# Patient Record
Sex: MALE | Race: WHITE | Employment: OTHER | URBAN - METROPOLITAN AREA
[De-identification: names, ages, dates, MRNs, and addresses within clinical notes are randomized per-mention and may not be internally consistent; named-entity substitution may affect disease eponyms.]

---

## 2017-01-06 ENCOUNTER — TELEPHONE (OUTPATIENT)
Dept: FAMILY MEDICINE CLINIC | Age: 72
End: 2017-01-06

## 2017-01-10 ENCOUNTER — OFFICE VISIT (OUTPATIENT)
Dept: FAMILY MEDICINE CLINIC | Age: 72
End: 2017-01-10

## 2017-01-10 VITALS
HEART RATE: 68 BPM | BODY MASS INDEX: 29.83 KG/M2 | DIASTOLIC BLOOD PRESSURE: 74 MMHG | WEIGHT: 202 LBS | SYSTOLIC BLOOD PRESSURE: 130 MMHG

## 2017-01-10 DIAGNOSIS — E78.2 MIXED HYPERLIPIDEMIA: ICD-10-CM

## 2017-01-10 DIAGNOSIS — K21.9 GASTROESOPHAGEAL REFLUX DISEASE WITHOUT ESOPHAGITIS: ICD-10-CM

## 2017-01-10 DIAGNOSIS — N18.30 CKD (CHRONIC KIDNEY DISEASE) STAGE 3, GFR 30-59 ML/MIN (HCC): ICD-10-CM

## 2017-01-10 DIAGNOSIS — R73.01 ELEVATED FASTING GLUCOSE: ICD-10-CM

## 2017-01-10 DIAGNOSIS — I10 ESSENTIAL HYPERTENSION, BENIGN: Primary | ICD-10-CM

## 2017-01-10 DIAGNOSIS — Z86.718 HISTORY OF RECURRENT DEEP VEIN THROMBOSIS (DVT): ICD-10-CM

## 2017-01-10 PROCEDURE — 36415 COLL VENOUS BLD VENIPUNCTURE: CPT | Performed by: FAMILY MEDICINE

## 2017-01-10 PROCEDURE — 99214 OFFICE O/P EST MOD 30 MIN: CPT | Performed by: FAMILY MEDICINE

## 2017-01-10 ASSESSMENT — ENCOUNTER SYMPTOMS
CONSTIPATION: 0
ABDOMINAL PAIN: 0
EYE PAIN: 0
DIARRHEA: 0
NAUSEA: 0
COUGH: 0
SHORTNESS OF BREATH: 0

## 2017-01-11 LAB
A/G RATIO: 1.9 (ref 1.1–2.2)
ALBUMIN SERPL-MCNC: 4.5 G/DL (ref 3.4–5)
ALP BLD-CCNC: 55 U/L (ref 40–129)
ALT SERPL-CCNC: 10 U/L (ref 10–40)
ANION GAP SERPL CALCULATED.3IONS-SCNC: 15 MMOL/L (ref 3–16)
AST SERPL-CCNC: 12 U/L (ref 15–37)
BILIRUB SERPL-MCNC: 0.4 MG/DL (ref 0–1)
BUN BLDV-MCNC: 22 MG/DL (ref 7–20)
CALCIUM SERPL-MCNC: 9.7 MG/DL (ref 8.3–10.6)
CHLORIDE BLD-SCNC: 100 MMOL/L (ref 99–110)
CHOLESTEROL, TOTAL: 172 MG/DL (ref 0–199)
CO2: 26 MMOL/L (ref 21–32)
CREAT SERPL-MCNC: 1.4 MG/DL (ref 0.8–1.3)
ESTIMATED AVERAGE GLUCOSE: 116.9 MG/DL
GFR AFRICAN AMERICAN: >60
GFR NON-AFRICAN AMERICAN: 50
GLOBULIN: 2.4 G/DL
GLUCOSE BLD-MCNC: 79 MG/DL (ref 70–99)
HBA1C MFR BLD: 5.7 %
HDLC SERPL-MCNC: 67 MG/DL (ref 40–60)
LDL CHOLESTEROL CALCULATED: 91 MG/DL
POTASSIUM SERPL-SCNC: 4.9 MMOL/L (ref 3.5–5.1)
SODIUM BLD-SCNC: 141 MMOL/L (ref 136–145)
TOTAL PROTEIN: 6.9 G/DL (ref 6.4–8.2)
TRIGL SERPL-MCNC: 71 MG/DL (ref 0–150)
VLDLC SERPL CALC-MCNC: 14 MG/DL

## 2017-01-17 ENCOUNTER — HOSPITAL ENCOUNTER (OUTPATIENT)
Dept: OTHER | Age: 72
Discharge: HOME | End: 2017-01-17
Attending: INTERNAL MEDICINE | Admitting: INTERNAL MEDICINE

## 2017-01-17 LAB
A/G RATIO: 1.7 (ref 1.1–2.2)
ALBUMIN SERPL-MCNC: 4.2 G/DL (ref 3.4–5)
ALP BLD-CCNC: 55 U/L (ref 40–129)
ALT SERPL-CCNC: 9 U/L (ref 10–40)
ANION GAP SERPL CALCULATED.3IONS-SCNC: 16 MMOL/L (ref 3–16)
AST SERPL-CCNC: 11 U/L (ref 15–37)
BILIRUB SERPL-MCNC: 0.3 MG/DL (ref 0–1)
BUN BLDV-MCNC: 19 MG/DL (ref 7–20)
CALCIUM SERPL-MCNC: 9.1 MG/DL (ref 8.3–10.6)
CHLORIDE BLD-SCNC: 103 MMOL/L (ref 99–110)
CO2: 25 MMOL/L (ref 21–32)
CREAT SERPL-MCNC: 1.4 MG/DL (ref 0.8–1.3)
GFR AFRICAN AMERICAN: >60
GFR NON-AFRICAN AMERICAN: 50
GLOBULIN: 2.5 G/DL
GLUCOSE BLD-MCNC: 111 MG/DL (ref 70–99)
HCT VFR BLD CALC: 44.1 % (ref 40.5–52.5)
HEMOGLOBIN: 14.7 G/DL (ref 13.5–17.5)
MCH RBC QN AUTO: 29.8 PG (ref 26–34)
MCHC RBC AUTO-ENTMCNC: 33.3 G/DL (ref 31–36)
MCV RBC AUTO: 89.5 FL (ref 80–100)
PDW BLD-RTO: 14.1 % (ref 12.4–15.4)
PLATELET # BLD: 206 K/UL (ref 135–450)
PMV BLD AUTO: 9.2 FL (ref 5–10.5)
POTASSIUM SERPL-SCNC: 4.4 MMOL/L (ref 3.5–5.1)
RBC # BLD: 4.93 M/UL (ref 4.2–5.9)
SODIUM BLD-SCNC: 144 MMOL/L (ref 136–145)
TOTAL PROTEIN: 6.7 G/DL (ref 6.4–8.2)
WBC # BLD: 7.9 K/UL (ref 4–11)

## 2017-02-14 DIAGNOSIS — F41.9 ANXIETY: ICD-10-CM

## 2017-02-15 RX ORDER — ROSUVASTATIN CALCIUM 20 MG/1
20 TABLET, COATED ORAL NIGHTLY
Qty: 90 TABLET | Refills: 1 | Status: SHIPPED | OUTPATIENT
Start: 2017-02-15 | End: 2017-07-11 | Stop reason: SDUPTHER

## 2017-02-15 RX ORDER — LORAZEPAM 1 MG/1
TABLET ORAL
Qty: 180 TABLET | Refills: 0 | OUTPATIENT
Start: 2017-02-15 | End: 2017-05-01 | Stop reason: SDUPTHER

## 2017-02-15 RX ORDER — FENOFIBRATE 48 MG/1
48 TABLET, COATED ORAL DAILY
Qty: 90 TABLET | Refills: 1 | Status: SHIPPED | OUTPATIENT
Start: 2017-02-15 | End: 2017-07-11 | Stop reason: DRUGHIGH

## 2017-03-09 ENCOUNTER — TELEPHONE (OUTPATIENT)
Dept: FAMILY MEDICINE CLINIC | Age: 72
End: 2017-03-09

## 2017-03-15 ENCOUNTER — TELEPHONE (OUTPATIENT)
Dept: FAMILY MEDICINE CLINIC | Age: 72
End: 2017-03-15

## 2017-03-21 DIAGNOSIS — E78.2 MIXED HYPERLIPIDEMIA: Primary | ICD-10-CM

## 2017-03-21 RX ORDER — FENOFIBRATE 160 MG/1
160 TABLET ORAL DAILY
Qty: 90 TABLET | Refills: 1 | Status: CANCELLED | OUTPATIENT
Start: 2017-03-21

## 2017-03-23 RX ORDER — FENOFIBRATE 54 MG/1
54 TABLET ORAL DAILY
Qty: 90 TABLET | Refills: 3 | Status: SHIPPED | OUTPATIENT
Start: 2017-03-23 | End: 2018-03-08 | Stop reason: SDUPTHER

## 2017-04-26 RX ORDER — TRAZODONE HYDROCHLORIDE 100 MG/1
TABLET ORAL
Qty: 90 TABLET | Refills: 1 | Status: SHIPPED | OUTPATIENT
Start: 2017-04-26 | End: 2017-10-06 | Stop reason: SDUPTHER

## 2017-04-26 RX ORDER — LISINOPRIL 20 MG/1
TABLET ORAL
Qty: 90 TABLET | Refills: 1 | Status: SHIPPED | OUTPATIENT
Start: 2017-04-26 | End: 2017-10-06 | Stop reason: SDUPTHER

## 2017-04-26 RX ORDER — DILTIAZEM HYDROCHLORIDE 120 MG/1
CAPSULE, COATED, EXTENDED RELEASE ORAL
Qty: 90 CAPSULE | Refills: 1 | Status: SHIPPED | OUTPATIENT
Start: 2017-04-26 | End: 2017-10-06 | Stop reason: SDUPTHER

## 2017-04-26 RX ORDER — AMITRIPTYLINE HYDROCHLORIDE 100 MG/1
TABLET, FILM COATED ORAL
Qty: 90 TABLET | Refills: 1 | Status: SHIPPED | OUTPATIENT
Start: 2017-04-26 | End: 2017-10-06 | Stop reason: SDUPTHER

## 2017-05-01 DIAGNOSIS — F41.9 ANXIETY: ICD-10-CM

## 2017-05-02 RX ORDER — LORAZEPAM 1 MG/1
TABLET ORAL
Qty: 180 TABLET | Refills: 0 | OUTPATIENT
Start: 2017-05-02 | End: 2017-07-11 | Stop reason: SDUPTHER

## 2017-05-23 RX ORDER — RANITIDINE 150 MG/1
150 TABLET ORAL 2 TIMES DAILY
Qty: 180 TABLET | Refills: 3 | Status: SHIPPED | OUTPATIENT
Start: 2017-05-23 | End: 2018-03-24

## 2017-06-19 DIAGNOSIS — I82.401 DVT, RECURRENT, LOWER EXTREMITY, ACUTE, RIGHT (HCC): ICD-10-CM

## 2017-07-05 ENCOUNTER — HOSPITAL ENCOUNTER (OUTPATIENT)
Dept: GENERAL RADIOLOGY | Age: 72
Discharge: HOME | End: 2017-07-05
Attending: INTERNAL MEDICINE | Admitting: INTERNAL MEDICINE

## 2017-07-05 DIAGNOSIS — R13.10 DYSPHAGIA, UNSPECIFIED TYPE: ICD-10-CM

## 2017-07-05 DIAGNOSIS — R13.10 DYSPHAGIA: ICD-10-CM

## 2017-07-11 ENCOUNTER — OFFICE VISIT (OUTPATIENT)
Dept: FAMILY MEDICINE CLINIC | Age: 72
End: 2017-07-11

## 2017-07-11 VITALS
SYSTOLIC BLOOD PRESSURE: 128 MMHG | WEIGHT: 196 LBS | HEART RATE: 64 BPM | BODY MASS INDEX: 28.94 KG/M2 | DIASTOLIC BLOOD PRESSURE: 70 MMHG

## 2017-07-11 DIAGNOSIS — Z85.46 PERSONAL HISTORY OF PROSTATE CANCER: ICD-10-CM

## 2017-07-11 DIAGNOSIS — I10 ESSENTIAL HYPERTENSION, BENIGN: Primary | ICD-10-CM

## 2017-07-11 DIAGNOSIS — F41.9 ANXIETY: ICD-10-CM

## 2017-07-11 DIAGNOSIS — E78.2 MIXED HYPERLIPIDEMIA: ICD-10-CM

## 2017-07-11 DIAGNOSIS — K21.9 GASTROESOPHAGEAL REFLUX DISEASE WITHOUT ESOPHAGITIS: ICD-10-CM

## 2017-07-11 DIAGNOSIS — N18.30 CKD (CHRONIC KIDNEY DISEASE) STAGE 3, GFR 30-59 ML/MIN (HCC): ICD-10-CM

## 2017-07-11 DIAGNOSIS — R73.01 ELEVATED FASTING GLUCOSE: ICD-10-CM

## 2017-07-11 LAB
A/G RATIO: 2 (ref 1.1–2.2)
ALBUMIN SERPL-MCNC: 4.8 G/DL (ref 3.4–5)
ALP BLD-CCNC: 63 U/L (ref 40–129)
ALT SERPL-CCNC: 13 U/L (ref 10–40)
ANION GAP SERPL CALCULATED.3IONS-SCNC: 14 MMOL/L (ref 3–16)
AST SERPL-CCNC: 13 U/L (ref 15–37)
BASOPHILS ABSOLUTE: 0.1 K/UL (ref 0–0.2)
BASOPHILS RELATIVE PERCENT: 0.7 %
BILIRUB SERPL-MCNC: <0.2 MG/DL (ref 0–1)
BUN BLDV-MCNC: 26 MG/DL (ref 7–20)
CALCIUM SERPL-MCNC: 9.9 MG/DL (ref 8.3–10.6)
CHLORIDE BLD-SCNC: 98 MMOL/L (ref 99–110)
CHOLESTEROL, TOTAL: 224 MG/DL (ref 0–199)
CO2: 26 MMOL/L (ref 21–32)
CREAT SERPL-MCNC: 1.4 MG/DL (ref 0.8–1.3)
EOSINOPHILS ABSOLUTE: 0.2 K/UL (ref 0–0.6)
EOSINOPHILS RELATIVE PERCENT: 2.5 %
GFR AFRICAN AMERICAN: >60
GFR NON-AFRICAN AMERICAN: 50
GLOBULIN: 2.4 G/DL
GLUCOSE BLD-MCNC: 85 MG/DL (ref 70–99)
HCT VFR BLD CALC: 47.2 % (ref 40.5–52.5)
HDLC SERPL-MCNC: 67 MG/DL (ref 40–60)
HEMOGLOBIN: 15.3 G/DL (ref 13.5–17.5)
LDL CHOLESTEROL CALCULATED: 136 MG/DL
LYMPHOCYTES ABSOLUTE: 1.6 K/UL (ref 1–5.1)
LYMPHOCYTES RELATIVE PERCENT: 18.5 %
MCH RBC QN AUTO: 29.7 PG (ref 26–34)
MCHC RBC AUTO-ENTMCNC: 32.5 G/DL (ref 31–36)
MCV RBC AUTO: 91.6 FL (ref 80–100)
MONOCYTES ABSOLUTE: 0.7 K/UL (ref 0–1.3)
MONOCYTES RELATIVE PERCENT: 7.7 %
NEUTROPHILS ABSOLUTE: 6.3 K/UL (ref 1.7–7.7)
NEUTROPHILS RELATIVE PERCENT: 70.6 %
PARATHYROID HORMONE INTACT: 44.9 PG/ML (ref 14–72)
PDW BLD-RTO: 14.2 % (ref 12.4–15.4)
PLATELET # BLD: 240 K/UL (ref 135–450)
PMV BLD AUTO: 9.2 FL (ref 5–10.5)
POTASSIUM SERPL-SCNC: 5 MMOL/L (ref 3.5–5.1)
PROSTATE SPECIFIC ANTIGEN: <0.01 NG/ML (ref 0–4)
RBC # BLD: 5.16 M/UL (ref 4.2–5.9)
SODIUM BLD-SCNC: 138 MMOL/L (ref 136–145)
TOTAL PROTEIN: 7.2 G/DL (ref 6.4–8.2)
TRIGL SERPL-MCNC: 106 MG/DL (ref 0–150)
VITAMIN D 25-HYDROXY: 32.3 NG/ML
VLDLC SERPL CALC-MCNC: 21 MG/DL
WBC # BLD: 8.9 K/UL (ref 4–11)

## 2017-07-11 PROCEDURE — 1036F TOBACCO NON-USER: CPT | Performed by: FAMILY MEDICINE

## 2017-07-11 PROCEDURE — 3017F COLORECTAL CA SCREEN DOC REV: CPT | Performed by: FAMILY MEDICINE

## 2017-07-11 PROCEDURE — 99214 OFFICE O/P EST MOD 30 MIN: CPT | Performed by: FAMILY MEDICINE

## 2017-07-11 PROCEDURE — 1123F ACP DISCUSS/DSCN MKR DOCD: CPT | Performed by: FAMILY MEDICINE

## 2017-07-11 PROCEDURE — 4040F PNEUMOC VAC/ADMIN/RCVD: CPT | Performed by: FAMILY MEDICINE

## 2017-07-11 PROCEDURE — G8427 DOCREV CUR MEDS BY ELIG CLIN: HCPCS | Performed by: FAMILY MEDICINE

## 2017-07-11 PROCEDURE — 36415 COLL VENOUS BLD VENIPUNCTURE: CPT | Performed by: FAMILY MEDICINE

## 2017-07-11 PROCEDURE — G8419 CALC BMI OUT NRM PARAM NOF/U: HCPCS | Performed by: FAMILY MEDICINE

## 2017-07-11 RX ORDER — LORAZEPAM 1 MG/1
TABLET ORAL
Qty: 180 TABLET | Refills: 0 | OUTPATIENT
Start: 2017-07-11 | End: 2017-07-24 | Stop reason: SDUPTHER

## 2017-07-11 RX ORDER — ROSUVASTATIN CALCIUM 20 MG/1
20 TABLET, COATED ORAL NIGHTLY
Qty: 90 TABLET | Refills: 1 | Status: SHIPPED | OUTPATIENT
Start: 2017-07-11 | End: 2018-02-20 | Stop reason: SDUPTHER

## 2017-07-12 ENCOUNTER — HOSPITAL ENCOUNTER (OUTPATIENT)
Dept: OTHER | Age: 72
Discharge: HOME | End: 2017-07-12
Attending: INTERNAL MEDICINE | Admitting: INTERNAL MEDICINE

## 2017-07-12 VITALS
HEIGHT: 69 IN | WEIGHT: 195 LBS | HEART RATE: 62 BPM | OXYGEN SATURATION: 92 % | SYSTOLIC BLOOD PRESSURE: 116 MMHG | BODY MASS INDEX: 28.88 KG/M2 | TEMPERATURE: 98.1 F | DIASTOLIC BLOOD PRESSURE: 78 MMHG | RESPIRATION RATE: 16 BRPM

## 2017-07-12 LAB
ESTIMATED AVERAGE GLUCOSE: 116.9 MG/DL
HBA1C MFR BLD: 5.7 %

## 2017-07-12 RX ORDER — SODIUM CHLORIDE, SODIUM LACTATE, POTASSIUM CHLORIDE, CALCIUM CHLORIDE 600; 310; 30; 20 MG/100ML; MG/100ML; MG/100ML; MG/100ML
INJECTION, SOLUTION INTRAVENOUS CONTINUOUS
Status: DISCONTINUED | OUTPATIENT
Start: 2017-07-12 | End: 2017-07-13 | Stop reason: HOSPADM

## 2017-07-12 RX ADMIN — SODIUM CHLORIDE, SODIUM LACTATE, POTASSIUM CHLORIDE, CALCIUM CHLORIDE: 600; 310; 30; 20 INJECTION, SOLUTION INTRAVENOUS at 09:29

## 2017-07-12 ASSESSMENT — PAIN - FUNCTIONAL ASSESSMENT: PAIN_FUNCTIONAL_ASSESSMENT: 0-10

## 2017-07-12 ASSESSMENT — ENCOUNTER SYMPTOMS
CHEST TIGHTNESS: 0
DIARRHEA: 0
SHORTNESS OF BREATH: 0
ABDOMINAL PAIN: 0
EYE PAIN: 0
NAUSEA: 0
COUGH: 0

## 2017-07-21 ENCOUNTER — TELEPHONE (OUTPATIENT)
Dept: FAMILY MEDICINE CLINIC | Age: 72
End: 2017-07-21

## 2017-07-24 ENCOUNTER — TELEPHONE (OUTPATIENT)
Dept: FAMILY MEDICINE CLINIC | Age: 72
End: 2017-07-24

## 2017-07-24 DIAGNOSIS — F41.9 ANXIETY: ICD-10-CM

## 2017-07-31 RX ORDER — LORAZEPAM 1 MG/1
TABLET ORAL
Qty: 180 TABLET | Refills: 0 | OUTPATIENT
Start: 2017-07-31 | End: 2017-10-23 | Stop reason: SDUPTHER

## 2017-08-21 DIAGNOSIS — E78.2 MIXED HYPERLIPIDEMIA: ICD-10-CM

## 2017-08-21 RX ORDER — ROSUVASTATIN CALCIUM 20 MG/1
20 TABLET, COATED ORAL NIGHTLY
Qty: 90 TABLET | Refills: 1 | Status: CANCELLED | OUTPATIENT
Start: 2017-08-21

## 2017-10-02 ENCOUNTER — TELEPHONE (OUTPATIENT)
Dept: FAMILY MEDICINE CLINIC | Age: 72
End: 2017-10-02

## 2017-10-02 NOTE — TELEPHONE ENCOUNTER
Patient was advised that once a response is received from his insurance he would be notified of the outcome. He voiced understanding.

## 2017-10-06 NOTE — TELEPHONE ENCOUNTER
Alaina Cornelius is requesting refill(s)   Last OV 7/11/17 (pertaining to medication)  LR 4/26/17 (per medication requested)  Next office visit scheduled or attempted Yes   If no, reason:  1/15/18

## 2017-10-06 NOTE — TELEPHONE ENCOUNTER
From: Roland Alegria  To:  Al Reyes MD  Sent: 10/5/2017 8:00 PM EDT  Subject: Medication Renewal Request    Original authorizing provider: MD Roland Jc would like a refill of the following medications:  amitriptyline (ELAVIL) 100 MG tablet [Jarrod Tidwell MD]  diltiazem (CARDIZEM CD) 120 MG extended release capsule [Jarrod Tidwell MD]  lisinopril (PRINIVIL;ZESTRIL) 20 MG tablet [Jarrod Tidwell MD]  traZODone (DESYREL) 100 MG tablet [Jarrod Tidwell MD]    Preferred pharmacy: [address] [place] [address] [phone] - F [phone]    Comment:

## 2017-10-09 RX ORDER — AMITRIPTYLINE HYDROCHLORIDE 100 MG/1
TABLET, FILM COATED ORAL
Qty: 90 TABLET | Refills: 1 | Status: SHIPPED | OUTPATIENT
Start: 2017-10-09 | End: 2018-04-17 | Stop reason: SDUPTHER

## 2017-10-09 RX ORDER — TRAZODONE HYDROCHLORIDE 100 MG/1
TABLET ORAL
Qty: 90 TABLET | Refills: 1 | Status: SHIPPED | OUTPATIENT
Start: 2017-10-09 | End: 2018-03-08 | Stop reason: SDUPTHER

## 2017-10-09 RX ORDER — DILTIAZEM HYDROCHLORIDE 120 MG/1
CAPSULE, COATED, EXTENDED RELEASE ORAL
Qty: 90 CAPSULE | Refills: 1 | Status: SHIPPED | OUTPATIENT
Start: 2017-10-09 | End: 2018-04-17 | Stop reason: SDUPTHER

## 2017-10-09 RX ORDER — LISINOPRIL 20 MG/1
TABLET ORAL
Qty: 90 TABLET | Refills: 1 | Status: SHIPPED | OUTPATIENT
Start: 2017-10-09 | End: 2018-04-17 | Stop reason: SDUPTHER

## 2017-10-23 DIAGNOSIS — F41.9 ANXIETY: ICD-10-CM

## 2017-10-23 RX ORDER — LORAZEPAM 1 MG/1
TABLET ORAL
Qty: 180 TABLET | Refills: 0 | Status: CANCELLED | OUTPATIENT
Start: 2017-10-23

## 2017-10-24 RX ORDER — LORAZEPAM 1 MG/1
TABLET ORAL
Qty: 180 TABLET | Refills: 0 | OUTPATIENT
Start: 2017-10-24 | End: 2018-01-11 | Stop reason: SDUPTHER

## 2017-11-04 ENCOUNTER — HOSPITAL ENCOUNTER (OUTPATIENT)
Dept: ULTRASOUND IMAGING | Age: 72
Discharge: HOME | End: 2017-11-04
Attending: INTERNAL MEDICINE | Admitting: INTERNAL MEDICINE

## 2017-11-04 DIAGNOSIS — R10.13 ABDOMINAL PAIN, EPIGASTRIC: ICD-10-CM

## 2017-11-04 DIAGNOSIS — K80.10 CALCULUS OF GALLBLADDER WITH CHOLECYSTITIS WITHOUT BILIARY OBSTRUCTION, UNSPECIFIED CHOLECYSTITIS ACUITY: ICD-10-CM

## 2017-11-04 DIAGNOSIS — R10.13 EPIGASTRIC PAIN: ICD-10-CM

## 2017-11-04 DIAGNOSIS — K30 MILD DIETARY INDIGESTION: ICD-10-CM

## 2017-11-04 LAB
A/G RATIO: 1.6 (ref 1.1–2.2)
ALBUMIN SERPL-MCNC: 4.4 G/DL (ref 3.4–5)
ALP BLD-CCNC: 53 U/L (ref 40–129)
ALT SERPL-CCNC: 9 U/L (ref 10–40)
ANION GAP SERPL CALCULATED.3IONS-SCNC: 11 MMOL/L (ref 3–16)
AST SERPL-CCNC: 17 U/L (ref 15–37)
BASOPHILS ABSOLUTE: 0 K/UL (ref 0–0.2)
BASOPHILS RELATIVE PERCENT: 0.6 %
BILIRUB SERPL-MCNC: 0.4 MG/DL (ref 0–1)
BUN BLDV-MCNC: 30 MG/DL (ref 7–20)
CALCIUM SERPL-MCNC: 9.4 MG/DL (ref 8.3–10.6)
CHLORIDE BLD-SCNC: 97 MMOL/L (ref 99–110)
CO2: 28 MMOL/L (ref 21–32)
CREAT SERPL-MCNC: 1.5 MG/DL (ref 0.8–1.3)
EOSINOPHILS ABSOLUTE: 0.2 K/UL (ref 0–0.6)
EOSINOPHILS RELATIVE PERCENT: 2.1 %
GFR AFRICAN AMERICAN: 56
GFR NON-AFRICAN AMERICAN: 46
GLOBULIN: 2.8 G/DL
GLUCOSE BLD-MCNC: 88 MG/DL (ref 70–99)
HCT VFR BLD CALC: 44.9 % (ref 40.5–52.5)
HEMOGLOBIN: 15 G/DL (ref 13.5–17.5)
LIPASE: 24 U/L (ref 13–60)
LYMPHOCYTES ABSOLUTE: 1.4 K/UL (ref 1–5.1)
LYMPHOCYTES RELATIVE PERCENT: 17.6 %
MCH RBC QN AUTO: 30.3 PG (ref 26–34)
MCHC RBC AUTO-ENTMCNC: 33.3 G/DL (ref 31–36)
MCV RBC AUTO: 91 FL (ref 80–100)
MONOCYTES ABSOLUTE: 0.7 K/UL (ref 0–1.3)
MONOCYTES RELATIVE PERCENT: 8.5 %
NEUTROPHILS ABSOLUTE: 5.6 K/UL (ref 1.7–7.7)
NEUTROPHILS RELATIVE PERCENT: 71.2 %
PDW BLD-RTO: 14.1 % (ref 12.4–15.4)
PLATELET # BLD: 222 K/UL (ref 135–450)
PMV BLD AUTO: 9.1 FL (ref 5–10.5)
POTASSIUM SERPL-SCNC: 5 MMOL/L (ref 3.5–5.1)
RBC # BLD: 4.94 M/UL (ref 4.2–5.9)
SODIUM BLD-SCNC: 136 MMOL/L (ref 136–145)
TOTAL PROTEIN: 7.2 G/DL (ref 6.4–8.2)
TSH SERPL DL<=0.05 MIU/L-ACNC: 2.48 UIU/ML (ref 0.27–4.2)
WBC # BLD: 7.9 K/UL (ref 4–11)

## 2017-11-06 LAB — H PYLORI BREATH TEST: NEGATIVE

## 2018-01-11 DIAGNOSIS — F41.9 ANXIETY: ICD-10-CM

## 2018-01-12 RX ORDER — LORAZEPAM 1 MG/1
TABLET ORAL
Qty: 180 TABLET | Refills: 0 | OUTPATIENT
Start: 2018-01-12 | End: 2018-04-06 | Stop reason: SDUPTHER

## 2018-01-15 ENCOUNTER — TELEPHONE (OUTPATIENT)
Dept: FAMILY MEDICINE CLINIC | Age: 73
End: 2018-01-15

## 2018-01-15 NOTE — TELEPHONE ENCOUNTER
Left message for patient to reschedule today's visit (1/15/18) while our office was delayed 2 hours this morning.  Patient is to schedule at earliest convenience

## 2018-01-29 ENCOUNTER — OFFICE VISIT (OUTPATIENT)
Dept: FAMILY MEDICINE CLINIC | Age: 73
End: 2018-01-29

## 2018-01-29 VITALS
HEIGHT: 69 IN | BODY MASS INDEX: 28.58 KG/M2 | HEART RATE: 64 BPM | WEIGHT: 193 LBS | OXYGEN SATURATION: 96 % | DIASTOLIC BLOOD PRESSURE: 62 MMHG | SYSTOLIC BLOOD PRESSURE: 110 MMHG

## 2018-01-29 DIAGNOSIS — Z23 NEED FOR IMMUNIZATION AGAINST INFLUENZA: ICD-10-CM

## 2018-01-29 DIAGNOSIS — E78.2 MIXED HYPERLIPIDEMIA: ICD-10-CM

## 2018-01-29 DIAGNOSIS — I82.401 DEEP VEIN THROMBOSIS (DVT) OF RIGHT LOWER EXTREMITY, UNSPECIFIED CHRONICITY, UNSPECIFIED VEIN (HCC): ICD-10-CM

## 2018-01-29 DIAGNOSIS — R73.01 ELEVATED FASTING GLUCOSE: ICD-10-CM

## 2018-01-29 DIAGNOSIS — I10 ESSENTIAL HYPERTENSION, BENIGN: Primary | ICD-10-CM

## 2018-01-29 LAB
ANION GAP SERPL CALCULATED.3IONS-SCNC: 13 MMOL/L (ref 3–16)
BUN BLDV-MCNC: 25 MG/DL (ref 7–20)
CALCIUM SERPL-MCNC: 9.7 MG/DL (ref 8.3–10.6)
CHLORIDE BLD-SCNC: 104 MMOL/L (ref 99–110)
CHOLESTEROL, TOTAL: 177 MG/DL (ref 0–199)
CO2: 28 MMOL/L (ref 21–32)
CREAT SERPL-MCNC: 1.3 MG/DL (ref 0.8–1.3)
GFR AFRICAN AMERICAN: >60
GFR NON-AFRICAN AMERICAN: 54
GLUCOSE BLD-MCNC: 89 MG/DL (ref 70–99)
HDLC SERPL-MCNC: 75 MG/DL (ref 40–60)
LDL CHOLESTEROL CALCULATED: 86 MG/DL
POTASSIUM SERPL-SCNC: 5.4 MMOL/L (ref 3.5–5.1)
SODIUM BLD-SCNC: 145 MMOL/L (ref 136–145)
TRIGL SERPL-MCNC: 80 MG/DL (ref 0–150)
VLDLC SERPL CALC-MCNC: 16 MG/DL

## 2018-01-29 PROCEDURE — 1123F ACP DISCUSS/DSCN MKR DOCD: CPT | Performed by: FAMILY MEDICINE

## 2018-01-29 PROCEDURE — 3017F COLORECTAL CA SCREEN DOC REV: CPT | Performed by: FAMILY MEDICINE

## 2018-01-29 PROCEDURE — 1036F TOBACCO NON-USER: CPT | Performed by: FAMILY MEDICINE

## 2018-01-29 PROCEDURE — 99213 OFFICE O/P EST LOW 20 MIN: CPT | Performed by: FAMILY MEDICINE

## 2018-01-29 PROCEDURE — G8484 FLU IMMUNIZE NO ADMIN: HCPCS | Performed by: FAMILY MEDICINE

## 2018-01-29 PROCEDURE — 90662 IIV NO PRSV INCREASED AG IM: CPT | Performed by: FAMILY MEDICINE

## 2018-01-29 PROCEDURE — 4040F PNEUMOC VAC/ADMIN/RCVD: CPT | Performed by: FAMILY MEDICINE

## 2018-01-29 PROCEDURE — G8427 DOCREV CUR MEDS BY ELIG CLIN: HCPCS | Performed by: FAMILY MEDICINE

## 2018-01-29 PROCEDURE — G8419 CALC BMI OUT NRM PARAM NOF/U: HCPCS | Performed by: FAMILY MEDICINE

## 2018-01-29 PROCEDURE — G0008 ADMIN INFLUENZA VIRUS VAC: HCPCS | Performed by: FAMILY MEDICINE

## 2018-01-29 PROCEDURE — 36415 COLL VENOUS BLD VENIPUNCTURE: CPT | Performed by: FAMILY MEDICINE

## 2018-01-29 RX ORDER — PANTOPRAZOLE SODIUM 40 MG/1
40 TABLET, DELAYED RELEASE ORAL DAILY
COMMUNITY
Start: 2018-01-24 | End: 2022-04-07 | Stop reason: SDUPTHER

## 2018-01-30 LAB
ESTIMATED AVERAGE GLUCOSE: 114 MG/DL
HBA1C MFR BLD: 5.6 %

## 2018-01-30 ASSESSMENT — ENCOUNTER SYMPTOMS
ABDOMINAL PAIN: 0
SHORTNESS OF BREATH: 0

## 2018-02-20 DIAGNOSIS — E78.2 MIXED HYPERLIPIDEMIA: ICD-10-CM

## 2018-02-20 RX ORDER — ROSUVASTATIN CALCIUM 20 MG/1
20 TABLET, COATED ORAL NIGHTLY
Qty: 90 TABLET | Refills: 1 | Status: SHIPPED | OUTPATIENT
Start: 2018-02-20 | End: 2018-05-07 | Stop reason: SDUPTHER

## 2018-03-08 DIAGNOSIS — E78.2 MIXED HYPERLIPIDEMIA: ICD-10-CM

## 2018-03-09 DIAGNOSIS — E78.2 MIXED HYPERLIPIDEMIA: ICD-10-CM

## 2018-03-09 NOTE — TELEPHONE ENCOUNTER
Collette Gey is requesting refill(s)   Last OV 1/29/18 (pertaining to medication)  LR 3/3/17 (per medication requested)  Next office visit scheduled or attempted No   If no, reason:  Has one on 7/30/18

## 2018-03-10 RX ORDER — FENOFIBRATE 54 MG/1
54 TABLET ORAL DAILY
Qty: 90 TABLET | Refills: 3 | Status: SHIPPED | OUTPATIENT
Start: 2018-03-10 | End: 2019-04-01 | Stop reason: SDUPTHER

## 2018-03-10 RX ORDER — TRAZODONE HYDROCHLORIDE 100 MG/1
TABLET ORAL
Qty: 90 TABLET | Refills: 3 | Status: SHIPPED | OUTPATIENT
Start: 2018-03-10 | End: 2018-03-24

## 2018-03-10 RX ORDER — TRAZODONE HYDROCHLORIDE 100 MG/1
TABLET ORAL
Qty: 90 TABLET | Refills: 3 | Status: SHIPPED | OUTPATIENT
Start: 2018-03-10 | End: 2019-05-27 | Stop reason: SDUPTHER

## 2018-03-10 RX ORDER — FENOFIBRATE 54 MG/1
TABLET ORAL
Qty: 90 TABLET | Refills: 3 | Status: SHIPPED | OUTPATIENT
Start: 2018-03-10 | End: 2018-03-24

## 2018-03-26 ENCOUNTER — OFFICE VISIT (OUTPATIENT)
Dept: FAMILY MEDICINE CLINIC | Age: 73
End: 2018-03-26

## 2018-03-26 VITALS
HEART RATE: 84 BPM | DIASTOLIC BLOOD PRESSURE: 70 MMHG | RESPIRATION RATE: 18 BRPM | HEIGHT: 71 IN | WEIGHT: 189.4 LBS | SYSTOLIC BLOOD PRESSURE: 116 MMHG | BODY MASS INDEX: 26.52 KG/M2

## 2018-03-26 DIAGNOSIS — E87.5 HYPERKALEMIA: Primary | ICD-10-CM

## 2018-03-26 DIAGNOSIS — I49.9 IRREGULAR HEART BEAT: ICD-10-CM

## 2018-03-26 DIAGNOSIS — R42 EPISODIC LIGHTHEADEDNESS: ICD-10-CM

## 2018-03-26 LAB — POTASSIUM SERPL-SCNC: 5 MMOL/L (ref 3.5–5.1)

## 2018-03-26 PROCEDURE — G8428 CUR MEDS NOT DOCUMENT: HCPCS | Performed by: PHYSICIAN ASSISTANT

## 2018-03-26 PROCEDURE — 99213 OFFICE O/P EST LOW 20 MIN: CPT | Performed by: PHYSICIAN ASSISTANT

## 2018-03-26 PROCEDURE — G8482 FLU IMMUNIZE ORDER/ADMIN: HCPCS | Performed by: PHYSICIAN ASSISTANT

## 2018-03-26 PROCEDURE — 4040F PNEUMOC VAC/ADMIN/RCVD: CPT | Performed by: PHYSICIAN ASSISTANT

## 2018-03-26 PROCEDURE — 1036F TOBACCO NON-USER: CPT | Performed by: PHYSICIAN ASSISTANT

## 2018-03-26 PROCEDURE — 3017F COLORECTAL CA SCREEN DOC REV: CPT | Performed by: PHYSICIAN ASSISTANT

## 2018-03-26 PROCEDURE — 1123F ACP DISCUSS/DSCN MKR DOCD: CPT | Performed by: PHYSICIAN ASSISTANT

## 2018-03-26 PROCEDURE — 36415 COLL VENOUS BLD VENIPUNCTURE: CPT | Performed by: PHYSICIAN ASSISTANT

## 2018-03-26 PROCEDURE — G8419 CALC BMI OUT NRM PARAM NOF/U: HCPCS | Performed by: PHYSICIAN ASSISTANT

## 2018-03-26 ASSESSMENT — ENCOUNTER SYMPTOMS
GASTROINTESTINAL NEGATIVE: 1
RESPIRATORY NEGATIVE: 1

## 2018-04-06 ENCOUNTER — TELEPHONE (OUTPATIENT)
Dept: FAMILY MEDICINE CLINIC | Age: 73
End: 2018-04-06

## 2018-04-06 DIAGNOSIS — F41.9 ANXIETY: ICD-10-CM

## 2018-04-10 RX ORDER — LORAZEPAM 1 MG/1
TABLET ORAL
Qty: 180 TABLET | Refills: 0 | OUTPATIENT
Start: 2018-04-10 | End: 2018-07-05

## 2018-04-18 RX ORDER — LISINOPRIL 20 MG/1
TABLET ORAL
Qty: 90 TABLET | Refills: 1 | Status: SHIPPED | OUTPATIENT
Start: 2018-04-18 | End: 2018-09-24 | Stop reason: SDUPTHER

## 2018-04-18 RX ORDER — AMITRIPTYLINE HYDROCHLORIDE 100 MG/1
TABLET, FILM COATED ORAL
Qty: 90 TABLET | Refills: 1 | Status: SHIPPED | OUTPATIENT
Start: 2018-04-18 | End: 2018-09-24 | Stop reason: SDUPTHER

## 2018-04-18 RX ORDER — DILTIAZEM HYDROCHLORIDE 120 MG/1
CAPSULE, COATED, EXTENDED RELEASE ORAL
Qty: 90 CAPSULE | Refills: 1 | Status: SHIPPED | OUTPATIENT
Start: 2018-04-18 | End: 2018-09-24 | Stop reason: SDUPTHER

## 2018-05-07 DIAGNOSIS — E78.2 MIXED HYPERLIPIDEMIA: ICD-10-CM

## 2018-05-09 RX ORDER — ROSUVASTATIN CALCIUM 20 MG/1
20 TABLET, COATED ORAL NIGHTLY
Qty: 90 TABLET | Refills: 3 | Status: SHIPPED | OUTPATIENT
Start: 2018-05-09 | End: 2019-04-01 | Stop reason: SDUPTHER

## 2018-06-18 DIAGNOSIS — I82.401 DVT, RECURRENT, LOWER EXTREMITY, ACUTE, RIGHT (HCC): ICD-10-CM

## 2018-06-20 DIAGNOSIS — I82.401 DVT, RECURRENT, LOWER EXTREMITY, ACUTE, RIGHT (HCC): ICD-10-CM

## 2018-07-02 ENCOUNTER — TELEPHONE (OUTPATIENT)
Dept: FAMILY MEDICINE CLINIC | Age: 73
End: 2018-07-02

## 2018-07-16 DIAGNOSIS — F41.9 ANXIETY: Primary | ICD-10-CM

## 2018-07-16 RX ORDER — LORAZEPAM 1 MG/1
1 TABLET ORAL 2 TIMES DAILY PRN
Qty: 180 TABLET | Refills: 0 | OUTPATIENT
Start: 2018-07-16 | End: 2018-10-01 | Stop reason: SDUPTHER

## 2018-07-16 NOTE — TELEPHONE ENCOUNTER
Mr Mary Mckeon stopping in office to ask about his lorazepam refill/ last fill 4/10/18 for #180 wants called into local pharmacy , jose [facility] instead of mail-away this time

## 2018-07-30 ENCOUNTER — OFFICE VISIT (OUTPATIENT)
Dept: FAMILY MEDICINE CLINIC | Age: 73
End: 2018-07-30

## 2018-07-30 VITALS
DIASTOLIC BLOOD PRESSURE: 64 MMHG | SYSTOLIC BLOOD PRESSURE: 110 MMHG | BODY MASS INDEX: 26.88 KG/M2 | WEIGHT: 190 LBS | HEART RATE: 60 BPM

## 2018-07-30 DIAGNOSIS — R73.09 ELEVATED GLUCOSE: ICD-10-CM

## 2018-07-30 DIAGNOSIS — I10 ESSENTIAL HYPERTENSION, BENIGN: Primary | ICD-10-CM

## 2018-07-30 DIAGNOSIS — I49.49 PREMATURE CONTRACTIONS OR SYSTOLES: ICD-10-CM

## 2018-07-30 DIAGNOSIS — C61 PROSTATE CA (HCC): ICD-10-CM

## 2018-07-30 DIAGNOSIS — F41.9 ANXIETY: ICD-10-CM

## 2018-07-30 DIAGNOSIS — I83.92 VARICOSE VEINS OF LEFT LOWER EXTREMITY: ICD-10-CM

## 2018-07-30 DIAGNOSIS — I80.02 THROMBOPHLEBITIS OF SUPERFICIAL VEINS OF LEFT LOWER EXTREMITY: ICD-10-CM

## 2018-07-30 DIAGNOSIS — I83.91 VARICOSE VEINS OF RIGHT LOWER EXTREMITY: ICD-10-CM

## 2018-07-30 DIAGNOSIS — K22.70 BARRETT'S ESOPHAGUS WITHOUT DYSPLASIA: ICD-10-CM

## 2018-07-30 DIAGNOSIS — E78.2 MIXED HYPERLIPIDEMIA: ICD-10-CM

## 2018-07-30 DIAGNOSIS — K21.9 GASTROESOPHAGEAL REFLUX DISEASE WITHOUT ESOPHAGITIS: ICD-10-CM

## 2018-07-30 LAB
ANION GAP SERPL CALCULATED.3IONS-SCNC: 14 MMOL/L (ref 3–16)
BUN BLDV-MCNC: 32 MG/DL (ref 7–20)
CALCIUM SERPL-MCNC: 9.9 MG/DL (ref 8.3–10.6)
CHLORIDE BLD-SCNC: 101 MMOL/L (ref 99–110)
CHOLESTEROL, TOTAL: 183 MG/DL (ref 0–199)
CO2: 25 MMOL/L (ref 21–32)
CREAT SERPL-MCNC: 1.6 MG/DL (ref 0.8–1.3)
GFR AFRICAN AMERICAN: 51
GFR NON-AFRICAN AMERICAN: 43
GLUCOSE BLD-MCNC: 102 MG/DL (ref 70–99)
HDLC SERPL-MCNC: 67 MG/DL (ref 40–60)
LDL CHOLESTEROL CALCULATED: 97 MG/DL
POTASSIUM SERPL-SCNC: 5.3 MMOL/L (ref 3.5–5.1)
PROSTATE SPECIFIC ANTIGEN: <0.01 NG/ML (ref 0–4)
SODIUM BLD-SCNC: 140 MMOL/L (ref 136–145)
TRIGL SERPL-MCNC: 97 MG/DL (ref 0–150)
VLDLC SERPL CALC-MCNC: 19 MG/DL

## 2018-07-30 PROCEDURE — 3017F COLORECTAL CA SCREEN DOC REV: CPT | Performed by: FAMILY MEDICINE

## 2018-07-30 PROCEDURE — 4040F PNEUMOC VAC/ADMIN/RCVD: CPT | Performed by: FAMILY MEDICINE

## 2018-07-30 PROCEDURE — 36415 COLL VENOUS BLD VENIPUNCTURE: CPT | Performed by: FAMILY MEDICINE

## 2018-07-30 PROCEDURE — G8419 CALC BMI OUT NRM PARAM NOF/U: HCPCS | Performed by: FAMILY MEDICINE

## 2018-07-30 PROCEDURE — 1036F TOBACCO NON-USER: CPT | Performed by: FAMILY MEDICINE

## 2018-07-30 PROCEDURE — G8427 DOCREV CUR MEDS BY ELIG CLIN: HCPCS | Performed by: FAMILY MEDICINE

## 2018-07-30 PROCEDURE — 1123F ACP DISCUSS/DSCN MKR DOCD: CPT | Performed by: FAMILY MEDICINE

## 2018-07-30 PROCEDURE — 99214 OFFICE O/P EST MOD 30 MIN: CPT | Performed by: FAMILY MEDICINE

## 2018-07-30 PROCEDURE — 1101F PT FALLS ASSESS-DOCD LE1/YR: CPT | Performed by: FAMILY MEDICINE

## 2018-07-30 ASSESSMENT — ENCOUNTER SYMPTOMS
SHORTNESS OF BREATH: 0
NAUSEA: 0
ABDOMINAL PAIN: 0
COUGH: 0
EYE PAIN: 0
DIARRHEA: 0

## 2018-07-30 ASSESSMENT — PATIENT HEALTH QUESTIONNAIRE - PHQ9
SUM OF ALL RESPONSES TO PHQ QUESTIONS 1-9: 0
2. FEELING DOWN, DEPRESSED OR HOPELESS: 0
SUM OF ALL RESPONSES TO PHQ9 QUESTIONS 1 & 2: 0
1. LITTLE INTEREST OR PLEASURE IN DOING THINGS: 0

## 2018-07-30 NOTE — PROGRESS NOTES
Subjective:      Patient ID: Thanh Marie is a 67 y.o. male. HPI  FU for HTN/HLD/elevated glucose. In [place] recently. Noticed something above left medial ankle, concerned about ? DVT - has US, but told seems to be large lump, and it hurts. Was a little black and blue at the time, and states is \"weird\" - states. Still hurts and is a little swollen - was told he needs to do something about his varicose veins. States was told it was a superficial thrombophlebitis. About one month ago, noticed irregular heartbeats - had stress test, all ok, and told his irregular heartbeat is NOT concerning - told can come back in 2 years, no need to FU for the irregular heartbeat (per Lorene Severino). Walking 40  Minutes 4 times/week, and diet good (lowfat) - 185 few months ago, now up to 190. Pantoprazole for GERD, daily, and works well. (Also told has Ni's esophagus.)    Review of Systems   Constitutional: Negative for chills and fever. HENT: Negative for ear pain and hearing loss. Eyes: Negative for pain. Respiratory: Negative for cough and shortness of breath. Cardiovascular: Negative for chest pain, palpitations and leg swelling. Gastrointestinal: Negative for abdominal pain, diarrhea and nausea. Genitourinary: Negative for difficulty urinating and dysuria. Musculoskeletal: Negative for gait problem. Psychiatric/Behavioral: Negative for dysphoric mood. The patient is not nervous/anxious. Objective:   Physical Exam   Constitutional: He is oriented to person, place, and time. He appears well-developed and well-nourished. No distress. HENT:   Head: Normocephalic and atraumatic. Eyes: Conjunctivae and EOM are normal. No scleral icterus. Neck: Neck supple. Cardiovascular: Normal rate, regular rhythm, normal heart sounds and intact distal pulses. Pulmonary/Chest: Effort normal and breath sounds normal. No respiratory distress. Abdominal: Soft. There is no tenderness.

## 2018-07-31 DIAGNOSIS — R79.89 ELEVATED SERUM CREATININE: Primary | ICD-10-CM

## 2018-07-31 DIAGNOSIS — I10 ESSENTIAL HYPERTENSION, BENIGN: ICD-10-CM

## 2018-07-31 LAB
ESTIMATED AVERAGE GLUCOSE: 114 MG/DL
HBA1C MFR BLD: 5.6 %

## 2018-07-31 NOTE — PATIENT INSTRUCTIONS
Hypertension, anxiety, GERD, and premature contractions all stable, continue present medications. Consider follow-up with vascular surgeon as discussed, regarding your bilateral varicose veins. Follow-up with cardiologist if palpitations increase, or if any other symptoms with the palpitations. Advise low-fat and low sweets diet, also mild/gradual weight loss as able. If labs stable, and overall feeling well, then repeat fasting office visit in 6 months, sooner as needed.

## 2018-09-24 NOTE — TELEPHONE ENCOUNTER
From: Bruce Pal  Sent: 9/24/2018 7:21 AM EDT  Subject: Medication Renewal Request    Bruce Pal would like a refill of the following medications:     amitriptyline (ELAVIL) 100 MG tablet [Jarrod Tidwell MD]     diltiazem (CARDIZEM CD) 120 MG extended release capsule [Jarrod Tidwell MD]     lisinopril (PRINIVIL;ZESTRIL) 20 MG tablet [Jarrod Tidwell MD]    Preferred pharmacy: [address] [place] [address] [phone] - F [phone]

## 2018-09-25 RX ORDER — LISINOPRIL 20 MG/1
TABLET ORAL
Qty: 90 TABLET | Refills: 1 | Status: CANCELLED | OUTPATIENT
Start: 2018-09-25

## 2018-09-25 RX ORDER — AMITRIPTYLINE HYDROCHLORIDE 100 MG/1
TABLET, FILM COATED ORAL
Qty: 90 TABLET | Refills: 1 | Status: CANCELLED | OUTPATIENT
Start: 2018-09-25

## 2018-09-25 RX ORDER — DILTIAZEM HYDROCHLORIDE 120 MG/1
CAPSULE, COATED, EXTENDED RELEASE ORAL
Qty: 90 CAPSULE | Refills: 1 | Status: CANCELLED | OUTPATIENT
Start: 2018-09-25

## 2018-09-25 NOTE — TELEPHONE ENCOUNTER
Norma Fernández is requesting refill(s)   Last OV 7/30/18 (pertaining to medication)  LR 12/4/17 (per medication requested)  Next office visit scheduled or attempted No   If no, reason:  Has one on 12/4/18

## 2018-09-25 NOTE — TELEPHONE ENCOUNTER
From: Sonia Phillips  Sent: 9/25/2018 7:22 AM EDT  Subject: Medication Renewal Request    Sonia Phillips would like a refill of the following medications:     amitriptyline (ELAVIL) 100 MG tablet [Jarrod Tidwell MD]     diltiazem (CARDIZEM CD) 120 MG extended release capsule [Jarrod Tidwell MD]     lisinopril (PRINIVIL;ZESTRIL) 20 MG tablet [Jarrod Tidwell MD]    Preferred pharmacy: [address] [place] [address] [phone] - F [phone]

## 2018-09-26 RX ORDER — DILTIAZEM HYDROCHLORIDE 120 MG/1
CAPSULE, COATED, EXTENDED RELEASE ORAL
Qty: 90 CAPSULE | Refills: 1 | Status: SHIPPED | OUTPATIENT
Start: 2018-09-26 | End: 2019-04-01 | Stop reason: SDUPTHER

## 2018-09-26 RX ORDER — LISINOPRIL 20 MG/1
TABLET ORAL
Qty: 90 TABLET | Refills: 1 | Status: SHIPPED | OUTPATIENT
Start: 2018-09-26 | End: 2019-04-01 | Stop reason: SDUPTHER

## 2018-09-26 RX ORDER — AMITRIPTYLINE HYDROCHLORIDE 100 MG/1
TABLET, FILM COATED ORAL
Qty: 90 TABLET | Refills: 1 | Status: SHIPPED | OUTPATIENT
Start: 2018-09-26 | End: 2019-04-01 | Stop reason: SDUPTHER

## 2018-10-01 DIAGNOSIS — F41.9 ANXIETY: ICD-10-CM

## 2018-10-03 RX ORDER — LORAZEPAM 1 MG/1
1 TABLET ORAL 2 TIMES DAILY PRN
Qty: 180 TABLET | Refills: 0 | OUTPATIENT
Start: 2018-10-03 | End: 2018-12-19 | Stop reason: SDUPTHER

## 2018-10-22 ENCOUNTER — HOSPITAL ENCOUNTER (OUTPATIENT)
Dept: GENERAL RADIOLOGY | Age: 73
Discharge: HOME | End: 2018-10-22
Payer: MEDICARE

## 2018-10-22 DIAGNOSIS — R13.10 DYSPHAGIA, UNSPECIFIED TYPE: ICD-10-CM

## 2018-10-22 PROCEDURE — 74220 X-RAY XM ESOPHAGUS 1CNTRST: CPT

## 2018-12-19 DIAGNOSIS — F41.9 ANXIETY: ICD-10-CM

## 2018-12-19 NOTE — TELEPHONE ENCOUNTER
Emma Wiley is requesting refill(s) ativan  Last OV 7/30/18 (pertaining to medication)  LR 10/3/18 (per medication requested)  Next office visit scheduled or attempted Yes   If no, reason:  2/4/19

## 2018-12-21 ENCOUNTER — TELEPHONE (OUTPATIENT)
Dept: FAMILY MEDICINE CLINIC | Age: 73
End: 2018-12-21

## 2018-12-21 ENCOUNTER — APPOINTMENT (OUTPATIENT)
Dept: CT IMAGING | Age: 73
End: 2018-12-21
Payer: COMMERCIAL

## 2018-12-21 ENCOUNTER — HOSPITAL ENCOUNTER (EMERGENCY)
Age: 73
Discharge: HOME | End: 2018-12-21
Attending: EMERGENCY MEDICINE
Payer: COMMERCIAL

## 2018-12-21 VITALS
HEIGHT: 69 IN | HEART RATE: 60 BPM | RESPIRATION RATE: 14 BRPM | WEIGHT: 187 LBS | SYSTOLIC BLOOD PRESSURE: 131 MMHG | DIASTOLIC BLOOD PRESSURE: 75 MMHG | BODY MASS INDEX: 27.7 KG/M2 | TEMPERATURE: 98.1 F | OXYGEN SATURATION: 98 %
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
## 2018-12-21 DIAGNOSIS — S09.90XA CLOSED HEAD INJURY, INITIAL ENCOUNTER: ICD-10-CM

## 2018-12-21 PROCEDURE — 70450 CT HEAD/BRAIN W/O DYE: CPT

## 2018-12-21 PROCEDURE — 99284 EMERGENCY DEPT VISIT MOD MDM: CPT

## 2018-12-21 RX ORDER — LORAZEPAM 1 MG/1
TABLET ORAL
Qty: 180 TABLET | Refills: 0 | Status: SHIPPED | OUTPATIENT
Start: 2018-12-21 | End: 2019-03-19 | Stop reason: SDUPTHER

## 2018-12-21 ASSESSMENT — PAIN DESCRIPTION - PAIN TYPE: TYPE: ACUTE PAIN

## 2018-12-21 ASSESSMENT — PAIN SCALES - GENERAL
PAINLEVEL_OUTOF10: 3
PAINLEVEL_OUTOF10: 0

## 2018-12-21 ASSESSMENT — PAIN DESCRIPTION - ORIENTATION: ORIENTATION: POSTERIOR

## 2018-12-21 ASSESSMENT — PAIN DESCRIPTION - LOCATION: LOCATION: HEAD

## 2018-12-22 NOTE — ED PROVIDER NOTES
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 denies loc. on eloquis) and Head Injury      HISTORY OF PRESENT ILLNESS  Sahil Mcclure is a 75 y.o. male who presents to the ED complaining of headache after tripped and fell. Works with disabled people. He is on Eliquis due to DVTs in the past. He denies any loc or neck pain or any numbness or tingling. .   No other complaints, modifying factors or associated symptoms. Nursing notes reviewed. Past Medical History:   Diagnosis Date    Arthritis     [facility]     DVT (deep venous thrombosis) ([facility] 7/1/2014    Left leg    Elevated fasting glucose     Hyperlipidemia     Hypertension     MVP (mitral valve prolapse)     Prostate CA ([facility] 2005     Past Surgical History:   Procedure Laterality Date    BLADDER SURGERY      tumor removed    COLONOSCOPY  6/29/12    Polyps    COLONOSCOPY  07/12/2017    Polyp    EYE SURGERY  June 2009    cataracts both    HERNIA REPAIR  6/1999    Poncho Colon - Dr. [facility]  2005 approx    prostatectomy - Dr. Randal Rothman      In [facility] - had one tonsil burned out     Family History   Problem Relation Age of Onset    Cancer Mother         lung    Cancer Father         prostate    Diabetes Father     Diabetes Brother     Cancer Daughter      Social History     Social History    Marital status:      Spouse name: N/A    Number of children: N/A    Years of education: N/A     Occupational History    Not on file. Social History Main Topics    Smoking status: Never Smoker    Smokeless tobacco: Never Used    Alcohol use No    Drug use: No    Sexual activity: Not on file     Other Topics Concern    Not on file     Social History Narrative    , retired 1/2015     No current facility-administered medications for this encounter.       Current Outpatient Prescriptions   Medication Sig Dispense Refill    LORazepam (ATIVAN) 1 MG tablet TAKE 1 TABLET BY MOUTH  TWICE A DAY AS NEEDED FOR  ANXIETY MUST LAST 90 DAYS 180 tablet 0    amitriptyline (ELAVIL) 100 MG tablet Take 1 tablet by mouth  every night as directed 90 tablet 1    diltiazem (CARDIZEM CD) 120 MG extended release capsule Take 1 capsule by mouth  daily 90 capsule 1    lisinopril (PRINIVIL;ZESTRIL) 20 MG tablet Take 1 tablet by mouth  daily 90 tablet 1    apixaban (ELIQUIS) 5 MG TABS tablet Take 1 tablet by mouth 2 times daily 180 tablet 3    rosuvastatin (CRESTOR) 20 MG tablet Take 1 tablet by mouth nightly 90 tablet 3    traZODone (DESYREL) 100 MG tablet Take 1 tablet by mouth  nightly 90 tablet 3    fenofibrate (TRICOR) 54 MG tablet Take 1 tablet by mouth daily instead of the 48mg dose. (Please dispense GENERIC!) 90 tablet 3    pantoprazole (PROTONIX) 40 MG tablet Take 40 mg by mouth daily       Flaxseed, Linseed, (FLAX SEED OIL) 1000 MG CAPS Take 4,000 Units by mouth daily. Allergies   Allergen Reactions    Lipitor Rash       REVIEW OF SYSTEMS  6 systems reviewed, pertinent positives per HPI otherwise noted to be negative    PHYSICAL EXAM  /75   Pulse 60   Temp 98.1 °F (36.7 °C)   Resp 14   Ht 5' 9\" (1.753 m)   Wt 187 lb (84.8 kg)   SpO2 98%   BMI 27.62 kg/m²   GENERAL APPEARANCE: Awake and alert. Cooperative. No acute distress. HEAD: Normocephalic. Atraumatic. EYES: PERRL. EOM's grossly intact. ENT: Mucous membranes are moist.   NECK: Supple. Normal ROM. CHEST: Equal symmetric chest rise. LUNGS: Breathing is unlabored. Speaking comfortably in full sentences. EXTREMITIES: . MAEE. No acute deformities. All extremities neurovascularly intact. SKIN: Warm and dry. NEUROLOGICAL: Alert and oriented. Normal coordination. Gait normal.     RADIOLOGY  X-RAYS:  I have reviewed radiologic plain film image(s).   ALL OTHER NON-PLAIN FILM IMAGES SUCH AS CT, ULTRASOUND AND MRI HAVE BEEN READ BY THE

## 2019-01-28 ENCOUNTER — HOSPITAL ENCOUNTER (EMERGENCY)
Age: 74
Discharge: HOME | End: 2019-01-28
Attending: EMERGENCY MEDICINE
Payer: COMMERCIAL

## 2019-01-28 ENCOUNTER — APPOINTMENT (OUTPATIENT)
Dept: CT IMAGING | Age: 74
End: 2019-01-28
Payer: COMMERCIAL

## 2019-01-28 VITALS
DIASTOLIC BLOOD PRESSURE: 90 MMHG | TEMPERATURE: 97 F | WEIGHT: 189 LBS | BODY MASS INDEX: 27.99 KG/M2 | OXYGEN SATURATION: 100 % | HEIGHT: 69 IN | SYSTOLIC BLOOD PRESSURE: 148 MMHG | HEART RATE: 88 BPM | RESPIRATION RATE: 14 BRPM

## 2019-01-28 DIAGNOSIS — R42 DIZZINESS: Primary | ICD-10-CM

## 2019-01-28 DIAGNOSIS — R91.1 PULMONARY NODULE: ICD-10-CM

## 2019-01-28 LAB
A/G RATIO: 1.7 (ref 1.1–2.2)
ALBUMIN SERPL-MCNC: 4.6 G/DL (ref 3.4–5)
ALP BLD-CCNC: 64 U/L (ref 40–129)
ALT SERPL-CCNC: 9 U/L (ref 10–40)
ANION GAP SERPL CALCULATED.3IONS-SCNC: 10 MMOL/L (ref 3–16)
AST SERPL-CCNC: 13 U/L (ref 15–37)
BASOPHILS ABSOLUTE: 0 K/UL (ref 0–0.2)
BASOPHILS RELATIVE PERCENT: 0.4 %
BILIRUB SERPL-MCNC: 0.4 MG/DL (ref 0–1)
BUN BLDV-MCNC: 27 MG/DL (ref 7–20)
CALCIUM SERPL-MCNC: 9.3 MG/DL (ref 8.3–10.6)
CHLORIDE BLD-SCNC: 103 MMOL/L (ref 99–110)
CO2: 28 MMOL/L (ref 21–32)
CREAT SERPL-MCNC: 1.3 MG/DL (ref 0.8–1.3)
EOSINOPHILS ABSOLUTE: 0.2 K/UL (ref 0–0.6)
EOSINOPHILS RELATIVE PERCENT: 1.9 %
GFR AFRICAN AMERICAN: >60
GFR NON-AFRICAN AMERICAN: 54
GLOBULIN: 2.7 G/DL
GLUCOSE BLD-MCNC: 103 MG/DL (ref 70–99)
HCT VFR BLD CALC: 47.9 % (ref 40.5–52.5)
HEMOGLOBIN: 16 G/DL (ref 13.5–17.5)
INR BLD: 1.3 (ref 0.86–1.14)
LYMPHOCYTES ABSOLUTE: 1.4 K/UL (ref 1–5.1)
LYMPHOCYTES RELATIVE PERCENT: 15.5 %
MCH RBC QN AUTO: 29.8 PG (ref 26–34)
MCHC RBC AUTO-ENTMCNC: 33.3 G/DL (ref 31–36)
MCV RBC AUTO: 89.5 FL (ref 80–100)
MONOCYTES ABSOLUTE: 0.5 K/UL (ref 0–1.3)
MONOCYTES RELATIVE PERCENT: 5.9 %
NEUTROPHILS ABSOLUTE: 6.9 K/UL (ref 1.7–7.7)
NEUTROPHILS RELATIVE PERCENT: 76.3 %
PDW BLD-RTO: 14.8 % (ref 12.4–15.4)
PLATELET # BLD: 262 K/UL (ref 135–450)
PMV BLD AUTO: 8.3 FL (ref 5–10.5)
POTASSIUM SERPL-SCNC: 5.3 MMOL/L (ref 3.5–5.1)
PROTHROMBIN TIME: 14.8 SEC (ref 9.8–13)
RBC # BLD: 5.35 M/UL (ref 4.2–5.9)
SODIUM BLD-SCNC: 141 MMOL/L (ref 136–145)
TOTAL PROTEIN: 7.3 G/DL (ref 6.4–8.2)
WBC # BLD: 9.1 K/UL (ref 4–11)

## 2019-01-28 PROCEDURE — 6360000004 HC RX CONTRAST MEDICATION: Performed by: EMERGENCY MEDICINE

## 2019-01-28 PROCEDURE — 70496 CT ANGIOGRAPHY HEAD: CPT

## 2019-01-28 PROCEDURE — 2580000003 HC RX 258: Performed by: NURSE PRACTITIONER

## 2019-01-28 PROCEDURE — 85610 PROTHROMBIN TIME: CPT

## 2019-01-28 PROCEDURE — 70498 CT ANGIOGRAPHY NECK: CPT

## 2019-01-28 PROCEDURE — 96360 HYDRATION IV INFUSION INIT: CPT

## 2019-01-28 PROCEDURE — 6370000000 HC RX 637 (ALT 250 FOR IP): Performed by: EMERGENCY MEDICINE

## 2019-01-28 PROCEDURE — 85025 COMPLETE CBC W/AUTO DIFF WBC: CPT

## 2019-01-28 PROCEDURE — 99284 EMERGENCY DEPT VISIT MOD MDM: CPT

## 2019-01-28 PROCEDURE — 70450 CT HEAD/BRAIN W/O DYE: CPT

## 2019-01-28 PROCEDURE — 80053 COMPREHEN METABOLIC PANEL: CPT

## 2019-01-28 RX ORDER — 0.9 % SODIUM CHLORIDE 0.9 %
250 INTRAVENOUS SOLUTION INTRAVENOUS ONCE
Status: COMPLETED | OUTPATIENT
Start: 2019-01-28 | End: 2019-01-28

## 2019-01-28 RX ORDER — MECLIZINE HCL 12.5 MG/1
25 TABLET ORAL ONCE
Status: COMPLETED | OUTPATIENT
Start: 2019-01-28 | End: 2019-01-28

## 2019-01-28 RX ORDER — MECLIZINE HYDROCHLORIDE 25 MG/1
25 TABLET ORAL 3 TIMES DAILY PRN
Qty: 30 TABLET | Refills: 0 | Status: SHIPPED | OUTPATIENT
Start: 2019-01-28 | End: 2019-02-07

## 2019-01-28 RX ADMIN — MECLIZINE 25 MG: 12.5 TABLET ORAL at 10:43

## 2019-01-28 RX ADMIN — SODIUM CHLORIDE 250 ML: 9 INJECTION, SOLUTION INTRAVENOUS at 10:43

## 2019-01-28 RX ADMIN — IOPAMIDOL 75 ML: 755 INJECTION, SOLUTION INTRAVENOUS at 10:21

## 2019-01-28 ASSESSMENT — ENCOUNTER SYMPTOMS
EYES NEGATIVE: 1
DIARRHEA: 0
SHORTNESS OF BREATH: 0
ABDOMINAL PAIN: 0
VOMITING: 0
BACK PAIN: 0
ALLERGIC/IMMUNOLOGIC NEGATIVE: 1
ABDOMINAL DISTENTION: 0
NAUSEA: 0
COUGH: 0
WHEEZING: 0

## 2019-02-04 ENCOUNTER — OFFICE VISIT (OUTPATIENT)
Dept: FAMILY MEDICINE CLINIC | Age: 74
End: 2019-02-04
Payer: MEDICARE

## 2019-02-04 VITALS
WEIGHT: 193.4 LBS | BODY MASS INDEX: 28.64 KG/M2 | HEART RATE: 79 BPM | SYSTOLIC BLOOD PRESSURE: 120 MMHG | HEIGHT: 69 IN | OXYGEN SATURATION: 98 % | DIASTOLIC BLOOD PRESSURE: 62 MMHG

## 2019-02-04 DIAGNOSIS — C67.9 MALIGNANT NEOPLASM OF URINARY BLADDER, UNSPECIFIED SITE (HCC): ICD-10-CM

## 2019-02-04 DIAGNOSIS — K22.70 BARRETT'S ESOPHAGUS WITHOUT DYSPLASIA: ICD-10-CM

## 2019-02-04 DIAGNOSIS — E78.2 MIXED HYPERLIPIDEMIA: ICD-10-CM

## 2019-02-04 DIAGNOSIS — I10 ESSENTIAL HYPERTENSION, BENIGN: Primary | ICD-10-CM

## 2019-02-04 DIAGNOSIS — F07.81 POST CONCUSSIVE SYNDROME: ICD-10-CM

## 2019-02-04 DIAGNOSIS — N18.30 CKD (CHRONIC KIDNEY DISEASE) STAGE 3, GFR 30-59 ML/MIN (HCC): ICD-10-CM

## 2019-02-04 DIAGNOSIS — R73.01 ELEVATED FASTING GLUCOSE: ICD-10-CM

## 2019-02-04 LAB
CHOLESTEROL, TOTAL: 196 MG/DL (ref 0–199)
GLUCOSE BLD-MCNC: 92 MG/DL (ref 70–99)
HDLC SERPL-MCNC: 62 MG/DL (ref 40–60)
LDL CHOLESTEROL CALCULATED: 109 MG/DL
TRIGL SERPL-MCNC: 124 MG/DL (ref 0–150)
VLDLC SERPL CALC-MCNC: 25 MG/DL

## 2019-02-04 PROCEDURE — G8427 DOCREV CUR MEDS BY ELIG CLIN: HCPCS | Performed by: FAMILY MEDICINE

## 2019-02-04 PROCEDURE — 1036F TOBACCO NON-USER: CPT | Performed by: FAMILY MEDICINE

## 2019-02-04 PROCEDURE — 36415 COLL VENOUS BLD VENIPUNCTURE: CPT | Performed by: FAMILY MEDICINE

## 2019-02-04 PROCEDURE — 1101F PT FALLS ASSESS-DOCD LE1/YR: CPT | Performed by: FAMILY MEDICINE

## 2019-02-04 PROCEDURE — G8484 FLU IMMUNIZE NO ADMIN: HCPCS | Performed by: FAMILY MEDICINE

## 2019-02-04 PROCEDURE — 4040F PNEUMOC VAC/ADMIN/RCVD: CPT | Performed by: FAMILY MEDICINE

## 2019-02-04 PROCEDURE — 1123F ACP DISCUSS/DSCN MKR DOCD: CPT | Performed by: FAMILY MEDICINE

## 2019-02-04 PROCEDURE — 99214 OFFICE O/P EST MOD 30 MIN: CPT | Performed by: FAMILY MEDICINE

## 2019-02-04 PROCEDURE — G8419 CALC BMI OUT NRM PARAM NOF/U: HCPCS | Performed by: FAMILY MEDICINE

## 2019-02-04 PROCEDURE — 3017F COLORECTAL CA SCREEN DOC REV: CPT | Performed by: FAMILY MEDICINE

## 2019-02-04 ASSESSMENT — ENCOUNTER SYMPTOMS
EYE PAIN: 0
COUGH: 0
ABDOMINAL PAIN: 0
SHORTNESS OF BREATH: 0
DIARRHEA: 0

## 2019-02-05 LAB
ESTIMATED AVERAGE GLUCOSE: 119.8 MG/DL
HBA1C MFR BLD: 5.8 %

## 2019-03-08 ENCOUNTER — OFFICE VISIT (OUTPATIENT)
Dept: FAMILY MEDICINE CLINIC | Age: 74
End: 2019-03-08
Payer: MEDICARE

## 2019-03-08 VITALS
OXYGEN SATURATION: 88 % | WEIGHT: 190 LBS | SYSTOLIC BLOOD PRESSURE: 138 MMHG | DIASTOLIC BLOOD PRESSURE: 70 MMHG | HEIGHT: 69 IN | BODY MASS INDEX: 28.14 KG/M2 | HEART RATE: 77 BPM

## 2019-03-08 DIAGNOSIS — J02.9 SORE THROAT: Primary | ICD-10-CM

## 2019-03-08 PROCEDURE — G8484 FLU IMMUNIZE NO ADMIN: HCPCS | Performed by: PHYSICIAN ASSISTANT

## 2019-03-08 PROCEDURE — 3017F COLORECTAL CA SCREEN DOC REV: CPT | Performed by: PHYSICIAN ASSISTANT

## 2019-03-08 PROCEDURE — 1036F TOBACCO NON-USER: CPT | Performed by: PHYSICIAN ASSISTANT

## 2019-03-08 PROCEDURE — 1101F PT FALLS ASSESS-DOCD LE1/YR: CPT | Performed by: PHYSICIAN ASSISTANT

## 2019-03-08 PROCEDURE — 4040F PNEUMOC VAC/ADMIN/RCVD: CPT | Performed by: PHYSICIAN ASSISTANT

## 2019-03-08 PROCEDURE — 99213 OFFICE O/P EST LOW 20 MIN: CPT | Performed by: PHYSICIAN ASSISTANT

## 2019-03-08 PROCEDURE — G8427 DOCREV CUR MEDS BY ELIG CLIN: HCPCS | Performed by: PHYSICIAN ASSISTANT

## 2019-03-08 PROCEDURE — G8419 CALC BMI OUT NRM PARAM NOF/U: HCPCS | Performed by: PHYSICIAN ASSISTANT

## 2019-03-08 PROCEDURE — 1123F ACP DISCUSS/DSCN MKR DOCD: CPT | Performed by: PHYSICIAN ASSISTANT

## 2019-03-08 ASSESSMENT — PATIENT HEALTH QUESTIONNAIRE - PHQ9
SUM OF ALL RESPONSES TO PHQ QUESTIONS 1-9: 0
2. FEELING DOWN, DEPRESSED OR HOPELESS: 0
1. LITTLE INTEREST OR PLEASURE IN DOING THINGS: 0
SUM OF ALL RESPONSES TO PHQ QUESTIONS 1-9: 0
SUM OF ALL RESPONSES TO PHQ9 QUESTIONS 1 & 2: 0

## 2019-03-08 ASSESSMENT — ENCOUNTER SYMPTOMS
SORE THROAT: 1
RESPIRATORY NEGATIVE: 1
EYES NEGATIVE: 1

## 2019-03-15 DIAGNOSIS — F41.9 ANXIETY: ICD-10-CM

## 2019-03-15 RX ORDER — LORAZEPAM 1 MG/1
TABLET ORAL
Qty: 180 TABLET | Refills: 0 | Status: CANCELLED | OUTPATIENT
Start: 2019-03-15 | End: 2019-06-13

## 2019-03-19 RX ORDER — LORAZEPAM 1 MG/1
TABLET ORAL
Qty: 180 TABLET | OUTPATIENT
Start: 2019-03-19 | End: 2019-06-17

## 2019-03-19 RX ORDER — LORAZEPAM 1 MG/1
TABLET ORAL
Qty: 180 TABLET | Refills: 0 | Status: SHIPPED | OUTPATIENT
Start: 2019-03-19 | End: 2019-08-20 | Stop reason: SDUPTHER

## 2019-04-01 ENCOUNTER — TELEPHONE (OUTPATIENT)
Dept: FAMILY MEDICINE CLINIC | Age: 74
End: 2019-04-01

## 2019-04-01 DIAGNOSIS — E78.2 MIXED HYPERLIPIDEMIA: ICD-10-CM

## 2019-04-01 RX ORDER — FENOFIBRATE 54 MG/1
54 TABLET ORAL DAILY
Qty: 90 TABLET | Refills: 3 | Status: SHIPPED | OUTPATIENT
Start: 2019-04-01 | End: 2020-03-02

## 2019-04-01 NOTE — TELEPHONE ENCOUNTER
Dale King [phone] (home)    is requesting refill(s) of medication  fenofibrate (TRICOR) 54 MG tablet to preferred pharmacy [address] [place] [address]. 3/8/19 (pertaining to medication)   Last refill 3/10/18 (per medication requested)  Next office visit scheduled or attempted Yes  Date 8/5/19  If No, reason

## 2019-04-02 RX ORDER — LISINOPRIL 20 MG/1
TABLET ORAL
Qty: 90 TABLET | Refills: 1 | Status: SHIPPED | OUTPATIENT
Start: 2019-04-02 | End: 2019-09-23 | Stop reason: SDUPTHER

## 2019-04-02 RX ORDER — ROSUVASTATIN CALCIUM 20 MG/1
20 TABLET, COATED ORAL NIGHTLY
Qty: 90 TABLET | Refills: 1 | Status: SHIPPED | OUTPATIENT
Start: 2019-04-02 | End: 2019-09-23 | Stop reason: SDUPTHER

## 2019-04-02 RX ORDER — DILTIAZEM HYDROCHLORIDE 120 MG/1
CAPSULE, COATED, EXTENDED RELEASE ORAL
Qty: 90 CAPSULE | Refills: 1 | Status: SHIPPED | OUTPATIENT
Start: 2019-04-02 | End: 2019-08-20 | Stop reason: SDUPTHER

## 2019-04-02 RX ORDER — AMITRIPTYLINE HYDROCHLORIDE 100 MG/1
TABLET, FILM COATED ORAL
Qty: 90 TABLET | Refills: 1 | Status: SHIPPED | OUTPATIENT
Start: 2019-04-02 | End: 2019-08-30 | Stop reason: SDUPTHER

## 2019-05-07 DIAGNOSIS — I10 ESSENTIAL HYPERTENSION, BENIGN: Primary | ICD-10-CM

## 2019-05-07 NOTE — TELEPHONE ENCOUNTER
Servando Lindsay [phone] (home)    is requesting refill(s) of medication Lisinopril to preferred pharmacy [facility]     Last OV 2/4/19 (pertaining to medication)   Last refill 4/2/19 (per medication requested)  Next office visit scheduled or attempted Yes  Date 8/5/19  If No, reason         This was refilled on 4/2/19 90 x1 sent to [facility]

## 2019-05-08 NOTE — TELEPHONE ENCOUNTER
Nurse - Please call his pharmacy (or patient) to see what happened to the lisinopril refills sent on 4/2/19 - if they never received them, then please call in exact same Rx, then send to me to sign for \"phone in\" refills.   (Thanks)

## 2019-05-09 NOTE — TELEPHONE ENCOUNTER
Nurse - Please call [facility] and see what's going on - see my note below.   (HE apparently will be running out soon, so IF he needs new Rx, please call in #90 and 1RF, same instructions.)

## 2019-05-13 RX ORDER — LISINOPRIL 20 MG/1
TABLET ORAL
Qty: 90 TABLET | Refills: 3 | OUTPATIENT
Start: 2019-05-13

## 2019-05-13 RX ORDER — LISINOPRIL 20 MG/1
TABLET ORAL
Qty: 90 TABLET | Refills: 1 | OUTPATIENT
Start: 2019-05-13

## 2019-05-13 NOTE — TELEPHONE ENCOUNTER
Patient needs the Lisinopril prescription called back into [facility] they state that did not receive it.

## 2019-05-14 RX ORDER — LISINOPRIL 20 MG/1
TABLET ORAL
Qty: 90 TABLET | Refills: 3 | Status: SHIPPED | OUTPATIENT
Start: 2019-05-14 | End: 2019-08-20 | Stop reason: SDUPTHER

## 2019-05-28 RX ORDER — TRAZODONE HYDROCHLORIDE 100 MG/1
TABLET ORAL
Qty: 90 TABLET | Refills: 3 | Status: SHIPPED | OUTPATIENT
Start: 2019-05-28 | End: 2020-03-02

## 2019-06-05 ENCOUNTER — HOSPITAL ENCOUNTER (OUTPATIENT)
Dept: PHYSICAL THERAPY | Age: 74
Setting detail: THERAPIES SERIES
Discharge: HOME | End: 2019-06-05
Payer: MEDICARE

## 2019-06-05 PROCEDURE — 97140 MANUAL THERAPY 1/> REGIONS: CPT

## 2019-06-05 PROCEDURE — 97161 PT EVAL LOW COMPLEX 20 MIN: CPT

## 2019-06-05 ASSESSMENT — PAIN SCALES - GENERAL: PAINLEVEL_OUTOF10: 0

## 2019-06-05 NOTE — PROGRESS NOTES
Physical Therapy  Initial Assessment  Date: 2019  Patient Name: Isela Dominguez  MRN: [id number]  : 1945    Subjective   General  Chart Reviewed: Yes  Patient assessed for rehabilitation services?: Yes  Family / Caregiver Present: No  Referring Practitioner: Maged Uribe  Referral Date : 19  Diagnosis: Cervical radiculopathy  Follows Commands: Within Functional Limits  General Comment  Comments: PLOF: full functional activity without limits to pain. Pt goal to have no numbness in L hand and full use of L UE. PT Visit Information  PT Insurance Information: Medicare  Subjective  Subjective: Pt reports numb L pinky in the middle of the night about a year ago. Pt fell in december last year and fell on his back on concrete. Pt hit his neck on bumper and head on door of the [employer]. Since that time pain in the L lateral upper arm into elbo and numbness in 4-5 digits. Symptoms have improved since december but numbness is constant and annoying. MRI (+) arthritis in cervical spine. Bulging disks in the cervical spine. no HA. Dizziness at times 3 weeks after the fall. Diagnosed as post-cuncussive. Ful functional activity but significant pain in the lateral elbow with neck motions. Difficulty gripping and holding objects in L hand. Pt is R hand dominant. Pt is retired but working as a . No Hx of neck or back pain. Pain only in middle of night. No pain during the day. Pain Screening  Patient Currently in Pain: Yes  Pain Assessment  Pain Assessment: 0-10  Pain Level: 0(At worst 3-4/10 at night when he wakes in the middle of the night. )  Vital Signs  Patient Currently in Pain: Yes    Objective     Observation/Palpation  Posture: Fair  Palpation: Mild tenderness B suboccipitals  Observation: Sitting: Forward head and upper cervical extension. Good HH positioning in the glenoid. L scapular IR, AT and depression. Body Mechanics: B cervical ERS.      PROM RUE (degrees)  RUE PROM: WNL  AROM RUE (degrees)  RUE AROM : WNL  PROM LUE (degrees)  LUE PROM: WNL  AROM LUE (degrees)  LUE AROM : WNL  Spine  Cervical: Decreased 50% L rotation with pain on L E'.  SB 75% decreased without pain. Flexion WFL without pain. Extension 50% with pain in the Elbow. Joint Mobility  Spine: Hypomobility L rib 1-2. Hypomobility C0-C1 all motions, C1-C2 L rotation. C2-C3     Strength RUE  Strength RUE: WNL  Comment: 52, 38, 34  strength   Strength LUE  Strength LUE: WNL  Comment: W' flexion 4/5; finger abd/add 4/5; thumb ext 4-/5. 28, 27, 25  strength   Strength Other  Other: Cervical 5/5 grossly except SF 4/5     Additional Measures  Special Tests: NDI 8% disability. (-) clonus, (-) hoffmans. Normal DTR B UEs. Denies VA symptoms. (-) cranial nerve symptoms. Other: Normal sensation except L C8 'different'. (+) ULTT R with symptoms in L elbow median and ulnar.  (+) ULTT L median and ulnar with increased tingling in fingers. (+) ULTT L radial with pain in hand and elbow. Assessment   Conditions Requiring Skilled Therapeutic Intervention  Assessment: Pt presents wtih movement impairment ERS cervical spine B, increased L scapular IR,AT and depression.  (+) ULTT B Median and ulnar and L radial nerves.    Prognosis: Good  Decision Making: Low Complexity  REQUIRES PT FOLLOW UP: Yes         Plan   Plan  Times per week: 2x/wk for 4 weeks   Current Treatment Recommendations: Strengthening, Manual Therapy - Joint Manipulation, ROM, Neuromuscular Re-education, Endurance Training, Manual Therapy - Soft Tissue Mobilization, Home Exercise Program    G-Code  NDI  8% disability     Goals  Long term goals  Time Frame for Long term goals : 4 weeks   Long term goal 1: Pt return to PLOF without pain  Long term goal 2: Centralization of symptoms in L hand  Long term goal 3: Pt to sleep through night without waking to elbow pain  Long term goal 4: Pt strength to improve by 1/3 muscle grade L UE.         Therapy Time   Individual Concurrent Group Co-treatment   Time In  7:30         Time Out  8:30         Minutes  15                 Julio Boyer [place]

## 2019-06-05 NOTE — PROGRESS NOTES
Outpatient Physical Therapy  Phone: [phone] Fax: [phone]     To: Erna Benjamin    From: Elnor Carrel, PT     Patient: Julianna Parham      : 1945  Diagnosis: Cervical Spine    Date: 2019  Treatment Diagnosis:      Physical Therapy Certification/Re-Certification Form  Dear Dr. Sheng Guzman,  The following patient has been evaluated for physical therapy services and for therapy to continue, Medicare requires monthly physician review of the treatment plan. Please review the attached evaluation and/or summary of the patient's plan of care, and verify that you agree therapy should continue by signing the attached document and sending it back to our office. Plan of Care/Treatment to date:  [x] Therapeutic Exercise   [] Modalities:  [x] Therapeutic Activity     [] Ultrasound  [] Electrical Stimulation   [] Gait Training      [] Cervical Traction [] Lumbar Traction  [x] Neuromuscular Re-education  [] Hot/Coldpack [] Iontophoresis    [x] Instruction in HEP      Other:  [x] Manual Therapy       []                        [] Aquatic Therapy       []                      ? Frequency/Duration:  # Days per week: [] 1 day # Weeks: [] 1 week [] 5 weeks      [x] 2 days? [] 2 weeks [] 6 weeks     [] 3 days   [] 3 weeks [] 7 weeks     [] 4 days   [x] 4 weeks [] 8 weeks    Rehab Potential: [] Excellent [x] Good [] Fair  [] Poor       Electronically signed by:  Elnor Carrel, PT      If you have any questions or concerns, please don't hesitate to call.   Thank you for your referral.    Physician Signature:________________________________Date:__________________  By signing above, therapists plan is approved by physician

## 2019-06-05 NOTE — FLOWSHEET NOTE
Physical Therapy Daily Treatment Note    Date:  2019    Patient Name:  Charu Nichole    :  1945  MRN: [id number]  Restrictions/Precautions:    Medical/Treatment Diagnosis Information:  · Diagnosis: Cervical radiculopathy  Insurance/Certification information:  PT Insurance Information: Medicare  Physician Information:  Referring Practitioner: Renita Whitman  Plan of care signed (Y/N):  N  Visit# / total visits:      G-Code (if applicable):          NDI 8% disability     Medicare Cap (if applicable):  70 = total amount used, updated 2019    Time in:   8:30     Timed Treatment: 0  Total Treatment Time:  60  ________________________________________________________________________________________    Pain Level:    0/10  SUBJECTIVE:  See evaluation. OBJECTIVE:     Exercise/Equipment Resistance/Repetitions Other comments                                                                                                                                             Other Therapeutic Activities:  Posture re-ed NV     Manual Treatments:   NV Neural flossing and tensioners B uEs. Gr 4 1st rib mobs L. HH repositioning L. Serratus setting, scapular setting. Modalities:      Test/Measurements:    Sitting: Forward head and upper cervical extension. Good HH positioning in the glenoid. L scapular IR, AT and depression. Body Mechanics: B cervical ERS. Cervical: Decreased 50% L rotation with pain on L E'.  SB 75% decreased without pain. Flexion WFL without pain. Extension 50% with pain in the Elbow. Joint Mobility  Spine: Hypomobility L rib 1-2. Hypomobility C0-C1 all motions, C1-C2 L rotation.  C2-C3      Strength RUE  Strength RUE: WNL  Comment: 52, 38, 34  strength   Strength LUE  Strength LUE: WNL  Comment: W' flexion 4/5; finger abd/add 4/5; thumb ext 4-/5. 28, 27, 25  strength   Strength Other  Other: Cervical 5/5 grossly except SF 4/5  Additional Measures  Special Tests: NDI 8% disability. (-) clonus, (-) hoffmans. Normal DTR B UEs. Denies VA symptoms. (-) cranial nerve symptoms. Other: Normal sensation except L C8 'different'. (+) ULTT R with symptoms in L elbow median and ulnar.  (+) ULTT L median and ulnar with increased tingling in fingers. (+) ULTT L radial with pain in hand and elbow. ASSESSMENT:         Treatment/Activity Tolerance:   ?Patient tolerated treatment well ? Patient limited by fatique  ? Patient limited by pain ? Patient limited by other medical complications  ? Other:     Goals:        Long term goals  Time Frame for Long term goals : 4 weeks   Long term goal 1: Pt return to PLOF without pain  Long term goal 2: Centralization of symptoms in L hand  Long term goal 3: Pt to sleep through night without waking to elbow pain  Long term goal 4: Pt strength to improve by 1/3 muscle grade L UE. Plan: ? Continue per plan of care ? Alter current plan (see comments)   ? Plan of care initiated ? Hold pending MD visit ?  Discharge      Plan for Next Session:      Re-Certification Due Date:         Signature:  Deepak Paez

## 2019-06-10 ENCOUNTER — HOSPITAL ENCOUNTER (OUTPATIENT)
Dept: PHYSICAL THERAPY | Age: 74
Setting detail: THERAPIES SERIES
Discharge: HOME | End: 2019-06-10
Payer: MEDICARE

## 2019-06-10 PROCEDURE — 97110 THERAPEUTIC EXERCISES: CPT

## 2019-06-10 PROCEDURE — 97140 MANUAL THERAPY 1/> REGIONS: CPT

## 2019-06-10 NOTE — FLOWSHEET NOTE
Physical Therapy Daily Treatment Note    Date:  6/10/2019    Patient Name:  Freddy Gonzalez    :  1945  MRN: [id number]  Restrictions/Precautions:    Medical/Treatment Diagnosis Information:  · Diagnosis: Cervical radiculopathy  Insurance/Certification information:  PT Insurance Information: Medicare  Physician Information:  Referring Practitioner: Anna Hugo  Plan of care signed (Y/N):  N  Visit# / total visits:      G-Code (if applicable):          NDI 8% disability     Medicare Cap (if applicable):  876 = total amount used, updated 6/10/2019    Time in:   8:30     Timed Treatment: 30  Total Treatment Time:  30  ________________________________________________________________________________________    Pain Level:    0-2/10  SUBJECTIVE:   Only has 1/10 in L elbow with turning his head to the R. No longer having pain at night. OBJECTIVE:     Exercise/Equipment Resistance/Repetitions Other comments          Cervical rotation   x10  HEP video    SNF training    x10 with 5 sec hold  HEP video                               S' IR/ER neuro re-ed    1# x3 mins                                                                                       Other Therapeutic Activities:  Posture re-ed NV     Manual Treatments:   NV Neural flossing and tensioners B UEs. Gr 4 1st and 2nd rib mobs L through 3 breaths. Suboccipital release x5 mins. Gr 3-4 C0-1 mob to promote C0 flexion on C1. Thoracic distraction gr 4 attempted but pt unable to relax. Gr 2-3 upper thoracic and CTJ PA mobs on TPs. HH repositioning L followed by resisted IR/ER neuro re-ed training. Serratus setting, scapular setting. Modalities:      Test/Measurements:    Sitting: Forward head and upper cervical extension. Good HH positioning in the glenoid. L scapular IR, AT and depression. Body Mechanics: B cervical ERS. Cervical: Decreased 50% L rotation with pain on L E'.  SB 75% decreased without pain.   Flexion WFL without pain.  Extension 50% with pain in the Elbow. Joint Mobility  Spine: Hypomobility L rib 1-2. Hypomobility C0-C1 all motions, C1-C2 L rotation. C2-C3      Strength RUE  Strength RUE: WNL  Comment: 52, 38, 34  strength   Strength LUE  Strength LUE: WNL  Comment: W' flexion 4/5; finger abd/add 4/5; thumb ext 4-/5. 28, 27, 25  strength   Strength Other  Other: Cervical 5/5 grossly except SF 4/5  Additional Measures  Special Tests: NDI 8% disability. (-) clonus, (-) hoffmans. Normal DTR B UEs. Denies VA symptoms. (-) cranial nerve symptoms. Other: Normal sensation except L C8 'different'. (+) ULTT R with symptoms in L elbow median and ulnar.  (+) ULTT L median and ulnar with increased tingling in fingers. (+) ULTT L radial with pain in hand and elbow. ASSESSMENT:   Pt tolerated therex well but still benefits from verbal cueing during performance of exercises. Pt demonstrated improved stability of L shoulder and improved mobility of cervical spine. Progress POC as patient tolerates. Treatment/Activity Tolerance:   XPatient tolerated treatment well ? Patient limited by fatique  ? Patient limited by pain ? Patient limited by other medical complications  ? Other:     Goals:        Long term goals  Time Frame for Long term goals : 4 weeks   Long term goal 1: Pt return to PLOF without pain  Long term goal 2: Centralization of symptoms in L hand  Long term goal 3: Pt to sleep through night without waking to elbow pain  Long term goal 4: Pt strength to improve by 1/3 muscle grade L UE. Plan: X Continue per plan of care ? Alter current plan (see comments)   ? Plan of care initiated ? Hold pending MD visit ?  Discharge      Plan for Next Session:      Re-Certification Due Date:         Signature:  Austin Richardson

## 2019-06-12 ENCOUNTER — HOSPITAL ENCOUNTER (OUTPATIENT)
Dept: PHYSICAL THERAPY | Age: 74
Setting detail: THERAPIES SERIES
Discharge: HOME | End: 2019-06-12
Payer: MEDICARE

## 2019-06-12 PROCEDURE — 97110 THERAPEUTIC EXERCISES: CPT

## 2019-06-12 PROCEDURE — 97140 MANUAL THERAPY 1/> REGIONS: CPT

## 2019-06-12 NOTE — FLOWSHEET NOTE
Physical Therapy Daily Treatment Note    Date:  2019    Patient Name:  Sherry Chapman    :  1945  MRN: [id number]  Restrictions/Precautions:    Medical/Treatment Diagnosis Information:  · Diagnosis: Cervical radiculopathy  Insurance/Certification information:  PT Insurance Information: Medicare  Physician Information:  Referring Practitioner: Emily Bourne  Plan of care signed (Y/N):  N  Visit# / total visits:  3/8    G-Code (if applicable):          NDI 8% disability     Medicare Cap (if applicable):  638 = total amount used, updated 2019    Time in:   8:30     Timed Treatment: 30  Total Treatment Time:  30  ________________________________________________________________________________________    Pain Level:    0-2/10  SUBJECTIVE: Pt reports treatment and exercises \"sort of\" working\". Pt reports that since last session the pain and tingling is improving. 50% improvement with L cervical rotation pain. OBJECTIVE:     Exercise/Equipment Resistance/Repetitions Other comments          Cervical rotation   x10  HEP video    SNF training    x10 with 5 sec hold  HEP video                               S' IR/ER neuro re-ed    1# x3 mins                                                                                       Other Therapeutic Activities:  Posture re-ed x5 mins      Manual Treatments:   Neural flossing and tensioners B UEs. Gr 4 1st and 2nd rib mobs R through 3 breaths. Suboccipital release x5 mins. Cervical traction manual gr 3 x5 mins. Gr 3-4 C0-1 mob to promote C0 flexion on C1. Thoracic distraction gr 4 with cavitation at set up. Gr 2-3 upper thoracic and CTJ PA mobs on TPs. HH repositioning L followed by resisted IR/ER neuro re-ed training. Serratus setting, scapular setting. Modalities:      Test/Measurements:    Sitting: Forward head and upper cervical extension. Good HH positioning in the glenoid. L scapular IR, AT and depression.     Body Mechanics: B cervical ERS. Cervical: Decreased 50% L rotation with pain on L E'.  SB 75% decreased without pain. Flexion WFL without pain. Extension 50% with pain in the Elbow. Joint Mobility  Spine: Hypomobility L rib 1-2. Hypomobility C0-C1 all motions, C1-C2 L rotation. C2-C3      Strength RUE  Strength RUE: WNL  Comment: 52, 38, 34  strength   Strength LUE  Strength LUE: WNL  Comment: W' flexion 4/5; finger abd/add 4/5; thumb ext 4-/5. 28, 27, 25  strength   Strength Other  Other: Cervical 5/5 grossly except SF 4/5  Additional Measures  Special Tests: NDI 8% disability. (-) clonus, (-) hoffmans. Normal DTR B UEs. Denies VA symptoms. (-) cranial nerve symptoms. Other: Normal sensation except L C8 'different'. (+) ULTT R with symptoms in L elbow median and ulnar.  (+) ULTT L median and ulnar with increased tingling in fingers. (+) ULTT L radial with pain in hand and elbow. ASSESSMENT:   Pt tolerated therex and manual therapy well this session. Pt demonstrated increase in cervical ROM without pain. Pt also demonstrated improvement in cervical strength. Progress POC as pt tolerates. Treatment/Activity Tolerance:   XPatient tolerated treatment well ? Patient limited by fatique  ? Patient limited by pain ? Patient limited by other medical complications  ? Other:     Goals:        Long term goals  Time Frame for Long term goals : 4 weeks   Long term goal 1: Pt return to PLOF without pain  Long term goal 2: Centralization of symptoms in L hand  Long term goal 3: Pt to sleep through night without waking to elbow pain  Long term goal 4: Pt strength to improve by 1/3 muscle grade L UE. Plan: X Continue per plan of care ? Alter current plan (see comments)   ? Plan of care initiated ? Hold pending MD visit ?  Discharge      Plan for Next Session:      Re-Certification Due Date:         Signature:  Olivier Mera

## 2019-06-17 ENCOUNTER — HOSPITAL ENCOUNTER (OUTPATIENT)
Dept: PHYSICAL THERAPY | Age: 74
Setting detail: THERAPIES SERIES
Discharge: HOME | End: 2019-06-17
Payer: MEDICARE

## 2019-06-17 PROCEDURE — 97140 MANUAL THERAPY 1/> REGIONS: CPT

## 2019-06-17 PROCEDURE — 97110 THERAPEUTIC EXERCISES: CPT

## 2019-06-17 NOTE — FLOWSHEET NOTE
Physical Therapy Daily Treatment Note    Date:  2019    Patient Name:  Keena Morales    :  1945  MRN: [id number]  Restrictions/Precautions:    Medical/Treatment Diagnosis Information:  · Diagnosis: Cervical radiculopathy  Insurance/Certification information:  PT Insurance Information: Medicare  Physician Information:  Referring Practitioner: Magalis Gutierres  Plan of care signed (Y/N):  N  Visit# / total visits:      G-Code (if applicable):          NDI 8% disability     Medicare Cap (if applicable):  946 = total amount used, updated 2019    Time in:   8:30     Timed Treatment: 30  Total Treatment Time:  30  ________________________________________________________________________________________    Pain Level:    0-2/10  SUBJECTIVE: Pt reports pain is \"pretty much gone\" and neck is feeling \"really good\". OBJECTIVE:     Exercise/Equipment Resistance/Repetitions Other comments          Cervical rotation   x10  HEP video    SNF training    x10 with 5 sec hold  HEP video          Prone Ys and Ts   x10 B each  HEP video                  S' IR/ER neuro re-ed    2# x3 mins                                                                                       Other Therapeutic Activities:  Posture re-ed x5 mins      Manual Treatments:   Neural flossing and tensioners L UEs ulnar and median nerve. Gr 4 1st and 2nd rib mobs B through 3 breaths. Suboccipital release x3 mins. Cervical traction manual gr 3 x4 mins. Gr 3-4 C0-1 mob to promote C0 flexion on C1. Thoracic distraction gr 4 with cavitation at set up. Gr 2-3 upper thoracic and CTJ PA mobs on TPs. HH repositioning L followed by resisted IR/ER neuro re-ed training. Serratus setting, scapular setting. Modalities:      Test/Measurements:    Sitting: Forward head and upper cervical extension. Good HH positioning in the glenoid. L scapular IR, AT and depression. Body Mechanics: B cervical ERS.   Cervical: Decreased 50% L rotation with pain on L E'.  SB 75% decreased without pain. Flexion WFL without pain. Extension 50% with pain in the Elbow. Joint Mobility  Spine: Hypomobility L rib 1-2. Hypomobility C0-C1 all motions, C1-C2 L rotation. C2-C3      Strength RUE  Strength RUE: WNL  Comment: 52, 38, 34  strength   Strength LUE  Strength LUE: WNL  Comment: W' flexion 4/5; finger abd/add 4/5; thumb ext 4-/5. 28, 27, 25  strength   Strength Other  Other: Cervical 5/5 grossly except SF 4/5  Additional Measures  Special Tests: NDI 8% disability. (-) clonus, (-) hoffmans. Normal DTR B UEs. Denies VA symptoms. (-) cranial nerve symptoms. Other: Normal sensation except L C8 'different'. (+) ULTT R with symptoms in L elbow median and ulnar.  (+) ULTT L median and ulnar with increased tingling in fingers. (+) ULTT L radial with pain in hand and elbow. ASSESSMENT:   Pt tolerated therex and manual therapy well this session. Pt demonstrated increase in pain-free cervical ROM. Pt progressed to scapular strengthening in prone position. Progress POC as pt tolerates. Treatment/Activity Tolerance:   XPatient tolerated treatment well ? Patient limited by fatique  ? Patient limited by pain ? Patient limited by other medical complications  ? Other:     Goals:        Long term goals  Time Frame for Long term goals : 4 weeks   Long term goal 1: Pt return to PLOF without pain  Long term goal 2: Centralization of symptoms in L hand  Long term goal 3: Pt to sleep through night without waking to elbow pain  Long term goal 4: Pt strength to improve by 1/3 muscle grade L UE. Plan: X Continue per plan of care ? Alter current plan (see comments)   ? Plan of care initiated ? Hold pending MD visit ?  Discharge      Plan for Next Session:      Re-Certification Due Date:         Signature:  Sarah Pizarro

## 2019-06-19 ENCOUNTER — HOSPITAL ENCOUNTER (OUTPATIENT)
Dept: PHYSICAL THERAPY | Age: 74
Setting detail: THERAPIES SERIES
Discharge: HOME | End: 2019-06-19
Payer: MEDICARE

## 2019-06-19 PROCEDURE — 97140 MANUAL THERAPY 1/> REGIONS: CPT

## 2019-06-19 PROCEDURE — 97110 THERAPEUTIC EXERCISES: CPT

## 2019-06-19 NOTE — FLOWSHEET NOTE
Physical Therapy Daily Treatment Note    Date:  2019    Patient Name:  Lul Chen    :  1945  MRN: [id number]  Restrictions/Precautions:    Medical/Treatment Diagnosis Information:  · Diagnosis: Cervical radiculopathy  Insurance/Certification information:  PT Insurance Information: Medicare  Physician Information:  Referring Practitioner: Christel Jiménez  Plan of care signed (Y/N):  N  Visit# / total visits:      G-Code (if applicable):          NDI 8% disability     Medicare Cap (if applicable):  907 = total amount used, updated 2019    Time in:   7:30     Timed Treatment: 30  Total Treatment Time:  30  ________________________________________________________________________________________    Pain Level:    0-2/10  SUBJECTIVE: Pt reports feeling great without symptoms. OBJECTIVE:     Exercise/Equipment Resistance/Repetitions Other comments          Cervical rotation    HEP video    SNF training     HEP video          Prone Ys and Ts   x10 B each  HEP video    UT on Wall    x10            S' IR/ER neuro re-ed    2# x3 mins            Pivot prone      x3 mins  Attempted with increased pain upon stretch    Pec Stretch in Doorway     NV                                                              Other Therapeutic Activities:  Posture re-ed x5 mins      Manual Treatments:   Neural flossing and tensioners L UEs ulnar and median nerve. Gr 4 1st and 2nd rib mobs B through 3 breaths. Suboccipital release x3 mins. Cervical traction manual gr 3 x4 mins. Gr 3-4 C0-1 mob to promote C0 flexion on C1. Thoracic distraction gr 4 with cavitation at set up. Gr 2-3 upper thoracic and CTJ PA mobs on TPs. Seated MWM upper thoracic and CTJ PAs. HH repositioning L followed by resisted IR/ER neuro re-ed training. Serratus setting, scapular setting. Modalities:      Test/Measurements:    Sitting: Forward head and upper cervical extension. Good HH positioning in the glenoid.   L

## 2019-06-24 ENCOUNTER — HOSPITAL ENCOUNTER (OUTPATIENT)
Dept: PHYSICAL THERAPY | Age: 74
Setting detail: THERAPIES SERIES
Discharge: HOME | End: 2019-06-24
Payer: MEDICARE

## 2019-06-24 DIAGNOSIS — I82.401 DVT, RECURRENT, LOWER EXTREMITY, ACUTE, RIGHT (HCC): ICD-10-CM

## 2019-06-24 PROCEDURE — 97110 THERAPEUTIC EXERCISES: CPT

## 2019-06-24 PROCEDURE — 97140 MANUAL THERAPY 1/> REGIONS: CPT

## 2019-06-24 RX ORDER — APIXABAN 5 MG/1
TABLET, FILM COATED ORAL
Qty: 180 TABLET | Refills: 3 | Status: SHIPPED | OUTPATIENT
Start: 2019-06-24 | End: 2020-04-03

## 2019-06-24 NOTE — FLOWSHEET NOTE
Physical Therapy Daily Treatment Note    Date:  2019    Patient Name:  Mahesh Chavarria    :  1945  MRN: [id number]  Restrictions/Precautions:    Medical/Treatment Diagnosis Information:  · Diagnosis: Cervical radiculopathy  Insurance/Certification information:  PT Insurance Information: Medicare  Physician Information:  Referring Practitioner: Stevo Asencio  Plan of care signed (Y/N):  N  Visit# / total visits:      G-Code (if applicable):          NDI 8% disability     Medicare Cap (if applicable):  595 = total amount used, updated 2019    Time in:   7:25     Timed Treatment: 30  Total Treatment Time:  30  ________________________________________________________________________________________    Pain Level:    denies/10  SUBJECTIVE: Pt reports that the neck is good and that all of the symptoms are gone. OBJECTIVE:     Exercise/Equipment Resistance/Repetitions Other comments          Cervical rotation    HEP video    SNF training     HEP video          Prone Ys and Ts   x10 B each  HEP video    UT on Wall    x10            S' IR/ER neuro re-ed    2# x3 mins            Pivot prone      x3 mins  Attempted with increased pain upon stretch    Pec Stretch in Doorway     NV           Foam Roll Protocol (6 in half roll)    -Stable wit arms out, palms up     -Hugs    -Punches    -S' Flexion 3 mins, 1 min stable   x10 hugs  x10 punches  x10 S' Flexion                                                Other Therapeutic Activities:  Movement and reaching training x6 mins       Manual Treatments:     HH repositioning L followed by resisted IR/ER neuro re-ed training. Serratus setting, scapular setting. Modalities:      Test/Measurements:    Sitting: Forward head and upper cervical extension. Good HH positioning in the glenoid. L scapular IR, AT and depression. Body Mechanics: B cervical ERS. Cervical: Decreased 50% L rotation with pain on L E'.  SB 75% decreased without pain. and was responsible for the assessment and treatment of the patient.

## 2019-06-26 ENCOUNTER — HOSPITAL ENCOUNTER (OUTPATIENT)
Dept: PHYSICAL THERAPY | Age: 74
Setting detail: THERAPIES SERIES
Discharge: HOME | End: 2019-06-26
Payer: MEDICARE

## 2019-06-26 PROCEDURE — 97110 THERAPEUTIC EXERCISES: CPT

## 2019-06-26 NOTE — FLOWSHEET NOTE
Physical Therapy Daily Treatment Note    Date:  2019    Patient Name:  Saige Little    :  1945  MRN: [id number]  Restrictions/Precautions:    Medical/Treatment Diagnosis Information:  · Diagnosis: Cervical radiculopathy  Insurance/Certification information:  PT Insurance Information: Medicare  Physician Information:  Referring Practitioner: Rafal Smith  Plan of care signed (Y/N):  N  Visit# / total visits:      G-Code (if applicable):          NDI 4% disability (19)    8% disability (initial)      Medicare Cap (if applicable):  500 = total amount used, updated 2019    Time in:   7:30     Timed Treatment: 30  Total Treatment Time:  30  ________________________________________________________________________________________    Pain Level:    denies/10  SUBJECTIVE: Pt reports that the neck is good and that all of the symptoms are gone. \"Feeling like a new man\". Pt states he is going to cancel his cortisone shot because he says there is no need due to pain being gone. OBJECTIVE:   Test/Measurements:    Sitting: Forward head and upper cervical extension. Good HH positioning in the glenoid. L scapular IR, AT and depression improved from initial visit. Body Mechanics: B cervical ERS.   Cervical: 25% decrease in L cervical rotation, 50% decrease in R cervical rotation       Strength RUE  Strength RUE: WNL  Comment: 19  strength: 66, 50, 68          At initial  strength: 52, 38, 34     Strength LUE  Strength LUE: WNL  Comment:      19       L W' Flexion: 4+/5       L Finger ABd/ADd: 4/5       L Thumb Ext 4-/5    At initial      L W' Flexion: 4/5      L Finger ABd/ADd: 4/5      L Thumb: 4-/5      19  strength: 41, 40, 42    At initial  strength: 28, 27, 25      Strength Other  Other:  19 SNF 5/5   At initial SNF: 4/5   Cervical 5/5 grossly except SF 4/5   Additional Measures  Special Tests: NDI 4% disability         ASSESSMENT: After 7 PT visits pt is demonstrating significant improvements in neck and L LE strength. Pt has also demonstrated significant improvements in postural control and awareness of HH position throughout functional activity. Recommend transition to HEP and hold last approved PT visit unless needed over the next 2 weeks. Discharge at that time if flare up does not occur. Treatment/Activity Tolerance:   XPatient tolerated treatment well ? Patient limited by fatique  ? Patient limited by pain ? Patient limited by other medical complications  ? Other:     Goals:        Long term goals  Time Frame for Long term goals : 4 weeks   Long term goal 1: Pt return to PLOF without pain (met)   Long term goal 2: Centralization of symptoms in L hand (met)   Long term goal 3: Pt to sleep through night without waking to elbow pain (met)    Long term goal 4: Pt strength to improve by 1/3 muscle grade L UE. (not met, all improved except L finger abd/add and L thumb strength)       Plan: X Hold PT 2 weeks unless flare up occurs then DC to HEP   ? Alter current plan (see comments)   ? Plan of care initiated ? Hold pending MD visit  Discharge      Plan for Next Session:      Re-Certification Due Date:         Signature:  Devi Macario, PT  Yannick Forrest, SPT     Physical therapist was present, directed the patient's care, made skilled judgement, and was responsible for the assessment and treatment of the patient.

## 2019-06-26 NOTE — FLOWSHEET NOTE
Physical Therapy Daily Treatment Note    Date:  2019    Patient Name:  Sherry Chapman    :  1945  MRN: [id number]  Restrictions/Precautions:    Medical/Treatment Diagnosis Information:  · Diagnosis: Cervical radiculopathy  Insurance/Certification information:  PT Insurance Information: Medicare  Physician Information:  Referring Practitioner: Emily Mealing  Plan of care signed (Y/N):  N  Visit# / total visits:      G-Code (if applicable):          NDI 8% disability (initial)    4% disability (19)     Medicare Cap (if applicable):  077 = total amount used, updated 2019    Time in:   7:30     Timed Treatment: 30  Total Treatment Time:  30  ________________________________________________________________________________________    Pain Level:    denies/10  SUBJECTIVE: Pt reports that the neck is good and that all of the symptoms are gone. \"Feeling like a new man\". Pt states he is going to cancel his cortisone shot because he says there is no need due to pain being gone. OBJECTIVE:     Exercise/Equipment Resistance/Repetitions Other comments          Cervical rotation    HEP video    SNF training     HEP video          Prone Ys and Ts    HEP video    UT on Wall    x10            S' IR/ER neuro re-ed               Pivot prone      x3 mins  Attempted with increased pain upon stretch    Pec Stretch in Doorway     NV           Foam Roll Protocol (6 in half roll)    -Stable wit arms out, palms up     -Hugs    -Punches    -S' Flexion                                                 Other Therapeutic Activities:    Manual Treatments:     HH repositioning L followed by resisted IR/ER neuro re-ed training. Serratus setting, scapular setting. Modalities:      Test/Measurements:    Sitting: Forward head and upper cervical extension. Good HH positioning in the glenoid. L scapular IR, AT and depression. Body Mechanics: B cervical ERS.   Cervical: Decreased 50% L rotation with pain on L E'.  SB 75% decreased without pain. Flexion WFL without pain. Extension 50% with pain in the Elbow. Joint Mobility  Spine: Hypomobility L rib 1-2. Hypomobility C0-C1 all motions, C1-C2 L rotation. C2-C3      Strength RUE  Strength RUE: WNL  Comment: 52, 38, 34  strength (66, 50, 68 6/26/19)   Strength LUE  Strength LUE: WNL  Comment: W'     flexion 4/5 (4+/5, 6/26/19)    finger abd/add 4/5 (4/5, 6/26/19)    thumb ext 4-/5 (4-/5, 6/26/19)    28, 27, 25  strength (41,40,42 6/26/19)   Strength Other  Other: Cervical 5/5 grossly except SF 4/5 (5/5, 6/26/19)  Additional Measures  Special Tests: NDI 8% disability. (-) clonus, (-) hoffmans. Normal DTR B UEs. Denies VA symptoms. (-) cranial nerve symptoms. Other: Normal sensation except L C8 'different'. (+) ULTT R with symptoms in L elbow median and ulnar.  (+) ULTT L median and ulnar with increased tingling in fingers. (+) ULTT L radial with pain in hand and elbow. ASSESSMENT: After 7 PT visits pt is demonstrating significant improvements in neck and L LE strength. Pt has also demonstrated significant improvements in postural control and awareness of HH position throughout functional activity. Recommend transition to HEP and hold last approved PT visit unless needed over the next 2 weeks. Discharge at that time if flare up does not occur. Treatment/Activity Tolerance:   XPatient tolerated treatment well ? Patient limited by fatique  ? Patient limited by pain ? Patient limited by other medical complications  ?  Other:     Goals:        Long term goals  Time Frame for Long term goals : 4 weeks   Long term goal 1: Pt return to PLOF without pain (met)   Long term goal 2: Centralization of symptoms in L hand (met)   Long term goal 3: Pt to sleep through night without waking to elbow pain (met)    Long term goal 4: Pt strength to improve by 1/3 muscle grade L UE. (not met, all improved except L finger abd/add and L thumb strength) Plan: X Hold PT 2 weeks unless flare up occurs then DC to HEP   ? Alter current plan (see comments)   ? Plan of care initiated ? Hold pending MD visit  Discharge      Plan for Next Session:      Re-Certification Due Date:         Signature:  Galo Barrientos PT  Mary Sánchez, [facility]     Physical therapist was present, directed the patient's care, made skilled judgement, and was responsible for the assessment and treatment of the patient.

## 2019-08-20 ENCOUNTER — OFFICE VISIT (OUTPATIENT)
Dept: FAMILY MEDICINE CLINIC | Age: 74
End: 2019-08-20
Payer: MEDICARE

## 2019-08-20 VITALS
DIASTOLIC BLOOD PRESSURE: 60 MMHG | HEIGHT: 69 IN | SYSTOLIC BLOOD PRESSURE: 120 MMHG | WEIGHT: 197 LBS | HEART RATE: 65 BPM | BODY MASS INDEX: 29.18 KG/M2 | OXYGEN SATURATION: 98 %

## 2019-08-20 DIAGNOSIS — F41.9 ANXIETY: ICD-10-CM

## 2019-08-20 DIAGNOSIS — I10 ESSENTIAL HYPERTENSION, BENIGN: Primary | ICD-10-CM

## 2019-08-20 DIAGNOSIS — R73.09 ELEVATED GLUCOSE: ICD-10-CM

## 2019-08-20 DIAGNOSIS — C61 PROSTATE CA (HCC): ICD-10-CM

## 2019-08-20 DIAGNOSIS — K21.9 GASTROESOPHAGEAL REFLUX DISEASE WITHOUT ESOPHAGITIS: ICD-10-CM

## 2019-08-20 DIAGNOSIS — E78.2 MIXED HYPERLIPIDEMIA: ICD-10-CM

## 2019-08-20 DIAGNOSIS — E66.3 OVERWEIGHT (BMI 25.0-29.9): ICD-10-CM

## 2019-08-20 DIAGNOSIS — N18.30 CKD (CHRONIC KIDNEY DISEASE) STAGE 3, GFR 30-59 ML/MIN (HCC): ICD-10-CM

## 2019-08-20 LAB
A/G RATIO: 2.5 (ref 1.1–2.2)
ALBUMIN SERPL-MCNC: 5 G/DL (ref 3.4–5)
ALP BLD-CCNC: 60 U/L (ref 40–129)
ALT SERPL-CCNC: 12 U/L (ref 10–40)
ANION GAP SERPL CALCULATED.3IONS-SCNC: 16 MMOL/L (ref 3–16)
AST SERPL-CCNC: 14 U/L (ref 15–37)
BILIRUB SERPL-MCNC: 0.3 MG/DL (ref 0–1)
BUN BLDV-MCNC: 31 MG/DL (ref 7–20)
CALCIUM SERPL-MCNC: 10 MG/DL (ref 8.3–10.6)
CHLORIDE BLD-SCNC: 99 MMOL/L (ref 99–110)
CHOLESTEROL, TOTAL: 174 MG/DL (ref 0–199)
CO2: 24 MMOL/L (ref 21–32)
CREAT SERPL-MCNC: 1.7 MG/DL (ref 0.8–1.3)
GFR AFRICAN AMERICAN: 48
GFR NON-AFRICAN AMERICAN: 40
GLOBULIN: 2 G/DL
GLUCOSE BLD-MCNC: 94 MG/DL (ref 70–99)
HDLC SERPL-MCNC: 65 MG/DL (ref 40–60)
LDL CHOLESTEROL CALCULATED: 91 MG/DL
POTASSIUM SERPL-SCNC: 5.4 MMOL/L (ref 3.5–5.1)
PROSTATE SPECIFIC ANTIGEN: 0.01 NG/ML (ref 0–4)
SODIUM BLD-SCNC: 139 MMOL/L (ref 136–145)
TOTAL PROTEIN: 7 G/DL (ref 6.4–8.2)
TRIGL SERPL-MCNC: 90 MG/DL (ref 0–150)
VLDLC SERPL CALC-MCNC: 18 MG/DL

## 2019-08-20 PROCEDURE — 3017F COLORECTAL CA SCREEN DOC REV: CPT | Performed by: FAMILY MEDICINE

## 2019-08-20 PROCEDURE — G8419 CALC BMI OUT NRM PARAM NOF/U: HCPCS | Performed by: FAMILY MEDICINE

## 2019-08-20 PROCEDURE — 36415 COLL VENOUS BLD VENIPUNCTURE: CPT | Performed by: FAMILY MEDICINE

## 2019-08-20 PROCEDURE — 1123F ACP DISCUSS/DSCN MKR DOCD: CPT | Performed by: FAMILY MEDICINE

## 2019-08-20 PROCEDURE — 4040F PNEUMOC VAC/ADMIN/RCVD: CPT | Performed by: FAMILY MEDICINE

## 2019-08-20 PROCEDURE — 99214 OFFICE O/P EST MOD 30 MIN: CPT | Performed by: FAMILY MEDICINE

## 2019-08-20 PROCEDURE — 1036F TOBACCO NON-USER: CPT | Performed by: FAMILY MEDICINE

## 2019-08-20 PROCEDURE — G8427 DOCREV CUR MEDS BY ELIG CLIN: HCPCS | Performed by: FAMILY MEDICINE

## 2019-08-20 RX ORDER — LORAZEPAM 1 MG/1
TABLET ORAL
Qty: 180 TABLET | Refills: 1 | Status: SHIPPED | OUTPATIENT
Start: 2019-08-20 | End: 2019-11-20

## 2019-08-20 RX ORDER — DILTIAZEM HYDROCHLORIDE 120 MG/1
120 CAPSULE, COATED, EXTENDED RELEASE ORAL DAILY
Qty: 90 CAPSULE | Refills: 1 | Status: SHIPPED | OUTPATIENT
Start: 2019-08-20 | End: 2019-12-11 | Stop reason: SDUPTHER

## 2019-08-20 RX ORDER — LORAZEPAM 1 MG/1
TABLET ORAL
Qty: 180 TABLET | Refills: 1 | OUTPATIENT
Start: 2019-08-20 | End: 2019-08-20 | Stop reason: SDUPTHER

## 2019-08-20 NOTE — PROGRESS NOTES
note and vitals reviewed. Assessment:      Encounter Diagnoses   Name Primary?  Essential hypertension, benign Yes    CKD (chronic kidney disease) stage 3, GFR 30-59 ml/min (HCC)     Mixed hyperlipidemia     Elevated glucose     Anxiety     Gastroesophageal reflux disease without esophagitis     Prostate Ca     Overweight (BMI 25.0-29. 9)          Plan:      Per orders - await results. Hypertension, anxiety, and GERD all stable, continue present medications. Discussed basics of weight loss diet/lifestyle -increase non-starchy vegetables, decrease carbs, minimize fried foods and high fat foods, also increase regular exercise as able. If labs stable, and overall doing well, then repeat fasting office visit in 6 months, sooner as needed.           Raven Snider MD

## 2019-08-21 LAB
ESTIMATED AVERAGE GLUCOSE: 119.8 MG/DL
HBA1C MFR BLD: 5.8 %

## 2019-08-21 ASSESSMENT — ENCOUNTER SYMPTOMS
DIARRHEA: 0
ABDOMINAL PAIN: 0
EYE PAIN: 0
COUGH: 0
SHORTNESS OF BREATH: 0
NAUSEA: 0

## 2019-08-21 NOTE — PATIENT INSTRUCTIONS
Hypertension, anxiety, and GERD all stable, continue present medications. Discussed basics of weight loss diet/lifestyle -increase non-starchy vegetables, decrease carbs, minimize fried foods and high fat foods, also increase regular exercise as able. If labs stable, and overall doing well, then repeat fasting office visit in 6 months, sooner as needed.

## 2019-08-30 RX ORDER — AMITRIPTYLINE HYDROCHLORIDE 100 MG/1
TABLET, FILM COATED ORAL
Qty: 90 TABLET | Refills: 1 | Status: SHIPPED | OUTPATIENT
Start: 2019-08-30 | End: 2020-04-07 | Stop reason: SDUPTHER

## 2019-08-30 NOTE — TELEPHONE ENCOUNTER
Virielizabeth Quintanilla [phone] (home)    is requesting refill(s) of medication Amitriptyline to preferred pharmacy [facility]    Last OV 8/20/19 (pertaining to medication)   Last refill 4/2/19 (per medication requested)  Next office visit scheduled or attempted Yes  Date 2/21/20  If No, reason

## 2019-09-23 DIAGNOSIS — E78.2 MIXED HYPERLIPIDEMIA: ICD-10-CM

## 2019-09-23 RX ORDER — ROSUVASTATIN CALCIUM 20 MG/1
20 TABLET, COATED ORAL NIGHTLY
Qty: 90 TABLET | Refills: 1 | Status: SHIPPED | OUTPATIENT
Start: 2019-09-23 | End: 2020-03-02

## 2019-09-23 RX ORDER — LISINOPRIL 20 MG/1
TABLET ORAL
Qty: 90 TABLET | Refills: 1 | Status: SHIPPED | OUTPATIENT
Start: 2019-09-23 | End: 2019-12-31 | Stop reason: SDUPTHER

## 2019-11-06 ENCOUNTER — TELEPHONE (OUTPATIENT)
Dept: FAMILY MEDICINE CLINIC | Age: 74
End: 2019-11-06

## 2019-12-11 DIAGNOSIS — I10 ESSENTIAL HYPERTENSION, BENIGN: ICD-10-CM

## 2019-12-11 RX ORDER — DILTIAZEM HYDROCHLORIDE 120 MG/1
120 CAPSULE, COATED, EXTENDED RELEASE ORAL DAILY
Qty: 90 CAPSULE | Refills: 1 | Status: SHIPPED | OUTPATIENT
Start: 2019-12-11 | End: 2020-03-18

## 2019-12-31 DIAGNOSIS — I10 ESSENTIAL HYPERTENSION, BENIGN: Primary | ICD-10-CM

## 2019-12-31 RX ORDER — LISINOPRIL 20 MG/1
TABLET ORAL
Qty: 90 TABLET | Refills: 1 | Status: SHIPPED | OUTPATIENT
Start: 2019-12-31 | End: 2020-04-07 | Stop reason: SDUPTHER

## 2020-02-03 ENCOUNTER — HOSPITAL ENCOUNTER (OUTPATIENT)
Age: 75
Discharge: HOME | End: 2020-02-03
Payer: MEDICARE

## 2020-02-03 LAB
ALBUMIN SERPL-MCNC: 4.4 G/DL (ref 3.4–5)
ANION GAP SERPL CALCULATED.3IONS-SCNC: 17 MMOL/L (ref 3–16)
BILIRUBIN URINE: NEGATIVE
BLOOD, URINE: NEGATIVE
BUN BLDV-MCNC: 24 MG/DL (ref 7–20)
CALCIUM SERPL-MCNC: 10 MG/DL (ref 8.3–10.6)
CHLORIDE BLD-SCNC: 106 MMOL/L (ref 99–110)
CLARITY: CLEAR
CO2: 22 MMOL/L (ref 21–32)
COLOR: YELLOW
CREAT SERPL-MCNC: 1.5 MG/DL (ref 0.8–1.3)
CREATININE URINE: 13.4 MG/DL (ref 39–259)
GFR AFRICAN AMERICAN: 55
GFR NON-AFRICAN AMERICAN: 46
GLUCOSE BLD-MCNC: 72 MG/DL (ref 70–99)
GLUCOSE URINE: NEGATIVE MG/DL
KETONES, URINE: NEGATIVE MG/DL
LEUKOCYTE ESTERASE, URINE: NEGATIVE
MAGNESIUM: 2.5 MG/DL (ref 1.8–2.4)
MICROALBUMIN UR-MCNC: <1.2 MG/DL
MICROALBUMIN/CREAT UR-RTO: ABNORMAL MG/G (ref 0–30)
MICROSCOPIC EXAMINATION: NORMAL
NITRITE, URINE: NEGATIVE
PARATHYROID HORMONE INTACT: 26.8 PG/ML (ref 14–72)
PH UA: 6 (ref 5–8)
PHOSPHORUS: 2.8 MG/DL (ref 2.5–4.9)
POTASSIUM SERPL-SCNC: 5.3 MMOL/L (ref 3.5–5.1)
PROTEIN UA: NEGATIVE MG/DL
SODIUM BLD-SCNC: 145 MMOL/L (ref 136–145)
SPECIFIC GRAVITY UA: <=1.005 (ref 1–1.03)
URIC ACID, SERUM: 5.5 MG/DL (ref 3.5–7.2)
URINE TYPE: NORMAL
UROBILINOGEN, URINE: 0.2 E.U./DL
VITAMIN D 25-HYDROXY: 26.9 NG/ML

## 2020-02-03 PROCEDURE — 80069 RENAL FUNCTION PANEL: CPT

## 2020-02-03 PROCEDURE — 36415 COLL VENOUS BLD VENIPUNCTURE: CPT

## 2020-02-03 PROCEDURE — 82043 UR ALBUMIN QUANTITATIVE: CPT

## 2020-02-03 PROCEDURE — 83735 ASSAY OF MAGNESIUM: CPT

## 2020-02-03 PROCEDURE — 81003 URINALYSIS AUTO W/O SCOPE: CPT

## 2020-02-03 PROCEDURE — 82570 ASSAY OF URINE CREATININE: CPT

## 2020-02-03 PROCEDURE — 84550 ASSAY OF BLOOD/URIC ACID: CPT

## 2020-02-03 PROCEDURE — 82306 VITAMIN D 25 HYDROXY: CPT

## 2020-02-03 PROCEDURE — 83970 ASSAY OF PARATHORMONE: CPT

## 2020-02-04 RX ORDER — LORAZEPAM 1 MG/1
TABLET ORAL
Qty: 180 TABLET | Refills: 0 | Status: SHIPPED | OUTPATIENT
Start: 2020-02-04 | End: 2020-05-08 | Stop reason: SDUPTHER

## 2020-02-04 NOTE — TELEPHONE ENCOUNTER
Rx sent via two step verification through Epic. Nurse - If you are willing, please let patient know that he will need to find a new primary care physician who is willing to prescribe lorazepam/Ativan, as I will be leaving [facility] by the beginning of March - he should be receiving a letter in the mail within the next week or two.  (I can speak to him if any problems - just get me/interupt me, doesn't matter.)      ([facility] was run and reviewed personally by me, Merlene Tidwell, on 02/04/20, and no signs or suspicion of diversion or other abuse.)

## 2020-03-02 RX ORDER — TRAZODONE HYDROCHLORIDE 100 MG/1
TABLET ORAL
Qty: 30 TABLET | Refills: 0 | Status: SHIPPED | OUTPATIENT
Start: 2020-03-02 | End: 2020-04-10 | Stop reason: SDUPTHER

## 2020-03-02 RX ORDER — ROSUVASTATIN CALCIUM 20 MG/1
20 TABLET, COATED ORAL NIGHTLY
Qty: 30 TABLET | Refills: 0 | Status: SHIPPED | OUTPATIENT
Start: 2020-03-02 | End: 2020-06-19 | Stop reason: SDUPTHER

## 2020-03-18 ENCOUNTER — OFFICE VISIT (OUTPATIENT)
Dept: INTERNAL MEDICINE CLINIC | Age: 75
End: 2020-03-18
Payer: MEDICARE

## 2020-03-18 VITALS
WEIGHT: 184 LBS | SYSTOLIC BLOOD PRESSURE: 124 MMHG | OXYGEN SATURATION: 95 % | HEART RATE: 64 BPM | DIASTOLIC BLOOD PRESSURE: 82 MMHG | BODY MASS INDEX: 27.17 KG/M2

## 2020-03-18 DIAGNOSIS — E78.2 MIXED HYPERLIPIDEMIA: ICD-10-CM

## 2020-03-18 DIAGNOSIS — I10 ESSENTIAL HYPERTENSION, BENIGN: ICD-10-CM

## 2020-03-18 DIAGNOSIS — R73.02 IGT (IMPAIRED GLUCOSE TOLERANCE): ICD-10-CM

## 2020-03-18 LAB
A/G RATIO: 2.1 (ref 1.1–2.2)
ALBUMIN SERPL-MCNC: 4.9 G/DL (ref 3.4–5)
ALP BLD-CCNC: 57 U/L (ref 40–129)
ALT SERPL-CCNC: 10 U/L (ref 10–40)
ANION GAP SERPL CALCULATED.3IONS-SCNC: 13 MMOL/L (ref 3–16)
AST SERPL-CCNC: 13 U/L (ref 15–37)
BASOPHILS ABSOLUTE: 0 K/UL (ref 0–0.2)
BASOPHILS RELATIVE PERCENT: 0.4 %
BILIRUB SERPL-MCNC: 0.4 MG/DL (ref 0–1)
BILIRUBIN URINE: NEGATIVE
BLOOD, URINE: NEGATIVE
BUN BLDV-MCNC: 28 MG/DL (ref 7–20)
CALCIUM SERPL-MCNC: 10 MG/DL (ref 8.3–10.6)
CHLORIDE BLD-SCNC: 99 MMOL/L (ref 99–110)
CHOLESTEROL, TOTAL: 178 MG/DL (ref 0–199)
CLARITY: CLEAR
CO2: 27 MMOL/L (ref 21–32)
COLOR: YELLOW
CREAT SERPL-MCNC: 1.4 MG/DL (ref 0.8–1.3)
CREATININE URINE: 88.1 MG/DL (ref 39–259)
EOSINOPHILS ABSOLUTE: 0.1 K/UL (ref 0–0.6)
EOSINOPHILS RELATIVE PERCENT: 0.9 %
EPITHELIAL CELLS, UA: 0 /HPF (ref 0–5)
GFR AFRICAN AMERICAN: 60
GFR NON-AFRICAN AMERICAN: 49
GLOBULIN: 2.3 G/DL
GLUCOSE BLD-MCNC: 94 MG/DL (ref 70–99)
GLUCOSE URINE: NEGATIVE MG/DL
HCT VFR BLD CALC: 48.9 % (ref 40.5–52.5)
HDLC SERPL-MCNC: 57 MG/DL (ref 40–60)
HEMOGLOBIN: 16 G/DL (ref 13.5–17.5)
HYALINE CASTS: 0 /LPF (ref 0–8)
KETONES, URINE: NEGATIVE MG/DL
LDL CHOLESTEROL CALCULATED: 103 MG/DL
LEUKOCYTE ESTERASE, URINE: NEGATIVE
LYMPHOCYTES ABSOLUTE: 1.8 K/UL (ref 1–5.1)
LYMPHOCYTES RELATIVE PERCENT: 17.7 %
MCH RBC QN AUTO: 29.6 PG (ref 26–34)
MCHC RBC AUTO-ENTMCNC: 32.7 G/DL (ref 31–36)
MCV RBC AUTO: 90.7 FL (ref 80–100)
MICROALBUMIN UR-MCNC: 1.5 MG/DL
MICROALBUMIN/CREAT UR-RTO: 17 MG/G (ref 0–30)
MICROSCOPIC EXAMINATION: NORMAL
MONOCYTES ABSOLUTE: 0.6 K/UL (ref 0–1.3)
MONOCYTES RELATIVE PERCENT: 6.1 %
NEUTROPHILS ABSOLUTE: 7.4 K/UL (ref 1.7–7.7)
NEUTROPHILS RELATIVE PERCENT: 74.9 %
NITRITE, URINE: NEGATIVE
PDW BLD-RTO: 14.4 % (ref 12.4–15.4)
PH UA: 6 (ref 5–8)
PLATELET # BLD: 274 K/UL (ref 135–450)
PMV BLD AUTO: 9.3 FL (ref 5–10.5)
POTASSIUM SERPL-SCNC: 5 MMOL/L (ref 3.5–5.1)
PROTEIN UA: NEGATIVE MG/DL
RBC # BLD: 5.39 M/UL (ref 4.2–5.9)
RBC UA: 0 /HPF (ref 0–4)
SODIUM BLD-SCNC: 139 MMOL/L (ref 136–145)
SPECIFIC GRAVITY UA: 1.02 (ref 1–1.03)
TOTAL PROTEIN: 7.2 G/DL (ref 6.4–8.2)
TRIGL SERPL-MCNC: 89 MG/DL (ref 0–150)
TSH SERPL DL<=0.05 MIU/L-ACNC: 1.94 UIU/ML (ref 0.27–4.2)
URINE TYPE: NORMAL
UROBILINOGEN, URINE: 0.2 E.U./DL
VLDLC SERPL CALC-MCNC: 18 MG/DL
WBC # BLD: 9.9 K/UL (ref 4–11)
WBC UA: 1 /HPF (ref 0–5)

## 2020-03-18 PROCEDURE — G8427 DOCREV CUR MEDS BY ELIG CLIN: HCPCS | Performed by: INTERNAL MEDICINE

## 2020-03-18 PROCEDURE — 1123F ACP DISCUSS/DSCN MKR DOCD: CPT | Performed by: INTERNAL MEDICINE

## 2020-03-18 PROCEDURE — G8417 CALC BMI ABV UP PARAM F/U: HCPCS | Performed by: INTERNAL MEDICINE

## 2020-03-18 PROCEDURE — 4040F PNEUMOC VAC/ADMIN/RCVD: CPT | Performed by: INTERNAL MEDICINE

## 2020-03-18 PROCEDURE — G8484 FLU IMMUNIZE NO ADMIN: HCPCS | Performed by: INTERNAL MEDICINE

## 2020-03-18 PROCEDURE — G8510 SCR DEP NEG, NO PLAN REQD: HCPCS | Performed by: INTERNAL MEDICINE

## 2020-03-18 PROCEDURE — 1036F TOBACCO NON-USER: CPT | Performed by: INTERNAL MEDICINE

## 2020-03-18 PROCEDURE — 3017F COLORECTAL CA SCREEN DOC REV: CPT | Performed by: INTERNAL MEDICINE

## 2020-03-18 PROCEDURE — 99203 OFFICE O/P NEW LOW 30 MIN: CPT | Performed by: INTERNAL MEDICINE

## 2020-03-18 ASSESSMENT — PATIENT HEALTH QUESTIONNAIRE - PHQ9
2. FEELING DOWN, DEPRESSED OR HOPELESS: 0
1. LITTLE INTEREST OR PLEASURE IN DOING THINGS: 0
SUM OF ALL RESPONSES TO PHQ9 QUESTIONS 1 & 2: 0
SUM OF ALL RESPONSES TO PHQ QUESTIONS 1-9: 0
SUM OF ALL RESPONSES TO PHQ QUESTIONS 1-9: 0

## 2020-03-18 ASSESSMENT — ENCOUNTER SYMPTOMS
WHEEZING: 0
CHEST TIGHTNESS: 0
RESPIRATORY NEGATIVE: 1
GASTROINTESTINAL NEGATIVE: 1
SHORTNESS OF BREATH: 0

## 2020-03-18 NOTE — PROGRESS NOTES
Fear of current or ex partner: Not on file     Emotionally abused: Not on file     Physically abused: Not on file     Forced sexual activity: Not on file   Other Topics Concern    Not on file   Social History Narrative    , retired 1/2015       Family History   Problem Relation Age of Onset    Lung Cancer Mother     Heart Disease Mother     Diabetes Father     Prostate Cancer Father     Diabetes Brother        Vitals:    03/18/20 1300   BP: 124/82   Pulse: 64   SpO2: 95%       Wt Readings from Last 3 Encounters:   03/18/20 184 lb (83.5 kg)   08/20/19 197 lb (89.4 kg)   03/08/19 190 lb (86.2 kg)     He is feeling well. He is working on diet and exercise  He has no problems with Eliquis. He is taking this for recurrent DVT. Patient is taking blood pressure medication without problem  He follows with urology for prostate cancer. He is scheduled for cystoscopy in May for cystoscopy. Patient is taking their cholesterol medication and watching diet without problem. Review of Systems   Constitutional: Negative. HENT: Negative. Respiratory: Negative. Negative for chest tightness, shortness of breath and wheezing. Cardiovascular: Negative. Negative for chest pain, palpitations and leg swelling. Gastrointestinal: Negative. Genitourinary: Negative. Musculoskeletal: Negative for arthralgias and myalgias. Neurological: Negative. Objective:   Physical Exam  Constitutional:       Appearance: He is well-developed. Neck:      Vascular: No carotid bruit or JVD. Cardiovascular:      Rate and Rhythm: Normal rate. Pulses: Normal pulses. Heart sounds: Normal heart sounds. No murmur. Pulmonary:      Effort: Pulmonary effort is normal.      Breath sounds: Normal breath sounds. No wheezing or rales. Abdominal:      General: Abdomen is flat. Bowel sounds are normal.      Palpations: Abdomen is soft. Tenderness: There is no abdominal tenderness. Skin:     General: Skin is dry. Assessment:      See Problem List assessment and plan       Plan:      Prostate Ca  He is following with Dr Chi Euceda at the [facility]  He is doing well    GERD (gastroesophageal reflux disease)  Stable  No dyspepsia  Doing well    DVT (deep venous thrombosis) ([facility] [address]  Stable  Continue Eliquis  Doing well with treatment    Malignant neoplasm of urinary bladder [facility])  He is following with Dr Chi Euceda at the Urology group  He is doing well    Anxiety  Mood good  He has been on Ativan for > 20 years  Discussed risks  Discussed contract  Discussed need to be seen every 3 months  He will see Dr Nancie Fu for evaluation of anxiety and discuss treatment and have baseline  Discussed Trazodone and Elavil and duplication of medication    Mixed hyperlipidemia  Stable with present treatment  Check fasting lipids      Essential hypertension, benign  BP stable  Continue present treatment              Patient engaged in shared decision making. Information given to evaluate options of treatment, understand what is needed and discussimportance of following plan.

## 2020-03-19 LAB
ESTIMATED AVERAGE GLUCOSE: 116.9 MG/DL
HBA1C MFR BLD: 5.7 %

## 2020-04-03 RX ORDER — APIXABAN 5 MG/1
TABLET, FILM COATED ORAL
Qty: 180 TABLET | Refills: 3 | Status: SHIPPED | OUTPATIENT
Start: 2020-04-03 | End: 2021-04-14 | Stop reason: SDUPTHER

## 2020-04-07 ENCOUNTER — TELEPHONE (OUTPATIENT)
Dept: INTERNAL MEDICINE CLINIC | Age: 75
End: 2020-04-07

## 2020-04-07 RX ORDER — LISINOPRIL 20 MG/1
TABLET ORAL
Qty: 90 TABLET | Refills: 1 | Status: SHIPPED | OUTPATIENT
Start: 2020-04-07 | End: 2020-08-03

## 2020-04-07 RX ORDER — AMITRIPTYLINE HYDROCHLORIDE 100 MG/1
TABLET, FILM COATED ORAL
Qty: 90 TABLET | Refills: 1 | Status: SHIPPED | OUTPATIENT
Start: 2020-04-07 | End: 2020-07-20

## 2020-04-13 RX ORDER — TRAZODONE HYDROCHLORIDE 100 MG/1
TABLET ORAL
Qty: 30 TABLET | Refills: 0 | Status: SHIPPED | OUTPATIENT
Start: 2020-04-13 | End: 2020-05-25 | Stop reason: SDUPTHER

## 2020-04-13 NOTE — TELEPHONE ENCOUNTER
Sent to [facility]    Enoc Chen [phone] (home)    is requesting refill(s) of medication Trazodone  to preferred pharmacy [facility]    Last OV 3/18/20 (pertaining to medication)   Last refill 3/2/20 (per medication requested)  Next office visit scheduled or attempted Yes  Date 4/21/20  If No, reason

## 2020-04-14 ENCOUNTER — VIRTUAL VISIT (OUTPATIENT)
Dept: PSYCHOLOGY | Age: 75
End: 2020-04-14
Payer: MEDICARE

## 2020-04-14 PROCEDURE — 98968 PH1 ASSMT&MGMT NQHP 21-30: CPT | Performed by: PSYCHOLOGIST

## 2020-04-14 NOTE — PATIENT INSTRUCTIONS
Restlessness   Agitation   Insecurity            Worthlessness   Anxiety   Stress    Depression             Hopelessness   Guilt    Defensiveness  Anger            Racing thoughts   Nightmares   Intense thinking  Sensitivity           Expecting the worst   Numbness   Lack of motivation  Mood swings             Forgetfulness   ? Concentration  Rigidity              Preoccupation  Intolerance     Behavioral Responses   Avoidance   Withdrawal   Neglect   ? Alcohol use   Smoking   ? Eating   Arguing        Poor appearance   ? Spending   Poor hygiene   ? Eating   Seeking reassurance   Nail biting   Skin picking   ? Talking         ? Body checking   Sexual problems  Foot tapping          Seeking information ? Exercise   Teeth clenching          Multitasking      Aggressive speaking  ? Fun activities    ? Sleeping        ? Relaxing activities    Fidgeting Rapid walking    The parasympathetic nervous system in your body is designed to turn on your bodys relaxation response, which is why it is sometimes called your \"rest and relax\" system. Your behaviors and thinking can keep your bodys natural relaxation response from operating at its best, especially when we're under stress. Getting your body to relax on a daily basis for at least brief periods can help decrease unpleasant stress responses. Learning to relax your body, through specific breathing and relaxation exercises and by minimizing stressful thinking, can help your bodys natural relaxation system become more effective. MINDFULNESS    Mindfulness means paying attention in a particular way: on purpose, in the present moment, and non-judgmentally. Haroon Murillo    \"Mindfulness is the basic human ability to be fully present, aware of where we are and what were doing, and not overly reactive or overwhelmed by whats going on around us. \"   -Mindful [place]    Paying attention on purpose  Mindfulness involves paying attention on purpose.  Mindfulness are  sometimes associated with overbreathing. Helpful Hints  Make sure that you arent hyperventitating; it is important to pause for a second or two after each breath. Try to breathe from your diaphragm or abdomen. Your shoulders and chest area  should be fairly relaxed and still. If this is challenging at first, it can be helpful to  first try this exercise by lying down on the floor with one hand on your heart, the  other hand on your abdomen. Watch the hand on your abdomen rise as you fill  your lungs with air, expanding your chest.     Rules of Practice   Try diaphragmatic breathing for one to two minutes throughout the day. You do not need to be feeling anxious to practice - in fact, at first you  should practice while feeling relatively calm. You need to be comfortable  breathing this way when feeling calm before you can feel comfortable doing it  when anxious. Winferd Agent gradually master this skill and feel the benefits! Once you are comfortable with this technique, you will feel more comfortable using it in situations that cause anxiety.

## 2020-04-14 NOTE — PROGRESS NOTES
Behavioral Health Consultation  Blossom Mcdaniel Psy.D. Psychologist  4/14/2020  10:30-11:05 AM      Time spent with Patient: 30 minutes  This is patient's first [facility] appointment. Reason for Consult: Anxiety  Referring Provider: MD Nikunj López Milton [address] [address]    Pt provided informed consent for the behavioral health program. Discussed with patient the model of service, including the limits of confidentiality (e.g., abuse reporting, suicide intervention) and the nature of the [facility] approach (e.g., focused, targeted interventions; open communication with PCP). Pt indicated understanding. Feedback given to PCP. TELEHEALTH VISIT -- Audio Only (During WPOQU-78 public health emergency)  }  Pursuant to the emergency declaration under the [facility], [facility] waiver authority and the [employer] Act, this phone call was conducted, with patient's consent, to reduce the patient's risk of exposure to COVID-19 and provide continuity of care for an established patient. Services were provided through a phone call discussion to substitute for in-person clinic visit. Pt gave verbal informed consent to participate in telehealth services. Consent:  He and/or health care decision maker is aware that that he may receive a bill for this telephone service, depending on his insurance coverage, and has provided verbal consent to proceed: Yes    Conducted a risk-benefit analysis and determined that the patient's presenting problems are consistent with the use of telepsychology. Determined that the patient has sufficient knowledge and skills in the use of technology enabling them to adequately benefit from telepsychology. It was determined that this patient was able to be properly treated without an in-person session.  Patient verified that they were currently located at the [facility] address that was provided during registration. Verified the following information:  Patient's identification: Yes  Patient location: [address] [place] [id number]  Patient's call back number: [phone]  Patient's emergency contact's name and number, as well as permission to contact them if needed:   Extended Emergency Contact Information  Primary Emergency Contact: Radha Monroe  Address: [address]  [address]           [address], [address] Phone: [phone]  Mobile Phone: [phone]  Relation: Spouse    Provider location: [place], [address] affirm this is an episode with a Patient who has not had a related appointment within my department in the past 7 days or scheduled within the next 24 hours. S:  Pt reported that he has dealt with \"free-floating anxiety\" throughout his life. Mother was high-strung, nervous individual. Pt's panic attacks began when pt was in college. Stopped having PAs for awhile when working (partially retired ). Moved to [place] in 12, started having PAs again. Actually went to the ER a couple times d/t PAs. A former PCP prescribed Ativan about 30 years ago. Now taking 1mg BID. Pt stopped having PAs 20 years ago, but he continues to wake up daily with anxiety that focuses on \"everything. \" Feels anxious in the afternoon before he takes second Ativan around 3pm. PCP left his practice recently, so pt just switched to Dr. Simin Hamilton. Avid reader of history and English literature. Has played Go board game since high school. 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     Prostate cancer 15 years ago. Tumor removed from his bladder more recently. Cancer-free now. Sleep: Terrible. Wakes several times during the night, able to get back to sleep. Can't recall sleeping through the night. Plays music and pets his dogs to help him fall asleep. Nutrition: Really good. Lot of veggies and fish.  No sweets to keep his A1c from inching up.  Exercise: Walks 4 miles 3 times per week  Drugs/Etoh: Hardly ever drinks alcohol. No marijuana. Tobacco: No  Caffeine: \"Lots of caffeine,\" nearly a full pot of coffee every morning  SI/HI: Has passive thoughts of death when he thinks about surviving if his wife were not here any longer. No SI, plan or intent at any time. Mental health history: No      Social History     Tobacco Use    Smoking status: Never Smoker    Smokeless tobacco: Never Used   Substance Use Topics    Alcohol use: Yes     Comment: rare      Illicit drugs:   Social History     Substance and Sexual Activity   Drug Use No        O:  Patient reported a history of generalized anxiety and panic attacks, the latter of which have been well controlled on Ativan 1 mg twice daily for the past 30 years. Self-care routine includes regular walks, engagement in valued activities, and connection with his support system. Patient drinks a significant amount of caffeine every morning. Although he describes sleep as being \"terrible\" due to nighttime awakenings, he is able to fall asleep without much difficulty. No past psychotherapy. He endorsed occasional passive thoughts of death when he thinks about losing his wife, but he denied SI/HI, plan or intent. Safe for outpatient level of care at this time. Normalized and validated pt's distress. Provided psychoeducation about anxiety, the body's stress reaction, mindfulness, and risks of long-term benzo use. Practiced diaphragmatic breathing during visit.        A:  MSE:  Appearance: Not assessed  Attitude: cooperative, friendly and mild distress  Consciousness: alert  Orientation: oriented to person, place, time, general circumstance  Memory: recent and remote memory intact  Attention/Concentration: intact during session  Psychomotor Activity: normal and Not assessed  Eye Contact: normal and Not assessed  Speech: normal rate and volume, well-articulated  Mood: anxious  Affect: congruent  Perception:

## 2020-04-16 ENCOUNTER — TELEPHONE (OUTPATIENT)
Dept: INTERNAL MEDICINE CLINIC | Age: 75
End: 2020-04-16

## 2020-04-28 ENCOUNTER — VIRTUAL VISIT (OUTPATIENT)
Dept: PSYCHOLOGY | Age: 75
End: 2020-04-28
Payer: MEDICARE

## 2020-04-28 PROCEDURE — 98968 PH1 ASSMT&MGMT NQHP 21-30: CPT | Performed by: PSYCHOLOGIST

## 2020-04-28 NOTE — PATIENT INSTRUCTIONS
Goals: 1. Practice being mindful - paying attention to the present moment on purpose and in a nonjudgmental way  2. Practice diaphragmatic breathing throughout the day  3. Limit time spent watching the news  4. Try guided meditation using an phuc like Head Space, Calm, or Ten Percent Happier: Meditation for the [employer]  It can be very helpful to use tools like relaxed breathing, muscle relaxation, and guided imagery/visualization to cope with stress, pain, anxiety and depression. Try different techniques to find the ones that work best for you. Below are 2 websites that have several breathing, relaxation, and visualization exercises that you can listen to and download for free.    [email or web address] html  · Deep Breathing & Guided Relaxation Exercises (3)  · Guided Imagery/Visualization Exercises (5)  · Mindfulness & Meditation Exercises (3)  · Progressive Muscle Relaxation   · Soothing Instrumental Music (11)    [email or web address]  · Diaphragmatic Breathing   · Deep Breathing I   · Deep Breathing II   · Progressive Muscle Relaxation   · Guided Imagery: The mPay Gateway   · Guided Imagery: [facility]   · Relaxing Phrases   · Just This Breath   · Increasing Awareness   · Sending Thoughts Away on Clouds  · Sending Thoughts Away on Leaves  · Sorting Into Boxes   -----------------------------------------------------  Below are several apps that you can download to your smartphone to help with relaxation and mood coping. [email or web address]  Platform: iPhone & Android  Cost: Free  Your breathing has a profound effect on your body. Alice Mclaughlin know this fact to be true if youve ever taken deep breaths to calm yourself down when you were upset. That exercise can often make you feel more centered, and its proof that breathing is powerful. The Nwgioun3Sybsc phuc uses guided breathing exercises to help reduce symptoms of an anxiety attack.  If an control of your anxiety and emotions. Tell the phuc how youre feeling, how anxious you are, or how worried you are. Then let the phucs self-help features walk you through some calming or relaxation practices. If you want, you can connect with a social network of other [facility] users. Dont worry, the network isnt connected to larger networks like Twitter or [employer]. Stop Panic & Anxiety Help  Platform: Android  Cost: Free  If panic and anxiety attacks have a  on your life, this phuc might help you let them go. The Stop Panic & Anxiety Help Android phuc uses emotion and relaxation training audio tracks to help you fight your fears and find a state of calm. When youve overcome the attack, use the phucs journal to record what caused the attack and how you were able to get through it. Then use this journal to learn from your experiences and prepare for the future. I Can Be Fearless by [employer]  Platform: iPhone  Cost: Free  When you were younger, your parents might have told you that you could do anything you put your mind to. This phuc might not help you be an astronaut or a world famous actress, but it can help you break through your anxiety, fears, and worries to a place of calm and confidence. Open your Apple device and select what you want to be right now -- calm, motivated, and confident are among the options -- then let the audio hypnosis guide you through a session. Anti-Anxiety   Platform: Android  Cost: Free  You can tell the phuc your problems by taking a diagnostic quiz about your level of stress and anxiety. Using your answers, the phuc will design a custom treatment plan for you. Follow instructional self-help videos like How to Tolerate and Lessen Anxiety.  Keep a daily journal of your anxiety and worries, and track your progress as you learn to regain a sense of calm.   Worry Box - Anxiety Self-Help  Platform: Android  Cost: Free  Have you ever wished you could put all your worries in a box, leave you feel sleepy, even better. Use the phuc to doze off into a relaxing slumber  Calming Music to Simplicity  Platform: Android  Cost: Free  [employer] arent the only relaxing tunes in smartphone apps. Music, especially some traditional [place] music, can be relaxing and soothing. The Calming Music to Simplicity phuc contains nine traditional [place] music selections. Press play and let your worries melt away. Relax Ocean Waves Sleep by [employer]  Platform: Android  Cost: Free  Living far from a beach doesnt mean you have to be far from total relaxation. Slip on a set of headphones and drift into a distant sand-and-suds oasis. Whether youre trying to head off an anxiety attack or just need to get some good sleep after a few anxious days, the Relax Ocean Waves Sleep phuc helps you find a place of serenity.  --------------------------------------------------------------  Harvdarrel Countess Meditation Relaxation  Platform: Android  Cost: Free  Rent[employer] Communicationsdarrel CevallosOpTier is a traditional [facility] health system that brings together posture, breathing, and the mind to reduce anxiety. This Android phuc connects users with a library of relaxation videos that contain instructions for relaxing and clearing the mind. The videos are created by Dr. Abimael Starkey, a psychologist with more than 20 years of experience. In addition to viewing Dr. Saint Client videos, you can read a variety of articles related to anxiety, meditation, and stress management. Anxiety Free  Platform: Miradia   Cost: Free  Meditation requires mindfulness and a sense of presence in your thoughts. Hypnosis is one step beyond meditation. It works by sending signals to your brain and transforming it almost unknowingly. The creators of this phuc say that its audio recordings contain subliminal signals that speak to the subconscious with powerful effect.  Hypnosis, like meditation, requires practice, and the goal is to get each user to a place where self-hypnosis is possible in order to reduce Stress  Platform: iPhone and Android  Cost: Free  This phuc can help you learn about and manage symptoms that often occur after trauma. Provides information and coping skills for common kinds of posttraumatic stress symptoms and problems, including systematic relaxation and self-help techniques. Things Keep Getting Scarier. He Can Help You [place]. By Chucho Lovell     In this turbulent moment, a lot of us -- myself included -- are feeling fear, anxiety and grief. And a lot of us, I suspect, could use some help managing those difficult emotions and thoughts. I had been wanting to talk to someone who could answer that question with practicality and steadying wisdom, so I got in touch with Brittany Tovar, whose work has offered that to me and a great many others over the years. A clinical psychologist and Amari Chan whose books have sold over a million copies, Italo Whyte is one of [place]'s true mindfulness pioneers, a man who helped popularize the once-exotic practices he learned more than 50 years ago when he began training as a . Epidemics are a part of the cycle of life on this planet, Rancho said. The choice is how we respond. With greed and hatred and fear and ignorance? Or with generosity, clarity, steadiness and love?     People reading this might be scared of socrates Covid-19 themselves, or fear that someone they love might contract it. Is there something, even small, that you can share that can help us all feel a little steadier? What's needed in a time like this, Gautam Sanchez, are ways to steady the heart, which is the essence of your question. The first step is acknowledgment and the willingness to be present. You could almost whisper to yourself, Sadness, fear, anxiety, grief, longing, as if to bow to that feeling and hold it with respect. That allows the feeling to open -- maybe even intensify for a bit -- but eventually to soften.  The next step is to bring in a sense of compassion just this body. We are consciousness. Does it matter if I don't believe that? I believe that when we die, we're gone. Is there still comfort I can take in what you're saying? Doesn't matter in the slightest. I was pushing the envelope so that those who were reading and were interested could explore it. You know, when I was a kid, if it was a clear night, I used to go outside and lie in the grass. I would imagine that I wasn't looking up at the stars but that I was looking down into a vast sea of stars. It gave me this combination of awe and fear and wonder. What is our place in the universe? It is so vast. So when we talk about questions of death, we can bring all of our ideas to them. I've had my own very powerful experiences, but these aren't things that I would ask someone to believe. You're a human being on this earth for this time, and you have not just a body but a miraculous consciousness. There's no good science about consciousness, really. It's still a mystery. I want to add something entirely different, if I may. Of course. In the Uatsdin tradition, there are beings called bodhisattvas. A bodhisattva vows to alleviate suffering and bring blessings in every circumstance. They choose to live with dignity and courage and radiate compassion for all. The beautiful thing is that we can see bodhisattvas all around. We see them in the bravery of the health care workers or the unheralded ones who drive the trucks and stock the shelves of our grocery stores so we can all continue to eat. And now it's time to add our part to this great dance. This is what we are here for. It's time to make a vow, to sit quietly, rest your heart and ask, Versa An is my best intention, my most griffiths aspiration in this difficult time?  If you quiet yourself, your heart will answer. The answer could be simple: I vow to be kind no matter what.  And when you find the answer in yourself, write it down and place it somewhere you'll a bunch of questions that relate only to me and how I'm feeling. You can do that. That could make it more interesting for people. I want this to be useful, Lexi Romero. Well, if you're willing to indulge me: I feel that I'm iwona enough not to have had to deal with any serious health issues, and I'm doing a good job of being a  and a dad in this weird time. So I'm fine. I'm iwona, and I'm fine. But just underneath that feeling of fine is a real strain of fear and uncertainty. I don't know what my question is. I guess I just want someone to tell me that having those conflicting feelings -- and feeling guilt about being scared while I'm in such a relatively fortunate position -- is all OK. How does it feel to say all that out loud to me? My guess is that it's helpful, because you're acknowledging: I'm doing OK. Even though the virus is rampaging through [place], I have a job, and I have my family sequestered for now.  So you can feel all those things. You can feel guilt. All of those are natural, and it's not helpful to  the feelings, because you don't ask for them. They arise. But what you can do, as you just did, is acknowledge that these are all part of being human and that the field of mindfulness can hold them. Then you can say, Ronny Plunkett do I tend this moment? Shayy Foss You're tending it by doing your work, which is a source of understanding. You're tending your family. And your acknowledgment of this is helpful. It can make other people feel like, Oh, it's OK to be a human being.     How do we strike any sort of balance between accepting how little control we have as individuals in this situation and not letting that acceptance turn into resignation? That's a beautiful question. It's posed in a way that sets up a straw man: Either we accept things the way they are and don't try to change anything, or we realize that it's our job to change the whole world, which would be a heavy burden.  The reality is the anything about how to adapt to disconnection and solitude? I wouldn't want to set any ideal, because temperaments are very different. Instead I would say, in the solitude or in the sequestering, let yourself find ways that nurture you. It may be listening to music or watching old movies or reading. And people will find that they might feel stir-crazy, but if they look closely, there will be moments that get more still. Moments of presence or contentment that come unbidden because we have been quiet. And if you can, pause before you distract yourself with a video and acknowledge, I'm getting stir-crazy.  Take a breath, and hold that restlessness. Allow it to be held with some kindness, and it will start to settle down. Doing that will open you to something more mysterious. Which is that you've realized: I can tolerate this. Maybe I can live a little easier.  This kind of attention is what neuroscientists call widening the window of tolerance. Are you finding it more difficult to practice mindfulness these days? Although I guess you're probably long past having that problem. Give me a break, Link Blizzard. I worry about dying. I'm almost 75years old. I've had many blessings, and in a lot of ways I feel I'm ready to die, but I know I don't want to leave my daughter, my grandchildren, my wife. But when the time comes, I will let go. So nobody's past anything. We all are exactly where we are.     © 2020 [employer] [facility]

## 2020-04-28 NOTE — PROGRESS NOTES
Behavioral Health Consultation  James Godinez Psy.D. Psychologist  4/28/2020  11-11:30 AM      Time spent with Patient: 30 minutes  This is patient's second [facility] appointment. Reason for Consult: Anxiety  Referring Provider: Delphine Downey MD  [address] [facility] [address]    TELEHEALTH VISIT -- Audio Only (During JIEZE-09 public health emergency)  }  Pursuant to the emergency declaration under the [facility] waiver authority and the Coronavirus Preparedness and [employer] Act, this phone call was conducted, with patient's consent, to reduce the patient's risk of exposure to COVID-19 and provide continuity of care for an established patient. Services were provided through a phone call discussion to substitute for in-person clinic visit. Pt gave verbal informed consent to participate in telehealth services. Consent:  He and/or health care decision maker is aware that that he may receive a bill for this telephone service, depending on his insurance coverage, and has provided verbal consent to proceed: Yes    Conducted a risk-benefit analysis and determined that the patient's presenting problems are consistent with the use of telepsychology. Determined that the patient has sufficient knowledge and skills in the use of technology enabling them to adequately benefit from telepsychology. It was determined that this patient was able to be properly treated without an in-person session. Patient verified that they were currently located at the [facility] address that was provided during registration.     Verified the following information:  Patient's identification: Yes  Patient location: [address] [place] [address]  Patient's call back number: [phone]  Patient's emergency contact's name and number, as well as permission to contact them if needed:   Extended Emergency Contact Information  Primary Emergency Contact: Karmen Hartman  Address: [address] [address]           [place], [address] [place] of [address] [place] [address] Phone: [phone]  Mobile Phone: [phone]  Relation: Spouse    Provider location: [facility] Mejia affirm this is an episode with a Patient who has not had a related appointment within my department in the past 7 days or scheduled within the next 24 hours. S:  Pt reported that he's doing alright. Practicing 5-10 minutes of breathing when he wakes in the morning and before bed. Hasn't helped much, wonders if he needs to practice it for longer. Cut back on caffeine (drinking 1/2 caffeine, 1/2 decaf now). Taking less time to fall asleep at night. Aware the news is a trigger for his anxiety but struggles to shut it off. His wife watches the news all day. Trying to spend more time playing Go, reading. Open to trying anything to better manage his anxiety. O:  Pt's anxiety remains elevated due to concerns about COVID-19 and is worsened by news consumption. Normalized and validated pt's concerns. Explored ways to limit news intake, which is a significant trigger for him. Provided psychoeducation about the benefits of guided meditation to help pt shift his relationship with his thoughts. Provided a handout with a mindfulness article for pt to review. Discussed with pt the risks of long-term benzo use and clarified our plan to continue with skill-building to help pt better manage his anxiety in preparation to reduce benzo dependence. He was in agreement. No safety concerns at this time.       A:  MSE:  Appearance: Not assessed  Attitude: cooperative, friendly and mild distress  Consciousness: alert  Orientation: oriented to person, place, time, general circumstance  Memory: recent and remote memory intact  Attention/Concentration: intact during session  Psychomotor Activity: normal and Not assessed  Eye Contact: normal and Not assessed  Speech: normal rate and volume, well-articulated  Mood:

## 2020-05-07 ENCOUNTER — TELEPHONE (OUTPATIENT)
Dept: INTERNAL MEDICINE CLINIC | Age: 75
End: 2020-05-07

## 2020-05-08 RX ORDER — LORAZEPAM 1 MG/1
1 TABLET ORAL 2 TIMES DAILY PRN
Qty: 56 TABLET | Refills: 0 | Status: SHIPPED | OUTPATIENT
Start: 2020-05-08 | End: 2020-06-05 | Stop reason: SDUPTHER

## 2020-05-14 ENCOUNTER — VIRTUAL VISIT (OUTPATIENT)
Dept: PSYCHOLOGY | Age: 75
End: 2020-05-14
Payer: MEDICARE

## 2020-05-14 PROCEDURE — 98968 PH1 ASSMT&MGMT NQHP 21-30: CPT | Performed by: PSYCHOLOGIST

## 2020-05-14 NOTE — PROGRESS NOTES
Behavioral Health Consultation  Suzanna Pritchard Psy.D. Psychologist  5/14/2020  11:30 AM - 12 PM      Time spent with Patient: 30 minutes  This is patient's third [facility] appointment. Reason for Consult: Anxiety  Referring Provider: MD Butch Hunter [address] [address]    TELEHEALTH VISIT -- Audio Only (During UWUIS-08 public health emergency)  }  Pursuant to the emergency declaration under the [facility] waiver authority and the Coronavirus Preparedness and [employer] Act, this phone call was conducted, with patient's consent, to reduce the patient's risk of exposure to COVID-19 and provide continuity of care for an established patient. Services were provided through a phone call discussion to substitute for in-person clinic visit. Pt gave verbal informed consent to participate in telehealth services. Consent:  He and/or health care decision maker is aware that that he may receive a bill for this telephone service, depending on his insurance coverage, and has provided verbal consent to proceed: Yes    Conducted a risk-benefit analysis and determined that the patient's presenting problems are consistent with the use of telepsychology. Determined that the patient has sufficient knowledge and skills in the use of technology enabling them to adequately benefit from telepsychology. It was determined that this patient was able to be properly treated without an in-person session. Patient verified that they were currently located at the [facility] address that was provided during registration.     Verified the following information:  Patient's identification: Yes  Patient location: [address] [place] [address]  Patient's call back number: [phone]  Patient's emergency contact's name and number, as well as permission to contact them if needed:   Extended Emergency Contact Information  Primary Emergency Contact: Karmen Hartman  Address: [address] [address]           [facility] [place] [address] Phone: [phone]  Mobile Phone: [phone]  Relation: Spouse    Provider location: [facility] Mejia affirm this is an episode with a Patient who has not had a related appointment within my department in the past 7 days or scheduled within the next 24 hours. S:  Pt reported that things have been not too bad. Learning about meditation through the resources he was provided at last visit and finding some of them to be beneficial. Realized that he was hyperventilating, so he's been practicing slowing down his breathing in general (e.g., while he walks, before bed). Still waking up with a feeling of doom. Trying to eat less, losing weight. Walking a lot. Cut back on news intake but still quite worried about the COVID-19 outbreak. Pt is interested in trying a new medication, as he has been learning more about the risks associated with long-term Ativan use. O:  Pt's anxiety remains high. He has been benefiting from breath retraining, guided meditation, and visualization exercises, as well as cutting back on news intake. Reinforced his progress. Provided psychoeducation about cognitive defusion. Explored additional strategies to better manage his worries, including worry mgmt techniques (e.g., worry delaying, making a worry log) and grounding techniques (handouts provided). Recommended pt manage morning anxiety through breathing, grounding, visualization and movement. Encouraged pt to discuss alternative medication options with his PCP at their upcoming visit. No safety concerns at this time.       A:  MSE:  Appearance: Not assessed  Attitude: cooperative, friendly and mild distress  Consciousness: alert  Orientation: oriented to person, place, time, general circumstance  Memory: recent and remote memory intact  Attention/Concentration: intact during session  Psychomotor Activity: normal and Not assessed  Eye Contact: normal and Not assessed  Speech: normal rate and volume, well-articulated  Mood: anxious  Affect: congruent  Perception: within normal limits  Thought Content: within normal limits  Thought Process: logical, coherent and goal-directed  Insight: good  Judgment: intact  Ability to understand instructions: Yes  Ability to respond meaningfully: Yes  Morbid Ideation: Occasional passive thoughts of death  Suicide Assessment: no suicidal ideation, plan, or intent  Homicidal Ideation: no      PHQ Scores 3/18/2020 3/8/2019 7/30/2018 2/18/2014   PHQ2 Score 0 0 0 0   PHQ9 Score 0 0 0 0     Interpretation of Total Score Depression Severity: 1-4 = Minimal depression, 5-9 = Mild depression, 10-14 = Moderate depression, 15-19 = Moderately severe depression, 20-27 = Severe depression    Diagnosis:    1. Generalized anxiety disorder        Patient Active Problem List   Diagnosis    Elevated fasting glucose    GERD (gastroesophageal reflux disease)    Anxiety    Essential hypertension, benign    [facility]    CKD (chronic kidney disease) stage 3, GFR 30-59 ml/min ([facility])    DVT, recurrent, lower extremity, acute ([facility]    Mixed hyperlipidemia    Ni's esophagus without dysplasia    Malignant neoplasm of urinary bladder ([facility]         Plan:  Pt interventions:  Supportive techniques, Provided Psychoeducation re: worry mgmt techniques, grounding, Emphasized self-care as important for managing overall health, Provided handout on worry mgmt techniques, grounding and CBT to target balanced thinking. Pt Behavioral Change Plan:  Pt set the following goals:  1. Practice being mindful - paying attention to the present moment on purpose and in a nonjudgmental way  2. Practice diaphragmatic breathing throughout the day  3. Limit time spent watching the news  4. Practice guided meditation daily, in addition to practicing visualization (e.g., the beach in [place])  5.  Try delaying your worries, planning a set worry time for later in the day, and/or worrying for a set amount of time and then shifting to something else when that time period ends  6. Consider writing down your worries  7. Practice grounding exercises - noticing what your senses are experiencing in the moment  8. Start your day with breathing, grounding, visualization, and movement    Pt scheduled F/U phone visit in 2 weeks.

## 2020-05-26 RX ORDER — TRAZODONE HYDROCHLORIDE 100 MG/1
TABLET ORAL
Qty: 30 TABLET | Refills: 0 | Status: SHIPPED | OUTPATIENT
Start: 2020-05-26 | End: 2020-06-19 | Stop reason: SDUPTHER

## 2020-05-26 RX ORDER — FENOFIBRATE 54 MG/1
54 TABLET ORAL DAILY
Qty: 30 TABLET | Refills: 0 | Status: SHIPPED | OUTPATIENT
Start: 2020-05-26 | End: 2020-06-19 | Stop reason: SDUPTHER

## 2020-05-29 ENCOUNTER — VIRTUAL VISIT (OUTPATIENT)
Dept: PSYCHOLOGY | Age: 75
End: 2020-05-29
Payer: MEDICARE

## 2020-05-29 PROCEDURE — 98968 PH1 ASSMT&MGMT NQHP 21-30: CPT | Performed by: PSYCHOLOGIST

## 2020-05-29 NOTE — PROGRESS NOTES
Karmen Hartman  Address: [address] [facility] [address]           [place], [address] Reesa Luther of [address] [place] [address] Phone: [phone]  Mobile Phone: [phone]  Relation: Spouse    Provider location: Jose Armando Mejia affirm this is an episode with a Patient who has not had a related appointment within my department in the past 7 days or scheduled within the next 24 hours. S:  Pt reported that he's been using mindfulness tools to manage anxiety. Starts his day with deep breathing and visualization, as well as a game of Go. He enjoys grounding when he goes for a 3-mile walk every morning. Learning not to hyperventilate while walking. Trying to delay his worries until a 30-minute period every evening, but his anxiety also peaks during this time. He finds it helpful to write down his worries. Prays after he goes to bed, finds this soothing. Worries about his son, who will be 48 soon and has delayed getting a colonoscopy. Daughter has colon cancer 2 years ago at age 52, but pt knows he needs to let that worry go because he has no control over it. Whereas his worries about his brother prompted him to check on his brother, which made him feel better. Feeling sad about the recent loss of his aunt in [place] to COVID-19. Also upset about the [place] plane crash and the killing of Patricia Rumps by police in [place]. Wellness visit scheduled for yesterday was pushed back, but pt still wants to discuss non-benzo medication options with his PCP. O:  Pt continues to experience daily anxiety, but he has been making progress with managing his anxiety utilizing the tools noted above. Normalized and validated his experience. Reinforced his efforts towards self-care and anxiety/worry management, including his use of prayer to calm himself. Encouraged pt to utilize mindfulness techniques during his daily worry time to manage the corresponding increase in anxiety.  Encouraged pt's plan to discuss non-benzo medication options with his PCP. No safety concerns at this time. A:  MSE:  Appearance: Not assessed  Attitude: cooperative, friendly and mild distress  Consciousness: alert  Orientation: oriented to person, place, time, general circumstance  Memory: recent and remote memory intact  Attention/Concentration: intact during session  Psychomotor Activity: normal and Not assessed  Eye Contact: normal and Not assessed  Speech: normal rate and volume, well-articulated  Mood: anxious  Affect: congruent  Perception: within normal limits  Thought Content: within normal limits  Thought Process: logical, coherent and goal-directed  Insight: good  Judgment: intact  Ability to understand instructions: Yes  Ability to respond meaningfully: Yes  Morbid Ideation: Occasional passive thoughts of death  Suicide Assessment: no suicidal ideation, plan, or intent  Homicidal Ideation: no      PHQ Scores 3/18/2020 3/8/2019 7/30/2018 2/18/2014   PHQ2 Score 0 0 0 0   PHQ9 Score 0 0 0 0     Interpretation of Total Score Depression Severity: 1-4 = Minimal depression, 5-9 = Mild depression, 10-14 = Moderate depression, 15-19 = Moderately severe depression, 20-27 = Severe depression    Diagnosis:    1. Generalized anxiety disorder        Patient Active Problem List   Diagnosis    Elevated fasting glucose    GERD (gastroesophageal reflux disease)    Anxiety    Essential hypertension, benign    [facility]    CKD (chronic kidney disease) stage 3, GFR 30-59 ml/min ([facility])    DVT, recurrent, lower extremity, acute ([address]    Mixed hyperlipidemia    Ni's esophagus without dysplasia    Malignant neoplasm of urinary bladder ([address]         Plan:  Pt interventions:  Supportive techniques, Emphasized self-care as important for managing overall health and CBT to target balanced thinking. Pt Behavioral Change Plan:  Pt set the following goals:  1.  Practice being mindful - paying attention to the present moment on purpose and

## 2020-06-05 RX ORDER — LORAZEPAM 1 MG/1
1 TABLET ORAL 2 TIMES DAILY PRN
Qty: 56 TABLET | Refills: 0 | Status: SHIPPED | OUTPATIENT
Start: 2020-06-05 | End: 2020-07-02 | Stop reason: SDUPTHER

## 2020-06-12 ENCOUNTER — TELEMEDICINE (OUTPATIENT)
Dept: PSYCHOLOGY | Age: 75
End: 2020-06-12
Payer: MEDICARE

## 2020-06-12 PROCEDURE — 90832 PSYTX W PT 30 MINUTES: CPT | Performed by: PSYCHOLOGIST

## 2020-06-12 NOTE — PROGRESS NOTES
Behavioral Health Consultation  Felicity Marie Psy.D. Psychologist  6/12/2020  8:30-9 AM    Time spent with Patient: 30 minutes  This is patient's fifth [facility] appointment. Reason for Consult: Anxiety  Referring Provider: MD Anabel Coyne'ALFREDO'' [facility] [address] [facility] [address]    TELEHEALTH VISIT -- Audio/Video (During QELEG-54 public health emergency)  }  Pursuant to the emergency declaration under the [facility] waiver authority and the Coronavirus Preparedness and [employer] Act, this phone call was conducted, with patient's consent, to reduce the patient's risk of exposure to COVID-19 and provide continuity of care for an established patient. Services were provided through a phone call discussion to substitute for in-person clinic visit. Pt gave verbal informed consent to participate in telehealth services. Consent:  He and/or health care decision maker is aware that that he may receive a bill for this telephone service, depending on his insurance coverage, and has provided verbal consent to proceed: Yes    Conducted a risk-benefit analysis and determined that the patient's presenting problems are consistent with the use of telepsychology. Determined that the patient has sufficient knowledge and skills in the use of technology enabling them to adequately benefit from telepsychology. It was determined that this patient was able to be properly treated without an in-person session. Patient verified that they were currently located at the [facility] address that was provided during registration.     Verified the following information:  Patient's identification: Yes  Patient location: [address] [place] [address]  Patient's call back number: [phone]  Patient's emergency contact's name and number, as well as permission to contact them if needed:   Extended Emergency Contact Information  Primary Emergency Contact:

## 2020-06-12 NOTE — PATIENT INSTRUCTIONS
Goals: 1. Practice being mindful - paying attention to the present moment on purpose and in a nonjudgmental way  2. Practice diaphragmatic breathing throughout the day  3. Limit time spent watching the news  4. Practice guided meditation daily, in addition to practicing visualization (e.g., the beach in [place])  5. Try delaying your worries, planning a set worry time for later in the day, and/or worrying for a set amount of time and then shifting to something else when that time period ends. Utilize mindfulness techniques to manage anxiety during the worry time. 6. Consider writing down your worries  7. Practice grounding exercises - noticing what your senses are experiencing in the moment  8. Start your day with breathing, grounding, visualization, and movement  9. Look for volunteer opportunities that you can start doing at home  10.  Talk to Dr. Yvonne Barboza about trying an SSRI (antidepressant) to manage anxiety        Dr. Mendez  of [address]  [facility].  [phone]

## 2020-06-17 ENCOUNTER — VIRTUAL VISIT (OUTPATIENT)
Dept: INTERNAL MEDICINE CLINIC | Age: 75
End: 2020-06-17

## 2020-06-19 RX ORDER — ROSUVASTATIN CALCIUM 20 MG/1
20 TABLET, COATED ORAL NIGHTLY
Qty: 30 TABLET | Refills: 0 | Status: SHIPPED | OUTPATIENT
Start: 2020-06-19 | End: 2020-07-17 | Stop reason: SDUPTHER

## 2020-06-19 RX ORDER — FENOFIBRATE 54 MG/1
54 TABLET ORAL DAILY
Qty: 30 TABLET | Refills: 0 | Status: SHIPPED | OUTPATIENT
Start: 2020-06-19 | End: 2020-07-17 | Stop reason: SDUPTHER

## 2020-06-19 RX ORDER — TRAZODONE HYDROCHLORIDE 100 MG/1
TABLET ORAL
Qty: 30 TABLET | Refills: 0 | Status: SHIPPED | OUTPATIENT
Start: 2020-06-19 | End: 2020-07-17 | Stop reason: SDUPTHER

## 2020-06-26 ENCOUNTER — TELEMEDICINE (OUTPATIENT)
Dept: PSYCHOLOGY | Age: 75
End: 2020-06-26
Payer: MEDICARE

## 2020-06-26 PROCEDURE — 90832 PSYTX W PT 30 MINUTES: CPT | Performed by: PSYCHOLOGIST

## 2020-06-26 NOTE — PROGRESS NOTES
Karmen Hartman  Address: [address] [address]           [place], [address] Phone: [phone]  Mobile Phone: [phone]  Relation: Spouse    Provider location: [facility] Armando Mejia affirm this is an episode with a Patient who has not had a related appointment within my department in the past 7 days or scheduled within the next 24 hours. S:  Pt reported that he was unable to have his visit with Dr. Belem Og. Pt called the office to ask why he hadn't gotten a phone call, he was told by the registrar that there was a problem with outbound phone calls. He rescheduled his visit for in-person on 7/17. Pt is eager to discuss SSRIs with his PCP because his anxiety is never gone, always with him to some degree. After learning about daily regimens online from [facility], pt is trying to develop a daily regimen that he can stick to to better manage pt's anxiety and stress. Adding hot baths and house cleaning projects into his routine. Pt speed walks 4 times per week while grounding himself. Practicing visualizing positive scenes from his childhood to manage anxiety and help him fall asleep at night. Staying in contact with family and friends. Worried about having another panic attack, but breathing deeply has helped him avoid them. O:  Pt's distress has been better managed using the abovementioned strategies, but his anxiety remains ever present. Normalized and validated his experience. Strongly reinforced his efforts towards self-care and progress with further developing his anxiety management strategies. Continued discussing the potential benefits of an SSRI as another tool to better manage his anxiety. Directed pt to learn more about benzo tapering using the [facility] Manual, in addition to discussing this process with his PCP before he begins. No safety concerns at this time.       A:  MSE:  Appearance: good hygiene  and appropriate attire  Attitude: cooperative, friendly and mild distress  Consciousness: alert  Orientation: oriented to person, place, time, general circumstance  Memory: recent and remote memory intact  Attention/Concentration: intact during session  Psychomotor Activity: normal  Eye Contact: normal  Speech: normal rate and volume, well-articulated  Mood: anxious  Affect: congruent  Perception: within normal limits  Thought Content: within normal limits  Thought Process: logical, coherent and goal-directed  Insight: good  Judgment: intact  Ability to understand instructions: Yes  Ability to respond meaningfully: Yes  Morbid Ideation: no   Suicide Assessment: no suicidal ideation, plan, or intent  Homicidal Ideation: no      PHQ Scores 3/18/2020 3/8/2019 7/30/2018 2/18/2014   PHQ2 Score 0 0 0 0   PHQ9 Score 0 0 0 0     Interpretation of Total Score Depression Severity: 1-4 = Minimal depression, 5-9 = Mild depression, 10-14 = Moderate depression, 15-19 = Moderately severe depression, 20-27 = Severe depression    Diagnosis:    1. Generalized anxiety disorder        Patient Active Problem List   Diagnosis    Elevated fasting glucose    GERD (gastroesophageal reflux disease)    Anxiety    Essential hypertension, benign    [facility]    CKD (chronic kidney disease) stage 3, GFR 30-59 ml/min ([facility])    DVT, recurrent, lower extremity, acute ([address]    Mixed hyperlipidemia    Ni's esophagus without dysplasia    Malignant neoplasm of urinary bladder ([address]         Plan:  Pt interventions:  Supportive techniques, Emphasized self-care as important for managing overall health, CBT to target balanced thinking and Provided education on the use of medication to treat patient's mental health symptoms. Pt Behavioral Change Plan:  Pt set the following goals:  1. Practice being mindful - paying attention to the present moment on purpose and in a nonjudgmental way  2. Practice diaphragmatic breathing throughout the day  3.  Limit time spent watching the news  4. Practice guided meditation daily, in addition to practicing visualization (e.g., the beach in [place])  5. Try delaying your worries, planning a set worry time for later in the day, and/or worrying for a set amount of time and then shifting to something else when that time period ends. Utilize mindfulness techniques to manage anxiety during the worry time. 6. Consider writing down your worries  7. Practice grounding exercises - noticing what your senses are experiencing in the moment  8. Start your day with breathing, grounding, visualization, and movement  9. Look for volunteer opportunities that you can start doing at home  10. Talk to Dr. Genoveva Tobias about trying an SSRI (antidepressant) to manage anxiety  11. Read through the following to learn about tapering off benzos:  [email or web address]    Pt scheduled F/U virtual visit in 2 weeks.

## 2020-06-26 NOTE — PATIENT INSTRUCTIONS
Goals: 1. Practice being mindful - paying attention to the present moment on purpose and in a nonjudgmental way  2. Practice diaphragmatic breathing throughout the day  3. Limit time spent watching the news  4. Practice guided meditation daily, in addition to practicing visualization (e.g., the beach in [place])  5. Try delaying your worries, planning a set worry time for later in the day, and/or worrying for a set amount of time and then shifting to something else when that time period ends. Utilize mindfulness techniques to manage anxiety during the worry time. 6. Consider writing down your worries  7. Practice grounding exercises - noticing what your senses are experiencing in the moment  8. Start your day with breathing, grounding, visualization, and movement  9. Look for volunteer opportunities that you can start doing at home  10. Talk to Dr. Beto Eid about trying an SSRI (antidepressant) to manage anxiety  11.  Read through the following to learn about tapering off benzos:  [email or web address]

## 2020-07-02 RX ORDER — LORAZEPAM 1 MG/1
1 TABLET ORAL 2 TIMES DAILY PRN
Qty: 56 TABLET | Refills: 0 | Status: SHIPPED | OUTPATIENT
Start: 2020-07-02 | End: 2020-07-30 | Stop reason: SDUPTHER

## 2020-07-10 ENCOUNTER — TELEMEDICINE (OUTPATIENT)
Dept: PSYCHOLOGY | Age: 75
End: 2020-07-10
Payer: MEDICARE

## 2020-07-10 PROCEDURE — 98968 PH1 ASSMT&MGMT NQHP 21-30: CPT | Performed by: PSYCHOLOGIST

## 2020-07-10 NOTE — PATIENT INSTRUCTIONS
Goals: 1. Practice being mindful - paying attention to the present moment on purpose and in a nonjudgmental way  2. Practice diaphragmatic breathing throughout the day  3. Limit time spent watching the news  4. Practice guided meditation daily, in addition to practicing visualization (e.g., the beach in [place])  5. Try delaying your worries, planning a set worry time for later in the day, and/or worrying for a set amount of time and then shifting to something else when that time period ends. Utilize mindfulness techniques to manage anxiety during the worry time. 6. Consider writing down your worries  7. Practice grounding exercises - noticing what your senses are experiencing in the moment  8. Start your day with breathing, grounding, visualization, and movement  9. Look for volunteer opportunities that you can start doing at home  10. Talk to Dr. Brittaney Wright about trying an SSRI (antidepressant) to manage anxiety  11.  Read through the following to learn about tapering off benzos:  [email or web address]

## 2020-07-10 NOTE — PROGRESS NOTES
Behavioral Health Consultation  Belinda Blair Psy.D. Psychologist  7/10/2020  8:30-9 AM    Time spent with Patient: 30 minutes  This is patient's seventh [facility] appointment. Reason for Consult: Anxiety  Referring Provider: Tegan Teresa MD  [address] [facility] [address]    TELEHEALTH VISIT -- Audio Only (During [facility] public health emergency)  }  Pursuant to the emergency declaration under the [facility] waiver authority and the Coronavirus Preparedness and [employer] Act, this phone call was conducted, with patient's consent, to reduce the patient's risk of exposure to COVID-19 and provide continuity of care for an established patient. Services were provided through a phone call discussion to substitute for in-person clinic visit. Pt gave verbal informed consent to participate in telehealth services. Consent:  He and/or health care decision maker is aware that that he may receive a bill for this service, depending on his insurance coverage, and has provided verbal consent to proceed: Yes    Conducted a risk-benefit analysis and determined that the patient's presenting problems are consistent with the use of telepsychology. Determined that the patient has sufficient knowledge and skills in the use of technology enabling them to adequately benefit from telepsychology. It was determined that this patient was able to be properly treated without an in-person session. Patient verified that they were currently located at the [facility] address that was provided during registration.     Verified the following information:  Patient's identification: Yes  Patient location: [address] [place] [address]  Patient's call back number: [phone]  Patient's emergency contact's name and number, as well as permission to contact them if needed:   Extended Emergency Contact Information  Primary Emergency Contact: Karmen Hartman  Address: [address] [facility] [address]           [address], [address] [facility] [address] Phone: [phone]  Mobile Phone: [phone]  Relation: Spouse    Provider location: [place], [address] affirm this is an episode with a Patient who has not had a related appointment within my department in the past 7 days or scheduled within the next 24 hours. S:  Pt reported that he's been doing his best to manage stress and anxiety about the COVID pandemic. Discussed his concerns about aging. Pt and his wife continue to isolate at home. He is doing well with maintaining his self-care and relaxation routines. Finds breathing and other tools helpful but also only temporary. 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 Pt read about benzo use and tapering, which raised questions and concerns for pt about what he will experience should he try to taper off. O:  Pt's distress has been present but generally stable and better managed using tools discussed thus far. Pt spent much of today's discussion asking questions and expressing concerns about eventually tapering off benzo medication. Answered his questions and processed his concerns. Reinforced his ongoing efforts toward self-care and anxiety management. Encouraged his plan to discuss SSRIs or other antidepressants that might help to better control his anxiety. No safety concerns at this time.       A:  MSE:  Appearance: good hygiene  and appropriate attire  Attitude: cooperative, friendly and mild distress  Consciousness: alert  Orientation: oriented to person, place, time, general circumstance  Memory: recent and remote memory intact  Attention/Concentration: intact during session  Psychomotor Activity: normal  Eye Contact: normal  Speech: normal rate and volume, well-articulated  Mood: anxious  Affect: congruent  Perception: within normal limits  Thought Content: within normal limits  Thought Process: logical, coherent and goal-directed  Insight: good  Judgment: intact  Ability to understand instructions: Yes  Ability to respond meaningfully: Yes  Morbid Ideation: no   Suicide Assessment: no suicidal ideation, plan, or intent  Homicidal Ideation: no      PHQ Scores 3/18/2020 3/8/2019 7/30/2018 2/18/2014   PHQ2 Score 0 0 0 0   PHQ9 Score 0 0 0 0     Interpretation of Total Score Depression Severity: 1-4 = Minimal depression, 5-9 = Mild depression, 10-14 = Moderate depression, 15-19 = Moderately severe depression, 20-27 = Severe depression    Diagnosis:    1. Generalized anxiety disorder        Patient Active Problem List   Diagnosis    Elevated fasting glucose    GERD (gastroesophageal reflux disease)    Anxiety    Essential hypertension, benign    [facility]    CKD (chronic kidney disease) stage 3, GFR 30-59 ml/min ([facility]    D[facility], recurrent, [facility] extremity, acute [facility]    Mixed hyperlipidemia    Ni's esophagus without dysplasia    Malignant neoplasm of urinary bladder ([facility]         Plan:  Pt interventions:  Supportive techniques, Emphasized self-care as important for managing overall health, CBT to target balanced thinking and Provided education on the use of medication to treat patient's mental health symptoms. Pt Behavioral Change Plan:  Pt set the following goals:  1. Practice being mindful - paying attention to the present moment on purpose and in a nonjudgmental way  2. Practice diaphragmatic breathing throughout the day  3. Limit time spent watching the news  4. Practice guided meditation daily, in addition to practicing visualization (e.g., the beach in [place])  5. Try delaying your worries, planning a set worry time for later in the day, and/or worrying for a set amount of time and then shifting to something else when that time period ends. Utilize mindfulness techniques to manage anxiety during the worry time.   6. Consider writing down your worries  7. Practice grounding exercises - noticing what your senses are experiencing in the moment  8. Start your day with breathing, grounding, visualization, and movement  9. Look for volunteer opportunities that you can start doing at home  10. Talk to Dr. Violeta Bernstein about trying an SSRI (antidepressant) to manage anxiety  11. Read through the following to learn about tapering off benzos:  [email or web address]    Pt scheduled F/U virtual visit in 2 weeks.

## 2020-07-17 ENCOUNTER — HOSPITAL ENCOUNTER (OUTPATIENT)
Age: 75
Discharge: HOME | End: 2020-07-17
Payer: MEDICARE

## 2020-07-17 LAB
ALBUMIN SERPL-MCNC: 4.7 G/DL (ref 3.4–5)
ANION GAP SERPL CALCULATED.3IONS-SCNC: 14 MMOL/L (ref 3–16)
BILIRUBIN URINE: NEGATIVE
BLOOD, URINE: NEGATIVE
BUN BLDV-MCNC: 30 MG/DL (ref 7–20)
CALCIUM SERPL-MCNC: 9.6 MG/DL (ref 8.3–10.6)
CHLORIDE BLD-SCNC: 101 MMOL/L (ref 99–110)
CLARITY: CLEAR
CO2: 26 MMOL/L (ref 21–32)
COLOR: YELLOW
CREAT SERPL-MCNC: 1.4 MG/DL (ref 0.8–1.3)
GFR AFRICAN AMERICAN: 60
GFR NON-AFRICAN AMERICAN: 49
GLUCOSE BLD-MCNC: 64 MG/DL (ref 70–99)
GLUCOSE URINE: NEGATIVE MG/DL
KETONES, URINE: NEGATIVE MG/DL
LEUKOCYTE ESTERASE, URINE: NEGATIVE
MICROSCOPIC EXAMINATION: NORMAL
NITRITE, URINE: NEGATIVE
PH UA: 5 (ref 5–8)
PHOSPHORUS: 3.7 MG/DL (ref 2.5–4.9)
POTASSIUM SERPL-SCNC: 4.7 MMOL/L (ref 3.5–5.1)
PROTEIN UA: NEGATIVE MG/DL
SODIUM BLD-SCNC: 141 MMOL/L (ref 136–145)
SPECIFIC GRAVITY UA: 1.02 (ref 1–1.03)
URINE TYPE: NORMAL
UROBILINOGEN, URINE: 0.2 E.U./DL

## 2020-07-17 PROCEDURE — 36415 COLL VENOUS BLD VENIPUNCTURE: CPT

## 2020-07-17 PROCEDURE — 80069 RENAL FUNCTION PANEL: CPT

## 2020-07-17 PROCEDURE — 81003 URINALYSIS AUTO W/O SCOPE: CPT

## 2020-07-17 RX ORDER — FENOFIBRATE 54 MG/1
54 TABLET ORAL DAILY
Qty: 30 TABLET | Refills: 0 | Status: SHIPPED | OUTPATIENT
Start: 2020-07-17 | End: 2020-07-20 | Stop reason: SDUPTHER

## 2020-07-17 RX ORDER — ROSUVASTATIN CALCIUM 20 MG/1
20 TABLET, COATED ORAL NIGHTLY
Qty: 30 TABLET | Refills: 0 | Status: SHIPPED | OUTPATIENT
Start: 2020-07-17 | End: 2020-07-20 | Stop reason: SDUPTHER

## 2020-07-17 RX ORDER — TRAZODONE HYDROCHLORIDE 100 MG/1
TABLET ORAL
Qty: 30 TABLET | Refills: 0 | Status: SHIPPED | OUTPATIENT
Start: 2020-07-17 | End: 2020-07-20 | Stop reason: SDUPTHER

## 2020-07-20 ENCOUNTER — OFFICE VISIT (OUTPATIENT)
Dept: INTERNAL MEDICINE CLINIC | Age: 75
End: 2020-07-20
Payer: MEDICARE

## 2020-07-20 VITALS
BODY MASS INDEX: 28.14 KG/M2 | TEMPERATURE: 97.7 F | HEIGHT: 69 IN | WEIGHT: 190 LBS | SYSTOLIC BLOOD PRESSURE: 114 MMHG | OXYGEN SATURATION: 97 % | DIASTOLIC BLOOD PRESSURE: 64 MMHG

## 2020-07-20 PROBLEM — Z00.00 ROUTINE GENERAL MEDICAL EXAMINATION AT A HEALTH CARE FACILITY: Status: ACTIVE | Noted: 2020-07-20

## 2020-07-20 LAB
CONTROL: NORMAL
HEMOCCULT STL QL: NORMAL

## 2020-07-20 PROCEDURE — G8417 CALC BMI ABV UP PARAM F/U: HCPCS | Performed by: INTERNAL MEDICINE

## 2020-07-20 PROCEDURE — 93000 ELECTROCARDIOGRAM COMPLETE: CPT | Performed by: INTERNAL MEDICINE

## 2020-07-20 PROCEDURE — 1123F ACP DISCUSS/DSCN MKR DOCD: CPT | Performed by: INTERNAL MEDICINE

## 2020-07-20 PROCEDURE — 3017F COLORECTAL CA SCREEN DOC REV: CPT | Performed by: INTERNAL MEDICINE

## 2020-07-20 PROCEDURE — 1036F TOBACCO NON-USER: CPT | Performed by: INTERNAL MEDICINE

## 2020-07-20 PROCEDURE — 4040F PNEUMOC VAC/ADMIN/RCVD: CPT | Performed by: INTERNAL MEDICINE

## 2020-07-20 PROCEDURE — 99213 OFFICE O/P EST LOW 20 MIN: CPT | Performed by: INTERNAL MEDICINE

## 2020-07-20 PROCEDURE — G8427 DOCREV CUR MEDS BY ELIG CLIN: HCPCS | Performed by: INTERNAL MEDICINE

## 2020-07-20 PROCEDURE — 82274 ASSAY TEST FOR BLOOD FECAL: CPT | Performed by: INTERNAL MEDICINE

## 2020-07-20 PROCEDURE — G0439 PPPS, SUBSEQ VISIT: HCPCS | Performed by: INTERNAL MEDICINE

## 2020-07-20 RX ORDER — ESCITALOPRAM OXALATE 10 MG/1
10 TABLET ORAL DAILY
Qty: 30 TABLET | Refills: 1 | Status: SHIPPED | OUTPATIENT
Start: 2020-07-20 | End: 2020-09-09 | Stop reason: SDUPTHER

## 2020-07-20 RX ORDER — FENOFIBRATE 54 MG/1
54 TABLET ORAL DAILY
Qty: 90 TABLET | Refills: 1 | Status: SHIPPED | OUTPATIENT
Start: 2020-07-20 | End: 2021-03-25 | Stop reason: SDUPTHER

## 2020-07-20 RX ORDER — TRAZODONE HYDROCHLORIDE 150 MG/1
TABLET ORAL
Qty: 90 TABLET | Refills: 0
Start: 2020-07-20 | End: 2020-08-31 | Stop reason: SDUPTHER

## 2020-07-20 RX ORDER — ROSUVASTATIN CALCIUM 20 MG/1
20 TABLET, COATED ORAL NIGHTLY
Qty: 90 TABLET | Refills: 1 | Status: SHIPPED | OUTPATIENT
Start: 2020-07-20 | End: 2021-01-19

## 2020-07-20 ASSESSMENT — ENCOUNTER SYMPTOMS
EYE REDNESS: 0
COLOR CHANGE: 0
TROUBLE SWALLOWING: 0
SHORTNESS OF BREATH: 0
RHINORRHEA: 0
BLOOD IN STOOL: 0
APNEA: 0
PHOTOPHOBIA: 0
SORE THROAT: 0
CHEST TIGHTNESS: 0
ANAL BLEEDING: 0
VOICE CHANGE: 0
WHEEZING: 0
NAUSEA: 0
DIARRHEA: 0
COUGH: 0
RECTAL PAIN: 0
EYE PAIN: 0
EYE DISCHARGE: 0
CHOKING: 0
FACIAL SWELLING: 0
VOMITING: 0
ABDOMINAL PAIN: 0
EYE ITCHING: 0
SINUS PRESSURE: 0
CONSTIPATION: 0
ABDOMINAL DISTENTION: 0
BACK PAIN: 0
STRIDOR: 0

## 2020-07-20 ASSESSMENT — LIFESTYLE VARIABLES: HOW OFTEN DO YOU HAVE A DRINK CONTAINING ALCOHOL: 0

## 2020-07-20 ASSESSMENT — PATIENT HEALTH QUESTIONNAIRE - PHQ9
SUM OF ALL RESPONSES TO PHQ QUESTIONS 1-9: 0
SUM OF ALL RESPONSES TO PHQ QUESTIONS 1-9: 0

## 2020-07-20 NOTE — PATIENT INSTRUCTIONS
Personalized Preventive Plan for Chris Schmitt - 7/20/2020  Medicare offers a range of preventive health benefits. Some of the tests and screenings are paid in full while other may be subject to a deductible, co-insurance, and/or copay. Some of these benefits include a comprehensive review of your medical history including lifestyle, illnesses that may run in your family, and various assessments and screenings as appropriate. After reviewing your medical record and screening and assessments performed today your provider may have ordered immunizations, labs, imaging, and/or referrals for you. A list of these orders (if applicable) as well as your Preventive Care list are included within your After Visit Summary for your review. Other Preventive Recommendations:    · A preventive eye exam performed by an eye specialist is recommended every 1-2 years to screen for glaucoma; cataracts, macular degeneration, and other eye disorders. · A preventive dental visit is recommended every 6 months. · Try to get at least 150 minutes of exercise per week or 10,000 steps per day on a pedometer . · Order or download the FREE \"Exercise & Physical Activity: Your Everyday Guide\" from [employer]. Call 4-422.351.9017 or search [employer] online. · You need 1815-9808 mg of calcium and 2068-4875 IU of vitamin D per day. It is possible to meet your calcium requirement with diet alone, but a vitamin D supplement is usually necessary to meet this goal.  · When exposed to the sun, use a sunscreen that protects against both UVA and UVB radiation with an SPF of 30 or greater. Reapply every 2 to 3 hours or after sweating, drying off with a towel, or swimming. · Always wear a seat belt when traveling in a car. Always wear a helmet when riding a bicycle or motorcycle.     Keeping Home a [facility]       As we get older, changes in balance, gait, strength, vision, hearing, and cognition make even the most youthful senior more prone to accidents. Falls are one of the leading health risks for older people. This increased risk of falling is related to:   Aging process (eg, decreased muscle strength, slowed reflexes)   Higher incidence of chronic health problems (eg, arthritis, diabetes) that may limit mobility, agility or sensory awareness   Side effects of medicine (eg, dizziness, blurred vision)especially medicines like prescription pain medicines and drugs used to treat mental health conditions   Depending on the brittleness of your bones, the consequences of a fall can be serious and long lasting. Home Life   Research by the Association of Aging [facility]) shows that some home accidents among older adults can be prevented by making simple lifestyle changes and basic modifications and repairs to the home environment. Here are some lifestyle changes that experts recommend:   Have your hearing and vision checked regularly. Be sure to wear prescription glasses that are right for you. Speak to your doctor or pharmacist about the possible side effects of your medicines. A number of medicines can cause dizziness. If you have problems with sleep, talk to your doctor. Limit your intake of alcohol. If necessary, use a cane or walker to help maintain your balance. Wear supportive, rubber-soled shoes, even at home. If you live in a region that gets wintry weather, you may want to put special cleats on your shoes to prevent you from slipping on the snow and ice. Exercise regularly to help maintain muscle tone, agility, and balance. Always hold the banister when going up or down stairs. Also, use  bars when getting in or out of the bath or shower, or using the toilet. To avoid dizziness, get up slowly from a lying down position. Sit up first, dangling your legs for a minute or two before rising to a standing position.    Overall Home Safety Check   According to the Consumer Product Safety Commision's when in use. Make sure kitchen curtains are tied back. Move cords and appliances away from the sink and hot surfaces. If extension cords are needed, install wiring guides so they do not hang over the sink, range, or working areas. Look for coffee pots, kettles and toaster ovens with automatic shut-offs. Keep a mop handy in the kitchen so you can wipe up spills instantly. You should also have a small fire extinguisher. Arrange your kitchen with frequently used items on lower shelves to avoid the need to stand on a stepstool to reach them. Make sure countertops are well-lit to avoid injuries while cutting and preparing food. In the Bathroom    Use a non-slip mat or decals in the tub and shower, since wet, soapy tile or porcelain surfaces are extremely slippery. Make sure bathroom rugs are non-skid or tape them firmly to the floor. Bathtubs should have at least one, preferably two, grab bars, firmly attached to structural supports in the wall. (Do not use built-in soap holders or glass shower doors as grab bars.)    Tub seats fitted with non-slip material on the legs allow you to wash sitting down. For people with limited mobility, bathtub transfer benches allow you to slide safely into the tub. Raised toilet seats and toilet safety rails are helpful for those with knee or hip problems. In the [facility]    Make sure you use a nightlight and that the area around your bed is clear of potential obstacles. Be careful with electric blankets and never go to sleep with a heating pad, which can cause serious burns even if on a low setting. Use fire-resistant mattress covers and pillows, and NEVER smoke in bed. Keep a phone next to the bed that is programmed to dial 911 at the push of a button. If you have a chronic condition, you may want to sign on with an automatic call-in service.  Typically the system includes a small pendant that connects directly to an emergency medical worse, you may want to make decisions about end-of-life care. Planning for the end of your life does not mean that you are giving up. It is a way to make sure that your wishes are met. Clearly stating your wishes can make it easier for your loved ones. Making plans while you are still able may also ease your mind and make your final days less stressful and more meaningful. Follow-up care is a key part of your treatment and safety. Be sure to make and go to all appointments, and call your doctor if you are having problems. It's also a good idea to know your test results and keep a list of the medicines you take. What can you do to plan for the end of life? You can bring these issues up with your doctor. You do not need to wait until your doctor starts the conversation. You might start with \"I would not be willing to live with . Sam Tong \" When you complete this sentence it helps your doctor understand your wishes. Talk openly and honestly with your doctor. This is the best way to understand the decisions you will need to make as your health changes. Know that you can always change your mind. Ask your doctor about commonly used life-support measures. These include tube feedings, breathing machines, and fluids given through a vein (IV). Understanding these treatments will help you decide whether you want them. You may choose to have these life-supporting treatments for a limited time. This allows a trial period to see whether they will help you. You may also decide that you want your doctor to take only certain measures to keep you alive. It is important to spell out these conditions so that your doctor and family understand them. Talk to your doctor about how long you are likely to live. He or she may be able to give you an idea of what usually happens with your specific illness. Think about preparing papers that state your wishes. This way there will not be any confusion about what you want.  You can change your instructions at any time. Which papers should you prepare? Advance directives are legal papers that tell doctors how you want to be cared for at the end of your life. You do not need a  to write these papers. Ask your doctor or your state [facility] department for information on how to write your advance directives. They may have the forms for each of these types of papers. Make sure your doctor has a copy of these on file, and give a copy to a family member or close friend. Consider a do-not-resuscitate order (DNR). This order asks that no extra treatments be done if your heart stops or you stop breathing. Extra treatments may include cardiopulmonary resuscitation (CPR), electrical shock to restart your heart, or a machine to breathe for you. If you decide to have a DNR order, ask your doctor to explain and write it. Place the order in your home where everyone can easily see it. Consider a living will. A living will explains your wishes about life support and other treatments at the end of your life if you become unable to speak for yourself. Living curtis tell doctors to use or not use treatments that would keep you alive. You must have one or two witnesses or a notary present when you sign this form. A living will may be called something else in your state. Consider a medical power of . This form allows you to name a person to make decisions about your care if you are not able to. Most people ask a close friend or family member. Talk to this person about the kinds of treatments you want and those that you do not want. Make sure this person understands your wishes. A medical power of  may be called something else in your state. These legal papers are simple to change. Tell your doctor what you want to change, and ask him or her to make a note in your medical file. Give your family updated copies of the papers. Where can you learn more? Go to https://praneeth.[employer]. org and sign in to your [employer]et[employer] account. Enter P184 in the [employer] box to learn more about \"Advance Care Planning: Care Instructions. \"     If you do not have an account, please click on the \"Sign Up Now\" link. Current as of: December 9, 2019               Content Version: 12.5  © 2060-6986 [employer]. Care instructions adapted under license by [facility] ([facility]). If you have questions about a medical condition or this instruction, always ask your healthcare professional. [address] any warranty or liability for your use of this information. ·        Learning About Jevon Wade  What is a living will? A living will, also called a declaration, is a legal form. It tells your family and your doctor your wishes when you can't speak for yourself. It's used by the health professionals who will treat you as you near the end of your life or if you get seriously hurt or ill. If you put your wishes in writing, your loved ones and others will know what kind of care you want. They won't need to guess. This can ease your mind and be helpful to others. And you can change or cancel your living will at any time. A living will is not the same as an estate or property will. An estate will explains what you want to happen with your money and property after you die. How do you use it? A living will is used to describe the kinds of treatment or life support you want as you near the end of your life or if you get seriously hurt or ill. Keep these facts in mind about living curtis. Your living will is used only if you can't speak or make decisions for yourself. Most often, one or more doctors must certify that you can't speak or decide for yourself before your living will takes effect. If you get better and can speak for yourself again, you can accept or refuse any treatment. It doesn't matter what you said in your living will.   Some states may limit your right to refuse treatment in certain cases. For example, you may need to clearly state in your living will that you don't want artificial hydration and nutrition, such as being fed through a tube. Is a living will a legal document? A living will is a legal document. Each state has its own laws about living curtis. And a living will may be called something else in your state. Here are some things to know about living curtis. You don't need an  to complete a living will. But legal advice can be helpful if your state's laws are unclear. It can also help if your health history is complicated or your family can't agree on what should be in your living will. You can change your living will at any time. Some people find that their wishes about end-of-life care change as their health changes. If you make big changes to your living will, complete a new form. If you move to another state, make sure that your living will is legal in the state where you now live. In most cases, doctors will respect your wishes even if you have a form from a different state. You might use a universal form that has been approved by many states. This kind of form can sometimes be filled out and stored online. Your digital copy will then be available wherever you have a connection to the internet. The doctors and nurses who need to treat you can find it right away. Your state may offer an online registry. This is another place where you can store your living will online. It's a good idea to get your living will notarized. This means using a person called a  to watch two people sign, or witness, your living will. What should you know when you create a living will? Here are some questions to ask yourself as you make your living will:  Do you know enough about life support methods that might be used? If not, talk to your doctor so you know what might be done if you can't breathe on your own, your heart stops, or you can't swallow.   What things would you still want to be able to do after you receive life-support methods? Would you want to be able to walk? To speak? To eat on your own? To live without the help of machines? Do you want certain Zoroastrian practices performed if you become very ill? If you have a choice, where do you want to be cared for? In your home? At a hospital or nursing home? If you have a choice at the end of your life, where would you prefer to die? At home? In a hospital or nursing home? Somewhere else? Would you prefer to be buried or cremated? Do you want your organs to be donated after you die? What should you do with your living will? Make sure that your family members and your health care agent have copies of your living will (also called a declaration). Give your doctor a copy of your living will. Ask him or her to keep it as part of your medical record. If you have more than one doctor, make sure that each one has a copy. Put a copy of your living will where it can be easily found. For example, some people may put a copy on their refrigerator door. If you are using a digital copy, be sure your doctor, family members, and health care agent know how to find and access it. Where can you learn more? Go to https://HIGH MOBILITYpepiceweb.[employer]. org and sign in to your [employer] account. Enter I499 in the Outsmart box to learn more about \"Learning About Living Perroy. \"     If you do not have an account, please click on the \"Sign Up Now\" link. Current as of: December 9, 2019               Content Version: 12.5  © 3919-8289 Healthwise, Incorporated. Care instructions adapted under license by [facility] [place] ([facility]). If you have questions about a medical condition or this instruction, always ask your healthcare professional. [place] [address] any warranty or liability for your use of this information. ·        Learning About Medical Power of   What is a medical power of ?      A medical power of , also called a durable power of  for health care, is one type of the legal forms called advance directives. It lets you name the person you want to make treatment decisions for you if you can't speak or decide for yourself. The person you choose is called your health care agent. This person is also called a health care proxy or health care surrogate. A medical power of  may be called something else in your state. How do you choose a health care agent? Choose your health care agent carefully. This person may or may not be a family member. Talk to the person before you make your final decision. Make sure he or she is comfortable with this responsibility. It's a good idea to choose someone who:  Is at least 25years old. Knows you well and understands what makes life meaningful for you. Understands your Buddhism and moral values. Will do what you want, not what he or she wants. Will be able to make difficult choices at a stressful time. Will be able to refuse or stop treatment, if that is what you would want, even if you could die. Will be firm and confident with health professionals if needed. Will ask questions to get needed information. Lives near you or agrees to travel to you if needed. Your family may help you make medical decisions while you can still be part of that process. But it's important to choose one person to be your health care agent in case you aren't able to make decisions for yourself. If you don't fill out the legal form and name a health care agent, the decisions your family can make may be limited. A health care agent may be called something else in your state. Who will make decisions for you if you don't have a health care agent? If you don't have a health care agent or a living will, you may not get the care you want. Decisions may be made by family members who disagree about your medical care.  Or decisions may be made by a medical professional who doesn't know you well. In some cases, a  makes the decisions. When you name a health care agent, it is very clear who has the power to make health decisions for you. How do you name a health care agent? You name your health care agent on a legal form. This form is usually called a medical power of . Ask your hospital, state bar association, or office on aging where to find these forms. You must sign the form to make it legal. Some states require you to get the form notarized. This means that a person called a  watches you sign the form and then he or she signs the form. Some states also require that two or more witnesses sign the form. Be sure to tell your family members and doctors who your health care agent is. Where can you learn more? Go to https://Transpondpecaioeweb.[employer]. org and sign in to your [employer] account. Enter [id number] in the [employer] box to learn more about \"Learning About [address]. \"     If you do not have an account, please click on the \"Sign Up Now\" link. Current as of: December 9, 2019               Content Version: 12.5  © 8302-8477 Healthwise, Incorporated. Care instructions adapted under license by [facility] ([facility]). If you have questions about a medical condition or this instruction, always ask your healthcare professional. [place] [address] any warranty or liability for your use of this information.     ·

## 2020-07-20 NOTE — PROGRESS NOTES
Nose: Nose normal.      Mouth/Throat:      Pharynx: Uvula midline. Eyes:      General: Lids are normal.      Pupils: Pupils are equal, round, and reactive to light. Comments: Fundi exam is normal   Neck:      Musculoskeletal: No edema. Thyroid: No thyroid mass or thyromegaly. Vascular: No carotid bruit or JVD. Trachea: Trachea normal.   Cardiovascular:      Rate and Rhythm: Normal rate and regular rhythm. Heart sounds: S1 normal and S2 normal. No murmur. Pulmonary:      Effort: Pulmonary effort is normal.      Breath sounds: Normal breath sounds. Abdominal:      Palpations: Abdomen is soft. Tenderness: There is no abdominal tenderness. Hernia: No hernia is present. There is no hernia in the left inguinal area. Genitourinary:     Penis: Normal.       Scrotum/Testes: Normal.      Rectum: Guaiac result negative. Comments: Instructed on monthly self testicle exam  Musculoskeletal: Normal range of motion. Skin:     General: Skin is warm and dry. Comments: Patient advised to do a monthly mole check     Neurological:      Mental Status: He is alert and oriented to person, place, and time. Cranial Nerves: No cranial nerve deficit. Sensory: No sensory deficit. Deep Tendon Reflexes: Reflexes are normal and symmetric. Assessment:      See Problem List assessment and plan       Plan:       Routine general medical examination at a health care facility  Reviewed diet and exercise  Check labs  Reviewed immunizations  Reviewed colonoscopy  Living Will      DVT, recurrent, lower extremity, acute ([facility] [address]  On Eliquis  Continue treatment    Mixed hyperlipidemia  Stable with present treatment  Check fasting lipids      Essential hypertension, benign  BP stable  Continue present treatment      Anxiety  Discussed issues  Seeing Dr Daniel Phipps  Call in two weeks how dong  If well, plan weaning Ativan  He is on Trazodone and Elavil.   He has been on these for a long while to sleep  Discussed changing to one  Will increase Trazodone to 150 gm at bedtime and stop Elavil  monitor              Patient engaged in shared decision making. Information given to evaluate options of treatment, understand what is needed and discuss importance of following plan. Medicare Annual Wellness Visit  Name: Sue Regan Date: 2020   MRN: <[id number]> Sex: Male   Age: 75 y.o. Ethnicity: Non-/Non    : 1945 Race: Jackson Davies is here for Medicare AWV    Screenings for behavioral, psychosocial and functional/safety risks, and cognitive dysfunction are all negative except as indicated below. These results, as well as other patient data from the [address] form, are documented in Flowsheets linked to this Encounter. Allergies   Allergen Reactions    Lipitor Rash         Prior to Visit Medications    Medication Sig Taking? Authorizing Provider   escitalopram (LEXAPRO) 10 MG tablet Take 1 tablet by mouth daily Yes Janett Benoit MD   rosuvastatin (CRESTOR) 20 MG tablet Take 1 tablet by mouth nightly Yes Janett Benoit MD   fenofibrate (TRICOR) 54 MG tablet Take 1 tablet by mouth daily s [facility] pharmacy: Please dispense generic fenofibrate unless prescriber denote Yes Janett Benoit MD   traZODone (DESYREL) 150 MG tablet TAKE 1 TABLET BY MOUTH  NIGHTLY Yes Janett Benoit MD   LORazepam (ATIVAN) 1 MG tablet Take 1 tablet by mouth 2 times daily as needed for Anxiety for up to 28 days. Yes Janett Benoit MD   lisinopril (PRINIVIL;ZESTRIL) 20 MG tablet TAKE 1 TABLET BY MOUTH  DAILY Yes Janett Benoit MD   ELIQUIS 5 MG TABS tablet TAKE 1 TABLET BY MOUTH TWO  TIMES DAILY Yes Janett Benoit MD   pantoprazole (PROTONIX) 40 MG tablet Take 40 mg by mouth daily  Yes Historical Provider, MD   Flaxseed, Linseed, (FLAX SEED OIL) 1000 MG CAPS Take 4,000 Units by mouth daily.  Yes Historical Provider, MD         Past Medical History:   Diagnosis Date    Arthritis     Bladder [facility] ([facility] [address]     DVT (deep venous thrombosis) ([facility] [address] 07/01/2014    two spontaneous DVTs left leg    Elevated fasting glucose     Hyperlipidemia     Hypertension     MVP (mitral valve prolapse)     Prostate CA ([facility] [address] 2005       Past Surgical History:   Procedure Laterality Date    BLADDER SURGERY      tumor removed    CATARACT REMOVAL Bilateral     COLONOSCOPY  6/29/12    Polyps    COLONOSCOPY  07/12/2017    Polyp    INGUINAL HERNIA REPAIR Right 06/1999    PENILE PROSTHESIS PLACEMENT      PROSTATECTOMY  2005 approx    Dr. Brennon Robbins      In [facility] - had one tonsil burned out         Family History   Problem Relation Age of Onset    Lung Cancer Mother     Heart Disease Mother     Diabetes Father     Prostate Cancer Father     Diabetes Brother        CareTeam (Including outside providers/suppliers regularly involved in providing care):   Patient Care Team:  Katelynn Daley MD as PCP - General (Internal Medicine)  Trenda Hamman, MD as PCP - Hematology/Oncology (Medical Oncology)  Katelynn Daley MD as PCP - [facility] Empaneled Provider    Wt Readings from Last 3 Encounters:   07/20/20 190 lb (86.2 kg)   03/18/20 184 lb (83.5 kg)   08/20/19 197 lb (89.4 kg)     Vitals:    07/20/20 1046   BP: 114/64   Site: Left Upper Arm   Position: Sitting   Cuff Size: Large Adult   Temp: 97.7 °F (36.5 °C)   TempSrc: Temporal   SpO2: 97%   Weight: 190 lb (86.2 kg)   Height: 5' 9\" (1.753 m)     Body mass index is 28.06 kg/m². Patient's complete Health Risk Assessment and screening values have been reviewed and are found in Flowsheets. The following problems were reviewed today and where indicated follow up appointments were made and/or referrals ordered.     Positive Risk Factor Screenings with Interventions:     Depression:  Depression Unable to Assess: Functional capacity motivation limits accuracy  PHQ-2 Score: 0  PHQ-9 Total Score: 0    General Health:  General  In general, how would you say your health is?: Fair  In the past 7 days, have you experienced any of the following?  New or Increased Pain, New or Increased Fatigue, Loneliness, Social Isolation, Stress or Anger?: (!) New or Increased Pain, New or Increased Fatigue  Do you get the social and emotional support that you need?: Yes  Do you have a Living Will?: (!) No  General Health Risk Interventions:  · No Living Will: additional information provided in AVS    Safety:  Safety  Do you have working smoke detectors?: Yes  Have all throw rugs been removed or fastened?: (!) No  Do you have non-slip mats or surfaces in all bathtubs/showers?: (!) No  Do all of your stairways have a railing or banister?: Yes  Are your doorways, halls and stairs free of clutter?: Yes  Do you always fasten your seatbelt when you are in a car?: Yes  Safety Interventions:  · Home safety tips provided    Personalized Preventive Plan   Current Health Maintenance Status  Immunization History   Administered Date(s) Administered    Influenza Vaccine, unspecified formulation 01/12/2016    Influenza, High Dose (Fluzone 65 yrs and older) 01/12/2016, 01/29/2018    PPD Test 08/13/2003    Pneumococcal Conjugate 13-valent ([id number]) 01/12/2016    Pneumococcal Polysaccharide ([id number]) 02/18/2014    Td, unspecified formulation 11/30/2004    Tdap (Boostrix, Adacel) 10/15/2012, 07/01/2017        Health Maintenance   Topic Date Due    Annual Wellness Visit (AWV)  03/18/2020    PSA counseling  08/20/2020    Shingles Vaccine (1 of 2) 03/18/2021 (Originally 12/22/1994)    Flu vaccine (1) 09/01/2020    A1C test (Diabetic or Prediabetic)  03/18/2021    Lipid screen  03/18/2021    Potassium monitoring  07/17/2021    Creatinine monitoring  07/17/2021    Colon cancer screen colonoscopy  07/12/2022    DTaP/Tdap/Td vaccine (3 - Td) 07/01/2027    Pneumococcal 65+ years Vaccine  Completed    Hepatitis A vaccine  Aged Out  Hepatitis B vaccine  Aged Out    Hib vaccine  Aged Out    Meningococcal (ACWY) vaccine  Aged Out     Recommendations for Fittr Due: see orders and patient instructions/AVS.  . Recommended screening schedule for the next 5-10 years is provided to the patient in written form: see Patient Instructions/AVS.    Ruthie Babcock was seen today for medicare aw. Diagnoses and all orders for this visit:    Routine general medical examination at a health care facility  -     CBC Auto Differential; Future  -     Comprehensive Metabolic Panel; Future  -     Lipid Panel; Future  -     TSH without Reflex; Future  -     Vitamin D 25 Hydroxy; Future  -     Albumin, Random Urine; Future  -     Microalbumin / Creatinine Urine Ratio; Future    Mixed hyperlipidemia  -     EKG 12 Lead  -     Lipid Panel; Future  -     rosuvastatin (CRESTOR) 20 MG tablet; Take 1 tablet by mouth nightly  -     fenofibrate (TRICOR) 54 MG tablet; Take 1 tablet by mouth daily [facility]: Please dispense generic fenofibrate unless prescriber denote    Screening for colorectal cancer  -     POCT Fecal Immunochemical Test (FIT)    Disorder of bone, unspecified   -     Vitamin D 25 Hydroxy; Future    Recurrent acute deep vein thrombosis (DVT) of right lower extremity (HCC)    Essential hypertension, benign    Anxiety    Other orders  -     escitalopram (LEXAPRO) 10 MG tablet;  Take 1 tablet by mouth daily  -     traZODone (DESYREL) 150 MG tablet; TAKE 1 TABLET BY MOUTH  NIGHTLY

## 2020-07-22 ENCOUNTER — HOSPITAL ENCOUNTER (OUTPATIENT)
Age: 75
Discharge: HOME | End: 2020-07-22
Payer: MEDICARE

## 2020-07-22 LAB
A/G RATIO: 1.8 (ref 1.1–2.2)
ALBUMIN SERPL-MCNC: 4.4 G/DL (ref 3.4–5)
ALP BLD-CCNC: 50 U/L (ref 40–129)
ALT SERPL-CCNC: 12 U/L (ref 10–40)
ANION GAP SERPL CALCULATED.3IONS-SCNC: 10 MMOL/L (ref 3–16)
AST SERPL-CCNC: 14 U/L (ref 15–37)
BASOPHILS ABSOLUTE: 0 K/UL (ref 0–0.2)
BASOPHILS RELATIVE PERCENT: 0.6 %
BILIRUB SERPL-MCNC: 0.3 MG/DL (ref 0–1)
BUN BLDV-MCNC: 30 MG/DL (ref 7–20)
CALCIUM SERPL-MCNC: 9.2 MG/DL (ref 8.3–10.6)
CHLORIDE BLD-SCNC: 101 MMOL/L (ref 99–110)
CHOLESTEROL, TOTAL: 177 MG/DL (ref 0–199)
CO2: 26 MMOL/L (ref 21–32)
CREAT SERPL-MCNC: 1.4 MG/DL (ref 0.8–1.3)
CREATININE URINE: 15.1 MG/DL (ref 39–259)
EOSINOPHILS ABSOLUTE: 0.2 K/UL (ref 0–0.6)
EOSINOPHILS RELATIVE PERCENT: 2.4 %
GFR AFRICAN AMERICAN: 60
GFR NON-AFRICAN AMERICAN: 49
GLOBULIN: 2.5 G/DL
GLUCOSE BLD-MCNC: 109 MG/DL (ref 70–99)
HCT VFR BLD CALC: 45.2 % (ref 40.5–52.5)
HDLC SERPL-MCNC: 59 MG/DL (ref 40–60)
HEMOGLOBIN: 14.9 G/DL (ref 13.5–17.5)
LDL CHOLESTEROL CALCULATED: 107 MG/DL
LYMPHOCYTES ABSOLUTE: 1.2 K/UL (ref 1–5.1)
LYMPHOCYTES RELATIVE PERCENT: 14.1 %
MCH RBC QN AUTO: 30.1 PG (ref 26–34)
MCHC RBC AUTO-ENTMCNC: 33 G/DL (ref 31–36)
MCV RBC AUTO: 91.2 FL (ref 80–100)
MICROALBUMIN UR-MCNC: <1.2 MG/DL
MICROALBUMIN/CREAT UR-RTO: ABNORMAL MG/G (ref 0–30)
MONOCYTES ABSOLUTE: 0.6 K/UL (ref 0–1.3)
MONOCYTES RELATIVE PERCENT: 6.5 %
NEUTROPHILS ABSOLUTE: 6.8 K/UL (ref 1.7–7.7)
NEUTROPHILS RELATIVE PERCENT: 76.4 %
PDW BLD-RTO: 14.3 % (ref 12.4–15.4)
PLATELET # BLD: 226 K/UL (ref 135–450)
PMV BLD AUTO: 9 FL (ref 5–10.5)
POTASSIUM SERPL-SCNC: 4.5 MMOL/L (ref 3.5–5.1)
RBC # BLD: 4.95 M/UL (ref 4.2–5.9)
SODIUM BLD-SCNC: 137 MMOL/L (ref 136–145)
TOTAL PROTEIN: 6.9 G/DL (ref 6.4–8.2)
TRIGL SERPL-MCNC: 55 MG/DL (ref 0–150)
TSH SERPL DL<=0.05 MIU/L-ACNC: 1.6 UIU/ML (ref 0.27–4.2)
VITAMIN D 25-HYDROXY: 44.2 NG/ML
VLDLC SERPL CALC-MCNC: 11 MG/DL
WBC # BLD: 8.9 K/UL (ref 4–11)

## 2020-07-22 PROCEDURE — 82570 ASSAY OF URINE CREATININE: CPT

## 2020-07-22 PROCEDURE — 80061 LIPID PANEL: CPT

## 2020-07-22 PROCEDURE — 80053 COMPREHEN METABOLIC PANEL: CPT

## 2020-07-22 PROCEDURE — 84443 ASSAY THYROID STIM HORMONE: CPT

## 2020-07-22 PROCEDURE — 82043 UR ALBUMIN QUANTITATIVE: CPT

## 2020-07-22 PROCEDURE — 85025 COMPLETE CBC W/AUTO DIFF WBC: CPT

## 2020-07-22 PROCEDURE — 82306 VITAMIN D 25 HYDROXY: CPT

## 2020-07-22 PROCEDURE — 36415 COLL VENOUS BLD VENIPUNCTURE: CPT

## 2020-07-24 ENCOUNTER — TELEMEDICINE (OUTPATIENT)
Dept: PSYCHOLOGY | Age: 75
End: 2020-07-24
Payer: MEDICARE

## 2020-07-24 PROCEDURE — 98968 PH1 ASSMT&MGMT NQHP 21-30: CPT | Performed by: PSYCHOLOGIST

## 2020-07-24 NOTE — PROGRESS NOTES
Behavioral Health Consultation  Kristen Sam Psy.D. Psychologist  7/24/2020  9-9:30 AM    Time spent with Patient: 30 minutes  This is patient's [facility] appointment. Reason for Consult: Anxiety  Referring Provider: Liss Bell MD  [address] [facility] [address]    TELEHEALTH VISIT -- Audio Only (During [facility]-33 public health emergency)  }  Pursuant to the emergency declaration under the [facility] waiver authority and the Coronavirus Preparedness and [employer] Act, this phone call was conducted, with patient's consent, to reduce the patient's risk of exposure to COVID-19 and provide continuity of care for an established patient. Services were provided through a phone call discussion to substitute for in-person clinic visit. Pt gave verbal informed consent to participate in telehealth services. Consent:  He and/or health care decision maker is aware that that he may receive a bill for this service, depending on his insurance coverage, and has provided verbal consent to proceed: Yes    Conducted a risk-benefit analysis and determined that the patient's presenting problems are consistent with the use of telepsychology. Determined that the patient has sufficient knowledge and skills in the use of technology enabling them to adequately benefit from telepsychology. It was determined that this patient was able to be properly treated without an in-person session. Patient verified that they were currently located at the [facility] address that was provided during registration.     Verified the following information:  Patient's identification: Yes  Patient location: [address] [place] [address]  Patient's call back number: [phone]  Patient's emergency contact's name and number, as well as permission to contact them if needed:   Extended Emergency Contact Information  Primary Emergency Contact: meaningfully: Yes  Morbid Ideation: no   Suicide Assessment: no suicidal ideation, plan, or intent  Homicidal Ideation: no      PHQ Scores 7/20/2020 3/18/2020 3/8/2019 7/30/2018 2/18/2014   PHQ2 Score 0 0 0 0 0   PHQ9 Score 0 0 0 0 0     Interpretation of Total Score Depression Severity: 1-4 = Minimal depression, 5-9 = Mild depression, 10-14 = Moderate depression, 15-19 = Moderately severe depression, 20-27 = Severe depression    Diagnosis:    1. Generalized anxiety disorder        Patient Active Problem List   Diagnosis    Elevated fasting glucose    GERD (gastroesophageal reflux disease)    Anxiety    Essential hypertension, benign    CKD (chronic kidney disease) stage 3, GFR 30-59 ml/min ([facility])    DVT, recurrent, lower extremity, acute ([address]    Mixed hyperlipidemia    Ni's esophagus without dysplasia    Malignant neoplasm of urinary bladder ([facility] [address]    Routine general medical examination at a health care facility         Plan:  Pt interventions:  Supportive techniques, Emphasized self-care as important for managing overall health, CBT to target balanced thinking and Provided education on the use of medication to treat patient's mental health symptoms. Pt Behavioral Change Plan:  Pt set the following goals:  1. Practice being mindful - paying attention to the present moment on purpose and in a nonjudgmental way  2. Practice diaphragmatic breathing throughout the day  3. Limit time spent watching the news  4. Practice guided meditation daily, in addition to practicing visualization (e.g., the beach in [place])  5. Try delaying your worries, planning a set worry time for later in the day, and/or worrying for a set amount of time and then shifting to something else when that time period ends. Utilize mindfulness techniques to manage anxiety during the worry time. 6. Consider writing down your worries  7. Practice grounding exercises - noticing what your senses are experiencing in the moment  8. Start your day with breathing, grounding, visualization, and movement  9. Look for volunteer opportunities that you can start doing at home  10. Talk to Dr. Ilda Wallace about trying an SSRI (antidepressant) to manage anxiety  11. Read through the following to learn about tapering off benzos:  [email or web address]    Pt scheduled F/U virtual visit in 2 weeks.

## 2020-07-24 NOTE — PATIENT INSTRUCTIONS
Goals: 1. Practice being mindful - paying attention to the present moment on purpose and in a nonjudgmental way  2. Practice diaphragmatic breathing throughout the day  3. Limit time spent watching the news  4. Practice guided meditation daily, in addition to practicing visualization (e.g., the beach in [place])  5. Try delaying your worries, planning a set worry time for later in the day, and/or worrying for a set amount of time and then shifting to something else when that time period ends. Utilize mindfulness techniques to manage anxiety during the worry time. 6. Consider writing down your worries  7. Practice grounding exercises - noticing what your senses are experiencing in the moment  8. Start your day with breathing, grounding, visualization, and movement  9. Look for volunteer opportunities that you can start doing at home  10. Talk to Dr. Demetra Cruz about trying an SSRI (antidepressant) to manage anxiety  11.  Read through the following to learn about tapering off benzos:  [email or web address]

## 2020-07-30 ENCOUNTER — TELEPHONE (OUTPATIENT)
Dept: INTERNAL MEDICINE CLINIC | Age: 75
End: 2020-07-30

## 2020-07-31 RX ORDER — LORAZEPAM 1 MG/1
1 TABLET ORAL 2 TIMES DAILY PRN
Qty: 56 TABLET | Refills: 0 | Status: SHIPPED | OUTPATIENT
Start: 2020-07-31 | End: 2020-08-27 | Stop reason: SDUPTHER

## 2020-08-03 ENCOUNTER — PATIENT MESSAGE (OUTPATIENT)
Dept: INTERNAL MEDICINE CLINIC | Age: 75
End: 2020-08-03

## 2020-08-03 RX ORDER — LISINOPRIL 20 MG/1
TABLET ORAL
Qty: 90 TABLET | Refills: 3 | Status: SHIPPED | OUTPATIENT
Start: 2020-08-03 | End: 2021-08-16 | Stop reason: SDUPTHER

## 2020-08-03 NOTE — TELEPHONE ENCOUNTER
From: Twin Zaman  To: Juan Henriquez MD  Sent: 8/3/2020 8:08 AM EDT  Subject: Prescription Question    Hi Dr. Krzysztof Rosario,  As of today,the lexapro 10mg has had no appreciative affect.     Cesia Linares

## 2020-08-07 ENCOUNTER — TELEMEDICINE (OUTPATIENT)
Dept: PSYCHOLOGY | Age: 75
End: 2020-08-07
Payer: MEDICARE

## 2020-08-07 PROCEDURE — 98968 PH1 ASSMT&MGMT NQHP 21-30: CPT | Performed by: PSYCHOLOGIST

## 2020-08-07 NOTE — PROGRESS NOTES
Behavioral Health Consultation  Kristen Sam Psy.D. Psychologist  8/7/2020  9:30-10 AM    Time spent with Patient: 30 minutes  This is patient's ninth [facility] appointment. Reason for Consult: Anxiety  Referring Provider: Liss Bell MD  [address] [facility] [address]    TELEHEALTH VISIT -- Audio Only (During [facility]-27 public health emergency)  }  Pursuant to the emergency declaration under the [address] [place] [address], Replaced by [facility] waiver authority and the Coronavirus Preparedness and [employer] Act, this phone call was conducted, with patient's consent, to reduce the patient's risk of exposure to COVID-19 and provide continuity of care for an established patient. Services were provided through a phone call discussion to substitute for in-person clinic visit. Pt gave verbal informed consent to participate in telehealth services. Consent:  He and/or health care decision maker is aware that that he may receive a bill for this service, depending on his insurance coverage, and has provided verbal consent to proceed: Yes    Conducted a risk-benefit analysis and determined that the patient's presenting problems are consistent with the use of telepsychology. Determined that the patient has sufficient knowledge and skills in the use of technology enabling them to adequately benefit from telepsychology. It was determined that this patient was able to be properly treated without an in-person session. Patient verified that they were currently located at the [facility] address that was provided during registration.     Verified the following information:  Patient's identification: Yes  Patient location: [address] [place] [address]  Patient's call back number: [phone]  Patient's emergency contact's name and number, as well as permission to contact them if needed:   Extended Emergency Contact Information  Primary Emergency Contact: Karmen Hartman  Address: [address] [address]           [place], [address] Phone: [phone]  Mobile Phone: [phone]  Relation: Spouse    Provider location: [facility] Armando Mejia affirm this is an episode with a Patient who has not had a related appointment within my department in the past 7 days or scheduled within the next 24 hours. S:  Pt reported that he's been doing well. He's been taking Lexapro 10mg. Anxiety has not been bad over the last couple weeks. He's been concentrating on the positives in his life rather than worrying about what's stressful in his life. He stays busy and avoids the news as well as he can. Pt shared his ongoing desire to taper off the Ativan, as well as his concerns about how the tapering process will affect him. He has noticed ataxia and some memory issues in recent years. Pt had a cancerous bladder tumor a couple years ago. He's been cancer-free since it was removed. He has one more maintenance exam in November and ongoing BCG treatments. He does have more anxiety before the BCG treatments d/t the discomfort they cause for him. O:  Pt's anxiety has been trending downward and has been more manageable since starting Lexapro 3 weeks ago. Pt was initially doubtful that the Lexapro has helped thus far, but with discussion he was able to recognize some of the recent progress he's made. Reinforced his progress. Continued processing his concerns about going through the benzo taper process, despite his desire to proceed with getting off the Ativan. Encouraged pt to shift his focus away from information about this process, which is adding to his anxiety, and instead consider experimenting with cutting back on his Ativan use (e.g., take a smaller afternoon dose when he feels ready to do so). He agreed to try this. Normalized and validated his concerns. No safety concerns at this time.       A:  MSE:  Appearance: good hygiene  and appropriate attire  Attitude: cooperative, friendly and mild distress  Consciousness: alert  Orientation: oriented to person, place, time, general circumstance  Memory: recent and remote memory intact  Attention/Concentration: intact during session  Psychomotor Activity: normal  Eye Contact: normal  Speech: normal rate and volume, well-articulated  Mood: anxious  Affect: congruent, calmer  Perception: within normal limits  Thought Content: within normal limits  Thought Process: logical, coherent and goal-directed  Insight: good  Judgment: intact  Ability to understand instructions: Yes  Ability to respond meaningfully: Yes  Morbid Ideation: no   Suicide Assessment: no suicidal ideation, plan, or intent  Homicidal Ideation: no      PHQ Scores 7/20/2020 3/18/2020 3/8/2019 7/30/2018 2/18/2014   PHQ2 Score 0 0 0 0 0   PHQ9 Score 0 0 0 0 0     Interpretation of Total Score Depression Severity: 1-4 = Minimal depression, 5-9 = Mild depression, 10-14 = Moderate depression, 15-19 = Moderately severe depression, 20-27 = Severe depression    Diagnosis:    1. Generalized anxiety disorder        Patient Active Problem List   Diagnosis    Elevated fasting glucose    GERD (gastroesophageal reflux disease)    Anxiety    Essential hypertension, benign    CKD (chronic kidney disease) stage 3, GFR 30-59 ml/min ([facility]    DVT, recurrent, lower extremity, acute ([address]    Mixed hyperlipidemia    Ni's esophagus without dysplasia    Malignant neoplasm of urinary bladder ([address]    Routine general medical examination at a health care facility         Plan:  Pt interventions:  Supportive techniques, Emphasized self-care as important for managing overall health, CBT to target balanced thinking and Provided education on the use of medication to treat patient's mental health symptoms. Pt Behavioral Change Plan:  Pt set the following goals:  1.  Practice being mindful - paying attention to the present moment on purpose and in a nonjudgmental way  2. Practice diaphragmatic breathing throughout the day  3. Limit time spent watching the news  4. Practice guided meditation daily, in addition to practicing visualization (e.g., the beach in [place])  5. Try delaying your worries, planning a set worry time for later in the day, and/or worrying for a set amount of time and then shifting to something else when that time period ends. Utilize mindfulness techniques to manage anxiety during the worry time. 6. Consider writing down your worries  7. Practice grounding exercises - noticing what your senses are experiencing in the moment  8. Start your day with breathing, grounding, visualization, and movement  9. Look for volunteer opportunities that you can start doing at home    Pt scheduled F/U virtual visit in 2 weeks.

## 2020-08-19 PROBLEM — Z00.00 ROUTINE GENERAL MEDICAL EXAMINATION AT A HEALTH CARE FACILITY: Status: RESOLVED | Noted: 2020-07-20 | Resolved: 2020-08-19

## 2020-08-21 ENCOUNTER — TELEMEDICINE (OUTPATIENT)
Dept: PSYCHOLOGY | Age: 75
End: 2020-08-21
Payer: MEDICARE

## 2020-08-21 PROCEDURE — 98968 PH1 ASSMT&MGMT NQHP 21-30: CPT | Performed by: PSYCHOLOGIST

## 2020-08-21 NOTE — PROGRESS NOTES
Behavioral Health Consultation  Zilphia Boast, Psy.D. Psychologist  8/21/2020  9-9:30 AM    Time spent with Patient: 30 minutes  This is patient's tenth [facility] appointment. Reason for Consult: Anxiety  Referring Provider: Isi Brunner MD  [address] [facility] [address]    TELEHEALTH VISIT -- Audio Only (During [facility] public [facility] emergency)  }  Pursuant to the emergency declaration under the [facility], [facility] waiver authority and the Coronavirus Preparedness and [employer] Act, this phone call was conducted, with patient's consent, to reduce the patient's risk of exposure to COVID-19 and provide continuity of care for an established patient. Services were provided through a phone call discussion to substitute for in-person clinic visit. Pt gave verbal informed consent to participate in telehealth services. Consent:  He and/or health care decision maker is aware that that he may receive a bill for this telephone service, depending on his insurance coverage, and has provided verbal consent to proceed: Yes    Conducted a risk-benefit analysis and determined that the patient's presenting problems are consistent with the use of telepsychology. Determined that the patient has sufficient knowledge and skills in the use of technology enabling them to adequately benefit from telepsychology. It was determined that this patient was able to be properly treated without an in-person session. Patient verified that they were currently located at the [facility] address that was provided during registration.     Verified the following information:  Patient's identification: Yes  Patient location: [address] [place] [address]  Patient's call back number: [phone]  Patient's emergency contact's name and number, as well as permission to contact them if needed:   Extended Emergency Contact Information  Primary Emergency Contact: Karmen Hartman  Address: [address] [address]           [facility], [address] Phone: [phone]  Mobile Phone: [phone]  Relation: Spouse    Provider location: [facility] Mejia affirm this is an episode with an Established Patient who has not had a related appointment within my department in the past 7 days or scheduled within the next 24 hours. S:  Pt reported that he's been pretty good. Anxiety had been 9-10 out of 10, lately averaging 5 out of 10. Keeping as busy as he possibly can. Using breathing and visualization to manage anxiety. Trying to remain in the moment more. Waking up more often during the night; sometimes he struggles to go back to sleep. Feeling comfortable on Lexapro 10mg but not sure it's fully effective yet. Waited a couple hours to take an afternoon Ativan 1mg dose and nothing happened, which pleased him. He is working up the courage to continue trying to delay his second dose. O:  Pt's anxiety remains lower and better controlled. Reinforced his progress and continued use of adaptive coping strategies. Empowered him to continue working through both the physiological and psychological dependence he has developed on Ativan. Encouraged continued focus on limiting time spent engaging in worries about areas in which he has no control. Pt was receptive and engaged. No safety concerns at this time.       A:  MSE:  Appearance: good hygiene  and appropriate attire  Attitude: cooperative, friendly and mild distress  Consciousness: alert  Orientation: oriented to person, place, time, general circumstance  Memory: recent and remote memory intact  Attention/Concentration: intact during session  Psychomotor Activity: normal  Eye Contact: normal  Speech: normal rate and volume, well-articulated  Mood: anxious  Affect: congruent, calmer  Perception: within normal limits  Thought Content: within normal limits  Thought Process: logical, coherent and goal-directed  Insight: good  Judgment: intact  Ability to understand instructions: Yes  Ability to respond meaningfully: Yes  Morbid Ideation: no   Suicide Assessment: no suicidal ideation, plan, or intent  Homicidal Ideation: no      PHQ Scores 7/20/2020 3/18/2020 3/8/2019 7/30/2018 2/18/2014   PHQ2 Score 0 0 0 0 0   PHQ9 Score 0 0 0 0 0     Interpretation of Total Score Depression Severity: 1-4 = Minimal depression, 5-9 = Mild depression, 10-14 = Moderate depression, 15-19 = Moderately severe depression, 20-27 = Severe depression    Diagnosis:    1. Generalized anxiety disorder        Patient Active Problem List   Diagnosis    Elevated fasting glucose    GERD (gastroesophageal reflux disease)    Anxiety    Essential hypertension, benign    CKD (chronic kidney disease) stage 3, GFR 30-59 ml/min ([facility])    DVT, recurrent, lower extremity, acute [facility]    Mixed hyperlipidemia    Ni's esophagus without dysplasia    Malignant neoplasm of urinary bladder ([facility]         Plan:  Pt interventions:  Supportive techniques, Emphasized self-care as important for managing overall health, CBT to target balanced thinking and Provided education on the use of medication to treat patient's mental health symptoms. Pt Behavioral Change Plan:  Pt set the following goals:  1. Practice being mindful - paying attention to the present moment on purpose and in a nonjudgmental way  2. Practice diaphragmatic breathing throughout the day  3. Limit time spent watching the news  4. Practice guided meditation daily, in addition to practicing visualization (e.g., the beach [place])  5. Try delaying your worries, planning a set worry time for later in the day, and/or worrying for a set amount of time and then shifting to something else when that time period ends. Utilize mindfulness techniques to manage anxiety during the worry time. 6. Consider writing down your worries  7.  Practice grounding exercises - noticing what your senses are experiencing in the moment  8. Start your day with breathing, grounding, visualization, and movement  9. Look for volunteer opportunities that you can start doing at home    Pt scheduled F/U virtual visit in 2-3 weeks.

## 2020-08-21 NOTE — PATIENT INSTRUCTIONS
Goals: 1. Practice being mindful - paying attention to the present moment on purpose and in a nonjudgmental way  2. Practice diaphragmatic breathing throughout the day  3. Limit time spent watching the news  4. Practice guided meditation daily, in addition to practicing visualization (e.g., the beach in [place])  5. Try delaying your worries, planning a set worry time for later in the day, and/or worrying for a set amount of time and then shifting to something else when that time period ends. Utilize mindfulness techniques to manage anxiety during the worry time. 6. Consider writing down your worries  7. Practice grounding exercises - noticing what your senses are experiencing in the moment  8. Start your day with breathing, grounding, visualization, and movement  9.  Look for volunteer opportunities that you can start doing at home

## 2020-08-27 RX ORDER — LORAZEPAM 1 MG/1
1 TABLET ORAL 2 TIMES DAILY PRN
Qty: 56 TABLET | Refills: 0 | Status: SHIPPED | OUTPATIENT
Start: 2020-08-27 | End: 2020-09-24 | Stop reason: SDUPTHER

## 2020-09-01 RX ORDER — TRAZODONE HYDROCHLORIDE 150 MG/1
TABLET ORAL
Qty: 90 TABLET | Refills: 0 | Status: SHIPPED | OUTPATIENT
Start: 2020-09-01 | End: 2020-12-03 | Stop reason: SDUPTHER

## 2020-09-08 ENCOUNTER — VIRTUAL VISIT (OUTPATIENT)
Dept: PSYCHOLOGY | Age: 75
End: 2020-09-08
Payer: MEDICARE

## 2020-09-08 PROCEDURE — 90832 PSYTX W PT 30 MINUTES: CPT | Performed by: PSYCHOLOGIST

## 2020-09-08 NOTE — PROGRESS NOTES
Behavioral Health Consultation  Kacey Barnard Psy.D. Psychologist  9/8/2020  9-9:30 AM    Time spent with Patient: 30 minutes  This is patient's eleventh [facility] appointment. Reason for Consult: Anxiety  Referring Provider: Coco Teixeira MD  [address] [facility] [address]    TELEHEALTH VISIT -- Audio/Visual (During IXIIJ-66 public health emergency)  }  Pursuant to the emergency declaration under the [address] [place] [address], [facility] waiver authority and the [employer] and [employer] Act, this Virtual Visit was conducted, with patient's consent, to reduce the patient's risk of exposure to COVID-19 and provide continuity of care for an established patient. Services were provided through a video synchronous discussion virtually to substitute for in-person clinic visit. Pt gave verbal informed consent to participate in telehealth services. Conducted a risk-benefit analysis and determined that the patient's presenting problems are consistent with the use of telepsychology. Determined that the patient has sufficient knowledge and skills in the use of technology enabling them to adequately benefit from telepsychology. It was determined that this patient was able to be properly treated without an in-person session. Patient verified that they were currently located at the [facility] address that was provided during registration.     Verified the following information:  Patient's identification: Yes  Patient location: [address] [place] [address]  Patient's call back number: [phone]  Patient's emergency contact's name and number, as well as permission to contact them if needed:  Extended Emergency Contact Information  Primary Emergency Contact: Kenney Martinez  Address: [address] [place] [address] Phone: [phone]  Mobile Phone: [phone]  Relation: Spouse    Provider location: [facility], [address]:  Pt reported that he's been \"pretty much okay. \" He has started taking Lexapro in the evening, which has helped him fall asleep more quickly and to feel less anxious in the mornings. However, he continues to deal with chronic low-grade anxiety that is related in part to worries about the pandemic. Pt is waiting to hear back from his PCP about keeping the same dose vs increasing the dose of Lexapro. Pt continues to delay the afternoon Ativan dose consistently but he still feels the need to take it daily. Worried about cutting the dose in half but plans to try this. Noticing surreal experiences at times, fits with derealization. Pt is keeping as busy as possible. 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 O:  Pt's anxiety was slightly higher today as he discussed the abovementioned concerns. Reinforced his efforts to delay Ativan dose and discussed the potential benefits of adopting an optimistic mindset as he prepares to cut the afternoon dose in half. Provided psychoeducation about derealization as a symptom of anxiety. Reinforced pt's progress. No safety concerns at this time.       A:  MSE:  Appearance: good hygiene  and appropriate attire  Attitude: cooperative, friendly and mild distress  Consciousness: alert  Orientation: oriented to person, place, time, general circumstance  Memory: recent and remote memory intact  Attention/Concentration: intact during session  Psychomotor Activity: normal  Eye Contact: normal  Speech: normal rate and volume, well-articulated  Mood: anxious  Affect: congruent  Perception: within normal limits  Thought Content: within normal limits  Thought Process: logical, coherent and goal-directed  Insight: good  Judgment: intact  Ability to understand instructions: Yes  Ability to respond meaningfully: Yes  Morbid Ideation: no   Suicide Assessment: no suicidal ideation, plan, or intent  Homicidal Ideation: no      PHQ Scores 7/20/2020 3/18/2020 3/8/2019 7/30/2018 2/18/2014   PHQ2 Score 0 0 0 0 0   PHQ9 Score 0 0 0 0 0     Interpretation of Total Score Depression Severity: 1-4 = Minimal depression, 5-9 = Mild depression, 10-14 = Moderate depression, 15-19 = Moderately severe depression, 20-27 = Severe depression    Diagnosis:    1. Generalized anxiety disorder        Patient Active Problem List   Diagnosis    Elevated fasting glucose    GERD (gastroesophageal reflux disease)    Anxiety    Essential hypertension, benign    CKD (chronic kidney disease) stage 3, GFR 30-59 ml/min ([facility])    DVT, recurrent, lower extremity, acute ([facility]    Mixed hyperlipidemia    Ni's esophagus without dysplasia    Malignant neoplasm of urinary bladder ([facility] [address] [facility]         Plan:  Pt interventions:  Supportive techniques, Provided Psychoeducation re: derealization, Emphasized self-care as important for managing overall health, CBT to target balanced thinking and Provided education on the use of medication to treat patient's mental health symptoms. Pt Behavioral Change Plan:  Pt set the following goals:  1. Practice being mindful - paying attention to the present moment on purpose and in a nonjudgmental way  2. Practice diaphragmatic breathing throughout the day  3. Limit time spent watching the news  4. Practice guided meditation daily, in addition to practicing visualization (e.g., the beach in [place])  5. Try delaying your worries, planning a set worry time for later in the day, and/or worrying for a set amount of time and then shifting to something else when that time period ends. Utilize mindfulness techniques to manage anxiety during the worry time. 6. Consider writing down your worries  7. Practice grounding exercises - noticing what your senses are experiencing in the moment  8. Start your day with breathing, grounding, visualization, and movement  9.  Look for volunteer opportunities that you can start doing at home    Pt scheduled F/U virtual visit in 3 weeks.

## 2020-09-08 NOTE — Clinical Note
TREVOR fry sent you a [employer] message inquiring about whether you think it might be time to increase the Lexapro dose. I think it has the potential to be helpful. Thanks.

## 2020-09-09 RX ORDER — ESCITALOPRAM OXALATE 20 MG/1
20 TABLET ORAL DAILY
Qty: 90 TABLET | Refills: 0 | Status: SHIPPED | OUTPATIENT
Start: 2020-09-09 | End: 2020-12-09 | Stop reason: SDUPTHER

## 2020-09-24 RX ORDER — LORAZEPAM 1 MG/1
1 TABLET ORAL 2 TIMES DAILY PRN
Qty: 56 TABLET | Refills: 0 | Status: SHIPPED | OUTPATIENT
Start: 2020-09-24 | End: 2020-10-06 | Stop reason: SDUPTHER

## 2020-10-01 ENCOUNTER — VIRTUAL VISIT (OUTPATIENT)
Dept: PSYCHOLOGY | Age: 75
End: 2020-10-01
Payer: MEDICARE

## 2020-10-01 PROCEDURE — 98968 PH1 ASSMT&MGMT NQHP 21-30: CPT | Performed by: PSYCHOLOGIST

## 2020-10-01 NOTE — Clinical Note
Hi Ladies,  This patient came to me and Joellen Burgos after having been on Ativan 1mg BID daily for decades. He's doing amazingly well on Lexapro and has slowly started tapering down on the Ativan, with some success. He's still pretty anxious about the process. Will he be able to continue getting his Ativan filled by one of you (or Johnette Lanes perhaps?) until he has successful tapered off of it?    Thanks,  Guillermina Handler

## 2020-10-01 NOTE — PROGRESS NOTES
Behavioral Health Consultation  Louis Luz Psy.D. Psychologist  10/1/2020  9:30-10 AM    Time spent with Patient: 30 minutes  This is patient's twelfth [facility] appointment. Reason for Consult: Anxiety  Referring Provider: No primary care provider on file. No primary provider on file. TELEHEALTH VISIT -- Audio Only (During EEEUE-13 public health emergency)  }  Pursuant to the emergency declaration under the [address] [place] [address], [facility] waiver authority and the [employer] and [employer] Act, this phone call was conducted, with patient's consent, to reduce the patient's risk of exposure to COVID-19 and provide continuity of care for an established patient. Services were provided through a phone call discussion to substitute for in-person clinic visit. Pt gave verbal informed consent to participate in telehealth services. Consent:  He and/or health care decision maker is aware that that he may receive a bill for this telephone service, depending on his insurance coverage, and has provided verbal consent to proceed: Yes    Conducted a risk-benefit analysis and determined that the patient's presenting problems are consistent with the use of telepsychology. Determined that the patient has sufficient knowledge and skills in the use of technology enabling them to adequately benefit from telepsychology. It was determined that this patient was able to be properly treated without an in-person session. Patient verified that they were currently located at the [facility] address that was provided during registration.     Verified the following information:  Patient's identification: Yes  Patient location: [address] [place] [address]  Patient's call back number: [phone]  Patient's emergency contact's name and number, as well as permission to contact them if needed:   Extended Emergency Contact Information  Primary Emergency Contact: Karmen Hartman  Address: [address] [address]           [facility], [address] Phone: [phone]  Mobile Phone: [phone]  Relation: Spouse    Provider location: [facility] Mejia affirm this is an episode with an Established Patient who has not had a related appointment within my department in the past 7 days or scheduled within the next 24 hours. S:  Pt reported that he's been \"really, really good. \" The Lexapro dose was increased to 20mg about 1 month ago. For the last 2 weeks pt has been able to decrease his Ativan dose by 25%. He takes 1mg in the AM, then a 1/2mg in the PM and is fine for the rest of the day. He plans to continue cutting back slowly. His overwhelming anxiety in the mornings has significantly dissipated. Much less anxiety throughout the day. He has completely given up caffeine. Pt was saddened to learn about Dr. Mahad Harrell' departure during this visit. O:  Pt's anxiety has been trending downward further since Lexapro dose was increased. Reinforced his efforts to slowly taper down on the Ativan he has been prescribed for decades. Processed his concerns, especially in light of Dr. Mahad Harrell' departure, but also empowered him to continue slowly tapering off this medication that is no longer serving him. Encouraged pt to maintain his current high level of self-care, and possibly add in more journaling about his worries. No safety concerns at this time.         A:  MSE:  Appearance: good hygiene  and appropriate attire  Attitude: cooperative, friendly and mild distress  Consciousness: alert  Orientation: oriented to person, place, time, general circumstance  Memory: recent and remote memory intact  Attention/Concentration: intact during session  Psychomotor Activity: normal  Eye Contact: normal  Speech: normal rate and volume, well-articulated  Mood: anxious  Affect: congruent, brighter, calmer  Perception: within normal limits  Thought Content: within normal Start your day with breathing, grounding, visualization, and movement  9. Look for volunteer opportunities that you can start doing at home    Pt scheduled F/U virtual visit in 4 weeks.

## 2020-10-05 NOTE — PROGRESS NOTES
(PRINIVIL;ZESTRIL) 20 MG tablet TAKE 1 TABLET BY MOUTH  DAILY Yes KATIE Villeda - CNP   rosuvastatin (CRESTOR) 20 MG tablet Take 1 tablet by mouth nightly Yes Santos Chicas MD   fenofibrate (TRICOR) 54 MG tablet Take 1 tablet by mouth daily [facility] pharmacy: Please dispense generic fenofibrate unless prescriber denote Yes Santos Chicas MD   ELIQUIS 5 MG TABS tablet TAKE 1 TABLET BY MOUTH TWO  TIMES DAILY Yes Santos Chicas MD   pantoprazole (PROTONIX) 40 MG tablet Take 40 mg by mouth daily  Yes Historical Provider, MD   Flaxseed Linseed, (FLAX SEED OIL) 1000 MG CAPS Take 4,000 Units by mouth daily.  Yes Historical Provider, MD   traZODone (DESYREL) 150 MG tablet TAKE 1 TABLET BY MOUTH  NIGHTLY  Santos Chicas MD        Allergies   Allergen Reactions    Lipitor Rash       Past Medical History:   Diagnosis Date    Arthritis     Bl[facility]     DVT (deep venous thrombosis) ([facility] [facility] 07/01/2014    two spontaneous DVTs left leg    DVT, recurrent, lower extremity, acute ([address] 12/2/2015    Elevated fasting glucose     Hyperlipidemia     Hypertension     MVP (mitral valve prolapse)     Prostate CA ([address] 2005       Past Surgical History:   Procedure Laterality Date    BLADDER SURGERY      tumor removed    CATARACT REMOVAL Bilateral     COLONOSCOPY  6/29/12    Polyps    COLONOSCOPY  07/12/2017    Polyp    INGUINAL HERNIA REPAIR Right 06/1999    PENILE PROSTHESIS PLACEMENT      PROSTATECTOMY  2005 approx    Dr. Leana Mcpherson      In [place] - had one tonsil burned out       Social History     Socioeconomic History    Marital status:      Spouse name: Not on file    Number of children: 2    Years of education: Not on file    Highest education level: Not on file   Occupational History    Occupation:      Comment: retired   Social Needs    Financial resource strain: Not on file    Food insecurity     Worry: Not on file Inability: Not on file    Transportation needs     Medical: Not on file     Non-medical: Not on file   Tobacco Use    Smoking status: Never Smoker    Smokeless tobacco: Never Used   Substance and Sexual Activity    Alcohol use: Yes     Comment: rare    Drug use: No    Sexual activity: Not Currently   Lifestyle    Physical activity     Days per week: Not on file     Minutes per session: Not on file    Stress: Not on file   Relationships    Social connections     Talks on phone: Not on file     Gets together: Not on file     Attends [employer] service: Not on file     Active member of club or organization: Not on file     Attends meetings of clubs or organizations: Not on file     Relationship status: Not on file    Intimate partner violence     Fear of current or ex partner: Not on file     Emotionally abused: Not on file     Physically abused: Not on file     Forced sexual activity: Not on file   Other Topics Concern    Not on file   Social History Narrative    , retired 1/2015        Family History   Problem Relation Age of Onset    Lung Cancer Mother     Heart Disease Mother     Diabetes Father     Prostate Cancer Father     Diabetes Brother        Vitals:    10/06/20 1509   BP: 136/74   Pulse: 68   Temp: 98 °F (36.7 °C)   TempSrc: Oral   SpO2: 100%   Weight: 196 lb 6 oz (89.1 kg)   Height: 5' 9\" (1.753 m)     Estimated body mass index is 29 kg/m² as calculated from the following:    Height as of this encounter: 5' 9\" (1.753 m). Weight as of this encounter: 196 lb 6 oz (89.1 kg). Physical Exam  Vitals signs reviewed. Constitutional:       Appearance: Normal appearance. He is normal weight. HENT:      Head: Normocephalic and atraumatic. Right Ear: Tympanic membrane normal.      Left Ear: Tympanic membrane normal.      Nose: Nose normal.      Mouth/Throat:      Mouth: Mucous membranes are moist.      Pharynx: Oropharynx is clear.    Eyes:      Extraocular Movements: Extraocular movements intact. Conjunctiva/sclera: Conjunctivae normal.   Neck:      Musculoskeletal: Normal range of motion and neck supple. Cardiovascular:      Rate and Rhythm: Normal rate and regular rhythm. Pulses: Normal pulses. Pulmonary:      Effort: Pulmonary effort is normal.      Breath sounds: Normal breath sounds. Abdominal:      General: Abdomen is flat. Bowel sounds are normal.      Palpations: Abdomen is soft. Musculoskeletal: Normal range of motion. General: No deformity. Skin:     General: Skin is warm and dry. Capillary Refill: Capillary refill takes less than 2 seconds. Findings: No rash. Neurological:      General: No focal deficit present. Mental Status: He is alert and oriented to person, place, and time. Mental status is at baseline. Psychiatric:         Mood and Affect: Mood normal.         Behavior: Behavior normal.         Thought Content: Thought content normal.         Judgment: Judgment normal.         Separate Identifiable issues addressed today:    ASSESSMENT/PLAN:  Boodymiguel Garcia was seen today for established new doctor. Diagnoses and all orders for this visit:    Encounter for medical examination to establish care: Does okay with diet and exercise regimen; mostly walking. Lifestyle healthy. Essential hypertension, benign: At goal today. Reviewed most recent blood work and okay. MANISH (generalized anxiety disorder): Has been working with Dr. Triny Marshall on eliminating Ativan. Started on Lexapro. Has weaned from 2 mg twice daily to 1 mg twice daily. Would like additional baseline med to help wean Ativan. Will start BuSpar 5 mg today. He is following with psychology and [place]. Will reevaluate in 1 month. Primary insomnia: Had been on Elavil 75 mg nightly for sleep. Dr. Triny Marshall stopped; he has occasional problems with sleep and has been taking the Elavil as needed.   As long as he only takes this periodically I am okay

## 2020-10-05 NOTE — PATIENT INSTRUCTIONS
Patient Education        Well Visit, Over 72: Care Instructions  Your Care Instructions     Physical exams can help you stay healthy. Your doctor has checked your overall health and may have suggested ways to take good care of yourself. He or she also may have recommended tests. At home, you can help prevent illness with healthy eating, regular exercise, and other steps. Follow-up care is a key part of your treatment and safety. Be sure to make and go to all appointments, and call your doctor if you are having problems. It's also a good idea to know your test results and keep a list of the medicines you take. How can you care for yourself at home? · Reach and stay at a healthy weight. This will lower your risk for many problems, such as obesity, diabetes, heart disease, and high blood pressure. · Get at least 30 minutes of exercise on most days of the week. Walking is a good choice. You also may want to do other activities, such as running, swimming, cycling, or playing tennis or team sports. · Do not smoke. Smoking can make health problems worse. If you need help quitting, talk to your doctor about stop-smoking programs and medicines. These can increase your chances of quitting for good. · Protect your skin from too much sun. When you're outdoors from 10 a.m. to 4 p.m., stay in the shade or cover up with clothing and a hat with a wide brim. Wear sunglasses that block UV rays. Even when it's cloudy, put broad-spectrum sunscreen (SPF 30 or higher) on any exposed skin. · See a dentist one or two times a year for checkups and to have your teeth cleaned. · Wear a seat belt in the car. Follow your doctor's advice about when to have certain tests. These tests can spot problems early. For men and women  · Cholesterol. Your doctor will tell you how often to have this done based on your overall health and other things that can increase your risk for heart attack and stroke. · Blood pressure.  Have your blood decide whether to have this test. Some experts say that men ages 79 and older no longer need testing. · Abdominal aortic aneurysm. Ask your doctor whether you should have a test to check for an aneurysm. You may need a test if you ever smoked or if your parent, brother, sister, or child has had an aneurysm. When should you call for help? Watch closely for changes in your health, and be sure to contact your doctor if you have any problems or symptoms that concern you. Where can you learn more? Go to https://Edevate.BuyerCurious. org and sign in to your [employer] account. Enter A651 in the [employer] box to learn more about \"Well Visit, Over 65: Care Instructions. \"     If you do not have an account, please click on the \"Sign Up Now\" link. Current as of: August 22, 2019               Content Version: 12.5  © 4736-3061 Healthwise, Incorporated. Care instructions adapted under license by [facility] ([facility]). If you have questions about a medical condition or this instruction, always ask your healthcare professional. [address] any warranty or liability for your use of this information.

## 2020-10-06 ENCOUNTER — OFFICE VISIT (OUTPATIENT)
Dept: PRIMARY CARE CLINIC | Age: 75
End: 2020-10-06

## 2020-10-06 VITALS
BODY MASS INDEX: 29.09 KG/M2 | SYSTOLIC BLOOD PRESSURE: 136 MMHG | HEART RATE: 68 BPM | DIASTOLIC BLOOD PRESSURE: 74 MMHG | TEMPERATURE: 98 F | WEIGHT: 196.38 LBS | HEIGHT: 69 IN | OXYGEN SATURATION: 100 %

## 2020-10-06 PROCEDURE — G8484 FLU IMMUNIZE NO ADMIN: HCPCS | Performed by: STUDENT IN AN ORGANIZED HEALTH CARE EDUCATION/TRAINING PROGRAM

## 2020-10-06 RX ORDER — LORAZEPAM 1 MG/1
1 TABLET ORAL 2 TIMES DAILY PRN
Qty: 56 TABLET | Refills: 0 | Status: SHIPPED | OUTPATIENT
Start: 2020-10-06 | End: 2020-10-26 | Stop reason: SDUPTHER

## 2020-10-06 RX ORDER — AMITRIPTYLINE HYDROCHLORIDE 25 MG/1
25 TABLET, FILM COATED ORAL NIGHTLY PRN
Qty: 30 TABLET | Refills: 5 | Status: SHIPPED | OUTPATIENT
Start: 2020-10-06 | End: 2020-10-26 | Stop reason: SDUPTHER

## 2020-10-06 RX ORDER — BUSPIRONE HYDROCHLORIDE 5 MG/1
5 TABLET ORAL 2 TIMES DAILY
Qty: 60 TABLET | Refills: 0 | Status: SHIPPED | OUTPATIENT
Start: 2020-10-06 | End: 2020-11-04 | Stop reason: SDUPTHER

## 2020-10-06 ASSESSMENT — PATIENT HEALTH QUESTIONNAIRE - PHQ9
SUM OF ALL RESPONSES TO PHQ QUESTIONS 1-9: 0
SUM OF ALL RESPONSES TO PHQ QUESTIONS 1-9: 0
2. FEELING DOWN, DEPRESSED OR HOPELESS: 0
1. LITTLE INTEREST OR PLEASURE IN DOING THINGS: 0
SUM OF ALL RESPONSES TO PHQ9 QUESTIONS 1 & 2: 0

## 2020-10-06 ASSESSMENT — ENCOUNTER SYMPTOMS
BLOOD IN STOOL: 0
SHORTNESS OF BREATH: 0
COUGH: 0

## 2020-10-19 ENCOUNTER — TELEPHONE (OUTPATIENT)
Dept: PRIMARY CARE CLINIC | Age: 75
End: 2020-10-19

## 2020-10-19 NOTE — TELEPHONE ENCOUNTER
Patient called and said he has been waiting for he said 2 weeks for his medications to be filled by [facility] LORazepam (ATIVAN) 1 MG tablet AND amitriptyline (ELAVIL) 25 MG tablet   They   ARE TELLING HIM THAT DR Betty Sinha NEEDS TO TALK TO THEM TO AUTHORIZE IT PLEASE CALL HIM THANKS

## 2020-10-20 ENCOUNTER — OFFICE VISIT (OUTPATIENT)
Dept: PRIMARY CARE CLINIC | Age: 75
End: 2020-10-20
Payer: MEDICARE

## 2020-10-20 ENCOUNTER — NURSE TRIAGE (OUTPATIENT)
Dept: OTHER | Facility: CLINIC | Age: 75
End: 2020-10-20

## 2020-10-20 VITALS
TEMPERATURE: 97.2 F | BODY MASS INDEX: 29.03 KG/M2 | HEIGHT: 69 IN | OXYGEN SATURATION: 100 % | HEART RATE: 64 BPM | SYSTOLIC BLOOD PRESSURE: 134 MMHG | DIASTOLIC BLOOD PRESSURE: 83 MMHG | WEIGHT: 196 LBS

## 2020-10-20 PROCEDURE — G8417 CALC BMI ABV UP PARAM F/U: HCPCS | Performed by: STUDENT IN AN ORGANIZED HEALTH CARE EDUCATION/TRAINING PROGRAM

## 2020-10-20 PROCEDURE — G8484 FLU IMMUNIZE NO ADMIN: HCPCS | Performed by: STUDENT IN AN ORGANIZED HEALTH CARE EDUCATION/TRAINING PROGRAM

## 2020-10-20 PROCEDURE — G8427 DOCREV CUR MEDS BY ELIG CLIN: HCPCS | Performed by: STUDENT IN AN ORGANIZED HEALTH CARE EDUCATION/TRAINING PROGRAM

## 2020-10-20 PROCEDURE — 99214 OFFICE O/P EST MOD 30 MIN: CPT | Performed by: STUDENT IN AN ORGANIZED HEALTH CARE EDUCATION/TRAINING PROGRAM

## 2020-10-20 PROCEDURE — 1123F ACP DISCUSS/DSCN MKR DOCD: CPT | Performed by: STUDENT IN AN ORGANIZED HEALTH CARE EDUCATION/TRAINING PROGRAM

## 2020-10-20 PROCEDURE — 1036F TOBACCO NON-USER: CPT | Performed by: STUDENT IN AN ORGANIZED HEALTH CARE EDUCATION/TRAINING PROGRAM

## 2020-10-20 PROCEDURE — 3017F COLORECTAL CA SCREEN DOC REV: CPT | Performed by: STUDENT IN AN ORGANIZED HEALTH CARE EDUCATION/TRAINING PROGRAM

## 2020-10-20 PROCEDURE — 4040F PNEUMOC VAC/ADMIN/RCVD: CPT | Performed by: STUDENT IN AN ORGANIZED HEALTH CARE EDUCATION/TRAINING PROGRAM

## 2020-10-20 RX ORDER — FLUTICASONE PROPIONATE 0.05 %
CREAM (GRAM) TOPICAL
Qty: 30 G | Refills: 1 | Status: SHIPPED | OUTPATIENT
Start: 2020-10-20 | End: 2020-11-19

## 2020-10-20 ASSESSMENT — PATIENT HEALTH QUESTIONNAIRE - PHQ9
SUM OF ALL RESPONSES TO PHQ QUESTIONS 1-9: 0
SUM OF ALL RESPONSES TO PHQ9 QUESTIONS 1 & 2: 0
SUM OF ALL RESPONSES TO PHQ QUESTIONS 1-9: 0
2. FEELING DOWN, DEPRESSED OR HOPELESS: 0
SUM OF ALL RESPONSES TO PHQ QUESTIONS 1-9: 0
1. LITTLE INTEREST OR PLEASURE IN DOING THINGS: 0

## 2020-10-20 ASSESSMENT — ENCOUNTER SYMPTOMS
COLOR CHANGE: 1
EYE REDNESS: 0
COUGH: 0
CHEST TIGHTNESS: 0
WHEEZING: 0
SHORTNESS OF BREATH: 0
EYE DISCHARGE: 0

## 2020-10-20 NOTE — PROGRESS NOTES
10/20/2020     Parisa Carr (:  1945) is a 75 y.o. male, here for evaluation of the following medical concerns:    HPI     Rash: Parisa Carr is a 75 y.o. male who presents with a 10 day history of a localized rash. Rash first presented on the left forearm, right antecubital fossa and partially the groin. It has not changed in distribution since its appearance. Rash is red in color and is macular. Associated symptoms include Mild pruritus. Patient has tried nothing. New exposures: medication - Started BuSpar on the sixth. He also had a flu vaccine on the . .  Patient has not had contacts with similar symptoms. Prior history of similar symptoms: no.    Facial eczema: Patient presents today for evaluation of facial eczema. He has had eczema on and off for the last several years. It typically gets worse in the spring and fall when his allergies worsen. He has dry, flaking and itchy skin located above his eyes on his nasal bridge and his eyelids. Review of Systems   Constitutional: Negative for activity change, appetite change, fatigue and fever. Eyes: Negative for discharge and redness. Respiratory: Negative for cough, chest tightness, shortness of breath and wheezing. Endocrine: Negative for cold intolerance and heat intolerance. Skin: Positive for color change and rash. Negative for pallor and wound. Allergic/Immunologic: Positive for environmental allergies. Negative for food allergies. Prior to Visit Medications    Medication Sig Taking? Authorizing Provider   fluticasone (CUTIVATE) 0.05 % cream Apply topically 2 times daily as needed. Yes Dwight Sanchez, DO   Crisaborole 2 % OINT Apply 1 Tube topically 2 times daily Yes Dwight Sanchez, DO   busPIRone (BUSPAR) 5 MG tablet Take 1 tablet by mouth 2 times daily  Dwight Sanchez DO   LORazepam (ATIVAN) 1 MG tablet Take 1 tablet by mouth 2 times daily as needed for Anxiety for up to 28 days.   Dwight Sanchez, DO   amitriptyline (ELAVIL) 25 MG tablet Take 1 tablet by mouth nightly as needed for Sleep  Hitesh Villagran,    escitalopram (LEXAPRO) 20 MG tablet Take 1 tablet by mouth daily  Esthela Hanson MD   traZODone (DESYREL) 150 MG tablet TAKE 1 TABLET BY MOUTH  NIGHTLY  Esthela Hanson MD   lisinopril (PRINIVIL;ZESTRIL) 20 MG tablet TAKE 1 TABLET BY MOUTH  DAILY  KATIE Baxter - CNP   rosuvastatin (CRESTOR) 20 MG tablet Take 1 tablet by mouth nightly  Esthela Hanson MD   fenofibrate (TRICOR) 54 MG tablet Take 1 tablet by mouth daily s [facility] pharmacy: Please dispense generic fenofibrate unless prescriber denote  Esthela Hanson MD   ELIQUIS 5 MG TABS tablet TAKE 1 TABLET BY MOUTH TWO  TIMES DAILY  Esthela Hanson MD   pantoprazole (PROTONIX) 40 MG tablet Take 40 mg by mouth daily   Historical Provider, MD   Flaxseed, Linseed, (FLAX SEED OIL) 1000 MG CAPS Take 4,000 Units by mouth daily. Historical Provider, MD        Social History     Tobacco Use    Smoking status: Never Smoker    Smokeless tobacco: Never Used   Substance Use Topics    Alcohol use: Yes     Comment: rare        Vitals:    10/20/20 1109   BP: 134/83   Pulse: 64   Temp: 97.2 °F (36.2 °C)   TempSrc: Oral   SpO2: 100%   Weight: 196 lb (88.9 kg)   Height: 5' 9\" (1.753 m)     Estimated body mass index is 28.94 kg/m² as calculated from the following:    Height as of this encounter: 5' 9\" (1.753 m). Weight as of this encounter: 196 lb (88.9 kg). Physical Exam  Vitals signs reviewed. Constitutional:       Appearance: Normal appearance. HENT:      Head: Normocephalic and atraumatic. Nose: Nose normal.   Eyes:      Extraocular Movements: Extraocular movements intact. Conjunctiva/sclera: Conjunctivae normal.   Neck:      Musculoskeletal: Normal range of motion and neck supple. Cardiovascular:      Rate and Rhythm: Normal rate and regular rhythm. Pulses: Normal pulses.    Pulmonary:      Effort: Pulmonary effort is normal.      Breath sounds: Normal breath sounds. Abdominal:      General: Abdomen is flat. Bowel sounds are normal.      Palpations: Abdomen is soft. Musculoskeletal: Normal range of motion. Skin:     Capillary Refill: Capillary refill takes less than 2 seconds. Findings: Rash (The dry, flaking rash involving the lower forehead nasal bridge and eyelids bilaterally. He also has a macular, erythematous rash involving the left forearm right antecubital fossa and part of the lower abdomen. See attached image of forearm.) present. Neurological:      General: No focal deficit present. Mental Status: He is alert and oriented to person, place, and time. Mental status is at baseline. Psychiatric:         Mood and Affect: Mood normal.               ASSESSMENT/PLAN:  1. Acute dermatitis: Patient is concerned this could be related to the BuSpar. But based on timing and distribution of the rash I doubt this. Especially on the left forearm an area on the upper arm that would have been protected by his sleeves is spared. And it looks like he may have touched other parts of his body that are affected. Will start with topical steroid cream and reevaluate in 7 to 10 days. If he worsens or fails to improve we will consider stopping BuSpar at that time. - fluticasone (CUTIVATE) 0.05 % cream; Apply topically 2 times daily as needed. Dispense: 30 g; Refill: 1    2. Eczema of face  Patient has been battling with eczema of the face for several years. Always worse in fall and spring; particularly bad after taking hot showers. Given samples of Eucrisa and instructions to get a coupon code. Follow-up 2 weeks. - Crisaborole 2 % OINT; Apply 1 Tube topically 2 times daily  Dispense: 30 g; Refill: 5      Return in about 2 weeks (around 11/3/2020). An electronic signature was used to authenticate this note.     --Italia Escobar DO on 10/20/2020 at 12:15 PM

## 2020-10-20 NOTE — TELEPHONE ENCOUNTER
Patient called pre-service center [facility]) Jackie with red flag complaint. Brief description of triage: Rash    Triage indicates for patient to be seen today in office    Care advice provided, patient verbalizes understanding; denies any other questions or concerns; instructed to call back for any new or worsening symptoms. Writer provided warm transfer to Jadiel Bravo at [facility] for appointment scheduling. Attention Provider: Thank you for allowing me to participate in the care of your patient. The patient was connected to triage in response to information provided to the [facility]. Please do not respond through this encounter as the response is not directed to a shared pool. Reason for Disposition   Taking new non-prescription (OTC) antihistamine, decongestant, ear drops, eye drops, or other OTC cough/cold medicine   Drug rash suspected and started taking new medicine within last 2 weeks (Exception: antihistamine, eye drops, ear drops, decongestant or other OTC cough/cold medicines)   Rash started more than 3 days after stopping new prescription medicine   Mild widespread rash    Answer Assessment - Initial Assessment Questions  1. APPEARANCE of RASH: \"Describe the rash. \" (e.g., spots, blisters, raised areas, skin peeling, scaly)      Started as spots, now appear raised, deep pink coloration, itching    2. SIZE: \"How big are the spots? \" (e.g., tip of pen, eraser, coin; inches, centimeters)      Size of M&M    3. LOCATION: \"Where is the rash located? \"      Both arms, left is worse. Starting to appear on torso. 4. COLOR: \"What color is the rash? \" (Note: It is difficult to assess rash color in people with darker-colored skin. When this situation occurs, simply ask the caller to describe what they see.)      Deep pink    5. ONSET: \"When did the rash begin? \"      10/15    6. FEVER: \"Do you have a fever? \" If so, ask: \"What is your temperature, how was it measured, and when did it start? \" No    7. ITCHING: \"Does the rash itch? \" If so, ask: \"How bad is the itch? \" (Scale 1-10; or mild, moderate, severe)      5    8. CAUSE: \"What do you think is causing the rash? \"     Started Wilhelmenia Candle recently on 10/8, and flu shot on 10/15. 9. NEW MEDICATION: \"What new medication are you taking? \" (e.g., name of antibiotic) \"When did you start taking this medication? \". Buspar 10/8    10. OTHER SYMPTOMS: \"Do you have any other symptoms? \" (e.g., sore throat, fever, joint pain)        No  11. PREGNANCY: \"Is there any chance you are pregnant? \" \"When was your last menstrual period? \"        N/A    Protocols used: RASH - WIDESPREAD ON DRUGS-ADULT-[place], RASH OR REDNESS - [facility]

## 2020-10-20 NOTE — PROGRESS NOTES
Pt reports having rash on both arms upper, he says it started after taking buspar, he states it was the 12th or 13th. He states it itches but not all the time.

## 2020-10-26 ENCOUNTER — PATIENT MESSAGE (OUTPATIENT)
Dept: PRIMARY CARE CLINIC | Age: 75
End: 2020-10-26

## 2020-10-26 RX ORDER — AMITRIPTYLINE HYDROCHLORIDE 25 MG/1
25 TABLET, FILM COATED ORAL NIGHTLY PRN
Qty: 30 TABLET | Refills: 5 | Status: SHIPPED | OUTPATIENT
Start: 2020-10-26 | End: 2021-01-01 | Stop reason: SDUPTHER

## 2020-10-26 RX ORDER — LORAZEPAM 1 MG/1
1 TABLET ORAL 2 TIMES DAILY PRN
Qty: 56 TABLET | Refills: 0 | Status: SHIPPED | OUTPATIENT
Start: 2020-10-26 | End: 2021-01-03 | Stop reason: SDUPTHER

## 2020-10-26 NOTE — TELEPHONE ENCOUNTER
From: Camron Briceno  To: Jose Guadalupeandrae Martinez,   Sent: 10/26/2020 2:02 PM EDT  Subject: Prescription Question    Hi Dr. Mayuri Waterman,  After being on hold for the past 3 weeks, the prescription which you sent in to [facility] on October 6th, has finally been cancelled; could you call it in to [facility] [facility] ? It is my understanding that [facility] attempted to contact you unsuccessfully regarding the prescription before they cancelled it. I had sent you a couple of My Chart messages concerning this about a week ago but never got a reply. As you know, I am trying to taper off of the ativan ( with the help of Dr. Delvin Muro ) but this could take some time. The prescription was for ativan 1mg, and elavil 25mg . Please contact me if you need more information. ( [phone] ) As I mentioned to you,I've been taking the ativan for about 20 years .   Thanks,  Georges

## 2020-10-29 ENCOUNTER — TELEMEDICINE (OUTPATIENT)
Dept: PSYCHOLOGY | Age: 75
End: 2020-10-29
Payer: MEDICARE

## 2020-10-29 PROCEDURE — 90832 PSYTX W PT 30 MINUTES: CPT | Performed by: PSYCHOLOGIST

## 2020-10-29 NOTE — PROGRESS NOTES
Behavioral Health Consultation  Kenya Alegria Psy.D. Psychologist  10/29/2020  8:30-9 AM    Time spent with Patient: 30 minutes  This is patient's thirteenth [facility] appointment. Reason for Consult: Anxiety  Referring Provider: Darlin Bullard DO  [address] [facility] [address] [facility] [address]    TELEHEALTH VISIT -- Audio/Visual (During HFBRG-73 public health emergency)  }  Pursuant to the emergency declaration under the [facility] waiver authority and the [employer] Act, this Virtual Visit was conducted, with patient's consent, to reduce the patient's risk of exposure to COVID-19 and provide continuity of care for an established patient. Services were provided through a video synchronous discussion virtually to substitute for in-person clinic visit. Pt gave verbal informed consent to participate in telehealth services. Conducted a risk-benefit analysis and determined that the patient's presenting problems are consistent with the use of telepsychology. Determined that the patient has sufficient knowledge and skills in the use of technology enabling them to adequately benefit from telepsychology. It was determined that this patient was able to be properly treated without an in-person session. Patient verified that they were currently located at the [facility] address that was provided during registration.     Verified the following information:  Patient's identification: Yes  Patient location: [address] [place] [address]  Patient's call back number: [phone]  Patient's emergency contact's name and number, as well as permission to contact them if needed:  Extended Emergency Contact Information  Primary Emergency Contact: Santosnaomi Nur  Address: [id number] [place] [address] Phone: [phone]  Mobile Phone: [phone]  Relation: Spouse    Provider location: [place], [place]      S:  Pt reported that he now has a new PCP, Dr. Abilio Rose, who he really likes. He prescribed BuSpar 5mg to help as pt continues tapering off Ativan. Pleased that he has not had issues with taking less Ativan thus far. Plans to continue tapering down. Waking up more often during the night, perhaps as he decreases Ativan dose, but he can handle this. Mornings: Ativan 0.5mg, BuSpar 5mg  Evenings: Lexapro 20mg, Ativan 0.5mg, BuSpar 5mg    Managing anxiety about health during the pandemic more effectively. Feeling optimistic about the future. Bought a treadmill so he can keep exercising during the winter. O:  Patient's anxiety has remained lower and well controlled on his current medication regimen. Although patient has not experienced any side effects as he tapers off Ativan, he continues to express some anxiety about this process. He is likely experiencing psychological benefits from the introduction of BuSpar as he continues towards full cessation of Ativan. Strongly reinforced his progress. Normalized and validated ongoing concerns about the pandemic. No safety concerns at this time.         A:  MSE:  Appearance: good hygiene  and appropriate attire  Attitude: cooperative and friendly  Consciousness: alert  Orientation: oriented to person, place, time, general circumstance  Memory: recent and remote memory intact  Attention/Concentration: intact during session  Psychomotor Activity: normal  Eye Contact: normal  Speech: normal rate and volume, well-articulated  Mood: mildly anxious  Affect: congruent, brighter, calmer  Perception: within normal limits  Thought Content: within normal limits  Thought Process: logical, coherent and goal-directed  Insight: good  Judgment: intact  Ability to understand instructions: Yes  Ability to respond meaningfully: Yes  Morbid Ideation: no   Suicide Assessment: no suicidal ideation, plan, or intent  Homicidal Ideation: no      PHQ Scores 10/20/2020 10/6/2020 7/20/2020 3/18/2020 3/8/2019 7/30/2018 2/18/2014   PHQ2 Score 0 0 0 0 0 0 0   PHQ9 Score 0 0 0 0 0 0 0     Interpretation of Total Score Depression Severity: 1-4 = Minimal depression, 5-9 = Mild depression, 10-14 = Moderate depression, 15-19 = Moderately severe depression, 20-27 = Severe depression    Diagnosis:    1. Generalized anxiety disorder        Patient Active Problem List   Diagnosis    GERD (gastroesophageal reflux disease)    MANISH (generalized anxiety disorder)    Essential hypertension, benign    CKD (chronic kidney disease) stage 3, GFR 30-59 ml/min    Mixed hyperlipidemia    Ni's esophagus without dysplasia    Malignant neoplasm of urinary bladder ([facility] [address]         Plan:  Pt interventions:  Supportive techniques, Emphasized self-care as important for managing overall health and CBT to target balanced thinking. Pt Behavioral Change Plan:  Pt set the following goals:  1. Practice being mindful - paying attention to the present moment on purpose and in a nonjudgmental way  2. Practice diaphragmatic breathing throughout the day  3. Limit time spent watching the news  4. Practice guided meditation daily, in addition to practicing visualization (e.g., the beach in [place])  5. Try delaying your worries, planning a set worry time for later in the day, and/or worrying for a set amount of time and then shifting to something else when that time period ends. Utilize mindfulness techniques to manage anxiety during the worry time. 6. Consider writing down your worries  7. Practice grounding exercises - noticing what your senses are experiencing in the moment  8. Start your day with breathing, grounding, visualization, and movement  9. Look for volunteer opportunities that you can start doing at home    Pt scheduled F/U virtual visit in 1 month.

## 2020-10-29 NOTE — Clinical Note
Simona, Dr. Jett Jesus. I have been providing behavioral health services to this patient, who was referred to me by his former PCP, Sandip Graham. Normally my services are reserved for patients of the practices in which I am embedded. Although Dr. Vivian Coronel has left and this patient is no longer with my practice, I feel comfortable continuing to provide him with services and communicating with you via Epic about his care as needed. I hope this works for you as well.    Thanks,  Maeve Maier

## 2020-11-04 RX ORDER — BUSPIRONE HYDROCHLORIDE 5 MG/1
5 TABLET ORAL 2 TIMES DAILY
Qty: 60 TABLET | Refills: 0 | Status: SHIPPED | OUTPATIENT
Start: 2020-11-04 | End: 2020-12-03 | Stop reason: SDUPTHER

## 2020-12-01 ENCOUNTER — VIRTUAL VISIT (OUTPATIENT)
Dept: PSYCHOLOGY | Age: 75
End: 2020-12-01
Payer: MEDICARE

## 2020-12-01 PROCEDURE — 90832 PSYTX W PT 30 MINUTES: CPT | Performed by: PSYCHOLOGIST

## 2020-12-01 NOTE — PROGRESS NOTES
Behavioral Health Consultation  Matias Kirkpatrick Psy.D. Psychologist  12/1/2020  8:30-9 AM    Time spent with Patient: 30 minutes  This is patient's fourteenth [facility] appointment. Reason for Consult: Anxiety  Referring Provider: Marilu Diez DO  [address] [facility] [address]  [facility] [address]    TELEHEALTH VISIT -- Audio/Visual (During BTQIL-04 public health emergency)  }  Pursuant to the emergency declaration under the [facility] waiver authority and the [employer] Act, this Virtual Visit was conducted, with patient's consent, to reduce the patient's risk of exposure to COVID-19 and provide continuity of care for an established patient. Services were provided through a video synchronous discussion virtually to substitute for in-person clinic visit. Pt gave verbal informed consent to participate in telehealth services. Conducted a risk-benefit analysis and determined that the patient's presenting problems are consistent with the use of telepsychology. Determined that the patient has sufficient knowledge and skills in the use of technology enabling them to adequately benefit from telepsychology. It was determined that this patient was able to be properly treated without an in-person session. Patient verified that they were currently located at the [facility] address that was provided during registration.     Verified the following information:  Patient's identification: Yes  Patient location: [address] [place] [address]  Patient's call back number: [phone]  Patient's emergency contact's name and number, as well as permission to contact them if needed:  Extended Emergency Contact Information  Primary Emergency Contact: Allison Montez  Address: [address] [place] [address], [address] [place] [address] [address] [facility] of [address] [place] [address] Phone: [phone]  Mobile Phone: [phone]  Relation: Spouse    Provider location: 0 0 0 0 0 0 0     Interpretation of Total Score Depression Severity: 1-4 = Minimal depression, 5-9 = Mild depression, 10-14 = Moderate depression, 15-19 = Moderately severe depression, 20-27 = Severe depression    Diagnosis:    1. Generalized anxiety disorder        Patient Active Problem List   Diagnosis    GERD (gastroesophageal reflux disease)    MANISH (generalized anxiety disorder)    Essential hypertension, benign    CKD (chronic kidney disease) stage 3, GFR 30-59 ml/min    Mixed hyperlipidemia    Ni's esophagus without dysplasia    Malignant neoplasm of urinary bladder ([facility] [address]         Plan:  Pt interventions:  Supportive techniques, Emphasized self-care as important for managing overall health, CBT to target balanced thinking, ACT book metaphor and Provided education on the use of medication to treat patient's mental health symptoms. Pt Behavioral Change Plan:  Pt set the following goals:  1. Practice being mindful - paying attention to the present moment on purpose and in a nonjudgmental way  2. Practice diaphragmatic breathing throughout the day  3. Limit time spent watching the news  4. Practice guided meditation daily, in addition to practicing visualization (e.g., the beach in [place])  5. Try delaying your worries, planning a set worry time for later in the day, and/or worrying for a set amount of time and then shifting to something else when that time period ends. Utilize mindfulness techniques to manage anxiety during the worry time. 6. Consider writing down your worries  7. Practice grounding exercises - noticing what your senses are experiencing in the moment  8. Start your day with breathing, grounding, visualization, and movement  9. Look for volunteer opportunities that you can start doing at home  10. When you notice that you're hyperfocused on worries or trying hard to push away the anxiety, consider the additional energy that this requires from you.  Acknowledge your thoughts

## 2020-12-01 NOTE — PATIENT INSTRUCTIONS
Goals: 1. Practice being mindful - paying attention to the present moment on purpose and in a nonjudgmental way  2. Practice diaphragmatic breathing throughout the day  3. Limit time spent watching the news  4. Practice guided meditation daily, in addition to practicing visualization (e.g., the beach in [place])  5. Try delaying your worries, planning a set worry time for later in the day, and/or worrying for a set amount of time and then shifting to something else when that time period ends. Utilize mindfulness techniques to manage anxiety during the worry time. 6. Consider writing down your worries  7. Practice grounding exercises - noticing what your senses are experiencing in the moment  8. Start your day with breathing, grounding, visualization, and movement  9. Look for volunteer opportunities that you can start doing at home  10. When you notice that you're hyperfocused on worries or trying hard to push away the anxiety, consider the additional energy that this requires from you. Acknowledge your thoughts and feelings so you can refocus on what is important to you (e.g., your values). Imagine placing the \"anxiety\" book in your lap instead of holding it in front of your face or trying to push it away.

## 2020-12-03 RX ORDER — BUSPIRONE HYDROCHLORIDE 5 MG/1
5 TABLET ORAL 2 TIMES DAILY
Qty: 60 TABLET | Refills: 11 | Status: SHIPPED | OUTPATIENT
Start: 2020-12-03 | End: 2021-01-01 | Stop reason: SDUPTHER

## 2020-12-03 RX ORDER — TRAZODONE HYDROCHLORIDE 150 MG/1
TABLET ORAL
Qty: 90 TABLET | Refills: 3 | Status: SHIPPED | OUTPATIENT
Start: 2020-12-03 | End: 2021-08-16

## 2020-12-03 RX ORDER — BUSPIRONE HYDROCHLORIDE 5 MG/1
5 TABLET ORAL 2 TIMES DAILY
Qty: 60 TABLET | Refills: 0 | Status: SHIPPED | OUTPATIENT
Start: 2020-12-03 | End: 2020-12-03 | Stop reason: SDUPTHER

## 2020-12-08 NOTE — PROGRESS NOTES
2020     Karin Morgan (:  1945) is a 75 y.o. male, here for evaluation of the following medical concerns:    HPI     Shoulder Pain  Patient complains of superior shoulder pain. The symptoms began several weeks ago Symptoms have gradually worsened. Pain is described as repetitive movements, overhead movements and lying on the shoulder. Symptoms were incited by no known event. Patient denies chest pain, . Therapy to date includes rest.    Review of Systems   Constitutional: Negative for activity change, chills and fever. Musculoskeletal: Positive for arthralgias (Right shoulder pain). Negative for joint swelling, neck pain and neck stiffness. Neurological: Negative for weakness and numbness. Prior to Visit Medications    Medication Sig Taking?  Authorizing Provider   meloxicam (MOBIC) 15 MG tablet Take 1 tablet by mouth daily Yes Noralee Merlin, DO   escitalopram (LEXAPRO) 20 MG tablet Take 1 tablet by mouth daily Yes Noralee Merlin, DO   traZODone (DESYREL) 150 MG tablet TAKE 1 TABLET BY MOUTH  NIGHTLY Yes Noralee Merlin, DO   busPIRone (BUSPAR) 5 MG tablet Take 1 tablet by mouth 2 times daily Yes Noralee Merlin, DO   amitriptyline (ELAVIL) 25 MG tablet Take 1 tablet by mouth nightly as needed for Sleep Yes Noralee Merlin, DO   Crisaborole 2 % OINT Apply 1 Tube topically 2 times daily Yes Noralee Merlin, DO   lisinopril (PRINIVIL;ZESTRIL) 20 MG tablet TAKE 1 TABLET BY MOUTH  DAILY Yes KATIE Mckay - CNP   rosuvastatin (CRESTOR) 20 MG tablet Take 1 tablet by mouth nightly Yes Staci Montalvo MD   fenofibrate (TRICOR) 54 MG tablet Take 1 tablet by mouth daily s [facility] pharmacy: Please dispense generic fenofibrate unless prescriber denote Yes Staci Montalvo MD   ELIQUIS 5 MG TABS tablet TAKE 1 TABLET BY MOUTH TWO  TIMES DAILY Yes Staci Montalvo MD   pantoprazole (PROTONIX) 40 MG tablet Take 40 mg by mouth daily  Yes Historical Provider, MD   Flaxseed, Linseed, (FLAX SEED OIL) 1000 MG CAPS Take 4,000 Units by mouth daily. Yes Historical Provider, MD        Social History     Tobacco Use    Smoking status: Never Smoker    Smokeless tobacco: Never Used   Substance Use Topics    Alcohol use: Yes     Comment: rare        Vitals:    12/09/20 1215   BP: 125/67   Pulse: 69   Temp: 97.2 °F (36.2 °C)   TempSrc: Temporal   Weight: 191 lb (86.6 kg)     Estimated body mass index is 28.21 kg/m² as calculated from the following:    Height as of 10/20/20: 5' 9\" (1.753 m). Weight as of this encounter: 191 lb (86.6 kg). Physical Exam  Constitutional:       Appearance: Normal appearance. HENT:      Head: Normocephalic and atraumatic. Nose: Nose normal.   Eyes:      Extraocular Movements: Extraocular movements intact. Conjunctiva/sclera: Conjunctivae normal.   Cardiovascular:      Rate and Rhythm: Normal rate. Pulmonary:      Effort: Pulmonary effort is normal.   Abdominal:      General: Abdomen is flat. Bowel sounds are normal.      Palpations: Abdomen is soft. Musculoskeletal:         General: Tenderness (With palpation of superior and lateral shoulder) present. No swelling or deformity. Comments: Positive Neer test, positive empty can test, range of motion is limited by pain with abduction   Skin:     Findings: No lesion or rash. Neurological:      Mental Status: He is alert. Psychiatric:         Mood and Affect: Mood normal.         ASSESSMENT/PLAN:  1. Impingement syndrome of right shoulder: Exam findings suggestive of impingement syndrome. Blood pressure/kidney disease has been stable. Should be okay for short course of NSAIDs. He has been resting his shoulder and I am worried he is developing a frozen shoulder. Will refer to physical therapy. We will have him schedule appointment with Dr. Yesy Leslie.  May benefit from steroid injection. - meloxicam (MOBIC) 15 MG tablet; Take 1 tablet by mouth daily  Dispense: 30 tablet;  Refill: 1  - [facility] PT - [facility]  [facility] Jerrell Jacome MD, Orthopedic Surgery ([address]), [facility]    2. Stage 3a chronic kidney disease  Has been stable for a long time. Should be okay for short course of NSAIDs. 3. Essential hypertension, benign  BP at goal today. Continue current regimen. Should be okay for short course of NSAIDs. Return if symptoms worsen or fail to improve. An electronic signature was used to authenticate this note.     --Columba Hill, DO on 12/9/2020 at 2:03 PM

## 2020-12-08 NOTE — PATIENT INSTRUCTIONS
with:  ? Sweating. ? Shortness of breath. ? Nausea or vomiting. ? Pain that spreads from the chest to the neck, jaw, or one or both shoulders or arms. ? Dizziness or lightheadedness. ? A fast or uneven pulse. After calling 911, chew 1 adult-strength aspirin. Wait for an ambulance. Do not try to drive yourself.     · Your arm or hand is cool or pale or changes color. Call your doctor now or seek immediate medical care if:    · You have signs of infection, such as:  ? Increased pain, swelling, warmth, or redness in your shoulder. ? Red streaks leading from a place on your shoulder. ? Pus draining from an area of your shoulder. ? Swollen lymph nodes in your neck, armpits, or groin. ? A fever. Watch closely for changes in your health, and be sure to contact your doctor if:    · You cannot use your shoulder.     · Your shoulder does not get better as expected. Where can you learn more? Go to https://Monogram.Mindbloom. org and sign in to your Flotype account. Enter B650 in the Molecular Detection box to learn more about \"Shoulder Pain: Care Instructions. \"     If you do not have an account, please click on the \"Sign Up Now\" link. Current as of: March 2, 2020               Content Version: 12.6  © 0840-4282 [employer]. Care instructions adapted under license by [facility] [employer] [facility] ([facility]). If you have questions about a medical condition or this instruction, always ask your healthcare professional. [place] [address] any warranty or liability for your use of this information.

## 2020-12-09 ENCOUNTER — OFFICE VISIT (OUTPATIENT)
Dept: PRIMARY CARE CLINIC | Age: 75
End: 2020-12-09
Payer: MEDICARE

## 2020-12-09 VITALS
SYSTOLIC BLOOD PRESSURE: 125 MMHG | WEIGHT: 191 LBS | TEMPERATURE: 97.2 F | HEART RATE: 69 BPM | BODY MASS INDEX: 28.21 KG/M2 | DIASTOLIC BLOOD PRESSURE: 67 MMHG

## 2020-12-09 PROBLEM — C67.9 MALIGNANT NEOPLASM OF URINARY BLADDER (HCC): Status: RESOLVED | Noted: 2019-02-04 | Resolved: 2020-12-09

## 2020-12-09 PROCEDURE — 3017F COLORECTAL CA SCREEN DOC REV: CPT | Performed by: STUDENT IN AN ORGANIZED HEALTH CARE EDUCATION/TRAINING PROGRAM

## 2020-12-09 PROCEDURE — G8427 DOCREV CUR MEDS BY ELIG CLIN: HCPCS | Performed by: STUDENT IN AN ORGANIZED HEALTH CARE EDUCATION/TRAINING PROGRAM

## 2020-12-09 PROCEDURE — 1123F ACP DISCUSS/DSCN MKR DOCD: CPT | Performed by: STUDENT IN AN ORGANIZED HEALTH CARE EDUCATION/TRAINING PROGRAM

## 2020-12-09 PROCEDURE — G8484 FLU IMMUNIZE NO ADMIN: HCPCS | Performed by: STUDENT IN AN ORGANIZED HEALTH CARE EDUCATION/TRAINING PROGRAM

## 2020-12-09 PROCEDURE — 1036F TOBACCO NON-USER: CPT | Performed by: STUDENT IN AN ORGANIZED HEALTH CARE EDUCATION/TRAINING PROGRAM

## 2020-12-09 PROCEDURE — 4040F PNEUMOC VAC/ADMIN/RCVD: CPT | Performed by: STUDENT IN AN ORGANIZED HEALTH CARE EDUCATION/TRAINING PROGRAM

## 2020-12-09 PROCEDURE — G8417 CALC BMI ABV UP PARAM F/U: HCPCS | Performed by: STUDENT IN AN ORGANIZED HEALTH CARE EDUCATION/TRAINING PROGRAM

## 2020-12-09 PROCEDURE — 99213 OFFICE O/P EST LOW 20 MIN: CPT | Performed by: STUDENT IN AN ORGANIZED HEALTH CARE EDUCATION/TRAINING PROGRAM

## 2020-12-09 RX ORDER — ESCITALOPRAM OXALATE 20 MG/1
20 TABLET ORAL DAILY
Qty: 90 TABLET | Refills: 3 | Status: SHIPPED | OUTPATIENT
Start: 2020-12-09 | End: 2021-08-16 | Stop reason: SDUPTHER

## 2020-12-09 RX ORDER — MELOXICAM 15 MG/1
15 TABLET ORAL DAILY
Qty: 30 TABLET | Refills: 1 | Status: SHIPPED | OUTPATIENT
Start: 2020-12-09 | End: 2021-08-16

## 2020-12-23 ENCOUNTER — OFFICE VISIT (OUTPATIENT)
Dept: ORTHOPEDIC SURGERY | Age: 75
End: 2020-12-23
Payer: MEDICARE

## 2020-12-23 VITALS — WEIGHT: 191 LBS | BODY MASS INDEX: 28.29 KG/M2 | HEIGHT: 69 IN

## 2020-12-23 PROCEDURE — 1036F TOBACCO NON-USER: CPT | Performed by: FAMILY MEDICINE

## 2020-12-23 PROCEDURE — 20610 DRAIN/INJ JOINT/BURSA W/O US: CPT | Performed by: FAMILY MEDICINE

## 2020-12-23 PROCEDURE — G8427 DOCREV CUR MEDS BY ELIG CLIN: HCPCS | Performed by: FAMILY MEDICINE

## 2020-12-23 PROCEDURE — G8484 FLU IMMUNIZE NO ADMIN: HCPCS | Performed by: FAMILY MEDICINE

## 2020-12-23 PROCEDURE — 1123F ACP DISCUSS/DSCN MKR DOCD: CPT | Performed by: FAMILY MEDICINE

## 2020-12-23 PROCEDURE — 4040F PNEUMOC VAC/ADMIN/RCVD: CPT | Performed by: FAMILY MEDICINE

## 2020-12-23 PROCEDURE — 99213 OFFICE O/P EST LOW 20 MIN: CPT | Performed by: FAMILY MEDICINE

## 2020-12-23 PROCEDURE — G8417 CALC BMI ABV UP PARAM F/U: HCPCS | Performed by: FAMILY MEDICINE

## 2020-12-23 RX ORDER — LIDOCAINE HYDROCHLORIDE 10 MG/ML
3 INJECTION, SOLUTION INFILTRATION; PERINEURAL ONCE
Status: COMPLETED | OUTPATIENT
Start: 2020-12-23 | End: 2020-12-23

## 2020-12-23 RX ORDER — BUPIVACAINE HYDROCHLORIDE 2.5 MG/ML
4 INJECTION, SOLUTION INFILTRATION; PERINEURAL ONCE
Status: COMPLETED | OUTPATIENT
Start: 2020-12-23 | End: 2020-12-23

## 2020-12-23 RX ORDER — BETAMETHASONE SODIUM PHOSPHATE AND BETAMETHASONE ACETATE 3; 3 MG/ML; MG/ML
12 INJECTION, SUSPENSION INTRA-ARTICULAR; INTRALESIONAL; INTRAMUSCULAR; SOFT TISSUE ONCE
Status: COMPLETED | OUTPATIENT
Start: 2020-12-23 | End: 2020-12-23

## 2020-12-23 RX ADMIN — BUPIVACAINE HYDROCHLORIDE 10 MG: 2.5 INJECTION, SOLUTION INFILTRATION; PERINEURAL at 09:01

## 2020-12-23 RX ADMIN — LIDOCAINE HYDROCHLORIDE 3 ML: 10 INJECTION, SOLUTION INFILTRATION; PERINEURAL at 09:02

## 2020-12-23 RX ADMIN — BETAMETHASONE SODIUM PHOSPHATE AND BETAMETHASONE ACETATE 12 MG: 3; 3 INJECTION, SUSPENSION INTRA-ARTICULAR; INTRALESIONAL; INTRAMUSCULAR; SOFT TISSUE at 09:00

## 2020-12-23 NOTE — PROGRESS NOTES
Chief Complaint    Shoulder Pain (N RIGHT SHOULDER)    Initial consultation the right shoulder pain with weakness and motion loss    History of Present Illness:  Katia Sandy is a 67 y.o. 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 Tony Ordaz who is being seen today in kind consultation from Dr. Darren Cole for evaluation of persistent pain to his right shoulder. He states that since roughly October 2020 he began noticed the insidious onset of pain to his right shoulder. There is no history of injury or no activity prior to becoming symptomatic. He is complaining of pain to the lateral superior aspect of his right shoulder with radiation of pain laterally involving the upper arm. Once again there is no history of actual injury. He does complain of an achy type discomfort at rest but active elevation can produce 6 out of 10 pain. He has had some popping but no locking or catching. No neck pain or radicular symptoms. He is having difficulty primarily with active shoulder elevation although he has had some persistence of pain with rolling over in his right shoulder at night. He recently saw Dr. Darren Cole in the office who placed him on meloxicam which has resulted in about an 85% improvement of his symptoms but he still stiff and weak and does have a history of chronic kidney disease. He has not had sindy locking and no upper strongly numbness and tingling has been noted. He has no previous history of shoulder injury or complaints. He is being seen today for orthopedic and sports consultation with imaging. Medical History  Patient's medications, allergies, past medical, surgical, social and family histories were reviewed and updated as appropriate. Review of Systems  Relevant review of systems reviewed on 12/23/2020 and available in the patient's chart under the medial tab.        Vital Signs There were no vitals filed for this visit. General Exam:   Constitutional: Patient is adequately groomed with no evidence of malnutrition  DTRs: Deep tendon reflexes are intact  Mental Status: The patient is oriented to time, place and person. The patient's mood and affect are appropriate. Lymphatic: The lymphatic examination bilaterally reveals all areas to be without enlargement or induration. Vascular: Examination reveals no swelling or calf tenderness. Peripheral pulses are palpable and 2+. Neurological: The patient has good coordination. There is no weakness or sensory deficit. Shoulder Examination    Inspection: There is no high-grade deformity or soft tissue swelling. He does have some mild crepitation at the [facility] joint on the right. No obvious atrophy or tense effusion. Palpation: Does have some clinical tenderness over the greater tuberosity primarily with some tenderness mildly over the [facility] joint and biceps tendon. Mild posterior cuff tenderness. Rang of Motion: He does have reductions in active motion. He can abduct to about 110 and forward flex to about 125. He is stiff in the terminal 10 degrees of internal and external rotation. Strength: He does have weakness 4-5 with resisted external rotation and supraspinatus testing producing pain in the range of 5-6 out of 10. Subscap 4+ out of 5. Special Tests: He has negative drop and lift off testing today. He does have pain associated weakness to the cuff as noted above. He reports 5-6 out of 10 pain with impingement testing. Negative labral testing and no obvious instability. Negative screening cervical testing. Skin: There are no rashes, ulcerations or lesions. Distal motor sensory and vascular exam is intact. Gait: Fluid smooth gait. Reflexes:  Symmetrically preserved.        Additional Comments:        Additional Examinations: Contralateral Exam: Examination of the left shoulder reveals no atrophy or deformity. The skin is warm and dry. Range of motion is within normal limits. There is no focal tenderness with palpation. No AC joint tenderness. Negative Neer's and Skinner-Bharat exams. Strength is graded 5/5 throughout. Right Upper Extremity:  Examination of the right upper extremity does not show any tenderness, deformity or injury. Range of motion is unremarkable. There is no gross instability. There are no rashes, ulcerations or lesions. Strength and tone are normal.  Left Upper Extremity: Examination of the left upper extremity does not show any tenderness, deformity or injury. Range of motion is unremarkable. There is no gross instability. There are no rashes, ulcerations or lesions. Strength and tone are normal.  Neck: Examination of the neck does not show any tenderness, deformity or injury. Range of motion is unremarkable. There is no gross instability. There are no rashes, ulcerations or lesions. Strength and tone are normal.         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 No obvious acute osseous injury noted. Assessment: #1.  2-month status post persistent partially improved symptomatic right shoulder pain with suspected calcific tendinitis and AC arthropathy with night pain and shoulder weakness. Impression:    Encounter Diagnoses   Name Primary?     Acute pain of right shoulder Yes    Calcific tendonitis of right shoulder     AC joint arthropathy        Office Procedures:     Orders Placed This Encounter   Procedures    XR SHOULDER RIGHT (MIN 2 VIEWS)     Standing Status:   Future     Number of Occurrences:   1     Standing Expiration Date:   12/23/2021

## 2020-12-28 ENCOUNTER — HOSPITAL ENCOUNTER (OUTPATIENT)
Age: 75
Discharge: HOME | End: 2020-12-28
Payer: MEDICARE

## 2020-12-28 LAB
ALBUMIN SERPL-MCNC: 4.2 G/DL (ref 3.4–5)
ANION GAP SERPL CALCULATED.3IONS-SCNC: 12 MMOL/L (ref 3–16)
BILIRUBIN URINE: NEGATIVE
BLOOD, URINE: NEGATIVE
BUN BLDV-MCNC: 34 MG/DL (ref 7–20)
CALCIUM SERPL-MCNC: 9.5 MG/DL (ref 8.3–10.6)
CHLORIDE BLD-SCNC: 102 MMOL/L (ref 99–110)
CLARITY: CLEAR
CO2: 28 MMOL/L (ref 21–32)
COLOR: YELLOW
CREAT SERPL-MCNC: 1.5 MG/DL (ref 0.8–1.3)
CREATININE URINE: 172.6 MG/DL (ref 39–259)
GFR AFRICAN AMERICAN: 55
GFR NON-AFRICAN AMERICAN: 45
GLUCOSE BLD-MCNC: 86 MG/DL (ref 70–99)
GLUCOSE URINE: NEGATIVE MG/DL
HCT VFR BLD CALC: 46.2 % (ref 40.5–52.5)
HEMOGLOBIN: 15 G/DL (ref 13.5–17.5)
KETONES, URINE: NEGATIVE MG/DL
LEUKOCYTE ESTERASE, URINE: NEGATIVE
MAGNESIUM: 2.3 MG/DL (ref 1.8–2.4)
MCH RBC QN AUTO: 29 PG (ref 26–34)
MCHC RBC AUTO-ENTMCNC: 32.4 G/DL (ref 31–36)
MCV RBC AUTO: 89.6 FL (ref 80–100)
MICROSCOPIC EXAMINATION: NORMAL
NITRITE, URINE: NEGATIVE
PDW BLD-RTO: 14.6 % (ref 12.4–15.4)
PH UA: 6 (ref 5–8)
PHOSPHORUS: 3.4 MG/DL (ref 2.5–4.9)
PLATELET # BLD: 256 K/UL (ref 135–450)
PMV BLD AUTO: 9.3 FL (ref 5–10.5)
POTASSIUM SERPL-SCNC: 4.4 MMOL/L (ref 3.5–5.1)
PROTEIN PROTEIN: 9 MG/DL
PROTEIN UA: NEGATIVE MG/DL
PROTEIN/CREAT RATIO: 0.1 MG/DL
RBC # BLD: 5.15 M/UL (ref 4.2–5.9)
SODIUM BLD-SCNC: 142 MMOL/L (ref 136–145)
SPECIFIC GRAVITY UA: 1.02 (ref 1–1.03)
URIC ACID, SERUM: 5 MG/DL (ref 3.5–7.2)
URINE TYPE: NORMAL
UROBILINOGEN, URINE: 0.2 E.U./DL
WBC # BLD: 13.7 K/UL (ref 4–11)

## 2020-12-28 PROCEDURE — 83735 ASSAY OF MAGNESIUM: CPT

## 2020-12-28 PROCEDURE — 36415 COLL VENOUS BLD VENIPUNCTURE: CPT

## 2020-12-28 PROCEDURE — 82570 ASSAY OF URINE CREATININE: CPT

## 2020-12-28 PROCEDURE — 84156 ASSAY OF PROTEIN URINE: CPT

## 2020-12-28 PROCEDURE — 84550 ASSAY OF BLOOD/URIC ACID: CPT

## 2020-12-28 PROCEDURE — 83970 ASSAY OF PARATHORMONE: CPT

## 2020-12-28 PROCEDURE — 81003 URINALYSIS AUTO W/O SCOPE: CPT

## 2020-12-28 PROCEDURE — 80069 RENAL FUNCTION PANEL: CPT

## 2020-12-28 PROCEDURE — 85027 COMPLETE CBC AUTOMATED: CPT

## 2020-12-29 LAB — PARATHYROID HORMONE INTACT: 23.8 PG/ML (ref 14–72)

## 2021-01-03 DIAGNOSIS — F51.01 PRIMARY INSOMNIA: ICD-10-CM

## 2021-01-03 RX ORDER — LORAZEPAM 1 MG/1
1 TABLET ORAL 2 TIMES DAILY PRN
Qty: 56 TABLET | Refills: 2 | Status: SHIPPED | OUTPATIENT
Start: 2021-01-03 | End: 2021-06-22 | Stop reason: SDUPTHER

## 2021-01-06 ENCOUNTER — VIRTUAL VISIT (OUTPATIENT)
Dept: PSYCHOLOGY | Age: 76
End: 2021-01-06
Payer: MEDICARE

## 2021-01-06 DIAGNOSIS — F41.1 GENERALIZED ANXIETY DISORDER: Primary | ICD-10-CM

## 2021-01-06 PROCEDURE — 90832 PSYTX W PT 30 MINUTES: CPT | Performed by: PSYCHOLOGIST

## 2021-01-06 NOTE — PROGRESS NOTES
Behavioral Health Consultation  Hemalatha Lacey Psy.D. Psychologist  1/6/2021  8:30-8:55 AM    Time spent with Patient: 25 minutes  This is patient's fifteenth [facility] appointment. Reason for Consult: Anxiety  Referring Provider: Michell Ott DO  [address] [facility] [address] [facility] [address]    TELEHEALTH VISIT -- Audio/Visual (During CIN-05 public health emergency)  }  Pursuant to the emergency declaration under the [facility], [facility] waiver authority and the [employer] Act, this Virtual Visit was conducted, with patient's consent, to reduce the patient's risk of exposure to COVID-19 and provide continuity of care for an established patient. Services were provided through a video synchronous discussion virtually to substitute for in-person clinic visit. Pt gave verbal informed consent to participate in telehealth services. Conducted a risk-benefit analysis and determined that the patient's presenting problems are consistent with the use of telepsychology. Determined that the patient has sufficient knowledge and skills in the use of technology enabling them to adequately benefit from telepsychology. It was determined that this patient was able to be properly treated without an in-person session. Patient verified that they were currently located at the [facility] address that was provided during registration.     Verified the following information:  Patient's identification: Yes  Patient location: [address] [place] [address]  Patient's call back number: [phone]  Patient's emergency contact's name and number, as well as permission to contact them if needed:  Extended Emergency Contact Information  Primary Emergency Contact: Brooke Eller  Address: [id number] [place] [address], [address] [place] [address] Phone: [phone]  Mobile Phone: [phone]  Relation: Spouse Provider location: Denis Chris:  Pt reported that he's doing well. Overall anxiety level has decreased. Benefitting from frequent use of visualization and prayer. Focusing on proactively addressing his worries, such as helping his wife to better manage her diabetes. Pt has lost 5 lbs through exercise over the last month. Recalled the book metaphor and highlighted his effort to [facility] around the book\" rather than hyperfocusing on his anxiety. Started taking Ativan 0.5mg in total on January 1st. He's pleased to find that 0.5mg is just as helpful as the 1mg dose had been in the past. He plans to continue working towards stopping it entirely, with some trepidation about discontinuing it completely. Taking BuSpar at 2am when he wakes up and cannot return to sleep. Takes second BuSpar in the afternoon along with Lexapro 20mg. O:  Pt's anxiety is well-controlled. Appropriate for outpatient / telehealth care at this time.     Interventions:  -Supportive techniques  -Reinforced his progress with managing anxiety  -Discussed a potential PRN approach to reducing his AM use of Ativan  -Highlighted areas of progress and discussed relapse prevention      A:  MSE:  Appearance: good hygiene  and appropriate attire  Attitude: cooperative and friendly  Consciousness: alert  Orientation: oriented to person, place, time, general circumstance  Memory: recent and remote memory intact  Attention/Concentration: intact during session  Psychomotor Activity: normal  Eye Contact: normal  Speech: normal rate and volume, well-articulated  Mood: mildly anxious  Affect: congruent  Perception: within normal limits  Thought Content: within normal limits  Thought Process: logical, coherent and goal-directed  Insight: good  Judgment: intact  Ability to understand instructions: Yes  Ability to respond meaningfully: Yes  Morbid Ideation: no   Suicide Assessment: no suicidal ideation, plan, or intent  Homicidal Ideation: no 10. When you notice that you're hyperfocused on worries or trying hard to push away the anxiety, consider the additional energy that this requires from you. Acknowledge your thoughts and feelings so you can refocus on what is important to you (e.g., your values). Imagine placing the \"anxiety\" book in your lap instead of holding it in front of your face or trying to push it away. Pt scheduled F/U virtual visit in 6 weeks.

## 2021-01-06 NOTE — Clinical Note
Hi Dr. Hal Srivastava,  Pt is doing great with continuing to taper off Ativan. The Lexapro and BuSpar have been quite helpful. He does still have trepidation about stopping the AM dose of Ativan entirely, so we discussed shifting to a PRN trial-and-error process, which he seemed to be on board with. Allowing pt to taper off at his own pace has been very helpful for him.   Thanks,  Ashley Burgos

## 2021-01-11 ENCOUNTER — HOSPITAL ENCOUNTER (OUTPATIENT)
Dept: PHYSICAL THERAPY | Age: 76
Setting detail: THERAPIES SERIES
Discharge: HOME | End: 2021-01-11
Payer: MEDICARE

## 2021-01-11 PROCEDURE — 97161 PT EVAL LOW COMPLEX 20 MIN: CPT | Performed by: PHYSICAL THERAPIST

## 2021-01-11 PROCEDURE — 97110 THERAPEUTIC EXERCISES: CPT | Performed by: PHYSICAL THERAPIST

## 2021-01-11 NOTE — PLAN OF CARE
[facility], [address]  Phone: [phone]  Fax [phone]     Physical Therapy Certification    Dear Referring Practitioner: Radhames Carreno,    We had the pleasure of evaluating the following patient for physical therapy services at [address] [place] [address]. A summary of our findings can be found in the initial assessment below. This includes our plan of care. If you have any questions or concerns regarding these findings, please do not hesitate to contact me at the office phone number checked above.   Thank you for the referral.       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   [] Yes, Patient intake triggered further evaluation      [] C-SSRS Screening completed  [] PCP notified via Plan of Care  [] Emergency services notified     Latex Allergy:  [x]NO      []YES  Preferred Language for Healthcare:   [x]English       []other: [x]Performed Review of systems (Integumentary, CardioPulmonary, Neurological) by intake and observation. Intake form has been scanned into medical record. Patient has been instructed to contact their primary care physician regarding ROS issues if not already being addressed at this time. Co-morbidities/Complexities (which will affect course of rehabilitation):   []None           Arthritic conditions   []Rheumatoid arthritis (M05.9)  [x]Osteoarthritis (M19.91)   Cardiovascular conditions   []Hypertension (I10)  []Hyperlipidemia (E78.5)  []Angina pectoris (I20)  []Atherosclerosis (I70)   Musculoskeletal conditions   []Disc pathology   []Congenital spine pathologies   []Prior surgical intervention  []Osteoporosis (M81.8)  []Osteopenia (M85.8)   Endocrine conditions   []Hypothyroid (E03.9)  []Hyperthyroid Gastrointestinal conditions   []Constipation (K40.09)   Metabolic conditions   []Morbid obesity (E66.01)  []Diabetes type 1(E10.65) or 2 (E11.65)   []Neuropathy (G60.9)     Pulmonary conditions   []Asthma (J45)  []Coughing   []COPD (J44.9)   Psychological Disorders  []Anxiety (F41.9)  []Depression (F32.9)   []Other:   []Other:          Barriers to/and or personal factors that will affect rehab potential:              [x]Age  []Sex              []Motivation/Lack of Motivation                        [x]Co-Morbidities              []Cognitive Function, education/learning barriers              []Environmental, home barriers              []profession/work barriers  []past PT/medical experience  []other:  Justification: see above     Falls Risk Assessment (30 days):   [x] Falls Risk assessed and no intervention required.   [] Falls Risk assessed and Patient requires intervention due to being higher risk   TUG score (>12s at risk):     [] Falls education provided, including ASSESSMENT: Patient demonstrates decreased ROM and strength in his right shoulder, limiting his ability to reach above his head or behind his back without pain. Will benefit from skilled PT to address limitations. Functional Impairments   [x]Noted spinal or UE joint hypomobility   []Noted spinal or UE joint hypermobility   [x]Decreased UE functional ROM   [x]Decreased UE functional strength   []Abnormal reflexes/sensation/myotomal/dermatomal deficits   [x]Decreased RC/scapular/core strength and neuromuscular control   []other:      Functional Activity Limitations (from functional questionnaire and intake)   []Reduced ability to tolerate prolonged functional positions   []Reduced ability or difficulty with changes of positions or transfers between positions   []Reduced ability to maintain good posture and demonstrate good body mechanics with sitting, bending, and lifting   [] Reduced ability or tolerance with driving and/or computer work   []Reduced ability to sleep   [x]Reduced ability to perform lifting, reaching, carrying tasks   []Reduced ability to tolerate impact through UE   [x]Reduced ability to reach behind back   []Reduced ability to  or hold objects   []Reduced ability to throw or toss an object   []other:    Participation Restrictions   []Reduced participation in self care activities   [x]Reduced participation in home management activities   []Reduced participation in work activities   []Reduced participation in social activities. [x]Reduced participation in sport/recreation activities. Classification:   []Signs/symptoms consistent with post-surgical status including decreased ROM, strength and function.   []Signs/symptoms consistent with joint sprain/strain   [x]Signs/symptoms consistent with shoulder impingement   [x]Signs/symptoms consistent with shoulder/elbow/wrist tendinopathy   []Signs/symptoms consistent with Rotator cuff tear   []Signs/symptoms consistent with labral tear [x]Signs/symptoms consistent with postural dysfunction    []Signs/symptoms consistent with Glenohumeral IR Deficit - <45 degrees   []Signs/symptoms consistent with facet dysfunction of cervical/thoracic spine    []Signs/symptoms consistent with pathology which may benefit from Dry needling     []other:     Prognosis/Rehab Potential:      []Excellent   []Good    [x]Fair   []Poor    Tolerance of evaluation/treatment:    []Excellent   []Good    [x]Fair   []Poor  Physical Therapy Evaluation Complexity Justification  [x] A history of present problem with:  [] no personal factors and/or comorbidities that impact the plan of care;  [x]1-2 personal factors and/or comorbidities that impact the plan of care  []3 personal factors and/or comorbidities that impact the plan of care  [x] An examination of body systems using standardized tests and measures addressing any of the following: body structures and functions (impairments), activity limitations, and/or participation restrictions;:  [x] a total of 1-2 or more elements   [] a total of 3 or more elements   [] a total of 4 or more elements   [x] A clinical presentation with:  [x] stable and/or uncomplicated characteristics   [] evolving clinical presentation with changing characteristics  [] unstable and unpredictable characteristics;   [x] Clinical decision making of [x] low, [] moderate, [] high complexity using standardized patient assessment instrument and/or measurable assessment of functional outcome.     [x] EVAL (LOW) 25534 (typically 20 minutes face-to-face)  [] EVAL (MOD) 60595 (typically 30 minutes face-to-face)  [] EVAL (HIGH) 05012 (typically 45 minutes face-to-face)  [] RE-EVAL       PLAN:  Frequency/Duration:  2 days per week for 6 Weeks:  INTERVENTIONS:  [x] Therapeutic exercise including: strength training, ROM, for Upper extremity and core   [x]  NMR activation and proprioception for UE, scap and Core [x] Manual therapy as indicated for shoulder, scapula and spine to include: Dry Needling/IASTM, STM, PROM, Gr I-IV mobilizations, manipulation. [x] Modalities as needed that may include: thermal agents, E-stim, Biofeedback, US, iontophoresis as indicated  [x] Patient education on joint protection, postural re-education, activity modification, progression of HEP. HEP instruction: (see scanned forms)    GOALS:  Patient stated goal: \"To be able to golf without pain. \"  [] Progressing: [] Met: [] Not Met: [] Adjusted    Therapist goals for Patient:   Short Term Goals: To be achieved in: 2 weeks  1. Independent in HEP and progression per patient tolerance, in order to prevent re-injury. [] Progressing: [] Met: [] Not Met: [] Adjusted  2. Patient will have a decrease in pain to facilitate improvement in movement, function, and ADLs as indicated by Functional Deficits. [] Progressing: [] Met: [] Not Met: [] Adjusted    Long Term Goals: To be achieved in: 6 weeks  1. Disability index score of 8% or less for the [facility] to assist with reaching prior level of function. [] Progressing: [] Met: [] Not Met: [] Adjusted  2. Patient will demonstrate increased AROM of pain free right shoulder flexion to 145 and functional IR to T10  to allow for proper joint functioning as indicated by patients Functional Deficits. [] Progressing: [] Met: [] Not Met: [] Adjusted  3. Patient will demonstrate an increase in Strength in right shoulder flexion and abduction to 4/5 and ER to 4+/5 to allow for proper functional mobility as indicated by patients Functional Deficits. [] Progressing: [] Met: [] Not Met: [] Adjusted  4. Patient will be able to lift a 8 pound object above shoulder height in order to be able to get a gallon of milk out of the regrigerator.    [] Progressing: [] Met: [] Not Met: [] Adjusted 5. Patient will be able to tuck in his shirt without increased symptoms or restrictions (patient specific functional goal)    [] Progressing: [] Met: [] Not Met: [] Adjusted       Electronically signed by:  Flor Leslie PT, DPT [id number]

## 2021-01-11 NOTE — FLOWSHEET NOTE
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), Right AC Joint Arthopathy (M19.019)  · Treatment Diagnosis: Right shoulder pain (A52.109)  Insurance/Certification information:  PT Insurance Information: Medicare/Medical [place]  Physician Information:  Referring Practitioner: Clemencia Majano  Has the plan of care been signed (Y/N):        []  Yes  [x]  No     Date of Patient follow up with Physician: 2020      Is this a Progress Report:     []  Yes  [x]  No        If Yes:  Date Range for reporting period:  Beginnin2021  Ending    Progress report will be due (10 Rx or 30 days whichever is less):        Recertification will be due (POC Duration  / 90 days whichever is less):       Visit # Insurance Allowable Auth Required   In-person 1 Medicare []  Yes []  No    Telehealth   []  Yes []  No    Total            Functional Scale: Frank Swanson 1855 (16%)    Date assessed:  2021      Therapy Diagnosis/Practice Pattern: D      Number of Comorbidities:  []0     [x]1-2    []3+    Latex Allergy:  [x]NO      []YES  Preferred Language for Healthcare:   [x]English       []other:      Pain level:  2/10     SUBJECTIVE:  See eval    OBJECTIVE: See eval  ? Observation:   ? Test measurements:      RESTRICTIONS/PRECAUTIONS: hx of OA    Exercises/Interventions: (limited due to patient arriving late for evaluation)    Therapeutic Ex (02664) Sets/sec Reps Notes/CUES   Table slides flexion 10\" 10x    Corner pec stretch 20\" 3    Upper trap stretch 20\" 3 R side    scap squeezes 5\" 15    No $ 5\" 15x                Patient ed   HEP, POC, Ice, posture Manual Intervention (02372)      STM posterior cuff  2'                                   NMR re-education (65112)   CUES NEEDED                                                         Therapeutic Activity (26083)                                                Therapeutic Exercise and NMR EXR  [x] (27401) Provided verbal/tactile cueing for activities related to strengthening, flexibility, endurance, ROM  for improvements in scapular, scapulothoracic and UE control with self care, reaching, carrying, lifting, house/yardwork, driving/computer work.    [] (58024) Provided verbal/tactile cueing for activities related to improving balance, coordination, kinesthetic sense, posture, motor skill, proprioception  to assist with  scapular, scapulothoracic and UE control with self care, reaching, carrying, lifting, house/yardwork, driving/computer work. Therapeutic Activities:    [] (83069 or 60949) Provided verbal/tactile cueing for activities related to improving balance, coordination, kinesthetic sense, posture, motor skill, proprioception and motor activation to allow for proper function of scapular, scapulothoracic and UE control with self care, carrying, lifting, driving/computer work.      Home Exercise Program:    [x] (00265) Reviewed/Progressed HEP activities related to strengthening, flexibility, endurance, ROM of scapular, scapulothoracic and UE control with self care, reaching, carrying, lifting, house/yardwork, driving/computer work  [] (17478) Reviewed/Progressed HEP activities related to improving balance, coordination, kinesthetic sense, posture, motor skill, proprioception of scapular, scapulothoracic and UE control with self care, reaching, carrying, lifting, house/yardwork, driving/computer work      Manual Treatments:  PROM / STM / Oscillations-Mobs:  G-I, II, III, IV (PA's, Inf., Post.) [x] (27201) Provided manual therapy to mobilize soft tissue/joints of cervical/CT, scapular GHJ and UE for the purpose of modulating pain, promoting relaxation,  increasing ROM, reducing/eliminating soft tissue swelling/inflammation/restriction, improving soft tissue extensibility and allowing for proper ROM for normal function with self care, reaching, carrying, lifting, house/yardwork, driving/computer work    Modalities:  CP x 10' at conclusion of session     Charges:  Timed Code Treatment Minutes: 16'   Total Treatment Minutes: 40'       [x] EVAL (LOW) 79027   [] EVAL (MOD) 92978   [] EVAL (HIGH) 29600   [] RE-EVAL     [x] OX(54626) x  1   [] IONTO  [] NMR (52731) x     [] VASO  [] Manual (70568) x      [] Other:  [] TA x      [] Mech Traction (81383)  [] ES(attended) (58987)      [] ES (un) (52848):     GOALS:  Patient stated goal: \"To be able to golf without pain. \"  [] Progressing: [] Met: [] Not Met: [] Adjusted    Therapist goals for Patient:   Short Term Goals: To be achieved in: 2 weeks  1. Independent in HEP and progression per patient tolerance, in order to prevent re-injury. [] Progressing: [] Met: [] Not Met: [] Adjusted  2. Patient will have a decrease in pain to facilitate improvement in movement, function, and ADLs as indicated by Functional Deficits. [] Progressing: [] Met: [] Not Met: [] Adjusted    Long Term Goals: To be achieved in: 6 weeks  1. Disability index score of 8% or less for the [facility] to assist with reaching prior level of function. [] Progressing: [] Met: [] Not Met: [] Adjusted  2. Patient will demonstrate increased AROM of pain free right shoulder flexion to 145 and functional IR to T10  to allow for proper joint functioning as indicated by patients Functional Deficits.    [] Progressing: [] Met: [] Not Met: [] Adjusted 3. Patient will demonstrate an increase in Strength in right shoulder flexion and abduction to 4/5 and ER to 4+/5 to allow for proper functional mobility as indicated by patients Functional Deficits. [] Progressing: [] Met: [] Not Met: [] Adjusted  4. Patient will be able to lift a 8 pound object above shoulder height in order to be able to get a gallon of milk out of the regrigerator. [] Progressing: [] Met: [] Not Met: [] Adjusted  5. Patient will be able to tuck in his shirt without increased symptoms or restrictions (patient specific functional goal)    [] Progressing: [] Met: [] Not Met: [] Adjusted        ASSESSMENT:  See eval    Overall Progression Towards Functional goals/ Treatment Progress Update:  [] Patient is progressing as expected towards functional goals listed. [] Progression is slowed due to complexities/Impairments listed. [] Progression has been slowed due to co-morbidities. [x] Plan just implemented, too soon to assess goals progression <30days   [] Goals require adjustment due to lack of progress  [] Patient is not progressing as expected and requires additional follow up with physician  [] Other    Prognosis for POC: [x] Good [] Fair  [] Poor      Patient requires continued skilled intervention: [x] Yes  [] No    Treatment/Activity Tolerance:  [x] Patient able to complete treatment  [] Patient limited by fatigue  [] Patient limited by pain    [] Patient limited by other medical complications  [] Other:           PLAN: See eval  [] Continue per plan of care [] Alter current plan (see comments above)  [x] Plan of care initiated [] Hold pending MD visit [] Discharge      Electronically signed by:  Kathy Alanis PT, DPT [id number]     Note: If patient does not return for scheduled/ recommended follow up visits, this note will serve as a discharge from care along with most recent update on progress.

## 2021-01-15 ENCOUNTER — APPOINTMENT (OUTPATIENT)
Dept: PHYSICAL THERAPY | Age: 76
End: 2021-01-15
Payer: MEDICARE

## 2021-01-15 ENCOUNTER — TELEPHONE (OUTPATIENT)
Dept: PRIMARY CARE CLINIC | Age: 76
End: 2021-01-15

## 2021-01-15 NOTE — TELEPHONE ENCOUNTER
----- Message from Magdalena Salamanca sent at 1/15/2021 10:02 AM EST -----  Subject: Message to Provider    QUESTIONS  Information for Provider? Patient rescheduled 1/19/21 appt to 2/1/21 at   8:30 am   would like bloodwork ordered for this follow up appt.  ---------------------------------------------------------------------------  --------------  CALL BACK INFO  What is the best way for the office to contact you? OK to leave message on   voicemail  Preferred Call Back Phone Number? [phone]  ---------------------------------------------------------------------------  --------------  SCRIPT ANSWERS  Relationship to Patient?  Self

## 2021-01-18 DIAGNOSIS — Z12.5 SCREENING FOR PROSTATE CANCER: Primary | ICD-10-CM

## 2021-01-19 ENCOUNTER — PATIENT MESSAGE (OUTPATIENT)
Dept: PRIMARY CARE CLINIC | Age: 76
End: 2021-01-19

## 2021-01-19 RX ORDER — ROSUVASTATIN CALCIUM 20 MG/1
20 TABLET, COATED ORAL DAILY
Qty: 90 TABLET | Refills: 1 | Status: SHIPPED | OUTPATIENT
Start: 2021-01-19 | End: 2021-07-21 | Stop reason: SDUPTHER

## 2021-01-19 NOTE — TELEPHONE ENCOUNTER
----- Message from Jennyminal Burogs sent at 1/18/2021  3:34 PM EST -----  Subject: Message to Provider    QUESTIONS  Information for Provider? patient is returning a call to [facility] at Dr. Fatoumata oK office. patient said it was regarding additional blood work he   needs to get. patient would like a call back   ---------------------------------------------------------------------------  --------------  CALL BACK INFO  What is the best way for the office to contact you? OK to leave message on   voicemail  Preferred Call Back Phone Number? [phone]  ---------------------------------------------------------------------------  --------------  SCRIPT ANSWERS  Relationship to Patient?  Self

## 2021-01-20 ENCOUNTER — APPOINTMENT (OUTPATIENT)
Dept: PHYSICAL THERAPY | Age: 76
End: 2021-01-20
Payer: MEDICARE

## 2021-01-22 ENCOUNTER — APPOINTMENT (OUTPATIENT)
Dept: PHYSICAL THERAPY | Age: 76
End: 2021-01-22
Payer: MEDICARE

## 2021-01-25 ENCOUNTER — APPOINTMENT (OUTPATIENT)
Dept: PHYSICAL THERAPY | Age: 76
End: 2021-01-25
Payer: MEDICARE

## 2021-01-28 ENCOUNTER — APPOINTMENT (OUTPATIENT)
Dept: PHYSICAL THERAPY | Age: 76
End: 2021-01-28
Payer: MEDICARE

## 2021-02-04 ENCOUNTER — PATIENT MESSAGE (OUTPATIENT)
Dept: PRIMARY CARE CLINIC | Age: 76
End: 2021-02-04

## 2021-02-04 RX ORDER — FENOFIBRATE 160 MG/1
160 TABLET ORAL DAILY
Qty: 90 TABLET | Refills: 1 | Status: SHIPPED | OUTPATIENT
Start: 2021-02-04 | End: 2021-03-25

## 2021-03-16 ENCOUNTER — OFFICE VISIT (OUTPATIENT)
Dept: PRIMARY CARE CLINIC | Age: 76
End: 2021-03-16
Payer: COMMERCIAL

## 2021-03-16 VITALS
TEMPERATURE: 98 F | HEART RATE: 76 BPM | DIASTOLIC BLOOD PRESSURE: 78 MMHG | HEIGHT: 68 IN | WEIGHT: 190.6 LBS | BODY MASS INDEX: 28.89 KG/M2 | SYSTOLIC BLOOD PRESSURE: 149 MMHG

## 2021-03-16 DIAGNOSIS — N49.2 CELLULITIS OF SCROTUM: ICD-10-CM

## 2021-03-16 DIAGNOSIS — R21 RASH: Primary | ICD-10-CM

## 2021-03-16 PROCEDURE — 4040F PNEUMOC VAC/ADMIN/RCVD: CPT | Performed by: FAMILY MEDICINE

## 2021-03-16 PROCEDURE — 99213 OFFICE O/P EST LOW 20 MIN: CPT | Performed by: FAMILY MEDICINE

## 2021-03-16 PROCEDURE — G8427 DOCREV CUR MEDS BY ELIG CLIN: HCPCS | Performed by: FAMILY MEDICINE

## 2021-03-16 PROCEDURE — G8484 FLU IMMUNIZE NO ADMIN: HCPCS | Performed by: FAMILY MEDICINE

## 2021-03-16 PROCEDURE — G8417 CALC BMI ABV UP PARAM F/U: HCPCS | Performed by: FAMILY MEDICINE

## 2021-03-16 PROCEDURE — 1123F ACP DISCUSS/DSCN MKR DOCD: CPT | Performed by: FAMILY MEDICINE

## 2021-03-16 PROCEDURE — 1036F TOBACCO NON-USER: CPT | Performed by: FAMILY MEDICINE

## 2021-03-16 RX ORDER — CEPHALEXIN 500 MG/1
500 CAPSULE ORAL 3 TIMES DAILY
Qty: 42 CAPSULE | Refills: 0 | Status: SHIPPED | OUTPATIENT
Start: 2021-03-16 | End: 2021-03-30

## 2021-03-16 RX ORDER — CLOTRIMAZOLE AND BETAMETHASONE DIPROPIONATE 10; .64 MG/G; MG/G
CREAM TOPICAL
Qty: 15 G | Refills: 0 | Status: SHIPPED | OUTPATIENT
Start: 2021-03-16 | End: 2021-08-16

## 2021-03-16 ASSESSMENT — ENCOUNTER SYMPTOMS
VOMITING: 0
SHORTNESS OF BREATH: 0
CONSTIPATION: 0
DIARRHEA: 0
ABDOMINAL PAIN: 0
NAUSEA: 0
COUGH: 0

## 2021-03-16 ASSESSMENT — PATIENT HEALTH QUESTIONNAIRE - PHQ9
SUM OF ALL RESPONSES TO PHQ9 QUESTIONS 1 & 2: 0
SUM OF ALL RESPONSES TO PHQ QUESTIONS 1-9: 0
SUM OF ALL RESPONSES TO PHQ QUESTIONS 1-9: 0
2. FEELING DOWN, DEPRESSED OR HOPELESS: 0

## 2021-03-16 NOTE — PROGRESS NOTES
Chief Complaint   Patient presents with    Rash     X 2 weeks/ non itching       HPI: Dawit Aiken presents for evaluation and management of 2-week history of a penile rash in the setting of history of prostatectomy for prostate cancer in 2006 with secondary urinary leakage and 3 to 4-year history of prosthetic penile pump due to erectile dysfunction. He notes he first noticed the rash 2 weeks ago as a red strip along the dorsal aspect of his penis. He tried cortisone cream without significant benefit. He notes no pain or itching and denies use of irritating chemicals to the site. Review of Systems   Constitutional: Negative for chills and fever. Respiratory: Negative for cough and shortness of breath. Cardiovascular: Negative for chest pain and palpitations. Gastrointestinal: Negative for abdominal pain, constipation, diarrhea, nausea and vomiting. Genitourinary: Positive for difficulty urinating. Negative for dysuria and testicular pain. Allergies   Allergen Reactions    Lipitor Rash     New Prescriptions    CEPHALEXIN (KEFLEX) 500 MG CAPSULE    Take 1 capsule by mouth 3 times daily for 14 days    CLOTRIMAZOLE-BETAMETHASONE (LOTRISONE) 1-0.05 % CREAM    Apply topically 2 times daily. Current Outpatient Medications   Medication Sig Dispense Refill    cephALEXin (KEFLEX) 500 MG capsule Take 1 capsule by mouth 3 times daily for 14 days 42 capsule 0    clotrimazole-betamethasone (LOTRISONE) 1-0.05 % cream Apply topically 2 times daily.  15 g 0    fenofibrate (TRIGLIDE) 160 MG tablet Take 1 tablet by mouth daily 90 tablet 1    rosuvastatin (CRESTOR) 20 MG tablet Take 1 tablet by mouth daily 90 tablet 1    amitriptyline (ELAVIL) 25 MG tablet Take 1 tablet by mouth nightly as needed for Sleep 90 tablet 3    escitalopram (LEXAPRO) 20 MG tablet Take 1 tablet by mouth daily 90 tablet 3    traZODone (DESYREL) 150 MG tablet TAKE 1 TABLET BY MOUTH  NIGHTLY 90 tablet 3    lisinopril (PRINIVIL;ZESTRIL) 20 MG tablet TAKE 1 TABLET BY MOUTH  DAILY 90 tablet 3    fenofibrate (TRICOR) 54 MG tablet Take 1 tablet by mouth daily [facility] pharmacy: Please dispense generic fenofibrate unless prescriber denote 90 tablet 1    ELIQUIS 5 MG TABS tablet TAKE 1 TABLET BY MOUTH TWO  TIMES DAILY 180 tablet 3    pantoprazole (PROTONIX) 40 MG tablet Take 40 mg by mouth daily       Flaxseed, Linseed, (FLAX SEED OIL) 1000 MG CAPS Take 4,000 Units by mouth daily.  diclofenac sodium (VOLTAREN) 1 % GEL Apply 4 g topically 4 times daily as needed for Pain (Patient not taking: Reported on 3/16/2021) 100 g 3    meloxicam (MOBIC) 15 MG tablet Take 1 tablet by mouth daily (Patient not taking: Reported on 3/16/2021) 30 tablet 1    Crisaborole 2 % OINT Apply 1 Tube topically 2 times daily (Patient not taking: Reported on 3/16/2021) 30 g 5     No current facility-administered medications for this visit. Past Medical History:   Diagnosis Date    Bladder cancer ([facility]     DVT (deep venous thrombosis) ([facility] 07/01/2014    two spontaneous DVTs left leg    Elevated fasting glucose     Malignant neoplasm of urinary bladder (HCC) 2/4/2019    MVP (mitral valve prolapse)     Prostate CA (HCC) 2005         Objective   BP (!) 149/78   Pulse 76   Temp 98 °F (36.7 °C) (Temporal)   Ht 5' 8\" (1.727 m)   Wt 190 lb 9.6 oz (86.5 kg)   BMI 28.98 kg/m²   Wt Readings from Last 3 Encounters:   03/16/21 190 lb 9.6 oz (86.5 kg)   12/23/20 191 lb (86.6 kg)   12/09/20 191 lb (86.6 kg)       Physical Exam  Genitourinary:     Comments: Macular erythematous rash on ventral aspect of shaft of penis from glans to base.   Scrotum with bright erythema though nontender and hardware device noted on right side          Chemistry        Component Value Date/Time     12/28/2020 1215    K 4.4 12/28/2020 1215     12/28/2020 1215    CO2 28 12/28/2020 1215    BUN 34 (H) 12/28/2020 1215    CREATININE 1.5 (H) 12/28/2020 1213 08/20/2020    Lipid screen  07/22/2021    Annual Wellness Visit (AWV)  10/07/2021    Potassium monitoring  12/28/2021    Creatinine monitoring  12/28/2021    DTaP/Tdap/Td vaccine (3 - Td) 07/01/2027    Flu vaccine  Completed    Shingles Vaccine  Completed    Pneumococcal 65+ years Vaccine  Completed    COVID-19 Vaccine  Completed    Hepatitis A vaccine  Aged Out    Hepatitis B vaccine  Aged Out    Hib vaccine  Aged Out    Meningococcal (ACWY) vaccine  Aged Out    Hepatitis C screen  Discontinued       RTC 1 week and as needed

## 2021-03-16 NOTE — PATIENT INSTRUCTIONS
[facility]  [address] [place] [address]  Phone: [phone]  Fax: [phone]

## 2021-03-25 DIAGNOSIS — E78.2 MIXED HYPERLIPIDEMIA: ICD-10-CM

## 2021-03-25 RX ORDER — FENOFIBRATE 54 MG/1
54 TABLET ORAL DAILY
Qty: 90 TABLET | Refills: 1 | Status: SHIPPED | OUTPATIENT
Start: 2021-03-25 | End: 2021-08-16 | Stop reason: SDUPTHER

## 2021-04-14 ENCOUNTER — PATIENT MESSAGE (OUTPATIENT)
Dept: PRIMARY CARE CLINIC | Age: 76
End: 2021-04-14

## 2021-04-14 DIAGNOSIS — I82.401 DVT, RECURRENT, LOWER EXTREMITY, ACUTE, RIGHT (HCC): ICD-10-CM

## 2021-04-14 NOTE — TELEPHONE ENCOUNTER
From: Christina Maher  To: Subha Solares DO  Sent: 4/14/2021 8:17 AM EDT  Subject: Prescription Question    Hi Doc,  Need an Eliquis refill from [facility]; Could you give them a buzz ?   As always,  Pily Johnson

## 2021-06-14 ENCOUNTER — TELEPHONE (OUTPATIENT)
Dept: PRIMARY CARE CLINIC | Age: 76
End: 2021-06-14

## 2021-06-14 DIAGNOSIS — Z12.5 SCREENING PSA (PROSTATE SPECIFIC ANTIGEN): ICD-10-CM

## 2021-06-14 DIAGNOSIS — N18.31 STAGE 3A CHRONIC KIDNEY DISEASE (HCC): Primary | ICD-10-CM

## 2021-06-22 ENCOUNTER — PATIENT MESSAGE (OUTPATIENT)
Dept: PRIMARY CARE CLINIC | Age: 76
End: 2021-06-22

## 2021-06-22 DIAGNOSIS — F51.01 PRIMARY INSOMNIA: ICD-10-CM

## 2021-06-22 RX ORDER — LORAZEPAM 1 MG/1
1 TABLET ORAL 2 TIMES DAILY PRN
Qty: 56 TABLET | Refills: 2 | Status: SHIPPED | OUTPATIENT
Start: 2021-06-22 | End: 2021-07-20

## 2021-06-22 NOTE — TELEPHONE ENCOUNTER
Medication: LORazepam (ATIVAN) 1 MG tablet          Last Filled:  01/03/2021    Patient Phone Number: [phone] (home)     Last appt: 3/16/2021   Next appt: 7/19/2021    Last [place]AR[place]:   RX Monitoring 2/4/2020   Attestation -   Periodic Controlled Substance Monitoring No signs of potential drug abuse or diversion identified.

## 2021-06-22 NOTE — TELEPHONE ENCOUNTER
From: Karene Seip  To: Maxime Dent DO  Sent: 6/22/2021 6:34 AM EDT  Subject: Prescription Question    Hi Dr. Dawit Angeles,  Could you please call in an Ativan refill for me at [facility]?     As always,  Thanks    Kristin Hein

## 2021-07-06 ENCOUNTER — HOSPITAL ENCOUNTER (OUTPATIENT)
Age: 76
Discharge: HOME | End: 2021-07-06
Payer: MEDICARE

## 2021-07-06 DIAGNOSIS — Z12.5 SCREENING PSA (PROSTATE SPECIFIC ANTIGEN): ICD-10-CM

## 2021-07-06 DIAGNOSIS — N18.31 STAGE 3A CHRONIC KIDNEY DISEASE (HCC): ICD-10-CM

## 2021-07-06 LAB
A/G RATIO: 2.1 (ref 1.1–2.2)
ALBUMIN SERPL-MCNC: 4.5 G/DL (ref 3.4–5)
ALP BLD-CCNC: 55 U/L (ref 40–129)
ALT SERPL-CCNC: 12 U/L (ref 10–40)
ANION GAP SERPL CALCULATED.3IONS-SCNC: 9 MMOL/L (ref 3–16)
AST SERPL-CCNC: 15 U/L (ref 15–37)
BILIRUB SERPL-MCNC: 0.3 MG/DL (ref 0–1)
BILIRUBIN URINE: NEGATIVE
BLOOD, URINE: NEGATIVE
BUN BLDV-MCNC: 29 MG/DL (ref 7–20)
CALCIUM SERPL-MCNC: 9.5 MG/DL (ref 8.3–10.6)
CHLORIDE BLD-SCNC: 100 MMOL/L (ref 99–110)
CLARITY: CLEAR
CO2: 27 MMOL/L (ref 21–32)
COLOR: YELLOW
CREAT SERPL-MCNC: 1.5 MG/DL (ref 0.8–1.3)
GFR AFRICAN AMERICAN: 55
GFR NON-AFRICAN AMERICAN: 45
GLOBULIN: 2.1 G/DL
GLUCOSE BLD-MCNC: 73 MG/DL (ref 70–99)
GLUCOSE URINE: NEGATIVE MG/DL
KETONES, URINE: NEGATIVE MG/DL
LEUKOCYTE ESTERASE, URINE: NEGATIVE
MAGNESIUM: 2.4 MG/DL (ref 1.8–2.4)
MICROSCOPIC EXAMINATION: NORMAL
NITRITE, URINE: NEGATIVE
PH UA: 6 (ref 5–8)
PHOSPHORUS: 3.3 MG/DL (ref 2.5–4.9)
POTASSIUM SERPL-SCNC: 4.8 MMOL/L (ref 3.5–5.1)
PROSTATE SPECIFIC ANTIGEN: <0.01 NG/ML (ref 0–4)
PROTEIN UA: NEGATIVE MG/DL
SODIUM BLD-SCNC: 136 MMOL/L (ref 136–145)
SPECIFIC GRAVITY UA: <=1.005 (ref 1–1.03)
TOTAL PROTEIN: 6.6 G/DL (ref 6.4–8.2)
URINE TYPE: NORMAL
UROBILINOGEN, URINE: 0.2 E.U./DL

## 2021-07-06 PROCEDURE — 83735 ASSAY OF MAGNESIUM: CPT

## 2021-07-06 PROCEDURE — 84100 ASSAY OF PHOSPHORUS: CPT

## 2021-07-06 PROCEDURE — 81003 URINALYSIS AUTO W/O SCOPE: CPT

## 2021-07-06 PROCEDURE — 36415 COLL VENOUS BLD VENIPUNCTURE: CPT

## 2021-07-06 PROCEDURE — 84153 ASSAY OF PSA TOTAL: CPT

## 2021-07-06 PROCEDURE — 80053 COMPREHEN METABOLIC PANEL: CPT

## 2021-07-21 RX ORDER — ROSUVASTATIN CALCIUM 20 MG/1
20 TABLET, COATED ORAL DAILY
Qty: 90 TABLET | Refills: 1 | Status: SHIPPED | OUTPATIENT
Start: 2021-07-21 | End: 2022-01-18 | Stop reason: SDUPTHER

## 2021-07-21 NOTE — TELEPHONE ENCOUNTER
Medication:   Requested Prescriptions     Pending Prescriptions Disp Refills    rosuvastatin (CRESTOR) 20 MG tablet 90 tablet 1     Sig: Take 1 tablet by mouth daily        Last Filled:  01/19/21   Patient Phone Number: [phone] (home)     Last appt: 3/16/2021   Next appt: 8/16/2021    Last OARRS:   RX Monitoring 2/4/2020   Attestation -   Periodic Controlled Substance Monitoring No signs of potential drug abuse or diversion identified.

## 2021-08-10 ENCOUNTER — HOSPITAL ENCOUNTER (OUTPATIENT)
Age: 76
Discharge: HOME | End: 2021-08-10
Payer: MEDICARE

## 2021-08-10 DIAGNOSIS — N18.31 STAGE 3A CHRONIC KIDNEY DISEASE (HCC): ICD-10-CM

## 2021-08-10 DIAGNOSIS — E78.2 MIXED HYPERLIPIDEMIA: ICD-10-CM

## 2021-08-10 DIAGNOSIS — N18.31 STAGE 3A CHRONIC KIDNEY DISEASE (HCC): Primary | ICD-10-CM

## 2021-08-10 LAB
A/G RATIO: 2.1 (ref 1.1–2.2)
ALBUMIN SERPL-MCNC: 4.4 G/DL (ref 3.4–5)
ALP BLD-CCNC: 51 U/L (ref 40–129)
ALT SERPL-CCNC: 13 U/L (ref 10–40)
ANION GAP SERPL CALCULATED.3IONS-SCNC: 11 MMOL/L (ref 3–16)
AST SERPL-CCNC: 16 U/L (ref 15–37)
BILIRUB SERPL-MCNC: 0.3 MG/DL (ref 0–1)
BUN BLDV-MCNC: 32 MG/DL (ref 7–20)
CALCIUM SERPL-MCNC: 9.9 MG/DL (ref 8.3–10.6)
CHLORIDE BLD-SCNC: 103 MMOL/L (ref 99–110)
CHOLESTEROL, FASTING: 181 MG/DL (ref 0–199)
CO2: 24 MMOL/L (ref 21–32)
CREAT SERPL-MCNC: 1.6 MG/DL (ref 0.8–1.3)
GFR AFRICAN AMERICAN: 51
GFR NON-AFRICAN AMERICAN: 42
GLOBULIN: 2.1 G/DL
GLUCOSE BLD-MCNC: 78 MG/DL (ref 70–99)
HDLC SERPL-MCNC: 54 MG/DL (ref 40–60)
LDL CHOLESTEROL CALCULATED: 110 MG/DL
POTASSIUM SERPL-SCNC: 5.1 MMOL/L (ref 3.5–5.1)
SODIUM BLD-SCNC: 138 MMOL/L (ref 136–145)
TOTAL PROTEIN: 6.5 G/DL (ref 6.4–8.2)
TRIGLYCERIDE, FASTING: 83 MG/DL (ref 0–150)
VLDLC SERPL CALC-MCNC: 17 MG/DL

## 2021-08-10 PROCEDURE — 80061 LIPID PANEL: CPT

## 2021-08-10 PROCEDURE — 36415 COLL VENOUS BLD VENIPUNCTURE: CPT

## 2021-08-10 PROCEDURE — 80053 COMPREHEN METABOLIC PANEL: CPT

## 2021-08-11 ENCOUNTER — TELEPHONE (OUTPATIENT)
Dept: PRIMARY CARE CLINIC | Age: 76
End: 2021-08-11

## 2021-08-11 NOTE — TELEPHONE ENCOUNTER
----- Message from Dana Sanchez DO sent at 8/11/2021  7:23 AM EDT -----  Labs stable and unchanged from most recent.

## 2021-08-15 PROBLEM — N49.2 CELLULITIS OF SCROTUM: Status: RESOLVED | Noted: 2021-03-16 | Resolved: 2021-08-15

## 2021-08-15 NOTE — PATIENT INSTRUCTIONS
Patient Education        Learning About the Mediterranean Diet  What is the [address]? The Mediterranean diet is a style of eating rather than a diet plan. It features foods eaten in [place], [place], [place] and [place], and other countries along the [facility]. It emphasizes eating foods like fish, fruits, vegetables, beans, high-fiber breads and whole grains, nuts, and olive oil. This style of eating includes limited red meat, cheese, and sweets. Why choose the Mediterranean diet? A Mediterranean-style diet may improve heart health. It contains more fat than other heart-healthy diets. But the fats are mainly from nuts, unsaturated oils (such as fish oils and olive oil), and certain nut or seed oils (such as canola, soybean, or flaxseed oil). These fats may help protect the heart and blood vessels. How can you get started on the Mediterranean diet? Here are some things you can do to switch to a more Mediterranean way of eating. What to eat  · Eat a variety of fruits and vegetables each day, such as grapes, blueberries, tomatoes, broccoli, peppers, figs, olives, spinach, eggplant, beans, lentils, and chickpeas. · Eat a variety of whole-grain foods each day, such as oats, brown rice, and whole wheat bread, pasta, and couscous. · Eat fish at least 2 times a week. Try tuna, salmon, mackerel, lake trout, herring, or sardines. · Eat moderate amounts of low-fat dairy products, such as milk, cheese, or yogurt. · Eat moderate amounts of poultry and eggs. · Choose healthy (unsaturated) fats, such as nuts, olive oil, and certain nut or seed oils like canola, soybean, and flaxseed. · Limit unhealthy (saturated) fats, such as butter, palm oil, and coconut oil. And limit fats found in animal products, such as meat and dairy products made with whole milk. Try to eat red meat only a few times a month in very small amounts. · Limit sweets and desserts to only a few times a week.  This includes sugar-sweetened drinks like soda. The Mediterranean diet may also include red wine with your meal1 glass each day for women and up to 2 glasses a day for men. Tips for eating at home  · Use herbs, spices, garlic, lemon zest, and citrus juice instead of salt to add flavor to foods. · Add avocado slices to your sandwich instead of haile. · Have fish for lunch or dinner instead of red meat. Brush the fish with olive oil, and broil or grill it. · Sprinkle your salad with seeds or nuts instead of cheese. · Cook with olive or canola oil instead of butter or oils that are high in saturated fat. · Switch from 2% milk or whole milk to 1% or fat-free milk. · Dip raw vegetables in a vinaigrette dressing or hummus instead of dips made from mayonnaise or sour cream.  · Have a piece of fruit for dessert instead of a piece of cake. Try baked apples, or have some dried fruit. Tips for eating out  · Try broiled, grilled, baked, or poached fish instead of having it fried or breaded. · Ask your  to have your meals prepared with olive oil instead of butter. · Order dishes made with marinara sauce or sauces made from olive oil. Avoid sauces made from cream or mayonnaise. · Choose whole-grain breads, whole wheat pasta and pizza crust, brown rice, beans, and lentils. · Cut back on butter or margarine on bread. Instead, you can dip your bread in a small amount of olive oil. · Ask for a side salad or grilled vegetables instead of french fries or chips. Where can you learn more? Go to https://[employer]aeweb.[employer]. org and sign in to your [employer] account. Enter [phone] in the [facility] box to learn more about \"Learning About the Mediterranean Diet. \"     If you do not have an account, please click on the \"Sign Up Now\" link. Current as of: December 17, 2020               Content Version: 12.9  © 3176-1675 Healthwise, Incorporated. Care instructions adapted under license by [facility] ([facility]).  If you have questions about a medical condition or this instruction, always ask your healthcare professional. [place] [address] any warranty or liability for your use of this information.

## 2021-08-16 ENCOUNTER — OFFICE VISIT (OUTPATIENT)
Dept: PRIMARY CARE CLINIC | Age: 76
End: 2021-08-16
Payer: MEDICARE

## 2021-08-16 VITALS
BODY MASS INDEX: 29.35 KG/M2 | OXYGEN SATURATION: 100 % | HEART RATE: 61 BPM | DIASTOLIC BLOOD PRESSURE: 80 MMHG | WEIGHT: 193 LBS | SYSTOLIC BLOOD PRESSURE: 120 MMHG | TEMPERATURE: 97.8 F

## 2021-08-16 DIAGNOSIS — E78.2 MIXED HYPERLIPIDEMIA: ICD-10-CM

## 2021-08-16 DIAGNOSIS — I10 ESSENTIAL HYPERTENSION, BENIGN: Primary | ICD-10-CM

## 2021-08-16 DIAGNOSIS — R09.82 POST-NASAL DRIP: ICD-10-CM

## 2021-08-16 DIAGNOSIS — I82.401 DVT, RECURRENT, LOWER EXTREMITY, ACUTE, RIGHT (HCC): ICD-10-CM

## 2021-08-16 DIAGNOSIS — N18.31 STAGE 3A CHRONIC KIDNEY DISEASE (HCC): ICD-10-CM

## 2021-08-16 DIAGNOSIS — F51.01 PRIMARY INSOMNIA: ICD-10-CM

## 2021-08-16 DIAGNOSIS — F41.1 GAD (GENERALIZED ANXIETY DISORDER): ICD-10-CM

## 2021-08-16 PROCEDURE — 99214 OFFICE O/P EST MOD 30 MIN: CPT | Performed by: STUDENT IN AN ORGANIZED HEALTH CARE EDUCATION/TRAINING PROGRAM

## 2021-08-16 PROCEDURE — 1036F TOBACCO NON-USER: CPT | Performed by: STUDENT IN AN ORGANIZED HEALTH CARE EDUCATION/TRAINING PROGRAM

## 2021-08-16 PROCEDURE — 1123F ACP DISCUSS/DSCN MKR DOCD: CPT | Performed by: STUDENT IN AN ORGANIZED HEALTH CARE EDUCATION/TRAINING PROGRAM

## 2021-08-16 PROCEDURE — 4040F PNEUMOC VAC/ADMIN/RCVD: CPT | Performed by: STUDENT IN AN ORGANIZED HEALTH CARE EDUCATION/TRAINING PROGRAM

## 2021-08-16 PROCEDURE — G8417 CALC BMI ABV UP PARAM F/U: HCPCS | Performed by: STUDENT IN AN ORGANIZED HEALTH CARE EDUCATION/TRAINING PROGRAM

## 2021-08-16 PROCEDURE — G8427 DOCREV CUR MEDS BY ELIG CLIN: HCPCS | Performed by: STUDENT IN AN ORGANIZED HEALTH CARE EDUCATION/TRAINING PROGRAM

## 2021-08-16 RX ORDER — AZELASTINE HYDROCHLORIDE, FLUTICASONE PROPIONATE 137; 50 UG/1; UG/1
1 SPRAY, METERED NASAL 2 TIMES DAILY
Qty: 23 G | Refills: 1 | Status: SHIPPED | OUTPATIENT
Start: 2021-08-16 | End: 2021-12-22

## 2021-08-16 RX ORDER — LORAZEPAM 1 MG/1
TABLET ORAL
COMMUNITY
Start: 2021-08-02 | End: 2021-11-02 | Stop reason: SDUPTHER

## 2021-08-16 RX ORDER — LISINOPRIL 20 MG/1
20 TABLET ORAL DAILY
Qty: 90 TABLET | Refills: 3 | Status: SHIPPED | OUTPATIENT
Start: 2021-08-16 | End: 2021-08-23

## 2021-08-16 RX ORDER — ESCITALOPRAM OXALATE 20 MG/1
20 TABLET ORAL DAILY
Qty: 90 TABLET | Refills: 3 | Status: ON HOLD
Start: 2021-08-16 | End: 2021-12-17 | Stop reason: HOSPADM

## 2021-08-16 RX ORDER — FENOFIBRATE 54 MG/1
54 TABLET ORAL DAILY
Qty: 90 TABLET | Refills: 1 | Status: SHIPPED | OUTPATIENT
Start: 2021-08-16 | End: 2021-09-16 | Stop reason: SDUPTHER

## 2021-08-16 RX ORDER — AMITRIPTYLINE HYDROCHLORIDE 25 MG/1
25 TABLET, FILM COATED ORAL NIGHTLY PRN
Qty: 90 TABLET | Refills: 3 | Status: CANCELLED | OUTPATIENT
Start: 2021-08-16

## 2021-08-16 RX ORDER — TRAZODONE HYDROCHLORIDE 150 MG/1
TABLET ORAL
Qty: 90 TABLET | Refills: 3 | Status: CANCELLED | OUTPATIENT
Start: 2021-08-16

## 2021-08-16 RX ORDER — SUVOREXANT 10 MG/1
1 TABLET, FILM COATED ORAL EVERY EVENING
Qty: 30 TABLET | Refills: 0 | Status: SHIPPED | OUTPATIENT
Start: 2021-08-16 | End: 2021-09-15

## 2021-08-16 RX ORDER — BUSPIRONE HYDROCHLORIDE 5 MG/1
TABLET ORAL
COMMUNITY
Start: 2021-07-01 | End: 2021-08-16 | Stop reason: SDUPTHER

## 2021-08-16 RX ORDER — BUSPIRONE HYDROCHLORIDE 5 MG/1
5 TABLET ORAL 2 TIMES DAILY
Qty: 180 TABLET | Refills: 0 | Status: SHIPPED | OUTPATIENT
Start: 2021-08-16 | End: 2021-11-14

## 2021-08-16 SDOH — ECONOMIC STABILITY: FOOD INSECURITY: WITHIN THE PAST 12 MONTHS, YOU WORRIED THAT YOUR FOOD WOULD RUN OUT BEFORE YOU GOT MONEY TO BUY MORE.: NEVER TRUE

## 2021-08-16 SDOH — ECONOMIC STABILITY: FOOD INSECURITY: WITHIN THE PAST 12 MONTHS, THE FOOD YOU BOUGHT JUST DIDN'T LAST AND YOU DIDN'T HAVE MONEY TO GET MORE.: NEVER TRUE

## 2021-08-16 ASSESSMENT — ENCOUNTER SYMPTOMS
SHORTNESS OF BREATH: 0
DIARRHEA: 0
VOMITING: 0
COUGH: 0
ABDOMINAL DISTENTION: 0
ABDOMINAL PAIN: 0
CHEST TIGHTNESS: 0
NAUSEA: 0

## 2021-08-16 ASSESSMENT — SOCIAL DETERMINANTS OF HEALTH (SDOH): HOW HARD IS IT FOR YOU TO PAY FOR THE VERY BASICS LIKE FOOD, HOUSING, MEDICAL CARE, AND HEATING?: NOT HARD AT ALL

## 2021-08-16 NOTE — PROGRESS NOTES
2021     Charity Jung (:  1945) is a 67 y.o. male, here for evaluation of the following medical concerns:    HPI  Hyperlipidemia:  No new myalgias or GI upset on fenofibrate (Tricor, Trilipix) and rosuvastatin (Crestor). Medication compliance: compliant most of the time. Patient is  following a low fat, low cholesterol diet. He is  exercising regularly. Lab Results   Component Value Date    CHOL 177 2020    TRIG 55 2020    HDL 54 08/10/2021    LDLCALC 110 (H) 08/10/2021     Lab Results   Component Value Date    ALT 13 08/10/2021    AST 16 08/10/2021        Hypertension:  Home blood pressure monitoring: No.  He is not adherent to a low sodium diet. Patient denies chest pain, shortness of breath, headache, lightheadedness, blurred vision, peripheral edema, palpitations, dry cough and fatigue. Antihypertensive medication side effects: no medication side effects noted. Use of agents associated with hypertension: none. Sodium (mmol/L)   Date Value   08/10/2021 138    BUN (mg/dL)   Date Value   08/10/2021 32 (H)    Glucose   Date Value   08/10/2021 78 mg/dL   2015 108 MG/DL      Potassium (mmol/L)   Date Value   08/10/2021 5.1    CREATININE (mg/dL)   Date Value   08/10/2021 1.6 (H)         Insomnia:  Current treatment: avoiding long naps during the day, decreasing caffeine consumption, going to sleep at the same time each night, regular daily exercise and prescription sleep aid- trazodone- 150 and TCA- 25, which has been ineffective. Medication side effects: daytime fatigue. Mood Disorder:  Patient presents for follow-up of anxiety disorder. Current complaints include: none. He denies irritability, excessive worry, restlessness and panic attacks. Symptoms/signs of adrienne: none. External stressors: none. Current treatment includes: Lexapro- 20. Medication side effects: none.       Review of Systems   Constitutional: Negative for activity pantoprazole (PROTONIX) 40 MG tablet Take 40 mg by mouth daily  Yes Historical Provider, MD        Social History     Tobacco Use    Smoking status: Never Smoker    Smokeless tobacco: Never Used   Substance Use Topics    Alcohol use: Yes     Comment: rare        Vitals:    08/16/21 0848   BP: 120/80   Pulse: 61   Temp: 97.8 °F (36.6 °C)   TempSrc: Oral   SpO2: 100%   Weight: 193 lb (87.5 kg)     Estimated body mass index is 29.35 kg/m² as calculated from the following:    Height as of 3/16/21: 5' 8\" (1.727 m). Weight as of this encounter: 193 lb (87.5 kg). Physical Exam  Constitutional:       Appearance: Normal appearance. He is normal weight. HENT:      Head: Normocephalic and atraumatic. Right Ear: Tympanic membrane normal.      Left Ear: Tympanic membrane normal.      Nose: Nose normal.      Mouth/Throat:      Mouth: Mucous membranes are moist.      Pharynx: Oropharynx is clear. Eyes:      Conjunctiva/sclera: Conjunctivae normal.   Cardiovascular:      Rate and Rhythm: Normal rate and regular rhythm. Pulses: Normal pulses. Pulmonary:      Effort: Pulmonary effort is normal.      Breath sounds: Normal breath sounds. Musculoskeletal:         General: No deformity. Skin:     General: Skin is warm and dry. Capillary Refill: Capillary refill takes less than 2 seconds. Neurological:      Mental Status: He is alert. Mental status is at baseline. Psychiatric:         Mood and Affect: Mood normal.         Behavior: Behavior normal.         Thought Content: Thought content normal.         Judgment: Judgment normal.         ASSESSMENT/PLAN:  1. Essential hypertension: Blood pressure at goal today. Tolerating well without side effects. Refill given.:  - lisinopril (PRINIVIL;ZESTRIL) 20 MG tablet; Take 1 tablet by mouth daily  Dispense: 90 tablet; Refill: 3    2. Stage 3a chronic kidney disease ([facility] [address]): Renal function has been stable for the last 6 months.     3. Primary insomnia: Poorly managed on trazodone 150 and Elavil 25 mg. He is having some clouded thinking and daytime fatigue, which I think could be side effects from these medications. We will stop them today and transition to [facility] [address]. Follow-up for reevaluation in 1 month. - Suvorexant (BELSOMRA) 10 MG TABS; Take 10 mg by mouth every evening for 30 days. Dispense: 30 tablet; Refill: 0    4. Mixed hyperlipidemia: On Crestor and TriCor. Most recent lipid panel reviewed and good. Could consider stopping the TriCor. We will continue today. - fenofibrate (TRICOR) 54 MG tablet; Take 1 tablet by mouth daily [facility] pharmacy: Please dispense generic fenofibrate unless prescriber denote  Dispense: 90 tablet; Refill: 1    5. MANISH (generalized anxiety disorder): Well-managed on current regimen. - escitalopram (LEXAPRO) 20 MG tablet; Take 1 tablet by mouth daily  Dispense: 90 tablet; Refill: 3    6. DVT, recurrent, lower extremity, acute, right ([address]): No indications of bleeding.  - apixaban (ELIQUIS) 5 MG TABS tablet; TAKE 1 TABLET BY MOUTH TWO  TIMES DAILY  Dispense: 180 tablet; Refill: 3    Return in about 4 weeks (around 9/13/2021). An electronic signature was used to authenticate this note.     --Dana Sanchez DO on 8/16/2021 at 9:31 AM

## 2021-08-21 DIAGNOSIS — I10 ESSENTIAL HYPERTENSION, BENIGN: ICD-10-CM

## 2021-08-23 ENCOUNTER — TELEPHONE (OUTPATIENT)
Dept: PRIMARY CARE CLINIC | Age: 76
End: 2021-08-23

## 2021-08-23 RX ORDER — LISINOPRIL 20 MG/1
20 TABLET ORAL DAILY
Qty: 90 TABLET | Refills: 3 | Status: SHIPPED | OUTPATIENT
Start: 2021-08-23 | End: 2021-09-16 | Stop reason: SDUPTHER

## 2021-08-23 NOTE — TELEPHONE ENCOUNTER
Azelastine-Fluticasone 137-50 MCG/ACT SUSP [[id number]]   Order Details   Dose: 1 spray Route: Nasal Frequency: 2 TIMES DAILY   Dispense Quantity: 23 g Refills: 1         Si spray by Nasal route 2 times daily        Start Date: 21 End Date: --   Written Date: 21 Expiration Date: 22      Diagnosis Association: Post-nasal drip (W68.62)   Providers  Authorizing Provider: Celine Colunga DO NPI: [id number]   Ordering User: Celine Colunga DO        Pharmacy  [facility] - P [phone] - F [phone]   [address] [place] [address] [address]   Phone: [phone] Fax: [phone]

## 2021-08-24 NOTE — TELEPHONE ENCOUNTER
PA submitted via [facility] for Azelastine-Fluticasone 137-50MCG/ACT suspension.   Key: [id number] - PA Case ID: [id number]    STATUS: PENDING

## 2021-08-25 NOTE — TELEPHONE ENCOUNTER
Received DENIAL for Azelastine-Fluticasone 137-50MCG/ACT suspension. Denial letter attached. Please advise patient. Thank you.

## 2021-09-16 ENCOUNTER — OFFICE VISIT (OUTPATIENT)
Dept: PRIMARY CARE CLINIC | Age: 76
End: 2021-09-16
Payer: MEDICARE

## 2021-09-16 VITALS
HEART RATE: 62 BPM | BODY MASS INDEX: 29.04 KG/M2 | DIASTOLIC BLOOD PRESSURE: 68 MMHG | WEIGHT: 191.6 LBS | SYSTOLIC BLOOD PRESSURE: 111 MMHG | HEIGHT: 68 IN

## 2021-09-16 DIAGNOSIS — Z00.00 ROUTINE GENERAL MEDICAL EXAMINATION AT A HEALTH CARE FACILITY: Primary | ICD-10-CM

## 2021-09-16 DIAGNOSIS — F51.01 PRIMARY INSOMNIA: ICD-10-CM

## 2021-09-16 DIAGNOSIS — Z23 ENCOUNTER FOR IMMUNIZATION: ICD-10-CM

## 2021-09-16 DIAGNOSIS — E78.2 MIXED HYPERLIPIDEMIA: ICD-10-CM

## 2021-09-16 DIAGNOSIS — I10 ESSENTIAL HYPERTENSION, BENIGN: ICD-10-CM

## 2021-09-16 PROCEDURE — 1123F ACP DISCUSS/DSCN MKR DOCD: CPT | Performed by: STUDENT IN AN ORGANIZED HEALTH CARE EDUCATION/TRAINING PROGRAM

## 2021-09-16 PROCEDURE — G0439 PPPS, SUBSEQ VISIT: HCPCS | Performed by: STUDENT IN AN ORGANIZED HEALTH CARE EDUCATION/TRAINING PROGRAM

## 2021-09-16 PROCEDURE — 1036F TOBACCO NON-USER: CPT | Performed by: STUDENT IN AN ORGANIZED HEALTH CARE EDUCATION/TRAINING PROGRAM

## 2021-09-16 PROCEDURE — 90694 VACC AIIV4 NO PRSRV 0.5ML IM: CPT | Performed by: STUDENT IN AN ORGANIZED HEALTH CARE EDUCATION/TRAINING PROGRAM

## 2021-09-16 PROCEDURE — 4040F PNEUMOC VAC/ADMIN/RCVD: CPT | Performed by: STUDENT IN AN ORGANIZED HEALTH CARE EDUCATION/TRAINING PROGRAM

## 2021-09-16 PROCEDURE — G0008 ADMIN INFLUENZA VIRUS VAC: HCPCS | Performed by: STUDENT IN AN ORGANIZED HEALTH CARE EDUCATION/TRAINING PROGRAM

## 2021-09-16 PROCEDURE — 99214 OFFICE O/P EST MOD 30 MIN: CPT | Performed by: STUDENT IN AN ORGANIZED HEALTH CARE EDUCATION/TRAINING PROGRAM

## 2021-09-16 PROCEDURE — G8427 DOCREV CUR MEDS BY ELIG CLIN: HCPCS | Performed by: STUDENT IN AN ORGANIZED HEALTH CARE EDUCATION/TRAINING PROGRAM

## 2021-09-16 PROCEDURE — G8417 CALC BMI ABV UP PARAM F/U: HCPCS | Performed by: STUDENT IN AN ORGANIZED HEALTH CARE EDUCATION/TRAINING PROGRAM

## 2021-09-16 RX ORDER — ZOLPIDEM TARTRATE 5 MG/1
5 TABLET ORAL NIGHTLY PRN
Qty: 30 TABLET | Refills: 2 | Status: SHIPPED | OUTPATIENT
Start: 2021-09-16 | End: 2021-12-10 | Stop reason: SDUPTHER

## 2021-09-16 RX ORDER — LISINOPRIL 20 MG/1
20 TABLET ORAL DAILY
Qty: 90 TABLET | Refills: 3 | Status: SHIPPED | OUTPATIENT
Start: 2021-09-16 | End: 2022-01-18 | Stop reason: SDUPTHER

## 2021-09-16 RX ORDER — FENOFIBRATE 54 MG/1
54 TABLET ORAL DAILY
Qty: 90 TABLET | Refills: 1 | Status: SHIPPED | OUTPATIENT
Start: 2021-09-16 | End: 2022-03-10 | Stop reason: SDUPTHER

## 2021-09-16 ASSESSMENT — LIFESTYLE VARIABLES
HAS A RELATIVE, FRIEND, DOCTOR, OR ANOTHER HEALTH PROFESSIONAL EXPRESSED CONCERN ABOUT YOUR DRINKING OR SUGGESTED YOU CUT DOWN: NO
AUDIT TOTAL SCORE: 1
HOW OFTEN DURING THE LAST YEAR HAVE YOU FAILED TO DO WHAT WAS NORMALLY EXPECTED FROM YOU BECAUSE OF DRINKING: NEVER
AUDIT TOTAL SCORE: 0
HOW OFTEN DURING THE LAST YEAR HAVE YOU BEEN UNABLE TO REMEMBER WHAT HAPPENED THE NIGHT BEFORE BECAUSE YOU HAD BEEN DRINKING: 0
HOW OFTEN DO YOU HAVE SIX OR MORE DRINKS ON ONE OCCASION: 0
HAS A RELATIVE, FRIEND, DOCTOR, OR ANOTHER HEALTH PROFESSIONAL EXPRESSED CONCERN ABOUT YOUR DRINKING OR SUGGESTED YOU CUT DOWN: 0
AUDIT-C TOTAL SCORE: 1
HOW MANY STANDARD DRINKS CONTAINING ALCOHOL DO YOU HAVE ON A TYPICAL DAY: ONE OR TWO
HOW OFTEN DURING THE LAST YEAR HAVE YOU FOUND THAT YOU WERE NOT ABLE TO STOP DRINKING ONCE YOU HAD STARTED: NEVER
HOW MANY STANDARD DRINKS CONTAINING ALCOHOL DO YOU HAVE ON A TYPICAL DAY: 0
HOW OFTEN DURING THE LAST YEAR HAVE YOU HAD A FEELING OF GUILT OR REMORSE AFTER DRINKING: 0
HOW OFTEN DURING THE LAST YEAR HAVE YOU BEEN UNABLE TO REMEMBER WHAT HAPPENED THE NIGHT BEFORE BECAUSE YOU HAD BEEN DRINKING: NEVER
HAVE YOU OR SOMEONE ELSE BEEN INJURED AS A RESULT OF YOUR DRINKING: NO
HOW OFTEN DURING THE LAST YEAR HAVE YOU NEEDED AN ALCOHOLIC DRINK FIRST THING IN THE MORNING TO GET YOURSELF GOING AFTER A NIGHT OF HEAVY DRINKING: 0
HAVE YOU OR SOMEONE ELSE BEEN INJURED AS A RESULT OF YOUR DRINKING: 0
HOW OFTEN DO YOU HAVE A DRINK CONTAINING ALCOHOL: 1
HOW OFTEN DURING THE LAST YEAR HAVE YOU HAD A FEELING OF GUILT OR REMORSE AFTER DRINKING: NEVER
HOW OFTEN DURING THE LAST YEAR HAVE YOU FOUND THAT YOU WERE NOT ABLE TO STOP DRINKING ONCE YOU HAD STARTED: 0
HOW OFTEN DURING THE LAST YEAR HAVE YOU NEEDED AN ALCOHOLIC DRINK FIRST THING IN THE MORNING TO GET YOURSELF GOING AFTER A NIGHT OF HEAVY DRINKING: NEVER
AUDIT-C TOTAL SCORE: 0
HOW OFTEN DO YOU HAVE A DRINK CONTAINING ALCOHOL: MONTHLY OR LESS
HOW OFTEN DO YOU HAVE SIX OR MORE DRINKS ON ONE OCCASION: NEVER
HOW OFTEN DURING THE LAST YEAR HAVE YOU FAILED TO DO WHAT WAS NORMALLY EXPECTED FROM YOU BECAUSE OF DRINKING: 0

## 2021-09-16 ASSESSMENT — PATIENT HEALTH QUESTIONNAIRE - PHQ9
2. FEELING DOWN, DEPRESSED OR HOPELESS: 0
2. FEELING DOWN, DEPRESSED OR HOPELESS: 0
1. LITTLE INTEREST OR PLEASURE IN DOING THINGS: 0
SUM OF ALL RESPONSES TO PHQ QUESTIONS 1-9: 0
SUM OF ALL RESPONSES TO PHQ QUESTIONS 1-9: 0
SUM OF ALL RESPONSES TO PHQ9 QUESTIONS 1 & 2: 0
SUM OF ALL RESPONSES TO PHQ QUESTIONS 1-9: 0
SUM OF ALL RESPONSES TO PHQ9 QUESTIONS 1 & 2: 0
SUM OF ALL RESPONSES TO PHQ QUESTIONS 1-9: 0
SUM OF ALL RESPONSES TO PHQ QUESTIONS 1-9: 0
1. LITTLE INTEREST OR PLEASURE IN DOING THINGS: 0
SUM OF ALL RESPONSES TO PHQ QUESTIONS 1-9: 0

## 2021-09-16 ASSESSMENT — ENCOUNTER SYMPTOMS
NAUSEA: 0
VOMITING: 0

## 2021-09-16 NOTE — PATIENT INSTRUCTIONS
Personalized Preventive Plan for Eddie Earing - 9/16/2021  Medicare offers a range of preventive health benefits. Some of the tests and screenings are paid in full while other may be subject to a deductible, co-insurance, and/or copay. Some of these benefits include a comprehensive review of your medical history including lifestyle, illnesses that may run in your family, and various assessments and screenings as appropriate. After reviewing your medical record and screening and assessments performed today your provider may have ordered immunizations, labs, imaging, and/or referrals for you. A list of these orders (if applicable) as well as your Preventive Care list are included within your After Visit Summary for your review. Other Preventive Recommendations:    · A preventive eye exam performed by an eye specialist is recommended every 1-2 years to screen for glaucoma; cataracts, macular degeneration, and other eye disorders. · A preventive dental visit is recommended every 6 months. · Try to get at least 150 minutes of exercise per week or 10,000 steps per day on a pedometer . · Order or download the FREE \"Exercise & Physical Activity: Your Everyday Guide\" from [employer]. Call 7-588.886.9251 or search [employer] online. · You need 8408-6645 mg of calcium and 0177-1821 IU of vitamin D per day. It is possible to meet your calcium requirement with diet alone, but a vitamin D supplement is usually necessary to meet this goal.  · When exposed to the sun, use a sunscreen that protects against both UVA and UVB radiation with an SPF of 30 or greater. Reapply every 2 to 3 hours or after sweating, drying off with a towel, or swimming. · Always wear a seat belt when traveling in a car. Always wear a helmet when riding a bicycle or motorcycle.

## 2021-09-16 NOTE — PROGRESS NOTES
Medicare Annual Wellness Visit  Name: Bautista Branch Date: 2021   MRN: <[id number]> Sex: Male   Age: 67 y.o. Ethnicity: Non- / Non    : 1945 Race: White (non-)      Kenny Monet is here for Medicare AWV and Insomnia    Screenings for behavioral, psychosocial and functional/safety risks, and cognitive dysfunction are all negative except as indicated below. These results, as well as other patient data from the [address] form, are documented in Flowsheets linked to this Encounter. Allergies   Allergen Reactions    Lipitor Rash       Prior to Visit Medications    Medication Sig Taking?  Authorizing Provider   lisinopril (PRINIVIL;ZESTRIL) 20 MG tablet TAKE 1 TABLET BY MOUTH  DAILY Yes Cristina Holstein, DO   LORazepam (ATIVAN) 1 MG tablet  Yes Historical Provider, MD   busPIRone (BUSPAR) 5 MG tablet Take 1 tablet by mouth 2 times daily Yes Cristina Holstein, DO   apixaban (ELIQUIS) 5 MG TABS tablet TAKE 1 TABLET BY MOUTH TWO  TIMES DAILY Yes Cristina Holstein, DO   escitalopram (LEXAPRO) 20 MG tablet Take 1 tablet by mouth daily Yes Cristina Holstein, DO   fenofibrate (TRICOR) 54 MG tablet Take 1 tablet by mouth daily s [facility] pharmacy: Please dispense generic fenofibrate unless prescriber denote Yes Cristina Holstein, DO   rosuvastatin (CRESTOR) 20 MG tablet Take 1 tablet by mouth daily Yes Cristina Holstein, DO   pantoprazole (PROTONIX) 40 MG tablet Take 40 mg by mouth daily  Yes Historical Provider, MD   Azelastine-Fluticasone 137-50 MCG/ACT SUSP 1 spray by Nasal route 2 times daily  Patient not taking: Reported on 2021  Cristina Holstein, DO       Past Medical History:   Diagnosis Date    Bladder cancer [facility])     DVT (deep venous thrombosis) ([facility] [address] 2014    two spontaneous DVTs left leg    Elevated fasting glucose     Malignant neoplasm of urinary bladder ([address] 2019    MVP (mitral valve prolapse)     Prostate CA ([facility] [address]        Past Surgical History:   Procedure Laterality Date    BLADDER SURGERY      tumor removed    CATARACT REMOVAL Bilateral     COLONOSCOPY  6/29/12    Polyps    COLONOSCOPY  07/12/2017    Polyp    INGUINAL HERNIA REPAIR Right 06/1999    PENILE PROSTHESIS PLACEMENT      PROSTATECTOMY  2005 approx    Dr. Tara Rivera      In [place] - had one tonsil burned out       Family History   Problem Relation Age of Onset    Lung Cancer Mother     Heart Disease Mother     Diabetes Father     Prostate Cancer Father     Diabetes Brother        CareTeam (Including outside providers/suppliers regularly involved in providing care):   Patient Care Team:  Nichol Pimentel DO as PCP - General (Family Medicine)  Juan A Baird MD as PCP - Hematology/Oncology (Medical Oncology)  Nichol Pimentel DO as PCP [facility]    Wt Readings from Last 3 Encounters:   09/16/21 191 lb 9.6 oz (86.9 kg)   08/16/21 193 lb (87.5 kg)   03/16/21 190 lb 9.6 oz (86.5 kg)     Vitals:    09/16/21 0818   BP: 111/68   Site: Right Upper Arm   Position: Sitting   Cuff Size: Medium Adult   Pulse: 62   Weight: 191 lb 9.6 oz (86.9 kg)   Height: 5' 8\" (1.727 m)     Body mass index is 29.13 kg/m². Based upon direct observation of the patient, evaluation of cognition reveals recent and remote memory intact. Patient's complete Health Risk Assessment and screening values have been reviewed and are found in Flowsheets. The following problems were reviewed today and where indicated follow up appointments were made and/or referrals ordered. Positive Risk Factor Screenings with Interventions:          General Health and ACP:  General  In general, how would you say your health is?: Good  In the past 7 days, have you experienced any of the following?  New or Increased Pain, New or Increased Fatigue, Loneliness, Social Isolation, Stress or Anger?: (!) New or Increased Fatigue  Do you get the social and emotional support that you need?: Yes  Do you have a Living Will?: Yes  Advance Directives     Power of  Living Will ACP-Advance Directive ACP-Power of     Not on File Not on File Not on File Not on File      General Health Risk Interventions:  · Patient has Lolita Riser will. Will give office a copy to put on file         Personalized Preventive Plan   Current Health Maintenance Status  Immunization History   Administered Date(s) Administered    COVID-19, Moderna, PF, 100mcg/0.5mL 01/29/2021, 02/26/2021    Influenza Vaccine, unspecified formulation 01/12/2016    Influenza Virus Vaccine 01/12/2016    Influenza, High Dose (Fluzone 65 yrs and older) 01/12/2016, 01/29/2018    Influenza, Quadv, adjuvanted, 65 yrs +, IM, PF (Fluad) 10/15/2020, 09/16/2021    PPD Test 08/13/2003    Pneumococcal Conjugate 13-valent ([id number]) 01/12/2016    Pneumococcal Polysaccharide ([id number]) 02/18/2014    Td, unspecified formulation 11/30/2004    Tdap (Boostrix, Adacel) 10/15/2012, 07/01/2017    Zoster Recombinant (Shingrix) 08/20/2020, 12/30/2020        Health Maintenance   Topic Date Due    Annual Wellness Visit (AWV)  Never done    PSA counseling  07/06/2022    Lipid screen  08/10/2022    Potassium monitoring  08/10/2022    Creatinine monitoring  08/10/2022    DTaP/Tdap/Td vaccine (3 - Td or Tdap) 07/01/2027    Flu vaccine  Completed    Shingles Vaccine  Completed    Pneumococcal 65+ years Vaccine  Completed    COVID-19 Vaccine  Completed    Hepatitis A vaccine  Aged Out    Hepatitis B vaccine  Aged Out    Hib vaccine  Aged Out    Meningococcal (ACWY) vaccine  Aged Out    Hepatitis C screen  Discontinued     Recommendations for [employer] Due: see orders and patient instructions/AVS.  . Recommended screening schedule for the next 5-10 years is provided to the patient in written form: see Patient Instructions/AVS.    Javier Bailon LPN, 9/84/2537, performed the documented evaluation under the direct supervision of the attending physician.

## 2021-09-16 NOTE — PROGRESS NOTES
2021     Vandana Clemons (:  1945) is a 67 y.o. male, here for evaluation of the following medical concerns:    HPI  Insomnia:  Current treatment: avoiding heavy meal before bedtime, avoiding long naps during the day, avoiding use of electronic devices before bedtime, cognitive behavioral therapy, decreasing caffeine consumption, over the counter diphenhydramine, going to sleep at the same time each night, limiting alcohol consumption, melatonin use and prescription sleep aid- has tried elavil, trazodone, Belsomra, which has been ineffective. Medication side effects: none. Belsomra was the most recent. It helps him fall asleep, but he would wake after about 4 hours. The medication was too expensive. Review of Systems   Constitutional: Negative for activity change, fatigue and unexpected weight change. Gastrointestinal: Negative for nausea and vomiting. Endocrine: Negative for cold intolerance and heat intolerance. Skin: Negative for rash. Neurological: Negative for dizziness, seizures, weakness and light-headedness. Psychiatric/Behavioral: Positive for sleep disturbance (insomnia). Negative for agitation, decreased concentration, dysphoric mood, self-injury and suicidal ideas. The patient is not nervous/anxious. Prior to Visit Medications    Medication Sig Taking? Authorizing Provider   zolpidem (AMBIEN) 5 MG tablet Take 1 tablet by mouth nightly as needed for Sleep for up to 14 days.  Yes Celine Colunga DO   fenofibrate (TRICOR) 54 MG tablet Take 1 tablet by mouth daily [facility] pharmacy: Please dispense generic fenofibrate unless prescriber denote Yes Celine Colunga DO   lisinopril (PRINIVIL;ZESTRIL) 20 MG tablet Take 1 tablet by mouth daily Yes Celine Colunga DO   LORazepam (ATIVAN) 1 MG tablet  Yes Historical Provider, MD   busPIRone (BUSPAR) 5 MG tablet Take 1 tablet by mouth 2 times daily Yes Celine Colunga DO   apixaban (ELIQUIS) 5 MG TABS tablet TAKE 1 TABLET BY MOUTH TWO  TIMES DAILY Yes Fernando Brown DO   escitalopram (LEXAPRO) 20 MG tablet Take 1 tablet by mouth daily Yes Fernando Brown DO   rosuvastatin (CRESTOR) 20 MG tablet Take 1 tablet by mouth daily Yes Fernando Brown DO   pantoprazole (PROTONIX) 40 MG tablet Take 40 mg by mouth daily  Yes Historical Provider, MD   Azelastine-Fluticasone 137-50 MCG/ACT SUSP 1 spray by Nasal route 2 times daily  Patient not taking: Reported on 9/16/2021  Fernando Brown DO        Social History     Tobacco Use    Smoking status: Never Smoker    Smokeless tobacco: Never Used   Substance Use Topics    Alcohol use: Yes     Comment: rare        Vitals:    09/16/21 0818   BP: 111/68   Site: Right Upper Arm   Position: Sitting   Cuff Size: Medium Adult   Pulse: 62   Weight: 191 lb 9.6 oz (86.9 kg)   Height: 5' 8\" (1.727 m)     Estimated body mass index is 29.13 kg/m² as calculated from the following:    Height as of this encounter: 5' 8\" (1.727 m). Weight as of this encounter: 191 lb 9.6 oz (86.9 kg). Physical Exam  Constitutional:       Appearance: Normal appearance. He is normal weight. HENT:      Head: Normocephalic and atraumatic. Right Ear: Tympanic membrane normal.      Left Ear: Tympanic membrane normal.      Nose: Nose normal.      Mouth/Throat:      Mouth: Mucous membranes are moist.      Pharynx: Oropharynx is clear. Eyes:      Conjunctiva/sclera: Conjunctivae normal.   Cardiovascular:      Rate and Rhythm: Normal rate and regular rhythm. Pulses: Normal pulses. Pulmonary:      Effort: Pulmonary effort is normal.      Breath sounds: Normal breath sounds. Musculoskeletal:         General: No deformity. Skin:     General: Skin is warm and dry. Capillary Refill: Capillary refill takes less than 2 seconds. Neurological:      Mental Status: He is alert. Mental status is at baseline. Psychiatric:         Mood and Affect: Mood normal.         Behavior: Behavior normal.         Thought Content:  Thought content normal. Judgment: Judgment normal.         ASSESSMENT/PLAN:  1. Primary insomnia: Patient has been dealing with insomnia for several years now. Has tried and failed lifestyle modifications OTC Benadryl and melatonin as well as prescription Elavil, trazodone and most recently Belsomra. We were trying to avoid prescription benzodiazepines for sleep due to the increased risk of falls, but given the persistence of his insomnia he would now like to try it. He understands the risks and lifestyle modifications required including: No driving after taking, no alcohol after taking and avoiding concurrent use of other sedative medications. We will follow-up in 1 month if he is not sleeping. If doing well will follow up in 3.  - zolpidem (AMBIEN) 5 MG tablet; Take 1 tablet by mouth nightly as needed for Sleep for up to 14 days. Dispense: 30 tablet; Refill: 2      Return in about 3 months (around 12/16/2021). An electronic signature was used to authenticate this note.     --Cam Carmona,  on 9/16/2021 at 9:01 AM

## 2021-11-02 ENCOUNTER — PATIENT MESSAGE (OUTPATIENT)
Dept: PRIMARY CARE CLINIC | Age: 76
End: 2021-11-02

## 2021-11-02 DIAGNOSIS — F41.1 GAD (GENERALIZED ANXIETY DISORDER): Primary | ICD-10-CM

## 2021-11-02 RX ORDER — LORAZEPAM 1 MG/1
1 TABLET ORAL EVERY 8 HOURS PRN
Qty: 45 TABLET | Refills: 2 | Status: SHIPPED | OUTPATIENT
Start: 2021-11-02 | End: 2021-12-02

## 2021-11-02 NOTE — TELEPHONE ENCOUNTER
From: Aleks Bills  To: Alyssa Baltazar DO  Sent: 11/2/2021 9:59 AM EDT  Subject: Prescription Question    Hi Doctor D,  Can you please do a lorazepam refill for me at [facility] ? Also, the 5mg of ambien nightly doesn,t seem to have much effect; is increasing the dosage an option ?     As always,  Cristy anthony

## 2021-12-13 ENCOUNTER — HOSPITAL ENCOUNTER (INPATIENT)
Age: 76
LOS: 3 days | Discharge: HOME | DRG: 641 | End: 2021-12-17
Attending: EMERGENCY MEDICINE | Admitting: INTERNAL MEDICINE
Payer: MEDICARE

## 2021-12-13 ENCOUNTER — HOSPITAL ENCOUNTER (OUTPATIENT)
Dept: ULTRASOUND IMAGING | Age: 76
Discharge: HOME | DRG: 641 | End: 2021-12-13
Payer: MEDICARE

## 2021-12-13 ENCOUNTER — HOSPITAL ENCOUNTER (OUTPATIENT)
Age: 76
Discharge: HOME | DRG: 641 | End: 2021-12-13
Payer: MEDICARE

## 2021-12-13 DIAGNOSIS — R11.0 NAUSEA: ICD-10-CM

## 2021-12-13 DIAGNOSIS — R14.0 GASTRIC TYMPANY: ICD-10-CM

## 2021-12-13 DIAGNOSIS — R10.13 ABDOMINAL PAIN, EPIGASTRIC: ICD-10-CM

## 2021-12-13 DIAGNOSIS — K30 MILD DIETARY INDIGESTION: ICD-10-CM

## 2021-12-13 DIAGNOSIS — E87.1 HYPONATREMIA: Primary | ICD-10-CM

## 2021-12-13 LAB
A/G RATIO: 2 (ref 1.1–2.2)
A/G RATIO: 2.9 (ref 1.1–2.2)
ALBUMIN SERPL-MCNC: 4.2 G/DL (ref 3.4–5)
ALBUMIN SERPL-MCNC: 4.4 G/DL (ref 3.4–5)
ALP BLD-CCNC: 49 U/L (ref 40–129)
ALP BLD-CCNC: 51 U/L (ref 40–129)
ALT SERPL-CCNC: 18 U/L (ref 10–40)
ALT SERPL-CCNC: 19 U/L (ref 10–40)
ANION GAP SERPL CALCULATED.3IONS-SCNC: 14 MMOL/L (ref 3–16)
ANION GAP SERPL CALCULATED.3IONS-SCNC: 14 MMOL/L (ref 3–16)
ANION GAP SERPL CALCULATED.3IONS-SCNC: 19 MMOL/L (ref 3–16)
AST SERPL-CCNC: 26 U/L (ref 15–37)
AST SERPL-CCNC: 38 U/L (ref 15–37)
BASOPHILS ABSOLUTE: 0 K/UL (ref 0–0.2)
BASOPHILS ABSOLUTE: 0 K/UL (ref 0–0.2)
BASOPHILS RELATIVE PERCENT: 0.3 %
BASOPHILS RELATIVE PERCENT: 0.4 %
BILIRUB SERPL-MCNC: 0.6 MG/DL (ref 0–1)
BILIRUB SERPL-MCNC: 0.7 MG/DL (ref 0–1)
BUN BLDV-MCNC: 13 MG/DL (ref 7–20)
BUN BLDV-MCNC: 14 MG/DL (ref 7–20)
BUN BLDV-MCNC: 14 MG/DL (ref 7–20)
CALCIUM SERPL-MCNC: 8.5 MG/DL (ref 8.3–10.6)
CALCIUM SERPL-MCNC: 9 MG/DL (ref 8.3–10.6)
CALCIUM SERPL-MCNC: 9.2 MG/DL (ref 8.3–10.6)
CHLORIDE BLD-SCNC: 78 MMOL/L (ref 99–110)
CHLORIDE BLD-SCNC: 78 MMOL/L (ref 99–110)
CHLORIDE BLD-SCNC: 80 MMOL/L (ref 99–110)
CO2: 18 MMOL/L (ref 21–32)
CO2: 19 MMOL/L (ref 21–32)
CO2: 20 MMOL/L (ref 21–32)
CREAT SERPL-MCNC: 1 MG/DL (ref 0.8–1.3)
CREAT SERPL-MCNC: 1.1 MG/DL (ref 0.8–1.3)
CREAT SERPL-MCNC: 1.2 MG/DL (ref 0.8–1.3)
EOSINOPHILS ABSOLUTE: 0 K/UL (ref 0–0.6)
EOSINOPHILS ABSOLUTE: 0 K/UL (ref 0–0.6)
EOSINOPHILS RELATIVE PERCENT: 0.1 %
EOSINOPHILS RELATIVE PERCENT: 0.1 %
GFR AFRICAN AMERICAN: >60
GFR NON-AFRICAN AMERICAN: 59
GFR NON-AFRICAN AMERICAN: >60
GFR NON-AFRICAN AMERICAN: >60
GLUCOSE BLD-MCNC: 106 MG/DL (ref 70–99)
GLUCOSE BLD-MCNC: 86 MG/DL (ref 70–99)
GLUCOSE BLD-MCNC: 95 MG/DL (ref 70–99)
HCT VFR BLD CALC: 39 % (ref 40.5–52.5)
HCT VFR BLD CALC: 39.3 % (ref 40.5–52.5)
HEMOGLOBIN: 13.3 G/DL (ref 13.5–17.5)
HEMOGLOBIN: 13.5 G/DL (ref 13.5–17.5)
INR BLD: 1.26 (ref 0.88–1.12)
LIPASE: 17 U/L (ref 13–60)
LIPASE: 19 U/L (ref 13–60)
LYMPHOCYTES ABSOLUTE: 0.7 K/UL (ref 1–5.1)
LYMPHOCYTES ABSOLUTE: 0.7 K/UL (ref 1–5.1)
LYMPHOCYTES RELATIVE PERCENT: 5.6 %
LYMPHOCYTES RELATIVE PERCENT: 5.9 %
MCH RBC QN AUTO: 30 PG (ref 26–34)
MCH RBC QN AUTO: 30.2 PG (ref 26–34)
MCHC RBC AUTO-ENTMCNC: 33.8 G/DL (ref 31–36)
MCHC RBC AUTO-ENTMCNC: 34.7 G/DL (ref 31–36)
MCV RBC AUTO: 87.1 FL (ref 80–100)
MCV RBC AUTO: 88.8 FL (ref 80–100)
MONOCYTES ABSOLUTE: 0.7 K/UL (ref 0–1.3)
MONOCYTES ABSOLUTE: 0.8 K/UL (ref 0–1.3)
MONOCYTES RELATIVE PERCENT: 6 %
MONOCYTES RELATIVE PERCENT: 6 %
NEUTROPHILS ABSOLUTE: 11.6 K/UL (ref 1.7–7.7)
NEUTROPHILS ABSOLUTE: 9.7 K/UL (ref 1.7–7.7)
NEUTROPHILS RELATIVE PERCENT: 87.6 %
NEUTROPHILS RELATIVE PERCENT: 88 %
PDW BLD-RTO: 13.6 % (ref 12.4–15.4)
PDW BLD-RTO: 13.9 % (ref 12.4–15.4)
PHOSPHORUS: 2.4 MG/DL (ref 2.5–4.9)
PLATELET # BLD: 216 K/UL (ref 135–450)
PLATELET # BLD: 248 K/UL (ref 135–450)
PMV BLD AUTO: 9.2 FL (ref 5–10.5)
PMV BLD AUTO: 9.5 FL (ref 5–10.5)
POTASSIUM REFLEX MAGNESIUM: 3.9 MMOL/L (ref 3.5–5.1)
POTASSIUM REFLEX MAGNESIUM: 4.4 MMOL/L (ref 3.5–5.1)
POTASSIUM SERPL-SCNC: 4.7 MMOL/L (ref 3.5–5.1)
PROTHROMBIN TIME: 14.4 SEC (ref 9.9–12.7)
RBC # BLD: 4.42 M/UL (ref 4.2–5.9)
RBC # BLD: 4.48 M/UL (ref 4.2–5.9)
SODIUM BLD-SCNC: 111 MMOL/L (ref 136–145)
SODIUM BLD-SCNC: 112 MMOL/L (ref 136–145)
SODIUM BLD-SCNC: 117 MMOL/L (ref 136–145)
SPECIMEN STATUS: NORMAL
TOTAL PROTEIN: 5.9 G/DL (ref 6.4–8.2)
TOTAL PROTEIN: 6.3 G/DL (ref 6.4–8.2)
WBC # BLD: 11.1 K/UL (ref 4–11)
WBC # BLD: 13.2 K/UL (ref 4–11)

## 2021-12-13 PROCEDURE — 82533 TOTAL CORTISOL: CPT

## 2021-12-13 PROCEDURE — 83930 ASSAY OF BLOOD OSMOLALITY: CPT

## 2021-12-13 PROCEDURE — 2580000003 HC RX 258: Performed by: PHYSICIAN ASSISTANT

## 2021-12-13 PROCEDURE — 36415 COLL VENOUS BLD VENIPUNCTURE: CPT

## 2021-12-13 PROCEDURE — 83690 ASSAY OF LIPASE: CPT

## 2021-12-13 PROCEDURE — 76705 ECHO EXAM OF ABDOMEN: CPT

## 2021-12-13 PROCEDURE — 85025 COMPLETE CBC W/AUTO DIFF WBC: CPT

## 2021-12-13 PROCEDURE — 99284 EMERGENCY DEPT VISIT MOD MDM: CPT

## 2021-12-13 PROCEDURE — 96374 THER/PROPH/DIAG INJ IV PUSH: CPT

## 2021-12-13 PROCEDURE — 85610 PROTHROMBIN TIME: CPT

## 2021-12-13 PROCEDURE — 80053 COMPREHEN METABOLIC PANEL: CPT

## 2021-12-13 PROCEDURE — 84100 ASSAY OF PHOSPHORUS: CPT

## 2021-12-13 PROCEDURE — 6360000002 HC RX W HCPCS: Performed by: PHYSICIAN ASSISTANT

## 2021-12-13 RX ORDER — 0.9 % SODIUM CHLORIDE 0.9 %
500 INTRAVENOUS SOLUTION INTRAVENOUS ONCE
Status: COMPLETED | OUTPATIENT
Start: 2021-12-13 | End: 2021-12-13

## 2021-12-13 RX ORDER — SODIUM CHLORIDE 9 MG/ML
1000 INJECTION, SOLUTION INTRAVENOUS CONTINUOUS
Status: DISCONTINUED | OUTPATIENT
Start: 2021-12-13 | End: 2021-12-14

## 2021-12-13 RX ORDER — ONDANSETRON 2 MG/ML
4 INJECTION INTRAMUSCULAR; INTRAVENOUS ONCE
Status: COMPLETED | OUTPATIENT
Start: 2021-12-13 | End: 2021-12-13

## 2021-12-13 RX ORDER — AMITRIPTYLINE HYDROCHLORIDE 25 MG/1
TABLET, FILM COATED ORAL
COMMUNITY
Start: 2021-10-06 | End: 2021-12-22

## 2021-12-13 RX ADMIN — ONDANSETRON 4 MG: 2 INJECTION INTRAMUSCULAR; INTRAVENOUS at 22:01

## 2021-12-13 RX ADMIN — SODIUM CHLORIDE 1000 ML: 9 INJECTION, SOLUTION INTRAVENOUS at 22:11

## 2021-12-13 RX ADMIN — SODIUM CHLORIDE 500 ML: 9 INJECTION, SOLUTION INTRAVENOUS at 22:04

## 2021-12-14 ENCOUNTER — APPOINTMENT (OUTPATIENT)
Dept: GENERAL RADIOLOGY | Age: 76
DRG: 641 | End: 2021-12-14
Payer: MEDICARE

## 2021-12-14 PROBLEM — E87.1 ACUTE HYPONATREMIA: Status: ACTIVE | Noted: 2021-12-14

## 2021-12-14 LAB
ANION GAP SERPL CALCULATED.3IONS-SCNC: 10 MMOL/L (ref 3–16)
ANION GAP SERPL CALCULATED.3IONS-SCNC: 11 MMOL/L (ref 3–16)
ANION GAP SERPL CALCULATED.3IONS-SCNC: 12 MMOL/L (ref 3–16)
ANION GAP SERPL CALCULATED.3IONS-SCNC: 12 MMOL/L (ref 3–16)
ANION GAP SERPL CALCULATED.3IONS-SCNC: 13 MMOL/L (ref 3–16)
ANION GAP SERPL CALCULATED.3IONS-SCNC: 14 MMOL/L (ref 3–16)
ANION GAP SERPL CALCULATED.3IONS-SCNC: 15 MMOL/L (ref 3–16)
ANION GAP SERPL CALCULATED.3IONS-SCNC: 15 MMOL/L (ref 3–16)
BILIRUBIN URINE: ABNORMAL
BLOOD, URINE: NEGATIVE
BUN BLDV-MCNC: 12 MG/DL (ref 7–20)
BUN BLDV-MCNC: 12 MG/DL (ref 7–20)
BUN BLDV-MCNC: 13 MG/DL (ref 7–20)
BUN BLDV-MCNC: 14 MG/DL (ref 7–20)
BUN BLDV-MCNC: 15 MG/DL (ref 7–20)
CALCIUM SERPL-MCNC: 8.2 MG/DL (ref 8.3–10.6)
CALCIUM SERPL-MCNC: 8.4 MG/DL (ref 8.3–10.6)
CALCIUM SERPL-MCNC: 8.4 MG/DL (ref 8.3–10.6)
CALCIUM SERPL-MCNC: 8.5 MG/DL (ref 8.3–10.6)
CALCIUM SERPL-MCNC: 8.5 MG/DL (ref 8.3–10.6)
CALCIUM SERPL-MCNC: 8.6 MG/DL (ref 8.3–10.6)
CALCIUM SERPL-MCNC: 8.7 MG/DL (ref 8.3–10.6)
CALCIUM SERPL-MCNC: 8.8 MG/DL (ref 8.3–10.6)
CALCIUM SERPL-MCNC: 8.8 MG/DL (ref 8.3–10.6)
CALCIUM SERPL-MCNC: 8.9 MG/DL (ref 8.3–10.6)
CALCIUM SERPL-MCNC: 8.9 MG/DL (ref 8.3–10.6)
CHLORIDE BLD-SCNC: 78 MMOL/L (ref 99–110)
CHLORIDE BLD-SCNC: 80 MMOL/L (ref 99–110)
CHLORIDE BLD-SCNC: 81 MMOL/L (ref 99–110)
CHLORIDE BLD-SCNC: 83 MMOL/L (ref 99–110)
CHLORIDE BLD-SCNC: 84 MMOL/L (ref 99–110)
CHLORIDE BLD-SCNC: 85 MMOL/L (ref 99–110)
CHLORIDE BLD-SCNC: 88 MMOL/L (ref 99–110)
CHLORIDE BLD-SCNC: 93 MMOL/L (ref 99–110)
CHLORIDE BLD-SCNC: 95 MMOL/L (ref 99–110)
CLARITY: CLEAR
CO2: 18 MMOL/L (ref 21–32)
CO2: 18 MMOL/L (ref 21–32)
CO2: 19 MMOL/L (ref 21–32)
CO2: 19 MMOL/L (ref 21–32)
CO2: 20 MMOL/L (ref 21–32)
CO2: 21 MMOL/L (ref 21–32)
CO2: 22 MMOL/L (ref 21–32)
CO2: 23 MMOL/L (ref 21–32)
COLOR: YELLOW
CORTISOL TOTAL: 19.5 UG/DL
CREAT SERPL-MCNC: 0.9 MG/DL (ref 0.8–1.3)
CREAT SERPL-MCNC: 0.9 MG/DL (ref 0.8–1.3)
CREAT SERPL-MCNC: 1 MG/DL (ref 0.8–1.3)
CREAT SERPL-MCNC: 1 MG/DL (ref 0.8–1.3)
CREAT SERPL-MCNC: 1.1 MG/DL (ref 0.8–1.3)
CREAT SERPL-MCNC: 1.1 MG/DL (ref 0.8–1.3)
CREAT SERPL-MCNC: 1.2 MG/DL (ref 0.8–1.3)
CREAT SERPL-MCNC: 1.3 MG/DL (ref 0.8–1.3)
CREATININE URINE: 178.6 MG/DL (ref 39–259)
EKG ATRIAL RATE: 59 BPM
EKG DIAGNOSIS: NORMAL
EKG P AXIS: 21 DEGREES
EKG P-R INTERVAL: 176 MS
EKG Q-T INTERVAL: 438 MS
EKG QRS DURATION: 116 MS
EKG QTC CALCULATION (BAZETT): 433 MS
EKG R AXIS: 30 DEGREES
EKG T AXIS: 20 DEGREES
EKG VENTRICULAR RATE: 59 BPM
GFR AFRICAN AMERICAN: >60
GFR NON-AFRICAN AMERICAN: 54
GFR NON-AFRICAN AMERICAN: 59
GFR NON-AFRICAN AMERICAN: >60
GLUCOSE BLD-MCNC: 110 MG/DL (ref 70–99)
GLUCOSE BLD-MCNC: 72 MG/DL (ref 70–99)
GLUCOSE BLD-MCNC: 77 MG/DL (ref 70–99)
GLUCOSE BLD-MCNC: 81 MG/DL (ref 70–99)
GLUCOSE BLD-MCNC: 82 MG/DL (ref 70–99)
GLUCOSE BLD-MCNC: 83 MG/DL (ref 70–99)
GLUCOSE BLD-MCNC: 83 MG/DL (ref 70–99)
GLUCOSE BLD-MCNC: 85 MG/DL (ref 70–99)
GLUCOSE BLD-MCNC: 86 MG/DL (ref 70–99)
GLUCOSE BLD-MCNC: 89 MG/DL (ref 70–99)
GLUCOSE BLD-MCNC: 95 MG/DL (ref 70–99)
GLUCOSE URINE: NEGATIVE MG/DL
KETONES, URINE: 15 MG/DL
LEUKOCYTE ESTERASE, URINE: NEGATIVE
MAGNESIUM: 1.6 MG/DL (ref 1.8–2.4)
MAGNESIUM: 1.8 MG/DL (ref 1.8–2.4)
MAGNESIUM: 1.9 MG/DL (ref 1.8–2.4)
MICROSCOPIC EXAMINATION: ABNORMAL
NITRITE, URINE: NEGATIVE
OSMOLALITY URINE: 726 MOSM/KG (ref 390–1070)
OSMOLALITY: 244 MOSM/KG (ref 280–301)
OSMOLALITY: 244 MOSM/KG (ref 280–301)
PH UA: 6 (ref 5–8)
POTASSIUM REFLEX MAGNESIUM: 3.3 MMOL/L (ref 3.5–5.1)
POTASSIUM REFLEX MAGNESIUM: 3.3 MMOL/L (ref 3.5–5.1)
POTASSIUM REFLEX MAGNESIUM: 3.5 MMOL/L (ref 3.5–5.1)
POTASSIUM REFLEX MAGNESIUM: 3.6 MMOL/L (ref 3.5–5.1)
POTASSIUM REFLEX MAGNESIUM: 3.7 MMOL/L (ref 3.5–5.1)
POTASSIUM REFLEX MAGNESIUM: 3.7 MMOL/L (ref 3.5–5.1)
POTASSIUM REFLEX MAGNESIUM: 3.8 MMOL/L (ref 3.5–5.1)
POTASSIUM REFLEX MAGNESIUM: 3.9 MMOL/L (ref 3.5–5.1)
POTASSIUM REFLEX MAGNESIUM: 4.7 MMOL/L (ref 3.5–5.1)
PROCALCITONIN: 0.11 NG/ML (ref 0–0.15)
PROTEIN UA: NEGATIVE MG/DL
RBC UA: ABNORMAL /HPF (ref 0–4)
SODIUM BLD-SCNC: 109 MMOL/L (ref 136–145)
SODIUM BLD-SCNC: 112 MMOL/L (ref 136–145)
SODIUM BLD-SCNC: 114 MMOL/L (ref 136–145)
SODIUM BLD-SCNC: 115 MMOL/L (ref 136–145)
SODIUM BLD-SCNC: 116 MMOL/L (ref 136–145)
SODIUM BLD-SCNC: 116 MMOL/L (ref 136–145)
SODIUM BLD-SCNC: 118 MMOL/L (ref 136–145)
SODIUM BLD-SCNC: 120 MMOL/L (ref 136–145)
SODIUM BLD-SCNC: 121 MMOL/L (ref 136–145)
SODIUM BLD-SCNC: 126 MMOL/L (ref 136–145)
SODIUM BLD-SCNC: 127 MMOL/L (ref 136–145)
SODIUM URINE: <20 MMOL/L
SPECIFIC GRAVITY UA: >=1.03 (ref 1–1.03)
TROPONIN: <0.01 NG/ML
URINE TYPE: ABNORMAL
UROBILINOGEN, URINE: 0.2 E.U./DL
WBC UA: ABNORMAL /HPF (ref 0–5)

## 2021-12-14 PROCEDURE — 93010 ELECTROCARDIOGRAM REPORT: CPT | Performed by: INTERNAL MEDICINE

## 2021-12-14 PROCEDURE — 6360000002 HC RX W HCPCS: Performed by: INTERNAL MEDICINE

## 2021-12-14 PROCEDURE — 84484 ASSAY OF TROPONIN QUANT: CPT

## 2021-12-14 PROCEDURE — 2000000000 HC ICU R&B

## 2021-12-14 PROCEDURE — 2580000003 HC RX 258: Performed by: INTERNAL MEDICINE

## 2021-12-14 PROCEDURE — 84300 ASSAY OF URINE SODIUM: CPT

## 2021-12-14 PROCEDURE — 6370000000 HC RX 637 (ALT 250 FOR IP): Performed by: HOSPITALIST

## 2021-12-14 PROCEDURE — 83935 ASSAY OF URINE OSMOLALITY: CPT

## 2021-12-14 PROCEDURE — 71045 X-RAY EXAM CHEST 1 VIEW: CPT

## 2021-12-14 PROCEDURE — 83930 ASSAY OF BLOOD OSMOLALITY: CPT

## 2021-12-14 PROCEDURE — 81001 URINALYSIS AUTO W/SCOPE: CPT

## 2021-12-14 PROCEDURE — 82570 ASSAY OF URINE CREATININE: CPT

## 2021-12-14 PROCEDURE — 80048 BASIC METABOLIC PNL TOTAL CA: CPT

## 2021-12-14 PROCEDURE — 84145 PROCALCITONIN (PCT): CPT

## 2021-12-14 PROCEDURE — 93005 ELECTROCARDIOGRAM TRACING: CPT | Performed by: INTERNAL MEDICINE

## 2021-12-14 PROCEDURE — 6370000000 HC RX 637 (ALT 250 FOR IP): Performed by: INTERNAL MEDICINE

## 2021-12-14 PROCEDURE — 83735 ASSAY OF MAGNESIUM: CPT

## 2021-12-14 PROCEDURE — 36415 COLL VENOUS BLD VENIPUNCTURE: CPT

## 2021-12-14 RX ORDER — ROSUVASTATIN CALCIUM 10 MG/1
20 TABLET, COATED ORAL DAILY
Status: DISCONTINUED | OUTPATIENT
Start: 2021-12-14 | End: 2021-12-17 | Stop reason: HOSPADM

## 2021-12-14 RX ORDER — LORAZEPAM 0.5 MG/1
0.5 TABLET ORAL 2 TIMES DAILY
COMMUNITY
End: 2022-01-03 | Stop reason: SDUPTHER

## 2021-12-14 RX ORDER — FENOFIBRATE 54 MG/1
54 TABLET ORAL DAILY
Refills: 1 | Status: DISCONTINUED | OUTPATIENT
Start: 2021-12-14 | End: 2021-12-17 | Stop reason: HOSPADM

## 2021-12-14 RX ORDER — DEXTROSE MONOHYDRATE 50 MG/ML
INJECTION, SOLUTION INTRAVENOUS CONTINUOUS
Status: ACTIVE | OUTPATIENT
Start: 2021-12-14 | End: 2021-12-15

## 2021-12-14 RX ORDER — MAGNESIUM SULFATE IN WATER 40 MG/ML
2000 INJECTION, SOLUTION INTRAVENOUS PRN
Status: DISCONTINUED | OUTPATIENT
Start: 2021-12-14 | End: 2021-12-15

## 2021-12-14 RX ORDER — SODIUM CHLORIDE, SODIUM LACTATE, POTASSIUM CHLORIDE, AND CALCIUM CHLORIDE .6; .31; .03; .02 G/100ML; G/100ML; G/100ML; G/100ML
500 INJECTION, SOLUTION INTRAVENOUS
Status: ACTIVE | OUTPATIENT
Start: 2021-12-14 | End: 2021-12-14

## 2021-12-14 RX ORDER — ONDANSETRON 2 MG/ML
4 INJECTION INTRAMUSCULAR; INTRAVENOUS EVERY 6 HOURS PRN
Status: DISCONTINUED | OUTPATIENT
Start: 2021-12-14 | End: 2021-12-17 | Stop reason: HOSPADM

## 2021-12-14 RX ORDER — SODIUM CHLORIDE, SODIUM LACTATE, POTASSIUM CHLORIDE, AND CALCIUM CHLORIDE .6; .31; .03; .02 G/100ML; G/100ML; G/100ML; G/100ML
500 INJECTION, SOLUTION INTRAVENOUS ONCE
Status: DISCONTINUED | OUTPATIENT
Start: 2021-12-14 | End: 2021-12-14

## 2021-12-14 RX ORDER — SODIUM CHLORIDE 0.9 % (FLUSH) 0.9 %
10 SYRINGE (ML) INJECTION EVERY 12 HOURS SCHEDULED
Status: DISCONTINUED | OUTPATIENT
Start: 2021-12-14 | End: 2021-12-17 | Stop reason: HOSPADM

## 2021-12-14 RX ORDER — POTASSIUM CHLORIDE 7.45 MG/ML
10 INJECTION INTRAVENOUS PRN
Status: DISCONTINUED | OUTPATIENT
Start: 2021-12-14 | End: 2021-12-16

## 2021-12-14 RX ORDER — MECOBALAMIN 5000 MCG
5 TABLET,DISINTEGRATING ORAL NIGHTLY PRN
Status: DISCONTINUED | OUTPATIENT
Start: 2021-12-14 | End: 2021-12-17 | Stop reason: HOSPADM

## 2021-12-14 RX ORDER — LISINOPRIL 20 MG/1
20 TABLET ORAL DAILY
Status: DISCONTINUED | OUTPATIENT
Start: 2021-12-14 | End: 2021-12-17 | Stop reason: HOSPADM

## 2021-12-14 RX ORDER — AMITRIPTYLINE HYDROCHLORIDE 25 MG/1
25 TABLET, FILM COATED ORAL NIGHTLY PRN
Status: DISCONTINUED | OUTPATIENT
Start: 2021-12-14 | End: 2021-12-17 | Stop reason: HOSPADM

## 2021-12-14 RX ORDER — ACETAMINOPHEN 650 MG/1
650 SUPPOSITORY RECTAL EVERY 6 HOURS PRN
Status: DISCONTINUED | OUTPATIENT
Start: 2021-12-14 | End: 2021-12-17 | Stop reason: HOSPADM

## 2021-12-14 RX ORDER — SODIUM CHLORIDE 0.9 % (FLUSH) 0.9 %
10 SYRINGE (ML) INJECTION PRN
Status: DISCONTINUED | OUTPATIENT
Start: 2021-12-14 | End: 2021-12-17 | Stop reason: HOSPADM

## 2021-12-14 RX ORDER — ESCITALOPRAM OXALATE 10 MG/1
20 TABLET ORAL DAILY
Status: DISCONTINUED | OUTPATIENT
Start: 2021-12-14 | End: 2021-12-16

## 2021-12-14 RX ORDER — SODIUM CHLORIDE 9 MG/ML
25 INJECTION, SOLUTION INTRAVENOUS PRN
Status: DISCONTINUED | OUTPATIENT
Start: 2021-12-14 | End: 2021-12-17 | Stop reason: HOSPADM

## 2021-12-14 RX ORDER — ZOLPIDEM TARTRATE 5 MG/1
5 TABLET ORAL NIGHTLY PRN
Status: DISCONTINUED | OUTPATIENT
Start: 2021-12-14 | End: 2021-12-17 | Stop reason: HOSPADM

## 2021-12-14 RX ORDER — 3% SODIUM CHLORIDE 3 G/100ML
25 INJECTION, SOLUTION INTRAVENOUS CONTINUOUS
Status: DISCONTINUED | OUTPATIENT
Start: 2021-12-14 | End: 2021-12-14

## 2021-12-14 RX ORDER — PROMETHAZINE HYDROCHLORIDE 25 MG/1
12.5 TABLET ORAL EVERY 6 HOURS PRN
Status: DISCONTINUED | OUTPATIENT
Start: 2021-12-14 | End: 2021-12-17 | Stop reason: HOSPADM

## 2021-12-14 RX ORDER — ACETAMINOPHEN 325 MG/1
650 TABLET ORAL EVERY 6 HOURS PRN
Status: DISCONTINUED | OUTPATIENT
Start: 2021-12-14 | End: 2021-12-17 | Stop reason: HOSPADM

## 2021-12-14 RX ORDER — PANTOPRAZOLE SODIUM 40 MG/1
40 TABLET, DELAYED RELEASE ORAL DAILY
Status: DISCONTINUED | OUTPATIENT
Start: 2021-12-14 | End: 2021-12-17 | Stop reason: HOSPADM

## 2021-12-14 RX ORDER — SODIUM CHLORIDE 9 MG/ML
1000 INJECTION, SOLUTION INTRAVENOUS CONTINUOUS
Status: DISCONTINUED | OUTPATIENT
Start: 2021-12-14 | End: 2021-12-14

## 2021-12-14 RX ADMIN — ONDANSETRON 4 MG: 2 INJECTION INTRAMUSCULAR; INTRAVENOUS at 02:43

## 2021-12-14 RX ADMIN — VASOPRESSIN: 20 INJECTION, SOLUTION INTRAVENOUS at 13:45

## 2021-12-14 RX ADMIN — ONDANSETRON 4 MG: 2 INJECTION INTRAMUSCULAR; INTRAVENOUS at 10:25

## 2021-12-14 RX ADMIN — MUPIROCIN: 20 OINTMENT TOPICAL at 20:55

## 2021-12-14 RX ADMIN — ROSUVASTATIN CALCIUM 20 MG: 10 TABLET, FILM COATED ORAL at 09:04

## 2021-12-14 RX ADMIN — APIXABAN 5 MG: 5 TABLET, FILM COATED ORAL at 20:54

## 2021-12-14 RX ADMIN — FENOFIBRATE 54 MG: 54 TABLET ORAL at 09:04

## 2021-12-14 RX ADMIN — SODIUM CHLORIDE, PRESERVATIVE FREE 10 ML: 5 INJECTION INTRAVENOUS at 09:05

## 2021-12-14 RX ADMIN — MUPIROCIN: 20 OINTMENT TOPICAL at 09:05

## 2021-12-14 RX ADMIN — MAGNESIUM HYDROXIDE/ALUMINUM HYDROXICE/SIMETHICONE: 120; 1200; 1200 SUSPENSION ORAL at 04:49

## 2021-12-14 RX ADMIN — APIXABAN 5 MG: 5 TABLET, FILM COATED ORAL at 09:04

## 2021-12-14 RX ADMIN — Medication 5 MG: at 20:54

## 2021-12-14 RX ADMIN — SODIUM CHLORIDE 25 ML/HR: 3 INJECTION, SOLUTION INTRAVENOUS at 05:55

## 2021-12-14 RX ADMIN — ONDANSETRON 4 MG: 2 INJECTION INTRAMUSCULAR; INTRAVENOUS at 16:09

## 2021-12-14 RX ADMIN — DEXTROSE MONOHYDRATE: 50 INJECTION, SOLUTION INTRAVENOUS at 23:23

## 2021-12-14 RX ADMIN — PANTOPRAZOLE SODIUM 40 MG: 40 TABLET, DELAYED RELEASE ORAL at 09:04

## 2021-12-14 NOTE — PROGRESS NOTES
Secure message sent to Kelsey Stuart for hyponatremia. FYI, most recent sodium 109. Called nephro, waiting to hear back. They wanted paged for <110. Currently has 0.9% sodium chloride at 500 ml/hr.  Thanks\"    Waiting to hear back from on call nephro    Nephro to put on 2% sodium chloride

## 2021-12-14 NOTE — ED PROVIDER NOTES
I independently examined and evaluated Alysia Pena. All diagnostic, treatment, and disposition decisions were made by myself in conjunction with the PHILIP, Nehemiah Cleveland. For all further details of the patient's emergency department visit, please see their documentation. 58-year-old male with a history of chronic renal insufficiency, hypertension hyperlipidemia presents after he had abnormal lab work done as an outpatient showing hyponatremia. Over the past week he has had decreased appetite and some mild abdominal discomfort. He had outpatient lab work done outpatient ultrasound was called and told that his sodium is dangerously low. He denies any generalized weakness or confusion. No history of diuretic use or congestive heart failure. States he is urinating his normal amount. Vitals:    12/14/21 0110   BP: 114/62   Pulse: 60   Resp: 16   Temp:    SpO2: 96%   On exam the patient is awake and alert. Heart is regular rate and rhythm, no respiratory distress.     Results for orders placed or performed during the hospital encounter of 12/13/21   CBC Auto Differential   Result Value Ref Range    WBC 13.2 (H) 4.0 - 11.0 K/uL    RBC 4.48 4.20 - 5.90 M/uL    Hemoglobin 13.5 13.5 - 17.5 g/dL    Hematocrit 39.0 (L) 40.5 - 52.5 %    MCV 87.1 80.0 - 100.0 fL    MCH 30.2 26.0 - 34.0 pg    MCHC 34.7 31.0 - 36.0 g/dL    RDW 13.9 12.4 - 15.4 %    Platelets 263 299 - 092 K/uL    MPV 9.2 5.0 - 10.5 fL    Neutrophils % 88.0 %    Lymphocytes % 5.6 %    Monocytes % 6.0 %    Eosinophils % 0.1 %    Basophils % 0.3 %    Neutrophils Absolute 11.6 (H) 1.7 - 7.7 K/uL    Lymphocytes Absolute 0.7 (L) 1.0 - 5.1 K/uL    Monocytes Absolute 0.8 0.0 - 1.3 K/uL    Eosinophils Absolute 0.0 0.0 - 0.6 K/uL    Basophils Absolute 0.0 0.0 - 0.2 K/uL   Comprehensive Metabolic Panel w/ Reflex to MG   Result Value Ref Range    Sodium 111 (LL) 136 - 145 mmol/L    Potassium reflex Magnesium 3.9 3.5 - 5.1 mmol/L    Chloride 78 (L) 99 - 110 mmol/L CO2 19 (L) 21 - 32 mmol/L    Anion Gap 14 3 - 16    Glucose 95 70 - 99 mg/dL    BUN 14 7 - 20 mg/dL    CREATININE 1.1 0.8 - 1.3 mg/dL    GFR Non-African American >60 >60    GFR African American >60 >60    Calcium 9.0 8.3 - 10.6 mg/dL    Total Protein 6.3 (L) 6.4 - 8.2 g/dL    Albumin 4.2 3.4 - 5.0 g/dL    Albumin/Globulin Ratio 2.0 1.1 - 2.2    Total Bilirubin 0.6 0.0 - 1.0 mg/dL    Alkaline Phosphatase 49 40 - 129 U/L    ALT 18 10 - 40 U/L    AST 38 (H) 15 - 37 U/L   Phosphorus   Result Value Ref Range    Phosphorus 2.4 (L) 2.5 - 4.9 mg/dL   Sample possible blood bank testing   Result Value Ref Range    Specimen Status NAA    Basic Metabolic Panel w/ Reflex to MG   Result Value Ref Range    Sodium 112 (LL) 136 - 145 mmol/L    Potassium reflex Magnesium 4.4 3.5 - 5.1 mmol/L    Chloride 78 (L) 99 - 110 mmol/L    CO2 20 (L) 21 - 32 mmol/L    Anion Gap 14 3 - 16    Glucose 86 70 - 99 mg/dL    BUN 14 7 - 20 mg/dL    CREATININE 1.0 0.8 - 1.3 mg/dL    GFR Non-African American >60 >60    GFR African American >60 >60    Calcium 8.5 8.3 - 10.6 mg/dL   Urinalysis with Microscopic   Result Value Ref Range    Color, UA Yellow Straw/Yellow    Clarity, UA Clear Clear    Glucose, Ur Negative Negative mg/dL    Bilirubin Urine SMALL (A) Negative    Ketones, Urine 15 (A) Negative mg/dL    Specific Gravity, UA >=1.030 1.005 - 1.030    Blood, Urine Negative Negative    pH, UA 6.0 5.0 - 8.0    Protein, UA Negative Negative mg/dL    Urobilinogen, Urine 0.2 <2.0 E.U./dL    Nitrite, Urine Negative Negative    Leukocyte Esterase, Urine Negative Negative    Microscopic Examination Not Indicated     Urine Type NotGiven     WBC, UA 0-2 0 - 5 /HPF    RBC, UA None seen 0 - 4 /HPF   Basic Metabolic Panel w/ Reflex to MG   Result Value Ref Range    Sodium 114 (LL) 136 - 145 mmol/L    Potassium reflex Magnesium 3.8 3.5 - 5.1 mmol/L    Chloride 81 (L) 99 - 110 mmol/L    CO2 19 (L) 21 - 32 mmol/L    Anion Gap 14 3 - 16    Glucose 77 70 - 99 mg/dL BUN 13 7 - 20 mg/dL    CREATININE 0.9 0.8 - 1.3 mg/dL    GFR Non-African American >60 >60    GFR African American >60 >60    Calcium 8.4 8.3 - 10.6 mg/dL   Lipase   Result Value Ref Range    Lipase 17.0 13.0 - 60.0 U/L   Sodium, urine, random   Result Value Ref Range    Sodium, Ur <20 Not Established mmol/L   Creatinine, Random Urine   Result Value Ref Range    Creatinine, Ur 178.6 39.0 - 259.0 mg/dL       No orders to display     No patient's lab work was repeated and sodium is 111. Nephrology was consulted and they recommend that we start normal saline at 75 cc an hour with repeat sodium level every 2 hours.   This has been initiated and the patient will be admitted to the hospitalist.       Della Stout MD  12/14/21 [id number]

## 2021-12-14 NOTE — CONSULTS
Nephrology Consult Note  [facility]    I have been notified of the new consult request for us to see Mr. Natalie Michel  Formal consult & note to follow in AM.    Dw ER recheck serum sodium now given 500 ml bolus given  Goal correction 118 by 2000 12/14/21  Work-up ordered   For now hold isotonic IVF    Luz Arevalo MD    For questions or concerns please contact us at   Telephone: [phone]

## 2021-12-14 NOTE — CONSULTS
Thank you to requesting provider:  Antonette Holter, PA  , for asking us to see Adina Hickey  Reason for consultation:  Hyponatremia   Chief Complaint:  Abnormal labs     History of Presenting Illness      68 y/o with history of CKD stage 3 due to hypertensive nephrosclerosis presents to the hospital with abnormal labs and found to have severe hyponatremia. His baseline Scr has been 1.2 - 1.5. Prior labs have not shown a significant issue with hyponatremia. Last sodium was normal.  He is alert. Has been in the ICU on hypertonic saline. More nausea lately with decreased intake. He has edema related to bilateral LE DVTs.         Past Medical/Surgical History      Active Ambulatory Problems     Diagnosis Date Noted    GERD (gastroesophageal reflux disease) 12/26/2010    MANISH (generalized anxiety disorder) 12/26/2010    Essential hypertension, benign 01/23/2014    CKD (chronic kidney disease) stage 3, GFR 30-59 ml/min ([address] 05/18/2015    Mixed hyperlipidemia 01/12/2016    Ni's esophagus without dysplasia 07/30/2018     Resolved Ambulatory Problems     Diagnosis Date Noted    Hyperlipidemia     Hypertension     Prostate Ca     Elevated fasting glucose 06/12/2010    DVT (deep venous thrombosis) ([address] 07/01/2014    DVT, recurrent, lower extremity, acute ([address] 12/02/2015    Deep vein thrombosis of right lower extremity ([address] 12/09/2015    Personal history of prostate cancer 12/09/2015    Malignant neoplasm of urinary bladder ([address] 02/04/2019    Routine general medical examination at a health care facility 07/20/2020    Cellulitis of scrotum 03/16/2021     Past Medical History:   Diagnosis Date    Bladder cancer ([address]     MVP (mitral valve prolapse)          Review of Systems     Constitutional:  No weight loss, no fever/chills  Eyes:  No eye pain, no eye redness  Cardiovascular:  No chest pain, no worsening of edema  Respiratory:  No hemoptysis, no stridor  Gastrointestinal:  No blood in stool, + nausea, no diarrhea  Genitoruinary:  No hematuria, no difficulty with urination  Musculoskeletal:  No joint swelling, no redness  Integumentary:  No Rash, no itching  Neurological:  No focal weakness, No new sensory deficit  Psychiatric:  No depression, no confusion  Endocrine:  No polyuria, no polydipsia       Medications      Reviewed in EMR     Allergies     Lipitor      Family History       Negative for Kidney Disease    Social History      Social History     Socioeconomic History    Marital status:      Spouse name: None    Number of children: 2    Years of education: None    Highest education level: None   Occupational History    Occupation:      Comment: retired   Tobacco Use    Smoking status: Never Smoker    Smokeless tobacco: Never Used   Vaping Use    Vaping Use: Never used   Substance and Sexual Activity    Alcohol use: Yes     Comment: rare    Drug use: No    Sexual activity: Not Currently   Other Topics Concern    None   Social History Narrative    , retired 1/2015     Social Determinants of Health     Financial Resource Strain: Low Risk     Difficulty of Paying Living Expenses: Not hard at all   Food Insecurity: No Food Insecurity    Worried About [facility] in the Last Year: Never true    Feng of Food in the Last Year: Never true   Transportation Needs:     Lack of Transportation (Medical): Not on file    Lack of Transportation (Non-Medical):  Not on file   Physical Activity:     Days of Exercise per Week: Not on file    Minutes of Exercise per Session: Not on file   Stress:     Feeling of Stress : Not on file   Social Connections:     Frequency of Communication with Friends and Family: Not on file    Frequency of Social Gatherings with Friends and Family: Not on file    Attends Jewish Services: Not on file    Active Member of Clubs or Organizations: Not on file    Attends Club or Organization Meetings: Not on file    Marital Status: Not on file   Intimate Partner Violence:     Fear of Current or Ex-Partner: Not on file    Emotionally Abused: Not on file    Physically Abused: Not on file    Sexually Abused: Not on file   Housing Stability:     Unable to Pay for Housing in the Last Year: Not on file    Number of [place] in the Last Year: Not on file    Unstable Housing in the Last Year: Not on file       Physical Exam     Blood pressure (!) 115/59, pulse 54, temperature 98.3 °F (36.8 °C), temperature source Oral, resp. rate 12, height 5' 9\" (1.753 m), weight 195 lb 15.8 oz (88.9 kg), SpO2 96 %. General:  NAD, A+Ox3, ill-appearing  HEENT:  PERRL, EOMI  Neck:  Supple, normal range of movement  Chest:  CTAB, good respiratory effort, good air movement  CV:  Bradycardic, no rub   Abdomen:  NTND, soft, +BS, no hepatosplenomegaly  Extremities:  + peripheral edema  Neurological:  Moving all four extremities, CN II-XII grossly intact  Lymphatics:  No palpable lymph nodes  Skin:  No rash, no jaundice  Psychiatric:  Normal insight and judgement, good recall    Data     Recent Labs     12/13/21  0903 12/13/21 2014   WBC 11.1* 13.2*   HGB 13.3* 13.5   HCT 39.3* 39.0*   MCV 88.8 87.1    248     Recent Labs     12/13/21 2014 12/13/21  2247 12/14/21  0818 12/14/21  1118 12/14/21  1304   *   < > 116* 115* 116*   K 3.9   < > 3.9 3.9 3.9   CL 78*   < > 83* 83* 83*   CO2 19*   < > 23 20* 22   GLUCOSE 95   < > 86 82 83   PHOS 2.4*  --   --   --   --    BUN 14   < > 13 13 12   CREATININE 1.1   < > 1.1 1.0 1.1   LABGLOM >60   < > >60 >60 >60   GFRAA >60   < > >60 >60 >60    < > = values in this interval not displayed. Assessment:    Hyponatremia:  Severe / new problem but chronic onset as he is alert  - Data:  Prior echo was normal.   U Na < 20, U Osm is 726  - Etiology: At this time would appear to be volume depletion. Cannot exclude superimposed SIADH.   Doubt hypervolemic process as prior EF was normal, no proteinuria on UA, and no history of liver disease. He does have a history of hypercoagulable disease and has had bilateral LE DVTs    Chronic Kidney Disease:  Stage 3  - Follow with me in the office  - Baseline 1.2 - 1.5  - Stable    Hypertension:  Range is ok     Plan:     At this point would continue concentrated saline with goal of slow correction of sodium, 116 safe for today  Goal would be to get him up to 123 by tomorrow   Continue to follow sodium closely and adjust IV fluids as needed   Expand work up pending response to current plan     Thank you for asking us to participate in the management of your patient, please do not hesitate to contact me for any concerns regarding my recommendations as outlined above.    -----------------------------  Gaby Castañeda M.D.   [facility]

## 2021-12-14 NOTE — ED NOTES
PS @ 9621  RE: Hyponatremia - 111 per TANNER Kinney @ [address]  12/13/21 7159

## 2021-12-14 NOTE — ACP (ADVANCE CARE PLANNING)
ADVANCED CARE PLANNING    Laura Braun       :  1945              MRN:  [id number]      Purpose of Encounter: Advanced care planning in light of multiple cormobities,advanced age  Parties in attendance: :Benigno Mae MD, Family members:  Decisional Capacity:Yes    Diagnosis: Active Problems:    Acute hyponatremia  Resolved Problems:    * No resolved hospital problems. *    Patients Medical Story:71 yr old admitted with severe hyponatremia  Goals of Care Determinations: Patient wishes to focus on comfort measures,no CPR  Plan: Will notify Alyssa Baltazar DO of change in care plan. Will look at further interventions as needed. Code Status: At this time patient wishes to be DNR-CC  Time Spent with Patient: 15 minutes      Electronically signed by Benigno Stokes MD on 2021 at 3:36 PM  Thank you Alyssa Baltazar DO for the opportunity to be involved in this patient's care.

## 2021-12-14 NOTE — H&P
Hospital Medicine History & Physical      PCP: Rasheed Benavides DO    Date of Admission: 12/13/2021    Date of Service: Pt seen/examined on 12/14/2021  Pt seen/examined face to face on and admitted as inpatient with expected LOS greater than two midnights due to medical therapy    Chief Complaint:    Chief Complaint   Patient presents with    Abnormal Lab     Patient arrives via walk in with c/o abnormal lab. Patient had blood work this morning and Sodium was 117. PCP told pt to come in. History Of Present Illness:      67 y.o. male who presented to [facility] with past medical history of DVT, malignant neoplasm of urinary bladder, and BPH who presented to the ED with chief complaint of abnormal labs. Patient had just had labs performed by PCP due to abnormal lab finding. PCP asked the patient to come to the ED immediately. Patient reported that she been having low oral intake abdominal discomfort epigastric decreased appetite with nausea and believes this is likely from gallbladder related. Patient otherwise denies having any chest pain shortness of breath diarrhea weakness confusion or dysuria. Patient reports that he has a family history of cancer in his mom she had lung cancer however is a smoker, patient also reports that his dad had prostate cancer. Patient otherwise reported that he has been having weight gain otherwise no they changed his diet. Patient denied having diarrhea or urinary frequency. Patient reports that he has been drinking a lot of carbonated drinks and not water. In regards to his diet is mostly fish and vegetables. CODE STATUS discussed and the patient is a full code.       Past Medical History:          Diagnosis Date    Bladder cancer ([address]     DVT (deep venous thrombosis) ([address] 07/01/2014    two spontaneous DVTs left leg    Elevated fasting glucose     Malignant neoplasm of urinary bladder ([address] 2/4/2019    MVP (mitral valve prolapse)     Prostate CA ([address] 2005       Past Surgical History:          Procedure Laterality Date    BLADDER SURGERY      tumor removed    CATARACT REMOVAL Bilateral     COLONOSCOPY  6/29/12    Polyps    COLONOSCOPY  07/12/2017    Polyp    INGUINAL HERNIA REPAIR Right 06/1999    PENILE PROSTHESIS PLACEMENT      PROSTATECTOMY  2005 approx    Dr. Jackelyn Esteban      In [place] - had one tonsil burned out       Medications Prior to Admission:      Prior to Admission medications    Medication Sig Start Date End Date Taking? Authorizing Provider   amitriptyline (ELAVIL) 25 MG tablet  10/6/21  Yes Historical Provider, MD   zolpidem (AMBIEN) 5 MG tablet Take 1 tablet by mouth nightly as needed for Sleep for up to 90 days. 12/10/21 3/10/22 Yes Isabel Winter DO   fenofibrate (TRICOR) 54 MG tablet Take 1 tablet by mouth daily s [facility] pharmacy: Please dispense generic fenofibrate unless prescriber denote 9/16/21  Yes Isabel Winter DO   lisinopril (PRINIVIL;ZESTRIL) 20 MG tablet Take 1 tablet by mouth daily 9/16/21  Yes Isabel Winter DO   apixaban (ELIQUIS) 5 MG TABS tablet TAKE 1 TABLET BY MOUTH TWO  TIMES DAILY 8/16/21  Yes Isabel Winter DO   escitalopram (LEXAPRO) 20 MG tablet Take 1 tablet by mouth daily 8/16/21  Yes Isabel Winter DO   rosuvastatin (CRESTOR) 20 MG tablet Take 1 tablet by mouth daily 7/21/21  Yes Isabel Winter DO   pantoprazole (PROTONIX) 40 MG tablet Take 40 mg by mouth daily  1/24/18  Yes Historical Provider, MD   Azelastine-Fluticasone 137-50 MCG/ACT SUSP 1 spray by Nasal route 2 times daily  Patient not taking: Reported on 9/16/2021 8/16/21   Isabel Winter DO       Allergies:  Lipitor    Social History:          TOBACCO:   reports that he has never smoked. He has never used smokeless tobacco.  ETOH:   reports current alcohol use.   E-Cigarettes/Vaping Use     Questions Responses    E-Cigarette/Vaping Use Never User    Start Date     Passive Exposure     Quit Date     Counseling Given     Comments             Family History: Reviewed in detail         Problem Relation Age of Onset    Lung Cancer Mother     Heart Disease Mother     Diabetes Father     Prostate Cancer Father     Diabetes Brother        REVIEW OF SYSTEMS:     Constitutional:  No Fever, No Chills, No Night Sweats  ENT/Mouth:  No Nasal Congestion,  No Hoarseness, No new mouth lesion  Eyes:  No Eye Pain, No Redness, No Discharge  Cardiovascular:  No Chest Pain, No Orthopnea, No Palpitations  Respiratory:  No Cough, No Sputum, No Dyspnea  Gastrointestinal: + Vomiting, No Diarrhea, No abdominal pain  Genitourinary: No Urinary Frequency, No Hematuria, No Urinary pain  Musculoskeletal:  No worsening Arthralgias, No worsening Myalgias  Skin:  No new Skin Lesions, No new skin rash  Neuro:  No new weakness, No new numbness. Psych:  No suicial ideation, No Violence ideation    PHYSICAL EXAM PERFORMED:    /62   Pulse 60   Temp 98.2 °F (36.8 °C) (Oral)   Resp 16   Ht 5' 9\" (1.753 m)   Wt 198 lb (89.8 kg)   SpO2 96%   BMI 29.24 kg/m²     General appearance:  mild acute distress, appears older than stated age  HEENT:   atraumatic, sclera anicteric, Conjunctivae clear. Neck: Supple,Trachea midline, no goiter  Respiratory:minimal accessory muscle usage, Normal respiratory effort. Clear to auscultation, bilaterally without wheezing  Cardiovascular:  Regular rate and rhythm, capillary refill 2 seconds  Abdomen: Soft, non-tender, non-distended with normal bowel sounds. Minimal epigastric pain  Musculoskeletal:  No clubbing, cyanosis. trace edema LE bilaterally. Skin: turgor normal.  No new rashes or lesions. Neurologic: Alert and oriented x4, no new focal sensory/motor deficits.      Labs:     Recent Labs     12/13/21  0903 12/13/21 2014   WBC 11.1* 13.2*   HGB 13.3* 13.5   HCT 39.3* 39.0*    248     Recent Labs     12/13/21 2014 12/13/21  2247 12/14/21  0047   * 112* 114*   K 3.9 4.4 3.8   CL 78* 78* 81*   CO2 19* 20* 19*   BUN 14 14 13   CREATININE 1.1 1.0 0.9   CALCIUM 9.0 8.5 8.4   PHOS 2.4*  --   --      Recent Labs     12/13/21  0903 12/13/21 2014   AST 26 38*   ALT 19 18   BILITOT 0.7 0.6   ALKPHOS 51 49     Recent Labs     12/13/21 0903   INR 1.26*     No results for input(s): Shauna Malin in the last 72 hours. Urinalysis:      Lab Results   Component Value Date    NITRU Negative 12/14/2021    WBCUA 0-2 12/14/2021    RBCUA None seen 12/14/2021    BLOODU Negative 12/14/2021    SPECGRAV >=1.030 12/14/2021    GLUCOSEU Negative 12/14/2021       Radiology:     CXR: I have reviewed the CXR with the following interpretation:   Left basilar atelectasis  EKG:  I have reviewed the EKG with the following interpretation:   Sinus bradycardia with sinus arrhythmia  No orders to display       ASSESSMENT AND PLAN:    WILI/Aliza Hinton 1106 Problems    Diagnosis Date Noted    Acute hyponatremia [E87.1] 12/14/2021     Acute hyponatremia:  Etiology unclear at this time  Urine OSM, urine sodium pending  Nephrology consulted, much appreciated    Leukocytosis: Suspect reactive?   Pro-Jonel, recheck    Left basilar atelectasis on x-ray:  Incentive spirometry ordered    Epigastric pain: Ultrasound obtained in the ED showing gallbladder polyps, negative Sanchez sign on my exam  No NSAIDs intake  history of GERD  Protonix, GI cocktail    Chronic medical conditions  History of unprovoked DVTs: On Eliquis  Insomnia: Continue medication  Essential Hypertension: Continue home medication  Depression: continue home medications  GERD: Continue home medication  Hyperlipidemia: Continue home medication    Diet: Regular diet    DVT Prophylaxis: eliquis    Dispo:   [facility]

## 2021-12-14 NOTE — CARE COORDINATION
CASE MANAGEMENT INITIAL ASSESSMENT    Reviewed chart and completed assessment with patient: and wife at bedside  Explained Case Management role/services. Health Care Decision Maker :   Primary Decision Maker: Wesley Parker - Spouse - [phone]        Admit date/status: 12/13 Inpatient  Diagnosis: Acute hyponatremia   Is this a Readmission?:  No      Insurance: Medicare, [employer] secondary   Precert required for SNF: No       3 night stay required: No, waived currently due to pandemic. Living arrangements, Adls, care needs, prior to admission: Pt lives at home with spouse, independent with ADLs. Pt does not use DME nor have any home care or services. Transportation: Typically drives self, will arrange with family     PT/OT recs: Not ordered yet    Hospital Exemption Notification (HEN): Would be needed for SNF    Barriers to discharge: None noted    Plan/comments: Pt plans on returning home with wife at discharge, denies needs now. CM will continue to follow pt's progress and coordinate discharge arrangements as needed. ECOC on chart for MD signature.   LUPE Kent-RN

## 2021-12-14 NOTE — ED PROVIDER NOTES
[facility] Emergency Department    CHIEF COMPLAINT  Abnormal Lab (Patient arrives via walk in with c/o abnormal lab. Patient had blood work this morning and Sodium was 117. PCP told pt to come in. )      SHARED SERVICE VISIT  Evaluated by PHILIP. My supervising physician was available for consultation. HISTORY OF PRESENT ILLNESS  Ron Castillo is a 67 y.o. male history of CKD, HTN, HLD presents emergency department for evaluation of low sodium. Patient was contacted by his [address] provider to inform that his sodium was low and he needs to go to the emergency department. Patient states that over the past week he has had a decreased appetite as well as some abdominal discomfort. Patient states he believes that this is due to his gallbladder polyps. Admits to nausea as well as 1 episode of vomiting today. No diarrhea. No weakness or confusion reported. No history of any heart failure or diuretic use. Denies any increase in urination. No other complaints, modifying factors or associated symptoms. Nursing notes reviewed.    Past Medical History:   Diagnosis Date    Bladder cancer ([address]     DVT (deep venous thrombosis) ([address] 07/01/2014    two spontaneous DVTs left leg    Elevated fasting glucose     Malignant neoplasm of urinary bladder ([address] 2/4/2019    MVP (mitral valve prolapse)     Prostate CA ([address] 2005     Past Surgical History:   Procedure Laterality Date    BLADDER SURGERY      tumor removed    CATARACT REMOVAL Bilateral     COLONOSCOPY  6/29/12    Polyps    COLONOSCOPY  07/12/2017    Polyp    INGUINAL HERNIA REPAIR Right 06/1999    PENILE PROSTHESIS PLACEMENT      PROSTATECTOMY  2005 approx    Dr. Brown [place]      In [place] - had one tonsil burned out     Family History   Problem Relation Age of Onset    Lung Cancer Mother     Heart Disease Mother     Diabetes Father     Prostate Cancer Father     Diabetes Brother      Social History Socioeconomic History    Marital status:      Spouse name: Not on file    Number of children: 2    Years of education: Not on file    Highest education level: Not on file   Occupational History    Occupation:      Comment: retired   Tobacco Use    Smoking status: Never Smoker    Smokeless tobacco: Never Used   Vaping Use    Vaping Use: Never used   Substance and Sexual Activity    Alcohol use: Yes     Comment: rare    Drug use: No    Sexual activity: Not Currently   Other Topics Concern    Not on file   Social History Narrative    , retired 1/2015     Social Determinants of Health     Financial Resource Strain: Low Risk     Difficulty of Paying Living Expenses: Not hard at all   Food Insecurity: No Food Insecurity    Worried About [address] in the Last Year: Never true    [address] in the Last Year: Never true   Transportation Needs:     Lack of Transportation (Medical): Not on file    Lack of Transportation (Non-Medical):  Not on file   Physical Activity:     Days of Exercise per Week: Not on file    Minutes of Exercise per Session: Not on file   Stress:     Feeling of Stress : Not on file   Social Connections:     Frequency of Communication with Friends and Family: Not on file    Frequency of Social Gatherings with Friends and Family: Not on file    Attends [employer] Services: Not on file    Active Member of [address] [place] [address] or Organizations: Not on file    Attends Club or Organization Meetings: Not on file    Marital Status: Not on file   Intimate Partner Violence:     Fear of Current or Ex-Partner: Not on file    Emotionally Abused: Not on file    Physically Abused: Not on file    Sexually Abused: Not on file   Housing Stability:     Unable to Pay for Housing in the Last Year: Not on file    Number of [place] in the Last Year: Not on file    Unstable Housing in the Last Year: Not on file     No current facility-administered medications for this encounter. Current Outpatient Medications   Medication Sig Dispense Refill    LORazepam (ATIVAN) 0.5 MG tablet Take 0.5 mg by mouth 2 times daily. Pt takes 0.5 mg in AM and 1 mg in afternoon.  amitriptyline (ELAVIL) 25 MG tablet       zolpidem (AMBIEN) 5 MG tablet Take 1 tablet by mouth nightly as needed for Sleep for up to 90 days. 30 tablet 2    fenofibrate (TRICOR) 54 MG tablet Take 1 tablet by mouth daily s [facility] pharmacy: Please dispense generic fenofibrate unless prescriber denote 90 tablet 1    lisinopril (PRINIVIL;ZESTRIL) 20 MG tablet Take 1 tablet by mouth daily 90 tablet 3    apixaban (ELIQUIS) 5 MG TABS tablet TAKE 1 TABLET BY MOUTH TWO  TIMES DAILY 180 tablet 3    rosuvastatin (CRESTOR) 20 MG tablet Take 1 tablet by mouth daily 90 tablet 1    pantoprazole (PROTONIX) 40 MG tablet Take 40 mg by mouth daily       Azelastine-Fluticasone 137-50 MCG/ACT SUSP 1 spray by Nasal route 2 times daily 23 g 1     Allergies   Allergen Reactions    Lipitor Rash       REVIEW OF SYSTEMS  10 systems reviewed, pertinent positives per HPI otherwise noted to be negative    PHYSICAL EXAM  BP (!) 150/83   Pulse 54   Temp 98.2 °F (36.8 °C)   Resp 16   Ht 5' 9\" (1.753 m)   Wt 187 lb 13.3 oz (85.2 kg)   SpO2 99%   BMI 27.74 kg/m²   GENERAL APPEARANCE: Awake and alert. Cooperative. Alert and oriented  HEAD: Normocephalic. Atraumatic. EYES: EOM's grossly intact. ENT: Mucous membranes are moist.   NECK: Supple. HEART: RRR. No murmurs. LUNGS: Respirations unlabored. CTAB. Good air exchange. Speaking comfortably in full sentences. ABDOMEN: Soft. Non-distended. No reproducible tenderness. No guarding or rebound. No masses. No organomegaly. EXTREMITIES: No peripheral edema. Moves all extremities equally. All extremities neurovascularly intact. SKIN: Warm and dry. No acute rashes. NEUROLOGICAL: Alert and oriented. CN's 2-12 intact.  No gross facial drooping. Strength 5/5, sensation intact. PSYCHIATRIC: Normal mood and affect. RADIOLOGY  XR CHEST PORTABLE   Final Result   Minimal left basilar atelectasis.              LABS  Labs Reviewed   CBC WITH AUTO DIFFERENTIAL - Abnormal; Notable for the following components:       Result Value    WBC 13.2 (*)     Hematocrit 39.0 (*)     Neutrophils Absolute 11.6 (*)     Lymphocytes Absolute 0.7 (*)     All other components within normal limits    Narrative:     Performed at:  [address], [facility] [employer]   Phone [phone]   COMPREHENSIVE METABOLIC PANEL W/ REFLEX TO MG FOR LOW K - Abnormal; Notable for the following components:    Sodium 111 (*)     Chloride 78 (*)     CO2 19 (*)     Total Protein 6.3 (*)     AST 38 (*)     All other components within normal limits    Narrative:     Ariannajill Jean tel. [phone],  Chemistry results called to and read back by Francisco Elam RN, 12/13/2021 21:20,  by [facility]  Performed at:  [address], [facility] [employer]   Phone [phone] - Abnormal; Notable for the following components:    Osmolality 244 (*)     All other components within normal limits    Narrative:     Performed at:  [place] [address]   Phone [phone]   PHOSPHORUS - Abnormal; Notable for the following components:    Phosphorus 2.4 (*)     All other components within normal limits    Narrative:     [address],  Chemistry results called to and read back by Francisco Elam RN, 12/13/2021 21:20,  by [facility]  Performed at:  [place] [address] [employer]   Phone [phone]   BASIC METABOLIC PANEL W/ REFLEX TO MG FOR LOW K - Abnormal; Notable for the following components:    Sodium 112 (*)     Chloride 78 (*)     CO2 20 (*)     All other components within normal limits    Narrative:     Bertrand Dills tel. [phone],  Chemistry results called to and read back by CATHERINE Dan, 12/13/2021 23:06,  by [facility]  Performed at:  [address], [facility] [employer]   Phone [phone]   URINALYSIS WITH MICROSCOPIC - Abnormal; Notable for the following components:    Bilirubin Urine SMALL (*)     Ketones, Urine 15 (*)     All other components within normal limits    Narrative:     Performed at:  [address], [facility] [employer]   Phone [phone]   BASIC METABOLIC PANEL W/ REFLEX TO MG FOR LOW K - Abnormal; Notable for the following components:    Sodium 114 (*)     Chloride 81 (*)     CO2 19 (*)     All other components within normal limits    Narrative:     Bertrand Rodríguez tel. [phone],  Chemistry results called to and read back by Nette Dan RN, 12/14/2021 01:13,  by [facility]  Performed at:  [address], [facility] [employer]   Phone [phone]   BASIC METABOLIC PANEL W/ REFLEX TO MG FOR LOW K - Abnormal; Notable for the following components:    Sodium 112 (*)     Chloride 78 (*)     CO2 19 (*)     All other components within normal limits    Narrative:     [address]. [phone],  Chemistry results called to and read back by Rafael Antoine RN, 12/14/2021  03:29, by [facility]  Performed at:  [address], [facility] [employer]   Phone [phone]   BASIC METABOLIC PANEL W/ REFLEX TO MG FOR LOW K - Abnormal; Notable for the following components:    Sodium 109 (*)     Chloride 80 (*)     CO2 18 (*)     Calcium 8.2 (*)     All other components within normal limits    Narrative:     Bertrand Rodríguez tel. [phone],  Chemistry results called to and read back by Nette Dan RN, 12/14/2021 05:00,  by [facility]  Performed at:  [facility]  [address] [place] [address] [employer]   Phone [phone]   OSMOLALITY - Abnormal; Notable for the following components:    Osmolality 244 (*)     All other components within normal limits    Narrative:     Performed at:  [address] [address]   Phone [phone]   BASIC METABOLIC PANEL W/ REFLEX TO MG FOR LOW K - Abnormal; Notable for the following components:    Sodium 116 (*)     Chloride 83 (*)     All other components within normal limits    Narrative:     [address]. [phone],  Chemistry results called to and read back by Annemarie Pereyra RN, 12/14/2021  08:56, by [facility]  Performed at:  [place] [address] [employer]   Phone [phone]   BASIC METABOLIC PANEL W/ REFLEX TO MG FOR LOW K - Abnormal; Notable for the following components:    Sodium 115 (*)     Chloride 83 (*)     CO2 20 (*)     All other components within normal limits    Narrative:     [address]. [phone],  Chemistry results called to and read back by Annemarie Pereyra RN, 12/14/2021  11:39, by [facility]  Performed at:  [place] [address] [employer]   Phone [phone]   BASIC METABOLIC PANEL W/ REFLEX TO MG FOR LOW K - Abnormal; Notable for the following components:    Sodium 116 (*)     Chloride 83 (*)     All other components within normal limits    Narrative:     [address] [phone],  Chemistry results called to and read back by Annemarie Pereyra RN, 12/14/2021  13:41, by [facility]  Performed at:  [place] [address] [employer]   Phone [phone]   BASIC METABOLIC PANEL W/ REFLEX TO MG FOR LOW K - Abnormal; Notable for the following components:    Sodium 118 (*)     Chloride 84 (*)     GFR Non- 59 (*)     All other components within normal limits    Narrative: CALL  [place] [address] tel. [phone],  Chemistry results called to and read back by CATHERINE Corado, 12/14/2021  15:32, by Katey Jimenez  Performed at:  [address], [facility] [employer]   Phone [phone]   BASIC METABOLIC PANEL W/ REFLEX TO MG FOR LOW K - Abnormal; Notable for the following components:    Sodium 120 (*)     Chloride 85 (*)     GFR Non- 59 (*)     All other components within normal limits    Narrative:     Performed at:  [address], [facility] [employer]   Phone [phone]   BASIC METABOLIC PANEL W/ REFLEX TO MG FOR LOW K - Abnormal; Notable for the following components:    Sodium 121 (*)     Potassium reflex Magnesium 3.3 (*)     Chloride 88 (*)     CO2 18 (*)     GFR Non- 59 (*)     All other components within normal limits    Narrative:     Performed at:  [address], [facility] [employer]   Phone [phone]   BASIC METABOLIC PANEL W/ REFLEX TO MG FOR LOW K - Abnormal; Notable for the following components:    Sodium 126 (*)     Potassium reflex Magnesium 3.3 (*)     Chloride 93 (*)     Glucose 110 (*)     GFR Non- 54 (*)     All other components within normal limits    Narrative:     Performed at:  [place] [address] [employer]   Phone [phone]   BASIC METABOLIC PANEL W/ REFLEX TO MG FOR LOW K - Abnormal; Notable for the following components:    Sodium 127 (*)     Chloride 95 (*)     GFR Non- 59 (*)     All other components within normal limits    Narrative:     Performed at:  [address], [facility] [employer]   Phone [phone]   BASIC METABOLIC PANEL W/ REFLEX TO MG FOR LOW K - Abnormal; Notable for the following components:    Sodium 124 (*)     Potassium reflex Magnesium 3.4 (*)     Chloride 93 (*)     CO2 19 (*)     Glucose 159 (*)     GFR Non- 59 (*)     All other components within normal limits    Narrative:     Performed at:  [facility]  [address],  [address], [address]   Phone [phone]   BASIC METABOLIC PANEL W/ REFLEX TO MG FOR LOW K - Abnormal; Notable for the following components:    Sodium 130 (*)     Chloride 97 (*)     Glucose 102 (*)     GFR Non- 54 (*)     All other components within normal limits    Narrative:     Performed at:  [facility]  [address],  [address], [address]   Phone [phone]   BASIC METABOLIC PANEL W/ REFLEX TO MG FOR LOW K - Abnormal; Notable for the following components:    Sodium 131 (*)     Potassium reflex Magnesium 3.4 (*)     Chloride 97 (*)     GFR Non- 54 (*)     All other components within normal limits    Narrative:     Performed at:  [address],  [address], [address]   Phone [phone]   CBC WITH AUTO DIFFERENTIAL - Abnormal; Notable for the following components:    Hemoglobin 13.2 (*)     Hematocrit 37.7 (*)     Lymphocytes Absolute 0.8 (*)     All other components within normal limits    Narrative:     Performed at:  [place] [address],  [address], [address]   Phone [phone]   BASIC METABOLIC PANEL W/ REFLEX TO MG FOR LOW K - Abnormal; Notable for the following components:    Sodium 133 (*)     GFR Non- 59 (*)     All other components within normal limits    Narrative:     Performed at:  [address],  [address], [address]   Phone [phone]   MAGNESIUM - Abnormal; Notable for the following components:    Magnesium 1.60 (*)     All other components within normal limits    Narrative:     Performed at:  [facility] [address] [facility], [facility] [employer]   Phone [phone]   BASIC METABOLIC PANEL W/ REFLEX TO MG FOR LOW K - Abnormal; Notable for the following components:    Sodium 134 (*)     Potassium reflex Magnesium 3.4 (*)     Chloride 98 (*)     Glucose 110 (*)     GFR Non- 54 (*)     All other components within normal limits    Narrative:     Performed at:  [address], [facility] [employer]   Phone [phone]   BASIC METABOLIC PANEL W/ REFLEX TO MG FOR LOW K - Abnormal; Notable for the following components:    Sodium 133 (*)     Glucose 106 (*)     GFR Non- 54 (*)     All other components within normal limits    Narrative:     Performed at:  [address], [facility] [employer]   Phone [phone]   BASIC METABOLIC PANEL W/ REFLEX TO MG FOR LOW K - Abnormal; Notable for the following components:    Sodium 134 (*)     Potassium reflex Magnesium 3.4 (*)     Glucose 119 (*)     GFR Non- 59 (*)     All other components within normal limits    Narrative:     Performed at:  [address], [facility] [employer]   Phone [phone]   BASIC METABOLIC PANEL W/ REFLEX TO MG FOR LOW K - Abnormal; Notable for the following components:    Sodium 128 (*)     Potassium reflex Magnesium 3.4 (*)     Chloride 95 (*)     Glucose 133 (*)     GFR Non- 54 (*)     All other components within normal limits    Narrative:     retime per Alexander Blazing - 18:00  Performed at:  [address], [facility] [employer]   Phone [phone]   CBC WITH AUTO DIFFERENTIAL - Abnormal; Notable for the following components:    Hemoglobin 13.2 (*)     Hematocrit 37.6 (*)     All other components within normal limits    Narrative:     Performed at:  [facility]  [address] [place] [address], [facility] [employer]   Phone [phone]   BASIC METABOLIC PANEL W/ REFLEX TO MG FOR LOW K - Abnormal; Notable for the following components:    Sodium 131 (*)     Chloride 98 (*)     All other components within normal limits    Narrative:     Performed at:  [address], [facility] [employer]   Phone [phone]   CBC - Abnormal; Notable for the following components:    Hematocrit 40.0 (*)     All other components within normal limits    Narrative:     Performed at:  [address], [facility] [employer]   Phone [phone]   RENAL FUNCTION PANEL - Abnormal; Notable for the following components:    GFR Non- 59 (*)     All other components within normal limits    Narrative:     Performed at:  [place] [address] [employer]   Phone [phone]   CORTISOL TOTAL    Narrative:     Performed at:  [facility]  [address], [facility] [facility] [employer] [address]   Phone [phone]   SAMPLE POSSIBLE BLOOD BANK TESTING    Narrative:     Performed at:  [address], [facility] [employer]   Phone [phone]   LIPASE    Narrative:     Lianne Crimes tel. [phone],  Chemistry results called to and read back by Anaid Magaña RN, 12/13/2021 21:20,  by [facility]  Performed at:  [address], [facility] [employer]   Phone [phone]   OSMOLALITY, URINE    Narrative:     Performed at:  [facility]  [address] [facility] [employer] [address]   Phone [phone]   SODIUM, URINE, RANDOM    Narrative:     Performed at:  [address], [facility] [employer]   Phone [phone]   CREATININE, RANDOM URINE    Narrative: Performed at:  [address], [facility] [employer]   Phone [phone]   PROCALCITONIN    Narrative:     Performed at:  [address], [facility] [employer]   Phone [phone]   TROPONIN    Narrative:     Performed at:  [address], [facility] [employer]   Phone [phone]   CORTISOL AM, TOTAL    Narrative:     Performed at:  [facility]  [address] [facility] [employer] [address]   Phone [phone]   LACTIC ACID, PLASMA    Narrative:     Performed at:  [address], [facility] [employer]   Phone [phone]   TRIGLYCERIDES    Narrative:     Performed at:  [facility]  [address] [facility] [employer] [address]   Phone [phone]   TROPONIN    Narrative:     Performed at:  [address], [facility] [employer]   Phone [phone]   TSH WITH REFLEX TO FT4    Narrative:     Performed at:  [facility]  [address] [facility] [employer] [address]   Phone [phone]   MAGNESIUM    Narrative:     Performed at:  [address], [facility] [employer]   Phone [phone]   MAGNESIUM    Narrative:     Performed at:  [address], [facility] [employer]   Phone [phone]   MAGNESIUM    Narrative:     Performed at:  [address], [facility] [employer]   Phone [phone]   MAGNESIUM    Narrative:     Performed at:  [address], [facility] [employer]   Phone [phone]   MAGNESIUM    Narrative:     Performed at:  [facility]  [address],  [place], [place] [employer]   Phone [phone]   MAGNESIUM    Narrative:     Performed at:  [facility]) - [address], [place] [employer]   Phone [phone]   MAGNESIUM    Narrative:     Performed at:  [facility]) - [place] [address] [employer]   Phone [phone]   MAGNESIUM    Narrative:     Performed at:  [facility]) - [place] [address] [employer]   Phone [phone]   MAGNESIUM    Narrative:     retime per RN Dre Rg - 18:00  Performed at:  [address], [facility] [employer]   Phone [phone]       PROCEDURES  Unless otherwise noted below, none  Procedures    CRITICAL CARE TIME  The total critical care time spent while evaluating and treating this patient was 34 minutes. This excludes time spent doing separately billable procedures. This includes time at the bedside, data interpretation, medication management, obtaining critical history from collateral sources if the patient is unable to provide it directly, and physician consultation. Specifics of interventions taken and potentially life-threatening diagnostic considerations are listed above in the medical decision making. MDM  MDM  48-year-old male presents the ED for evaluation of hyponatremia. Patient has lab work done this morning which demonstrated a sodium of 117 and was referred to the emergency department. He does report a decrease in oral intake, nausea abdominal discomfort. Episode of vomiting. On arrival to ED vitals within the limits patient is alert and oriented. Initial sodium obtained here in the emergency department was 111. Patient was started on IV fluid however the bolus was discontinued in the minutes after initiation. I consulted nephrology who recommended 75 cc/h with every 2 hour rechecks. First recheck demonstrated increased to 112.   At this time nephrology discontinued the IV fluid and recommended to contact if gets below 110 or of 120. This time will plan to reach out to the hospital service to request admission for hyponatremia    DISPOSITION  Admitted to the hospital service    CLINICAL IMPRESSION  1.  Hyponatremia            Cookie Correa PA-C  12/14/21 [facility] [address] Ranjeet Nova PA-C  12/21/21 [employer]

## 2021-12-15 LAB
ANION GAP SERPL CALCULATED.3IONS-SCNC: 10 MMOL/L (ref 3–16)
ANION GAP SERPL CALCULATED.3IONS-SCNC: 11 MMOL/L (ref 3–16)
ANION GAP SERPL CALCULATED.3IONS-SCNC: 12 MMOL/L (ref 3–16)
ANION GAP SERPL CALCULATED.3IONS-SCNC: 13 MMOL/L (ref 3–16)
BASOPHILS ABSOLUTE: 0 K/UL (ref 0–0.2)
BASOPHILS RELATIVE PERCENT: 0.5 %
BUN BLDV-MCNC: 10 MG/DL (ref 7–20)
BUN BLDV-MCNC: 11 MG/DL (ref 7–20)
BUN BLDV-MCNC: 12 MG/DL (ref 7–20)
BUN BLDV-MCNC: 12 MG/DL (ref 7–20)
BUN BLDV-MCNC: 13 MG/DL (ref 7–20)
BUN BLDV-MCNC: 13 MG/DL (ref 7–20)
BUN BLDV-MCNC: 14 MG/DL (ref 7–20)
BUN BLDV-MCNC: 14 MG/DL (ref 7–20)
CALCIUM SERPL-MCNC: 8.7 MG/DL (ref 8.3–10.6)
CALCIUM SERPL-MCNC: 8.7 MG/DL (ref 8.3–10.6)
CALCIUM SERPL-MCNC: 8.8 MG/DL (ref 8.3–10.6)
CALCIUM SERPL-MCNC: 8.8 MG/DL (ref 8.3–10.6)
CALCIUM SERPL-MCNC: 9 MG/DL (ref 8.3–10.6)
CALCIUM SERPL-MCNC: 9 MG/DL (ref 8.3–10.6)
CALCIUM SERPL-MCNC: 9.2 MG/DL (ref 8.3–10.6)
CALCIUM SERPL-MCNC: 9.3 MG/DL (ref 8.3–10.6)
CHLORIDE BLD-SCNC: 93 MMOL/L (ref 99–110)
CHLORIDE BLD-SCNC: 95 MMOL/L (ref 99–110)
CHLORIDE BLD-SCNC: 97 MMOL/L (ref 99–110)
CHLORIDE BLD-SCNC: 97 MMOL/L (ref 99–110)
CHLORIDE BLD-SCNC: 98 MMOL/L (ref 99–110)
CHLORIDE BLD-SCNC: 99 MMOL/L (ref 99–110)
CO2: 19 MMOL/L (ref 21–32)
CO2: 21 MMOL/L (ref 21–32)
CO2: 22 MMOL/L (ref 21–32)
CO2: 22 MMOL/L (ref 21–32)
CO2: 23 MMOL/L (ref 21–32)
CO2: 23 MMOL/L (ref 21–32)
CO2: 24 MMOL/L (ref 21–32)
CO2: 24 MMOL/L (ref 21–32)
CORTISOL - AM: 11.8 UG/DL (ref 4.3–22.4)
CREAT SERPL-MCNC: 1.2 MG/DL (ref 0.8–1.3)
CREAT SERPL-MCNC: 1.3 MG/DL (ref 0.8–1.3)
EOSINOPHILS ABSOLUTE: 0.1 K/UL (ref 0–0.6)
EOSINOPHILS RELATIVE PERCENT: 1.5 %
GFR AFRICAN AMERICAN: >60
GFR NON-AFRICAN AMERICAN: 54
GFR NON-AFRICAN AMERICAN: 59
GLUCOSE BLD-MCNC: 102 MG/DL (ref 70–99)
GLUCOSE BLD-MCNC: 106 MG/DL (ref 70–99)
GLUCOSE BLD-MCNC: 110 MG/DL (ref 70–99)
GLUCOSE BLD-MCNC: 119 MG/DL (ref 70–99)
GLUCOSE BLD-MCNC: 133 MG/DL (ref 70–99)
GLUCOSE BLD-MCNC: 159 MG/DL (ref 70–99)
GLUCOSE BLD-MCNC: 86 MG/DL (ref 70–99)
GLUCOSE BLD-MCNC: 91 MG/DL (ref 70–99)
HCT VFR BLD CALC: 37.7 % (ref 40.5–52.5)
HEMOGLOBIN: 13.2 G/DL (ref 13.5–17.5)
LACTIC ACID: 0.9 MMOL/L (ref 0.4–2)
LYMPHOCYTES ABSOLUTE: 0.8 K/UL (ref 1–5.1)
LYMPHOCYTES RELATIVE PERCENT: 11.7 %
MAGNESIUM: 2 MG/DL (ref 1.8–2.4)
MAGNESIUM: 2.2 MG/DL (ref 1.8–2.4)
MAGNESIUM: 2.3 MG/DL (ref 1.8–2.4)
MCH RBC QN AUTO: 30.6 PG (ref 26–34)
MCHC RBC AUTO-ENTMCNC: 35.2 G/DL (ref 31–36)
MCV RBC AUTO: 87.1 FL (ref 80–100)
MONOCYTES ABSOLUTE: 0.9 K/UL (ref 0–1.3)
MONOCYTES RELATIVE PERCENT: 11.8 %
NEUTROPHILS ABSOLUTE: 5.4 K/UL (ref 1.7–7.7)
NEUTROPHILS RELATIVE PERCENT: 74.5 %
PDW BLD-RTO: 14 % (ref 12.4–15.4)
PLATELET # BLD: 195 K/UL (ref 135–450)
PMV BLD AUTO: 8.7 FL (ref 5–10.5)
POTASSIUM REFLEX MAGNESIUM: 3.4 MMOL/L (ref 3.5–5.1)
POTASSIUM REFLEX MAGNESIUM: 3.5 MMOL/L (ref 3.5–5.1)
POTASSIUM REFLEX MAGNESIUM: 3.5 MMOL/L (ref 3.5–5.1)
POTASSIUM REFLEX MAGNESIUM: 3.6 MMOL/L (ref 3.5–5.1)
RBC # BLD: 4.32 M/UL (ref 4.2–5.9)
SODIUM BLD-SCNC: 124 MMOL/L (ref 136–145)
SODIUM BLD-SCNC: 128 MMOL/L (ref 136–145)
SODIUM BLD-SCNC: 130 MMOL/L (ref 136–145)
SODIUM BLD-SCNC: 131 MMOL/L (ref 136–145)
SODIUM BLD-SCNC: 133 MMOL/L (ref 136–145)
SODIUM BLD-SCNC: 133 MMOL/L (ref 136–145)
SODIUM BLD-SCNC: 134 MMOL/L (ref 136–145)
SODIUM BLD-SCNC: 134 MMOL/L (ref 136–145)
TRIGL SERPL-MCNC: 62 MG/DL (ref 0–150)
TROPONIN: <0.01 NG/ML
TSH REFLEX FT4: 2.16 UIU/ML (ref 0.27–4.2)
WBC # BLD: 7.3 K/UL (ref 4–11)

## 2021-12-15 PROCEDURE — 82533 TOTAL CORTISOL: CPT

## 2021-12-15 PROCEDURE — 85025 COMPLETE CBC W/AUTO DIFF WBC: CPT

## 2021-12-15 PROCEDURE — 2580000003 HC RX 258: Performed by: INTERNAL MEDICINE

## 2021-12-15 PROCEDURE — 84484 ASSAY OF TROPONIN QUANT: CPT

## 2021-12-15 PROCEDURE — 83605 ASSAY OF LACTIC ACID: CPT

## 2021-12-15 PROCEDURE — 6360000002 HC RX W HCPCS: Performed by: INTERNAL MEDICINE

## 2021-12-15 PROCEDURE — 84478 ASSAY OF TRIGLYCERIDES: CPT

## 2021-12-15 PROCEDURE — 36415 COLL VENOUS BLD VENIPUNCTURE: CPT

## 2021-12-15 PROCEDURE — 80048 BASIC METABOLIC PNL TOTAL CA: CPT

## 2021-12-15 PROCEDURE — 1200000000 HC SEMI PRIVATE

## 2021-12-15 PROCEDURE — 84443 ASSAY THYROID STIM HORMONE: CPT

## 2021-12-15 PROCEDURE — 6370000000 HC RX 637 (ALT 250 FOR IP): Performed by: INTERNAL MEDICINE

## 2021-12-15 PROCEDURE — 83735 ASSAY OF MAGNESIUM: CPT

## 2021-12-15 RX ORDER — DEXTROSE MONOHYDRATE 50 MG/ML
INJECTION, SOLUTION INTRAVENOUS CONTINUOUS
Status: ACTIVE | OUTPATIENT
Start: 2021-12-15 | End: 2021-12-15

## 2021-12-15 RX ORDER — LORAZEPAM 0.5 MG/1
0.5 TABLET ORAL 2 TIMES DAILY
Status: DISCONTINUED | OUTPATIENT
Start: 2021-12-15 | End: 2021-12-17 | Stop reason: HOSPADM

## 2021-12-15 RX ADMIN — ONDANSETRON 4 MG: 2 INJECTION INTRAMUSCULAR; INTRAVENOUS at 10:26

## 2021-12-15 RX ADMIN — ZOLPIDEM TARTRATE 5 MG: 5 TABLET ORAL at 20:29

## 2021-12-15 RX ADMIN — LORAZEPAM 0.5 MG: 0.5 TABLET ORAL at 20:29

## 2021-12-15 RX ADMIN — SODIUM CHLORIDE, PRESERVATIVE FREE 10 ML: 5 INJECTION INTRAVENOUS at 11:44

## 2021-12-15 RX ADMIN — LORAZEPAM 0.5 MG: 0.5 TABLET ORAL at 13:00

## 2021-12-15 RX ADMIN — SODIUM CHLORIDE, PRESERVATIVE FREE 10 ML: 5 INJECTION INTRAVENOUS at 20:29

## 2021-12-15 RX ADMIN — ONDANSETRON 4 MG: 2 INJECTION INTRAMUSCULAR; INTRAVENOUS at 20:29

## 2021-12-15 RX ADMIN — ONDANSETRON 4 MG: 2 INJECTION INTRAMUSCULAR; INTRAVENOUS at 02:54

## 2021-12-15 RX ADMIN — MUPIROCIN: 20 OINTMENT TOPICAL at 20:30

## 2021-12-15 RX ADMIN — POTASSIUM CHLORIDE 10 MEQ: 10 INJECTION, SOLUTION INTRAVENOUS at 03:52

## 2021-12-15 RX ADMIN — Medication 5 MG: at 20:29

## 2021-12-15 RX ADMIN — DEXTROSE MONOHYDRATE: 50 INJECTION, SOLUTION INTRAVENOUS at 15:41

## 2021-12-15 RX ADMIN — ZOLPIDEM TARTRATE 5 MG: 5 TABLET ORAL at 03:06

## 2021-12-15 RX ADMIN — APIXABAN 5 MG: 5 TABLET, FILM COATED ORAL at 20:29

## 2021-12-15 NOTE — PROGRESS NOTES
Na increased to 120. Dr Rosalie Rivera informed. IVF d/unique at this time. Will cont to closely monitor na level.

## 2021-12-15 NOTE — PROGRESS NOTES
Hospitalist Progress Note      PCP: Zoe Elizalde DO    Date of Admission: 12/13/2021    Chief Complaint: HypoNatremia    Subjective: no new c/o. Medications:  Reviewed    Infusion Medications    sodium chloride       Scheduled Medications    sodium chloride flush  10 mL IntraVENous 2 times per day    apixaban  5 mg Oral BID    escitalopram  20 mg Oral Daily    fenofibrate  54 mg Oral Daily    pantoprazole  40 mg Oral Daily    rosuvastatin  20 mg Oral Daily    lisinopril  20 mg Oral Daily    mupirocin   Nasal BID     PRN Meds: sodium chloride flush, sodium chloride, potassium chloride, magnesium sulfate, promethazine **OR** ondansetron, acetaminophen **OR** acetaminophen, zolpidem, amitriptyline, melatonin      Intake/Output Summary (Last 24 hours) at 12/15/2021 0941  Last data filed at 12/15/2021 0300  Gross per 24 hour   Intake 1210.94 ml   Output 5700 ml   Net -4489.06 ml       Physical Exam Performed:    /63   Pulse (!) 49   Temp 98.4 °F (36.9 °C) (Axillary)   Resp 18   Ht 5' 9\" (1.753 m)   Wt 195 lb 15.8 oz (88.9 kg)   SpO2 95%   BMI 28.94 kg/m²     General appearance: No apparent distress, appears stated age and cooperative. HEENT: Pupils equal, round, and reactive to light. Conjunctivae/corneas clear. Neck: Supple, with full range of motion. No jugular venous distention. Trachea midline. Respiratory:  Normal respiratory effort. Clear to auscultation, bilaterally without Rales/Wheezes/Rhonchi. Cardiovascular: Regular rate and rhythm with normal S1/S2 without murmurs, rubs or gallops. Abdomen: Soft, non-tender, non-distended with normal bowel sounds. Musculoskeletal: No clubbing, cyanosis or edema bilaterally. Full range of motion without deformity. Skin: Skin color, texture, turgor normal.  No rashes or lesions. Neurologic:  Neurovascularly intact without any focal sensory/motor deficits.  Cranial nerves: II-XII intact, grossly non-focal.  Psychiatric: Alert and oriented, thought content appropriate, normal insight  Capillary Refill: Brisk,< 3 seconds   Peripheral Pulses: +2 palpable, equal bilaterally       Labs:   Recent Labs     12/13/21  0903 12/13/21  2014 12/15/21  0451   WBC 11.1* 13.2* 7.3   HGB 13.3* 13.5 13.2*   HCT 39.3* 39.0* 37.7*    248 195     Recent Labs     12/13/21 2014 12/13/21  2247 12/15/21  0304 12/15/21  0451 12/15/21  0710   *   < > 130* 131* 133*   K 3.9   < > 3.5 3.4* 3.5   CL 78*   < > 97* 97* 99   CO2 19*   < > 22 21 22   BUN 14   < > 14 13 13   CREATININE 1.1   < > 1.3 1.3 1.2   CALCIUM 9.0   < > 9.0 8.8 9.2   PHOS 2.4*  --   --   --   --     < > = values in this interval not displayed. Recent Labs     12/13/21  0903 12/13/21 2014   AST 26 38*   ALT 19 18   BILITOT 0.7 0.6   ALKPHOS 51 49     Recent Labs     12/13/21 0903   INR 1.26*     Recent Labs     12/14/21  0242 12/15/21  0451   TROPONINI <0.01 <0.01       Urinalysis:      Lab Results   Component Value Date    NITRU Negative 12/14/2021    WBCUA 0-2 12/14/2021    RBCUA None seen 12/14/2021    BLOODU Negative 12/14/2021    SPECGRAV >=1.030 12/14/2021    GLUCOSEU Negative 12/14/2021       Consults:    IP CONSULT TO NEPHROLOGY  IP CONSULT TO HOSPITALIST      Assessment/Plan:    Active Hospital Problems    Diagnosis     Acute hyponatremia [E87.1]     Mixed hyperlipidemia [E78.2]     CKD (chronic kidney disease) stage 3, GFR 30-59 ml/min ([facility]) [N18.30]     Essential hypertension, benign [I10]     GERD (gastroesophageal reflux disease) [K21.9]          HypoNatremia - etiology clinically unable to determine but likely hypovolemic. Follow serial labs on IVF. Reviewed and documented as above. Nephrology consulted and appreciated. CKD - baseline stage 3. Follow serial labs. Reviewed and documented as above. Nephrology consulted and appreciated. HTN - w/out known CAD and no evidence of active signs/sxs of ischemia/failure.  Currently controlled on home meds w/ vitals reviewed and documented as above. HyperLipidemia - controlled on home Statin. Continue, w/ f/u and med adjustment w/ PCP    GERD - w/out active signs/sxs of dysphagia/odynophagia. No evidence of active PUD or hx of GI bleed. Controlled on home PPI - continue. Presented w/ non-specific epigastric pain. Ultrasound obtained in the ED showing gallbladder polyps    Hx DVT - unprovoked. On Eliquis - continued. Depression - controlled on home meds - continued. DVT Prophylaxis: Eliquis     Recent Labs     12/13/21  0903 12/13/21  2014 12/15/21  0451    248 195     Diet: ADULT DIET; Regular  Code Status: DNR-CC      PT/OT Eval Status: Not yet ordered. Dispo - Patient is likely to remain in-house at least until Sat/Humza 18/19 Dec pending clinical course and subspecialty recs.        Desean Waters MD

## 2021-12-15 NOTE — PROGRESS NOTES
Progress Note    HISTORY     CC:  Abnormal Labs           We are following for hyponatremia       Subjective/   HPI:  He is doing better. Still with nausea. Sodium has corrected. BP stable. Urine output really picked up with volume.       ROS:  Constitutional:  No fevers, No Chills, + weakness  Cardiovascular:  No palpations, + edema  Respiratory:  No wheezing, no cough  Skin:  No rash, no itching  :  No hematuria, no dysuria     Social Hx:  No Family at the bedside     Past Medical and Surgical History:  - Reviewed, no changes     EXAM       Objective/     Vitals:    12/15/21 1100 12/15/21 1200 12/15/21 1300 12/15/21 1400   BP: 114/63 (!) 154/71 (!) 124/106 (!) 141/90   Pulse: 74 60 60 59   Resp: 17 8 16 12   Temp:       TempSrc:       SpO2:  93% 90% 95%   Weight:       Height:         24HR INTAKE/OUTPUT:      Intake/Output Summary (Last 24 hours) at 12/15/2021 1823  Last data filed at 12/15/2021 0300  Gross per 24 hour   Intake 1110.94 ml   Output 4600 ml   Net -3489.06 ml     Constitutional:  Alert, awake, no apparent distress  Eyes:  Pupils reactive, sclera clear   Neck:  Normal thyroid, no masses   Cardiovascular:  Regular, no rub  Respiratory:  No distress, no wheezing  Psychiatry:  Appropriate mood/affect, alert  Abdomen: +bs, soft, nt, no masses   Musculoskeletal: Trace LE edema, no clubbing   Lymphatics:  No LAD in neck, no supraclavicular nodes       MEDICAL DECISION MAKING       Data/  Recent Labs     12/13/21  0903 12/13/21  2014 12/15/21  0451   WBC 11.1* 13.2* 7.3   HGB 13.3* 13.5 13.2*   HCT 39.3* 39.0* 37.7*   MCV 88.8 87.1 87.1    248 195     Recent Labs     12/13/21 2014 12/13/21  2247 12/15/21  0710 12/15/21  0710 12/15/21  1022 12/15/21  1234 12/15/21  1448   *   < > 133*   < > 134* 133* 134*   K 3.9   < > 3.5   < > 3.4* 3.6 3.4*   CL 78*   < > 99   < > 98* 99 99   CO2 19*   < > 22   < > 24 23 24   GLUCOSE 95   < > 86   < > 110* 106* 119*   PHOS 2.4*  --   --   --

## 2021-12-16 LAB
ANION GAP SERPL CALCULATED.3IONS-SCNC: 9 MMOL/L (ref 3–16)
BASOPHILS ABSOLUTE: 0 K/UL (ref 0–0.2)
BASOPHILS RELATIVE PERCENT: 0.6 %
BUN BLDV-MCNC: 10 MG/DL (ref 7–20)
CALCIUM SERPL-MCNC: 9.1 MG/DL (ref 8.3–10.6)
CHLORIDE BLD-SCNC: 98 MMOL/L (ref 99–110)
CO2: 24 MMOL/L (ref 21–32)
CREAT SERPL-MCNC: 1.1 MG/DL (ref 0.8–1.3)
EOSINOPHILS ABSOLUTE: 0.1 K/UL (ref 0–0.6)
EOSINOPHILS RELATIVE PERCENT: 1.8 %
GFR AFRICAN AMERICAN: >60
GFR NON-AFRICAN AMERICAN: >60
GLUCOSE BLD-MCNC: 97 MG/DL (ref 70–99)
HCT VFR BLD CALC: 37.6 % (ref 40.5–52.5)
HEMOGLOBIN: 13.2 G/DL (ref 13.5–17.5)
LYMPHOCYTES ABSOLUTE: 1 K/UL (ref 1–5.1)
LYMPHOCYTES RELATIVE PERCENT: 12.7 %
MCH RBC QN AUTO: 31 PG (ref 26–34)
MCHC RBC AUTO-ENTMCNC: 35.1 G/DL (ref 31–36)
MCV RBC AUTO: 88.2 FL (ref 80–100)
MONOCYTES ABSOLUTE: 0.8 K/UL (ref 0–1.3)
MONOCYTES RELATIVE PERCENT: 10.1 %
NEUTROPHILS ABSOLUTE: 5.8 K/UL (ref 1.7–7.7)
NEUTROPHILS RELATIVE PERCENT: 74.8 %
PDW BLD-RTO: 14.5 % (ref 12.4–15.4)
PLATELET # BLD: 198 K/UL (ref 135–450)
PMV BLD AUTO: 8.7 FL (ref 5–10.5)
POTASSIUM REFLEX MAGNESIUM: 3.6 MMOL/L (ref 3.5–5.1)
RBC # BLD: 4.27 M/UL (ref 4.2–5.9)
SODIUM BLD-SCNC: 131 MMOL/L (ref 136–145)
WBC # BLD: 7.7 K/UL (ref 4–11)

## 2021-12-16 PROCEDURE — 36415 COLL VENOUS BLD VENIPUNCTURE: CPT

## 2021-12-16 PROCEDURE — 85025 COMPLETE CBC W/AUTO DIFF WBC: CPT

## 2021-12-16 PROCEDURE — 97535 SELF CARE MNGMENT TRAINING: CPT

## 2021-12-16 PROCEDURE — 97116 GAIT TRAINING THERAPY: CPT

## 2021-12-16 PROCEDURE — 6370000000 HC RX 637 (ALT 250 FOR IP): Performed by: INTERNAL MEDICINE

## 2021-12-16 PROCEDURE — 2580000003 HC RX 258: Performed by: INTERNAL MEDICINE

## 2021-12-16 PROCEDURE — 6360000002 HC RX W HCPCS: Performed by: INTERNAL MEDICINE

## 2021-12-16 PROCEDURE — 80048 BASIC METABOLIC PNL TOTAL CA: CPT

## 2021-12-16 PROCEDURE — 1200000000 HC SEMI PRIVATE

## 2021-12-16 PROCEDURE — 97165 OT EVAL LOW COMPLEX 30 MIN: CPT

## 2021-12-16 PROCEDURE — 97530 THERAPEUTIC ACTIVITIES: CPT

## 2021-12-16 PROCEDURE — 97161 PT EVAL LOW COMPLEX 20 MIN: CPT

## 2021-12-16 RX ADMIN — LISINOPRIL 20 MG: 20 TABLET ORAL at 08:39

## 2021-12-16 RX ADMIN — ONDANSETRON 4 MG: 2 INJECTION INTRAMUSCULAR; INTRAVENOUS at 08:39

## 2021-12-16 RX ADMIN — ONDANSETRON 4 MG: 2 INJECTION INTRAMUSCULAR; INTRAVENOUS at 19:52

## 2021-12-16 RX ADMIN — APIXABAN 5 MG: 5 TABLET, FILM COATED ORAL at 08:39

## 2021-12-16 RX ADMIN — MUPIROCIN: 20 OINTMENT TOPICAL at 08:39

## 2021-12-16 RX ADMIN — LORAZEPAM 0.5 MG: 0.5 TABLET ORAL at 19:53

## 2021-12-16 RX ADMIN — ROSUVASTATIN CALCIUM 20 MG: 10 TABLET, FILM COATED ORAL at 08:39

## 2021-12-16 RX ADMIN — ZOLPIDEM TARTRATE 5 MG: 5 TABLET ORAL at 19:53

## 2021-12-16 RX ADMIN — APIXABAN 5 MG: 5 TABLET, FILM COATED ORAL at 19:53

## 2021-12-16 RX ADMIN — PANTOPRAZOLE SODIUM 40 MG: 40 TABLET, DELAYED RELEASE ORAL at 08:39

## 2021-12-16 RX ADMIN — LORAZEPAM 0.5 MG: 0.5 TABLET ORAL at 08:39

## 2021-12-16 RX ADMIN — Medication 5 MG: at 19:53

## 2021-12-16 RX ADMIN — SODIUM CHLORIDE, PRESERVATIVE FREE 10 ML: 5 INJECTION INTRAVENOUS at 19:52

## 2021-12-16 RX ADMIN — SODIUM CHLORIDE, PRESERVATIVE FREE 10 ML: 5 INJECTION INTRAVENOUS at 08:44

## 2021-12-16 RX ADMIN — FENOFIBRATE 54 MG: 54 TABLET ORAL at 08:39

## 2021-12-16 ASSESSMENT — PAIN DESCRIPTION - PAIN TYPE
TYPE: ACUTE PAIN

## 2021-12-16 ASSESSMENT — PAIN DESCRIPTION - DESCRIPTORS
DESCRIPTORS: DISCOMFORT
DESCRIPTORS: DISCOMFORT

## 2021-12-16 ASSESSMENT — PAIN DESCRIPTION - LOCATION
LOCATION: BREAST

## 2021-12-16 ASSESSMENT — PAIN SCALES - GENERAL
PAINLEVEL_OUTOF10: 2

## 2021-12-16 ASSESSMENT — PAIN DESCRIPTION - ORIENTATION
ORIENTATION: RIGHT

## 2021-12-16 NOTE — PROGRESS NOTES
Hospitalist Progress Note      PCP: April Suazo DO    Date of Admission: 12/13/2021    Chief Complaint: HypoNatremia    Subjective: no new c/o. Medications:  Reviewed    Infusion Medications    sodium chloride       Scheduled Medications    LORazepam  0.5 mg Oral BID    sodium chloride flush  10 mL IntraVENous 2 times per day    apixaban  5 mg Oral BID    escitalopram  20 mg Oral Daily    fenofibrate  54 mg Oral Daily    pantoprazole  40 mg Oral Daily    rosuvastatin  20 mg Oral Daily    lisinopril  20 mg Oral Daily    mupirocin   Nasal BID     PRN Meds: sodium chloride flush, sodium chloride, promethazine **OR** ondansetron, acetaminophen **OR** acetaminophen, zolpidem, amitriptyline, melatonin      Intake/Output Summary (Last 24 hours) at 12/16/2021 8799  Last data filed at 12/15/2021 2000  Gross per 24 hour   Intake 120 ml   Output 0 ml   Net 120 ml       Physical Exam Performed:    BP (!) 146/78   Pulse 60   Temp 97.9 °F (36.6 °C) (Oral)   Resp 17   Ht 5' 9\" (1.753 m)   Wt 187 lb 13.3 oz (85.2 kg)   SpO2 97%   BMI 27.74 kg/m²     General appearance: No apparent distress, appears stated age and cooperative. HEENT: Pupils equal, round, and reactive to light. Conjunctivae/corneas clear. Neck: Supple, with full range of motion. No jugular venous distention. Trachea midline. Respiratory:  Normal respiratory effort. Clear to auscultation, bilaterally without Rales/Wheezes/Rhonchi. Cardiovascular: Regular rate and rhythm with normal S1/S2 without murmurs, rubs or gallops. Abdomen: Soft, non-tender, non-distended with normal bowel sounds. Musculoskeletal: No clubbing, cyanosis or edema bilaterally. Full range of motion without deformity. Skin: Skin color, texture, turgor normal.  No rashes or lesions. Neurologic:  Neurovascularly intact without any focal sensory/motor deficits.  Cranial nerves: II-XII intact, grossly non-focal.  Psychiatric: Alert and oriented, thought content appropriate, normal insight  Capillary Refill: Brisk,< 3 seconds   Peripheral Pulses: +2 palpable, equal bilaterally       Labs:   Recent Labs     12/13/21  0903 12/13/21  2014 12/15/21  0451   WBC 11.1* 13.2* 7.3   HGB 13.3* 13.5 13.2*   HCT 39.3* 39.0* 37.7*    248 195     Recent Labs     12/13/21 2014 12/13/21  2247 12/15/21  1234 12/15/21  1448 12/15/21  1820   *   < > 133* 134* 128*   K 3.9   < > 3.6 3.4* 3.4*   CL 78*   < > 99 99 95*   CO2 19*   < > 23 24 23   BUN 14   < > 12 11 10   CREATININE 1.1   < > 1.3 1.2 1.3   CALCIUM 9.0   < > 9.0 8.7 8.8   PHOS 2.4*  --   --   --   --     < > = values in this interval not displayed. Recent Labs     12/13/21  0903 12/13/21 2014   AST 26 38*   ALT 19 18   BILITOT 0.7 0.6   ALKPHOS 51 49     Recent Labs     12/13/21  0903   INR 1.26*     Recent Labs     12/14/21  0242 12/15/21  0451   TROPONINI <0.01 <0.01       Urinalysis:      Lab Results   Component Value Date    NITRU Negative 12/14/2021    WBCUA 0-2 12/14/2021    RBCUA None seen 12/14/2021    BLOODU Negative 12/14/2021    SPECGRAV >=1.030 12/14/2021    GLUCOSEU Negative 12/14/2021       Consults:    IP CONSULT TO NEPHROLOGY  IP CONSULT TO HOSPITALIST      Assessment/Plan:    Active Hospital Problems    Diagnosis     Acute hyponatremia [E87.1]     Mixed hyperlipidemia [E78.2]     CKD (chronic kidney disease) stage 3, GFR 30-59 ml/min ([facility]) [N18.30]     Essential hypertension, benign [I10]     GERD (gastroesophageal reflux disease) [K21.9]        HypoNatremia - etiology clinically unable to determine but likely hypovolemic. Follow serial labs on IVF. Reviewed and documented as above. Nephrology consulted and appreciated. CKD - baseline stage 3. Follow serial labs. Reviewed and documented as above. Nephrology consulted and appreciated. HTN - w/out known CAD and no evidence of active signs/sxs of ischemia/failure.  Currently controlled on home meds w/ vitals reviewed and documented as above. HyperLipidemia - controlled on home Statin. Continue, w/ f/u and med adjustment w/ PCP    GERD - w/out active signs/sxs of dysphagia/odynophagia. No evidence of active PUD or hx of GI bleed. Controlled on home PPI - continue. Presented w/ non-specific epigastric pain. Ultrasound obtained in the ED showing gallbladder polyps    Hx DVT - unprovoked. On Eliquis - continued. Depression - controlled on home meds - continued. DVT Prophylaxis: Eliquis     Recent Labs     12/13/21  0903 12/13/21  2014 12/15/21  0451    248 195     Diet: ADULT DIET; Regular  Code Status: DNR-CC      PT/OT Eval Status: ordered and pending. Dispo - Transferred from ICU to floor 15 Dec. Patient is likely to remain in-house at least until Friday/Sat 17/18 Dec pending clinical course, PT/OT eval/recs and subspecialty recs.        Jada Carlos MD

## 2021-12-16 NOTE — PROGRESS NOTES
Occupational Therapy   Occupational Therapy Initial Assessment/Treat/Discharge  1x only  Date: 2021   Patient Name: Destiny Cardona  MRN: [id number]     : 1945    Date of Service: 2021    Discharge Recommendations:  Home with assist PRN       Assessment   Assessment: PAtient admitted with acute hyponatremia. Today patient supervision to Asher with ADLs/transfers and mobility. No further OT needs. Prognosis: Good  Decision Making: Low Complexity  OT Education: OT Role; Transfer Training; ADL Adaptive Strategies; Plan of Care  Patient Education: Disease specific: Pt educated on benefits of OOB activity to decrease risks associated with prolonged bedrest while in hospital.  REQUIRES OT FOLLOW UP: No  Safety Devices  Safety Devices in place: Yes  Type of devices: Left in bed; Gait belt; Call light within reach; Nurse notified; Bed alarm in place           Patient Diagnosis(es): The encounter diagnosis was Hyponatremia. has a past medical history of Bladder cancer ([facility] [address] DVT (deep venous thrombosis) ([facility] [address] Elevated fasting glucose, Malignant neoplasm of urinary bladder ([facility] [address] MVP (mitral valve prolapse), and Prostate CA ([facility] [address] has a past surgical history that includes Tonsillectomy; Colonoscopy (12); Colonoscopy (2017); Bladder surgery; Cataract removal (Bilateral); Prostatectomy (2005); Inguinal hernia repair (Right, 1999); and Penile prosthesis placement.            Restrictions  Restrictions/Precautions  Restrictions/Precautions: General Precautions, Fall Risk (medium fall risk per nursing)  Position Activity Restriction  Other position/activity restrictions: up with assist    Subjective   General  Chart Reviewed: Yes, Orders, Progress Notes, History and Physical  Patient assessed for rehabilitation services?: Yes  Family / Caregiver Present: No  Referring Practitioner: Grace Boo MD 21  Diagnosis: HypoNatremia  Subjective  Subjective: Pt pleasant states stomach upset. Agreeable to OT evaluation. Social/Functional History  Social/Functional History  Lives With: Spouse  Type of Home:  (condo)  Home Layout: Two level, Bed/Bath upstairs, 1/2 bath on main level  Home Access: Stairs to enter without rails  Entrance Stairs - Number of Steps: 3 ADEBAYO  Bathroom Shower/Tub: Tub/Shower unit  Bathroom Toilet: Standard  ADL Assistance: Independent  Homemaking Responsibilities: Yes  Ambulation Assistance: Independent  Transfer Assistance: Independent  Active : Yes  Occupation: Part time employment  Type of occupation:  [employer]  Leisure & Hobbies: [employer]       Objective   Vision: Impaired  Vision Exceptions: Wears glasses at all times; Wears glasses for reading  Hearing: Within functional limits    Orientation  Overall Orientation Status: Within Functional Limits     Balance  Sitting Balance: Independent  Standing Balance: Modified independent   Functional Mobility  Functional - Mobility Device: No device  Activity: To/from bathroom  Assist Level: Supervision  Toilet Transfers  Toilet - Technique: Ambulating  Equipment Used: Standard toilet  Toilet Transfer: Modified independent     Tone RUE  RUE Tone: Normotonic  Tone LUE  LUE Tone: Normotonic  Coordination  Movements Are Fluid And Coordinated: Yes     Bed mobility  Supine to Sit: Modified independent  Sit to Supine: Modified independent  Transfers  Sit to stand: Modified independent  Stand to sit: Modified independent              Sensation  Overall Sensation Status: WFL         BUE AROM/MMT: [facility]      Plan   Plan  Times per week: 1x only      AM-PAC Score        AM-PAC Inpatient Daily Activity Raw Score: 24 (12/16/21 1625)  AM-PAC Inpatient ADL T-Scale Score : 57.54 (12/16/21 1625)  ADL Inpatient CMS 0-100% Score: 0 (12/16/21 1625)  ADL Inpatient CMS G-Code Modifier : CH (12/16/21 1625)    Goals   1.  Pt will complete toilet transfers with Asher-STG met 12/16       Therapy Time Individual Concurrent Group Co-treatment   Time In 1313         Time Out 1346         Minutes 33         Timed Code Treatment Minutes: [address], OTR/L

## 2021-12-16 NOTE — PROGRESS NOTES
Physical Therapy    Facility/Department: [place] [address] - MED SURG/ORTHO  Initial Assessment, Treatment, and Discharge Summary    NAME: Irlanda Brown  : 1945  MRN: [id number]    Date of Service: 2021    Discharge Recommendations:  Home with assist PRN   PT Equipment Recommendations  Equipment Needed: No    Assessment   Assessment: Pt is 68 yo male who presents with diagnosis of acute hyponatremia. Pt indep with mobility at baseline. Pt grossly supervision for mobility without device this date. Declines concerns about mobility at home including gait, transfers, and stairs. Increased time required for mobility tasks d/t fatigue. Will d/c acute PT d/t at baseline function. Prognosis: Good  Decision Making: Low Complexity  PT Education: Goals; General Safety; Gait Training; PT Role; Disease Specific Education; Plan of Care; Functional Mobility Training; Transfer Training  Disease Specific Education: Pt educated on importance of OOB mobility, prevention of complications of bedrest, and general safety during hospitalization. Pt verbalized understanding  Barriers to Learning: none  No Skilled PT: At baseline function  REQUIRES PT FOLLOW UP: No  Activity Tolerance  Activity Tolerance: Patient Tolerated treatment well; Patient limited by fatigue  Activity Tolerance: /71, HR 70; SpO2 94% on RA       Patient Diagnosis(es): The encounter diagnosis was Hyponatremia. has a past medical history of Bladder cancer ([address] DVT (deep venous thrombosis) ([address] Elevated fasting glucose, Malignant neoplasm of urinary bladder ([address] MVP (mitral valve prolapse), and Prostate CA ([address] has a past surgical history that includes Tonsillectomy; Colonoscopy (12); Colonoscopy (2017); Bladder surgery; Cataract removal (Bilateral); Prostatectomy ( approx); Inguinal hernia repair (Right, 1999); and Penile prosthesis placement.     Restrictions  Restrictions/Precautions  Restrictions/Precautions: General independent  Sit to Supine: Modified independent     Transfers  Sit to Stand: Modified independent  Stand to sit: Modified independent     Ambulation  Ambulation?: Yes  Ambulation 1  Surface: level tile  Device: No Device  Assistance: Supervision  Quality of Gait: Supervisionfor balance. Mild unsteadiness initially but no overt LOB. Cues for posture  Distance: 15 ft + 40 ft  Comments: Declined further gait training d/t nausea and fatigue     Balance  Sitting - Static: Good  Sitting - Dynamic: Good  Standing - Static: Good  Standing - Dynamic: Good; -        Plan   Plan  Times per week: d/c acute PT  Safety Devices  Type of devices:  All fall risk precautions in place, Call light within reach, Gait belt, Left in bed, Patient at risk for falls, Nurse notified, Bed alarm in place                          AM-PAC Score  AM-PAC Inpatient Mobility Raw Score : 24 (12/16/21 1407)  AM-PAC Inpatient T-Scale Score : 61.14 (12/16/21 1407)  Mobility Inpatient CMS 0-100% Score: 0 (12/16/21 1407)  Mobility Inpatient CMS G-Code Modifier : [address] (12/16/21 [facility])          Goals  Short term goals  Time Frame for Short term goals: 1 PT session  Short term goal 1: Pt will be mod I with transfers - GOAL MET 12/16  Short term goal 2: Pt will ambulate 40 ft supervision without device -- GOAL MEt 12/16  Patient Goals   Patient goals : \"to go home\"       Therapy Time   Individual Concurrent Group Co-treatment   Time In 1313         Time Out 1346         Minutes 33         Timed Code Treatment Minutes: [place], PT, DPT

## 2021-12-16 NOTE — PROGRESS NOTES
4 Eyes Skin Assessment     The patient is being assess for   {Blank single:79965::\"Admission\",\"Transfer to New Unit\",\"Post-Op Surgical\",\"Cath Lab Post-Op\",\"Shift Handoff\"}    I agree that 2 RN's have performed a thorough Head to Toe Skin Assessment on the patient. ALL assessment sites listed below have been assessed. Areas assessed for pressure by both nurses:   [x]   Head, Face, and Ears   [x]   Shoulders, Back, and Chest, Abdomen  [x]   Arms, Elbows, and Hands   [x]   Coccyx, Sacrum, and Ischium  [x]   Legs, Feet, and Heels        Skin Assessed Under all Medical Devices by both nurses:  none              All Mepilex Borders were peeled back and area peeked at by both nurses:  Yes  Please list where Mepilex Borders are located:  sacrum  protection             **SHARE this note so that the co-signing nurse is able to place an eSignature**    Co-signer eSignature: {[email or web address]:[id number]}    Does the Patient have Skin Breakdown related to pressure?   No     (Insert Photo here***)         Brant Prevention initiated:  Yes   Wound Care Orders initiated:  No      WOC nurse consulted for Pressure Injury (Stage 3,4, Unstageable, DTI, NWPT, Complex wounds)and New or Established Ostomies:  No      Primary Nurse eSignature: Electronically signed by Dallas Nicole RN on 12/15/21 at 11:26 PM EST

## 2021-12-16 NOTE — PROGRESS NOTES
--    < > 2.30  --  2.20 2.20  --    BUN 14   < > 12   < > 11 10 10   CREATININE 1.1   < > 1.3   < > 1.2 1.3 1.1   LABGLOM >60   < > 54*   < > 59* 54* >60   GFRAA >60   < > >60   < > >60 >60 >60    < > = values in this interval not displayed. Assessment/     Hyponatremia:  Severe / new problem but chronic onset as he is alert  - Data:  Prior echo was normal.   U Na < 20, U Osm is 726  - Etiology: At this time would appear to be volume depletion.   Cannot exclude superimposed SIADH due SSRI / chronic nausea   - Sodium has corrected nicely  Sodium = 114 at 1 am on 12/14  Sodium = 124 at 1 am on 12/15  Sodium = 128 at 6:20 pm on 12/15  Sodium = 131 at 8:00 am on 12/16 - appropriate      Chronic Kidney Disease:  Stage 3  - Follow with me in the office  - Baseline 1.2 - 1.5  - Stable     Hypertension:  Range is ok     Plan/     Patient agrees with stopping SSRI as outpatient  Labs daily  I suspect he should drift to normal given how he responded to volume but if not we can give a dose of tolvaptan tomorrow   -----------------------------  Jennifer Jones M.D.   [facility]

## 2021-12-17 VITALS
TEMPERATURE: 98.2 F | SYSTOLIC BLOOD PRESSURE: 150 MMHG | RESPIRATION RATE: 16 BRPM | HEART RATE: 54 BPM | HEIGHT: 69 IN | BODY MASS INDEX: 27.82 KG/M2 | DIASTOLIC BLOOD PRESSURE: 83 MMHG | OXYGEN SATURATION: 99 % | WEIGHT: 187.83 LBS

## 2021-12-17 LAB
ALBUMIN SERPL-MCNC: 3.4 G/DL (ref 3.4–5)
ANION GAP SERPL CALCULATED.3IONS-SCNC: 11 MMOL/L (ref 3–16)
BUN BLDV-MCNC: 10 MG/DL (ref 7–20)
CALCIUM SERPL-MCNC: 9.4 MG/DL (ref 8.3–10.6)
CHLORIDE BLD-SCNC: 102 MMOL/L (ref 99–110)
CO2: 23 MMOL/L (ref 21–32)
CREAT SERPL-MCNC: 1.2 MG/DL (ref 0.8–1.3)
GFR AFRICAN AMERICAN: >60
GFR NON-AFRICAN AMERICAN: 59
GLUCOSE BLD-MCNC: 97 MG/DL (ref 70–99)
HCT VFR BLD CALC: 40 % (ref 40.5–52.5)
HEMOGLOBIN: 13.7 G/DL (ref 13.5–17.5)
MCH RBC QN AUTO: 30.8 PG (ref 26–34)
MCHC RBC AUTO-ENTMCNC: 34.3 G/DL (ref 31–36)
MCV RBC AUTO: 89.8 FL (ref 80–100)
PDW BLD-RTO: 14.4 % (ref 12.4–15.4)
PHOSPHORUS: 3.4 MG/DL (ref 2.5–4.9)
PLATELET # BLD: 217 K/UL (ref 135–450)
PMV BLD AUTO: 8.3 FL (ref 5–10.5)
POTASSIUM SERPL-SCNC: 4.2 MMOL/L (ref 3.5–5.1)
RBC # BLD: 4.45 M/UL (ref 4.2–5.9)
SODIUM BLD-SCNC: 136 MMOL/L (ref 136–145)
WBC # BLD: 9.7 K/UL (ref 4–11)

## 2021-12-17 PROCEDURE — 6360000002 HC RX W HCPCS: Performed by: INTERNAL MEDICINE

## 2021-12-17 PROCEDURE — 36415 COLL VENOUS BLD VENIPUNCTURE: CPT

## 2021-12-17 PROCEDURE — 85027 COMPLETE CBC AUTOMATED: CPT

## 2021-12-17 PROCEDURE — 6370000000 HC RX 637 (ALT 250 FOR IP): Performed by: INTERNAL MEDICINE

## 2021-12-17 PROCEDURE — 2580000003 HC RX 258: Performed by: INTERNAL MEDICINE

## 2021-12-17 PROCEDURE — 80069 RENAL FUNCTION PANEL: CPT

## 2021-12-17 RX ADMIN — SODIUM CHLORIDE, PRESERVATIVE FREE 10 ML: 5 INJECTION INTRAVENOUS at 08:31

## 2021-12-17 RX ADMIN — APIXABAN 5 MG: 5 TABLET, FILM COATED ORAL at 08:31

## 2021-12-17 RX ADMIN — FENOFIBRATE 54 MG: 54 TABLET ORAL at 08:30

## 2021-12-17 RX ADMIN — ROSUVASTATIN CALCIUM 20 MG: 10 TABLET, FILM COATED ORAL at 08:30

## 2021-12-17 RX ADMIN — LORAZEPAM 0.5 MG: 0.5 TABLET ORAL at 08:30

## 2021-12-17 RX ADMIN — ONDANSETRON 4 MG: 2 INJECTION INTRAMUSCULAR; INTRAVENOUS at 08:31

## 2021-12-17 RX ADMIN — MUPIROCIN: 20 OINTMENT TOPICAL at 08:55

## 2021-12-17 RX ADMIN — PANTOPRAZOLE SODIUM 40 MG: 40 TABLET, DELAYED RELEASE ORAL at 08:30

## 2021-12-17 ASSESSMENT — PAIN SCALES - GENERAL: PAINLEVEL_OUTOF10: 0

## 2021-12-17 NOTE — PROGRESS NOTES
Hospitalist Progress Note      PCP: Trisha Hendrix DO    Date of Admission: 12/13/2021    Chief Complaint: HypoNatremia    Subjective: no new c/o. Medications:  Reviewed    Infusion Medications    sodium chloride       Scheduled Medications    LORazepam  0.5 mg Oral BID    sodium chloride flush  10 mL IntraVENous 2 times per day    apixaban  5 mg Oral BID    fenofibrate  54 mg Oral Daily    pantoprazole  40 mg Oral Daily    rosuvastatin  20 mg Oral Daily    lisinopril  20 mg Oral Daily    mupirocin   Nasal BID     PRN Meds: sodium chloride flush, sodium chloride, promethazine **OR** ondansetron, acetaminophen **OR** acetaminophen, zolpidem, amitriptyline, melatonin    No intake or output data in the 24 hours ending 12/17/21 0847    Physical Exam Performed:    /71   Pulse 58   Temp 98 °F (36.7 °C) (Oral)   Resp 14   Ht 5' 9\" (1.753 m)   Wt 187 lb 13.3 oz (85.2 kg)   SpO2 97%   BMI 27.74 kg/m²     General appearance: No apparent distress, appears stated age and cooperative. HEENT: Pupils equal, round, and reactive to light. Conjunctivae/corneas clear. Neck: Supple, with full range of motion. No jugular venous distention. Trachea midline. Respiratory:  Normal respiratory effort. Clear to auscultation, bilaterally without Rales/Wheezes/Rhonchi. Cardiovascular: Regular rate and rhythm with normal S1/S2 without murmurs, rubs or gallops. Abdomen: Soft, non-tender, non-distended with normal bowel sounds. Musculoskeletal: No clubbing, cyanosis or edema bilaterally. Full range of motion without deformity. Skin: Skin color, texture, turgor normal.  No rashes or lesions. Neurologic:  Neurovascularly intact without any focal sensory/motor deficits.  Cranial nerves: II-XII intact, grossly non-focal.  Psychiatric: Alert and oriented, thought content appropriate, normal insight  Capillary Refill: Brisk,< 3 seconds   Peripheral Pulses: +2 palpable, equal bilaterally       Labs:   Recent Labs 12/15/21  0451 12/16/21  0801 12/17/21  0550   WBC 7.3 7.7 9.7   HGB 13.2* 13.2* 13.7   HCT 37.7* 37.6* 40.0*    198 217     Recent Labs     12/15/21  1820 12/16/21  0801 12/17/21  0550   * 131* 136   K 3.4* 3.6 4.2   CL 95* 98* 102   CO2 23 24 23   BUN 10 10 10   CREATININE 1.3 1.1 1.2   CALCIUM 8.8 9.1 9.4   PHOS  --   --  3.4     No results for input(s): AST, ALT, BILIDIR, BILITOT, ALKPHOS in the last 72 hours. No results for input(s): INR in the last 72 hours. Recent Labs     12/15/21  0451   TROPONINI <0.01       Urinalysis:      Lab Results   Component Value Date    NITRU Negative 12/14/2021    WBCUA 0-2 12/14/2021    RBCUA None seen 12/14/2021    BLOODU Negative 12/14/2021    SPECGRAV >=1.030 12/14/2021    GLUCOSEU Negative 12/14/2021       Consults:    IP CONSULT TO NEPHROLOGY  IP CONSULT TO HOSPITALIST      Assessment/Plan:    Active Hospital Problems    Diagnosis     Acute hyponatremia [E87.1]     Mixed hyperlipidemia [E78.2]     CKD (chronic kidney disease) stage 3, GFR 30-59 ml/min (HCC) [N18.30]     Essential hypertension, benign [I10]     GERD (gastroesophageal reflux disease) [K21.9]        HypoNatremia - etiology clinically unable to determine but likely hypovolemic. Follow serial labs on IVF. Reviewed and documented as above. Nephrology consulted and appreciated. SSRI d/c'd, to f/u w/ PCP. CKD - baseline stage 3. Follow serial labs. Reviewed and documented as above. Nephrology consulted and appreciated. HTN - w/out known CAD and no evidence of active signs/sxs of ischemia/failure. Currently controlled on home meds w/ vitals reviewed and documented as above. HyperLipidemia - controlled on home Statin. Continue, w/ f/u and med adjustment w/ PCP    GERD - w/out active signs/sxs of dysphagia/odynophagia. No evidence of active PUD or hx of GI bleed. Controlled on home PPI - continue. Presented w/ non-specific epigastric pain.   Ultrasound obtained in the ED showing gallbladder polyps    Hx DVT - unprovoked. On Eliquis - continued. Depression - controlled on home meds - continued. DVT Prophylaxis: Eliquis     Recent Labs     12/15/21  0451 12/16/21  0801 12/17/21  0550    198 217     Diet: ADULT DIET; Regular  Code Status: DNR-CC      PT/OT Eval Status: seen w/ recs for home w/ assist.     Dispo - Transferred from ICU to floor 15 Dec. Patient is likely to remain in-house at least until Friday/Sat 17/18 Dec pending clinical course.        Rafal Lawrence MD

## 2021-12-17 NOTE — CARE COORDINATION
CASE MANAGEMENT DISCHARGE SUMMARY      Discharge to: Home with NN with family    IMM given: Admission    New Durable Medical Equipment ordered/agency: None    Transportation: family    Confirmed discharge plan with: Therapy recs home with help PRN. Spoke to Bella Villanueva and no DCP needs identified. Pt will return home with current support system in place.        RN, name: Maxx Diaz RN

## 2021-12-17 NOTE — PROGRESS NOTES
Progress Note    HISTORY     CC:  Abnormal Labs           We are following for hyponatremia       Subjective/   HPI:  He is doing better. Still with nausea. Sodium correction has been appropriate. BP is good.       ROS:  Constitutional:  No fevers, No Chills, + weakness  Cardiovascular:  No palpations, + edema  Respiratory:  No wheezing, no cough  Skin:  No rash, no itching  :  No hematuria, no dysuria     Social Hx:  Family at the bedside     Past Medical and Surgical History:  - Reviewed, no changes     EXAM       Objective/     Vitals:    12/17/21 0004 12/17/21 0829 12/17/21 1429 12/17/21 1430   BP: (!) 148/76 130/71 (!) 150/30 (!) 150/83   Pulse: 68 58 57 54   Resp: 14 14 16    Temp: 97.8 °F (36.6 °C) 98 °F (36.7 °C) 98.7 °F (37.1 °C) 98.2 °F (36.8 °C)   TempSrc: Oral Oral Oral    SpO2: 97% 97% 99%    Weight:       Height:         24HR INTAKE/OUTPUT:    No intake or output data in the 24 hours ending 12/17/21 1606  Constitutional:  Alert, awake, no apparent distress  Eyes:  Pupils reactive, sclera clear   Neck:  Normal thyroid, no masses   Cardiovascular:  Regular, no rub  Respiratory:  No distress, no wheezing  Psychiatry:  Appropriate mood/affect, alert  Abdomen: +bs, soft, nt, no masses   Musculoskeletal: Trace LE edema, no clubbing   Lymphatics:  No LAD in neck, no supraclavicular nodes       MEDICAL DECISION MAKING       Data/  Recent Labs     12/15/21  0451 12/16/21  0801 12/17/21  0550   WBC 7.3 7.7 9.7   HGB 13.2* 13.2* 13.7   HCT 37.7* 37.6* 40.0*   MCV 87.1 88.2 89.8    198 217     Recent Labs     12/15/21  1022 12/15/21  1234 12/15/21  1448 12/15/21  1448 12/15/21  1820 12/16/21  0801 12/17/21  0550   *   < > 134*   < > 128* 131* 136   K 3.4*   < > 3.4*   < > 3.4* 3.6 4.2   CL 98*   < > 99   < > 95* 98* 102   CO2 24   < > 24   < > 23 24 23   GLUCOSE 110*   < > 119*   < > 133* 97 97   PHOS  --   --   --   --   --   --  3.4   MG 2.30  --  2.20  --  2.20  --   --    BUN 12   < > 11   < > 10 10 10   CREATININE 1.3   < > 1.2   < > 1.3 1.1 1.2   LABGLOM 54*   < > 59*   < > 54* >60 59*   GFRAA >60   < > >60   < > >60 >60 >60    < > = values in this interval not displayed. Assessment/     Hyponatremia:  Severe / new problem but chronic onset as he is alert  - Data:  Prior echo was normal.   U Na < 20, U Osm is 726  - Etiology: At this time would appear to be volume depletion.   Cannot exclude superimposed SIADH due SSRI / chronic nausea   - Sodium has corrected nicely  Sodium = 114 at 1 am on 12/14  Sodium = 124 at 1 am on 12/15  Sodium = 128 at 6:20 pm on 12/15  Sodium = 131 at 8:00 am on 12/16 - appropriate   Now at 136, has follow up with GI     Chronic Kidney Disease:  Stage 3  - Follow with me in the office  - Baseline 1.2 - 1.5  - Stable     Hypertension:  Range is ok     Plan/     Patient agrees with stopping SSRI as outpatient  Linda Skaggs for discharge  Will arrange CKD follow up as outpatient   -----------------------------  Alayna Goldsmith M.D.   [facility]

## 2021-12-17 NOTE — DISCHARGE SUMMARY
Hospital Medicine Discharge Summary    Patient ID: Demarcus Mcclellan      Patient's PCP: Carmen Meeks DO    Admit Date: 12/13/2021     Discharge Date: 12/17/2021      Admitting Physician: Gege Haynes DO     Discharge Physician: Sarai Sandoval MD     Discharge Diagnoses: Active Hospital Problems    Diagnosis     Acute hyponatremia [E87.1]     Mixed hyperlipidemia [E78.2]     CKD (chronic kidney disease) stage 3, GFR 30-59 ml/min (HCC) [N18.30]     Essential hypertension, benign [I10]     GERD (gastroesophageal reflux disease) [K21.9]        The patient was seen and examined on day of discharge and this discharge summary is in conjunction with any daily progress note from day of discharge. Hospital Course:        HypoNatremia - etiology clinically unable to determine but likely hypovolemic. Followed serial labs on IVF, now off and stable. Nephrology consulted and appreciated. SSRI d/c'd, to f/u w/ PCP.    CKD - baseline stage 3. Followed serial labs. .  Nephrology consulted and appreciated.      HTN - w/out known CAD and no evidence of active signs/sxs of ischemia/failure. Currently controlled on home meds     HyperLipidemia - controlled on home Statin. Continue, w/ f/u and med adjustment w/ PCP     GERD - w/out active signs/sxs of dysphagia/odynophagia. No evidence of active PUD or hx of GI bleed. Controlled on home PPI - continue. Presented w/ non-specific epigastric pain. Ultrasound obtained in the ED showing gallbladder polyps     Hx DVT - unprovoked. On Eliquis - continued.      Depression - controlled on home meds - continued w/ exception of SSRI as above.        Labs:  For convenience and continuity at follow-up the following most recent labs are provided:      CBC:    Lab Results   Component Value Date    WBC 9.7 12/17/2021    HGB 13.7 12/17/2021    HCT 40.0 12/17/2021     12/17/2021       Renal:    Lab Results   Component Value Date     12/17/2021    K 4.2 12/17/2021 K 3.6 12/16/2021     12/17/2021    CO2 23 12/17/2021    BUN 10 12/17/2021    CREATININE 1.2 12/17/2021    CALCIUM 9.4 12/17/2021    PHOS 3.4 12/17/2021         Significant Diagnostic Studies    Radiology:   XR CHEST PORTABLE   Final Result   Minimal left basilar atelectasis. Consults:     IP CONSULT TO NEPHROLOGY  IP CONSULT TO HOSPITALIST    Disposition: Home     Condition at Discharge: Stable    Discharge Instructions/Follow-up:  w/ PCP 1-2 weeks and subspecialists as arranged. Code Status:  DNR/CC    Activity: activity as tolerated    Diet: regular diet      Discharge Medications:     Discharge Medication List as of 12/17/2021  3:00 PM           Details   LORazepam (ATIVAN) 0.5 MG tablet Take 0.5 mg by mouth 2 times daily. Pt takes 0.5 mg in AM and 1 mg in afternoon. Historical Med      amitriptyline (ELAVIL) 25 MG tablet Historical Med      zolpidem (AMBIEN) 5 MG tablet Take 1 tablet by mouth nightly as needed for Sleep for up to 90 days. , Disp-30 tablet, R-2Normal      fenofibrate (TRICOR) 54 MG tablet Take 1 tablet by mouth daily s [facility] pharmacy: Please dispense generic fenofibrate unless prescriber denote, Disp-90 tablet, R-1Normal      lisinopril (PRINIVIL;ZESTRIL) 20 MG tablet Take 1 tablet by mouth daily, Disp-90 tablet, R-3Requesting 1 year supplyNormal      apixaban (ELIQUIS) 5 MG TABS tablet TAKE 1 TABLET BY MOUTH TWO  TIMES DAILY, Disp-180 tablet, R-3Normal      Azelastine-Fluticasone 137-50 MCG/ACT SUSP 1 spray by Nasal route 2 times daily, Disp-23 g, R-1Normal      rosuvastatin (CRESTOR) 20 MG tablet Take 1 tablet by mouth daily, Disp-90 tablet, R-1Normal      pantoprazole (PROTONIX) 40 MG tablet Take 40 mg by mouth daily Historical Med             Time Spent on discharge is more than 30 minutes in the examination, evaluation, counseling and review of medications and discharge plan.       Signed:    Carmin Lesch, MD   12/17/2021      Thank you Karen Wright DO for the opportunity to be involved in this patient's care. If you have any questions or concerns please feel free to contact me at [phone].

## 2021-12-17 NOTE — PROGRESS NOTES
Pt being DCd at this time. AVS provided, pt denies any questions or concerns. PIV removed prior to DC. Pt has all personal belongings upon DC with wife via private car.

## 2021-12-20 ENCOUNTER — CARE COORDINATION (OUTPATIENT)
Dept: CASE MANAGEMENT | Age: 76
End: 2021-12-20

## 2021-12-20 DIAGNOSIS — N18.30 STAGE 3 CHRONIC KIDNEY DISEASE, UNSPECIFIED WHETHER STAGE 3A OR 3B CKD (HCC): Primary | ICD-10-CM

## 2021-12-20 PROCEDURE — 1111F DSCHRG MED/CURRENT MED MERGE: CPT | Performed by: STUDENT IN AN ORGANIZED HEALTH CARE EDUCATION/TRAINING PROGRAM

## 2021-12-20 NOTE — CARE COORDINATION
[facility] Transitions Initial Follow Up Call    Call within 2 business days of discharge: Yes    Patient: Ofelia Montez Patient : 1945   MRN: [id number]  Reason for Admission: Abnormal labs hyponatremia   Discharge Date: 21 RARS: Readmission Risk Score: 8.1 ( )      Last Discharge [facility]       Complaint Diagnosis Description Type Department Provider    21 Abnormal Lab Hyponatremia ED to Hosp-Admission (Discharged) (ADMITTED) BEBE Hunt MD; Flor Cummings. .. Spoke with: Kvng 29: BETHANY     Transitions of Care Initial Call    Was this an external facility discharge? No Discharge Facility: n/a    Challenges to be reviewed by the provider   Additional needs identified to be addressed with provider: No  none             Method of communication with provider : none      Advance Care Planning:   Does patient have an Advance Directive: not on file. Was this a readmission? No  Patient stated reason for admission: abnormal lab   Patients top risk factors for readmission: medical condition-.    Care Transition Nurse (CTN) contacted the patient by telephone to perform post hospital discharge assessment. Verified name and  with patient as identifiers. Provided introduction to self, and explanation of the CTN role. CTN reviewed discharge instructions, medical action plan and red flags with patient who verbalized understanding. Patient given an opportunity to ask questions and does not have any further questions or concerns at this time. Were discharge instructions available to patient? Yes. Reviewed appropriate site of care based on symptoms and resources available to patient including: PCP, Specialist and When to call 911. The patient agrees to contact the PCP office for questions related to their healthcare. Medication reconciliation was performed with patient, who verbalizes understanding of administration of home medications.  Advised obtaining a 90-day supply of all daily and as-needed medications. Reviewed and educated patient on any new and changed medications related to discharge diagnosis. CTN provided contact information. Plan for follow-up call in 5-7 days based on severity of symptoms and risk factors. Plan for next call: symptom management-.  self management-.  follow up appointment-.  medication management-. Spoke with patient who reported he is doing alright just weak and recovering still. Patient reported he is waiting for nephrologist office to call him back to setup apt. CTN setup PCP apt for patient on 12/27. CTN reviewed all medications with patient who reported he is taking all as prescribed. Denies any acute needs at present time. Agreeable to f/u calls. Educated on the use of urgent care or physicians 24 hr access line if assistance is needed after hours.          Care Transitions 24 Hour Call    Do you have any ongoing symptoms?: Yes  Patient-reported symptoms: Weakness  Do you have a copy of your discharge instructions?: Yes  Do you have all of your prescriptions and are they filled?: Yes  Have you been contacted by a [address]?: No  Have you scheduled your follow up appointment?: Yes  How are you going to get to your appointment?: Car - family or friend to transport  Were you discharged with any Home Care or Post Acute Services: No  Do you feel like you have everything you need to keep you well at home?: Yes  Care Transitions Interventions         Follow Up  Future Appointments   Date Time Provider Austin Colunga   12/27/2021 10:00 AM Yaneth Rosenberg, [address] [facility]     Priscila LEUNGN, RN, [address] [facility] Transition Nurse  [phone] mobile

## 2021-12-22 ENCOUNTER — OFFICE VISIT (OUTPATIENT)
Dept: PRIMARY CARE CLINIC | Age: 76
End: 2021-12-22
Payer: MEDICARE

## 2021-12-22 VITALS
WEIGHT: 180.4 LBS | DIASTOLIC BLOOD PRESSURE: 80 MMHG | BODY MASS INDEX: 26.72 KG/M2 | TEMPERATURE: 97.3 F | HEART RATE: 75 BPM | HEIGHT: 69 IN | SYSTOLIC BLOOD PRESSURE: 127 MMHG

## 2021-12-22 DIAGNOSIS — E87.1 HYPONATREMIA: ICD-10-CM

## 2021-12-22 DIAGNOSIS — F41.1 GAD (GENERALIZED ANXIETY DISORDER): ICD-10-CM

## 2021-12-22 DIAGNOSIS — E87.1 HYPONATREMIA: Primary | ICD-10-CM

## 2021-12-22 DIAGNOSIS — K82.4 POLYP OF GALLBLADDER: ICD-10-CM

## 2021-12-22 DIAGNOSIS — R11.0 DAILY NAUSEA: ICD-10-CM

## 2021-12-22 DIAGNOSIS — K44.9 HIATAL HERNIA: ICD-10-CM

## 2021-12-22 DIAGNOSIS — F51.01 PRIMARY INSOMNIA: ICD-10-CM

## 2021-12-22 LAB
A/G RATIO: 2.1 (ref 1.1–2.2)
ALBUMIN SERPL-MCNC: 4.4 G/DL (ref 3.4–5)
ALP BLD-CCNC: 58 U/L (ref 40–129)
ALT SERPL-CCNC: 14 U/L (ref 10–40)
ANION GAP SERPL CALCULATED.3IONS-SCNC: 14 MMOL/L (ref 3–16)
AST SERPL-CCNC: 13 U/L (ref 15–37)
BILIRUB SERPL-MCNC: 0.4 MG/DL (ref 0–1)
BUN BLDV-MCNC: 19 MG/DL (ref 7–20)
CALCIUM SERPL-MCNC: 9.7 MG/DL (ref 8.3–10.6)
CHLORIDE BLD-SCNC: 98 MMOL/L (ref 99–110)
CO2: 24 MMOL/L (ref 21–32)
CREAT SERPL-MCNC: 1.4 MG/DL (ref 0.8–1.3)
GFR AFRICAN AMERICAN: 59
GFR NON-AFRICAN AMERICAN: 49
GLUCOSE BLD-MCNC: 80 MG/DL (ref 70–99)
POTASSIUM SERPL-SCNC: 4.9 MMOL/L (ref 3.5–5.1)
SODIUM BLD-SCNC: 136 MMOL/L (ref 136–145)
TOTAL PROTEIN: 6.5 G/DL (ref 6.4–8.2)

## 2021-12-22 PROCEDURE — 1111F DSCHRG MED/CURRENT MED MERGE: CPT | Performed by: STUDENT IN AN ORGANIZED HEALTH CARE EDUCATION/TRAINING PROGRAM

## 2021-12-22 PROCEDURE — 99496 TRANSJ CARE MGMT HIGH F2F 7D: CPT | Performed by: STUDENT IN AN ORGANIZED HEALTH CARE EDUCATION/TRAINING PROGRAM

## 2021-12-22 RX ORDER — BUSPIRONE HYDROCHLORIDE 7.5 MG/1
7.5 TABLET ORAL 3 TIMES DAILY
Qty: 90 TABLET | Refills: 0 | Status: SHIPPED | OUTPATIENT
Start: 2021-12-22 | End: 2022-01-18 | Stop reason: SDUPTHER

## 2021-12-22 RX ORDER — ONDANSETRON 8 MG/1
8 TABLET, ORALLY DISINTEGRATING ORAL EVERY 8 HOURS PRN
Qty: 60 TABLET | Refills: 0 | Status: ON HOLD | OUTPATIENT
Start: 2021-12-22 | End: 2022-01-09 | Stop reason: SDUPTHER

## 2021-12-22 RX ORDER — ZOLPIDEM TARTRATE 10 MG/1
10 TABLET ORAL NIGHTLY PRN
Qty: 30 TABLET | Refills: 0 | Status: SHIPPED | OUTPATIENT
Start: 2021-12-22 | End: 2022-01-05

## 2021-12-22 ASSESSMENT — ENCOUNTER SYMPTOMS
VOMITING: 0
ABDOMINAL PAIN: 0
ABDOMINAL DISTENTION: 1
BLOOD IN STOOL: 0
CHEST TIGHTNESS: 0
NAUSEA: 1
COUGH: 0
ANAL BLEEDING: 0
SHORTNESS OF BREATH: 0

## 2021-12-22 NOTE — PROGRESS NOTES
Post-Discharge Transitional Care Management Services or Hospital Follow Up      Blas Jonas   YOB: 1945    Date of Office Visit:  12/22/2021  Date of Hospital Admission: 12/13/21  Date of Hospital Discharge: 12/17/21  Risk of hospital readmission (high >=14%. Medium >=10%) :Readmission Risk Score: 8.1 ( )      Care management risk score Rising risk (score 2-5) and Complex Care (Scores >=6): 2     Non face to face  following discharge, date last encounter closed (first attempt may have been earlier): 12/20/2021  9:45 AM    Call initiated 2 business days of discharge: Yes    Patient Active Problem List   Diagnosis    GERD (gastroesophageal reflux disease)    MANISH (generalized anxiety disorder)    Essential hypertension, benign    CKD (chronic kidney disease) stage 3, GFR 30-59 ml/min (HCC)    Mixed hyperlipidemia    Ni's esophagus without dysplasia    Acute hyponatremia       Allergies   Allergen Reactions    Lipitor Rash       Medications listed as ordered at the time of discharge from hospital     Medication List          Accurate as of December 22, 2021  1:20 PM. If you have any questions, ask your nurse or doctor. START taking these medications    busPIRone 7.5 MG tablet  Commonly known as: BUSPAR  Take 1 tablet by mouth 3 times daily  Started by: Aiden Goins,      ondansetron 8 MG Tbdp disintegrating tablet  Commonly known as: Zofran ODT  Place 1 tablet under the tongue every 8 hours as needed for Nausea  Started by: Aiden Goins,      zolpidem 10 MG tablet  Commonly known as: Ambien  Take 1 tablet by mouth nightly as needed for Sleep for up to 14 days.   Started by: Aiden Goins, DO        CONTINUE taking these medications    apixaban 5 MG Tabs tablet  Commonly known as: Eliquis  TAKE 1 TABLET BY MOUTH TWO  TIMES DAILY     fenofibrate 54 MG tablet  Commonly known as: TRICOR  Take 1 tablet by mouth daily [facility] pharmacy: Please dispense generic fenofibrate unless prescriber denote     lisinopril 20 MG tablet  Commonly known as: PRINIVIL;ZESTRIL  Take 1 tablet by mouth daily     LORazepam 0.5 MG tablet  Commonly known as: ATIVAN     pantoprazole 40 MG tablet  Commonly known as: PROTONIX     rosuvastatin 20 MG tablet  Commonly known as: CRESTOR  Take 1 tablet by mouth daily           Where to Get Your Medications      [facility] [address] Valente [phone] - F [phone]  [address] [address] [facility] [address]    Phone: [phone]   · busPIRone 7.5 MG tablet  · ondansetron 8 MG Tbdp disintegrating tablet  · zolpidem 10 MG tablet           Medications marked \"taking\" at this time  Outpatient Medications Marked as Taking for the 12/22/21 encounter (Office Visit) with Danay Loyola, DO   Medication Sig Dispense Refill    zolpidem (AMBIEN) 10 MG tablet Take 1 tablet by mouth nightly as needed for Sleep for up to 14 days. 30 tablet 0    busPIRone (BUSPAR) 7.5 MG tablet Take 1 tablet by mouth 3 times daily 90 tablet 0    ondansetron (ZOFRAN ODT) 8 MG TBDP disintegrating tablet Place 1 tablet under the tongue every 8 hours as needed for Nausea 60 tablet 0    LORazepam (ATIVAN) 0.5 MG tablet Take 0.5 mg by mouth 2 times daily. Pt takes 0.5 mg in AM and 1 mg in afternoon.       fenofibrate (TRICOR) 54 MG tablet Take 1 tablet by mouth daily [facility] pharmacy: Please dispense generic fenofibrate unless prescriber denote 90 tablet 1    lisinopril (PRINIVIL;ZESTRIL) 20 MG tablet Take 1 tablet by mouth daily 90 tablet 3    apixaban (ELIQUIS) 5 MG TABS tablet TAKE 1 TABLET BY MOUTH TWO  TIMES DAILY 180 tablet 3    rosuvastatin (CRESTOR) 20 MG tablet Take 1 tablet by mouth daily 90 tablet 1    pantoprazole (PROTONIX) 40 MG tablet Take 40 mg by mouth daily           Medications patient taking as of now reconciled against medications ordered at time of hospital discharge: Yes    Chief Complaint   Patient presents with  Follow-Up from Hospital    Nausea & Vomiting       History of Present illness - Follow up of Hospital diagnosis(es): Patient presented to the emergency department after routine blood work demonstrated hyponatremia. Inpatient, nephrology was consulted. Patient has been dealing with chronic nausea, which is being worked up by gastroenterology. There is a theory it could be related to a polyp in his gallbladder. Because of this he has had very low food intake; however, he has been in taking a decent amount of fluids. Nephrology did stop his SSRI, but their feeling was that it was likely related to the latter. He continues to have nausea, which she manages with Zofran which she was discharged with. He is working on getting a hold of his gastroenterologist.    Inpatient course: Discharge summary reviewed- see chart. Review of Systems   Constitutional: Negative for fatigue. Eyes: Negative for visual disturbance. Respiratory: Negative for cough, chest tightness and shortness of breath. Cardiovascular: Negative for chest pain, palpitations and leg swelling. Gastrointestinal: Positive for abdominal distention and nausea. Negative for abdominal pain, anal bleeding, blood in stool and vomiting. Endocrine: Negative for polydipsia, polyphagia and polyuria. Genitourinary: Negative for frequency. Skin: Negative for rash. Neurological: Negative for dizziness, syncope, weakness and light-headedness. Vitals:    12/22/21 1115   BP: 127/80   Pulse: 75   Temp: 97.3 °F (36.3 °C)   Weight: 180 lb 6.4 oz (81.8 kg)   Height: 5' 9\" (1.753 m)     Body mass index is 26.64 kg/m².    Wt Readings from Last 3 Encounters:   12/22/21 180 lb 6.4 oz (81.8 kg)   12/15/21 187 lb 13.3 oz (85.2 kg)   09/16/21 191 lb 9.6 oz (86.9 kg)     BP Readings from Last 3 Encounters:   12/22/21 127/80   12/17/21 (!) 150/83   09/16/21 111/68        Physical Exam:  General Appearance: alert and oriented to person, place and time, well developed and well- nourished, in no acute distress  Skin: warm and dry, no rash or erythema  Head: normocephalic and atraumatic  Eyes: pupils equal, round, and reactive to light, extraocular eye movements intact, conjunctivae normal  ENT: tympanic membrane, external ear and ear canal normal bilaterally, nose without deformity, nasal mucosa and turbinates normal without polyps  Neck: supple and non-tender without mass, no thyromegaly or thyroid nodules, no cervical lymphadenopathy  Pulmonary/Chest: clear to auscultation bilaterally- no wheezes, rales or rhonchi, normal air movement, no respiratory distress  Cardiovascular: normal rate, regular rhythm, normal S1 and S2, no murmurs, rubs, clicks, or gallops, distal pulses intact, no carotid bruits  Abdomen: soft, non-tender, non-distended, normal bowel sounds, no masses or organomegaly  Extremities: no cyanosis, clubbing or edema  Musculoskeletal: normal range of motion, no joint swelling, deformity or tenderness  Neurologic: reflexes normal and symmetric, no cranial nerve deficit, gait, coordination and speech normal    Assessment/Plan:  1. Hyponatremia: Agree with nephrology that this is likely related to low food intake coupled with increased fluid intake; however, I do agree with stopping his SSRI. Updating CMP today. - PA DISCHARGE MEDS RECONCILED W/ CURRENT OUTPATIENT MED LIST  - COMPREHENSIVE METABOLIC PANEL; Future    2. Daily nausea: Patient has been following with gastroenterology about chronic nausea. Right upper quadrant ultrasound demonstrated gallbladder polyp. He has not seen gastroenterology in follow-up. Unsure if this could be related to his chronic hiatal hernia or this polyp in his gallbladder. Will refer to general surgeon. Question if patient could benefit from possible gallbladder removal, hiatal hernia repair or both. Given patient's age he is decently healthy and would likely be able to have the surgery.   - ondansetron (ZOFRAN ODT) 8 MG TBDP disintegrating tablet; Place 1 tablet under the tongue every 8 hours as needed for Nausea  Dispense: 60 tablet; Refill: 0    3. Polyp of gallbladder: As above  - Rebekah Morris MD, General Surgery, [facility]    4. Hiatal hernia: As above  [facility]    5. MANISH (generalized anxiety disorder): Patient's Lexapro was stopped due to hyponatremia, which I agree with. Will increase his BuSpar from 5 mg twice daily to 7.5 mg 3 times daily. This should have a lower risk of developing hyponatremia. Again I think there is a low chance this was contributing regardless. - busPIRone (BUSPAR) 7.5 MG tablet; Take 1 tablet by mouth 3 times daily  Dispense: 90 tablet; Refill: 0    6. Primary insomnia: Longstanding insomnia, which had previously been managed on trazodone and Elavil; however, it was causing him clouded thoughts and daytime fatigue. Tried Belsomra, but too expensive and covered by insurance. He has been on 5 mg of the Ambien, which lasts for about 3 to 4 hours. He would like to increase to 10 mg. He is well aware of the risks as we have discussed this in great detail before. Okay with increasing to 10 mg.  - zolpidem (AMBIEN) 10 MG tablet; Take 1 tablet by mouth nightly as needed for Sleep for up to 14 days. Dispense: 30 tablet;  Refill: 0      Follow-up 2 weeks    Medical Decision Making: high complexity

## 2021-12-30 ENCOUNTER — CARE COORDINATION (OUTPATIENT)
Dept: CASE MANAGEMENT | Age: 76
End: 2021-12-30

## 2021-12-30 NOTE — CARE COORDINATION
[facility] Transitions Follow Up Call    2021    Patient: Judge King  Patient : 1945   MRN: [id number]  Reason for Admission:   Discharge Date: 21 RARS: Readmission Risk Score: 8.1 ( )         Attempted to reach patient via phone for care transition. VM left stating purpose of call along with my contact information requesting a return call. Care Transitions Subsequent and Final Call    Subsequent and Final Calls  Care Transitions Interventions  Other Interventions:            Follow Up  Future Appointments   Date Time Provider Austin Colunga   1/3/2022 10:00 AM Dustin Crocker MD AND [facility]   2022 11:00 AM Karen Wright DO [address] [place] - BILLY Cason RN

## 2021-12-31 ENCOUNTER — HOSPITAL ENCOUNTER (OUTPATIENT)
Age: 76
Discharge: HOME | End: 2021-12-31
Payer: MEDICARE

## 2021-12-31 LAB
ALBUMIN SERPL-MCNC: 4.4 G/DL (ref 3.4–5)
ANION GAP SERPL CALCULATED.3IONS-SCNC: 13 MMOL/L (ref 3–16)
BUN BLDV-MCNC: 17 MG/DL (ref 7–20)
CALCIUM SERPL-MCNC: 9.8 MG/DL (ref 8.3–10.6)
CHLORIDE BLD-SCNC: 101 MMOL/L (ref 99–110)
CO2: 25 MMOL/L (ref 21–32)
CREAT SERPL-MCNC: 1.4 MG/DL (ref 0.8–1.3)
GFR AFRICAN AMERICAN: 59
GFR NON-AFRICAN AMERICAN: 49
GLUCOSE BLD-MCNC: 96 MG/DL (ref 70–99)
PHOSPHORUS: 3.7 MG/DL (ref 2.5–4.9)
POTASSIUM SERPL-SCNC: 4.6 MMOL/L (ref 3.5–5.1)
SODIUM BLD-SCNC: 139 MMOL/L (ref 136–145)

## 2021-12-31 PROCEDURE — 36415 COLL VENOUS BLD VENIPUNCTURE: CPT

## 2021-12-31 PROCEDURE — 80069 RENAL FUNCTION PANEL: CPT

## 2022-01-03 ENCOUNTER — ANESTHESIA EVENT (OUTPATIENT)
Dept: OPERATING ROOM | Age: 77
End: 2022-01-03
Payer: MEDICARE

## 2022-01-03 ENCOUNTER — INITIAL CONSULT (OUTPATIENT)
Dept: SURGERY | Age: 77
End: 2022-01-03
Payer: MEDICARE

## 2022-01-03 VITALS
DIASTOLIC BLOOD PRESSURE: 82 MMHG | WEIGHT: 183 LBS | HEIGHT: 69 IN | BODY MASS INDEX: 27.11 KG/M2 | SYSTOLIC BLOOD PRESSURE: 124 MMHG

## 2022-01-03 DIAGNOSIS — K82.4 GALLBLADDER POLYP: Primary | ICD-10-CM

## 2022-01-03 DIAGNOSIS — Z01.818 PRE-OP TESTING: ICD-10-CM

## 2022-01-03 DIAGNOSIS — F41.1 GAD (GENERALIZED ANXIETY DISORDER): Primary | ICD-10-CM

## 2022-01-03 PROCEDURE — 4040F PNEUMOC VAC/ADMIN/RCVD: CPT | Performed by: SURGERY

## 2022-01-03 PROCEDURE — G8484 FLU IMMUNIZE NO ADMIN: HCPCS | Performed by: SURGERY

## 2022-01-03 PROCEDURE — G8427 DOCREV CUR MEDS BY ELIG CLIN: HCPCS | Performed by: SURGERY

## 2022-01-03 PROCEDURE — G8417 CALC BMI ABV UP PARAM F/U: HCPCS | Performed by: SURGERY

## 2022-01-03 PROCEDURE — 1111F DSCHRG MED/CURRENT MED MERGE: CPT | Performed by: SURGERY

## 2022-01-03 PROCEDURE — 99203 OFFICE O/P NEW LOW 30 MIN: CPT | Performed by: SURGERY

## 2022-01-03 PROCEDURE — 1036F TOBACCO NON-USER: CPT | Performed by: SURGERY

## 2022-01-03 PROCEDURE — 1123F ACP DISCUSS/DSCN MKR DOCD: CPT | Performed by: SURGERY

## 2022-01-03 RX ORDER — SODIUM CHLORIDE 9 MG/ML
25 INJECTION, SOLUTION INTRAVENOUS PRN
Status: CANCELLED | OUTPATIENT
Start: 2022-01-03

## 2022-01-03 RX ORDER — SODIUM CHLORIDE 0.9 % (FLUSH) 0.9 %
5-40 SYRINGE (ML) INJECTION EVERY 12 HOURS SCHEDULED
Status: CANCELLED | OUTPATIENT
Start: 2022-01-03

## 2022-01-03 RX ORDER — SODIUM CHLORIDE 0.9 % (FLUSH) 0.9 %
5-40 SYRINGE (ML) INJECTION PRN
Status: CANCELLED | OUTPATIENT
Start: 2022-01-03

## 2022-01-03 RX ORDER — LORAZEPAM 0.5 MG/1
0.5 TABLET ORAL 2 TIMES DAILY
Qty: 60 TABLET | Refills: 2 | Status: SHIPPED | OUTPATIENT
Start: 2022-01-03 | End: 2022-01-06

## 2022-01-03 RX ORDER — HEPARIN SODIUM 5000 [USP'U]/ML
5000 INJECTION, SOLUTION INTRAVENOUS; SUBCUTANEOUS ONCE
Status: CANCELLED | OUTPATIENT
Start: 2022-01-03

## 2022-01-03 NOTE — PROGRESS NOTES
Dorrene Sheen    Age 67 y.o.    male    1945    MRN [id number]    1/10/2022  Arrival Time_____________  OR Time____________[address] [employer]     Procedure(s):  VIDEO LAPAROSCOPIC CHOLECYSTECTOMY, POSSIBLE OPEN CHOLECYSTECTOMY, POSSIBLE CHOLANGIOGRAM                      General    Surgeon(s):  Abdirashid Pappas, MD       Phone [phone] ([place])     [address]  Cell         Work  _____________________________________________________________________  _____________________________________________________________________  _____________________________________________________________________  _____________________________________________________________________  _____________________________________________________________________    PCP _____________________________ Phone_________________     H&P__________________Bringing      Chart            Epic   DOS      Called________  EKG__________________Bringing      Chart            Epic   DOS      Called________  LAB__________________ Bringing      Chart            Epic   DOS      Called________  Cardiac Clearance_______Bringing      Chart            Epic      DOS      Called________    Cardiologist________________________ Phone___________________________    ? Voodoo concerns / Waiver on Chart            PAT Communications________________  ? Pre-op Instructions Given [place]          _________________________________  ? Directions to Surgery Center                          _________________________________  ? Transportation Home_______________      __________________________________  ?  Crutches/Walker__________________        __________________________________    ________Pre-op Orders   _______Transcribed    _______Wt.  ________Pharmacy          _______SCD  ______VTE     ______TED Shadi Grooms  _______  Surgery Consent    _______  Anesthesia Consent         COVID DATE______________LOCATION________________ RESULT__________

## 2022-01-03 NOTE — PROGRESS NOTES
New Patient Via Anjel Cardenas MD    [address], [address] [facility]  [place], [facility]  [phone]    Josue Jeff   YOB: 1945    Date of Visit:  1/3/2022    Carlos Perez DO    Chief Complaint: Abdominal pain    HPI: Patient presents for evaluation of epigastric and right upper quadrant abdominal pain. He states he has had this for a year. It has slowly been getting worse. He has nausea as well and is recently lost weight. He denies vomiting. His reflux is well controlled. He denies fever or chills. He states he just feels bad, especially after he eats    Allergies   Allergen Reactions    Lipitor Rash     Outpatient Medications Marked as Taking for the 1/3/22 encounter (Initial consult) with Cuauhtemoc Bolanos MD   Medication Sig Dispense Refill    LORazepam (ATIVAN) 0.5 MG tablet Take 1 tablet by mouth 2 times daily for 90 days. Pt takes 0.5 mg in AM and 1 mg in afternoon. (Patient taking differently: Take 1 mg by mouth 2 times daily. BID) 60 tablet 2    zolpidem (AMBIEN) 10 MG tablet Take 1 tablet by mouth nightly as needed for Sleep for up to 14 days.  (Patient taking differently: Take 5 mg by mouth nightly as needed for Sleep. ) 30 tablet 0    busPIRone (BUSPAR) 7.5 MG tablet Take 1 tablet by mouth 3 times daily 90 tablet 0    ondansetron (ZOFRAN ODT) 8 MG TBDP disintegrating tablet Place 1 tablet under the tongue every 8 hours as needed for Nausea 60 tablet 0    fenofibrate (TRICOR) 54 MG tablet Take 1 tablet by mouth daily s DAWto pharmacy: Please dispense generic fenofibrate unless prescriber denote 90 tablet 1    lisinopril (PRINIVIL;ZESTRIL) 20 MG tablet Take 1 tablet by mouth daily 90 tablet 3    apixaban (ELIQUIS) 5 MG TABS tablet TAKE 1 TABLET BY MOUTH TWO  TIMES DAILY 180 tablet 3    rosuvastatin (CRESTOR) 20 MG tablet Take 1 tablet by mouth daily 90 tablet 1    pantoprazole (PROTONIX) 40 MG tablet Take 40 mg by mouth daily          Past Medical History:   Diagnosis Date    Bladder [facility] [address] [address]     DVT (deep venous thrombosis) ([address] 07/01/2014    two spontaneous DVTs left leg    Elevated fasting glucose     Malignant neoplasm of urinary bladder ([address] 2/4/2019    MVP (mitral valve prolapse)     Prostate CA ([address] 2005     Past Surgical History:   Procedure Laterality Date    BLADDER SURGERY      tumor removed    CATARACT REMOVAL Bilateral     COLONOSCOPY  6/29/12    Polyps    COLONOSCOPY  07/12/2017    Polyp    INGUINAL HERNIA REPAIR Right 06/1999    PENILE PROSTHESIS PLACEMENT      PROSTATECTOMY  2005 approx    Dr. Carloz Alarcon      In [facility] - had one tonsil burned out     Family History   Problem Relation Age of Onset    Lung Cancer Mother     Heart Disease Mother     Diabetes Father     Prostate Cancer Father     Diabetes Brother      Social History     Socioeconomic History    Marital status:      Spouse name: Not on file    Number of children: 2    Years of education: Not on file    Highest education level: Not on file   Occupational History    Occupation:      Comment: retired   Tobacco Use    Smoking status: Never Smoker    Smokeless tobacco: Never Used   Vaping Use    Vaping Use: Never used   Substance and Sexual Activity    Alcohol use: Yes     Comment: rare    Drug use: No    Sexual activity: Not Currently   Other Topics Concern    Not on file   Social History Narrative    , retired 1/2015     Social Determinants of Health     Financial Resource Strain: Low Risk     Difficulty of Paying Living Expenses: Not hard at all   Food Insecurity: No Food Insecurity    Worried About [address] in the Last Year: Never true    [address] in the Last Year: Never true   Transportation Needs:     Lack of Transportation (Medical): Not on file    Lack of Transportation (Non-Medical):  Not on file Physical Activity:     Days of Exercise per Week: Not on file    Minutes of Exercise per Session: Not on file   Stress:     Feeling of Stress : Not on file   Social Connections:     Frequency of Communication with Friends and Family: Not on file    Frequency of Social Gatherings with Friends and Family: Not on file    Attends Hoahaoism Services: Not on file    Active Member of [address] [place] [address] or Organizations: Not on file    Attends Club or Organization Meetings: Not on file    Marital Status: Not on file   Intimate Partner Violence:     Fear of Current or Ex-Partner: Not on file    Emotionally Abused: Not on file    Physically Abused: Not on file    Sexually Abused: Not on file   Housing Stability:     Unable to Pay for Housing in the Last Year: Not on file    Number of [place] in the Last Year: Not on file    Unstable Housing in the Last Year: Not on file          Vitals:    01/03/22 1011   BP: 124/82   Site: Left Upper Arm   Position: Sitting   Cuff Size: Large Adult   Weight: 183 lb (83 kg)   Height: 5' 9\" (1.753 m)     Body mass index is 27.02 kg/m². Wt Readings from Last 3 Encounters:   01/03/22 183 lb (83 kg)   12/22/21 180 lb 6.4 oz (81.8 kg)   12/15/21 187 lb 13.3 oz (85.2 kg)     BP Readings from Last 3 Encounters:   01/03/22 124/82   12/22/21 127/80   12/17/21 (!) 150/83        REVIEW OF SYSTEMS:  CONSTITUTIONAL:  negative  HEENT:  Negative  RESPIRATORY:  negative  CARDIOVASCULAR:  negative  GASTROINTESTINAL:  positive for nausea and abdominal pain  GENITOURINARY:  negative  HEMATOLOGIC/LYMPHATIC:  negative  ENDOCRINE:  Negative  NEUROLOGICAL:  Negative  * All other ROS reviewed and negative.      PE:  Constitutional:  Well developed, well nourished, no acute distress, non-toxic appearance   Eyes:  PERRL, conjunctiva normal   HENT:  Atraumatic, external ears normal, nose normal. Neck- normal range of motion, no tenderness, supple   Respiratory:  No respiratory distress, normal breath sounds, no rales, no wheezing   Cardiovascular:  Normal rate, normal rhythm  GI: Bowel sounds positive, soft, mild tenderness in the epigastrium and right upper quadrant  :  No costovertebral angle tenderness   Integument:  Well hydrated, no rash   Lymphatic:  No lymphadenopathy noted   Neurologic:  Alert & oriented x 3, no focal deficits noted   Psychiatric:  Speech and behavior appropriate       DATA:  Radiology Review: Ultrasound images reviewed and show gallbladder polyps      Assessment:  1. Gallbladder polyp        Plan: While the current images are suspicious for gallbladder polyps, he has a history of cholelithiasis. These may just be adherent stones. However, given his symptoms I recommend laparoscopic cholecystectomy, possible open procedure. I explained the procedure including risks, benefits, and alternatives. Questions were answered and the patient agrees to proceed.

## 2022-01-05 ENCOUNTER — HOSPITAL ENCOUNTER (OUTPATIENT)
Age: 77
Discharge: HOME | End: 2022-01-05
Payer: MEDICARE

## 2022-01-05 DIAGNOSIS — Z01.818 PRE-OP TESTING: ICD-10-CM

## 2022-01-05 PROCEDURE — U0005 INFEC AGEN DETEC AMPLI PROBE: HCPCS

## 2022-01-05 PROCEDURE — U0003 INFECTIOUS AGENT DETECTION BY NUCLEIC ACID (DNA OR RNA); SEVERE ACUTE RESPIRATORY SYNDROME CORONAVIRUS 2 (SARS-COV-2) (CORONAVIRUS DISEASE [COVID-19]), AMPLIFIED PROBE TECHNIQUE, MAKING USE OF HIGH THROUGHPUT TECHNOLOGIES AS DESCRIBED BY CMS-2020-01-R: HCPCS

## 2022-01-06 ENCOUNTER — CARE COORDINATION (OUTPATIENT)
Dept: CASE MANAGEMENT | Age: 77
End: 2022-01-06

## 2022-01-06 LAB — SARS-COV-2: NOT DETECTED

## 2022-01-06 RX ORDER — ZOLPIDEM TARTRATE 10 MG/1
TABLET ORAL NIGHTLY PRN
COMMUNITY
End: 2022-01-26 | Stop reason: SDUPTHER

## 2022-01-06 RX ORDER — LORAZEPAM 1 MG/1
1 TABLET ORAL 2 TIMES DAILY
COMMUNITY
End: 2022-01-07 | Stop reason: SDUPTHER

## 2022-01-06 NOTE — CARE COORDINATION
[facility] Transitions Follow Up Call    2022    Patient: Keith Cuellar  Patient : 1945   MRN: [id number]  Reason for Admission:   Discharge Date: 21 RARS: Readmission Risk Score: 8.1 ( )         Final attempt made to reach patient for post hospital follow up call. Left a voice message for patient with my contact information and informed of final outreach attempt. Care Transitions Subsequent and Final Call    Subsequent and Final Calls  Care Transitions Interventions  Other Interventions: Follow Up  No future appointments.     Chad LEUNGN, RN, [facility]  Care Transition Nurse  [phone] mobile

## 2022-01-07 ENCOUNTER — PATIENT MESSAGE (OUTPATIENT)
Dept: PRIMARY CARE CLINIC | Age: 77
End: 2022-01-07

## 2022-01-07 ENCOUNTER — TELEPHONE (OUTPATIENT)
Dept: PRIMARY CARE CLINIC | Age: 77
End: 2022-01-07

## 2022-01-07 DIAGNOSIS — F41.1 GAD (GENERALIZED ANXIETY DISORDER): Primary | ICD-10-CM

## 2022-01-07 RX ORDER — LORAZEPAM 1 MG/1
1 TABLET ORAL EVERY 8 HOURS PRN
Qty: 60 TABLET | Refills: 2 | Status: SHIPPED | OUTPATIENT
Start: 2022-01-07 | End: 2022-02-06

## 2022-01-07 NOTE — TELEPHONE ENCOUNTER
Pt called in stating that his [place] script is wrong. Looking back in the chart you have not sent this in for him it looks like it was but then canceled on 1/3/22.  He states he spoke with you regarding the increase of the medication to 2 tablets daily instead of 1 1/2 and he is in need of a refill

## 2022-01-07 NOTE — TELEPHONE ENCOUNTER
From: Delisa Cardenas  To: Dr. Joanna Arambula: 1/7/2022 10:17 AM EST  Subject: problem with latest Ativan prescription    Nai Briones has been trying to reach you unsuccessfully regarding latest Ativan prescription. There is confusion regarding dosage instructions not matching number of tabs. Instrucs on bottle say take 2 1mg tabs per day, but there are only 45 tabs total. So after four tries, I.ve been unable to  the latest add on prescrip which would have given me 60 tabs for the month ! Can you call them please ?   Ramón Chester

## 2022-01-09 DIAGNOSIS — R11.0 DAILY NAUSEA: ICD-10-CM

## 2022-01-10 ENCOUNTER — HOSPITAL ENCOUNTER (OUTPATIENT)
Age: 77
Setting detail: OUTPATIENT SURGERY
Discharge: HOME | End: 2022-01-10
Attending: SURGERY | Admitting: SURGERY
Payer: MEDICARE

## 2022-01-10 ENCOUNTER — ANESTHESIA (OUTPATIENT)
Dept: OPERATING ROOM | Age: 77
End: 2022-01-10
Payer: MEDICARE

## 2022-01-10 VITALS
RESPIRATION RATE: 17 BRPM | DIASTOLIC BLOOD PRESSURE: 83 MMHG | SYSTOLIC BLOOD PRESSURE: 174 MMHG | BODY MASS INDEX: 27.55 KG/M2 | OXYGEN SATURATION: 97 % | TEMPERATURE: 96.8 F | HEART RATE: 86 BPM | HEIGHT: 69 IN | WEIGHT: 186 LBS

## 2022-01-10 VITALS
SYSTOLIC BLOOD PRESSURE: 143 MMHG | OXYGEN SATURATION: 96 % | RESPIRATION RATE: 19 BRPM | DIASTOLIC BLOOD PRESSURE: 67 MMHG

## 2022-01-10 DIAGNOSIS — K82.4 POLYP OF GALLBLADDER: ICD-10-CM

## 2022-01-10 PROCEDURE — 3700000001 HC ADD 15 MINUTES (ANESTHESIA): Performed by: SURGERY

## 2022-01-10 PROCEDURE — 2500000003 HC RX 250 WO HCPCS: Performed by: SURGERY

## 2022-01-10 PROCEDURE — 6360000002 HC RX W HCPCS: Performed by: NURSE ANESTHETIST, CERTIFIED REGISTERED

## 2022-01-10 PROCEDURE — 6360000002 HC RX W HCPCS: Performed by: ANESTHESIOLOGY

## 2022-01-10 PROCEDURE — 3600000004 HC SURGERY LEVEL 4 BASE: Performed by: SURGERY

## 2022-01-10 PROCEDURE — 6360000002 HC RX W HCPCS: Performed by: SURGERY

## 2022-01-10 PROCEDURE — 7100000000 HC PACU RECOVERY - FIRST 15 MIN: Performed by: SURGERY

## 2022-01-10 PROCEDURE — 2580000003 HC RX 258: Performed by: SURGERY

## 2022-01-10 PROCEDURE — 2500000003 HC RX 250 WO HCPCS: Performed by: ANESTHESIOLOGY

## 2022-01-10 PROCEDURE — 2500000003 HC RX 250 WO HCPCS: Performed by: NURSE ANESTHETIST, CERTIFIED REGISTERED

## 2022-01-10 PROCEDURE — 3700000000 HC ANESTHESIA ATTENDED CARE: Performed by: SURGERY

## 2022-01-10 PROCEDURE — 7100000010 HC PHASE II RECOVERY - FIRST 15 MIN: Performed by: SURGERY

## 2022-01-10 PROCEDURE — 7100000001 HC PACU RECOVERY - ADDTL 15 MIN: Performed by: SURGERY

## 2022-01-10 PROCEDURE — 47562 LAPAROSCOPIC CHOLECYSTECTOMY: CPT | Performed by: SURGERY

## 2022-01-10 PROCEDURE — 2580000003 HC RX 258: Performed by: ANESTHESIOLOGY

## 2022-01-10 PROCEDURE — 7100000011 HC PHASE II RECOVERY - ADDTL 15 MIN: Performed by: SURGERY

## 2022-01-10 PROCEDURE — 88304 TISSUE EXAM BY PATHOLOGIST: CPT

## 2022-01-10 PROCEDURE — 2709999900 HC NON-CHARGEABLE SUPPLY: Performed by: SURGERY

## 2022-01-10 PROCEDURE — 3600000014 HC SURGERY LEVEL 4 ADDTL 15MIN: Performed by: SURGERY

## 2022-01-10 PROCEDURE — 6370000000 HC RX 637 (ALT 250 FOR IP): Performed by: ANESTHESIOLOGY

## 2022-01-10 RX ORDER — SODIUM CHLORIDE 0.9 % (FLUSH) 0.9 %
10 SYRINGE (ML) INJECTION PRN
Status: DISCONTINUED | OUTPATIENT
Start: 2022-01-10 | End: 2022-01-10 | Stop reason: HOSPADM

## 2022-01-10 RX ORDER — SODIUM CHLORIDE, SODIUM LACTATE, POTASSIUM CHLORIDE, CALCIUM CHLORIDE 600; 310; 30; 20 MG/100ML; MG/100ML; MG/100ML; MG/100ML
INJECTION, SOLUTION INTRAVENOUS CONTINUOUS
Status: DISCONTINUED | OUTPATIENT
Start: 2022-01-10 | End: 2022-01-10 | Stop reason: HOSPADM

## 2022-01-10 RX ORDER — PROMETHAZINE HYDROCHLORIDE 25 MG/ML
6.25 INJECTION, SOLUTION INTRAMUSCULAR; INTRAVENOUS
Status: DISCONTINUED | OUTPATIENT
Start: 2022-01-10 | End: 2022-01-10 | Stop reason: HOSPADM

## 2022-01-10 RX ORDER — ONDANSETRON 8 MG/1
8 TABLET, ORALLY DISINTEGRATING ORAL EVERY 8 HOURS PRN
Qty: 60 TABLET | Refills: 0 | Status: SHIPPED | OUTPATIENT
Start: 2022-01-10 | End: 2022-01-26 | Stop reason: SDUPTHER

## 2022-01-10 RX ORDER — SODIUM CHLORIDE 0.9 % (FLUSH) 0.9 %
5-40 SYRINGE (ML) INJECTION PRN
Status: DISCONTINUED | OUTPATIENT
Start: 2022-01-10 | End: 2022-01-10 | Stop reason: HOSPADM

## 2022-01-10 RX ORDER — OXYCODONE HYDROCHLORIDE AND ACETAMINOPHEN 5; 325 MG/1; MG/1
1 TABLET ORAL EVERY 6 HOURS PRN
Qty: 20 TABLET | Refills: 0 | Status: SHIPPED | OUTPATIENT
Start: 2022-01-10 | End: 2022-01-15

## 2022-01-10 RX ORDER — ROCURONIUM BROMIDE 10 MG/ML
INJECTION, SOLUTION INTRAVENOUS PRN
Status: DISCONTINUED | OUTPATIENT
Start: 2022-01-10 | End: 2022-01-10 | Stop reason: SDUPTHER

## 2022-01-10 RX ORDER — LABETALOL HYDROCHLORIDE 5 MG/ML
5 INJECTION, SOLUTION INTRAVENOUS EVERY 10 MIN PRN
Status: DISCONTINUED | OUTPATIENT
Start: 2022-01-10 | End: 2022-01-10 | Stop reason: HOSPADM

## 2022-01-10 RX ORDER — PROPOFOL 10 MG/ML
INJECTION, EMULSION INTRAVENOUS PRN
Status: DISCONTINUED | OUTPATIENT
Start: 2022-01-10 | End: 2022-01-10 | Stop reason: SDUPTHER

## 2022-01-10 RX ORDER — BUPIVACAINE HYDROCHLORIDE 5 MG/ML
INJECTION, SOLUTION EPIDURAL; INTRACAUDAL PRN
Status: DISCONTINUED | OUTPATIENT
Start: 2022-01-10 | End: 2022-01-10 | Stop reason: ALTCHOICE

## 2022-01-10 RX ORDER — APREPITANT 40 MG/1
40 CAPSULE ORAL ONCE
Status: COMPLETED | OUTPATIENT
Start: 2022-01-10 | End: 2022-01-10

## 2022-01-10 RX ORDER — MAGNESIUM HYDROXIDE 1200 MG/15ML
LIQUID ORAL CONTINUOUS PRN
Status: COMPLETED | OUTPATIENT
Start: 2022-01-10 | End: 2022-01-10

## 2022-01-10 RX ORDER — MORPHINE SULFATE 2 MG/ML
2 INJECTION, SOLUTION INTRAMUSCULAR; INTRAVENOUS EVERY 5 MIN PRN
Status: DISCONTINUED | OUTPATIENT
Start: 2022-01-10 | End: 2022-01-10 | Stop reason: HOSPADM

## 2022-01-10 RX ORDER — SODIUM CHLORIDE 9 MG/ML
25 INJECTION, SOLUTION INTRAVENOUS PRN
Status: DISCONTINUED | OUTPATIENT
Start: 2022-01-10 | End: 2022-01-10 | Stop reason: HOSPADM

## 2022-01-10 RX ORDER — MEPERIDINE HYDROCHLORIDE 50 MG/ML
12.5 INJECTION INTRAMUSCULAR; INTRAVENOUS; SUBCUTANEOUS EVERY 5 MIN PRN
Status: DISCONTINUED | OUTPATIENT
Start: 2022-01-10 | End: 2022-01-10 | Stop reason: HOSPADM

## 2022-01-10 RX ORDER — HYDRALAZINE HYDROCHLORIDE 20 MG/ML
5 INJECTION INTRAMUSCULAR; INTRAVENOUS EVERY 10 MIN PRN
Status: DISCONTINUED | OUTPATIENT
Start: 2022-01-10 | End: 2022-01-10 | Stop reason: HOSPADM

## 2022-01-10 RX ORDER — MORPHINE SULFATE 2 MG/ML
1 INJECTION, SOLUTION INTRAMUSCULAR; INTRAVENOUS EVERY 5 MIN PRN
Status: DISCONTINUED | OUTPATIENT
Start: 2022-01-10 | End: 2022-01-10 | Stop reason: HOSPADM

## 2022-01-10 RX ORDER — ONDANSETRON 2 MG/ML
INJECTION INTRAMUSCULAR; INTRAVENOUS PRN
Status: DISCONTINUED | OUTPATIENT
Start: 2022-01-10 | End: 2022-01-10 | Stop reason: SDUPTHER

## 2022-01-10 RX ORDER — LIDOCAINE HYDROCHLORIDE 20 MG/ML
INJECTION, SOLUTION EPIDURAL; INFILTRATION; INTRACAUDAL; PERINEURAL PRN
Status: DISCONTINUED | OUTPATIENT
Start: 2022-01-10 | End: 2022-01-10 | Stop reason: SDUPTHER

## 2022-01-10 RX ORDER — OXYCODONE HYDROCHLORIDE AND ACETAMINOPHEN 5; 325 MG/1; MG/1
2 TABLET ORAL PRN
Status: COMPLETED | OUTPATIENT
Start: 2022-01-10 | End: 2022-01-10

## 2022-01-10 RX ORDER — DIPHENHYDRAMINE HYDROCHLORIDE 50 MG/ML
12.5 INJECTION INTRAMUSCULAR; INTRAVENOUS
Status: DISCONTINUED | OUTPATIENT
Start: 2022-01-10 | End: 2022-01-10 | Stop reason: HOSPADM

## 2022-01-10 RX ORDER — SODIUM CHLORIDE, SODIUM LACTATE, POTASSIUM CHLORIDE, AND CALCIUM CHLORIDE .6; .31; .03; .02 G/100ML; G/100ML; G/100ML; G/100ML
IRRIGANT IRRIGATION PRN
Status: DISCONTINUED | OUTPATIENT
Start: 2022-01-10 | End: 2022-01-10 | Stop reason: ALTCHOICE

## 2022-01-10 RX ORDER — OXYCODONE HYDROCHLORIDE AND ACETAMINOPHEN 5; 325 MG/1; MG/1
1 TABLET ORAL PRN
Status: COMPLETED | OUTPATIENT
Start: 2022-01-10 | End: 2022-01-10

## 2022-01-10 RX ORDER — ONDANSETRON 2 MG/ML
4 INJECTION INTRAMUSCULAR; INTRAVENOUS PRN
Status: DISCONTINUED | OUTPATIENT
Start: 2022-01-10 | End: 2022-01-10 | Stop reason: HOSPADM

## 2022-01-10 RX ORDER — FENTANYL CITRATE 50 UG/ML
INJECTION, SOLUTION INTRAMUSCULAR; INTRAVENOUS PRN
Status: DISCONTINUED | OUTPATIENT
Start: 2022-01-10 | End: 2022-01-10 | Stop reason: SDUPTHER

## 2022-01-10 RX ORDER — DEXAMETHASONE SODIUM PHOSPHATE 10 MG/ML
INJECTION INTRAMUSCULAR; INTRAVENOUS PRN
Status: DISCONTINUED | OUTPATIENT
Start: 2022-01-10 | End: 2022-01-10 | Stop reason: SDUPTHER

## 2022-01-10 RX ORDER — HEPARIN SODIUM 5000 [USP'U]/ML
5000 INJECTION, SOLUTION INTRAVENOUS; SUBCUTANEOUS ONCE
Status: COMPLETED | OUTPATIENT
Start: 2022-01-10 | End: 2022-01-10

## 2022-01-10 RX ORDER — SODIUM CHLORIDE 0.9 % (FLUSH) 0.9 %
10 SYRINGE (ML) INJECTION EVERY 12 HOURS SCHEDULED
Status: DISCONTINUED | OUTPATIENT
Start: 2022-01-10 | End: 2022-01-10 | Stop reason: HOSPADM

## 2022-01-10 RX ORDER — LIDOCAINE HYDROCHLORIDE 10 MG/ML
1 INJECTION, SOLUTION EPIDURAL; INFILTRATION; INTRACAUDAL; PERINEURAL
Status: DISCONTINUED | OUTPATIENT
Start: 2022-01-10 | End: 2022-01-10 | Stop reason: HOSPADM

## 2022-01-10 RX ORDER — SODIUM CHLORIDE 0.9 % (FLUSH) 0.9 %
5-40 SYRINGE (ML) INJECTION EVERY 12 HOURS SCHEDULED
Status: DISCONTINUED | OUTPATIENT
Start: 2022-01-10 | End: 2022-01-10 | Stop reason: HOSPADM

## 2022-01-10 RX ADMIN — FAMOTIDINE 20 MG: 10 INJECTION, SOLUTION INTRAVENOUS at 12:02

## 2022-01-10 RX ADMIN — PROPOFOL 150 MG: 10 INJECTION, EMULSION INTRAVENOUS at 12:36

## 2022-01-10 RX ADMIN — FENTANYL CITRATE 50 MCG: 50 INJECTION INTRAMUSCULAR; INTRAVENOUS at 12:50

## 2022-01-10 RX ADMIN — CEFAZOLIN 2 G: 10 INJECTION, POWDER, FOR SOLUTION INTRAVENOUS at 12:31

## 2022-01-10 RX ADMIN — LIDOCAINE HYDROCHLORIDE 60 MG: 20 INJECTION, SOLUTION EPIDURAL; INFILTRATION; INTRACAUDAL; PERINEURAL at 12:36

## 2022-01-10 RX ADMIN — DEXAMETHASONE SODIUM PHOSPHATE 8 MG: 10 INJECTION INTRAMUSCULAR; INTRAVENOUS at 12:45

## 2022-01-10 RX ADMIN — HYDROMORPHONE HYDROCHLORIDE 0.5 MG: 1 INJECTION, SOLUTION INTRAMUSCULAR; INTRAVENOUS; SUBCUTANEOUS at 13:28

## 2022-01-10 RX ADMIN — ROCURONIUM BROMIDE 40 MG: 10 SOLUTION INTRAVENOUS at 12:34

## 2022-01-10 RX ADMIN — OXYCODONE AND ACETAMINOPHEN 1 TABLET: 5; 325 TABLET ORAL at 15:26

## 2022-01-10 RX ADMIN — SODIUM CHLORIDE, SODIUM LACTATE, POTASSIUM CHLORIDE, AND CALCIUM CHLORIDE: .6; .31; .03; .02 INJECTION, SOLUTION INTRAVENOUS at 12:31

## 2022-01-10 RX ADMIN — APREPITANT 40 MG: 40 CAPSULE ORAL at 12:07

## 2022-01-10 RX ADMIN — HYDROMORPHONE HYDROCHLORIDE 0.5 MG: 1 INJECTION, SOLUTION INTRAMUSCULAR; INTRAVENOUS; SUBCUTANEOUS at 14:08

## 2022-01-10 RX ADMIN — HEPARIN SODIUM 5000 UNITS: 5000 INJECTION INTRAVENOUS; SUBCUTANEOUS at 11:57

## 2022-01-10 RX ADMIN — FENTANYL CITRATE 50 MCG: 50 INJECTION INTRAMUSCULAR; INTRAVENOUS at 12:34

## 2022-01-10 RX ADMIN — ONDANSETRON 4 MG: 2 INJECTION INTRAMUSCULAR; INTRAVENOUS at 12:45

## 2022-01-10 RX ADMIN — HYDROMORPHONE HYDROCHLORIDE 0.5 MG: 1 INJECTION, SOLUTION INTRAMUSCULAR; INTRAVENOUS; SUBCUTANEOUS at 14:17

## 2022-01-10 ASSESSMENT — PULMONARY FUNCTION TESTS
PIF_VALUE: 21
PIF_VALUE: 15
PIF_VALUE: 11
PIF_VALUE: 6
PIF_VALUE: 19
PIF_VALUE: 26
PIF_VALUE: 26
PIF_VALUE: 1
PIF_VALUE: 1
PIF_VALUE: 0
PIF_VALUE: 1
PIF_VALUE: 19
PIF_VALUE: 26
PIF_VALUE: 19
PIF_VALUE: 20
PIF_VALUE: 15
PIF_VALUE: 26
PIF_VALUE: 19
PIF_VALUE: 16
PIF_VALUE: 19
PIF_VALUE: 25
PIF_VALUE: 28
PIF_VALUE: 1
PIF_VALUE: 5
PIF_VALUE: 19
PIF_VALUE: 27
PIF_VALUE: 19
PIF_VALUE: 16
PIF_VALUE: 19
PIF_VALUE: 15
PIF_VALUE: 15
PIF_VALUE: 25
PIF_VALUE: 28
PIF_VALUE: 27
PIF_VALUE: 17
PIF_VALUE: 0
PIF_VALUE: 17
PIF_VALUE: 5
PIF_VALUE: 19
PIF_VALUE: 14
PIF_VALUE: 0
PIF_VALUE: 15
PIF_VALUE: 8
PIF_VALUE: 25

## 2022-01-10 ASSESSMENT — PAIN SCALES - GENERAL
PAINLEVEL_OUTOF10: 8
PAINLEVEL_OUTOF10: 8
PAINLEVEL_OUTOF10: 7
PAINLEVEL_OUTOF10: 8
PAINLEVEL_OUTOF10: 8
PAINLEVEL_OUTOF10: 6

## 2022-01-10 ASSESSMENT — PAIN - FUNCTIONAL ASSESSMENT: PAIN_FUNCTIONAL_ASSESSMENT: 0-10

## 2022-01-10 ASSESSMENT — PAIN DESCRIPTION - PAIN TYPE: TYPE: SURGICAL PAIN

## 2022-01-10 ASSESSMENT — PAIN DESCRIPTION - LOCATION: LOCATION: ABDOMEN

## 2022-01-10 NOTE — H&P
I have reviewed the progress note serving as history and physical dated January/3/2022 and examined the patient and find no relevant changes. I have reviewed with the patient and/or family the risks, benefits, and alternatives to the procedure.

## 2022-01-10 NOTE — TELEPHONE ENCOUNTER
Medication:   Requested Prescriptions     Pending Prescriptions Disp Refills    ondansetron (ZOFRAN ODT) 8 MG TBDP disintegrating tablet 60 tablet 0     Sig: Place 1 tablet under the tongue every 8 hours as needed for Nausea        Last Filled:      Patient Phone Number: [phone] (home)     Last appt: 12/22/2021   Next appt: 1/18/2022    Last [place]AR[place]:   RX Monitoring 2/4/2020   Attestation -   Periodic Controlled Substance Monitoring No signs of potential drug abuse or diversion identified.

## 2022-01-10 NOTE — ANESTHESIA PRE PROCEDURE
Department of Anesthesiology  Preprocedure Note       Name:  Delisa Cardenas   Age:  67 y.o.  :  1945                                          MRN:  [id number]         Date:  1/10/2022      Surgeon: Megha Keller):  Kurt Buck MD    Procedure: Procedure(s):  VIDEO LAPAROSCOPIC CHOLECYSTECTOMY, POSSIBLE OPEN CHOLECYSTECTOMY, POSSIBLE CHOLANGIOGRAM    Medications prior to admission:   Prior to Admission medications    Medication Sig Start Date End Date Taking? Authorizing Provider   zolpidem (AMBIEN) 10 MG tablet Take by mouth nightly as needed for Sleep. Yes Historical Provider, MD   ondansetron (ZOFRAN ODT) 8 MG TBDP disintegrating tablet Place 1 tablet under the tongue every 8 hours as needed for Nausea 1/10/22   Ghazal Ambrose DO   LORazepam (ATIVAN) 1 MG tablet Take 1 tablet by mouth every 8 hours as needed for Anxiety for up to 30 days.  22  Ghazal Ambrose DO   busPIRone (BUSPAR) 7.5 MG tablet Take 1 tablet by mouth 3 times daily 21  Ghazal Ambrose DO   fenofibrate (TRICOR) 54 MG tablet Take 1 tablet by mouth daily s [facility] pharmacy: Please dispense generic fenofibrate unless prescriber denote 21   Ghazal Ambrose DO   lisinopril (PRINIVIL;ZESTRIL) 20 MG tablet Take 1 tablet by mouth daily 21   Ghazal Ambrose DO   apixaban (ELIQUIS) 5 MG TABS tablet TAKE 1 TABLET BY MOUTH TWO  TIMES DAILY 21   Ghazal Ambrose DO   rosuvastatin (CRESTOR) 20 MG tablet Take 1 tablet by mouth daily 21   Ghazal Ambrose, DO   pantoprazole (PROTONIX) 40 MG tablet Take 40 mg by mouth daily  18   Historical Provider, MD       Current medications:    Current Facility-Administered Medications   Medication Dose Route Frequency Provider Last Rate Last Admin    lactated ringers infusion   IntraVENous Continuous Prema Winters MD        sodium chloride flush 0.9 % injection 10 mL  10 mL IntraVENous 2 times per day Prema Winters MD        sodium chloride flush 0.9 % injection 10 mL  10 mL IntraVENous PRN Litzy Johnson MD        0.9 % sodium chloride infusion  25 mL IntraVENous PRN Litzy Johnson MD        lidocaine PF 1 % injection 1 mL  1 mL IntraDERmal Once PRN Litzy Johnson MD        aprepitant (EMEND) capsule 40 mg  40 mg Oral Once Litzy Johnson MD        0.9 % sodium chloride infusion  25 mL IntraVENous PRN Stacia Whyte MD        ceFAZolin (ANCEF) 2000 mg in dextrose 5 % 100 mL IVPB  2,000 mg IntraVENous On Call to Eliana Lozoya MD        sodium chloride flush 0.9 % injection 5-40 mL  5-40 mL IntraVENous 2 times per day Stacia Whyte MD        sodium chloride flush 0.9 % injection 5-40 mL  5-40 mL IntraVENous PRN Stacia Whyte MD           Allergies:     Allergies   Allergen Reactions    Lipitor Rash       Problem List:    Patient Active Problem List   Diagnosis Code    GERD (gastroesophageal reflux disease) K21.9    MANISH (generalized anxiety disorder) F41.1    Essential hypertension, benign I10    CKD (chronic kidney disease) stage 3, GFR 30-59 ml/min ([facility]) N18.30    Mixed hyperlipidemia E78.2    Ni's esophagus without dysplasia K22.70    Acute hyponatremia E87.1       Past Medical History:        Diagnosis Date    Anxiety     Arthritis     Bladder cancer ([address]     DVT (deep venous thrombosis) ([address] 07/01/2014    two spontaneous DVTs left leg    Elevated fasting glucose     Hx of blood clots     thigh    Hyperlipidemia     Hypertension     Malignant neoplasm of urinary bladder ([address] 2/4/2019    MVP (mitral valve prolapse)     Prostate CA ([address] 2005       Past Surgical History:        Procedure Laterality Date    BLADDER SURGERY      tumor removed    CATARACT REMOVAL Bilateral     COLONOSCOPY  6/29/12    Polyps    COLONOSCOPY  07/12/2017    Polyp    INGUINAL HERNIA REPAIR Right 06/1999    PENILE PROSTHESIS PLACEMENT      PROSTATECTOMY  2005 approx    Dr. Hollie England      In [place] - had one tonsil burned out       Social History:    Social History     Tobacco Use    Smoking status: Never Smoker    Smokeless tobacco: Never Used   Substance Use Topics    Alcohol use: Not Currently                                Counseling given: Not Answered      Vital Signs (Current):   Vitals:    01/06/22 1406   Weight: 186 lb (84.4 kg)   Height: 5' 9\" (1.753 m)                                              BP Readings from Last 3 Encounters:   01/03/22 124/82   12/22/21 127/80   12/17/21 (!) 150/83       NPO Status:                                                                                 BMI:   Wt Readings from Last 3 Encounters:   01/06/22 186 lb (84.4 kg)   01/03/22 183 lb (83 kg)   12/22/21 180 lb 6.4 oz (81.8 kg)     Body mass index is 27.47 kg/m². CBC:   Lab Results   Component Value Date    WBC 9.7 12/17/2021    RBC 4.45 12/17/2021    RBC 5.45 12/09/2015    HGB 13.7 12/17/2021    HCT 40.0 12/17/2021    MCV 89.8 12/17/2021    RDW 14.4 12/17/2021     12/17/2021       CMP:   Lab Results   Component Value Date     12/31/2021    K 4.6 12/31/2021    K 3.6 12/16/2021     12/31/2021    CO2 25 12/31/2021    BUN 17 12/31/2021    CREATININE 1.4 12/31/2021    GFRAA 59 12/31/2021    GFRAA 60 07/20/2012    AGRATIO 2.1 12/22/2021    LABGLOM 49 12/31/2021    GLUCOSE 96 12/31/2021    GLUCOSE 108 12/09/2015    PROT 6.5 12/22/2021    PROT 7.5 12/09/2015    CALCIUM 9.8 12/31/2021    BILITOT 0.4 12/22/2021    ALKPHOS 58 12/22/2021    AST 13 12/22/2021    ALT 14 12/22/2021       POC Tests: No results for input(s): POCGLU, POCNA, POCK, POCCL, POCBUN, POCHEMO, POCHCT in the last 72 hours.     Coags:   Lab Results   Component Value Date    PROTIME 14.4 12/13/2021    INR 1.26 12/13/2021       HCG (If Applicable): No results found for: PREGTESTUR, PREGSERUM, HCG, HCGQUANT     ABGs: No results found for: PHART, PO2ART, TIA8PEE, PJE7YKU, BEART, X0GGHJIY     Type & Screen (If Applicable):  No results found for: LABABO, [address]    Drug/Infectious Status (If Applicable):  No results found for: HIV, HEPCAB    COVID-19 Screening (If Applicable):   Lab Results   Component Value Date    COVID19 Not Detected 01/05/2022           Anesthesia Evaluation  Patient summary reviewed no history of anesthetic complications:   Airway: Mallampati: II  TM distance: >3 FB   Neck ROM: full  Mouth opening: > = 3 FB Dental: normal exam         Pulmonary:Negative Pulmonary ROS and normal exam  breath sounds clear to auscultation                             Cardiovascular:Negative CV ROS  Exercise tolerance: good (>4 METS),   (+) hypertension:,         Rhythm: regular  Rate: normal                    Neuro/Psych:   Negative Neuro/Psych ROS  (+) psychiatric history:            GI/Hepatic/Renal: Neg GI/Hepatic/Renal ROS  (+) GERD:, renal disease:,           Endo/Other: Negative Endo/Other ROS                    Abdominal:             Vascular: negative vascular ROS.  + DVT, . Other Findings:          Pre-Operative Diagnosis: Polyp of gallbladder [K82.4]    67 y.o.   BMI:  Body mass index is 27.47 kg/m².      Vitals:    01/06/22 1406   Weight: 186 lb (84.4 kg)   Height: 5' 9\" (1.753 m)       Allergies   Allergen Reactions    Lipitor Rash       Social History     Tobacco Use    Smoking status: Never Smoker    Smokeless tobacco: Never Used   Substance Use Topics    Alcohol use: Not Currently       LABS:    CBC  Lab Results   Component Value Date/Time    WBC 9.7 12/17/2021 05:50 AM    HGB 13.7 12/17/2021 05:50 AM    HCT 40.0 (L) 12/17/2021 05:50 AM     12/17/2021 05:50 AM     RENAL  Lab Results   Component Value Date/Time     12/31/2021 09:16 AM    K 4.6 12/31/2021 09:16 AM    K 3.6 12/16/2021 08:01 AM     12/31/2021 09:16 AM    CO2 25 12/31/2021 09:16 AM    BUN 17 12/31/2021 09:16 AM    CREATININE 1.4 (H) 12/31/2021 09:16 AM    GLUCOSE 96 12/31/2021 09:16 AM    GLUCOSE 108 12/09/2015 08:46 AM     COAGS  Lab Results   Component Value Date/Time    PROTIME 14.4 (H) 12/13/2021 09:03 AM    INR 1.26 (H) 12/13/2021 09:03 AM          Anesthesia Plan      general     ASA 2     (I discussed with the patient the risks and benefits of PIV, anesthesia, IV Narcotics, PACU. All questions were answered the patient agrees with the plan and wishes to proceed)  Induction: intravenous.                           Vin Hernández MD   1/10/2022

## 2022-01-10 NOTE — PROGRESS NOTES
As of this time pt still  with pain in abd. And I have given him 1 mg of dilaudid. Pt appears to be resting well but when asked he is still with pain in abd.

## 2022-01-10 NOTE — ANESTHESIA POSTPROCEDURE EVALUATION
Department of Anesthesiology  Postprocedure Note    Patient: Clarence Swanson  MRN: [id number]  [place]: 1945  Date of evaluation: 1/10/2022  Time:  2:50 PM     Procedure Summary     Date: 01/10/22 Room / Location: [facility] OR [address] [place] [address]    Anesthesia Start: 1231 Anesthesia Stop: 1319    Procedure: VIDEO LAPAROSCOPIC CHOLECYSTECTOMY (N/A Abdomen) Diagnosis:       Polyp of gallbladder      (Polyp of gallbladder [K82.4])    Surgeons: Jan Zhao MD Responsible Provider: Apoorva Lemos MD    Anesthesia Type: general ASA Status: 2          Anesthesia Type: general    Rama Phase I: Rama Score: 8    Rama Phase II: Rama Score: 9    Last vitals: Reviewed and per EMR flowsheets.        Anesthesia Post Evaluation    Comments: Postoperative Anesthesia Note    Name:    Clarence Swanson  MRN:      [id number]    Patient Vitals in the past 12 hrs:  01/10/22 1440, BP:(!) 150/72, Pulse:72, Resp:12, SpO2:96 %  01/10/22 1425, BP:(!) 156/72, Pulse:66, Resp:18, SpO2:95 %  01/10/22 1420, BP:(!) 173/64, Pulse:63, Resp:15, SpO2:96 %  01/10/22 1415, BP:(!) 171/70, Pulse:70, Resp:18, SpO2:96 %  01/10/22 1410, BP:(!) 170/77, Pulse:62, Resp:16, SpO2:97 %  01/10/22 1405, BP:(!) 170/77, Pulse:71, Resp:16, SpO2:96 %  01/10/22 1400, BP:(!) 179/82, Pulse:66, Resp:16, SpO2:96 %  01/10/22 1337, BP:(!) 175/86, Pulse:65, Resp:16, SpO2:95 %  01/10/22 1332, BP:(!) 176/90, Temp:96.8 °F (36 °C), Pulse:65, Resp:16, SpO2:96 %  01/10/22 1327, BP:(!) 154/107, Pulse:68, Resp:16, SpO2:98 %  01/10/22 1322, BP:(!) 183/100, Temp:96.8 °F (36 °C), Temp src:Temporal, Pulse:71, Resp:16, SpO2:95 %  01/10/22 1210, BP:(!) 153/72, Temp:97.6 °F (36.4 °C), Temp src:Temporal, Pulse:69, Resp:20, SpO2:99 %, Height:5' 9\" (1.753 m), Weight:186 lb (84.4 kg)     LABS:    CBC  Lab Results       Component                Value               Date/Time                  WBC                      9.7                 12/17/2021 05:50 AM        HGB 13.7                12/17/2021 05:50 AM        HCT                      40.0 (L)            12/17/2021 05:50 AM        PLT                      217                 12/17/2021 05:50 AM   RENAL  Lab Results       Component                Value               Date/Time                  NA                       139                 12/31/2021 09:16 AM        K                        4.6                 12/31/2021 09:16 AM        K                        3.6                 12/16/2021 08:01 AM        CL                       101                 12/31/2021 09:16 AM        CO2                      25                  12/31/2021 09:16 AM        BUN                      17                  12/31/2021 09:16 AM        CREATININE               1.4 (H)             12/31/2021 09:16 AM        GLUCOSE                  96                  12/31/2021 09:16 AM        GLUCOSE                  108                 12/09/2015 08:46 AM   COAGS  Lab Results       Component                Value               Date/Time                  PROTIME                  14.4 (H)            12/13/2021 09:03 AM        INR                      1.26 (H)            12/13/2021 09:03 AM     Intake & Output:  @84GZFV@    Nausea & Vomiting:  No    Level of Consciousness:  Awake    Pain Assessment:  Adequate analgesia    Anesthesia Complications:  No apparent anesthetic complications    SUMMARY      Vital signs stable  OK to discharge from Stage I post anesthesia care.   Care transferred from Anesthesiology department on discharge from perioperative area

## 2022-01-11 NOTE — OP NOTE
[address]                                OPERATIVE REPORT    PATIENT NAME: Michele Beach                        :        1945  MED REC NO:   [id number]                          ROOM:  ACCOUNT NO:   [de-identified]                           ADMIT DATE: 01/10/2022  PROVIDER:     Isrrael Titus MD    DATE OF PROCEDURE:  01/10/2022    PREOPERATIVE DIAGNOSIS:  Gallbladder polyps. POSTOPERATIVE DIAGNOSIS:  Gallbladder polyps. PROCEDURE:  Laparoscopic cholecystectomy. ANESTHESIA  General.    SURGEON:  Isrrael Titus MD    ESTIMATED BLOOD LOSS:  Minimal.    INDICATIONS:  The patient is a 59-year-old gentleman who presented with  epigastric and right upper quadrant pain. This has been going on for  over year. Ultrasound images were obtained and showed gallbladder  polyps. OPERATIVE SUMMARY:  After preoperative evaluation, the patient was  brought into the operating suite and placed in a comfortable supine  position on the operating room table. Monitoring equipment was attached  and general anesthesia was induced. His abdomen was sterilely prepped  and draped and a small incision was made at the superior aspect of the  umbilicus. This was dissected down to the fascia, and a suture of  0-Ethibond was placed on either side of the midline. The midline fascia  was opened and the peritoneal cavity was entered directly. A Leona  trocar was then inserted under direct internal visualization. The  abdomen was insufflated to a pressure of 15 mmHg and remaining ports  placed under direct internal visualization. He was placed in reverse  Trendelenburg and rotated to the left and the gallbladder was grasped  and elevated cephalad over the liver edge. The infundibular structures  were dissected out, and the cystic duct and artery were identified. Each was tracked down to its origin, triply clipped, and divided. The  gallbladder was then dissected free of liver bed using electrocautery. It was set aside and the gallbladder fossa examined. A few small  bleeding areas were cauterized. Following this, right upper quadrant  was evaluated. There is no evidence of further bleeding. There was no  bile leakage. The clips were intact in the proximal structures. The  ports were therefore removed and the gallbladder was withdrawn to the  epigastric fascial defect. The umbilical and epigastric fascial defects  were closed with figure-of-eight sutures of 0 Ethibond. Wounds were  injected with Marcaine, and the skin incisions closed with subcuticular  sutures of 4-0 Vicryl followed by Benzoin, Steri-Strips, and dry sterile  dressings. All sponge, needle, and instrument counts were correct at  the end of case. The patient tolerated the procedure well. He was  taken to recovery area in stable condition.         Galina Dennis MD    D: 01/10/2022 13:29:15       T: 01/10/2022 13:33:12     [email or web address]  Job#: [id number]     Doc#: [id number]    CC:

## 2022-01-17 PROBLEM — F51.01 PRIMARY INSOMNIA: Status: ACTIVE | Noted: 2022-01-17

## 2022-01-17 PROBLEM — E87.1 ACUTE HYPONATREMIA: Status: RESOLVED | Noted: 2021-12-14 | Resolved: 2022-01-17

## 2022-01-17 NOTE — PATIENT INSTRUCTIONS
Patient Education        Constipation: Care Instructions  Overview     Constipation means that you have a hard time passing stools (bowel movements). People pass stools from 3 times a day to once every 3 days. What is normal for you may be different. Constipation may occur with pain in the rectum and cramping. The pain may get worse when you try to pass stools. Sometimes there are small amounts of bright red blood on toilet paper or the surface of stools. This is because of enlarged veins near the rectum (hemorrhoids). A few changes in your diet and lifestyle may help you avoid ongoing constipation. Your doctor may also prescribe medicine to help loosen your stool. Some medicines can cause constipation. These include pain medicines and antidepressants. Tell your doctor about all the medicines you take. Your doctor may want to make a medicine change to ease your symptoms. Follow-up care is a key part of your treatment and safety. Be sure to make and go to all appointments, and call your doctor if you are having problems. It's also a good idea to know your test results and keep a list of the medicines you take. How can you care for yourself at home? · Drink plenty of fluids. If you have kidney, heart, or liver disease and have to limit fluids, talk with your doctor before you increase the amount of fluids you drink. · Include high-fiber foods in your diet each day. These include fruits, vegetables, beans, and whole grains. · Get at least 30 minutes of exercise on most days of the week. Walking is a good choice. You also may want to do other activities, such as running, swimming, cycling, or playing tennis or team sports. · Take a fiber supplement, such as Citrucel or Metamucil, every day. Read and follow all instructions on the label. · Schedule time each day for a bowel movement. A daily routine may help. Take your time having a bowel movement, but don't sit for more than 10 minutes at a time.  And don't strain too much. · Support your feet with a small step stool when you sit on the toilet. This helps flex your hips and places your pelvis in a squatting position. · Your doctor may recommend an over-the-counter laxative to relieve your constipation. Examples are Milk of Magnesia and MiraLax. Read and follow all instructions on the label. Do not use laxatives on a long-term basis. When should you call for help? Call your doctor now or seek immediate medical care if:    · You have new or worse belly pain.     · You have new or worse nausea or vomiting.     · You have blood in your stools. Watch closely for changes in your health, and be sure to contact your doctor if:    · Your constipation is getting worse.     · You do not get better as expected. Where can you learn more? Go to https://[employer]. org and sign in to your [employer] account. Enter 21 [phone] in the [employer] box to learn more about \"Constipation: Care Instructions. \"     If you do not have an account, please click on the \"Sign Up Now\" link. Current as of: July 1, 2021               Content Version: 13.1  © 4194-8096 [employer]. Care instructions adapted under license by [facility] ([facility]). If you have questions about a medical condition or this instruction, always ask your healthcare professional. [address] any warranty or liability for your use of this information.

## 2022-01-17 NOTE — PROGRESS NOTES
2022     Ranjit Campbell (:  1945) is a 67 y.o. male, here for evaluation of the following medical concerns:    HPI  Anxiety: Ross Polk presents today for evaluation of anxiety. Symptoms include; nervousness, anxiousness, restlessness and occasional panic attacks. He had previously been controlled on Lexapro and BuSpar. The Lexapro was added and attempted to wean his Ativan, which she had been controlled on for decades. Unfortunately, patient developed hyponatremia which required admission and IV fluids to correct. His Lexapro was stopped as a possible cause. His most recent appointment his BuSpar was increased to help compensate for the absence of his Lexapro; however, he was still having persistent anxiety. He ended up needing additional tablets of his Ativan, which had previously been decreased to 45 tablets/month from 61. Insomnia: He has a longstanding history of insomnia, which had been difficult to control. He had failed over-the-counter melatonin, Elavil and trazodone. Lifestyle modifications had also been ineffective. He was started on Ambien on his most recent appointment, which she has tolerated well without side effects. He had been sleeping a broken 2 to 4 hours per night. He has been able to sustain 6 hours of sleep. Bloating: Patient has been dealing with upper abdominal bloating, fullness and nausea for the past several months. Saw his gastroenterologist in early December where right upper quadrant ultrasound demonstrated a polyp of his gallbladder. He had his gallbladder removed last week. He is still having some residual bloating and nausea, which she is unsure improved since having this removed. He does not have a follow-up planned with his gastroenterologist.    Review of Systems   Constitutional: Negative for activity change, fatigue and unexpected weight change. Eyes: Negative for visual disturbance.    Respiratory: Negative for cough, chest tightness and shortness of breath. Cardiovascular: Negative for chest pain, palpitations and leg swelling. Gastrointestinal: Positive for abdominal distention and nausea. Negative for abdominal pain, anal bleeding, blood in stool and vomiting. Endocrine: Negative for cold intolerance, heat intolerance, polydipsia, polyphagia and polyuria. Genitourinary: Negative for frequency. Skin: Negative for rash. Neurological: Negative for dizziness, seizures, syncope, weakness and light-headedness. Psychiatric/Behavioral: Positive for sleep disturbance (Improved on the Ambien). Negative for agitation, decreased concentration, dysphoric mood, self-injury and suicidal ideas. The patient is nervous/anxious. Prior to Visit Medications    Medication Sig Taking? Authorizing Provider   lisinopril (PRINIVIL;ZESTRIL) 20 MG tablet Take 1 tablet by mouth daily Yes Lucrezia Favre, DO   apixaban (ELIQUIS) 5 MG TABS tablet TAKE 1 TABLET BY MOUTH TWO  TIMES DAILY Yes Lucrezia Favre, DO   rosuvastatin (CRESTOR) 20 MG tablet Take 1 tablet by mouth daily Yes Lucrezia Favre, DO   busPIRone (BUSPAR) 7.5 MG tablet Take 1 tablet by mouth 3 times daily Yes Lucrezia Favre, DO   ondansetron (ZOFRAN ODT) 8 MG TBDP disintegrating tablet Place 1 tablet under the tongue every 8 hours as needed for Nausea Yes Lucrezia Favre, DO   LORazepam (ATIVAN) 1 MG tablet Take 1 tablet by mouth every 8 hours as needed for Anxiety for up to 30 days. Yes Lucrezia Favre, DO   zolpidem (AMBIEN) 10 MG tablet Take by mouth nightly as needed for Sleep.  Yes Historical Provider, MD   fenofibrate (TRICOR) 54 MG tablet Take 1 tablet by mouth daily [facility] [place] pharmacy: Please dispense generic fenofibrate unless prescriber denote Yes Lucrezia Favre, DO   pantoprazole (PROTONIX) 40 MG tablet Take 40 mg by mouth daily  Yes Historical Provider, MD        Social History     Tobacco Use    Smoking status: Never Smoker    Smokeless tobacco: Never Used   Substance Use Topics    Alcohol use: Not Currently Vitals:    01/18/22 0914   BP: 132/67   Site: Left Upper Arm   Position: Sitting   Cuff Size: Medium Adult   Pulse: 72   Temp: 97.2 °F (36.2 °C)   TempSrc: Axillary   Weight: 176 lb 6.4 oz (80 kg)   Height: 5' 9\" (1.753 m)     Estimated body mass index is 26.05 kg/m² as calculated from the following:    Height as of this encounter: 5' 9\" (1.753 m). Weight as of this encounter: 176 lb 6.4 oz (80 kg). Physical Exam  Constitutional:       Appearance: Normal appearance. He is normal weight. HENT:      Head: Normocephalic and atraumatic. Nose: Nose normal.   Eyes:      Conjunctiva/sclera: Conjunctivae normal.   Cardiovascular:      Rate and Rhythm: Normal rate and regular rhythm. Pulses: Normal pulses. Pulmonary:      Effort: Pulmonary effort is normal.      Breath sounds: Normal breath sounds. Musculoskeletal:         General: No deformity. Skin:     General: Skin is warm and dry. Capillary Refill: Capillary refill takes less than 2 seconds. Neurological:      Mental Status: He is alert. Mental status is at baseline. Psychiatric:         Mood and Affect: Mood normal.         Behavior: Behavior normal.         Thought Content: Thought content normal.         Judgment: Judgment normal.         ASSESSMENT/PLAN:  1. MANISH (generalized anxiety disorder): There has been a lot of changes with his anxiety regimen recently. While him and I both agree it would be a good idea long-term to try to wean the Ativan. Off the Lexapro, he has been having a lot of anxiety and he has not noticed significant improvement on the BuSpar. We could consider adding an SSRI again in the recent future as I am unsure if this truly caused his hyponatremia. For now, we will continue his twice daily as needed Ativan and his BuSpar 7.5. Plan on following up in 3 months. - busPIRone (BUSPAR) 7.5 MG tablet; Take 1 tablet by mouth 3 times daily  Dispense: 90 tablet; Refill: 2    2.  Primary insomnia: Tolerating the Ambien well without side effects. It is improving his quality and duration of sleep. No indications of oversedation. 3. Essential hypertension, benign: At goal today. Tolerating current regimen without side effects.  - lisinopril (PRINIVIL;ZESTRIL) 20 MG tablet; Take 1 tablet by mouth daily  Dispense: 90 tablet; Refill: 3    4. Stage 3a chronic kidney disease ([facility] [place] [facility]): Renal function back to baseline on most recent CMP. - lisinopril (PRINIVIL;ZESTRIL) 20 MG tablet; Take 1 tablet by mouth daily  Dispense: 90 tablet; Refill: 3    5. Abdominal bloating: Has not noted significant improvement since cholecystectomy. We will have him schedule a follow-up appointment with his gastroenterologist.    6. Recurrent deep vein thrombosis (DVT) of right lower extremity ([facility]): Remains compliant with his Eliquis. Most recent DVT is distant. - apixaban (ELIQUIS) 5 MG TABS tablet; TAKE 1 TABLET BY MOUTH TWO  TIMES DAILY  Dispense: 180 tablet; Refill: 3      Return in about 3 months (around 4/18/2022). An electronic signature was used to authenticate this note.     --Maci Jones DO on 1/18/2022 at 10:39 AM

## 2022-01-18 ENCOUNTER — OFFICE VISIT (OUTPATIENT)
Dept: PRIMARY CARE CLINIC | Age: 77
End: 2022-01-18
Payer: MEDICARE

## 2022-01-18 VITALS
HEART RATE: 72 BPM | HEIGHT: 69 IN | WEIGHT: 176.4 LBS | SYSTOLIC BLOOD PRESSURE: 132 MMHG | TEMPERATURE: 97.2 F | BODY MASS INDEX: 26.13 KG/M2 | DIASTOLIC BLOOD PRESSURE: 67 MMHG

## 2022-01-18 DIAGNOSIS — I10 ESSENTIAL HYPERTENSION, BENIGN: ICD-10-CM

## 2022-01-18 DIAGNOSIS — I82.401 RECURRENT DEEP VEIN THROMBOSIS (DVT) OF RIGHT LOWER EXTREMITY (HCC): ICD-10-CM

## 2022-01-18 DIAGNOSIS — F51.01 PRIMARY INSOMNIA: ICD-10-CM

## 2022-01-18 DIAGNOSIS — F41.1 GAD (GENERALIZED ANXIETY DISORDER): Primary | ICD-10-CM

## 2022-01-18 DIAGNOSIS — N18.31 STAGE 3A CHRONIC KIDNEY DISEASE (HCC): ICD-10-CM

## 2022-01-18 DIAGNOSIS — R14.0 ABDOMINAL BLOATING: ICD-10-CM

## 2022-01-18 PROCEDURE — G8427 DOCREV CUR MEDS BY ELIG CLIN: HCPCS | Performed by: STUDENT IN AN ORGANIZED HEALTH CARE EDUCATION/TRAINING PROGRAM

## 2022-01-18 PROCEDURE — 99214 OFFICE O/P EST MOD 30 MIN: CPT | Performed by: STUDENT IN AN ORGANIZED HEALTH CARE EDUCATION/TRAINING PROGRAM

## 2022-01-18 PROCEDURE — 4040F PNEUMOC VAC/ADMIN/RCVD: CPT | Performed by: STUDENT IN AN ORGANIZED HEALTH CARE EDUCATION/TRAINING PROGRAM

## 2022-01-18 PROCEDURE — G8484 FLU IMMUNIZE NO ADMIN: HCPCS | Performed by: STUDENT IN AN ORGANIZED HEALTH CARE EDUCATION/TRAINING PROGRAM

## 2022-01-18 PROCEDURE — 1123F ACP DISCUSS/DSCN MKR DOCD: CPT | Performed by: STUDENT IN AN ORGANIZED HEALTH CARE EDUCATION/TRAINING PROGRAM

## 2022-01-18 PROCEDURE — G8417 CALC BMI ABV UP PARAM F/U: HCPCS | Performed by: STUDENT IN AN ORGANIZED HEALTH CARE EDUCATION/TRAINING PROGRAM

## 2022-01-18 PROCEDURE — 1036F TOBACCO NON-USER: CPT | Performed by: STUDENT IN AN ORGANIZED HEALTH CARE EDUCATION/TRAINING PROGRAM

## 2022-01-18 RX ORDER — BUSPIRONE HYDROCHLORIDE 7.5 MG/1
7.5 TABLET ORAL 3 TIMES DAILY
Qty: 90 TABLET | Refills: 2 | Status: SHIPPED | OUTPATIENT
Start: 2022-01-18 | End: 2022-02-17

## 2022-01-18 RX ORDER — LISINOPRIL 20 MG/1
20 TABLET ORAL DAILY
Qty: 90 TABLET | Refills: 3 | Status: SHIPPED | OUTPATIENT
Start: 2022-01-18 | End: 2022-10-25

## 2022-01-18 RX ORDER — ROSUVASTATIN CALCIUM 20 MG/1
20 TABLET, COATED ORAL DAILY
Qty: 90 TABLET | Refills: 1 | Status: SHIPPED | OUTPATIENT
Start: 2022-01-18 | End: 2022-02-28

## 2022-01-18 ASSESSMENT — ENCOUNTER SYMPTOMS
CHEST TIGHTNESS: 0
VOMITING: 0
ANAL BLEEDING: 0
ABDOMINAL PAIN: 0
NAUSEA: 1
SHORTNESS OF BREATH: 0
ABDOMINAL DISTENTION: 1
COUGH: 0
BLOOD IN STOOL: 0

## 2022-01-25 ENCOUNTER — HOSPITAL ENCOUNTER (OUTPATIENT)
Age: 77
Discharge: HOME | End: 2022-01-25
Payer: MEDICARE

## 2022-01-25 ENCOUNTER — OFFICE VISIT (OUTPATIENT)
Dept: SURGERY | Age: 77
End: 2022-01-25

## 2022-01-25 VITALS
DIASTOLIC BLOOD PRESSURE: 76 MMHG | HEIGHT: 69 IN | BODY MASS INDEX: 26.07 KG/M2 | WEIGHT: 176 LBS | SYSTOLIC BLOOD PRESSURE: 124 MMHG

## 2022-01-25 DIAGNOSIS — Z09 POSTOP CHECK: Primary | ICD-10-CM

## 2022-01-25 DIAGNOSIS — R11.0 NAUSEA: ICD-10-CM

## 2022-01-25 LAB
ALBUMIN SERPL-MCNC: 4.1 G/DL (ref 3.4–5)
ALP BLD-CCNC: 66 U/L (ref 40–129)
ALT SERPL-CCNC: 12 U/L (ref 10–40)
ANION GAP SERPL CALCULATED.3IONS-SCNC: 15 MMOL/L (ref 3–16)
AST SERPL-CCNC: 14 U/L (ref 15–37)
BILIRUB SERPL-MCNC: 0.4 MG/DL (ref 0–1)
BILIRUBIN DIRECT: <0.2 MG/DL (ref 0–0.3)
BILIRUBIN, INDIRECT: ABNORMAL MG/DL (ref 0–1)
BUN BLDV-MCNC: 13 MG/DL (ref 7–20)
CALCIUM SERPL-MCNC: 9.7 MG/DL (ref 8.3–10.6)
CHLORIDE BLD-SCNC: 98 MMOL/L (ref 99–110)
CO2: 23 MMOL/L (ref 21–32)
CREAT SERPL-MCNC: 1.8 MG/DL (ref 0.8–1.3)
GFR AFRICAN AMERICAN: 44
GFR NON-AFRICAN AMERICAN: 37
GLUCOSE BLD-MCNC: 107 MG/DL (ref 70–99)
HCT VFR BLD CALC: 44.9 % (ref 40.5–52.5)
HEMOGLOBIN: 14.9 G/DL (ref 13.5–17.5)
LIPASE: 23 U/L (ref 13–60)
MCH RBC QN AUTO: 30.4 PG (ref 26–34)
MCHC RBC AUTO-ENTMCNC: 33.2 G/DL (ref 31–36)
MCV RBC AUTO: 91.5 FL (ref 80–100)
PDW BLD-RTO: 14.6 % (ref 12.4–15.4)
PLATELET # BLD: 285 K/UL (ref 135–450)
PMV BLD AUTO: 8.7 FL (ref 5–10.5)
POTASSIUM SERPL-SCNC: 4.4 MMOL/L (ref 3.5–5.1)
RBC # BLD: 4.9 M/UL (ref 4.2–5.9)
SODIUM BLD-SCNC: 136 MMOL/L (ref 136–145)
TOTAL PROTEIN: 6.5 G/DL (ref 6.4–8.2)
WBC # BLD: 10.7 K/UL (ref 4–11)

## 2022-01-25 PROCEDURE — 83690 ASSAY OF LIPASE: CPT

## 2022-01-25 PROCEDURE — 80076 HEPATIC FUNCTION PANEL: CPT

## 2022-01-25 PROCEDURE — 85027 COMPLETE CBC AUTOMATED: CPT

## 2022-01-25 PROCEDURE — 99024 POSTOP FOLLOW-UP VISIT: CPT | Performed by: SURGERY

## 2022-01-25 PROCEDURE — 36415 COLL VENOUS BLD VENIPUNCTURE: CPT

## 2022-01-25 PROCEDURE — 80048 BASIC METABOLIC PNL TOTAL CA: CPT

## 2022-01-25 RX ORDER — PROMETHAZINE HYDROCHLORIDE 25 MG/1
25 TABLET ORAL 4 TIMES DAILY PRN
Qty: 20 TABLET | Refills: 0 | Status: SHIPPED | OUTPATIENT
Start: 2022-01-25 | End: 2022-02-01

## 2022-01-25 NOTE — PROGRESS NOTES
[facility]      S:   Patient presents s/p laparoscopic cholecystectomy. He reports nausea. Nothing seems to make it better or worse. O:   Comfortable         Incision sites healing well. A:   S/P laparoscopic cholecystectomy    P:   We will check some labs, and I will give him some Phenergan to try instead of the Zofran. I suspect this will slowly resolve over the next couple weeks. Follow up 2 weeks for reevaluation.

## 2022-01-28 ENCOUNTER — HOSPITAL ENCOUNTER (OUTPATIENT)
Age: 77
Discharge: HOME | End: 2022-01-28
Payer: MEDICARE

## 2022-01-28 LAB
ALBUMIN SERPL-MCNC: 4.2 G/DL (ref 3.4–5)
ANION GAP SERPL CALCULATED.3IONS-SCNC: 11 MMOL/L (ref 3–16)
BILIRUBIN URINE: NEGATIVE
BLOOD, URINE: NEGATIVE
BUN BLDV-MCNC: 14 MG/DL (ref 7–20)
CALCIUM SERPL-MCNC: 9.5 MG/DL (ref 8.3–10.6)
CHLORIDE BLD-SCNC: 103 MMOL/L (ref 99–110)
CLARITY: CLEAR
CO2: 26 MMOL/L (ref 21–32)
COLOR: YELLOW
CREAT SERPL-MCNC: 1.8 MG/DL (ref 0.8–1.3)
GFR AFRICAN AMERICAN: 44
GFR NON-AFRICAN AMERICAN: 37
GLUCOSE BLD-MCNC: 81 MG/DL (ref 70–99)
GLUCOSE URINE: NEGATIVE MG/DL
KETONES, URINE: NEGATIVE MG/DL
LEUKOCYTE ESTERASE, URINE: NEGATIVE
MICROSCOPIC EXAMINATION: NORMAL
NITRITE, URINE: NEGATIVE
PH UA: 7 (ref 5–8)
PHOSPHORUS: 3.3 MG/DL (ref 2.5–4.9)
POTASSIUM SERPL-SCNC: 4.9 MMOL/L (ref 3.5–5.1)
PROTEIN UA: NEGATIVE MG/DL
SODIUM BLD-SCNC: 140 MMOL/L (ref 136–145)
SPECIFIC GRAVITY UA: 1.01 (ref 1–1.03)
URINE TYPE: NORMAL
UROBILINOGEN, URINE: 0.2 E.U./DL

## 2022-01-28 PROCEDURE — 80069 RENAL FUNCTION PANEL: CPT

## 2022-01-28 PROCEDURE — 36415 COLL VENOUS BLD VENIPUNCTURE: CPT

## 2022-01-28 PROCEDURE — 81003 URINALYSIS AUTO W/O SCOPE: CPT

## 2022-02-02 RX ORDER — PROMETHAZINE HYDROCHLORIDE 25 MG/1
25 TABLET ORAL EVERY 6 HOURS PRN
Qty: 20 TABLET | Refills: 1 | Status: SHIPPED
Start: 2022-02-02 | End: 2022-02-11 | Stop reason: SDUPTHER

## 2022-02-08 ENCOUNTER — OFFICE VISIT (OUTPATIENT)
Dept: SURGERY | Age: 77
End: 2022-02-08

## 2022-02-08 VITALS
DIASTOLIC BLOOD PRESSURE: 70 MMHG | BODY MASS INDEX: 25.77 KG/M2 | SYSTOLIC BLOOD PRESSURE: 122 MMHG | WEIGHT: 174 LBS | HEIGHT: 69 IN

## 2022-02-08 DIAGNOSIS — Z09 POSTOP CHECK: Primary | ICD-10-CM

## 2022-02-08 PROCEDURE — 99024 POSTOP FOLLOW-UP VISIT: CPT | Performed by: SURGERY

## 2022-02-08 RX ORDER — CHOLESTYRAMINE 4 G/9G
1 POWDER, FOR SUSPENSION ORAL 2 TIMES DAILY
Qty: 60 PACKET | Refills: 2 | Status: SHIPPED | OUTPATIENT
Start: 2022-02-08 | End: 2022-06-07 | Stop reason: SDUPTHER

## 2022-02-08 NOTE — PROGRESS NOTES
[facility]      S:   Patient presents s/p laparoscopic cholecystectomy. He reports still having some nausea and difficulty eating. His bowels are working normally. O:   Comfortable         Incision sites healing well. 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 He also notes a bitter taste in his mouth, so we will try him on some cholestyramine to see if that helps. In addition, I will have him follow-up with GI for reevaluation.

## 2022-02-11 RX ORDER — PROMETHAZINE HYDROCHLORIDE 25 MG/1
25 TABLET ORAL 4 TIMES DAILY PRN
Qty: 20 TABLET | Refills: 1 | Status: SHIPPED | OUTPATIENT
Start: 2022-02-11 | End: 2022-02-21

## 2022-02-15 DIAGNOSIS — R11.0 DAILY NAUSEA: ICD-10-CM

## 2022-02-15 RX ORDER — ONDANSETRON 8 MG/1
8 TABLET, ORALLY DISINTEGRATING ORAL EVERY 8 HOURS PRN
Qty: 60 TABLET | Refills: 0 | Status: SHIPPED | OUTPATIENT
Start: 2022-02-15 | End: 2022-03-04 | Stop reason: SDUPTHER

## 2022-02-15 RX ORDER — ONDANSETRON 8 MG/1
8 TABLET, ORALLY DISINTEGRATING ORAL EVERY 8 HOURS PRN
Qty: 60 TABLET | Refills: 0 | Status: SHIPPED | OUTPATIENT
Start: 2022-02-15 | End: 2022-02-15 | Stop reason: SDUPTHER

## 2022-02-15 NOTE — TELEPHONE ENCOUNTER
Pt saw the gastro today. They recommend some blood work be ordered to monitor the sodium.  Level put they want Dr. Bullard Coma to order

## 2022-02-15 NOTE — TELEPHONE ENCOUNTER
Medication:   Requested Prescriptions     Pending Prescriptions Disp Refills    ondansetron (ZOFRAN ODT) 8 MG TBDP disintegrating tablet 60 tablet 0     Sig: Place 1 tablet under the tongue every 8 hours as needed for Nausea        Last Filled:      Patient Phone Number: [phone] (home)     Last appt: 1/18/2022   Next appt: Visit date not found    Last OARRS:   RX Monitoring 2/4/2020   Attestation -   Periodic Controlled Substance Monitoring No signs of potential drug abuse or diversion identified.

## 2022-02-15 NOTE — TELEPHONE ENCOUNTER
Please ask the patient if he has scheduled a follow up with his gastroenterologist for his nausea/bloating

## 2022-02-16 ENCOUNTER — HOSPITAL ENCOUNTER (OUTPATIENT)
Age: 77
Discharge: HOME | End: 2022-02-16
Payer: MEDICARE

## 2022-02-16 DIAGNOSIS — R11.0 DAILY NAUSEA: ICD-10-CM

## 2022-02-16 LAB
ANION GAP SERPL CALCULATED.3IONS-SCNC: 17 MMOL/L (ref 3–16)
BUN BLDV-MCNC: 17 MG/DL (ref 7–20)
CALCIUM SERPL-MCNC: 9.7 MG/DL (ref 8.3–10.6)
CHLORIDE BLD-SCNC: 102 MMOL/L (ref 99–110)
CO2: 22 MMOL/L (ref 21–32)
CREAT SERPL-MCNC: 2 MG/DL (ref 0.8–1.3)
GFR AFRICAN AMERICAN: 39
GFR NON-AFRICAN AMERICAN: 33
GLUCOSE BLD-MCNC: 82 MG/DL (ref 70–99)
POTASSIUM SERPL-SCNC: 5.3 MMOL/L (ref 3.5–5.1)
SODIUM BLD-SCNC: 141 MMOL/L (ref 136–145)

## 2022-02-16 PROCEDURE — 80048 BASIC METABOLIC PNL TOTAL CA: CPT

## 2022-02-16 PROCEDURE — 36415 COLL VENOUS BLD VENIPUNCTURE: CPT

## 2022-02-18 ENCOUNTER — HOSPITAL ENCOUNTER (OUTPATIENT)
Dept: CT IMAGING | Age: 77
Discharge: HOME | End: 2022-02-18
Payer: MEDICARE

## 2022-02-18 DIAGNOSIS — R63.0 ANOREXIA: ICD-10-CM

## 2022-02-18 DIAGNOSIS — R10.12 ABDOMINAL PAIN, BILATERAL UPPER QUADRANT: ICD-10-CM

## 2022-02-18 DIAGNOSIS — R11.0 NAUSEA: ICD-10-CM

## 2022-02-18 DIAGNOSIS — R63.4 WEIGHT LOSS: ICD-10-CM

## 2022-02-18 DIAGNOSIS — R14.0 ABDOMINAL DISTENTION: ICD-10-CM

## 2022-02-18 DIAGNOSIS — R10.11 ABDOMINAL PAIN, BILATERAL UPPER QUADRANT: ICD-10-CM

## 2022-02-18 PROCEDURE — 74176 CT ABD & PELVIS W/O CONTRAST: CPT

## 2022-02-18 PROCEDURE — 6360000004 HC RX CONTRAST MEDICATION: Performed by: INTERNAL MEDICINE

## 2022-02-18 RX ADMIN — IOHEXOL 50 ML: 240 INJECTION, SOLUTION INTRATHECAL; INTRAVASCULAR; INTRAVENOUS; ORAL at 10:03

## 2022-02-22 NOTE — PROGRESS NOTES
2022    TELEHEALTH EVALUATION -- Audio/Visual (During [id number] public health emergency)    HPI:    Keyona Denton (:  1945) has requested an audio/video evaluation for the following concern(s):    Cancer related anxiety: Liliam Mendoza presents today for evaluation of cancer related anxiety. Patient has been followed by myself and general surgery for several months due to abdominal pain and persistent nausea. Gallbladder removed with some improvement in nausea, but pain has been persistent. Seen by gastroenterology, who performed abdominal/pelvic CT scan, which demonstrated a renal mass and multiple metastasis. As would be expected, patient has been extremely anxious since this diagnosis. He has a lot of baseline anxiety, which was controlled on an SSRI and as needed Ativan; however, patient had hyponatremia, which was thought to be related to the SSRI. Surprisingly, despite the number of metastasis that are apparently present his pain is minimal.    Review of Systems   Constitutional: Negative for activity change, fatigue and unexpected weight change. Gastrointestinal: Negative for nausea and vomiting. Endocrine: Negative for cold intolerance and heat intolerance. Skin: Negative for rash. Neurological: Negative for dizziness, seizures and light-headedness. Psychiatric/Behavioral: Positive for sleep disturbance (insomnia). Negative for agitation, decreased concentration, dysphoric mood, self-injury and suicidal ideas. The patient is nervous/anxious. Prior to Visit Medications    Medication Sig Taking? Authorizing Provider   LORazepam (ATIVAN) 1 MG tablet Take 1 tablet by mouth every 6 hours as needed for Anxiety for up to 30 days.  Yes Jaunjustin Elizabeth, DO   ondansetron (ZOFRAN ODT) 8 MG TBDP disintegrating tablet Place 1 tablet under the tongue every 8 hours as needed for Nausea  Jaunjustin Elizabeth, DO   cholestyramine (QUESTRAN) 4 g packet Take 1 packet by mouth 2 times daily  Sebastián Holcomb MD zolpidem (AMBIEN) 10 MG tablet Take 1 tablet by mouth nightly as needed for Sleep for up to 30 days. Christa Webber, DO   lisinopril (PRINIVIL;ZESTRIL) 20 MG tablet Take 1 tablet by mouth daily  Fronie Overall, DO   apixaban (ELIQUIS) 5 MG TABS tablet TAKE 1 TABLET BY MOUTH TWO  TIMES DAILY  Fronie Overall, DO   rosuvastatin (CRESTOR) 20 MG tablet Take 1 tablet by mouth daily  Fronie Overall, DO   fenofibrate (TRICOR) 54 MG tablet Take 1 tablet by mouth daily s [facility] pharmacy: Please dispense generic fenofibrate unless prescriber denote  Fronie Overall, DO   pantoprazole (PROTONIX) 40 MG tablet Take 40 mg by mouth daily   Historical Provider, MD       Social History     Tobacco Use    Smoking status: Never Smoker    Smokeless tobacco: Never Used   Vaping Use    Vaping Use: Never used   Substance Use Topics    Alcohol use: Not Currently    Drug use: No        Past Medical History:   Diagnosis Date    Anxiety     Arthritis     [facility]adder [facility]     [facility]    two spontaneous DVTs left leg    Elevated fasting glucose     Hx of blood clots     thigh    Hyperlipidemia     Hypertension     Malignant neoplasm of urinary bladder ([facility] 2/4/2019    MVP (mitral valve prolapse)     Polyp of gallbladder     Prostate CA ([facility] 2005       PHYSICAL EXAMINATION:  There were no vitals taken for this visit.   Wt Readings from Last 3 Encounters:   02/08/22 174 lb (78.9 kg)   01/25/22 176 lb (79.8 kg)   01/18/22 176 lb 6.4 oz (80 kg)       [ INSTRUCTIONS:  \"[x]\" Indicates a positive item  \"[]\" Indicates a negative item  -- DELETE ALL ITEMS NOT EXAMINED]  [x] Alert  [x] Oriented to person/place/time    [] No apparent distress  []  Appears Unwell:     [] Breathing appears normal  [x] Converses normally      [] Rash on visible skin:    [] Cranial Nerves II-XII grossly intact    [] Motor grossly intact in visible upper extremities    [] Motor grossly intact in visible lower extremities    [x] Normal Mood  [] Anxious appearing    [] Depressed appearing     [] OTHER:      Due to this being a TeleHealth encounter, evaluation of the following organ systems is limited: Vitals/Constitutional/EENT/Resp/CV/GI//MS/Neuro/Skin/Heme-Lymph-Imm. Lab Results   Component Value Date     02/16/2022    K 5.3 (H) 02/16/2022     02/16/2022    CO2 22 02/16/2022    BUN 17 02/16/2022    CREATININE 2.0 (H) 02/16/2022    GLUCOSE 82 02/16/2022    CALCIUM 9.7 02/16/2022    PROT 6.5 01/25/2022    LABALBU 4.2 01/28/2022    BILITOT 0.4 01/25/2022    ALKPHOS 66 01/25/2022    AST 14 (L) 01/25/2022    ALT 12 01/25/2022    LABGLOM 33 (A) 02/16/2022    GFRAA 39 (A) 02/16/2022    AGRATIO 2.1 12/22/2021    GLOB 2.1 08/10/2021     Lab Results   Component Value Date    LABA1C 5.7 03/18/2020     Lab Results   Component Value Date    .9 03/18/2020         ASSESSMENT/PLAN:  1. Anxiety associated with cancer diagnosis [facility]): Extremely anxious at baseline, which is now worsened given pending cancer diagnosis. We have been avoiding SSRIs due to hyponatremia and response would likely be too delayed anyway. Will increase his Ativan to 3 times daily with 4th tablet to take as needed. His needs as far as anxiety and pain are likely going to escalate over the next few months to a point or I will not be capable of managing him. Will go ahead and refer him to palliative care so we can be prepared for when that day comes. - [facility], [place]  - LORazepam (ATIVAN) 1 MG tablet; Take 1 tablet by mouth every 6 hours as needed for Anxiety for up to 30 days. Dispense: 120 tablet; Refill: 0    2. Cancer, metastatic to bone [facility]): Pain remains largely unchanged from what had been followed for in the past.  Referring to palliative care. Has an appointment upcoming with oncology.     3. Renal mass: As above  - [facility] [address], [place]      Return in about 6 weeks (around 4/6/2022). An  electronic signature was used to authenticate this note. --Lynn Landaverde,  on 2/23/2022 at 8:03 AM        Pursuant to the emergency declaration under the [address] [place] [address], [facility] waiver authority and the [employer] Act, this Virtual  Visit was conducted, with patient's consent, to reduce the patient's risk of exposure to COVID-19 and provide continuity of care for an established patient. Services were provided through a video synchronous discussion virtually to substitute for in-person clinic visit.

## 2022-02-22 NOTE — PATIENT INSTRUCTIONS
Patient Education        Learning About Anxiety Disorders  What are anxiety disorders? Anxiety disorders are a type of medical problem. They cause severe anxiety. When you feel anxious, you feel that something bad is about to happen. This feeling interferes with your life. These disorders include:  · Generalized anxiety disorder. You feel worried and stressed about many everyday events and activities. This goes on for several months and disrupts your life on most days. · Panic disorder. You have repeated panic attacks. A panic attack is a sudden, intense fear or anxiety. It may make you feel short of breath. Your heart may pound. · Social anxiety disorder. You feel very anxious about what you will say or do in front of people. For example, you may be scared to talk or eat in public. This problem affects your daily life. · Phobias. You are very scared of a specific object, situation, or activity. For example, you may fear spiders, high places, or small spaces. What are the symptoms? Generalized anxiety disorder  Symptoms may include:  · Feeling worried and stressed about many things almost every day. · Feeling tired or irritable. You may have a hard time concentrating. · Having headaches or muscle aches. · Having a hard time getting to sleep or staying asleep. Panic disorder  You may have repeated panic attacks when there is no reason for feeling afraid. You may change your daily activities because you worry that you will have another attack. Symptoms may include:  · Intense fear, terror, or anxiety. · Trouble breathing or very fast breathing. · Chest pain or tightness. · A heartbeat that races or is not regular. Social anxiety disorder  Symptoms may include:  · Fear about a social situation, such as eating in front of others or speaking in public. You may worry a lot. Or you may be afraid that something bad will happen. · Anxiety that can cause you to blush, sweat, and feel shaky.   · A heartbeat that is faster than normal.  · A hard time focusing. Phobias  Symptoms may include:  · More fear than most people of being around an object, being in a situation, or doing an activity. You might also be stressed about the chance of being around the thing you fear. · Worry about losing control, panicking, fainting, or having physical symptoms like a faster heartbeat when you are around the situation or object. How are these disorders treated? Anxiety disorders can be treated with medicines or counseling. A combination of both may be used. Medicines may include:  · Antidepressants. These may help your symptoms by keeping chemicals in your brain in balance. · Benzodiazepines. These may give you short-term relief of your symptoms. Some people use cognitive-behavioral therapy. A therapist helps you learn to change stressful or bad thoughts into helpful thoughts. Lead a healthy lifestyle  A healthy lifestyle may help you feel better. · Get at least 30 minutes of exercise on most days of the week. Walking is a good choice. · Eat a healthy diet. Include fruits, vegetables, lean proteins, and whole grains in your diet each day. · Try to go to bed at the same time every night. Try for 8 hours of sleep a night. · Find ways to manage stress. Try relaxation exercises. · Avoid alcohol and illegal drugs. Follow-up care is a key part of your treatment and safety. Be sure to make and go to all appointments, and call your doctor if you are having problems. It's also a good idea to know your test results and keep a list of the medicines you take. Where can you learn more? Go to https://praneeth.[employer]. org and sign in to your [employer] account. Enter N259 in the Plex[employer] box to learn more about \"Learning About Anxiety Disorders. \"     If you do not have an account, please click on the \"Sign Up Now\" link.   Current as of: June 16, 2021               Content Version: 13.1  © 9905-0094 Healthwise, Incorporated. Care instructions adapted under license by [facility] ([facility]). If you have questions about a medical condition or this instruction, always ask your healthcare professional. [address] any warranty or liability for your use of this information.

## 2022-02-23 ENCOUNTER — TELEMEDICINE (OUTPATIENT)
Dept: PRIMARY CARE CLINIC | Age: 77
End: 2022-02-23
Payer: MEDICARE

## 2022-02-23 ENCOUNTER — PATIENT MESSAGE (OUTPATIENT)
Dept: PRIMARY CARE CLINIC | Age: 77
End: 2022-02-23

## 2022-02-23 DIAGNOSIS — F41.1 ANXIETY ASSOCIATED WITH CANCER DIAGNOSIS (HCC): Primary | ICD-10-CM

## 2022-02-23 DIAGNOSIS — N28.89 RENAL MASS: ICD-10-CM

## 2022-02-23 DIAGNOSIS — C80.1 ANXIETY ASSOCIATED WITH CANCER DIAGNOSIS (HCC): Primary | ICD-10-CM

## 2022-02-23 DIAGNOSIS — C79.51 CANCER, METASTATIC TO BONE (HCC): ICD-10-CM

## 2022-02-23 PROCEDURE — G8484 FLU IMMUNIZE NO ADMIN: HCPCS | Performed by: STUDENT IN AN ORGANIZED HEALTH CARE EDUCATION/TRAINING PROGRAM

## 2022-02-23 PROCEDURE — 1123F ACP DISCUSS/DSCN MKR DOCD: CPT | Performed by: STUDENT IN AN ORGANIZED HEALTH CARE EDUCATION/TRAINING PROGRAM

## 2022-02-23 PROCEDURE — G8417 CALC BMI ABV UP PARAM F/U: HCPCS | Performed by: STUDENT IN AN ORGANIZED HEALTH CARE EDUCATION/TRAINING PROGRAM

## 2022-02-23 PROCEDURE — 99214 OFFICE O/P EST MOD 30 MIN: CPT | Performed by: STUDENT IN AN ORGANIZED HEALTH CARE EDUCATION/TRAINING PROGRAM

## 2022-02-23 PROCEDURE — G8427 DOCREV CUR MEDS BY ELIG CLIN: HCPCS | Performed by: STUDENT IN AN ORGANIZED HEALTH CARE EDUCATION/TRAINING PROGRAM

## 2022-02-23 PROCEDURE — 4040F PNEUMOC VAC/ADMIN/RCVD: CPT | Performed by: STUDENT IN AN ORGANIZED HEALTH CARE EDUCATION/TRAINING PROGRAM

## 2022-02-23 PROCEDURE — 1036F TOBACCO NON-USER: CPT | Performed by: STUDENT IN AN ORGANIZED HEALTH CARE EDUCATION/TRAINING PROGRAM

## 2022-02-23 RX ORDER — PROMETHAZINE HYDROCHLORIDE 25 MG/1
25 TABLET ORAL EVERY 8 HOURS PRN
Qty: 45 TABLET | Refills: 0 | Status: SHIPPED | OUTPATIENT
Start: 2022-02-23 | End: 2022-03-09 | Stop reason: SDUPTHER

## 2022-02-23 RX ORDER — LORAZEPAM 1 MG/1
1 TABLET ORAL EVERY 6 HOURS PRN
Qty: 120 TABLET | Refills: 0 | Status: SHIPPED | OUTPATIENT
Start: 2022-02-23 | End: 2022-03-25

## 2022-02-23 ASSESSMENT — ENCOUNTER SYMPTOMS
NAUSEA: 0
VOMITING: 0

## 2022-02-27 DIAGNOSIS — F51.01 PRIMARY INSOMNIA: ICD-10-CM

## 2022-02-28 RX ORDER — ROSUVASTATIN CALCIUM 20 MG/1
TABLET, COATED ORAL
Qty: 90 TABLET | Refills: 1 | Status: SHIPPED | OUTPATIENT
Start: 2022-02-28 | End: 2022-09-02 | Stop reason: SDUPTHER

## 2022-02-28 RX ORDER — ZOLPIDEM TARTRATE 10 MG/1
TABLET ORAL
Qty: 30 TABLET | Refills: 5 | Status: SHIPPED | OUTPATIENT
Start: 2022-02-28 | End: 2022-08-29 | Stop reason: SDUPTHER

## 2022-02-28 NOTE — TELEPHONE ENCOUNTER
Medication:   Requested Prescriptions     Pending Prescriptions Disp Refills    rosuvastatin (CRESTOR) 20 MG tablet [Pharmacy Med Name: ROSUVASTATIN CALCIUM 20 MG TAB] 90 tablet 1     Sig: TAKE ONE TABLET BY MOUTH DAILY        Last Filled:      Patient Phone Number: [phone] (home)     Last appt: 2/23/2022   Next appt: 2/27/2022    Last OARRS:   RX Monitoring 2/4/2020   Attestation -   Periodic Controlled Substance Monitoring No signs of potential drug abuse or diversion identified.

## 2022-02-28 NOTE — TELEPHONE ENCOUNTER
Medication:   Requested Prescriptions     Pending Prescriptions Disp Refills    zolpidem (AMBIEN) 10 MG tablet [Pharmacy Med Name: ZOLPIDEM TARTRATE 10 MG TABLET] 30 tablet      Sig: TAKE ONE TABLET BY MOUTH ONCE NIGHTLY AS NEEDED FOR SLEEP        Last Filled:      Patient Phone Number: [phone] (home)     Last appt: 2/23/2022   Next appt: Visit date not found    Last OARRS:   RX Monitoring 2/4/2020   Attestation -   Periodic Controlled Substance Monitoring No signs of potential drug abuse or diversion identified.

## 2022-03-03 ENCOUNTER — HOSPITAL ENCOUNTER (OUTPATIENT)
Dept: CT IMAGING | Age: 77
Discharge: HOME | End: 2022-03-03
Payer: MEDICARE

## 2022-03-03 VITALS
BODY MASS INDEX: 25.22 KG/M2 | RESPIRATION RATE: 16 BRPM | TEMPERATURE: 97.8 F | OXYGEN SATURATION: 97 % | WEIGHT: 170.3 LBS | DIASTOLIC BLOOD PRESSURE: 59 MMHG | HEART RATE: 54 BPM | HEIGHT: 69 IN | SYSTOLIC BLOOD PRESSURE: 105 MMHG

## 2022-03-03 DIAGNOSIS — C79.51 BONE METASTASES (HCC): ICD-10-CM

## 2022-03-03 LAB
HCT VFR BLD CALC: 42.4 % (ref 40.5–52.5)
HEMOGLOBIN: 14 G/DL (ref 13.5–17.5)
INR BLD: 1.05 (ref 0.88–1.12)
MCH RBC QN AUTO: 30.5 PG (ref 26–34)
MCHC RBC AUTO-ENTMCNC: 33 G/DL (ref 31–36)
MCV RBC AUTO: 92.4 FL (ref 80–100)
PDW BLD-RTO: 15 % (ref 12.4–15.4)
PLATELET # BLD: 171 K/UL (ref 135–450)
PMV BLD AUTO: 8.9 FL (ref 5–10.5)
PROTHROMBIN TIME: 11.9 SEC (ref 9.9–12.7)
RBC # BLD: 4.59 M/UL (ref 4.2–5.9)
WBC # BLD: 8.2 K/UL (ref 4–11)

## 2022-03-03 PROCEDURE — 88341 IMHCHEM/IMCYTCHM EA ADD ANTB: CPT

## 2022-03-03 PROCEDURE — 7100000011 HC PHASE II RECOVERY - ADDTL 15 MIN

## 2022-03-03 PROCEDURE — 88311 DECALCIFY TISSUE: CPT

## 2022-03-03 PROCEDURE — 6360000002 HC RX W HCPCS: Performed by: RADIOLOGY

## 2022-03-03 PROCEDURE — 20225 BONE BIOPSY TROCAR/NDL DEEP: CPT

## 2022-03-03 PROCEDURE — 88307 TISSUE EXAM BY PATHOLOGIST: CPT

## 2022-03-03 PROCEDURE — 85027 COMPLETE CBC AUTOMATED: CPT

## 2022-03-03 PROCEDURE — 88342 IMHCHEM/IMCYTCHM 1ST ANTB: CPT

## 2022-03-03 PROCEDURE — 85610 PROTHROMBIN TIME: CPT

## 2022-03-03 PROCEDURE — 7100000010 HC PHASE II RECOVERY - FIRST 15 MIN

## 2022-03-03 PROCEDURE — 77012 CT SCAN FOR NEEDLE BIOPSY: CPT

## 2022-03-03 PROCEDURE — 36415 COLL VENOUS BLD VENIPUNCTURE: CPT

## 2022-03-03 RX ORDER — ACETAMINOPHEN 325 MG/1
650 TABLET ORAL EVERY 4 HOURS PRN
Status: DISCONTINUED | OUTPATIENT
Start: 2022-03-03 | End: 2022-03-04 | Stop reason: HOSPADM

## 2022-03-03 RX ORDER — FENTANYL CITRATE 50 UG/ML
INJECTION, SOLUTION INTRAMUSCULAR; INTRAVENOUS
Status: COMPLETED | OUTPATIENT
Start: 2022-03-03 | End: 2022-03-03

## 2022-03-03 RX ORDER — MIDAZOLAM HYDROCHLORIDE 5 MG/ML
INJECTION INTRAMUSCULAR; INTRAVENOUS
Status: COMPLETED | OUTPATIENT
Start: 2022-03-03 | End: 2022-03-03

## 2022-03-03 RX ORDER — ONDANSETRON 2 MG/ML
4 INJECTION INTRAMUSCULAR; INTRAVENOUS EVERY 8 HOURS PRN
Status: DISCONTINUED | OUTPATIENT
Start: 2022-03-03 | End: 2022-03-04 | Stop reason: HOSPADM

## 2022-03-03 RX ADMIN — MIDAZOLAM HYDROCHLORIDE 0.5 MG: 5 INJECTION, SOLUTION INTRAMUSCULAR; INTRAVENOUS at 09:07

## 2022-03-03 RX ADMIN — FENTANYL CITRATE 50 MCG: 50 INJECTION INTRAMUSCULAR; INTRAVENOUS at 08:58

## 2022-03-03 RX ADMIN — MIDAZOLAM HYDROCHLORIDE 1 MG: 5 INJECTION, SOLUTION INTRAMUSCULAR; INTRAVENOUS at 08:58

## 2022-03-03 RX ADMIN — FENTANYL CITRATE 25 MCG: 50 INJECTION INTRAMUSCULAR; INTRAVENOUS at 09:07

## 2022-03-03 ASSESSMENT — PAIN SCALES - GENERAL: PAINLEVEL_OUTOF10: 0

## 2022-03-03 ASSESSMENT — PAIN - FUNCTIONAL ASSESSMENT: PAIN_FUNCTIONAL_ASSESSMENT: 0-10

## 2022-03-03 NOTE — PRE SEDATION
Sedation Pre-Procedure Note    Patient Name: Kelton Dutta   YOB: 1945  Room/Bed: Room/bed info not found  Medical Record Number: [id number]  Date: 3/3/2022   Time: 8:48 AM       Indication:  History of prostate and bladder cancer. PET positive bone lesions    Consent: I have discussed with the patient and/or the patient representative the indication, alternatives, and the possible risks and/or complications of the planned procedure and the anesthesia methods. The patient and/or patient representative appear to understand and agree to proceed. Vital Signs:   Vitals:    03/03/22 0725   BP: 113/70   Pulse:    Resp:    Temp:    SpO2:        Past Medical History:   has a past medical history of Anxiety, Arthritis, Bladder cancer ([address] DVT (deep venous thrombosis) (HCC), Elevated fasting glucose, Hx of blood clots, Hyperlipidemia, Hypertension, Malignant neoplasm of urinary bladder ([address] MVP (mitral valve prolapse), Polyp of gallbladder, and Prostate CA ([facility] [address] Past Surgical History:   has a past surgical history that includes Tonsillectomy; Colonoscopy (6/29/12); Colonoscopy (07/12/2017); Bladder surgery; Cataract removal (Bilateral); Prostatectomy (2005 approx); Inguinal hernia repair (Right, 06/1999); Penile prosthesis placement; and Cholecystectomy, laparoscopic (N/A, 1/10/2022). Medications:   Scheduled Meds:   Continuous Infusions:   PRN Meds:   Home Meds:   Prior to Admission medications    Medication Sig Start Date End Date Taking? Authorizing Provider   rosuvastatin (CRESTOR) 20 MG tablet TAKE ONE TABLET BY MOUTH DAILY 2/28/22  Yes Maral Santiago DO   zolpidem (AMBIEN) 10 MG tablet TAKE ONE TABLET BY MOUTH ONCE NIGHTLY AS NEEDED FOR SLEEP 2/28/22 3/30/22 Yes Maral Santiago DO   LORazepam (ATIVAN) 1 MG tablet Take 1 tablet by mouth every 6 hours as needed for Anxiety for up to 30 days.  2/23/22 3/25/22 Yes Maral Santiago DO   ondansetron (ZOFRAN ODT) 8 MG TBDP disintegrating tablet Place 1 tablet under the tongue every 8 hours as needed for Nausea 2/15/22  Yes Rick Palma, DO   cholestyramine Rosebud Button) 4 g packet Take 1 packet by mouth 2 times daily 2/8/22 3/10/22 Yes Larry Harmon MD   lisinopril (PRINIVIL;ZESTRIL) 20 MG tablet Take 1 tablet by mouth daily 1/18/22  Yes Rick Palma, DO   apixaban (ELIQUIS) 5 MG TABS tablet TAKE 1 TABLET BY MOUTH TWO  TIMES DAILY 1/18/22  Yes Rick Palma, DO   fenofibrate (TRICOR) 54 MG tablet Take 1 tablet by mouth daily s [facility] pharmacy: Please dispense generic fenofibrate unless prescriber denote 9/16/21  Yes Rick Palma DO   pantoprazole (PROTONIX) 40 MG tablet Take 40 mg by mouth daily  1/24/18  Yes Historical Provider, MD     Coumadin Use Last 7 Days:  no  Antiplatelet drug therapy use last 7 days: no  Other anticoagulant use last 7 days: no  Additional Medication Information:        Pre-Sedation Documentation and Exam:   I have reviewed the patient's history and review of systems.     Mallampati Airway Assessment:  normal neck range of motion    Prior History of Anesthesia Complications:   none    ASA Classification:  Class 2 - A normal healthy patient with mild systemic disease    Sedation/ Anesthesia Plan:   intravenous sedation    Medications Planned:   midazolam (Versed) intravenously and fentanyl intravenously    Patient is an appropriate candidate for plan of sedation: yes    Electronically signed by Merced Montaño MD on 3/3/2022 at 8:48 AM

## 2022-03-03 NOTE — BRIEF OP NOTE
Brief Postoperative Note    Colonel Girt  YOB: 1945  [id number]    Pre-operative Diagnosis: history of prostate and bladder cancer.  Lytic bone lesions    Post-operative Diagnosis: Same    Procedure: CT guided left iliac lesion biopsy    Anesthesia: Moderate Sedation    Surgeons/Assistants: Dr. Damian Dan    Estimated Blood Loss: less than 5ml     Complications: None    Specimens: Was Obtained: left iliac lesion    Findings: three core biopsies    Electronically signed by Miguel Carroll MD on 3/3/2022 at 9:17 AM

## 2022-03-03 NOTE — SEDATION DOCUMENTATION
Pt arrived for image guided Bone Biopsy nodule biopsy left. Procedure explained including the risk and benefits of the procedure. All questions answered. Pt verbalizes understanding of the of procedure and states no more questions. Consent signed. Vital signs stable, labs, allergies, medications, and code status reviewed. No contraindications noted. Time out completed prior to procedure. Pt was place prone on the CT table. Pt was placed on all cardiac monitoring. Pt placed on 2 L NC for sedation.          Vitals:    03/03/22 0903   BP: 111/69   Pulse: 63   Resp: 10   Temp:    SpO2: 100%    (vital signs in table format)    Rama Score  2 - Able to move 4 extremities voluntarily on command  2 - BP+/- 20mmHg of normal  2 - Fully awake  2 - Able to maintain oxygen saturation >92% on room air  2 - Able to breathe deeply and cough freely    Allergies  Lipitor    Labs  Lab Results   Component Value Date    INR 1.05 03/03/2022    PROTIME 11.9 03/03/2022       Lab Results   Component Value Date    CREATININE 2.0 (H) 02/16/2022    BUN 17 02/16/2022    GFR 50 (L) 07/20/2012     02/16/2022    K 5.3 (H) 02/16/2022     02/16/2022    CO2 22 02/16/2022       Lab Results   Component Value Date    WBC 8.2 03/03/2022    HGB 14.0 03/03/2022    HCT 42.4 03/03/2022    MCV 92.4 03/03/2022     03/03/2022

## 2022-03-04 DIAGNOSIS — R11.0 DAILY NAUSEA: ICD-10-CM

## 2022-03-04 RX ORDER — ONDANSETRON 8 MG/1
8 TABLET, ORALLY DISINTEGRATING ORAL EVERY 8 HOURS PRN
Qty: 60 TABLET | Refills: 0 | Status: SHIPPED | OUTPATIENT
Start: 2022-03-04 | End: 2022-03-24 | Stop reason: SDUPTHER

## 2022-03-04 NOTE — TELEPHONE ENCOUNTER
Medication:   Requested Prescriptions     Pending Prescriptions Disp Refills    ondansetron (ZOFRAN ODT) 8 MG TBDP disintegrating tablet 60 tablet 0     Sig: Place 1 tablet under the tongue every 8 hours as needed for Nausea        Last Filled:  02/15/22    Patient Phone Number: [phone] (home)     Last appt: 2/23/2022 Return in about 4 weeks (around 3/23/2022) for Depression/Anxiety. Next appt: Visit date not found    Last OARRS:   RX Monitoring 2/4/2020   Attestation -   Periodic Controlled Substance Monitoring No signs of potential drug abuse or diversion identified.

## 2022-03-08 ENCOUNTER — PATIENT MESSAGE (OUTPATIENT)
Dept: PRIMARY CARE CLINIC | Age: 77
End: 2022-03-08

## 2022-03-09 RX ORDER — PROMETHAZINE HYDROCHLORIDE 25 MG/1
25 TABLET ORAL EVERY 8 HOURS PRN
Qty: 45 TABLET | Refills: 0 | Status: SHIPPED | OUTPATIENT
Start: 2022-03-09 | End: 2022-03-24 | Stop reason: SDUPTHER

## 2022-03-09 NOTE — TELEPHONE ENCOUNTER
From: Redd Rios  To: Dr. Brandee Elliott: 3/8/2022 5:51 PM EST  Subject: promethazine    Doctor Smitha Ok,  could you do a refill for me ?   thx

## 2022-03-09 NOTE — TELEPHONE ENCOUNTER
Medication:   Requested Prescriptions     Pending Prescriptions Disp Refills    promethazine (PHENERGAN) 25 MG tablet 45 tablet 0     Sig: Take 1 tablet by mouth every 8 hours as needed for Nausea        Last Filled:  02/23/22    Patient Phone Number: [phone] (home)     Last appt: 2/23/2022 Return in about 6 weeks (around 4/6/2022). Next appt: Visit date not found    Last OARRS:   RX Monitoring 2/4/2020   Attestation -   Periodic Controlled Substance Monitoring No signs of potential drug abuse or diversion identified.

## 2022-03-10 DIAGNOSIS — E78.2 MIXED HYPERLIPIDEMIA: ICD-10-CM

## 2022-03-10 RX ORDER — FENOFIBRATE 54 MG/1
54 TABLET ORAL DAILY
Qty: 90 TABLET | Refills: 1 | Status: SHIPPED | OUTPATIENT
Start: 2022-03-10 | End: 2022-09-02 | Stop reason: SDUPTHER

## 2022-03-10 NOTE — TELEPHONE ENCOUNTER
Medication:   Requested Prescriptions     Pending Prescriptions Disp Refills    fenofibrate (TRICOR) 54 MG tablet 90 tablet 1     Sig: Take 1 tablet by mouth daily s [facility] pharmacy: Please dispense generic fenofibrate unless prescriber denote        Last Filled:      Patient Phone Number: [phone] (home)     Last appt: 2/23/2022   Next appt: Visit date not found    Last OARRS:   RX Monitoring 2/4/2020   Attestation -   Periodic Controlled Substance Monitoring No signs of potential drug abuse or diversion identified.

## 2022-03-14 ENCOUNTER — OFFICE VISIT (OUTPATIENT)
Dept: PRIMARY CARE CLINIC | Age: 77
End: 2022-03-14
Payer: MEDICARE

## 2022-03-14 VITALS
HEART RATE: 41 BPM | DIASTOLIC BLOOD PRESSURE: 57 MMHG | TEMPERATURE: 97.3 F | HEIGHT: 69 IN | SYSTOLIC BLOOD PRESSURE: 105 MMHG | BODY MASS INDEX: 24.88 KG/M2 | WEIGHT: 168 LBS

## 2022-03-14 DIAGNOSIS — F51.01 PRIMARY INSOMNIA: ICD-10-CM

## 2022-03-14 DIAGNOSIS — N39.0 URINARY TRACT INFECTION WITH HEMATURIA, SITE UNSPECIFIED: Primary | ICD-10-CM

## 2022-03-14 DIAGNOSIS — R31.9 URINARY TRACT INFECTION WITH HEMATURIA, SITE UNSPECIFIED: Primary | ICD-10-CM

## 2022-03-14 LAB
BILIRUBIN, POC: ABNORMAL
BLOOD URINE, POC: NEGATIVE
CLARITY, POC: CLEAR
COLOR, POC: YELLOW
GLUCOSE URINE, POC: NEGATIVE
KETONES, POC: ABNORMAL
LEUKOCYTE EST, POC: NEGATIVE
NITRITE, POC: ABNORMAL
PH, POC: 5
PROTEIN, POC: 300
SPECIFIC GRAVITY, POC: 1.03
UROBILINOGEN, POC: 0.2

## 2022-03-14 PROCEDURE — 1036F TOBACCO NON-USER: CPT | Performed by: STUDENT IN AN ORGANIZED HEALTH CARE EDUCATION/TRAINING PROGRAM

## 2022-03-14 PROCEDURE — 1123F ACP DISCUSS/DSCN MKR DOCD: CPT | Performed by: STUDENT IN AN ORGANIZED HEALTH CARE EDUCATION/TRAINING PROGRAM

## 2022-03-14 PROCEDURE — G8484 FLU IMMUNIZE NO ADMIN: HCPCS | Performed by: STUDENT IN AN ORGANIZED HEALTH CARE EDUCATION/TRAINING PROGRAM

## 2022-03-14 PROCEDURE — G8420 CALC BMI NORM PARAMETERS: HCPCS | Performed by: STUDENT IN AN ORGANIZED HEALTH CARE EDUCATION/TRAINING PROGRAM

## 2022-03-14 PROCEDURE — 99214 OFFICE O/P EST MOD 30 MIN: CPT | Performed by: STUDENT IN AN ORGANIZED HEALTH CARE EDUCATION/TRAINING PROGRAM

## 2022-03-14 PROCEDURE — 4040F PNEUMOC VAC/ADMIN/RCVD: CPT | Performed by: STUDENT IN AN ORGANIZED HEALTH CARE EDUCATION/TRAINING PROGRAM

## 2022-03-14 PROCEDURE — 81002 URINALYSIS NONAUTO W/O SCOPE: CPT | Performed by: STUDENT IN AN ORGANIZED HEALTH CARE EDUCATION/TRAINING PROGRAM

## 2022-03-14 PROCEDURE — G8427 DOCREV CUR MEDS BY ELIG CLIN: HCPCS | Performed by: STUDENT IN AN ORGANIZED HEALTH CARE EDUCATION/TRAINING PROGRAM

## 2022-03-14 RX ORDER — QUETIAPINE FUMARATE 25 MG/1
25 TABLET, FILM COATED ORAL EVERY EVENING
Qty: 30 TABLET | Refills: 3 | Status: SHIPPED | OUTPATIENT
Start: 2022-03-14 | End: 2022-10-25

## 2022-03-14 RX ORDER — CIPROFLOXACIN 500 MG/1
500 TABLET, FILM COATED ORAL 2 TIMES DAILY
Qty: 10 TABLET | Refills: 0 | Status: SHIPPED | OUTPATIENT
Start: 2022-03-14 | End: 2022-03-19

## 2022-03-14 RX ORDER — BUSPIRONE HYDROCHLORIDE 7.5 MG/1
TABLET ORAL
COMMUNITY
Start: 2022-02-17 | End: 2022-04-18 | Stop reason: SDUPTHER

## 2022-03-14 ASSESSMENT — ENCOUNTER SYMPTOMS
NAUSEA: 0
VOMITING: 0

## 2022-03-14 NOTE — PROGRESS NOTES
3/14/2022     Marianne Page (:  1945) is a 67 y.o. male, here for evaluation of the following medical concerns:    HPI  Dysuria: Patient complains of frequency, urgency, burning with urination He has had symptoms for 3 days. Patient also complains of nausea and abdominal pain, which are chronic for him. He has had some mild suprapubic pressure. Patient denies back pain, congestion, cough, fever and headache. Patient does not have a history of recurrent UTI. Patient does not have a history of pyelonephritis. Insomnia:  Current treatment: avoiding heavy meal before bedtime, avoiding long naps during the day, avoiding use of electronic devices before bedtime, decreasing caffeine consumption, going to sleep at the same time each night, limiting alcohol consumption, melatonin use and prescription sleep aid- Ambien- 10 mg, which has been somewhat effective. He is only able to sleep 3-4 hours per night. Medication side effects: none. Review of Systems   Constitutional: Negative for activity change, fatigue and unexpected weight change. Gastrointestinal: Negative for nausea and vomiting. Endocrine: Negative for cold intolerance and heat intolerance. Genitourinary: Positive for dysuria and frequency. Negative for difficulty urinating, flank pain, hematuria, penile discharge and penile pain. Skin: Negative for rash. Neurological: Negative for dizziness, seizures and light-headedness. Psychiatric/Behavioral: Positive for sleep disturbance. Negative for agitation, decreased concentration, dysphoric mood, self-injury and suicidal ideas. The patient is nervous/anxious. Prior to Visit Medications    Medication Sig Taking?  Authorizing Provider   sertraline (ZOLOFT) 50 MG tablet  Yes Historical Provider, MD   busPIRone (BUSPAR) 7.5 MG tablet  Yes Historical Provider, MD   ciprofloxacin (CIPRO) 500 MG tablet Take 1 tablet by mouth 2 times daily for 5 days Yes Jacoby Lopez,    QUEtiapine (SEROQUEL) 25 MG tablet Take 1 tablet by mouth every evening Yes Jackie Rodriguez DO   fenofibrate (TRICOR) 54 MG tablet Take 1 tablet by mouth daily [facility]: Please dispense generic fenofibrate unless prescriber denote Yes Jackie Rodriguez DO   promethazine (PHENERGAN) 25 MG tablet Take 1 tablet by mouth every 8 hours as needed for Nausea Yes Jackie Rodriguez DO   ondansetron (ZOFRAN ODT) 8 MG TBDP disintegrating tablet Place 1 tablet under the tongue every 8 hours as needed for Nausea Yes Jackie Rodriguez DO   rosuvastatin (CRESTOR) 20 MG tablet TAKE ONE TABLET BY MOUTH DAILY Yes Jackie Rodriguez DO   zolpidem (AMBIEN) 10 MG tablet TAKE ONE TABLET BY MOUTH ONCE NIGHTLY AS NEEDED FOR SLEEP Yes Jackie Rodriguez DO   LORazepam (ATIVAN) 1 MG tablet Take 1 tablet by mouth every 6 hours as needed for Anxiety for up to 30 days. Yes Jackie Rodriguez DO   lisinopril (PRINIVIL;ZESTRIL) 20 MG tablet Take 1 tablet by mouth daily Yes Jackie Rodriguez DO   apixaban (ELIQUIS) 5 MG TABS tablet TAKE 1 TABLET BY MOUTH TWO  TIMES DAILY Yes Jackie Rodriguez DO   pantoprazole (PROTONIX) 40 MG tablet Take 40 mg by mouth daily  Yes Historical Provider, MD   cholestyramine (QUESTRAN) 4 g packet Take 1 packet by mouth 2 times daily  Nirmala Curry MD        Social History     Tobacco Use    Smoking status: Never Smoker    Smokeless tobacco: Never Used   Substance Use Topics    Alcohol use: Not Currently        Vitals:    03/14/22 1000   BP: (!) 105/57   Pulse: (!) 41   Temp: 97.3 °F (36.3 °C)   TempSrc: Axillary   Weight: 168 lb (76.2 kg)   Height: 5' 9\" (1.753 m)     Estimated body mass index is 24.81 kg/m² as calculated from the following:    Height as of this encounter: 5' 9\" (1.753 m). Weight as of this encounter: 168 lb (76.2 kg). Physical Exam  Vitals reviewed. Constitutional:       Appearance: Normal appearance. He is normal weight. HENT:      Head: Normocephalic and atraumatic.       Left Ear: External ear normal.      Nose: Nose normal. Mouth/Throat:      Mouth: Mucous membranes are moist.      Pharynx: Oropharynx is clear. Eyes:      Extraocular Movements: Extraocular movements intact. Conjunctiva/sclera: Conjunctivae normal.   Cardiovascular:      Rate and Rhythm: Normal rate and regular rhythm. Pulses: Normal pulses. Heart sounds: Normal heart sounds. Pulmonary:      Effort: Pulmonary effort is normal.      Breath sounds: Normal breath sounds. Abdominal:      General: Abdomen is flat. Bowel sounds are normal.      Palpations: Abdomen is soft. Tenderness: There is abdominal tenderness (mild suprapubic area). Musculoskeletal:         General: Normal range of motion. Cervical back: Normal range of motion and neck supple. Skin:     General: Skin is warm and dry. Capillary Refill: Capillary refill takes less than 2 seconds. Findings: No rash. Neurological:      General: No focal deficit present. Mental Status: He is alert and oriented to person, place, and time. Mental status is at baseline. Psychiatric:         Mood and Affect: Mood normal.         Behavior: Behavior normal.         Thought Content: Thought content normal.         Judgment: Judgment normal.         ASSESSMENT/PLAN:  1. Urinary tract infection with hematuria, site unspecified: Urine not impressive, but symptoms are suggestive. Does admit to significant increase in fluid intake and frequent urination, which could have cleared urinary tract. Sending for culture. Cipro as below. Follow-up Friday. - POCT Urinalysis no Micro  - ciprofloxacin (CIPRO) 500 MG tablet; Take 1 tablet by mouth 2 times daily for 5 days  Dispense: 10 tablet; Refill: 0  - Culture, Urine    2. Primary insomnia: No longer responding to Ambien, which I suspect is related to the fact that he is saturating his benzodiazepine receptor. Tried and failed Elavil and trazodone. Belsomra unlikely to work given long-term Ambien use. Will try Seroquel as below.   Okay with him taking Ambien Sunday through Thursday and switching to Seroquel the other days. Hopeful this will help down regulate benzodiazepine receptor, but given his frequent Ativan use it may be impossible to do. Follow-up 2 to 4 weeks. Could consider up titrating this if he responds well transitioning from Ambien to Seroquel. - QUEtiapine (SEROQUEL) 25 MG tablet; Take 1 tablet by mouth every evening  Dispense: 30 tablet; Refill: 3      Return in about 4 weeks (around 4/11/2022) for insomnia. An electronic signature was used to authenticate this note.     --Jason Sams,  on 3/14/2022 at 10:47 AM

## 2022-03-14 NOTE — PATIENT INSTRUCTIONS
Patient Education        Urinary Tract Infections (UTI) in Men: Care Instructions  Overview     A urinary tract infection, or UTI, is a term for an infection anywhere between the kidneys and the urethra. (The urethra is the tube that carries urine from the bladder to outside the body.) Most UTIs are bladder infections. They often cause pain or burning when you urinate. UTIs are caused by bacteria. This means they can be cured with antibiotics. Be sure to complete your treatment so that the infection does not get worse. Follow-up care is a key part of your treatment and safety. Be sure to make and go to all appointments, and call your doctor if you are having problems. It's also a good idea to know your test results and keep a list of the medicines you take. How can you care for yourself at home? · Take your antibiotics as prescribed. Do not stop taking them just because you feel better. You need to take the full course of antibiotics. · Take your medicines exactly as prescribed. Your doctor may have prescribed a medicine, such as phenazopyridine (Pyridium), to help relieve pain when you urinate. This turns your urine orange. You may stop taking it when your symptoms get better. But be sure to take all of your antibiotics, which treat the infection. · Drink extra water for the next day or two. This will help make the urine less concentrated and help wash out the bacteria causing the infection. (If you have kidney, heart, or liver disease and have to limit your fluids, talk with your doctor before you increase your fluid intake.)  · Avoid drinks that are carbonated or have caffeine. They can irritate the bladder. · Urinate often. Try to empty your bladder each time. · To relieve pain, take a hot bath or lay a heating pad (set on low) over your lower belly or genital area. Never go to sleep with a heating pad in place. To help prevent UTIs  · Drink plenty of fluids.  If you have kidney, heart, or liver disease and have to limit fluids, talk with your doctor before you increase the amount of fluids you drink. · Urinate when you have the urge. Do not hold your urine for a long time. Urinate before you go to sleep. · Keep your penis clean. Catheter care  If you have a drainage tube (catheter) in place, the following steps will help you care for it. · Always wash your hands before and after touching your catheter. · Check the area around the urethra for inflammation or signs of infection. Signs of infection include irritated, swollen, red, or tender skin, or pus around the catheter. · Clean the area around the catheter with soap and water two times a day. Dry with a clean towel afterward. · Do not apply powder or lotion to the skin around the catheter. To empty the urine collection bag   · Wash your hands with soap and water. · Without touching the drain spout, remove the spout from its sleeve at the bottom of the collection bag. Open the valve on the spout. · Let the urine flow out of the bag and into the toilet or a container. Do not let the tubing or drain spout touch anything. · After you empty the bag, clean the end of the drain spout with tissue and water. Close the valve and put the drain spout back into its sleeve at the bottom of the collection bag. · Wash your hands with soap and water. When should you call for help? Call your doctor now or seek immediate medical care if:    · Symptoms such as a fever, chills, nausea, or vomiting get worse or happen for the first time.     · You have new pain in your back just below your rib cage. This is called flank pain.     · There is new blood or pus in your urine.     · You are not able to take or keep down your antibiotics. Watch closely for changes in your health, and be sure to contact your doctor if:    · You are not getting better after taking an antibiotic for 2 days.     · Your symptoms go away but then come back. Where can you learn more?   Go to [email or web address] and sign in to your [facility] account. Enter Q094 in the [place] box to learn more about \"Urinary Tract Infections (UTI) in Men: Care Instructions. \"     If you do not have an account, please click on the \"Sign Up Now\" link. Current as of: February 10, 2021               Content Version: 13.1  © 7718-4774 [employer], Incorporated. Care instructions adapted under license by [facility] [place] ([facility]). If you have questions about a medical condition or this instruction, always ask your healthcare professional. [address] any warranty or liability for your use of this information.

## 2022-03-16 LAB — URINE CULTURE, ROUTINE: NORMAL

## 2022-03-23 ENCOUNTER — PATIENT MESSAGE (OUTPATIENT)
Dept: PRIMARY CARE CLINIC | Age: 77
End: 2022-03-23

## 2022-03-24 ENCOUNTER — PATIENT MESSAGE (OUTPATIENT)
Dept: PRIMARY CARE CLINIC | Age: 77
End: 2022-03-24

## 2022-03-24 DIAGNOSIS — R11.0 DAILY NAUSEA: ICD-10-CM

## 2022-03-24 RX ORDER — PROMETHAZINE HYDROCHLORIDE 25 MG/1
25 TABLET ORAL EVERY 8 HOURS PRN
Qty: 45 TABLET | Refills: 0 | Status: SHIPPED | OUTPATIENT
Start: 2022-03-24 | End: 2022-04-08 | Stop reason: SDUPTHER

## 2022-03-24 RX ORDER — ONDANSETRON 8 MG/1
8 TABLET, ORALLY DISINTEGRATING ORAL EVERY 8 HOURS PRN
Qty: 60 TABLET | Refills: 0 | Status: SHIPPED | OUTPATIENT
Start: 2022-03-24 | End: 2022-04-13 | Stop reason: SDUPTHER

## 2022-03-24 NOTE — TELEPHONE ENCOUNTER
Medication:   Requested Prescriptions     Pending Prescriptions Disp Refills    ondansetron (ZOFRAN ODT) 8 MG TBDP disintegrating tablet 60 tablet 0     Sig: Place 1 tablet under the tongue every 8 hours as needed for Nausea        Last Filled: 03/04/22     Patient Phone Number: [phone] (home)     Last appt: 3/14/2022   Next appt: Visit date not found    Last OARRS:   RX Monitoring 2/4/2020   Attestation -   Periodic Controlled Substance Monitoring No signs of potential drug abuse or diversion identified.

## 2022-03-24 NOTE — TELEPHONE ENCOUNTER
From: Colonel Dhaliwal  To: Dr. Yue Gallardo: 3/24/2022 12:05 PM EDT  Subject: promethazine    need refill. ...thx as always . A couple days left.

## 2022-03-24 NOTE — TELEPHONE ENCOUNTER
From: Ike Fuentes  To: Dr. Gonzalez Dress: 3/23/2022 2:58 PM EDT  Subject: magnesium    safe for people with kidney probs ? prescription needed?     raj Álvarezph.

## 2022-03-24 NOTE — TELEPHONE ENCOUNTER
Medication:   Requested Prescriptions     Pending Prescriptions Disp Refills    promethazine (PHENERGAN) 25 MG tablet 45 tablet 0     Sig: Take 1 tablet by mouth every 8 hours as needed for Nausea        Last Filled:   03/09/22    Patient Phone Number: [phone] (home)     Last appt: 3/14/2022 Return in about 4 weeks (around 4/11/2022) for insomnia. Next appt: Visit date not found    Last OARRS:   RX Monitoring 2/4/2020   Attestation -   Periodic Controlled Substance Monitoring No signs of potential drug abuse or diversion identified.

## 2022-04-07 ENCOUNTER — PATIENT MESSAGE (OUTPATIENT)
Dept: PRIMARY CARE CLINIC | Age: 77
End: 2022-04-07

## 2022-04-07 DIAGNOSIS — E78.2 MIXED HYPERLIPIDEMIA: ICD-10-CM

## 2022-04-07 RX ORDER — PANTOPRAZOLE SODIUM 40 MG/1
40 TABLET, DELAYED RELEASE ORAL DAILY
Qty: 30 TABLET | Refills: 5 | Status: SHIPPED | OUTPATIENT
Start: 2022-04-07 | End: 2022-10-10 | Stop reason: SDUPTHER

## 2022-04-07 RX ORDER — FENOFIBRATE 54 MG/1
54 TABLET ORAL DAILY
Qty: 90 TABLET | Refills: 1 | Status: CANCELLED | OUTPATIENT
Start: 2022-04-07

## 2022-04-07 NOTE — TELEPHONE ENCOUNTER
Medication:   Requested Prescriptions     Pending Prescriptions Disp Refills    pantoprazole (PROTONIX) 40 MG tablet 30 tablet      Sig: Take 1 tablet by mouth daily        Last Filled:  01/24/18    Patient Phone Number: [phone] (home)     Last appt: 3/14/2022 Return in about 4 weeks (around 4/11/2022) for insomnia    Next appt: Visit date not found    Last OARRS:   RX Monitoring 2/4/2020   Attestation -   Periodic Controlled Substance Monitoring No signs of potential drug abuse or diversion identified.

## 2022-04-07 NOTE — TELEPHONE ENCOUNTER
From: Jeffery Barr  To: Dr. Susana Gutierrez: 4/7/2022 9:57 AM EDT  Subject: pantoprazole 40mg and promethazine 25mg    Doctor Isabella CORCORAN please refil these for me ?     Caren Nascimento

## 2022-04-08 ENCOUNTER — PATIENT MESSAGE (OUTPATIENT)
Dept: PRIMARY CARE CLINIC | Age: 77
End: 2022-04-08

## 2022-04-08 RX ORDER — PANTOPRAZOLE SODIUM 40 MG/1
40 TABLET, DELAYED RELEASE ORAL DAILY
Qty: 30 TABLET | Refills: 5 | Status: CANCELLED | OUTPATIENT
Start: 2022-04-08

## 2022-04-08 RX ORDER — PROMETHAZINE HYDROCHLORIDE 25 MG/1
TABLET ORAL
Qty: 45 TABLET | Refills: 0 | Status: SHIPPED | OUTPATIENT
Start: 2022-04-08 | End: 2022-10-25

## 2022-04-08 RX ORDER — PROMETHAZINE HYDROCHLORIDE 25 MG/1
25 TABLET ORAL EVERY 8 HOURS PRN
Qty: 45 TABLET | Refills: 0 | Status: SHIPPED | OUTPATIENT
Start: 2022-04-08 | End: 2022-04-08

## 2022-04-08 NOTE — TELEPHONE ENCOUNTER
Medication:   Requested Prescriptions     Pending Prescriptions Disp Refills    promethazine (PHENERGAN) 25 MG tablet 45 tablet 0     Sig: Take 1 tablet by mouth every 8 hours as needed for Nausea        Last Filled:  03/09/22    Patient Phone Number: [phone] (home)     Last appt: 3/14/2022 Return in about 4 weeks (around 4/11/2022) for insomnia. Next appt: Visit date not found    Last OARRS:   RX Monitoring 2/4/2020   Attestation -   Periodic Controlled Substance Monitoring No signs of potential drug abuse or diversion identified.

## 2022-04-08 NOTE — TELEPHONE ENCOUNTER
From: Sandra Bey  To: Dr. Gregg Verma: 4/8/2022 9:42 AM EDT  Subject: promethazine 25mg not filled yet    H doc,  Per my request the pantoprazole got refilled, but not the promethazine . I only have a couple days left and will run out by weekend Could you please call the pharmacy.  As always,raj Ricks

## 2022-04-11 ENCOUNTER — HOSPITAL ENCOUNTER (OUTPATIENT)
Dept: CT IMAGING | Age: 77
Discharge: HOME | End: 2022-04-11
Payer: MEDICARE

## 2022-04-11 ENCOUNTER — HOSPITAL ENCOUNTER (OUTPATIENT)
Age: 77
Discharge: HOME | End: 2022-04-11
Payer: MEDICARE

## 2022-04-11 DIAGNOSIS — C90.00 MULTIPLE MYELOMA, REMISSION STATUS UNSPECIFIED (HCC): ICD-10-CM

## 2022-04-11 DIAGNOSIS — C90.00 MYELOMA ASSOCIATED AMYLOIDOSIS (HCC): ICD-10-CM

## 2022-04-11 DIAGNOSIS — E85.9 MYELOMA ASSOCIATED AMYLOIDOSIS (HCC): ICD-10-CM

## 2022-04-11 DIAGNOSIS — R07.82 INTERCOSTAL PAIN: ICD-10-CM

## 2022-04-11 LAB
BUN BLDV-MCNC: 41 MG/DL (ref 7–20)
CREAT SERPL-MCNC: 2.4 MG/DL (ref 0.8–1.3)
GFR AFRICAN AMERICAN: 32
GFR NON-AFRICAN AMERICAN: 26

## 2022-04-11 PROCEDURE — 36415 COLL VENOUS BLD VENIPUNCTURE: CPT

## 2022-04-11 PROCEDURE — 82565 ASSAY OF CREATININE: CPT

## 2022-04-11 PROCEDURE — 84520 ASSAY OF UREA NITROGEN: CPT

## 2022-04-13 DIAGNOSIS — R11.0 DAILY NAUSEA: ICD-10-CM

## 2022-04-13 RX ORDER — ONDANSETRON 8 MG/1
8 TABLET, ORALLY DISINTEGRATING ORAL EVERY 8 HOURS PRN
Qty: 60 TABLET | Refills: 0 | Status: SHIPPED | OUTPATIENT
Start: 2022-04-13 | End: 2022-04-27 | Stop reason: SDUPTHER

## 2022-04-13 NOTE — TELEPHONE ENCOUNTER
Medication:   Requested Prescriptions     Pending Prescriptions Disp Refills    ondansetron (ZOFRAN ODT) 8 MG TBDP disintegrating tablet 60 tablet 0     Sig: Place 1 tablet under the tongue every 8 hours as needed for Nausea        Last Filled:      Patient Phone Number: [phone] (home)     Last appt: 3/14/2022   Next appt: Visit date not found    Last OARRS:   RX Monitoring 2/4/2020   Attestation -   Periodic Controlled Substance Monitoring No signs of potential drug abuse or diversion identified.

## 2022-04-18 ENCOUNTER — PATIENT MESSAGE (OUTPATIENT)
Dept: PRIMARY CARE CLINIC | Age: 77
End: 2022-04-18

## 2022-04-18 RX ORDER — BUSPIRONE HYDROCHLORIDE 7.5 MG/1
7.5 TABLET ORAL 2 TIMES DAILY
Qty: 180 TABLET | Refills: 1 | Status: SHIPPED | OUTPATIENT
Start: 2022-04-18 | End: 2022-10-19 | Stop reason: SDUPTHER

## 2022-04-18 NOTE — TELEPHONE ENCOUNTER
Medication:   Requested Prescriptions     Pending Prescriptions Disp Refills    busPIRone (BUSPAR) 7.5 MG tablet          Last Filled:  Reported 2/17/22    Patient Phone Number: [phone] (home)     Last appt: 3/14/2022 Return in about 4 weeks (around 4/11/2022) for insomnia. Next appt: Visit date not found    Last OARRS:   RX Monitoring 2/4/2020   Attestation -   Periodic Controlled Substance Monitoring No signs of potential drug abuse or diversion identified.

## 2022-04-18 NOTE — TELEPHONE ENCOUNTER
From: Ricardo Jackson  To: Dr. Estrella Aisha: 4/18/2022 10:27 AM EDT  Subject: buspirone 7.5mg    can you please refill busPirone for me.  less than 3 days left; thx,  Norvel Servant

## 2022-04-27 DIAGNOSIS — R11.0 DAILY NAUSEA: ICD-10-CM

## 2022-04-28 RX ORDER — ONDANSETRON 8 MG/1
8 TABLET, ORALLY DISINTEGRATING ORAL EVERY 8 HOURS PRN
Qty: 60 TABLET | Refills: 0 | Status: SHIPPED | OUTPATIENT
Start: 2022-04-28 | End: 2022-05-12 | Stop reason: SDUPTHER

## 2022-04-28 NOTE — TELEPHONE ENCOUNTER
Medication:   Requested Prescriptions     Pending Prescriptions Disp Refills    ondansetron (ZOFRAN ODT) 8 MG TBDP disintegrating tablet 60 tablet 0     Sig: Place 1 tablet under the tongue every 8 hours as needed for Nausea        Last Filled: 4/13/2022     Patient Phone Number: [phone] (home)     Last appt: 3/14/2022   Next appt:     Last OARRS:   RX Monitoring 2/4/2020   Attestation -   Periodic Controlled Substance Monitoring No signs of potential drug abuse or diversion identified.

## 2022-05-12 DIAGNOSIS — R11.0 DAILY NAUSEA: ICD-10-CM

## 2022-05-12 RX ORDER — ONDANSETRON 8 MG/1
8 TABLET, ORALLY DISINTEGRATING ORAL EVERY 8 HOURS PRN
Qty: 60 TABLET | Refills: 0 | Status: SHIPPED | OUTPATIENT
Start: 2022-05-12 | End: 2022-06-05 | Stop reason: SDUPTHER

## 2022-05-12 NOTE — TELEPHONE ENCOUNTER
Medication:   Requested Prescriptions     Pending Prescriptions Disp Refills    ondansetron (ZOFRAN ODT) 8 MG TBDP disintegrating tablet 60 tablet 0     Sig: Place 1 tablet under the tongue every 8 hours as needed for Nausea        Last Filled:      Patient Phone Number: [phone] (home)     Last appt: 3/14/2022   Next appt: Return in about 4 weeks (around 4/11/2022) for insomnia    Last OARRS:   RX Monitoring 2/4/2020   Attestation -   Periodic Controlled Substance Monitoring No signs of potential drug abuse or diversion identified.

## 2022-06-02 ENCOUNTER — HOSPITAL ENCOUNTER (OUTPATIENT)
Dept: VASCULAR LAB | Age: 77
Discharge: HOME | End: 2022-06-02
Payer: MEDICARE

## 2022-06-02 DIAGNOSIS — R60.9 SWELLING: ICD-10-CM

## 2022-06-02 DIAGNOSIS — Z86.718 PERSONAL HISTORY OF VENOUS THROMBOSIS AND EMBOLUS: ICD-10-CM

## 2022-06-02 PROCEDURE — 93970 EXTREMITY STUDY: CPT

## 2022-06-05 DIAGNOSIS — R11.0 DAILY NAUSEA: ICD-10-CM

## 2022-06-06 RX ORDER — ONDANSETRON 8 MG/1
8 TABLET, ORALLY DISINTEGRATING ORAL EVERY 8 HOURS PRN
Qty: 60 TABLET | Refills: 0 | Status: SHIPPED | OUTPATIENT
Start: 2022-06-06 | End: 2022-06-27 | Stop reason: SDUPTHER

## 2022-06-06 NOTE — TELEPHONE ENCOUNTER
Medication:   Requested Prescriptions     Pending Prescriptions Disp Refills    ondansetron (ZOFRAN ODT) 8 MG TBDP disintegrating tablet 60 tablet 0     Sig: Place 1 tablet under the tongue every 8 hours as needed for Nausea        Last Filled:      Patient Phone Number: [phone] (home)     Last appt: 3/14/2022   Next appt: Visit date not found  Return in about 4 weeks (around 4/11/2022) for insomnia. Last OARRS:   RX Monitoring 2/4/2020   Attestation -   Periodic Controlled Substance Monitoring No signs of potential drug abuse or diversion identified.

## 2022-06-09 RX ORDER — CHOLESTYRAMINE 4 G/9G
1 POWDER, FOR SUSPENSION ORAL 2 TIMES DAILY
Qty: 60 PACKET | Refills: 2 | Status: SHIPPED | OUTPATIENT
Start: 2022-06-09 | End: 2022-09-22

## 2022-06-27 DIAGNOSIS — R11.0 DAILY NAUSEA: ICD-10-CM

## 2022-06-27 RX ORDER — ONDANSETRON 8 MG/1
8 TABLET, ORALLY DISINTEGRATING ORAL EVERY 8 HOURS PRN
Qty: 60 TABLET | Refills: 0 | Status: SHIPPED | OUTPATIENT
Start: 2022-06-27 | End: 2022-07-17 | Stop reason: SDUPTHER

## 2022-06-27 NOTE — TELEPHONE ENCOUNTER
Medication:   Requested Prescriptions     Pending Prescriptions Disp Refills    ondansetron (ZOFRAN ODT) 8 MG TBDP disintegrating tablet 60 tablet 0     Sig: Place 1 tablet under the tongue every 8 hours as needed for Nausea        Last Filled:  06/06/22    Patient Phone Number: [phone] (home)     Last appt: 3/14/2022 Return in about 4 weeks (around 4/11/2022) for insomnia. Next appt: Visit date not found    Last OARRS:   RX Monitoring 2/4/2020   Attestation -   Periodic Controlled Substance Monitoring No signs of potential drug abuse or diversion identified.

## 2022-07-17 DIAGNOSIS — R11.0 DAILY NAUSEA: ICD-10-CM

## 2022-07-18 NOTE — TELEPHONE ENCOUNTER
Medication:   Requested Prescriptions     Pending Prescriptions Disp Refills    ondansetron (ZOFRAN ODT) 8 MG TBDP disintegrating tablet 60 tablet 0     Sig: Place 1 tablet under the tongue every 8 hours as needed for Nausea        Last Filled:      Patient Phone Number: [phone] (home)     Last appt: 3/14/2022   Next appt: Visit date not found    Last OARRS:   RX Monitoring 2/4/2020   Attestation -   Periodic Controlled Substance Monitoring No signs of potential drug abuse or diversion identified.

## 2022-07-19 RX ORDER — ONDANSETRON 8 MG/1
8 TABLET, ORALLY DISINTEGRATING ORAL EVERY 8 HOURS PRN
Qty: 60 TABLET | Refills: 0 | Status: SHIPPED | OUTPATIENT
Start: 2022-07-19 | End: 2022-08-12 | Stop reason: SDUPTHER

## 2022-08-01 ENCOUNTER — HOSPITAL ENCOUNTER (OUTPATIENT)
Age: 77
Discharge: HOME | End: 2022-08-01
Payer: MEDICARE

## 2022-08-01 LAB
ALBUMIN SERPL-MCNC: 4.5 G/DL (ref 3.4–5)
ANION GAP SERPL CALCULATED.3IONS-SCNC: 9 MMOL/L (ref 3–16)
BILIRUBIN URINE: NEGATIVE
BLOOD, URINE: NEGATIVE
BUN BLDV-MCNC: 21 MG/DL (ref 7–20)
CALCIUM SERPL-MCNC: 9.3 MG/DL (ref 8.3–10.6)
CHLORIDE BLD-SCNC: 103 MMOL/L (ref 99–110)
CLARITY: CLEAR
CO2: 27 MMOL/L (ref 21–32)
COLOR: YELLOW
CREAT SERPL-MCNC: 1.5 MG/DL (ref 0.8–1.3)
CREATININE URINE: 31.3 MG/DL (ref 39–259)
GFR AFRICAN AMERICAN: 55
GFR NON-AFRICAN AMERICAN: 45
GLUCOSE BLD-MCNC: 105 MG/DL (ref 70–99)
GLUCOSE URINE: NEGATIVE MG/DL
HCT VFR BLD CALC: 35.7 % (ref 40.5–52.5)
HEMOGLOBIN: 12.3 G/DL (ref 13.5–17.5)
KETONES, URINE: NEGATIVE MG/DL
LEUKOCYTE ESTERASE, URINE: NEGATIVE
MCH RBC QN AUTO: 34.2 PG (ref 26–34)
MCHC RBC AUTO-ENTMCNC: 34.4 G/DL (ref 31–36)
MCV RBC AUTO: 99.4 FL (ref 80–100)
MICROALBUMIN UR-MCNC: <1.2 MG/DL
MICROALBUMIN/CREAT UR-RTO: ABNORMAL MG/G (ref 0–30)
MICROSCOPIC EXAMINATION: NORMAL
NITRITE, URINE: NEGATIVE
PDW BLD-RTO: 15.3 % (ref 12.4–15.4)
PH UA: 6 (ref 5–8)
PHOSPHORUS: 3.3 MG/DL (ref 2.5–4.9)
PLATELET # BLD: 144 K/UL (ref 135–450)
PMV BLD AUTO: 10.5 FL (ref 5–10.5)
POTASSIUM SERPL-SCNC: 5.2 MMOL/L (ref 3.5–5.1)
PROTEIN UA: NEGATIVE MG/DL
RBC # BLD: 3.6 M/UL (ref 4.2–5.9)
SODIUM BLD-SCNC: 139 MMOL/L (ref 136–145)
SPECIFIC GRAVITY UA: <=1.005 (ref 1–1.03)
URINE TYPE: NORMAL
UROBILINOGEN, URINE: 0.2 E.U./DL
WBC # BLD: 6.7 K/UL (ref 4–11)

## 2022-08-01 PROCEDURE — 85027 COMPLETE CBC AUTOMATED: CPT

## 2022-08-01 PROCEDURE — 82570 ASSAY OF URINE CREATININE: CPT

## 2022-08-01 PROCEDURE — 81003 URINALYSIS AUTO W/O SCOPE: CPT

## 2022-08-01 PROCEDURE — 82043 UR ALBUMIN QUANTITATIVE: CPT

## 2022-08-01 PROCEDURE — 80069 RENAL FUNCTION PANEL: CPT

## 2022-08-01 PROCEDURE — 36415 COLL VENOUS BLD VENIPUNCTURE: CPT

## 2022-08-12 DIAGNOSIS — R11.0 DAILY NAUSEA: ICD-10-CM

## 2022-08-12 RX ORDER — ONDANSETRON 8 MG/1
8 TABLET, ORALLY DISINTEGRATING ORAL EVERY 8 HOURS PRN
Qty: 60 TABLET | Refills: 0 | Status: SHIPPED | OUTPATIENT
Start: 2022-08-12 | End: 2022-09-23 | Stop reason: SDUPTHER

## 2022-08-28 ENCOUNTER — PATIENT MESSAGE (OUTPATIENT)
Dept: PRIMARY CARE CLINIC | Age: 77
End: 2022-08-28

## 2022-08-28 DIAGNOSIS — F51.01 PRIMARY INSOMNIA: ICD-10-CM

## 2022-08-29 DIAGNOSIS — F51.01 PRIMARY INSOMNIA: ICD-10-CM

## 2022-08-29 RX ORDER — ZOLPIDEM TARTRATE 10 MG/1
TABLET ORAL
Qty: 30 TABLET | Refills: 5 | Status: SHIPPED | OUTPATIENT
Start: 2022-08-29 | End: 2022-10-25 | Stop reason: SDUPTHER

## 2022-08-29 NOTE — TELEPHONE ENCOUNTER
Medication:   Requested Prescriptions     Pending Prescriptions Disp Refills    zolpidem (AMBIEN) 10 MG tablet 30 tablet 5        Last Filled:  02/28/22    Patient Phone Number: [phone] (home)     Last appt: 3/14/2022 Return in about 4 weeks (around 4/11/2022) for insomnia. Next appt: Visit date not found    Last OARRS:   RX Monitoring 2/4/2020   Attestation -   Periodic Controlled Substance Monitoring No signs of potential drug abuse or diversion identified.

## 2022-08-31 ENCOUNTER — HOSPITAL ENCOUNTER (OUTPATIENT)
Age: 77
Discharge: HOME | End: 2022-08-31
Payer: MEDICARE

## 2022-08-31 ENCOUNTER — HOSPITAL ENCOUNTER (OUTPATIENT)
Dept: GENERAL RADIOLOGY | Age: 77
Discharge: HOME | End: 2022-08-31
Payer: MEDICARE

## 2022-08-31 DIAGNOSIS — C90.00 MULTIPLE MYELOMA, REMISSION STATUS UNSPECIFIED (HCC): ICD-10-CM

## 2022-08-31 PROCEDURE — 72100 X-RAY EXAM L-S SPINE 2/3 VWS: CPT

## 2022-08-31 PROCEDURE — 72220 X-RAY EXAM SACRUM TAILBONE: CPT

## 2022-08-31 RX ORDER — CHOLESTYRAMINE 4 G/9G
1 POWDER, FOR SUSPENSION ORAL 2 TIMES DAILY
COMMUNITY
End: 2022-10-25

## 2022-08-31 NOTE — PROGRESS NOTES

## 2022-08-31 NOTE — PROGRESS NOTES
Obstructive Sleep Apnea (JANEY) Screening     Patient:  Ronney Spatz    YOB: 1945      Medical Record #:  [id number]                     Date:  8/31/2022     1. Are you a loud and/or regular snorer? []  Yes       [x] No    2. Have you been observed to gasp or stop breathing during sleep? []  Yes       [x] No    3. Do you feel tired or groggy upon awakening or do you awaken with a headache?           []  Yes       [] No    4. Are you often tired or fatigued during the wake time hours? []  Yes       [] No    5. Do you fall asleep sitting, reading, watching TV or driving? []  Yes       [] No    6. Do you often have problems with memory or concentration? []  Yes       [] No    **If patient's score is ? 3 they are considered high risk for JANEY. An Anesthesia provider will evaluate the patient and develop a plan of care the day of surgery. Note:  If the patient's BMI is more than 35 kg m¯² , has neck circumference > 40 cm, and/or high blood pressure the risk is greater (© American [employer]nea Association, 2006).

## 2022-09-02 DIAGNOSIS — E78.2 MIXED HYPERLIPIDEMIA: ICD-10-CM

## 2022-09-02 RX ORDER — ROSUVASTATIN CALCIUM 20 MG/1
TABLET, COATED ORAL
Qty: 90 TABLET | Refills: 1 | Status: SHIPPED | OUTPATIENT
Start: 2022-09-02 | End: 2022-10-25 | Stop reason: SDUPTHER

## 2022-09-02 RX ORDER — FENOFIBRATE 54 MG/1
54 TABLET ORAL DAILY
Qty: 90 TABLET | Refills: 1 | Status: SHIPPED | OUTPATIENT
Start: 2022-09-02

## 2022-09-02 NOTE — TELEPHONE ENCOUNTER
Medication:   Requested Prescriptions     Pending Prescriptions Disp Refills    rosuvastatin (CRESTOR) 20 MG tablet 90 tablet 1    fenofibrate (TRICOR) 54 MG tablet 90 tablet 1     Sig: Take 1 tablet by mouth daily [facility]: Please dispense generic fenofibrate unless prescriber denote        Last Filled:  02/28/22 and 03/10/22    Patient Phone Number: [phone] (home)     Last appt: 3/14/2022 Return in about 4 weeks (around 4/11/2022) for insomnia. Next appt: Visit date not found    Last OARRS:   RX Monitoring 2/4/2020   Attestation -   Periodic Controlled Substance Monitoring No signs of potential drug abuse or diversion identified.

## 2022-09-06 ENCOUNTER — HOSPITAL ENCOUNTER (OUTPATIENT)
Age: 77
Setting detail: OUTPATIENT SURGERY
Discharge: HOME | End: 2022-09-06
Attending: INTERNAL MEDICINE | Admitting: INTERNAL MEDICINE
Payer: MEDICARE

## 2022-09-06 ENCOUNTER — ANESTHESIA EVENT (OUTPATIENT)
Dept: ENDOSCOPY | Age: 77
End: 2022-09-06
Payer: MEDICARE

## 2022-09-06 ENCOUNTER — ANESTHESIA (OUTPATIENT)
Dept: ENDOSCOPY | Age: 77
End: 2022-09-06
Payer: MEDICARE

## 2022-09-06 VITALS
BODY MASS INDEX: 25.92 KG/M2 | WEIGHT: 175 LBS | SYSTOLIC BLOOD PRESSURE: 122 MMHG | OXYGEN SATURATION: 98 % | RESPIRATION RATE: 14 BRPM | HEIGHT: 69 IN | DIASTOLIC BLOOD PRESSURE: 78 MMHG | HEART RATE: 62 BPM | TEMPERATURE: 97 F

## 2022-09-06 DIAGNOSIS — R19.8 CHANGE IN BOWEL FUNCTION: ICD-10-CM

## 2022-09-06 DIAGNOSIS — K22.70 BARRETT'S ESOPHAGUS WITHOUT DYSPLASIA: ICD-10-CM

## 2022-09-06 PROCEDURE — 88305 TISSUE EXAM BY PATHOLOGIST: CPT

## 2022-09-06 PROCEDURE — 2580000003 HC RX 258: Performed by: NURSE ANESTHETIST, CERTIFIED REGISTERED

## 2022-09-06 PROCEDURE — 2580000003 HC RX 258: Performed by: INTERNAL MEDICINE

## 2022-09-06 PROCEDURE — 7100000010 HC PHASE II RECOVERY - FIRST 15 MIN: Performed by: INTERNAL MEDICINE

## 2022-09-06 PROCEDURE — 3700000000 HC ANESTHESIA ATTENDED CARE: Performed by: INTERNAL MEDICINE

## 2022-09-06 PROCEDURE — 3609010600 HC COLONOSCOPY POLYPECTOMY SNARE/COLD BIOPSY: Performed by: INTERNAL MEDICINE

## 2022-09-06 PROCEDURE — 7100000011 HC PHASE II RECOVERY - ADDTL 15 MIN: Performed by: INTERNAL MEDICINE

## 2022-09-06 PROCEDURE — 3700000001 HC ADD 15 MINUTES (ANESTHESIA): Performed by: INTERNAL MEDICINE

## 2022-09-06 PROCEDURE — 6360000002 HC RX W HCPCS: Performed by: NURSE ANESTHETIST, CERTIFIED REGISTERED

## 2022-09-06 PROCEDURE — 2709999900 HC NON-CHARGEABLE SUPPLY: Performed by: INTERNAL MEDICINE

## 2022-09-06 PROCEDURE — 3609012400 HC EGD TRANSORAL BIOPSY SINGLE/MULTIPLE: Performed by: INTERNAL MEDICINE

## 2022-09-06 PROCEDURE — 2500000003 HC RX 250 WO HCPCS: Performed by: NURSE ANESTHETIST, CERTIFIED REGISTERED

## 2022-09-06 RX ORDER — PROPOFOL 10 MG/ML
INJECTION, EMULSION INTRAVENOUS PRN
Status: DISCONTINUED | OUTPATIENT
Start: 2022-09-06 | End: 2022-09-06 | Stop reason: SDUPTHER

## 2022-09-06 RX ORDER — LIDOCAINE HYDROCHLORIDE 20 MG/ML
INJECTION, SOLUTION INFILTRATION; PERINEURAL PRN
Status: DISCONTINUED | OUTPATIENT
Start: 2022-09-06 | End: 2022-09-06 | Stop reason: SDUPTHER

## 2022-09-06 RX ORDER — SODIUM CHLORIDE, SODIUM LACTATE, POTASSIUM CHLORIDE, CALCIUM CHLORIDE 600; 310; 30; 20 MG/100ML; MG/100ML; MG/100ML; MG/100ML
INJECTION, SOLUTION INTRAVENOUS CONTINUOUS PRN
Status: DISCONTINUED | OUTPATIENT
Start: 2022-09-06 | End: 2022-09-06 | Stop reason: SDUPTHER

## 2022-09-06 RX ORDER — LIDOCAINE HYDROCHLORIDE 10 MG/ML
0.1 INJECTION, SOLUTION EPIDURAL; INFILTRATION; INTRACAUDAL; PERINEURAL
Status: DISCONTINUED | OUTPATIENT
Start: 2022-09-06 | End: 2022-09-06 | Stop reason: HOSPADM

## 2022-09-06 RX ORDER — SODIUM CHLORIDE, SODIUM LACTATE, POTASSIUM CHLORIDE, CALCIUM CHLORIDE 600; 310; 30; 20 MG/100ML; MG/100ML; MG/100ML; MG/100ML
INJECTION, SOLUTION INTRAVENOUS ONCE
Status: COMPLETED | OUTPATIENT
Start: 2022-09-06 | End: 2022-09-06

## 2022-09-06 RX ADMIN — PROPOFOL 10 MG: 10 INJECTION, EMULSION INTRAVENOUS at 12:30

## 2022-09-06 RX ADMIN — SODIUM CHLORIDE, POTASSIUM CHLORIDE, SODIUM LACTATE AND CALCIUM CHLORIDE: 600; 310; 30; 20 INJECTION, SOLUTION INTRAVENOUS at 11:09

## 2022-09-06 RX ADMIN — LIDOCAINE HYDROCHLORIDE 100 MG: 20 INJECTION, SOLUTION INFILTRATION; PERINEURAL at 12:00

## 2022-09-06 RX ADMIN — SODIUM CHLORIDE, SODIUM LACTATE, POTASSIUM CHLORIDE, AND CALCIUM CHLORIDE: .6; .31; .03; .02 INJECTION, SOLUTION INTRAVENOUS at 11:55

## 2022-09-06 RX ADMIN — PROPOFOL 10 MG: 10 INJECTION, EMULSION INTRAVENOUS at 12:22

## 2022-09-06 RX ADMIN — PROPOFOL 50 MG: 10 INJECTION, EMULSION INTRAVENOUS at 12:00

## 2022-09-06 RX ADMIN — PROPOFOL 20 MG: 10 INJECTION, EMULSION INTRAVENOUS at 12:24

## 2022-09-06 RX ADMIN — PROPOFOL 10 MG: 10 INJECTION, EMULSION INTRAVENOUS at 12:18

## 2022-09-06 RX ADMIN — PROPOFOL 50 MG: 10 INJECTION, EMULSION INTRAVENOUS at 12:07

## 2022-09-06 RX ADMIN — PROPOFOL 50 MG: 10 INJECTION, EMULSION INTRAVENOUS at 12:04

## 2022-09-06 RX ADMIN — PROPOFOL 50 MG: 10 INJECTION, EMULSION INTRAVENOUS at 12:15

## 2022-09-06 RX ADMIN — PROPOFOL 20 MG: 10 INJECTION, EMULSION INTRAVENOUS at 12:20

## 2022-09-06 RX ADMIN — PROPOFOL 50 MG: 10 INJECTION, EMULSION INTRAVENOUS at 12:01

## 2022-09-06 ASSESSMENT — PAIN - FUNCTIONAL ASSESSMENT: PAIN_FUNCTIONAL_ASSESSMENT: 0-10

## 2022-09-06 NOTE — ANESTHESIA PRE PROCEDURE
Department of Anesthesiology  Preprocedure Note       Name:  Nikki Zaidi   Age:  67 y.o.  :  1945                                          MRN:  [id number]         Date:  2022      Surgeon: Norma Dave):  Charmaine Craig MD    Procedure: Procedure(s):  COLONOSCOPY  EGD    Medications prior to admission:   Prior to Admission medications    Medication Sig Start Date End Date Taking?  Authorizing Provider   cholestyramine (QUESTRAN) 4 g packet Take 1 packet by mouth 2 times daily   Yes Historical Provider, MD   rosuvastatin (CRESTOR) 20 MG tablet TAKE ONE TABLET BY MOUTH DAILY 22   Rachana Monsalve MD   fenofibrate (TRICOR) 54 MG tablet Take 1 tablet by mouth daily s [facility] pharmacy: Please dispense generic fenofibrate unless prescriber denote 22   Rachana Monsalve MD   zolpidem (AMBIEN) 10 MG tablet TAKE ONE TABLET BY MOUTH ONCE NIGHTLY AS NEEDED FOR SLEEP 22  Calvin Kaufman, DO   ondansetron (ZOFRAN ODT) 8 MG TBDP disintegrating tablet Place 1 tablet under the tongue every 8 hours as needed for Nausea 22   Calvin Kaufman, DO   cholestyramine Ancel Wexford) 4 g packet Take 1 packet by mouth 2 times daily 22  Vickie Edgar MD   promethazine (PHENERGAN) 25 MG tablet TAKE ONE TABLET BY MOUTH EVERY 8 HOURS AS NEEDED FOR NAUSEA  Patient not taking: Reported on 2022   Calvin Kaufman, DO   pantoprazole (PROTONIX) 40 MG tablet Take 1 tablet by mouth daily 22   Calvin Kaufman, DO   sertraline (ZOLOFT) 50 MG tablet Take 50 mg by mouth daily 3/8/22   Historical Provider, MD   QUEtiapine (SEROQUEL) 25 MG tablet Take 1 tablet by mouth every evening 3/14/22   Calvin Kaufman, DO   lisinopril (PRINIVIL;ZESTRIL) 20 MG tablet Take 1 tablet by mouth daily  Patient not taking: Reported on 2022   Calvin Kaufman, DO   apixaban (ELIQUIS) 5 MG TABS tablet TAKE 1 TABLET BY MOUTH TWO  TIMES DAILY 22   Calvin Kaufman, DO       Current medications:    Current Facility-Administered Medications Medication Dose Route Frequency Provider Last Rate Last Admin    lidocaine PF 1 % injection 0.1 mL  0.1 mL IntraDERmal Once PRN Esthela Melchor MD           Allergies:     Allergies   Allergen Reactions    Atorvastatin     Lipitor Rash       Problem List:    Patient Active Problem List   Diagnosis Code    GERD (gastroesophageal reflux disease) K21.9    MANISH (generalized anxiety disorder) F41.1    Essential hypertension, benign I10    CKD (chronic kidney disease) stage 3, GFR 30-59 ml/min ([facility]) N18.30    Recurrent deep vein thrombosis (DVT) of right lower extremity ([facility]) I82.401    Mixed hyperlipidemia E78.2    Ni's esophagus without dysplasia K22.70    Primary insomnia F51.01       Past Medical History:        Diagnosis Date    Anxiety     Arthritis     Bladder cancer ([address]     DVT (deep venous thrombosis) ([address] 07/01/2014    two spontaneous DVTs left leg    Elevated fasting glucose     Hyperlipidemia     Hypertension     Malignant neoplasm of urinary bladder ([address] 02/04/2019    Multiple myeloma ([address]     MVP (mitral valve prolapse)     Polyp of gallbladder     Prostate CA ([address] 2005       Past Surgical History:        Procedure Laterality Date    BLADDER SURGERY      tumor removed    CATARACT REMOVAL Bilateral     CHOLECYSTECTOMY, LAPAROSCOPIC N/A 1/10/2022    VIDEO LAPAROSCOPIC CHOLECYSTECTOMY performed by Christina Banks MD at [address] [place] [address] Drive  6/29/12    Polyps    COLONOSCOPY  07/12/2017    Polyp    CT BIOPSY PERCUTANEOUS DEEP BONE  3/3/2022    CT BIOPSY PERCUTANEOUS DEEP BONE 3/3/2022 Rocío Gr MD [facility] CT SCAN    INGUINAL HERNIA REPAIR Right 06/1999    PENILE PROSTHESIS PLACEMENT      PROSTATECTOMY  2005 approx    Dr. Power Lopez      In [facility] - had one tonsil burned out       Social History:    Social History     Tobacco Use    Smoking status: Never    Smokeless tobacco: Never   Substance Use Topics    Alcohol use: Not Currently POCCL, POCBUN, POCHEMO, POCHCT in the last 72 hours. Coags:   Lab Results   Component Value Date/Time    PROTIME 11.9 03/03/2022 07:30 AM    INR 1.05 03/03/2022 07:30 AM       HCG (If Applicable): No results found for: PREGTESTUR, PREGSERUM, HCG, HCGQUANT     ABGs: No results found for: PHART, PO2ART, VYX7OKC, IMQ7RKE, BEART, C4GIFRBP     Type & Screen (If Applicable):  No results found for: LABABO, LABRH    Drug/Infectious Status (If Applicable):  No results found for: HIV, HEPCAB    COVID-19 Screening (If Applicable):   Lab Results   Component Value Date/Time    COVID19 Not Detected 01/05/2022 10:14 AM           Anesthesia Evaluation  Patient summary reviewed and Nursing notes reviewed  Airway: Mallampati: II  TM distance: >3 FB   Neck ROM: full  Mouth opening: > = 3 FB   Dental: normal exam         Pulmonary:Negative Pulmonary ROS and normal exam                               Cardiovascular:    (+) hypertension:,                   Neuro/Psych:   (+) psychiatric history:            GI/Hepatic/Renal:   (+) GERD:,           Endo/Other: Negative Endo/Other ROS                    Abdominal:             Vascular: negative vascular ROS. Other Findings:           Anesthesia Plan      MAC     ASA 3       Induction: intravenous. Anesthetic plan and risks discussed with patient. Plan discussed with CRNA.     Attending anesthesiologist reviewed and agrees with Preprocedure content                CRESENCIO Chowdhury MD   9/6/2022

## 2022-09-06 NOTE — DISCHARGE INSTRUCTIONS
April's office will call you with the biopsy findings. Call Dr. Keren Kothari if there are any GI concerns. [phone]. Repeat Colonoscopy - schedule through the office for 2 day prep.

## 2022-09-06 NOTE — ANESTHESIA POSTPROCEDURE EVALUATION
Department of Anesthesiology  Postprocedure Note    Patient: Yvette Rivera  MRN: [id number]  YOB: 1945  Date of evaluation: 9/6/2022      Procedure Summary     Date: 09/06/22 Room / Location: [facility] [address] [place] [address] 01 / [facility]    Anesthesia Start: 2997 Anesthesia Stop: 3754    Procedures:       COLONOSCOPY POLYPECTOMY SNARE/COLD BIOPSY      EGD BIOPSY Diagnosis:       Ni's esophagus without dysplasia      Change in bowel function      (Ni's esophagus without dysplasia [K22.70] Change in bowel function [R19.8])    Surgeons: Aleta Dawson MD Responsible Provider: Diana Fleischer, MD    Anesthesia Type: MAC ASA Status: 3          Anesthesia Type: No value filed.     Rama Phase I: Rama Score: 10    Rama Phase II:        Anesthesia Post Evaluation    Patient location during evaluation: PACU  Patient participation: complete - patient participated  Level of consciousness: awake and alert  Pain score: 0  Airway patency: patent  Nausea & Vomiting: no nausea  Complications: no  Cardiovascular status: blood pressure returned to baseline  Respiratory status: acceptable  Hydration status: euvolemic

## 2022-09-06 NOTE — H&P
Gastroenterology Note             Pre-operative History and Physical    Patient: Irlanda Brown  : 1945  CSN:     History Obtained From:  patient and/or guardian. HISTORY OF PRESENT ILLNESS:    The patient is a 67 y.o. male  here for EGD/colonoscopy. Past Medical History:    Past Medical History:   Diagnosis Date    Anxiety     Arthritis     Bladder cancer ([facility]     DVT (deep venous thrombosis) ([facility] 2014    two spontaneous DVTs left leg    Elevated fasting glucose     Hyperlipidemia     Hypertension     Malignant neoplasm of urinary bladder ([address] 2019    Multiple myeloma ([facility])     MVP (mitral valve prolapse)     Polyp of gallbladder     Prostate CA ([facility]      Past Surgical History:    Past Surgical History:   Procedure Laterality Date    BLADDER SURGERY      tumor removed    CATARACT REMOVAL Bilateral     CHOLECYSTECTOMY, LAPAROSCOPIC N/A 1/10/2022    VIDEO LAPAROSCOPIC CHOLECYSTECTOMY performed by Yelitza Nichols MD at [facility]  12    Polyps    COLONOSCOPY  2017    Polyp    CT BIOPSY PERCUTANEOUS DEEP BONE  3/3/2022    CT BIOPSY PERCUTANEOUS DEEP BONE 3/3/2022 Terrance Rios MD [facility] CT SCAN    INGUINAL HERNIA REPAIR Right 1999    PENILE PROSTHESIS PLACEMENT      PROSTATECTOMY  2005 approx    Dr. Gómez Malik      In [facility] - had one tonsil burned out     Medications Prior to Admission:   No current facility-administered medications on file prior to encounter.      Current Outpatient Medications on File Prior to Encounter   Medication Sig Dispense Refill    cholestyramine (QUESTRAN) 4 g packet Take 1 packet by mouth 2 times daily      zolpidem (AMBIEN) 10 MG tablet TAKE ONE TABLET BY MOUTH ONCE NIGHTLY AS NEEDED FOR SLEEP 30 tablet 5    ondansetron (ZOFRAN ODT) 8 MG TBDP disintegrating tablet Place 1 tablet under the tongue every 8 hours as needed for Nausea 60 tablet 0    cholestyramine (QUESTRAN) 4 g packet Take 1 packet by mouth 2 times daily 60 packet 2    promethazine (PHENERGAN) 25 MG tablet TAKE ONE TABLET BY MOUTH EVERY 8 HOURS AS NEEDED FOR NAUSEA (Patient not taking: Reported on 9/6/2022) 45 tablet 0    pantoprazole (PROTONIX) 40 MG tablet Take 1 tablet by mouth daily 30 tablet 5    sertraline (ZOLOFT) 50 MG tablet Take 50 mg by mouth daily      QUEtiapine (SEROQUEL) 25 MG tablet Take 1 tablet by mouth every evening 30 tablet 3    lisinopril (PRINIVIL;ZESTRIL) 20 MG tablet Take 1 tablet by mouth daily (Patient not taking: Reported on 9/6/2022) 90 tablet 3    apixaban (ELIQUIS) 5 MG TABS tablet TAKE 1 TABLET BY MOUTH TWO  TIMES DAILY 180 tablet 3        Allergies:  Atorvastatin and Lipitor      Social History:   Social History     Tobacco Use    Smoking status: Never    Smokeless tobacco: Never   Substance Use Topics    Alcohol use: Not Currently     Family History:   Family History   Problem Relation Age of Onset    Lung Cancer Mother     Heart Disease Mother     Diabetes Father     Prostate Cancer Father     Diabetes Brother        PHYSICAL EXAM:      BP (!) 151/82   Pulse 72   Temp 97.5 °F (36.4 °C) (Temporal)   Resp 20   Ht 5' 9\" (1.753 m)   Wt 175 lb (79.4 kg)   SpO2 98%   BMI 25.84 kg/m²  I        Heart:   RRR, normal s1s2    Lungs:  CTA bilat,  Normal effort    Abdomen:   NT, ND      ASA Grade:  ASA 2 - Patient with mild systemic disease with no functional limitations    Mallampati Class: 2          ASSESSMENT AND PLAN:    1. Patient is a 67 y.o. male here for EGD/Colonoscopy with MAC.   2.  Procedure options, risks and benefits reviewed with patient. Patient expresses understanding.     Ana Lilia Smith MD,   [facility]  9/6/2022

## 2022-09-22 RX ORDER — CHOLESTYRAMINE 4 G/9G
POWDER, FOR SUSPENSION ORAL
Qty: 60 PACKET | Refills: 2 | Status: SHIPPED | OUTPATIENT
Start: 2022-09-22 | End: 2022-10-25

## 2022-09-23 DIAGNOSIS — R11.0 DAILY NAUSEA: ICD-10-CM

## 2022-09-23 RX ORDER — ONDANSETRON 8 MG/1
8 TABLET, ORALLY DISINTEGRATING ORAL EVERY 8 HOURS PRN
Qty: 60 TABLET | Refills: 0 | Status: SHIPPED | OUTPATIENT
Start: 2022-09-23 | End: 2022-10-10 | Stop reason: SDUPTHER

## 2022-10-10 DIAGNOSIS — R11.0 DAILY NAUSEA: ICD-10-CM

## 2022-10-10 RX ORDER — PANTOPRAZOLE SODIUM 40 MG/1
40 TABLET, DELAYED RELEASE ORAL DAILY
Qty: 30 TABLET | Refills: 5 | Status: SHIPPED | OUTPATIENT
Start: 2022-10-10

## 2022-10-10 RX ORDER — ONDANSETRON 8 MG/1
8 TABLET, ORALLY DISINTEGRATING ORAL EVERY 8 HOURS PRN
Qty: 60 TABLET | Refills: 0 | Status: SHIPPED | OUTPATIENT
Start: 2022-10-10 | End: 2022-10-27 | Stop reason: SDUPTHER

## 2022-10-10 NOTE — TELEPHONE ENCOUNTER
Medication:   Requested Prescriptions     Pending Prescriptions Disp Refills    pantoprazole (PROTONIX) 40 MG tablet 30 tablet 5     Sig: Take 1 tablet by mouth daily    ondansetron (ZOFRAN ODT) 8 MG TBDP disintegrating tablet 60 tablet 0     Sig: Place 1 tablet under the tongue every 8 hours as needed for Nausea        04/07/22  Last Filled:  09/23/22    Patient Phone Number: [phone] (home)     Last appt: 3/14/2022   Next appt: Visit date not found    Last OARRS:   RX Monitoring 2/4/2020   Attestation -   Periodic Controlled Substance Monitoring No signs of potential drug abuse or diversion identified.

## 2022-10-18 RX ORDER — BUSPIRONE HYDROCHLORIDE 7.5 MG/1
TABLET ORAL
Qty: 180 TABLET | Refills: 1 | OUTPATIENT
Start: 2022-10-18

## 2022-10-18 NOTE — TELEPHONE ENCOUNTER
Medication:   Requested Prescriptions     Pending Prescriptions Disp Refills    busPIRone (BUSPAR) 7.5 MG tablet [Pharmacy Med Name: busPIRone HCL 7.5 MG TABLET] 180 tablet 1     Sig: TAKE ONE TABLET BY MOUTH TWICE A DAY        Last Filled:  04/18/22    Patient Phone Number: [phone] (home)     Last appt: 3/14/2022   Next appt: Visit date not found    Last OARRS:   RX Monitoring 2/4/2020   Attestation -   Periodic Controlled Substance Monitoring No signs of potential drug abuse or diversion identified.

## 2022-10-19 ENCOUNTER — TELEPHONE (OUTPATIENT)
Dept: PRIMARY CARE CLINIC | Age: 77
End: 2022-10-19

## 2022-10-19 RX ORDER — BUSPIRONE HYDROCHLORIDE 7.5 MG/1
7.5 TABLET ORAL 2 TIMES DAILY
Qty: 180 TABLET | Refills: 1 | Status: SHIPPED | OUTPATIENT
Start: 2022-10-19 | End: 2022-10-25 | Stop reason: SDUPTHER

## 2022-10-19 NOTE — TELEPHONE ENCOUNTER
busPIRone (BUSPAR) 7.5 MG tablet     [facility] [id number] [place], [place] - [address] [place]   Phone:  [phone]  Fax:  [phone]      Pt needs a refill. Also I explain to Pt that he must keep his appt for any other refill.

## 2022-10-25 ENCOUNTER — OFFICE VISIT (OUTPATIENT)
Dept: PRIMARY CARE CLINIC | Age: 77
End: 2022-10-25
Payer: MEDICARE

## 2022-10-25 VITALS
SYSTOLIC BLOOD PRESSURE: 129 MMHG | WEIGHT: 173.4 LBS | DIASTOLIC BLOOD PRESSURE: 69 MMHG | BODY MASS INDEX: 25.61 KG/M2 | HEART RATE: 75 BPM

## 2022-10-25 DIAGNOSIS — I10 ESSENTIAL HYPERTENSION, BENIGN: Primary | ICD-10-CM

## 2022-10-25 DIAGNOSIS — C90.00 MULTIPLE MYELOMA NOT HAVING ACHIEVED REMISSION (HCC): ICD-10-CM

## 2022-10-25 DIAGNOSIS — F41.1 GAD (GENERALIZED ANXIETY DISORDER): ICD-10-CM

## 2022-10-25 DIAGNOSIS — E78.2 MIXED HYPERLIPIDEMIA: ICD-10-CM

## 2022-10-25 DIAGNOSIS — F51.01 PRIMARY INSOMNIA: ICD-10-CM

## 2022-10-25 PROCEDURE — 1036F TOBACCO NON-USER: CPT | Performed by: STUDENT IN AN ORGANIZED HEALTH CARE EDUCATION/TRAINING PROGRAM

## 2022-10-25 PROCEDURE — G8417 CALC BMI ABV UP PARAM F/U: HCPCS | Performed by: STUDENT IN AN ORGANIZED HEALTH CARE EDUCATION/TRAINING PROGRAM

## 2022-10-25 PROCEDURE — 1123F ACP DISCUSS/DSCN MKR DOCD: CPT | Performed by: STUDENT IN AN ORGANIZED HEALTH CARE EDUCATION/TRAINING PROGRAM

## 2022-10-25 PROCEDURE — 99214 OFFICE O/P EST MOD 30 MIN: CPT | Performed by: STUDENT IN AN ORGANIZED HEALTH CARE EDUCATION/TRAINING PROGRAM

## 2022-10-25 PROCEDURE — G8427 DOCREV CUR MEDS BY ELIG CLIN: HCPCS | Performed by: STUDENT IN AN ORGANIZED HEALTH CARE EDUCATION/TRAINING PROGRAM

## 2022-10-25 PROCEDURE — G8484 FLU IMMUNIZE NO ADMIN: HCPCS | Performed by: STUDENT IN AN ORGANIZED HEALTH CARE EDUCATION/TRAINING PROGRAM

## 2022-10-25 RX ORDER — OLANZAPINE 7.5 MG/1
7.5 TABLET ORAL NIGHTLY
COMMUNITY
End: 2022-10-25

## 2022-10-25 RX ORDER — LORAZEPAM 1 MG/1
1 TABLET ORAL 4 TIMES DAILY PRN
COMMUNITY

## 2022-10-25 RX ORDER — OXYCODONE HYDROCHLORIDE 5 MG/1
5 TABLET ORAL EVERY 4 HOURS PRN
COMMUNITY

## 2022-10-25 RX ORDER — DRONABINOL 5 MG/1
CAPSULE ORAL
COMMUNITY
Start: 2022-10-20

## 2022-10-25 RX ORDER — ROSUVASTATIN CALCIUM 20 MG/1
TABLET, COATED ORAL
Qty: 90 TABLET | Refills: 1 | Status: SHIPPED | OUTPATIENT
Start: 2022-10-25

## 2022-10-25 RX ORDER — BUSPIRONE HYDROCHLORIDE 7.5 MG/1
7.5 TABLET ORAL 2 TIMES DAILY
Qty: 180 TABLET | Refills: 1 | Status: SHIPPED | OUTPATIENT
Start: 2022-10-25 | End: 2023-01-23

## 2022-10-25 RX ORDER — ZOLPIDEM TARTRATE 10 MG/1
TABLET ORAL
Qty: 30 TABLET | Refills: 5 | Status: SHIPPED | OUTPATIENT
Start: 2022-10-25 | End: 2023-04-26

## 2022-10-25 ASSESSMENT — ENCOUNTER SYMPTOMS
NAUSEA: 0
VOMITING: 0

## 2022-10-25 NOTE — PROGRESS NOTES
10/25/2022     Juwan Moore (:  1945) is a 67 y.o. male, here for evaluation of the following medical concerns:    HPI  Insomnia:  Current treatment: avoiding heavy meal before bedtime, avoiding long naps during the day, decreasing caffeine consumption, going to sleep at the same time each night, and prescription sleep aid- Ambien- 10 , which has been somewhat effective. Medication side effects: none. Mood Disorder:  Patient presents for follow-up of anxiety disorder. Current complaints include: insomnia, irritability, excessive worry, and restlessness. He denies increased use of drugs or alcohol and suicidal thoughts or behavior. Symptoms/signs of adrienne: none. External stressors: treatment for MM. Current treatment includes: Zoloft, Buspar and PRN ativan (managed by oncology.)  Medication side effects: none. Review of Systems   Constitutional:  Negative for activity change, fatigue and unexpected weight change. Gastrointestinal:  Negative for nausea and vomiting. Endocrine: Negative for cold intolerance and heat intolerance. Skin:  Negative for rash. Neurological:  Negative for dizziness, seizures and light-headedness. Psychiatric/Behavioral:  Positive for sleep disturbance. Negative for agitation, decreased concentration, dysphoric mood, self-injury and suicidal ideas. The patient is nervous/anxious. Prior to Visit Medications    Medication Sig Taking? Authorizing Provider   LORazepam (ATIVAN) 1 MG tablet Take 1 mg by mouth 4 times daily as needed. Yes Historical Provider, MD   dronabinol (MARINOL) 5 MG capsule  Yes Historical Provider, MD   oxyCODONE (ROXICODONE) 5 MG immediate release tablet Take 5 mg by mouth every 4 hours as needed for Pain.  Yes Historical Provider, MD   rosuvastatin (CRESTOR) 20 MG tablet TAKE ONE TABLET BY MOUTH DAILY Yes Andrew Mares DO   zolpidem (AMBIEN) 10 MG tablet TAKE ONE TABLET BY MOUTH ONCE NIGHTLY AS NEEDED FOR SLEEP Yes Andrew Mares DO busPIRone (BUSPAR) 7.5 MG tablet Take 1 tablet by mouth 2 times daily Yes Maribel Echevarria DO   sertraline (ZOLOFT) 50 MG tablet Take 1 tablet by mouth daily Yes Maribel Echevarria DO   pantoprazole (PROTONIX) 40 MG tablet Take 1 tablet by mouth daily Yes Maribel Echevarria DO   ondansetron (ZOFRAN ODT) 8 MG TBDP disintegrating tablet Place 1 tablet under the tongue every 8 hours as needed for Nausea Yes Maribel Echevarria DO   fenofibrate (TRICOR) 54 MG tablet Take 1 tablet by mouth daily s [facility] pharmacy: Please dispense generic fenofibrate unless prescriber denote Yes Deborah Argueta MD   apixaban (ELIQUIS) 5 MG TABS tablet TAKE 1 TABLET BY MOUTH TWO  TIMES DAILY Yes Maribel Echevarria DO        Social History     Tobacco Use    Smoking status: Never    Smokeless tobacco: Never   Substance Use Topics    Alcohol use: Not Currently        Vitals:    10/25/22 1044   BP: 129/69   Pulse: 75   Weight: 173 lb 6.4 oz (78.7 kg)     Estimated body mass index is 25.61 kg/m² as calculated from the following:    Height as of 9/6/22: 5' 9\" (1.753 m). Weight as of this encounter: 173 lb 6.4 oz (78.7 kg). Physical Exam  Vitals reviewed. Constitutional:       Appearance: Normal appearance. He is normal weight. HENT:      Head: Normocephalic and atraumatic. Nose: Nose normal.   Eyes:      Conjunctiva/sclera: Conjunctivae normal.   Cardiovascular:      Rate and Rhythm: Normal rate and regular rhythm. Pulses: Normal pulses. Heart sounds: Normal heart sounds. Pulmonary:      Effort: Pulmonary effort is normal.      Breath sounds: Normal breath sounds. Abdominal:      General: Abdomen is flat. Bowel sounds are normal.      Palpations: Abdomen is soft. Musculoskeletal:         General: Normal range of motion. Cervical back: Normal range of motion and neck supple. Skin:     General: Skin is warm and dry. Capillary Refill: Capillary refill takes less than 2 seconds. Neurological:      General: No focal deficit present. Mental Status: He is alert and oriented to person, place, and time. Mental status is at baseline. Psychiatric:         Mood and Affect: Mood normal.         Behavior: Behavior normal.         Thought Content: Thought content normal.         Judgment: Judgment normal.       ASSESSMENT/PLAN:  MANISH (generalized anxiety disorder): Refilling medications as below. Definitely have room to increase the dose if needed. Currently on Ativan through [facility]. Taking roughly every 6 hours. - busPIRone (BUSPAR) 7.5 MG tablet; Take 1 tablet by mouth 2 times daily  Dispense: 180 tablet; Refill: 1  - sertraline (ZOLOFT) 50 MG tablet; Take 1 tablet by mouth daily  Dispense: 90 tablet; Refill: 1    Primary insomnia: Not sleeping well. I suspect this may be related to the impressive number of benzodiazepines that are in his system as of now. Something that we can have to work on in the future, but given patient is in a difficult situation right now with his multiple myeloma we will continue it unchanged. - zolpidem (AMBIEN) 10 MG tablet; TAKE ONE TABLET BY MOUTH ONCE NIGHTLY AS NEEDED FOR SLEEP  Dispense: 30 tablet; Refill: 5    Multiple myeloma not having achieved remission ([facility]): Following with [facility]. Return in about 3 months (around 1/25/2023) for insomnia, anxiety. An electronic signature was used to authenticate this note.     --Radha Dean, DO on 10/25/2022 at 11:31 AM

## 2022-10-27 DIAGNOSIS — R11.0 DAILY NAUSEA: ICD-10-CM

## 2022-10-27 DIAGNOSIS — F41.1 GAD (GENERALIZED ANXIETY DISORDER): ICD-10-CM

## 2022-10-27 RX ORDER — ONDANSETRON 8 MG/1
8 TABLET, ORALLY DISINTEGRATING ORAL EVERY 8 HOURS PRN
Qty: 60 TABLET | Refills: 0 | Status: SHIPPED | OUTPATIENT
Start: 2022-10-27 | End: 2022-11-27

## 2022-10-27 NOTE — TELEPHONE ENCOUNTER
Medication:   Requested Prescriptions     Pending Prescriptions Disp Refills    ondansetron (ZOFRAN ODT) 8 MG TBDP disintegrating tablet 60 tablet 0     Sig: Place 1 tablet under the tongue every 8 hours as needed for Nausea    sertraline (ZOLOFT) 50 MG tablet 90 tablet 1     Sig: Take 1 tablet by mouth daily       Last Filled:      Patient Phone Number: [phone] (home)     Last appt: 10/25/2022   Next appt: Visit date not found  Return in about 3 months (around 1/25/2023) for insomnia, anxiety.   Last PSA:   Lab Results   Component Value Date/Time    PSA <0.01 07/06/2021 10:06 AM

## 2022-11-03 ENCOUNTER — TELEPHONE (OUTPATIENT)
Dept: PRIMARY CARE CLINIC | Age: 77
End: 2022-11-03

## 2022-11-03 NOTE — TELEPHONE ENCOUNTER
Simona! My name is Jacob Alvarez, I'm the nurse here at [facility]. I was writing to let you know we need to get you scheduled for your required medicare annual wellness visit. This can be done over the phone now as well. Please give our office a call to get that scheduled. Thank you and have a wonderful day!       Jacob Alvarez LPN  [phone]

## 2022-11-19 SDOH — HEALTH STABILITY: PHYSICAL HEALTH: ON AVERAGE, HOW MANY DAYS PER WEEK DO YOU ENGAGE IN MODERATE TO STRENUOUS EXERCISE (LIKE A BRISK WALK)?: 0 DAYS

## 2022-11-22 ENCOUNTER — OFFICE VISIT (OUTPATIENT)
Dept: FAMILY MEDICINE CLINIC | Age: 77
End: 2022-11-22
Payer: MEDICARE

## 2022-11-22 VITALS
HEART RATE: 61 BPM | OXYGEN SATURATION: 100 % | DIASTOLIC BLOOD PRESSURE: 68 MMHG | HEIGHT: 69 IN | SYSTOLIC BLOOD PRESSURE: 122 MMHG | WEIGHT: 172.8 LBS | BODY MASS INDEX: 25.59 KG/M2

## 2022-11-22 DIAGNOSIS — Z76.89 ENCOUNTER TO ESTABLISH CARE: ICD-10-CM

## 2022-11-22 DIAGNOSIS — F51.01 PRIMARY INSOMNIA: ICD-10-CM

## 2022-11-22 DIAGNOSIS — F32.1 CURRENT MODERATE EPISODE OF MAJOR DEPRESSIVE DISORDER WITHOUT PRIOR EPISODE (HCC): Primary | ICD-10-CM

## 2022-11-22 DIAGNOSIS — N18.31 STAGE 3A CHRONIC KIDNEY DISEASE (HCC): ICD-10-CM

## 2022-11-22 DIAGNOSIS — C90.00 MULTIPLE MYELOMA NOT HAVING ACHIEVED REMISSION (HCC): ICD-10-CM

## 2022-11-22 DIAGNOSIS — F41.1 GAD (GENERALIZED ANXIETY DISORDER): ICD-10-CM

## 2022-11-22 PROCEDURE — 3074F SYST BP LT 130 MM HG: CPT | Performed by: STUDENT IN AN ORGANIZED HEALTH CARE EDUCATION/TRAINING PROGRAM

## 2022-11-22 PROCEDURE — 99214 OFFICE O/P EST MOD 30 MIN: CPT | Performed by: STUDENT IN AN ORGANIZED HEALTH CARE EDUCATION/TRAINING PROGRAM

## 2022-11-22 PROCEDURE — G8484 FLU IMMUNIZE NO ADMIN: HCPCS | Performed by: STUDENT IN AN ORGANIZED HEALTH CARE EDUCATION/TRAINING PROGRAM

## 2022-11-22 PROCEDURE — 1036F TOBACCO NON-USER: CPT | Performed by: STUDENT IN AN ORGANIZED HEALTH CARE EDUCATION/TRAINING PROGRAM

## 2022-11-22 PROCEDURE — G8417 CALC BMI ABV UP PARAM F/U: HCPCS | Performed by: STUDENT IN AN ORGANIZED HEALTH CARE EDUCATION/TRAINING PROGRAM

## 2022-11-22 PROCEDURE — 3078F DIAST BP <80 MM HG: CPT | Performed by: STUDENT IN AN ORGANIZED HEALTH CARE EDUCATION/TRAINING PROGRAM

## 2022-11-22 PROCEDURE — 1123F ACP DISCUSS/DSCN MKR DOCD: CPT | Performed by: STUDENT IN AN ORGANIZED HEALTH CARE EDUCATION/TRAINING PROGRAM

## 2022-11-22 PROCEDURE — G8427 DOCREV CUR MEDS BY ELIG CLIN: HCPCS | Performed by: STUDENT IN AN ORGANIZED HEALTH CARE EDUCATION/TRAINING PROGRAM

## 2022-11-22 RX ORDER — OLANZAPINE 5 MG/1
5 TABLET ORAL NIGHTLY
COMMUNITY

## 2022-11-22 RX ORDER — DEXAMETHASONE 1 MG
1 TABLET ORAL 2 TIMES DAILY WITH MEALS
COMMUNITY

## 2022-11-22 RX ORDER — CYCLOPHOSPHAMIDE 25 MG/1
CAPSULE ORAL DAILY
COMMUNITY

## 2022-11-22 RX ORDER — CHOLESTYRAMINE 4 G/9G
POWDER, FOR SUSPENSION ORAL
COMMUNITY

## 2022-11-22 ASSESSMENT — COLUMBIA-SUICIDE SEVERITY RATING SCALE - C-SSRS
6. HAVE YOU EVER DONE ANYTHING, STARTED TO DO ANYTHING, OR PREPARED TO DO ANYTHING TO END YOUR LIFE?: NO
1. WITHIN THE PAST MONTH, HAVE YOU WISHED YOU WERE DEAD OR WISHED YOU COULD GO TO SLEEP AND NOT WAKE UP?: YES
2. HAVE YOU ACTUALLY HAD ANY THOUGHTS OF KILLING YOURSELF?: NO

## 2022-11-22 ASSESSMENT — PATIENT HEALTH QUESTIONNAIRE - PHQ9
8. MOVING OR SPEAKING SO SLOWLY THAT OTHER PEOPLE COULD HAVE NOTICED. OR THE OPPOSITE, BEING SO FIGETY OR RESTLESS THAT YOU HAVE BEEN MOVING AROUND A LOT MORE THAN USUAL: 0
2. FEELING DOWN, DEPRESSED OR HOPELESS: 3
10. IF YOU CHECKED OFF ANY PROBLEMS, HOW DIFFICULT HAVE THESE PROBLEMS MADE IT FOR YOU TO DO YOUR WORK, TAKE CARE OF THINGS AT HOME, OR GET ALONG WITH OTHER PEOPLE: 2
SUM OF ALL RESPONSES TO PHQ QUESTIONS 1-9: 13
SUM OF ALL RESPONSES TO PHQ9 QUESTIONS 1 & 2: 6
3. TROUBLE FALLING OR STAYING ASLEEP: 3
7. TROUBLE CONCENTRATING ON THINGS, SUCH AS READING THE NEWSPAPER OR WATCHING TELEVISION: 0
9. THOUGHTS THAT YOU WOULD BE BETTER OFF DEAD, OR OF HURTING YOURSELF: 1
SUM OF ALL RESPONSES TO PHQ QUESTIONS 1-9: 13
5. POOR APPETITE OR OVEREATING: 0
6. FEELING BAD ABOUT YOURSELF - OR THAT YOU ARE A FAILURE OR HAVE LET YOURSELF OR YOUR FAMILY DOWN: 0
1. LITTLE INTEREST OR PLEASURE IN DOING THINGS: 3
SUM OF ALL RESPONSES TO PHQ QUESTIONS 1-9: 12
4. FEELING TIRED OR HAVING LITTLE ENERGY: 3
SUM OF ALL RESPONSES TO PHQ QUESTIONS 1-9: 13

## 2022-11-22 ASSESSMENT — ANXIETY QUESTIONNAIRES
6. BECOMING EASILY ANNOYED OR IRRITABLE: 1
7. FEELING AFRAID AS IF SOMETHING AWFUL MIGHT HAPPEN: 2
IF YOU CHECKED OFF ANY PROBLEMS ON THIS QUESTIONNAIRE, HOW DIFFICULT HAVE THESE PROBLEMS MADE IT FOR YOU TO DO YOUR WORK, TAKE CARE OF THINGS AT HOME, OR GET ALONG WITH OTHER PEOPLE: SOMEWHAT DIFFICULT
1. FEELING NERVOUS, ANXIOUS, OR ON EDGE: 3
4. TROUBLE RELAXING: 0
3. WORRYING TOO MUCH ABOUT DIFFERENT THINGS: 3
2. NOT BEING ABLE TO STOP OR CONTROL WORRYING: 3
5. BEING SO RESTLESS THAT IT IS HARD TO SIT STILL: 3
GAD7 TOTAL SCORE: 15

## 2022-11-22 NOTE — PROGRESS NOTES
2022    Rey Servin (:  1945) is a 67 y.o. male, here for evaluation of the following medical concerns:  Chief Complaint   Patient presents with    New Patient       HPI    3B CKD  Follows with Dr. Eleno Williamson lisinopril  Likely seconday to nephrosclerosis. Latest Gfr improved to 45    MM  Following with Dr. Prachi Munson once monthly. Seeing members of his team during infusion. Diagnosed 2022  On CyBOrD. Currently once weekly  Fatigue is biggest issue as side effect currently. Also feeling unsteadiness with walking. Has not needed oxycodone    Hx of bladder cancer  Following with Urology, Dr. Rachel Perla every 6 months    Insomnia  No longer taking amben for sleep  Chandler that this was not working well. Using ativan and buspar 7.5 mg  This helps put him to sleep ok but wakes after 3 hours. Can fall back asleep about half the time. Anxiety  On zoloft 50mg. Buspar 7.5mg BID. 6 - 8 months on this medication. Has talked with a psychologist Dr. Adenike Khan previously. Ativan 1mg QID from [facility]    Hypertriglyceridemia  On Denofibrate  Crestor 20mg    Decreased appetite  Dronabinol  Using zofran TID    History of reccurrent DVT  Eliquis BID    Review of Systems  All other systems reviewed and negative    Prior to Visit Medications    Medication Sig Taking?  Authorizing Provider   OLANZapine (ZYPREXA) 5 MG tablet Take 5 mg by mouth nightly Yes Historical Provider, MD   cholestyramine (QUESTRAN) 4 GM/DOSE powder Take by mouth 3 times daily (with meals) Yes Historical Provider, MD   bortezomib 5 mg/2 mLs in sodium chloride (PF) injection Inject 1.3 mg/m2 into the skin once Yes Historical Provider, MD   cyclophosphamide (CYTOXAN) 25 MG CAPS capsule Take by mouth daily Yes Historical Provider, MD   dexamethasone (DECADRON) 1 MG tablet Take 1 mg by mouth 2 times daily (with meals) Yes Historical Provider, MD   sertraline (ZOLOFT) 50 MG tablet Take 1.5 tablets by mouth daily Yes Parra Sensing, DO   ondansetron (ZOFRAN ODT) 8 MG TBDP disintegrating tablet Place 1 tablet under the tongue every 8 hours as needed for Nausea Yes Ivory Hutson DO   LORazepam (ATIVAN) 1 MG tablet Take 1 mg by mouth 4 times daily as needed. Yes Historical Provider, MD   dronabinol (MARINOL) 5 MG capsule  Yes Historical Provider, MD   oxyCODONE (ROXICODONE) 5 MG immediate release tablet Take 5 mg by mouth every 4 hours as needed for Pain.  Yes Historical Provider, MD   rosuvastatin (CRESTOR) 20 MG tablet TAKE ONE TABLET BY MOUTH DAILY Yes Ivory Hutson DO   zolpidem (AMBIEN) 10 MG tablet TAKE ONE TABLET BY MOUTH ONCE NIGHTLY AS NEEDED FOR SLEEP Yes Ivory Hutson DO   busPIRone (BUSPAR) 7.5 MG tablet Take 1 tablet by mouth 2 times daily Yes Ivory Hutson DO   pantoprazole (PROTONIX) 40 MG tablet Take 1 tablet by mouth daily Yes Ivory Hutson DO   fenofibrate (TRICOR) 54 MG tablet Take 1 tablet by mouth daily s [facility] pharmacy: Please dispense generic fenofibrate unless prescriber denote Yes Jane Espinosa MD   apixaban (ELIQUIS) 5 MG TABS tablet TAKE 1 TABLET BY MOUTH TWO  TIMES DAILY Yes Ivory Hutson DO        Allergies   Allergen Reactions    Atorvastatin     Lipitor Rash       Past Medical History:   Diagnosis Date    Anxiety     Arthritis     Bladder cancer ([facility] [address]     DVT (deep venous thrombosis) ([facility] [address] 07/01/2014    two spontaneous DVTs left leg    Elevated fasting glucose     Hyperlipidemia     Hypertension     Malignant neoplasm of urinary bladder ([facility] [address] 02/04/2019    Multiple myeloma ([facility])     MVP (mitral valve prolapse)     Polyp of gallbladder     Prostate CA ([facility] [address] 2005       Past Surgical History:   Procedure Laterality Date    BLADDER SURGERY      tumor removed    CATARACT REMOVAL Bilateral     CHOLECYSTECTOMY, LAPAROSCOPIC N/A 1/10/2022    VIDEO LAPAROSCOPIC CHOLECYSTECTOMY performed by Ana Antoine MD at [address]  6/29/12    Polyps    COLONOSCOPY  07/12/2017    Polyp    COLONOSCOPY N/A 9/6/2022 COLONOSCOPY POLYPECTOMY SNARE/COLD BIOPSY performed by Violeta Laws MD at [address] BONE  3/3/2022    CT BIOPSY PERCUTANEOUS DEEP BONE 3/3/2022 Katie Raymond [place] [id number] [facility] CT SCAN    INGUINAL HERNIA REPAIR Right 06/1999    PENILE PROSTHESIS PLACEMENT      PROSTATECTOMY  2005 approx    Dr. Mylene Zapien      In [facility] - had one tonsil burned out    UPPER GASTROINTESTINAL ENDOSCOPY N/A 9/6/2022    EGD BIOPSY performed by Violeta Laws MD at [address] [place] [address] History     Socioeconomic History    Marital status:      Spouse name: Not on file    Number of children: 2    Years of education: Not on file    Highest education level: Not on file   Occupational History    Occupation:      Comment: retired   Tobacco Use    Smoking status: Never    Smokeless tobacco: Never   Vaping Use    Vaping Use: Never used   Substance and Sexual Activity    Alcohol use: Not Currently    Drug use: No    Sexual activity: Not Currently   Other Topics Concern    Not on file   Social History Narrative    , retired 1/2015     Social Determinants of Health     Financial Resource Strain: Not on file   Food Insecurity: Not on file   Transportation Needs: Not on file   Physical Activity: Unknown    Days of Exercise per Week: 0 days    Minutes of Exercise per Session: Not on file   Stress: Not on file   Social Connections: Not on file   Intimate Partner Violence: Not At Risk    Fear of Current or Ex-Partner: No    Emotionally Abused: No    Physically Abused: No    Sexually Abused: No   Housing Stability: Not on file        Family History   Problem Relation Age of Onset    Lung Cancer Mother     Heart Disease Mother     Diabetes Father     Prostate Cancer Father     Diabetes Brother        Vitals:    11/22/22 1540   BP: 122/68   Site: Right Upper Arm   Position: Sitting   Cuff Size: Medium Adult   Pulse: 61   SpO2: 100%   Weight: 172 lb 12.8 oz (78.4 kg)   Height: 5' 9\" (1.753 m)     Estimated body mass index is 25.52 kg/m² as calculated from the following:    Height as of this encounter: 5' 9\" (1.753 m). Weight as of this encounter: 172 lb 12.8 oz (78.4 kg). Physical Exam  Vitals reviewed. Constitutional:       Appearance: Normal appearance. HENT:      Head: Normocephalic and atraumatic. Cardiovascular:      Rate and Rhythm: Normal rate and regular rhythm. Pulmonary:      Effort: Pulmonary effort is normal.      Breath sounds: Normal breath sounds. Neurological:      General: No focal deficit present. Mental Status: He is alert and oriented to person, place, and time. Psychiatric:         Behavior: Behavior normal.         Thought Content: Thought content normal.     MANISH 7 SCORE 11/22/2022   MANISH-7 Total Score 15     Interpretation of MANISH-7 score: 5-9 = mild anxiety, 10-14 = moderate anxiety, 15+ = severe anxiety. Recommend referral to behavioral health for scores 10 or greater. PHQ Scores 11/22/2022 9/16/2021 9/16/2021 3/16/2021 10/20/2020 10/6/2020 7/20/2020   PHQ2 Score 6 0 0 0 0 0 0   PHQ9 Score 13 0 0 0 0 0 0     Interpretation of Total Score Depression Severity: 1-4 = Minimal depression, 5-9 = Mild depression, 10-14 = Moderate depression, 15-19 = Moderately severe depression, 20-27 = Severe depression        ASSESSMENT/PLAN:  1. Current moderate episode of major depressive disorder without prior episode [facility])  Patient with thoughts of SI but no plans and denies ever following through with thoughts. Has good support system. Will start zoloft and follow up in 1 month. Understands risks/benefits of medication  - sertraline (ZOLOFT) 50 MG tablet; Take 1.5 tablets by mouth daily  Dispense: 135 tablet; Refill: 1    2. MANISH (generalized anxiety disorder)  - sertraline (ZOLOFT) 50 MG tablet; Take 1.5 tablets by mouth daily  Dispense: 135 tablet; Refill: 1    3. Stage 3a chronic kidney disease [facility])  Following with nephrology. 4. Primary insomnia  Doing ok with ativan and buspar. 5. Multiple myeloma not having achieved remission [facility])  Following with oncology    6. Encounter to establish care  Previously following with Dr. Gaby Pisano    Return in about 4 weeks (around 12/20/2022) for Follow up depression. An  electronic signature was used to authenticate this note.     --Taylor Brantley, DO on 11/27/2022 at 1:32 PM

## 2022-11-26 DIAGNOSIS — R11.0 DAILY NAUSEA: ICD-10-CM

## 2022-11-27 RX ORDER — ONDANSETRON 8 MG/1
TABLET, ORALLY DISINTEGRATING ORAL
Qty: 60 TABLET | Refills: 0 | Status: SHIPPED | OUTPATIENT
Start: 2022-11-27

## 2022-12-12 ENCOUNTER — HOSPITAL ENCOUNTER (OUTPATIENT)
Dept: CT IMAGING | Age: 77
Discharge: HOME | End: 2022-12-12
Payer: MEDICARE

## 2022-12-12 DIAGNOSIS — C90.00 MULTIPLE MYELOMA NOT HAVING ACHIEVED REMISSION (HCC): ICD-10-CM

## 2022-12-12 PROCEDURE — 74176 CT ABD & PELVIS W/O CONTRAST: CPT

## 2022-12-22 ENCOUNTER — OFFICE VISIT (OUTPATIENT)
Dept: FAMILY MEDICINE CLINIC | Age: 77
End: 2022-12-22

## 2022-12-22 ENCOUNTER — PATIENT MESSAGE (OUTPATIENT)
Dept: FAMILY MEDICINE CLINIC | Age: 77
End: 2022-12-22

## 2022-12-22 VITALS
OXYGEN SATURATION: 97 % | HEART RATE: 105 BPM | BODY MASS INDEX: 25.4 KG/M2 | DIASTOLIC BLOOD PRESSURE: 60 MMHG | WEIGHT: 172 LBS | SYSTOLIC BLOOD PRESSURE: 122 MMHG

## 2022-12-22 DIAGNOSIS — R07.89 CHEST TIGHTNESS: Primary | ICD-10-CM

## 2022-12-22 DIAGNOSIS — R11.0 DAILY NAUSEA: ICD-10-CM

## 2022-12-22 DIAGNOSIS — R19.7 DIARRHEA, UNSPECIFIED TYPE: Primary | ICD-10-CM

## 2022-12-22 DIAGNOSIS — F32.1 CURRENT MODERATE EPISODE OF MAJOR DEPRESSIVE DISORDER WITHOUT PRIOR EPISODE (HCC): ICD-10-CM

## 2022-12-22 RX ORDER — ONDANSETRON 8 MG/1
8 TABLET, ORALLY DISINTEGRATING ORAL EVERY 8 HOURS PRN
Qty: 60 TABLET | Refills: 0 | Status: SHIPPED | OUTPATIENT
Start: 2022-12-22

## 2022-12-22 ASSESSMENT — PATIENT HEALTH QUESTIONNAIRE - PHQ9
9. THOUGHTS THAT YOU WOULD BE BETTER OFF DEAD, OR OF HURTING YOURSELF: 0
2. FEELING DOWN, DEPRESSED OR HOPELESS: 3
10. IF YOU CHECKED OFF ANY PROBLEMS, HOW DIFFICULT HAVE THESE PROBLEMS MADE IT FOR YOU TO DO YOUR WORK, TAKE CARE OF THINGS AT HOME, OR GET ALONG WITH OTHER PEOPLE: 0
SUM OF ALL RESPONSES TO PHQ QUESTIONS 1-9: 12
1. LITTLE INTEREST OR PLEASURE IN DOING THINGS: 3
6. FEELING BAD ABOUT YOURSELF - OR THAT YOU ARE A FAILURE OR HAVE LET YOURSELF OR YOUR FAMILY DOWN: 0
SUM OF ALL RESPONSES TO PHQ9 QUESTIONS 1 & 2: 6
SUM OF ALL RESPONSES TO PHQ QUESTIONS 1-9: 12
8. MOVING OR SPEAKING SO SLOWLY THAT OTHER PEOPLE COULD HAVE NOTICED. OR THE OPPOSITE, BEING SO FIGETY OR RESTLESS THAT YOU HAVE BEEN MOVING AROUND A LOT MORE THAN USUAL: 0
4. FEELING TIRED OR HAVING LITTLE ENERGY: 3
SUM OF ALL RESPONSES TO PHQ QUESTIONS 1-9: 12
SUM OF ALL RESPONSES TO PHQ QUESTIONS 1-9: 12
7. TROUBLE CONCENTRATING ON THINGS, SUCH AS READING THE NEWSPAPER OR WATCHING TELEVISION: 0
5. POOR APPETITE OR OVEREATING: 0
3. TROUBLE FALLING OR STAYING ASLEEP: 3

## 2022-12-22 ASSESSMENT — ANXIETY QUESTIONNAIRES
IF YOU CHECKED OFF ANY PROBLEMS ON THIS QUESTIONNAIRE, HOW DIFFICULT HAVE THESE PROBLEMS MADE IT FOR YOU TO DO YOUR WORK, TAKE CARE OF THINGS AT HOME, OR GET ALONG WITH OTHER PEOPLE: VERY DIFFICULT
2. NOT BEING ABLE TO STOP OR CONTROL WORRYING: 3
IF YOU CHECKED OFF ANY PROBLEMS ON THIS QUESTIONNAIRE, HOW DIFFICULT HAVE THESE PROBLEMS MADE IT FOR YOU TO DO YOUR WORK, TAKE CARE OF THINGS AT HOME, OR GET ALONG WITH OTHER PEOPLE: NOT DIFFICULT AT ALL
1. FEELING NERVOUS, ANXIOUS, OR ON EDGE: 3
GAD7 TOTAL SCORE: 16
4. TROUBLE RELAXING: 3
6. BECOMING EASILY ANNOYED OR IRRITABLE: 2
3. WORRYING TOO MUCH ABOUT DIFFERENT THINGS: 3
5. BEING SO RESTLESS THAT IT IS HARD TO SIT STILL: 0
7. FEELING AFRAID AS IF SOMETHING AWFUL MIGHT HAPPEN: 2

## 2022-12-22 NOTE — TELEPHONE ENCOUNTER
From: Joycelyn Fontaine  To: Dr. Sharri Salcedo  Sent: 12/22/2022 3:54 PM EST  Subject: Cholestyramine    Would you please refill my cholestyramine prescription that was originally prescribed by Dr. Polly Mai after my gall bladder surgery. I no longer need to see Dr. Polly Mai.    \"Cholestyramine 4 gr packet  Take one packet by mouth twice a day\"  Thank you,  Joycelyn Fontaine

## 2022-12-22 NOTE — PROGRESS NOTES
Patient: Anaid Griggs is a 65 y.o. male who presents today with the following Chief Complaint(s):  Chief Complaint   Patient presents with    Follow-up         HPI    At last visit patient increase Zoloft to 75 mg 1 month ago due to thoughts of SI with no plans or thoughts of follow-through. Much of this related to concerns with multiple myeloma diagnosis. Also on BuSpar 7.5 mg twice daily and Ativan 1 mg 4 times daily from [facility]. Personally has not noticed much improvement but wife reports a big improvement with increased dose to 75mg. Last few days has noted tightness in his chest. This pressure comes and goes. Occurring at rest. Rides his stationary bike for 30 minutes a day without difficulty. Tightness can last for several hours. Has had similiar symptoms for the last 35 years ago and had an angiogram that was reportedly unremarkable when this first started. These did however resolve for a long time. Will also notice dizziness with rapid position changes and having some shortness of breath momentarily associated with this. Current Outpatient Medications   Medication Sig Dispense Refill    ondansetron (ZOFRAN-ODT) 8 MG TBDP disintegrating tablet Place 1 tablet under the tongue every 8 hours as needed for Nausea or Vomiting 60 tablet 0    OLANZapine (ZYPREXA) 5 MG tablet Take 5 mg by mouth nightly      bortezomib 5 mg/2 mLs in sodium chloride (PF) injection Inject 1.3 mg/m2 into the skin once      cyclophosphamide (CYTOXAN) 25 MG CAPS capsule Take by mouth daily      dexamethasone (DECADRON) 1 MG tablet Take 1 mg by mouth 2 times daily (with meals)      sertraline (ZOLOFT) 50 MG tablet Take 1.5 tablets by mouth daily 135 tablet 1    LORazepam (ATIVAN) 1 MG tablet Take 1 mg by mouth 4 times daily as needed. dronabinol (MARINOL) 5 MG capsule       oxyCODONE (ROXICODONE) 5 MG immediate release tablet Take 5 mg by mouth every 4 hours as needed for Pain.       rosuvastatin (CRESTOR) 20 MG tablet TAKE ONE TABLET BY MOUTH DAILY 90 tablet 1    zolpidem (AMBIEN) 10 MG tablet TAKE ONE TABLET BY MOUTH ONCE NIGHTLY AS NEEDED FOR SLEEP 30 tablet 5    busPIRone (BUSPAR) 7.5 MG tablet Take 1 tablet by mouth 2 times daily 180 tablet 1    pantoprazole (PROTONIX) 40 MG tablet Take 1 tablet by mouth daily 30 tablet 5    fenofibrate (TRICOR) 54 MG tablet Take 1 tablet by mouth daily s [facility] pharmacy: Please dispense generic fenofibrate unless prescriber denote 90 tablet 1    apixaban (ELIQUIS) 5 MG TABS tablet TAKE 1 TABLET BY MOUTH TWO  TIMES DAILY 180 tablet 3    cholestyramine (QUESTRAN) 4 GM/DOSE powder Take 1 packet by mouth 3 times daily (with meals) 378 g 5     No current facility-administered medications for this visit. Patient's past medical history, surgical history, family history, medications,  andallergies  were all reviewed and updated as appropriate today. Review of Systems  All other systems reviewed and negative    Physical Exam  Vitals reviewed. Constitutional:       Appearance: Normal appearance. HENT:      Head: Normocephalic and atraumatic. Cardiovascular:      Rate and Rhythm: Normal rate and regular rhythm. Pulmonary:      Effort: Pulmonary effort is normal.      Breath sounds: Normal breath sounds. Neurological:      General: No focal deficit present. Mental Status: He is alert and oriented to person, place, and time. Psychiatric:         Behavior: Behavior normal.         Thought Content: Thought content normal.     Vitals:    12/22/22 0836   BP: 122/60   Pulse: (!) 105   SpO2: 97%       Assessment:  Encounter Diagnoses   Name Primary? Daily nausea     Chest tightness Yes       Plan:    MDD  Improved with zoloft 75 mg. Will continue on current dose for now and monitor for any further changes.      1. Daily nausea  - ondansetron (ZOFRAN-ODT) 8 MG TBDP disintegrating tablet; Place 1 tablet under the tongue every 8 hours as needed for Nausea or Vomiting  Dispense: 60 tablet; Refill: 0    2. Chest tightness  EKG is sinus rhythm without significant change from previous EKG 12/14/2021. Given atypical presentation I feel this is more likely related to anxiety than cardiac in origin. Discussed red flag cardiac symptoms that would prompt emergent evaluation.   - EKG 12 Lead      No follow-ups on file.

## 2022-12-23 RX ORDER — CHOLESTYRAMINE 4 G/9G
4 POWDER, FOR SUSPENSION ORAL
Qty: 378 G | Refills: 5 | Status: SHIPPED | OUTPATIENT
Start: 2022-12-23

## 2022-12-30 ENCOUNTER — HOSPITAL ENCOUNTER (OUTPATIENT)
Age: 77
Discharge: HOME | End: 2022-12-30
Payer: MEDICARE

## 2022-12-30 ENCOUNTER — HOSPITAL ENCOUNTER (OUTPATIENT)
Dept: MRI IMAGING | Age: 77
Discharge: HOME | End: 2022-12-30
Payer: MEDICARE

## 2022-12-30 DIAGNOSIS — N28.89 URETERAL FISTULA: ICD-10-CM

## 2022-12-30 DIAGNOSIS — C90.00 MYELOMA ASSOCIATED AMYLOIDOSIS (HCC): ICD-10-CM

## 2022-12-30 DIAGNOSIS — E85.9 MYELOMA ASSOCIATED AMYLOIDOSIS (HCC): ICD-10-CM

## 2022-12-30 DIAGNOSIS — C90.00 MULTIPLE MYELOMA NOT HAVING ACHIEVED REMISSION (HCC): ICD-10-CM

## 2022-12-30 PROBLEM — F32.1 CURRENT MODERATE EPISODE OF MAJOR DEPRESSIVE DISORDER WITHOUT PRIOR EPISODE (HCC): Status: ACTIVE | Noted: 2022-12-30

## 2022-12-30 LAB
BUN BLDV-MCNC: 26 MG/DL (ref 7–20)
CREAT SERPL-MCNC: 1.8 MG/DL (ref 0.8–1.3)
GFR SERPL CREATININE-BSD FRML MDRD: 38 ML/MIN/{1.73_M2}

## 2022-12-30 PROCEDURE — 6360000004 HC RX CONTRAST MEDICATION: Performed by: INTERNAL MEDICINE

## 2022-12-30 PROCEDURE — 84520 ASSAY OF UREA NITROGEN: CPT

## 2022-12-30 PROCEDURE — 36415 COLL VENOUS BLD VENIPUNCTURE: CPT

## 2022-12-30 PROCEDURE — A9579 GAD-BASE MR CONTRAST NOS,1ML: HCPCS | Performed by: INTERNAL MEDICINE

## 2022-12-30 PROCEDURE — 74183 MRI ABD W/O CNTR FLWD CNTR: CPT

## 2022-12-30 PROCEDURE — 82565 ASSAY OF CREATININE: CPT

## 2022-12-30 RX ADMIN — GADOTERIDOL 15 ML: 279.3 INJECTION, SOLUTION INTRAVENOUS at 12:51

## 2023-01-04 ENCOUNTER — PATIENT MESSAGE (OUTPATIENT)
Dept: FAMILY MEDICINE CLINIC | Age: 78
End: 2023-01-04

## 2023-01-04 DIAGNOSIS — R11.0 DAILY NAUSEA: ICD-10-CM

## 2023-01-04 RX ORDER — ONDANSETRON 8 MG/1
8 TABLET, ORALLY DISINTEGRATING ORAL EVERY 8 HOURS PRN
Qty: 60 TABLET | Refills: 0 | Status: SHIPPED | OUTPATIENT
Start: 2023-01-04

## 2023-01-04 NOTE — TELEPHONE ENCOUNTER
Refill Request     CONFIRM preferrred pharmacy with the patient. If Mail Order Rx - Pend for 90 day refill. Last Seen: Last Seen Department: 12/22/2022  Last Seen by PCP: 12/22/2022    Last Written: 12/22/22 0 refills    If no future appointment scheduled, route STAFF MESSAGE with patient name to the [facility] for scheduling. Next Appointment:   Future Appointments   Date Time Provider Austin Colunga   6/22/2023  9:30 AM DO FRED Purcell [place] [facility] [place]       Message sent to  to schedule appt with patient?   NO      Requested Prescriptions     Pending Prescriptions Disp Refills    ondansetron (ZOFRAN-ODT) 8 MG TBDP disintegrating tablet 60 tablet 0     Sig: Place 1 tablet under the tongue every 8 hours as needed for Nausea or Vomiting

## 2023-01-04 NOTE — TELEPHONE ENCOUNTER
From: Ofelia Montez  To: Dr. Zina Delcid  Sent: 1/4/2023 9:15 AM EST  Subject: zofran refill    Dr. Isi Vega, can you refill Zofran for me ?  Thanks,  Chuy Franco

## 2023-01-13 ENCOUNTER — TELEPHONE (OUTPATIENT)
Dept: FAMILY MEDICINE CLINIC | Age: 78
End: 2023-01-13

## 2023-01-19 ENCOUNTER — APPOINTMENT (OUTPATIENT)
Dept: GENERAL RADIOLOGY | Age: 78
End: 2023-01-19
Payer: MEDICARE

## 2023-01-19 ENCOUNTER — TELEPHONE (OUTPATIENT)
Dept: FAMILY MEDICINE CLINIC | Age: 78
End: 2023-01-19

## 2023-01-19 ENCOUNTER — HOSPITAL ENCOUNTER (EMERGENCY)
Age: 78
Discharge: HOME | End: 2023-01-19
Attending: EMERGENCY MEDICINE
Payer: MEDICARE

## 2023-01-19 VITALS
RESPIRATION RATE: 14 BRPM | DIASTOLIC BLOOD PRESSURE: 74 MMHG | SYSTOLIC BLOOD PRESSURE: 121 MMHG | WEIGHT: 173 LBS | OXYGEN SATURATION: 98 % | HEIGHT: 70 IN | TEMPERATURE: 97 F | HEART RATE: 71 BPM | BODY MASS INDEX: 24.77 KG/M2

## 2023-01-19 DIAGNOSIS — R07.9 CHEST PAIN, UNSPECIFIED TYPE: Primary | ICD-10-CM

## 2023-01-19 LAB
A/G RATIO: 2.4 (ref 1.1–2.2)
ALBUMIN SERPL-MCNC: 4.5 G/DL (ref 3.4–5)
ALP BLD-CCNC: 46 U/L (ref 40–129)
ALT SERPL-CCNC: 11 U/L (ref 10–40)
ANION GAP SERPL CALCULATED.3IONS-SCNC: 13 MMOL/L (ref 3–16)
ANISOCYTOSIS: ABNORMAL
AST SERPL-CCNC: 14 U/L (ref 15–37)
BANDED NEUTROPHILS RELATIVE PERCENT: 4 % (ref 0–7)
BASOPHILS ABSOLUTE: 0 K/UL (ref 0–0.2)
BASOPHILS RELATIVE PERCENT: 0 %
BILIRUB SERPL-MCNC: <0.2 MG/DL (ref 0–1)
BUN BLDV-MCNC: 26 MG/DL (ref 7–20)
CALCIUM SERPL-MCNC: 9.3 MG/DL (ref 8.3–10.6)
CHLORIDE BLD-SCNC: 104 MMOL/L (ref 99–110)
CO2: 21 MMOL/L (ref 21–32)
CREAT SERPL-MCNC: 1.9 MG/DL (ref 0.8–1.3)
EKG ATRIAL RATE: 84 BPM
EKG DIAGNOSIS: NORMAL
EKG P AXIS: 14 DEGREES
EKG P-R INTERVAL: 126 MS
EKG Q-T INTERVAL: 374 MS
EKG QRS DURATION: 98 MS
EKG QTC CALCULATION (BAZETT): 441 MS
EKG R AXIS: 42 DEGREES
EKG T AXIS: 51 DEGREES
EKG VENTRICULAR RATE: 84 BPM
EOSINOPHILS ABSOLUTE: 0 K/UL (ref 0–0.6)
EOSINOPHILS RELATIVE PERCENT: 0 %
GFR SERPL CREATININE-BSD FRML MDRD: 36 ML/MIN/{1.73_M2}
GLUCOSE BLD-MCNC: 109 MG/DL (ref 70–99)
HCT VFR BLD CALC: 36.5 % (ref 40.5–52.5)
HEMOGLOBIN: 12.3 G/DL (ref 13.5–17.5)
LYMPHOCYTES ABSOLUTE: 0.4 K/UL (ref 1–5.1)
LYMPHOCYTES RELATIVE PERCENT: 3 %
MACROCYTES: ABNORMAL
MAGNESIUM: 2.5 MG/DL (ref 1.8–2.4)
MCH RBC QN AUTO: 33.7 PG (ref 26–34)
MCHC RBC AUTO-ENTMCNC: 33.8 G/DL (ref 31–36)
MCV RBC AUTO: 99.7 FL (ref 80–100)
MICROCYTES: ABNORMAL
MONOCYTES ABSOLUTE: 1 K/UL (ref 0–1.3)
MONOCYTES RELATIVE PERCENT: 7 %
NEUTROPHILS ABSOLUTE: 13.2 K/UL (ref 1.7–7.7)
NEUTROPHILS RELATIVE PERCENT: 86 %
PDW BLD-RTO: 14.8 % (ref 12.4–15.4)
PLATELET # BLD: 153 K/UL (ref 135–450)
PLATELET SLIDE REVIEW: ADEQUATE
PMV BLD AUTO: 9.4 FL (ref 5–10.5)
POIKILOCYTES: ABNORMAL
POTASSIUM SERPL-SCNC: 4.5 MMOL/L (ref 3.5–5.1)
PRO-BNP: 279 PG/ML (ref 0–449)
RBC # BLD: 3.66 M/UL (ref 4.2–5.9)
SLIDE REVIEW: ABNORMAL
SODIUM BLD-SCNC: 138 MMOL/L (ref 136–145)
TOTAL PROTEIN: 6.4 G/DL (ref 6.4–8.2)
TROPONIN: <0.01 NG/ML
WBC # BLD: 14.7 K/UL (ref 4–11)

## 2023-01-19 PROCEDURE — 93005 ELECTROCARDIOGRAM TRACING: CPT | Performed by: EMERGENCY MEDICINE

## 2023-01-19 PROCEDURE — 85025 COMPLETE CBC W/AUTO DIFF WBC: CPT

## 2023-01-19 PROCEDURE — 83880 ASSAY OF NATRIURETIC PEPTIDE: CPT

## 2023-01-19 PROCEDURE — 83735 ASSAY OF MAGNESIUM: CPT

## 2023-01-19 PROCEDURE — 84484 ASSAY OF TROPONIN QUANT: CPT

## 2023-01-19 PROCEDURE — 99285 EMERGENCY DEPT VISIT HI MDM: CPT

## 2023-01-19 PROCEDURE — 80053 COMPREHEN METABOLIC PANEL: CPT

## 2023-01-19 PROCEDURE — 71045 X-RAY EXAM CHEST 1 VIEW: CPT

## 2023-01-19 PROCEDURE — 6370000000 HC RX 637 (ALT 250 FOR IP): Performed by: STUDENT IN AN ORGANIZED HEALTH CARE EDUCATION/TRAINING PROGRAM

## 2023-01-19 RX ORDER — HYDROXYZINE PAMOATE 25 MG/1
50 CAPSULE ORAL ONCE
Status: COMPLETED | OUTPATIENT
Start: 2023-01-19 | End: 2023-01-19

## 2023-01-19 RX ADMIN — HYDROXYZINE PAMOATE 50 MG: 25 CAPSULE ORAL at 14:10

## 2023-01-19 ASSESSMENT — PAIN DESCRIPTION - PAIN TYPE: TYPE: ACUTE PAIN

## 2023-01-19 ASSESSMENT — HEART SCORE: ECG: 1

## 2023-01-19 ASSESSMENT — PAIN - FUNCTIONAL ASSESSMENT
PAIN_FUNCTIONAL_ASSESSMENT: 0-10
PAIN_FUNCTIONAL_ASSESSMENT: 0-10

## 2023-01-19 ASSESSMENT — PAIN SCALES - GENERAL
PAINLEVEL_OUTOF10: 5
PAINLEVEL_OUTOF10: 5

## 2023-01-19 ASSESSMENT — PAIN DESCRIPTION - LOCATION: LOCATION: CHEST

## 2023-01-19 ASSESSMENT — PAIN DESCRIPTION - ONSET: ONSET: ON-GOING

## 2023-01-19 ASSESSMENT — PAIN DESCRIPTION - DESCRIPTORS: DESCRIPTORS: TIGHTNESS

## 2023-01-19 ASSESSMENT — PAIN DESCRIPTION - FREQUENCY: FREQUENCY: CONTINUOUS

## 2023-01-19 NOTE — DISCHARGE INSTRUCTIONS
- start taking baby aspirin  - follow-up with PCP on Monday for additional chest tightness workup  - discuss with PCP on whether to continue or discontinue baby aspirin

## 2023-01-19 NOTE — ED NOTES
[address] [facility]  ED  [address]      Pt Name: Norma Billings  MRN: [id number]  [place] 1945  Date of evaluation: 1/19/2023  Provider: Suha Bhagat MD PhD    CHIEF COMPLAINT       Chief Complaint   Patient presents with    Chest Pain     Pt c/o chest tightness that started 24 hours ago. Pt is on Eliquis d/t hx DVTs. HISTORY OF PRESENT ILLNESS   (Location/Symptom, Timing/Onset, Context/Setting, Quality, Duration, Modifying Factors, Severity)  Note limiting factors. Norma Billings is a 65 y.o. male who presents to the emergency department chest discomfort    - chest discomfort (not pain) started about 24 hours ago  - pt was sitting on a couch watching TV  - no radiating discomfort/pain to the jaw or L arm  - associated with some SOB which was more noticeable when walking up the stairs  - states that similar chest discomfort occurred previously in the last several years  - ECG in the past was unremarkable and presumed anxiety induced chest discomfort  - had stress test about 15 years ago which was normal  - has not seen a cardiology in several years  - no hx of MI or stroke, but has DVT and on eliquis  - denies headache, focal neurologic deficits, GERD, pleuritic chest pain, recent chest injury  - the patient has hx of anxiety and currently taking ativan and buspar  - the patient also on chemo for myeloma     Nursing Notes were reviewed. REVIEW OF SYSTEMS    (2-9 systems for level 4, 10 or more for level 5)       Except as noted above the remainder of the review of systems was reviewed and negative.        PAST MEDICAL HISTORY     Past Medical History:   Diagnosis Date    Anxiety     Arthritis     Bladder cancer ([facility]     DVT (deep venous thrombosis) ([facility] 07/01/2014    two spontaneous DVTs left leg    Elevated fasting glucose     Hyperlipidemia     Hypertension     Malignant neoplasm of urinary bladder ([address] 02/04/2019    Multiple myeloma (HCC)     MVP (mitral valve prolapse)     Polyp of gallbladder     Prostate CA ([facility] 2005         SURGICAL HISTORY       Past Surgical History:   Procedure Laterality Date    BLADDER SURGERY      tumor removed    CATARACT REMOVAL Bilateral     CHOLECYSTECTOMY, LAPAROSCOPIC N/A 1/10/2022    VIDEO LAPAROSCOPIC CHOLECYSTECTOMY performed by Ba Liu MD at [facility]  6/29/12    Polyps    COLONOSCOPY  07/12/2017    Polyp    COLONOSCOPY N/A 9/6/2022    COLONOSCOPY POLYPECTOMY SNARE/COLD BIOPSY performed by Albaro Alamo MD at [facility] BONE  3/3/2022    CT BIOPSY PERCUTANEOUS DEEP BONE 3/3/2022 Sandra Cardenas MD [address] [facility] CT SCAN    INGUINAL HERNIA REPAIR Right 06/1999    PENILE PROSTHESIS PLACEMENT      PROSTATECTOMY  2005 approx    Dr. Magda Cedillo      In [facility] - had one tonsil burned out    UPPER GASTROINTESTINAL ENDOSCOPY N/A 9/6/2022    EGD BIOPSY performed by Albaro Alamo MD at [address]       Discharge Medication List as of 1/19/2023  2:47 PM        CONTINUE these medications which have NOT CHANGED    Details   ondansetron (ZOFRAN-ODT) 8 MG TBDP disintegrating tablet Place 1 tablet under the tongue every 8 hours as needed for Nausea or Vomiting, Disp-60 tablet, R-0Normal      cholestyramine (QUESTRAN) 4 GM/DOSE powder Take 1 packet by mouth 3 times daily (with meals), Disp-378 g, R-5Normal      OLANZapine (ZYPREXA) 5 MG tablet Take 5 mg by mouth nightlyHistorical Med      bortezomib 5 mg/2 mLs in sodium chloride (PF) injection Inject 1.3 mg/m2 into the skin onceHistorical Med      cyclophosphamide (CYTOXAN) 25 MG CAPS capsule Take by mouth dailyHistorical Med      dexamethasone (DECADRON) 1 MG tablet Take 1 mg by mouth 2 times daily (with meals)Historical Med      sertraline (ZOLOFT) 50 MG tablet Take 1.5 tablets by mouth daily, Disp-135 tablet, R-1Normal      LORazepam (ATIVAN) 1 MG tablet Take 1 mg by mouth 4 times daily as needed. Historical Med      dronabinol (MARINOL) 5 MG capsule Historical Med      oxyCODONE (ROXICODONE) 5 MG immediate release tablet Take 5 mg by mouth every 4 hours as needed for Pain. Historical Med      rosuvastatin (CRESTOR) 20 MG tablet TAKE ONE TABLET BY MOUTH DAILY, Disp-90 tablet, R-1Normal      zolpidem (AMBIEN) 10 MG tablet TAKE ONE TABLET BY MOUTH ONCE NIGHTLY AS NEEDED FOR SLEEP, Disp-30 tablet, R-5Normal      busPIRone (BUSPAR) 7.5 MG tablet Take 1 tablet by mouth 2 times daily, Disp-180 tablet, R-1Normal      pantoprazole (PROTONIX) 40 MG tablet Take 1 tablet by mouth daily, Disp-30 tablet, R-5Normal      fenofibrate (TRICOR) 54 MG tablet Take 1 tablet by mouth daily [facility]: Please dispense generic fenofibrate unless prescriber denote, Disp-90 tablet, R-1Normal      apixaban (ELIQUIS) 5 MG TABS tablet TAKE 1 TABLET BY MOUTH TWO  TIMES DAILY, Disp-180 tablet, R-3Normal             ALLERGIES     Atorvastatin and Lipitor    FAMILY HISTORY       Family History   Problem Relation Age of Onset    Lung Cancer Mother     Heart Disease Mother     Diabetes Father     Prostate Cancer Father     Diabetes Brother         SOCIAL HISTORY       Social History     Socioeconomic History    Marital status:      Spouse name: None    Number of children: 2    Years of education: None    Highest education level: None   Occupational History    Occupation:      Comment: retired   Tobacco Use    Smoking status: Never    Smokeless tobacco: Never   Vaping Use    Vaping Use: Never used   Substance and Sexual Activity    Alcohol use: Not Currently    Drug use: No    Sexual activity: Not Currently   Social History Narrative    , retired 1/2015     Social Determinants of Health     Physical Activity: Unknown    Days of Exercise per Week: 0 days   Intimate Partner Violence: Not At Risk    Fear of Current or Ex-Partner: No    Emotionally Abused: No    Physically Abused: No    Sexually Abused: No       SCREENINGS        Home Coma Scale  Eye Opening: Spontaneous  Best Verbal Response: Oriented  Best Motor Response: Obeys commands  [place] Coma Scale Score: 15           CURB-65 Score  Confusion: No  BUN > 19 mg/dL (> 7 mmol/L): Yes  Respiratory Rate > 29: No  Systolic BP < 90 mmHg or Diastolic BP < 61 mmHg: No  Age is 65+: Yes  CURB-65 Score: 2   PHYSICAL EXAM    (up to 7 for level 4, 8 or more for level 5)     ED Triage Vitals [01/19/23 1232]   BP Temp Temp Source Heart Rate Resp SpO2 Height Weight   (!) 141/75 97 °F (36.1 °C) Oral 91 20 99 % 5' 9.5\" (1.765 m) 173 lb (78.5 kg)       Physical Exam  Constitutional:       General: He is not in acute distress. Appearance: Normal appearance. He is not ill-appearing, toxic-appearing or diaphoretic. Cardiovascular:      Rate and Rhythm: Normal rate and regular rhythm. Pulses: Normal pulses. Heart sounds: Normal heart sounds. No murmur heard. No gallop. Pulmonary:      Effort: No respiratory distress. Breath sounds: Normal breath sounds. No wheezing or rales. Abdominal:      General: Abdomen is flat. Bowel sounds are normal. There is no distension. Palpations: Abdomen is soft. Tenderness: There is no abdominal tenderness. There is no guarding. Neurological:      General: No focal deficit present. Mental Status: He is alert. Mental status is at baseline.        DIAGNOSTIC RESULTS     EKG: All EKG's are interpreted by the Emergency Department Physician who either signs or Co-signs this chart in the absence of a cardiologist.    - nsr, no st changes    RADIOLOGY:   Non-plain film images such as CT, Ultrasound and MRI are read by the radiologist. Plain radiographic images are visualized and preliminarily interpreted by the emergency physician with the below findings:    - no acute cardiopulmonary findings    Interpretation per the Radiologist below, if available at the time of this note:    XR CHEST PORTABLE   Final Result   No acute cardiopulmonary findings               ED BEDSIDE ULTRASOUND:   Performed by ED Physician - none    LABS:  Labs Reviewed   CBC WITH AUTO DIFFERENTIAL - Abnormal; Notable for the following components:       Result Value    WBC 14.7 (*)     RBC 3.66 (*)     Hemoglobin 12.3 (*)     Hematocrit 36.5 (*)     All other components within normal limits   COMPREHENSIVE METABOLIC PANEL - Abnormal; Notable for the following components:    Glucose 109 (*)     BUN 26 (*)     Creatinine 1.9 (*)     Est, Glom Filt Rate 36 (*)     Albumin/Globulin Ratio 2.4 (*)     AST 14 (*)     All other components within normal limits   MAGNESIUM - Abnormal; Notable for the following components:    Magnesium 2.50 (*)     All other components within normal limits   TROPONIN   BRAIN NATRIURETIC PEPTIDE       All other labs were within normal range or not returned as of this dictation.     EMERGENCY DEPARTMENT COURSE and DIFFERENTIAL DIAGNOSIS/MDM:   Vitals:    Vitals:    01/19/23 1232 01/19/23 1323 01/19/23 1410 01/19/23 1423   BP: (!) 141/75 121/64 121/74 121/74   Pulse: 91 77 71 71   Resp: 20 16 14    Temp: 97 °F (36.1 °C)      TempSrc: Oral      SpO2: 99% 97% 98%    Weight: 173 lb (78.5 kg)      Height: 5' 9.5\" (1.765 m)            MDM    #chest discomfort  - ACS vs anxiety  - unlikely aortic dissection, PE, gerd, msk injury, pancreatitis/abd pathology  - CBC, CMP, trop, BNP, ECG, CXR    REASSESSMENT        - CBC notable for leukocytosis (WBC 14.7) likely 2/2 decadon for ongoing chemo  - CMP unremarkable except for Cr 1.9 which is within pt's baseline  - trop and bnp neg  - ecg and cxr unremarkable  - Heart score 3-4  - updated the patient on the results and counseled the patient on staying in ED for stress test vs OP PCP f/u for cardiac w/u  - pt expressed preference for OP PCP f/u  - ordered hydroxyzine 50 mg once in ED for possible anxiety-related chest discomfort  - ordered consult to PCP for anxiety/chest tightness  - updated the PCP about the results, PCP stated that they will see the patient on Monday  - counseled the patient on daily baby aspirin to further reduce the risk of ACS and advised the patient to discuss with PCP on Monday on whether to continue/discontinue ASA    CONSULTS:  IP CONSULT TO PRIMARY CARE PROVIDER    PROCEDURES:  Unless otherwise noted below, none     FINAL IMPRESSION      1. Chest pain, unspecified type          DISPOSITION/PLAN   DISPOSITION Decision To Discharge 01/19/2023 02:21:52 PM      PATIENT REFERRED TO:  DO Mike Edwards  [phone]    Call       DISCHARGE MEDICATIONS:  Discharge Medication List as of 1/19/2023  2:47 PM        Controlled Substances Monitoring:     RX Monitoring 2/4/2020   Attestation -   Periodic Controlled Substance Monitoring No signs of potential drug abuse or diversion identified.        (Please note that portions of this note were completed with a voice recognition program.  Efforts were made to edit the dictations but occasionally words are mis-transcribed.)    Lisa Phan MD PhD  Family Medicine, PGY-1         Lisa Phan MD PhD  Resident  01/19/23 1318       Lisa Phan MD PhD  Resident  01/19/23 1356       Lisa Phan MD PhD  Resident  01/19/23 Gaye Bloch, MD PhD  Resident  01/19/23 1426       Lisa Phan MD PhD  Resident  01/19/23 5737

## 2023-01-19 NOTE — ED NOTES
Called Dr. Reina Hernández office @ 3555 RE: anxiety/chest tightness follow-up  Dr. Shaw Monday spoke with Dr. Naun Bhat @ [address] [facility]  01/19/23 3953

## 2023-01-19 NOTE — ED NOTES
Pt discharged after reviewing discharge instructions, medications and follow up.  Pt and pts family verbalize understandings and pt ambulated from [address] Marisa Oropeza RN  01/19/23 8898

## 2023-01-19 NOTE — ED PROVIDER NOTES
I independently performed a history and physical on Lesley Leyva. All diagnostic, treatment, and disposition decisions were made by myself in conjunction with the resident physician. I personally saw the patient and performed a substantive portion of the visit including all aspects of the medical decision making. For further details of [facility] emergency department encounter, please see Eliza Yeung MD's documentation. Patient is a 69-year-old male presenting today due to concern for having chest tightness over the past 2 days that has been relatively constant at which point he did wake him up at 330 this morning and has been constant ever since awakening. He does feel anxious but denies any suicidal thoughts. He denies any significant shortness of breath. He is on Eliquis due to a history of blood clots. He denies any pain with breathing. No abdominal pain or vomiting. No lightheadedness or dizziness. Other than some mild chest tightness, he has no other complaints at this time. He denies any recent cardiac testing but based on chart review he did have a reassuring stress test done in 2018 showing no wall abnormalities or sign of any significant EKG changes per cardiologist at [facility].  He is taking his blood thinner as prescribed. He does have a history of anxiety. He is on Decadron due to the history of multiple myeloma. He is still making urine normally. He denies any abdominal pain. No falls or trauma. Due to concern for the chest tightness, he came to the ED for further assessment. He denies any radiation of the pain into his neck or back or arms at this time. It has been constant since 3:30 AM.        Physical:   Gen: No acute distress. AOx3.   Pysch: Anxious mood and affect, no SI   HEENT: NCAT, MMM  Neck: supple  Cardiac: RRR, pulses 2+ in all 4 extremities  Lungs: C2AB, no R/R/W  Abdomen: soft and nontender with no R/D/G  Neuro: no focal neuro deficits with strength and sensation 5/5 in all 4 extremities    The Ekg interpreted by me shows  Sinus rhythm with premature atrial complexes noted  with a rate of 84  Axis is   Normal  QTc is  normal  Intervals and Durations are unremarkable. ST Segments: no acute change and normal (at worst slight QRS abnormality similar to old EKG)   No significant change from prior EKG dated - 12/22/22  No STEMI, QRS widening does appear somewhat improved today compared to old EKG        I was the primary provider for the patient. MDM: Patient was evaluated for persistent chest tightness over the last 24 hours and having a history of anxiety. Story was not suggestive of acute coronary syndrome. Troponin was negative and no need for repeat troponin since pain has been present for greater than 6 hours. He is on blood thinner and denies any pain with breathing and had reassuring vitals and story not suggestive of pulmonary embolism. Chest x-ray was reviewed by me and did not show any signs of mediastinal widening or pneumonia. BNP was normal suggesting against CHF exacerbation. He did receive oral Vistaril to see if this helped with his chest tightness in case it was related to anxiety. He is considered moderate risk based on heart score of 4 and is aware that he has a 15% chance of serious cardiac event such as a heart attack over the next 6 weeks that could happen as soon as tonight that could cause severe disability or death. He had full mental capacity to make his own medical decisions. At this time, he was declining admission and would like to go home and follow-up urgently with his primary doctor. We did call his primary doctor to ensure he can get urgent follow-up in case further testing such as more recent stress test would be needed at this point.   He is aware that if he does go home and has any worsening chest discomfort with shortness of breath, radiation of pain, new lightheadedness, or any other concerns, then return to the ED immediately for further assessment, but otherwise follow-up as discussed. We did discuss admitting the patient but he declined and ultimately exercised informed refusal for admission and not wanting to go home. He was told to take a baby aspirin daily until told otherwise by his primary doctor even with known history of taking Eliquis in case this is related to heart disease. He was well-appearing and in no acute distress at time of discharge and felt comfortable with this plan. He did have chronic kidney disease with similar creatinine today and therefore no need for any further work-up for this. His white count was elevated although he is on steroids and this most likely is the cause since he denied any fever or and story not suggestive of infection or sepsis at this time. Since he is already on a blood thinner and reports taking it as prescribed and denies any pain with breathing, I do not feel that CT is warranted at this point of the chest.  Story not suggestive of aortic dissection. Exclusion criteria - the patient is NOT to be included for SEP-1 Core Measure due to:   Infection is not suspected        Bina Rosenbaum MD  01/19/23 1194

## 2023-01-19 NOTE — TELEPHONE ENCOUNTER
----- Message from Rafael Persaud DO sent at 1/19/2023  2:10 PM EST -----  Patient seen in ED today for chest pain. Spoke with Ed provider. Please call and schedule patient for 11:30 on Monday.  Thank you

## 2023-01-19 NOTE — TELEPHONE ENCOUNTER
LM for pt to call back  Looks like hes still not out of the ER yet.  If he calls back please help to get him scheduled

## 2023-01-23 ENCOUNTER — OFFICE VISIT (OUTPATIENT)
Dept: FAMILY MEDICINE CLINIC | Age: 78
End: 2023-01-23
Payer: MEDICARE

## 2023-01-23 VITALS
HEART RATE: 104 BPM | WEIGHT: 168.8 LBS | OXYGEN SATURATION: 98 % | SYSTOLIC BLOOD PRESSURE: 114 MMHG | BODY MASS INDEX: 24.57 KG/M2 | DIASTOLIC BLOOD PRESSURE: 68 MMHG

## 2023-01-23 DIAGNOSIS — K21.9 GASTROESOPHAGEAL REFLUX DISEASE WITHOUT ESOPHAGITIS: ICD-10-CM

## 2023-01-23 DIAGNOSIS — I82.401 RECURRENT DEEP VEIN THROMBOSIS (DVT) OF RIGHT LOWER EXTREMITY (HCC): ICD-10-CM

## 2023-01-23 DIAGNOSIS — R07.9 CHEST PAIN, UNSPECIFIED TYPE: Primary | ICD-10-CM

## 2023-01-23 DIAGNOSIS — C90.00 MULTIPLE MYELOMA NOT HAVING ACHIEVED REMISSION (HCC): ICD-10-CM

## 2023-01-23 DIAGNOSIS — N18.31 STAGE 3A CHRONIC KIDNEY DISEASE (HCC): ICD-10-CM

## 2023-01-23 DIAGNOSIS — E78.2 MIXED HYPERLIPIDEMIA: ICD-10-CM

## 2023-01-23 DIAGNOSIS — F32.1 CURRENT MODERATE EPISODE OF MAJOR DEPRESSIVE DISORDER WITHOUT PRIOR EPISODE (HCC): ICD-10-CM

## 2023-01-23 DIAGNOSIS — C79.51 CANCER, METASTATIC TO BONE (HCC): ICD-10-CM

## 2023-01-23 PROCEDURE — 99214 OFFICE O/P EST MOD 30 MIN: CPT | Performed by: STUDENT IN AN ORGANIZED HEALTH CARE EDUCATION/TRAINING PROGRAM

## 2023-01-23 PROCEDURE — 1036F TOBACCO NON-USER: CPT | Performed by: STUDENT IN AN ORGANIZED HEALTH CARE EDUCATION/TRAINING PROGRAM

## 2023-01-23 PROCEDURE — 3074F SYST BP LT 130 MM HG: CPT | Performed by: STUDENT IN AN ORGANIZED HEALTH CARE EDUCATION/TRAINING PROGRAM

## 2023-01-23 PROCEDURE — G8420 CALC BMI NORM PARAMETERS: HCPCS | Performed by: STUDENT IN AN ORGANIZED HEALTH CARE EDUCATION/TRAINING PROGRAM

## 2023-01-23 PROCEDURE — G8484 FLU IMMUNIZE NO ADMIN: HCPCS | Performed by: STUDENT IN AN ORGANIZED HEALTH CARE EDUCATION/TRAINING PROGRAM

## 2023-01-23 PROCEDURE — 3078F DIAST BP <80 MM HG: CPT | Performed by: STUDENT IN AN ORGANIZED HEALTH CARE EDUCATION/TRAINING PROGRAM

## 2023-01-23 PROCEDURE — G8427 DOCREV CUR MEDS BY ELIG CLIN: HCPCS | Performed by: STUDENT IN AN ORGANIZED HEALTH CARE EDUCATION/TRAINING PROGRAM

## 2023-01-23 PROCEDURE — 1123F ACP DISCUSS/DSCN MKR DOCD: CPT | Performed by: STUDENT IN AN ORGANIZED HEALTH CARE EDUCATION/TRAINING PROGRAM

## 2023-01-23 RX ORDER — FENOFIBRATE 54 MG/1
54 TABLET ORAL DAILY
Qty: 90 TABLET | Refills: 1 | Status: SHIPPED | OUTPATIENT
Start: 2023-01-23

## 2023-01-23 RX ORDER — ROSUVASTATIN CALCIUM 20 MG/1
TABLET, COATED ORAL
Qty: 90 TABLET | Refills: 1 | Status: SHIPPED | OUTPATIENT
Start: 2023-01-23

## 2023-01-23 RX ORDER — HYDROXYZINE HYDROCHLORIDE 25 MG/1
25 TABLET, FILM COATED ORAL EVERY 8 HOURS PRN
Qty: 30 TABLET | Refills: 0 | Status: SHIPPED | OUTPATIENT
Start: 2023-01-23 | End: 2023-02-02

## 2023-01-23 ASSESSMENT — ANXIETY QUESTIONNAIRES
3. WORRYING TOO MUCH ABOUT DIFFERENT THINGS: 3
2. NOT BEING ABLE TO STOP OR CONTROL WORRYING: 3
6. BECOMING EASILY ANNOYED OR IRRITABLE: 3
7. FEELING AFRAID AS IF SOMETHING AWFUL MIGHT HAPPEN: 3
GAD7 TOTAL SCORE: 21
IF YOU CHECKED OFF ANY PROBLEMS ON THIS QUESTIONNAIRE, HOW DIFFICULT HAVE THESE PROBLEMS MADE IT FOR YOU TO DO YOUR WORK, TAKE CARE OF THINGS AT HOME, OR GET ALONG WITH OTHER PEOPLE: NOT DIFFICULT AT ALL
1. FEELING NERVOUS, ANXIOUS, OR ON EDGE: 3
4. TROUBLE RELAXING: 3
5. BEING SO RESTLESS THAT IT IS HARD TO SIT STILL: 3

## 2023-01-23 ASSESSMENT — PATIENT HEALTH QUESTIONNAIRE - PHQ9
4. FEELING TIRED OR HAVING LITTLE ENERGY: 3
SUM OF ALL RESPONSES TO PHQ QUESTIONS 1-9: 18
6. FEELING BAD ABOUT YOURSELF - OR THAT YOU ARE A FAILURE OR HAVE LET YOURSELF OR YOUR FAMILY DOWN: 0
2. FEELING DOWN, DEPRESSED OR HOPELESS: 3
10. IF YOU CHECKED OFF ANY PROBLEMS, HOW DIFFICULT HAVE THESE PROBLEMS MADE IT FOR YOU TO DO YOUR WORK, TAKE CARE OF THINGS AT HOME, OR GET ALONG WITH OTHER PEOPLE: 0
SUM OF ALL RESPONSES TO PHQ QUESTIONS 1-9: 18
5. POOR APPETITE OR OVEREATING: 3
1. LITTLE INTEREST OR PLEASURE IN DOING THINGS: 3
SUM OF ALL RESPONSES TO PHQ QUESTIONS 1-9: 18
9. THOUGHTS THAT YOU WOULD BE BETTER OFF DEAD, OR OF HURTING YOURSELF: 0
SUM OF ALL RESPONSES TO PHQ9 QUESTIONS 1 & 2: 6
7. TROUBLE CONCENTRATING ON THINGS, SUCH AS READING THE NEWSPAPER OR WATCHING TELEVISION: 0
8. MOVING OR SPEAKING SO SLOWLY THAT OTHER PEOPLE COULD HAVE NOTICED. OR THE OPPOSITE, BEING SO FIGETY OR RESTLESS THAT YOU HAVE BEEN MOVING AROUND A LOT MORE THAN USUAL: 3
3. TROUBLE FALLING OR STAYING ASLEEP: 3
SUM OF ALL RESPONSES TO PHQ QUESTIONS 1-9: 18

## 2023-01-23 NOTE — PROGRESS NOTES
Patient: Joshua Hartman is a 77 y.o. male who presents today with the following Chief Complaint(s):  Chief Complaint   Patient presents with    Follow-up     Chest pain,          HPI    Reports that chest pain came on and lasted 24 hours. Reports that pain started while watching television. Started as a tightness/pressure sensation on middle of chest. No change in quality or intensity as this continued. Reports that he took 2 hydroxyzine which improved pain significantly. Does feel some abnormal sensation in chest when he wakes up (increased difficulty inhaling)    NO associated sweating. Very slight shortness of breath.     Symptoms have resolved since discharge.     Does take ativan QID. Has been on medication 30 years.     Current Outpatient Medications   Medication Sig Dispense Refill    hydrOXYzine HCl (ATARAX) 25 MG tablet Take 1 tablet by mouth every 8 hours as needed for Anxiety 30 tablet 0    rosuvastatin (CRESTOR) 20 MG tablet TAKE ONE TABLET BY MOUTH DAILY 90 tablet 1    fenofibrate (TRICOR) 54 MG tablet Take 1 tablet by mouth daily s [facility] pharmacy: Please dispense generic fenofibrate unless prescriber denote 90 tablet 1    cholestyramine (QUESTRAN) 4 GM/DOSE powder Take 1 packet by mouth 3 times daily (with meals) 378 g 5    OLANZapine (ZYPREXA) 5 MG tablet Take 5 mg by mouth nightly      bortezomib 5 mg/2 mLs in sodium chloride (PF) injection Inject 1.3 mg/m2 into the skin once      cyclophosphamide (CYTOXAN) 25 MG CAPS capsule Take by mouth daily      dexamethasone (DECADRON) 1 MG tablet Take 1 mg by mouth 2 times daily (with meals)      sertraline (ZOLOFT) 50 MG tablet Take 1.5 tablets by mouth daily 135 tablet 1    LORazepam (ATIVAN) 1 MG tablet Take 1 mg by mouth 4 times daily as needed.      dronabinol (MARINOL) 5 MG capsule       oxyCODONE (ROXICODONE) 5 MG immediate release tablet Take 5 mg by mouth every 4 hours as needed for Pain.      zolpidem (AMBIEN) 10 MG tablet TAKE ONE TABLET BY MOUTH ONCE  NIGHTLY AS NEEDED FOR SLEEP 30 tablet 5    pantoprazole (PROTONIX) 40 MG tablet Take 1 tablet by mouth daily 30 tablet 5    apixaban (ELIQUIS) 5 MG TABS tablet TAKE 1 TABLET BY MOUTH TWO  TIMES DAILY 180 tablet 3    ondansetron (ZOFRAN-ODT) 8 MG TBDP disintegrating tablet DISSOLVE ONE TABLET BY MOUTH EVERY 8 HOURS AS NEEDED FOR NAUSEA OR VOMITING 60 tablet 0     No current facility-administered medications for this visit. Patient's past medical history, surgical history, family history, medications,  andallergies  were all reviewed and updated as appropriate today. Review of Systems  All other systems reviewed and negative    Physical Exam  Vitals reviewed. Constitutional:       Appearance: Normal appearance. HENT:      Head: Normocephalic and atraumatic. Cardiovascular:      Rate and Rhythm: Normal rate and regular rhythm. Pulmonary:      Effort: Pulmonary effort is normal.      Breath sounds: Normal breath sounds. Neurological:      General: No focal deficit present. Mental Status: He is alert and oriented to person, place, and time. Psychiatric:         Behavior: Behavior normal.         Thought Content: Thought content normal.     Vitals:    01/23/23 1139   BP: 114/68   Pulse: (!) 104   SpO2: 98%       Assessment:  Encounter Diagnoses   Name Primary? Chest pain, unspecified type Yes    Cancer, metastatic to bone (HCC)     Current moderate episode of major depressive disorder without prior episode ([facility]     Recurrent deep vein thrombosis (DVT) of right lower extremity (HCC)     Stage 3a chronic kidney disease (HCC)     Gastroesophageal reflux disease without esophagitis     Mixed hyperlipidemia        Plan:  1. Chest pain, unspecified type  Was seen in ED 4 days ago with normal cardiac workup. Description of symptoms seems non cardiac.  Discussed red flag symptoms that would prompt further evaluation and possible stress test. On eliquis with normal respiratory status making PE unlikely. 2. Cancer, metastatic to bone [facility])  Multiple Myeloma  Continues to follow with Dr. Calvin Fox. 3. Current moderate episode of major depressive disorder without prior episode ([address]  On Zoloft, zyprexa    4. Recurrent deep vein thrombosis (DVT) of right lower extremity (HCC)  On Eliquis    5. Stage 3a chronic kidney disease (HCC)  Kidney function is stable with GFR 36 on recent check    6. Gastroesophageal reflux disease without esophagitis  - hydrOXYzine HCl (ATARAX) 25 MG tablet; Take 1 tablet by mouth every 8 hours as needed for Anxiety  Dispense: 30 tablet; Refill: 0    7. Mixed hyperlipidemia  - rosuvastatin (CRESTOR) 20 MG tablet; TAKE ONE TABLET BY MOUTH DAILY  Dispense: 90 tablet; Refill: 1  - fenofibrate (TRICOR) 54 MG tablet; Take 1 tablet by mouth daily [facility]: Please dispense generic fenofibrate unless prescriber denote  Dispense: 90 tablet; Refill: 1      No follow-ups on file.

## 2023-01-25 DIAGNOSIS — R11.0 DAILY NAUSEA: ICD-10-CM

## 2023-01-25 RX ORDER — ONDANSETRON 8 MG/1
TABLET, ORALLY DISINTEGRATING ORAL
Qty: 60 TABLET | Refills: 0 | Status: SHIPPED | OUTPATIENT
Start: 2023-01-25

## 2023-01-25 NOTE — TELEPHONE ENCOUNTER
Refill Request     CONFIRM preferrred pharmacy with the patient. If Mail Order Rx - Pend for 90 day refill. Last Seen: Last Seen Department: 1/23/2023  Last Seen by PCP: 1/23/2023    Last Written: 1/4/2023    If no future appointment scheduled, route STAFF MESSAGE with patient name to the [facility] for scheduling. Next Appointment:   Future Appointments   Date Time Provider Austin Colunga   6/22/2023  9:30 AM DO FRED Ansari - [facility]       Message sent to  to schedule appt with patient?   NO      Requested Prescriptions     Pending Prescriptions Disp Refills    ondansetron (ZOFRAN-ODT) 8 MG TBDP disintegrating tablet [Pharmacy Med Name: ONDANSETRON ODT 8 MG TABLET] 60 tablet 0     Sig: DISSOLVE ONE TABLET BY MOUTH EVERY 8 HOURS AS NEEDED FOR NAUSEA OR VOMITING

## 2023-02-06 ENCOUNTER — HOSPITAL ENCOUNTER (OUTPATIENT)
Dept: ONCOLOGY | Age: 78
Setting detail: INFUSION SERIES
Discharge: HOME | End: 2023-02-06
Payer: MEDICARE

## 2023-02-06 ENCOUNTER — HOSPITAL ENCOUNTER (OUTPATIENT)
Age: 78
Discharge: HOME | End: 2023-02-06
Attending: INTERNAL MEDICINE | Admitting: INTERNAL MEDICINE
Payer: MEDICARE

## 2023-02-06 VITALS
HEART RATE: 76 BPM | OXYGEN SATURATION: 95 % | RESPIRATION RATE: 16 BRPM | TEMPERATURE: 98.5 F | SYSTOLIC BLOOD PRESSURE: 106 MMHG | DIASTOLIC BLOOD PRESSURE: 63 MMHG

## 2023-02-06 DIAGNOSIS — C90.00 MULTIPLE MYELOMA NOT HAVING ACHIEVED REMISSION (HCC): Primary | ICD-10-CM

## 2023-02-06 LAB
ABO/RH: NORMAL
ANTIBODY SCREEN: NORMAL
BLOOD BANK DISPENSE STATUS: NORMAL
BLOOD BANK DISPENSE STATUS: NORMAL
BLOOD BANK PRODUCT CODE: NORMAL
BLOOD BANK PRODUCT CODE: NORMAL
BPU ID: NORMAL
BPU ID: NORMAL
DESCRIPTION BLOOD BANK: NORMAL
DESCRIPTION BLOOD BANK: NORMAL

## 2023-02-06 PROCEDURE — 86900 BLOOD TYPING SEROLOGIC ABO: CPT

## 2023-02-06 PROCEDURE — 86850 RBC ANTIBODY SCREEN: CPT

## 2023-02-06 PROCEDURE — G0378 HOSPITAL OBSERVATION PER HR: HCPCS

## 2023-02-06 PROCEDURE — G0379 DIRECT REFER HOSPITAL OBSERV: HCPCS

## 2023-02-06 PROCEDURE — 36430 TRANSFUSION BLD/BLD COMPNT: CPT

## 2023-02-06 PROCEDURE — P9036 PLATELET PHERESIS IRRADIATED: HCPCS

## 2023-02-06 PROCEDURE — 86901 BLOOD TYPING SEROLOGIC RH(D): CPT

## 2023-02-06 PROCEDURE — 6370000000 HC RX 637 (ALT 250 FOR IP)

## 2023-02-06 RX ORDER — DIPHENHYDRAMINE HYDROCHLORIDE 50 MG/ML
50 INJECTION INTRAMUSCULAR; INTRAVENOUS
Status: DISCONTINUED | OUTPATIENT
Start: 2023-02-06 | End: 2023-02-07 | Stop reason: HOSPADM

## 2023-02-06 RX ORDER — SODIUM CHLORIDE 9 MG/ML
20 INJECTION, SOLUTION INTRAVENOUS CONTINUOUS
OUTPATIENT
Start: 2023-02-06

## 2023-02-06 RX ORDER — ALBUTEROL SULFATE 90 UG/1
4 AEROSOL, METERED RESPIRATORY (INHALATION) PRN
Status: DISCONTINUED | OUTPATIENT
Start: 2023-02-06 | End: 2023-02-07 | Stop reason: HOSPADM

## 2023-02-06 RX ORDER — ACETAMINOPHEN 325 MG/1
650 TABLET ORAL ONCE
Status: CANCELLED | OUTPATIENT
Start: 2023-02-06 | End: 2023-02-06

## 2023-02-06 RX ORDER — DIPHENHYDRAMINE HYDROCHLORIDE 50 MG/ML
50 INJECTION INTRAMUSCULAR; INTRAVENOUS
OUTPATIENT
Start: 2023-02-06

## 2023-02-06 RX ORDER — SODIUM CHLORIDE 9 MG/ML
25 INJECTION, SOLUTION INTRAVENOUS PRN
OUTPATIENT
Start: 2023-02-06

## 2023-02-06 RX ORDER — SODIUM CHLORIDE 0.9 % (FLUSH) 0.9 %
5-40 SYRINGE (ML) INJECTION PRN
OUTPATIENT
Start: 2023-02-06

## 2023-02-06 RX ORDER — ACETAMINOPHEN 325 MG/1
650 TABLET ORAL
OUTPATIENT
Start: 2023-02-06

## 2023-02-06 RX ORDER — SODIUM CHLORIDE 9 MG/ML
INJECTION, SOLUTION INTRAVENOUS CONTINUOUS
Status: DISCONTINUED | OUTPATIENT
Start: 2023-02-06 | End: 2023-02-07 | Stop reason: HOSPADM

## 2023-02-06 RX ORDER — ACETAMINOPHEN 325 MG/1
650 TABLET ORAL ONCE
Status: COMPLETED | OUTPATIENT
Start: 2023-02-06 | End: 2023-02-06

## 2023-02-06 RX ORDER — ONDANSETRON 2 MG/ML
8 INJECTION INTRAMUSCULAR; INTRAVENOUS
Status: DISCONTINUED | OUTPATIENT
Start: 2023-02-06 | End: 2023-02-07 | Stop reason: HOSPADM

## 2023-02-06 RX ORDER — ONDANSETRON 2 MG/ML
8 INJECTION INTRAMUSCULAR; INTRAVENOUS
OUTPATIENT
Start: 2023-02-06

## 2023-02-06 RX ORDER — ACETAMINOPHEN 325 MG/1
650 TABLET ORAL
Status: DISCONTINUED | OUTPATIENT
Start: 2023-02-06 | End: 2023-02-07 | Stop reason: HOSPADM

## 2023-02-06 RX ORDER — DIPHENHYDRAMINE HCL 25 MG
25 TABLET ORAL ONCE
Status: COMPLETED | OUTPATIENT
Start: 2023-02-06 | End: 2023-02-06

## 2023-02-06 RX ORDER — ALBUTEROL SULFATE 90 UG/1
4 AEROSOL, METERED RESPIRATORY (INHALATION) PRN
OUTPATIENT
Start: 2023-02-06

## 2023-02-06 RX ORDER — SODIUM CHLORIDE 9 MG/ML
INJECTION, SOLUTION INTRAVENOUS CONTINUOUS
OUTPATIENT
Start: 2023-02-06

## 2023-02-06 RX ORDER — DIPHENHYDRAMINE HCL 25 MG
25 TABLET ORAL ONCE
Status: CANCELLED | OUTPATIENT
Start: 2023-02-06 | End: 2023-02-06

## 2023-02-06 RX ADMIN — DIPHENHYDRAMINE HCL 25 MG: 25 TABLET ORAL at 14:41

## 2023-02-06 RX ADMIN — ACETAMINOPHEN 650 MG: 325 TABLET ORAL at 14:40

## 2023-02-06 NOTE — PROGRESS NOTES
Patient's hemoglobin this AM: 11.4  Patient's platelet count this AM: 35   Pt seen at [facility] today for  Platelet transfusion  for above lab values and nose bleed. Informed consent verified. Pre-medications administered as ordered. Transfused per policy. Pt tolerated transfusion well and without incident. Pt verbalizes understanding of discharge instructions. Discharged to home with family.

## 2023-02-10 ENCOUNTER — TELEPHONE (OUTPATIENT)
Dept: FAMILY MEDICINE CLINIC | Age: 78
End: 2023-02-10

## 2023-02-10 NOTE — TELEPHONE ENCOUNTER
Patient: Van Bamberger Patient : 1945      Patient call back number- [phone]     Spoke with: PT    Symptom(s) patient is experiencing- X3 days ago blood on pillow, now facial swelling and lip, cheek. Swelling. Symptom onset has been constant for a time period of x3 dyas day(s). Severity is described as no. Course of his symptoms over time is minimal. Chapped lips      Does anything make the symptom(s) better or worse?- no    Have you experienced this before?-no    Any pertinent medical history? Have you taken anything (prescriptions or OTC) to help with symptom(s)?- no   - If YES, did it help?- No      Is patient having pain? Yes   Location of the pain- chapped lips  Describe the pain (sharp, stabbing, aching, burning, dull, etc)-- aching  Constant or intermittent- constant  Rate pain on a scale of 1 to 10: 4  Does is it radiate to other areas or just in one spot?- nonradiating      Call Outcome/Determination of next steps for patient- Appointment Scheduled with 2023  as advised by provider    Advised to call back directly if there are further questions, or if these symptoms fail to improve as anticipated or worsen.       Plan of Care reviewed and discussed with PCP: Yes       Electronically signed by Aaron Hernandes LPN on  at 0:36 PM

## 2023-02-12 SDOH — ECONOMIC STABILITY: TRANSPORTATION INSECURITY
IN THE PAST 12 MONTHS, HAS LACK OF TRANSPORTATION KEPT YOU FROM MEETINGS, WORK, OR FROM GETTING THINGS NEEDED FOR DAILY LIVING?: NO

## 2023-02-12 SDOH — ECONOMIC STABILITY: HOUSING INSECURITY
IN THE LAST 12 MONTHS, WAS THERE A TIME WHEN YOU DID NOT HAVE A STEADY PLACE TO SLEEP OR SLEPT IN A SHELTER (INCLUDING NOW)?: NO

## 2023-02-12 SDOH — ECONOMIC STABILITY: INCOME INSECURITY: HOW HARD IS IT FOR YOU TO PAY FOR THE VERY BASICS LIKE FOOD, HOUSING, MEDICAL CARE, AND HEATING?: NOT HARD AT ALL

## 2023-02-12 SDOH — ECONOMIC STABILITY: FOOD INSECURITY: WITHIN THE PAST 12 MONTHS, YOU WORRIED THAT YOUR FOOD WOULD RUN OUT BEFORE YOU GOT MONEY TO BUY MORE.: NEVER TRUE

## 2023-02-12 SDOH — ECONOMIC STABILITY: FOOD INSECURITY: WITHIN THE PAST 12 MONTHS, THE FOOD YOU BOUGHT JUST DIDN'T LAST AND YOU DIDN'T HAVE MONEY TO GET MORE.: NEVER TRUE

## 2023-02-13 ENCOUNTER — TELEPHONE (OUTPATIENT)
Dept: FAMILY MEDICINE CLINIC | Age: 78
End: 2023-02-13

## 2023-02-13 ENCOUNTER — APPOINTMENT (OUTPATIENT)
Dept: GENERAL RADIOLOGY | Age: 78
DRG: 841 | End: 2023-02-13
Payer: MEDICARE

## 2023-02-13 ENCOUNTER — OFFICE VISIT (OUTPATIENT)
Dept: FAMILY MEDICINE CLINIC | Age: 78
End: 2023-02-13
Payer: MEDICARE

## 2023-02-13 ENCOUNTER — HOSPITAL ENCOUNTER (INPATIENT)
Age: 78
LOS: 8 days | Discharge: HOME HEALTH | DRG: 841 | End: 2023-02-21
Attending: EMERGENCY MEDICINE | Admitting: INTERNAL MEDICINE
Payer: MEDICARE

## 2023-02-13 VITALS
WEIGHT: 163 LBS | BODY MASS INDEX: 23.73 KG/M2 | RESPIRATION RATE: 18 BRPM | TEMPERATURE: 97.7 F | OXYGEN SATURATION: 95 % | SYSTOLIC BLOOD PRESSURE: 102 MMHG | HEART RATE: 111 BPM | DIASTOLIC BLOOD PRESSURE: 60 MMHG

## 2023-02-13 DIAGNOSIS — D69.6 THROMBOCYTOPENIA (HCC): ICD-10-CM

## 2023-02-13 DIAGNOSIS — D72.822: ICD-10-CM

## 2023-02-13 DIAGNOSIS — I95.1 ORTHOSTATIC HYPOTENSION: ICD-10-CM

## 2023-02-13 DIAGNOSIS — R53.1 WEAKNESS: Primary | ICD-10-CM

## 2023-02-13 DIAGNOSIS — N18.9 ACUTE RENAL FAILURE SUPERIMPOSED ON CHRONIC KIDNEY DISEASE, UNSPECIFIED CKD STAGE, UNSPECIFIED ACUTE RENAL FAILURE TYPE (HCC): ICD-10-CM

## 2023-02-13 DIAGNOSIS — R53.83 OTHER FATIGUE: Primary | ICD-10-CM

## 2023-02-13 DIAGNOSIS — N17.9 ACUTE RENAL FAILURE SUPERIMPOSED ON CHRONIC KIDNEY DISEASE, UNSPECIFIED CKD STAGE, UNSPECIFIED ACUTE RENAL FAILURE TYPE (HCC): ICD-10-CM

## 2023-02-13 PROBLEM — R53.81 MALAISE AND FATIGUE: Status: ACTIVE | Noted: 2023-02-13

## 2023-02-13 LAB
A/G RATIO: 2 (ref 1.1–2.2)
ABO/RH: NORMAL
ALBUMIN SERPL-MCNC: 4.5 G/DL (ref 3.4–5)
ALP BLD-CCNC: 74 U/L (ref 40–129)
ALT SERPL-CCNC: 16 U/L (ref 10–40)
ANION GAP SERPL CALCULATED.3IONS-SCNC: 15 MMOL/L (ref 3–16)
ANISOCYTOSIS: ABNORMAL
ANTIBODY SCREEN: NORMAL
AST SERPL-CCNC: 33 U/L (ref 15–37)
ATYPICAL LYMPHOCYTE RELATIVE PERCENT: 22 % (ref 0–6)
BANDED NEUTROPHILS RELATIVE PERCENT: 9 % (ref 0–7)
BASOPHILS ABSOLUTE: 0 K/UL (ref 0–0.2)
BASOPHILS RELATIVE PERCENT: 0 %
BILIRUB SERPL-MCNC: 0.5 MG/DL (ref 0–1)
BLOOD BANK DISPENSE STATUS: NORMAL
BLOOD BANK PRODUCT CODE: NORMAL
BPU ID: NORMAL
BUN BLDV-MCNC: 39 MG/DL (ref 7–20)
CALCIUM SERPL-MCNC: 9.4 MG/DL (ref 8.3–10.6)
CHLORIDE BLD-SCNC: 106 MMOL/L (ref 99–110)
CO2: 20 MMOL/L (ref 21–32)
CREAT SERPL-MCNC: 2.9 MG/DL (ref 0.8–1.3)
DESCRIPTION BLOOD BANK: NORMAL
EKG ATRIAL RATE: 91 BPM
EKG DIAGNOSIS: NORMAL
EKG P AXIS: 11 DEGREES
EKG P-R INTERVAL: 148 MS
EKG Q-T INTERVAL: 362 MS
EKG QRS DURATION: 106 MS
EKG QTC CALCULATION (BAZETT): 445 MS
EKG R AXIS: 3 DEGREES
EKG T AXIS: 28 DEGREES
EKG VENTRICULAR RATE: 91 BPM
EOSINOPHILS ABSOLUTE: 0.5 K/UL (ref 0–0.6)
EOSINOPHILS RELATIVE PERCENT: 3 %
GFR SERPL CREATININE-BSD FRML MDRD: 21 ML/MIN/{1.73_M2}
GLUCOSE BLD-MCNC: 128 MG/DL (ref 70–99)
HCT VFR BLD CALC: 31.1 % (ref 40.5–52.5)
HCT VFR BLD CALC: 34 % (ref 40.5–52.5)
HEMATOLOGY PATH CONSULT: YES
HEMOGLOBIN: 10 G/DL (ref 13.5–17.5)
HEMOGLOBIN: 11.2 G/DL (ref 13.5–17.5)
INFLUENZA A: NOT DETECTED
INFLUENZA B: NOT DETECTED
LACTIC ACID, SEPSIS: 1.1 MMOL/L (ref 0.4–1.9)
LYMPHOCYTES ABSOLUTE: 5 K/UL (ref 1–5.1)
LYMPHOCYTES RELATIVE PERCENT: 8 %
MACROCYTES: ABNORMAL
MCH RBC QN AUTO: 32.1 PG (ref 26–34)
MCH RBC QN AUTO: 32.3 PG (ref 26–34)
MCHC RBC AUTO-ENTMCNC: 32 G/DL (ref 31–36)
MCHC RBC AUTO-ENTMCNC: 32.8 G/DL (ref 31–36)
MCV RBC AUTO: 100.7 FL (ref 80–100)
MCV RBC AUTO: 97.9 FL (ref 80–100)
METAMYELOCYTES RELATIVE PERCENT: 2 %
MONOCYTES ABSOLUTE: 1.2 K/UL (ref 0–1.3)
MONOCYTES RELATIVE PERCENT: 7 %
MYELOCYTE PERCENT: 1 %
NEUTROPHILS ABSOLUTE: 3.5 K/UL (ref 1.7–7.7)
NEUTROPHILS RELATIVE PERCENT: 9 %
PDW BLD-RTO: 15.4 % (ref 12.4–15.4)
PDW BLD-RTO: 15.5 % (ref 12.4–15.4)
PLASMA CELLS PERCENT: 39 %
PLATELET # BLD: 10 K/UL (ref 135–450)
PLATELET # BLD: 10 K/UL (ref 135–450)
PLATELET SLIDE REVIEW: ABNORMAL
PMV BLD AUTO: 10 FL (ref 5–10.5)
PMV BLD AUTO: 8.1 FL (ref 5–10.5)
POTASSIUM SERPL-SCNC: 4.6 MMOL/L (ref 3.5–5.1)
PROCALCITONIN: 0.56 NG/ML (ref 0–0.15)
RBC # BLD: 3.09 M/UL (ref 4.2–5.9)
RBC # BLD: 3.48 M/UL (ref 4.2–5.9)
SARS-COV-2 RNA, RT PCR: NOT DETECTED
SLIDE REVIEW: ABNORMAL
SODIUM BLD-SCNC: 141 MMOL/L (ref 136–145)
TOTAL PROTEIN: 6.7 G/DL (ref 6.4–8.2)
TROPONIN: <0.01 NG/ML
WBC # BLD: 14.9 K/UL (ref 4–11)
WBC # BLD: 16.6 K/UL (ref 4–11)

## 2023-02-13 PROCEDURE — 1200000000 HC SEMI PRIVATE

## 2023-02-13 PROCEDURE — 86850 RBC ANTIBODY SCREEN: CPT

## 2023-02-13 PROCEDURE — 2580000003 HC RX 258: Performed by: INTERNAL MEDICINE

## 2023-02-13 PROCEDURE — 1123F ACP DISCUSS/DSCN MKR DOCD: CPT | Performed by: NURSE PRACTITIONER

## 2023-02-13 PROCEDURE — 87040 BLOOD CULTURE FOR BACTERIA: CPT

## 2023-02-13 PROCEDURE — 1036F TOBACCO NON-USER: CPT | Performed by: NURSE PRACTITIONER

## 2023-02-13 PROCEDURE — 85025 COMPLETE CBC W/AUTO DIFF WBC: CPT

## 2023-02-13 PROCEDURE — 86900 BLOOD TYPING SEROLOGIC ABO: CPT

## 2023-02-13 PROCEDURE — 84145 PROCALCITONIN (PCT): CPT

## 2023-02-13 PROCEDURE — P9035 PLATELET PHERES LEUKOREDUCED: HCPCS

## 2023-02-13 PROCEDURE — 93010 ELECTROCARDIOGRAM REPORT: CPT | Performed by: INTERNAL MEDICINE

## 2023-02-13 PROCEDURE — 85027 COMPLETE CBC AUTOMATED: CPT

## 2023-02-13 PROCEDURE — 71045 X-RAY EXAM CHEST 1 VIEW: CPT

## 2023-02-13 PROCEDURE — G8484 FLU IMMUNIZE NO ADMIN: HCPCS | Performed by: NURSE PRACTITIONER

## 2023-02-13 PROCEDURE — 80053 COMPREHEN METABOLIC PANEL: CPT

## 2023-02-13 PROCEDURE — G8420 CALC BMI NORM PARAMETERS: HCPCS | Performed by: NURSE PRACTITIONER

## 2023-02-13 PROCEDURE — G8427 DOCREV CUR MEDS BY ELIG CLIN: HCPCS | Performed by: NURSE PRACTITIONER

## 2023-02-13 PROCEDURE — 36415 COLL VENOUS BLD VENIPUNCTURE: CPT

## 2023-02-13 PROCEDURE — 99285 EMERGENCY DEPT VISIT HI MDM: CPT

## 2023-02-13 PROCEDURE — 36430 TRANSFUSION BLD/BLD COMPNT: CPT

## 2023-02-13 PROCEDURE — 93005 ELECTROCARDIOGRAM TRACING: CPT | Performed by: EMERGENCY MEDICINE

## 2023-02-13 PROCEDURE — 99215 OFFICE O/P EST HI 40 MIN: CPT | Performed by: NURSE PRACTITIONER

## 2023-02-13 PROCEDURE — 83605 ASSAY OF LACTIC ACID: CPT

## 2023-02-13 PROCEDURE — 96360 HYDRATION IV INFUSION INIT: CPT

## 2023-02-13 PROCEDURE — 86901 BLOOD TYPING SEROLOGIC RH(D): CPT

## 2023-02-13 PROCEDURE — 87636 SARSCOV2 & INF A&B AMP PRB: CPT

## 2023-02-13 PROCEDURE — 2580000003 HC RX 258: Performed by: EMERGENCY MEDICINE

## 2023-02-13 PROCEDURE — 6370000000 HC RX 637 (ALT 250 FOR IP): Performed by: INTERNAL MEDICINE

## 2023-02-13 PROCEDURE — 3078F DIAST BP <80 MM HG: CPT | Performed by: NURSE PRACTITIONER

## 2023-02-13 PROCEDURE — 84484 ASSAY OF TROPONIN QUANT: CPT

## 2023-02-13 PROCEDURE — 3074F SYST BP LT 130 MM HG: CPT | Performed by: NURSE PRACTITIONER

## 2023-02-13 PROCEDURE — 6370000000 HC RX 637 (ALT 250 FOR IP): Performed by: EMERGENCY MEDICINE

## 2023-02-13 RX ORDER — CHOLESTYRAMINE 4 G/9G
4 POWDER, FOR SUSPENSION ORAL
Status: DISCONTINUED | OUTPATIENT
Start: 2023-02-13 | End: 2023-02-21 | Stop reason: HOSPADM

## 2023-02-13 RX ORDER — LORAZEPAM 1 MG/1
1 TABLET ORAL ONCE
Status: COMPLETED | OUTPATIENT
Start: 2023-02-13 | End: 2023-02-13

## 2023-02-13 RX ORDER — ACETAMINOPHEN 650 MG/1
650 SUPPOSITORY RECTAL EVERY 6 HOURS PRN
Status: DISCONTINUED | OUTPATIENT
Start: 2023-02-13 | End: 2023-02-21 | Stop reason: HOSPADM

## 2023-02-13 RX ORDER — SODIUM CHLORIDE 9 MG/ML
INJECTION, SOLUTION INTRAVENOUS PRN
Status: DISCONTINUED | OUTPATIENT
Start: 2023-02-13 | End: 2023-02-17 | Stop reason: SDUPTHER

## 2023-02-13 RX ORDER — ONDANSETRON 2 MG/ML
4 INJECTION INTRAMUSCULAR; INTRAVENOUS EVERY 6 HOURS PRN
Status: DISCONTINUED | OUTPATIENT
Start: 2023-02-13 | End: 2023-02-21 | Stop reason: HOSPADM

## 2023-02-13 RX ORDER — POLYETHYLENE GLYCOL 3350 17 G/17G
17 POWDER, FOR SOLUTION ORAL DAILY PRN
Status: DISCONTINUED | OUTPATIENT
Start: 2023-02-13 | End: 2023-02-21 | Stop reason: HOSPADM

## 2023-02-13 RX ORDER — PANTOPRAZOLE SODIUM 40 MG/1
40 TABLET, DELAYED RELEASE ORAL DAILY
Status: DISCONTINUED | OUTPATIENT
Start: 2023-02-13 | End: 2023-02-21 | Stop reason: HOSPADM

## 2023-02-13 RX ORDER — ONDANSETRON 4 MG/1
4 TABLET, ORALLY DISINTEGRATING ORAL EVERY 8 HOURS PRN
Status: DISCONTINUED | OUTPATIENT
Start: 2023-02-13 | End: 2023-02-21 | Stop reason: HOSPADM

## 2023-02-13 RX ORDER — ACETAMINOPHEN 325 MG/1
650 TABLET ORAL EVERY 6 HOURS PRN
Status: DISCONTINUED | OUTPATIENT
Start: 2023-02-13 | End: 2023-02-21 | Stop reason: HOSPADM

## 2023-02-13 RX ORDER — OXYCODONE HYDROCHLORIDE 5 MG/1
5 TABLET ORAL EVERY 6 HOURS PRN
Status: DISCONTINUED | OUTPATIENT
Start: 2023-02-13 | End: 2023-02-21 | Stop reason: HOSPADM

## 2023-02-13 RX ORDER — OLANZAPINE 5 MG/1
5 TABLET ORAL NIGHTLY
Status: DISCONTINUED | OUTPATIENT
Start: 2023-02-13 | End: 2023-02-21 | Stop reason: HOSPADM

## 2023-02-13 RX ORDER — 0.9 % SODIUM CHLORIDE 0.9 %
1000 INTRAVENOUS SOLUTION INTRAVENOUS ONCE
Status: COMPLETED | OUTPATIENT
Start: 2023-02-13 | End: 2023-02-13

## 2023-02-13 RX ORDER — SODIUM CHLORIDE 9 MG/ML
INJECTION, SOLUTION INTRAVENOUS CONTINUOUS
Status: DISCONTINUED | OUTPATIENT
Start: 2023-02-13 | End: 2023-02-14

## 2023-02-13 RX ORDER — SODIUM CHLORIDE 0.9 % (FLUSH) 0.9 %
5-40 SYRINGE (ML) INJECTION PRN
Status: DISCONTINUED | OUTPATIENT
Start: 2023-02-13 | End: 2023-02-21 | Stop reason: HOSPADM

## 2023-02-13 RX ORDER — FENOFIBRATE 54 MG/1
54 TABLET ORAL DAILY
Status: DISCONTINUED | OUTPATIENT
Start: 2023-02-13 | End: 2023-02-13 | Stop reason: ALTCHOICE

## 2023-02-13 RX ORDER — SODIUM CHLORIDE 0.9 % (FLUSH) 0.9 %
5-40 SYRINGE (ML) INJECTION EVERY 12 HOURS SCHEDULED
Status: DISCONTINUED | OUTPATIENT
Start: 2023-02-13 | End: 2023-02-21 | Stop reason: HOSPADM

## 2023-02-13 RX ORDER — SODIUM CHLORIDE 9 MG/ML
INJECTION, SOLUTION INTRAVENOUS PRN
Status: DISCONTINUED | OUTPATIENT
Start: 2023-02-13 | End: 2023-02-21 | Stop reason: HOSPADM

## 2023-02-13 RX ORDER — SODIUM CHLORIDE 9 MG/ML
INJECTION, SOLUTION INTRAVENOUS PRN
Status: CANCELLED | OUTPATIENT
Start: 2023-02-13

## 2023-02-13 RX ADMIN — OLANZAPINE 5 MG: 5 TABLET, FILM COATED ORAL at 22:43

## 2023-02-13 RX ADMIN — SERTRALINE 75 MG: 50 TABLET, FILM COATED ORAL at 22:44

## 2023-02-13 RX ADMIN — CHOLESTYRAMINE 4 G: 4 POWDER, FOR SUSPENSION ORAL at 22:43

## 2023-02-13 RX ADMIN — LORAZEPAM 1 MG: 1 TABLET ORAL at 14:51

## 2023-02-13 RX ADMIN — PANTOPRAZOLE SODIUM 40 MG: 40 TABLET, DELAYED RELEASE ORAL at 22:43

## 2023-02-13 RX ADMIN — SODIUM CHLORIDE 1000 ML: 9 INJECTION, SOLUTION INTRAVENOUS at 13:34

## 2023-02-13 RX ADMIN — SODIUM CHLORIDE: 9 INJECTION, SOLUTION INTRAVENOUS at 22:55

## 2023-02-13 ASSESSMENT — ENCOUNTER SYMPTOMS
VOMITING: 0
NAUSEA: 0
COUGH: 0
SHORTNESS OF BREATH: 0
ABDOMINAL DISTENTION: 0
CHEST TIGHTNESS: 0
BLOOD IN STOOL: 1
CONSTIPATION: 0
ANAL BLEEDING: 0
DIARRHEA: 0
WHEEZING: 0
ABDOMINAL PAIN: 0
EYES NEGATIVE: 1

## 2023-02-13 ASSESSMENT — PAIN - FUNCTIONAL ASSESSMENT: PAIN_FUNCTIONAL_ASSESSMENT: NONE - DENIES PAIN

## 2023-02-13 NOTE — PROGRESS NOTES
2023  Katy Manriquez (: 1945)  65 y.o.    ASSESSMENT and PLAN:  Belle Chiu was seen today for oral swelling. Diagnoses and all orders for this visit:    Weakness  -     Basic Metabolic Panel; Future  -     Magnesium; Future  -     CBC with Auto Differential; Future  -     BLOOD OCCULT STOOL #1; Future  -no active bleeding in oral cavity, blood blisters to lower lip, soft and non-tender on palpation. Gum line inflamed, scant Gingival blood since on gum line, petechiae to chest. Cannot rule out active bleed. -Due to pale appearance and progressive weakness, very low threshold for ER, pt would like to avoid if possible. Our office will notify [facility] stat, and needs to f/u today or tomorrow. -STAT labs to rule out worsening thrombocytopenia, check renal function, r/o electrolyte imbalance. - Pt's wife aware of alarm signs and if any worsening weakness call 911, or go to ER immediately. -reviewed bleeding precautions, reviewed signs and symptoms of bleeding, need to inspect all output if any black tarry appearance or bleeding noted go to ER. Will place orders for hemoccult if labs stable, submit stool sample to rule out blood in stool. -HR elevated 100-118 bpm. Temp wnl. Bp stable during visit checked x2. Increase hydration.   -no current signs of infection but cannot rule out. Pt's wife aware if a temp 100.4 or greater go to ER. -alarm symptoms discussed at length, low threshold for ER. Return if symptoms worsen or fail to improve. HPI  Presenting with originally for lower lip swelling, and also progressive weakness. Onset 1 wk. Hx of MM. Had his first platelet transfusion last  due to platelets being in the 30's per pt. Tolerated transfusion well. Pt felt lip was swollen the day after, and wife noticed swelling to lower lip last Thursday. Denies bleeding from mouth. Denies known injury, or trauma. Denies any trouble breathing or swallowing. Appetite unchanged. No rash or hives.  Denies cp, sob, syncope, palpitations, headache, lightheadedness/dizziness. Has been off the eliquis since 2/6/23. Has been on chemo since April of 2022 for Multiple Myeloma-follows with Dr. Dari Romo. Last chemo treatment around 1/23, on hold. Has noticed some blood when brushing teeth. Denies hematuria. Denies bloody emesis. Pt's wife has noticed a significant increase in weakness the past few days. Pt states he had some bright red blood in stool. Unclear on amount and onset. Denies black tarry stools. Denies any fever, chills, body aches. Mild confusion-has been gradual per wife, no acute change. Appetite not great. Per wife is drinking plenty of water. Review of Systems   Constitutional:  Positive for activity change and fatigue. Negative for appetite change, chills, fever and unexpected weight change. HENT:  Positive for dental problem. Lower lip swelling   Eyes: Negative. Respiratory:  Negative for cough, chest tightness, shortness of breath and wheezing. Cardiovascular:  Negative for chest pain, palpitations and leg swelling. Gastrointestinal:  Positive for blood in stool. Negative for abdominal distention, abdominal pain, anal bleeding, constipation, diarrhea, nausea and vomiting. Genitourinary: Negative. Musculoskeletal: Negative. Skin:  Positive for pallor. Neurological:  Positive for weakness. Negative for dizziness, tremors, syncope, facial asymmetry, speech difficulty, light-headedness, numbness and headaches. Hematological: Negative. Psychiatric/Behavioral: Negative. Allergies, past medical history, family history, and social history reviewed and unchanged from previous encounter.      Current Outpatient Medications   Medication Sig Dispense Refill    ondansetron (ZOFRAN-ODT) 8 MG TBDP disintegrating tablet DISSOLVE ONE TABLET BY MOUTH EVERY 8 HOURS AS NEEDED FOR NAUSEA OR VOMITING 60 tablet 0    rosuvastatin (CRESTOR) 20 MG tablet TAKE ONE TABLET BY MOUTH DAILY 90 tablet 1    fenofibrate (TRICOR) 54 MG tablet Take 1 tablet by mouth daily s [facility]: Please dispense generic fenofibrate unless prescriber denote 90 tablet 1    cholestyramine (QUESTRAN) 4 GM/DOSE powder Take 1 packet by mouth 3 times daily (with meals) 378 g 5    OLANZapine (ZYPREXA) 5 MG tablet Take 5 mg by mouth nightly      bortezomib 5 mg/2 mLs in sodium chloride (PF) injection Inject 1.3 mg/m2 into the skin once      cyclophosphamide (CYTOXAN) 25 MG CAPS capsule Take by mouth daily      dexamethasone (DECADRON) 1 MG tablet Take 1 mg by mouth 2 times daily (with meals)      sertraline (ZOLOFT) 50 MG tablet Take 1.5 tablets by mouth daily 135 tablet 1    LORazepam (ATIVAN) 1 MG tablet Take 1 mg by mouth 4 times daily as needed. dronabinol (MARINOL) 5 MG capsule in the morning and at bedtime. oxyCODONE (ROXICODONE) 5 MG immediate release tablet Take 5 mg by mouth every 4 hours as needed for Pain.      zolpidem (AMBIEN) 10 MG tablet TAKE ONE TABLET BY MOUTH ONCE NIGHTLY AS NEEDED FOR SLEEP 30 tablet 5    pantoprazole (PROTONIX) 40 MG tablet Take 1 tablet by mouth daily 30 tablet 5    apixaban (ELIQUIS) 5 MG TABS tablet TAKE 1 TABLET BY MOUTH TWO  TIMES DAILY 180 tablet 3     No current facility-administered medications for this visit. Vitals:    02/13/23 0845 02/13/23 0936   BP: 110/60 102/60   Site: Right Upper Arm Right Upper Arm   Position: Sitting Sitting   Cuff Size: Medium Adult Medium Adult   Pulse: (!) 115 (!) 111   Resp:  18   Temp: 97.7 °F (36.5 °C) 97.7 °F (36.5 °C)   TempSrc: Oral Oral   SpO2: 96% 95%   Weight: 163 lb (73.9 kg)      Estimated body mass index is 23.73 kg/m² as calculated from the following:    Height as of 1/19/23: 5' 9.5\" (1.765 m). Weight as of this encounter: 163 lb (73.9 kg). Physical Exam  Vitals reviewed. Constitutional:       General: He is not in acute distress. Appearance: Normal appearance. He is ill-appearing.  He is not toxic-appearing or diaphoretic. HENT:      Head: Normocephalic and atraumatic. Nose: Nose normal.      Mouth/Throat:      Lips: Lesions (3 superficial healing abrasions to lower lip. mild swelling. no hematoma.) present. Dentition: Dental tenderness present. Tongue: Tongue does not deviate from midline. Palate: No lesions. Pharynx: Posterior oropharyngeal erythema present. No oropharyngeal exudate. Comments: Gingival bleeding, swelling to lower lip, soft, non-tender, multiple blood blisters on lower lip. No open areas. No purulent drainage. Eyes:      Extraocular Movements: Extraocular movements intact. Conjunctiva/sclera: Conjunctivae normal.      Pupils: Pupils are equal, round, and reactive to light. Neck:      Vascular: No carotid bruit. Cardiovascular:      Rate and Rhythm: Regular rhythm. Tachycardia present. Pulses: Normal pulses. Heart sounds: Normal heart sounds. Pulmonary:      Effort: Pulmonary effort is normal. No respiratory distress. Breath sounds: Normal breath sounds. No wheezing or rhonchi. Chest:      Chest wall: No tenderness. Abdominal:      General: Abdomen is flat. Bowel sounds are normal. There is no distension. Palpations: Abdomen is soft. Tenderness: There is no abdominal tenderness. There is no right CVA tenderness, left CVA tenderness or guarding. Musculoskeletal:         General: No tenderness. Normal range of motion. Cervical back: Normal range of motion and neck supple. No rigidity or tenderness. Right lower leg: No edema. Left lower leg: No edema. Lymphadenopathy:      Cervical: No cervical adenopathy. Skin:     General: Skin is warm and dry. Capillary Refill: Capillary refill takes less than 2 seconds. Coloration: Skin is pale. Findings: Bruising present. Comments: Petechiae on chest   Neurological:      General: No focal deficit present.       Mental Status: He is alert and oriented to person, place, and time. Mental status is at baseline. Motor: Weakness present. Psychiatric:         Mood and Affect: Mood normal.         Behavior: Behavior normal.         Thought Content:  Thought content normal.         Judgment: Judgment normal.

## 2023-02-13 NOTE — TELEPHONE ENCOUNTER
[facility] lab calling resulting patients platelets as 10. Jama Vargas already knows and sent patient to ER.

## 2023-02-13 NOTE — PROGRESS NOTES
Pt arrived from ER. ER nurse informed me they did not look at his skin but they don't think he has any skin breakdown.

## 2023-02-13 NOTE — ED NOTES
1320- Paged Oncology through PS  RE:thrombocytopenia  1350- Repaged Through PS  1428- Paged Dr. Georgiana Adam  [phone]- Dr. Georgiana Adam returned page- transferred to [address] [place] [address]  02/13/23 2438

## 2023-02-13 NOTE — H&P
Hospital Medicine History & Physical      PCP: Carito Jerome DO    Date of Admission: 2/13/2023    Date of Service: Pt seen/examined on 2/13/2023 and Admitted to Inpatient with expected LOS greater than two midnights due to medical therapy. Chief Complaint:  fatigue       History Of Present Illness:    65 y.o. male who [facility] with above c/o . He has H/o M, Myeloma , CKD developed malaise for weeks ago . Its worsened over the time and currently he cannot carry out daily activities as usual . No focal weakness . He has poor appetite . Denies chest pain , cough , fever .  Sob , ab pain , N , V or D    Past Medical History:          Diagnosis Date    Anxiety     Arthritis     Bladder cancer ([address]     DVT (deep venous thrombosis) ([address] 07/01/2014    two spontaneous DVTs left leg    Elevated fasting glucose     Hyperlipidemia     Hypertension     Malignant neoplasm of urinary bladder ([address] 02/04/2019    Multiple myeloma (HCC)     MVP (mitral valve prolapse)     Polyp of gallbladder     Prostate CA ([address] 2005       Past Surgical History:          Procedure Laterality Date    BLADDER SURGERY      tumor removed    CATARACT REMOVAL Bilateral     CHOLECYSTECTOMY, LAPAROSCOPIC N/A 1/10/2022    VIDEO LAPAROSCOPIC CHOLECYSTECTOMY performed by Parisa Holcomb MD at [facility]  6/29/12    Polyps    COLONOSCOPY  07/12/2017    Polyp    COLONOSCOPY N/A 9/6/2022    COLONOSCOPY POLYPECTOMY SNARE/COLD BIOPSY performed by Juan Romero MD at [facility] BONE  3/3/2022    CT BIOPSY PERCUTANEOUS DEEP BONE 3/3/2022 Gian Stevens MD [facility] AT [place] CT SCAN    INGUINAL HERNIA REPAIR Right 06/1999    PENILE PROSTHESIS PLACEMENT      PROSTATECTOMY  2005 approx    Dr. Fernando Nunes      In [facility] - had one tonsil burned out    UPPER GASTROINTESTINAL ENDOSCOPY N/A 9/6/2022    EGD BIOPSY performed by Juan Romero MD at [facility] Prior to Admission:      Prior to Admission medications    Medication Sig Start Date End Date Taking? Authorizing Provider   ondansetron (ZOFRAN-ODT) 8 MG TBDP disintegrating tablet DISSOLVE ONE TABLET BY MOUTH EVERY 8 HOURS AS NEEDED FOR NAUSEA OR VOMITING 1/25/23   Mel Whitten DO   rosuvastatin (CRESTOR) 20 MG tablet TAKE ONE TABLET BY MOUTH DAILY 1/23/23   Mel Whitten DO   fenofibrate (TRICOR) 54 MG tablet Take 1 tablet by mouth daily s [facility] pharmacy: Please dispense generic fenofibrate unless prescriber denote 1/23/23   Mel Whitten DO   cholestyramine [place]) 4 GM/DOSE powder Take 1 packet by mouth 3 times daily (with meals) 12/23/22   Mel Whitten DO   OLANZapine (ZYPREXA) 5 MG tablet Take 5 mg by mouth nightly    Historical Provider, MD   bortezomib 5 mg/2 mLs in sodium chloride (PF) injection Inject 1.3 mg/m2 into the skin once    Historical Provider, MD   cyclophosphamide (CYTOXAN) 25 MG CAPS capsule Take by mouth daily    Historical Provider, MD   dexamethasone (DECADRON) 1 MG tablet Take 1 mg by mouth 2 times daily (with meals)    Historical Provider, MD   sertraline (ZOLOFT) 50 MG tablet Take 1.5 tablets by mouth daily 11/22/22   Mel Whitten DO   LORazepam (ATIVAN) 1 MG tablet Take 1 mg by mouth 4 times daily as needed. Historical Provider, MD   dronabinol (MARINOL) 5 MG capsule in the morning and at bedtime. 10/20/22   Historical Provider, MD   oxyCODONE (ROXICODONE) 5 MG immediate release tablet Take 5 mg by mouth every 4 hours as needed for Pain. Historical Provider, MD   pantoprazole (PROTONIX) 40 MG tablet Take 1 tablet by mouth daily 10/10/22   Ivory Hutson DO   apixaban (ELIQUIS) 5 MG TABS tablet TAKE 1 TABLET BY MOUTH TWO  TIMES DAILY 1/18/22   Ivory Hutson DO       Allergies:  Atorvastatin and Lipitor    Social History:      The patient currently lives at home     TOBACCO:   reports that he has never smoked.  He has never used smokeless tobacco.  ETOH:   reports that he does not currently use alcohol. E-cigarette/Vaping       Questions Responses    E-cigarette/Vaping Use Never User    Start Date     Passive Exposure     Quit Date     Counseling Given     Comments               Family History:      Reviewed and negative in regards to presenting illness/complaint. Problem Relation Age of Onset    Lung Cancer Mother     Heart Disease Mother     Diabetes Father     Prostate Cancer Father     Diabetes Brother        REVIEW OF SYSTEMS COMPLETED:   Pertinent positives as noted in the HPI. All other systems reviewed and negative. PHYSICAL EXAM PERFORMED:    /60   Pulse 73   Temp 98.8 °F (37.1 °C) (Oral)   Resp 18   Ht 5' 9\" (1.753 m)   Wt 163 lb (73.9 kg)   SpO2 94%   BMI 24.07 kg/m²     General appearance:  No apparent distress, appears stated age and cooperative. very frial  HEENT:  Normal cephalic, atraumatic without obvious deformity. .  Extra ocular muscles intact. Conjunctivae/corneas clear. Neck: Supple, with full range of motion. No jugular venous distention. Trachea midline. Respiratory:  Normal respiratory effort. Clear to auscultation, bilaterally without Rales/Wheezes/Rhonchi. Cardiovascular:  Regular rate and rhythm with normal S1/S2 with murmurs, no rubs or gallops. Abdomen: Soft, non-tender, non-distended with normal bowel sounds. Musculoskeletal:  No clubbing, cyanosis or edema bilaterally. Full range of motion without deformity. Skin: Skin color, texture, turgor normal.    Neurologic:  Neurovascularly intact without any focal sensory/motor deficits.  l.  Psychiatric:  Alert and oriented, thought content appropriate, normal insight  Capillary Refill: Brisk,3 seconds, normal  Peripheral Pulses: +2 palpable, equal bilaterally       Labs:     Recent Labs     02/13/23  0948 02/13/23  1150   WBC 22.4* 16.6*   HGB 12.0* 11.2*   HCT 36.9* 34.0*   PLT 10* 10*     Recent Labs     02/13/23  0948 02/13/23  1150    141   K 4.8 4.6    106 CO2 18* 20*   BUN 31* 39*   CREATININE 2.7* 2.9*   CALCIUM 9.1 9.4     Recent Labs     02/13/23  1150   AST 33   ALT 16   BILITOT 0.5   ALKPHOS 74     No results for input(s): INR in the last 72 hours. Recent Labs     02/13/23  1150   TROPONINI <0.01       Urinalysis:      Lab Results   Component Value Date/Time    NITRU Negative 02/09/2023 12:08 PM    WBCUA 0-2 02/09/2023 12:08 PM    BACTERIA 1+ 02/09/2023 12:08 PM    RBCUA 0-2 02/09/2023 12:08 PM    BLOODU TRACE 02/09/2023 12:08 PM    SPECGRAV 1.012 02/09/2023 12:08 PM    GLUCOSEU Negative 02/09/2023 12:08 PM       Radiology:     CXR: I have reviewed the CXR with the following interpretation:   EKG:  I have reviewed the EKG with the following interpretation: NSR 91 / min, no acute ischemic changes      XR CHEST PORTABLE   Final Result   No acute cardiopulmonary findings             Consults:    IP CONSULT TO ONCOLOGY  IP CONSULT TO HOSPITALIST    ASSESSMENT:    Active Hospital Problems    Diagnosis Date Noted    Malaise and fatigue [R53.81, R53.83] 02/13/2023     Priority: Medium         PLAN:  Marked leukocytosis with plasma cell predominance - r/o plasma cell leukemia - admit , oncology eval , no evidence of infection even though procal is elevated     M myeloma with mets - currently getting treatment -     Marked thrombocytopenia - no active bleed - hold meds for M Myeloma , platelet transfusion , oncology was consulted from ER, fall precautions     ELEONORA on CKD- possibly poor po intake - IVF , hold nephrotoxic meds , monitor , consider nephrology PRN    Gen malaise - possibly 2/2 all of the above     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  If you have any questions or concerns please feel free to contact me at [phone].

## 2023-02-13 NOTE — ED PROVIDER NOTES
[address] [facility]  ED     EMERGENCY DEPARTMENT ENCOUNTER            Pt Name: Juwan Moore   MRN: [id number]   [place] 1945   Date of evaluation: 2/13/2023   Provider: Liyah Jacob DO   PCP: Vidhya aJcob DO   Note Started: 12:52 PM EST 2/13/23          CHIEF COMPLAINT     Chief Complaint   Patient presents with    Fatigue     Over the past week since a platelet infusion r/t multiple myeloma             HISTORY OF PRESENT ILLNESS:   History from : Patient and Family wife and daughter    Limitations to history : None     Juwan Moore is a 65 y.o. male who presents [place] from oncology for multiple myeloma. Patient recently discontinued chemotherapy and required platelet transfusion. Patient presents today with increasing fatigue over the last several days. Patient has noted decreased appetite. No fevers, chills, sweats. No vomiting, diarrhea, or constipation. Nursing Notes were all reviewed and agreed with, or any disagreements were addressed in the HPI. REVIEW OF SYSTEMS :    Positives and Pertinent negatives as per HPI. MEDICAL HISTORY   has a past medical history of Anxiety, Arthritis, Bladder cancer ([address] DVT (deep venous thrombosis) ([address] (07/01/2014), Elevated fasting glucose, Hyperlipidemia, Hypertension, Malignant neoplasm of urinary bladder ([address] (02/04/2019), Multiple myeloma ([address] MVP (mitral valve prolapse), Polyp of gallbladder, and Prostate CA ([address] (2005).     Past Surgical History:   Procedure Laterality Date    BLADDER SURGERY      tumor removed    CATARACT REMOVAL Bilateral     CHOLECYSTECTOMY, LAPAROSCOPIC N/A 1/10/2022    VIDEO LAPAROSCOPIC CHOLECYSTECTOMY performed by Mateo Barksdale MD at [facility]  6/29/12    Polyps    COLONOSCOPY  07/12/2017    Polyp    COLONOSCOPY N/A 9/6/2022    COLONOSCOPY POLYPECTOMY SNARE/COLD BIOPSY performed by López Martinez MD at [address]  3/3/2022    CT BIOPSY PERCUTANEOUS DEEP BONE 3/3/2022 Rebecca Richardson [place] [id number] [facility] CT SCAN    INGUINAL HERNIA REPAIR Right 06/1999    PENILE PROSTHESIS PLACEMENT      PROSTATECTOMY  2005 approx    Dr. Holley Clinton      In [place] - had one tonsil burned out    UPPER GASTROINTESTINAL ENDOSCOPY N/A 9/6/2022    EGD BIOPSY performed by Lawrence Hoffmann MD at [address]       Previous Medications    APIXABAN (ELIQUIS) 5 MG TABS TABLET    TAKE 1 TABLET BY MOUTH TWO  TIMES DAILY    BORTEZOMIB 5 MG/2 MLS IN SODIUM CHLORIDE (PF) INJECTION    Inject 1.3 mg/m2 into the skin once    CHOLESTYRAMINE (QUESTRAN) 4 GM/DOSE POWDER    Take 1 packet by mouth 3 times daily (with meals)    CYCLOPHOSPHAMIDE (CYTOXAN) 25 MG CAPS CAPSULE    Take by mouth daily    DEXAMETHASONE (DECADRON) 1 MG TABLET    Take 1 mg by mouth 2 times daily (with meals)    DRONABINOL (MARINOL) 5 MG CAPSULE    in the morning and at bedtime. FENOFIBRATE (TRICOR) 54 MG TABLET    Take 1 tablet by mouth daily [facility] pharmacy: Please dispense generic fenofibrate unless prescriber denote    LORAZEPAM (ATIVAN) 1 MG TABLET    Take 1 mg by mouth 4 times daily as needed. OLANZAPINE (ZYPREXA) 5 MG TABLET    Take 5 mg by mouth nightly    ONDANSETRON (ZOFRAN-ODT) 8 MG TBDP DISINTEGRATING TABLET    DISSOLVE ONE TABLET BY MOUTH EVERY 8 HOURS AS NEEDED FOR NAUSEA OR VOMITING    OXYCODONE (ROXICODONE) 5 MG IMMEDIATE RELEASE TABLET    Take 5 mg by mouth every 4 hours as needed for Pain.     PANTOPRAZOLE (PROTONIX) 40 MG TABLET    Take 1 tablet by mouth daily    ROSUVASTATIN (CRESTOR) 20 MG TABLET    TAKE ONE TABLET BY MOUTH DAILY    SERTRALINE (ZOLOFT) 50 MG TABLET    Take 1.5 tablets by mouth daily    ZOLPIDEM (AMBIEN) 10 MG TABLET    TAKE ONE TABLET BY MOUTH ONCE NIGHTLY AS NEEDED FOR SLEEP      SCREENINGS                           WA Assessment  BP: 93/69  Heart Rate: (!) 106                  PHYSICAL EXAM :  ED Triage Vitals [02/13/23 1056]   BP Temp Temp Source Heart Rate Resp SpO2 Height Weight   93/69 98.8 °F (37.1 °C) Oral (!) 106 20 95 % 5' 9\" (1.753 m) 163 lb (73.9 kg)      GENERAL APPEARANCE: Awake and alert. Cooperative. No acute distress. HEAD: Normocephalic. Atraumatic. EYES: PERRL. EOM's grossly intact. ENT: Mucous membranes are moist.   NECK: Supple, trachea midline. HEART: Tachycardia. Normal S1, S2. No murmurs, rubs or gallops. LUNGS: Respirations unlabored. CTAB. Good air exchange. No wheezes, rales, or rhonchi. Speaking comfortably in full sentences. ABDOMEN: Soft. Non-distended. Non-tender. No guarding or rebound. Normal Bowel sounds. EXTREMITIES: No peripheral edema. MAEE. No acute deformities. SKIN: Warm and dry. No acute rashes. NEUROLOGICAL: Alert and oriented X 3. CN II-XII intact. No gross facial drooping. Strength 5/5 in all extremities. Sensation intact. No pronator drift. Normal coordination. Gait normal.   PSYCHIATRIC: Normal mood and affect. DIAGNOSTIC RESULTS     LABS:   Labs Reviewed   CBC WITH AUTO DIFFERENTIAL - Abnormal; Notable for the following components:       Result Value    WBC 16.6 (*)     RBC 3.48 (*)     Hemoglobin 11.2 (*)     Hematocrit 34.0 (*)     Platelets 10 (*)     All other components within normal limits    Narrative:     Dimitris Martinez tel. [phone],  Hematology results called to and read back by henok childs rn, 02/13/2023  12:33, by [address] [employer] - Abnormal; Notable for the following components:    CO2 20 (*)     Glucose 128 (*)     BUN 39 (*)     Creatinine 2.9 (*)     Est, Glom Filt Rate 21 (*)     All other components within normal limits   CULTURE, BLOOD 2   CULTURE, BLOOD 1   COVID-19 & INFLUENZA COMBO   LACTATE, SEPSIS   TROPONIN   PROCALCITONIN   LACTATE, SEPSIS   LACTATE, SEPSIS   LACTATE, SEPSIS   LACTATE, SEPSIS   LACTATE, SEPSIS   LACTATE, SEPSIS      When ordered only abnormal lab results are displayed.  All other labs were within normal range or not returned as of this dictation. RADIOLOGY:      Non-plain film images such as CT, Ultrasound and MRI are read by the radiologist. Plain radiographic images are visualized and preliminarily interpreted by the ED Provider with the below findings:   Interpretation per the Radiologist below, if available at the time of this note:     XR CHEST PORTABLE   Final Result   No acute cardiopulmonary findings            XR CHEST PORTABLE    Result Date: 2/13/2023  EXAMINATION: [address] 2/13/2023 11:33 am COMPARISON: None. HISTORY: ORDERING SYSTEM PROVIDED HISTORY: weakness TECHNOLOGIST PROVIDED HISTORY: Reason for exam:->weakness Reason for Exam:  weakness FINDINGS: Normal cardiomediastinal silhouette. No acute airspace infiltrate. No pneumothorax or pleural effusion     No acute cardiopulmonary findings           EKG: Normal sinus rhythm with rate of 91. Normal axis. Normal intervals and durations. Nonspecific ST or T wave changes appreciated. PACs from previous EKG on 1/19/2023 are not presents at this time. Vitals:    Vitals:    02/13/23 1056 02/13/23 1236   BP: 93/69    Pulse: (!) 106    Resp: 20 15   Temp: 98.8 °F (37.1 °C)    TempSrc: Oral    SpO2: 95% 98%   Weight: 163 lb (73.9 kg)    Height: 5' 9\" (1.753 m)              Is this patient to be included in the SEP-1 Core Measure due to severe sepsis or septic shock? .  Infection not suspected. CC/HPI Summary, DDx, ED Course, and Reassessment: Patient presents to the emergency department today with increasing fatigue. Patient has mild tachycardia upon arrival.  Labs are consistent with acute on chronic renal failure. Platelets are 10. Leukocytosis is noted. Differential is concerning for increased plasma cells. 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 Patient will be admitted for platelet infusion and further symptomatic management.        Patient was given the following medications:   Medications   0.9 % sodium chloride bolus (0 mLs IntraVENous Stopped 2/13/23 1451)   LORazepam (ATIVAN) tablet 1 mg (1 mg Oral Given 2/13/23 1451)   0.9 % sodium chloride bolus (0 mLs IntraVENous Stopped 2/14/23 1539)   ondansetron (ZOFRAN) injection (4 mg IntraVENous Given 2/15/23 1100)   fentaNYL (SUBLIMAZE) injection (25 mcg IntraVENous Given 2/15/23 1118)   midazolam (VERSED) injection 2 mg/2mL (0.5 mg IntraVENous Given 2/15/23 1118)   fentaNYL (SUBLIMAZE) injection (25 mcg IntraVENous Given 2/15/23 1121)   midazolam (VERSED) injection 2 mg/2mL (0.5 mg IntraVENous Given 2/15/23 1121)   dexamethasone (DECADRON) 40 mg in sodium chloride 0.9 % 50 mL IVPB (0 mg IntraVENous Stopped 2/20/23 1708)   ondansetron (ZOFRAN) injection 8 mg (8 mg IntraVENous Given 2/19/23 0911)   cyclophosphamide (CYTOXAN) 500 mg in sodium chloride 0.9 % 250 mL chemo IVPB (0 mg IntraVENous Stopped 2/19/23 0744)   dexamethasone (DECADRON) injection 10 mg/mL (10 mg IntraVENous Given 2/19/23 1309)   diphenhydrAMINE (BENADRYL) injection 25 mg (25 mg IntraVENous Given 2/19/23 1308)        CONSULTS:   Oncology. Discussed patient with Dr. Jess Chinchilla. Agrees with admission. Recommends platelet infusion. Discussion with Other Professionals: None   {Social Determinants: None   Chronic Conditions: multiple myeloma. Records Reviewed: NA      Disposition Considerations:    I am the Primary Clinician of Record. FINAL IMPRESSION    1. Other fatigue    2. Plasma cell hyperplasia    3. Thrombocytopenia ([address]    4. Orthostatic hypotension    5.  Acute renal failure superimposed on chronic kidney disease, unspecified CKD stage, unspecified acute renal failure type [facility])           DISPOSITION/PLAN     PATIENT REFERRED TO:   [address] [facility] [address]  [address]  [address], [address] [place] [address]  [phone]    Schedule an appointment as soon as possible for a visit in 1 week(s)      Ronald Escobar MD  [address] [facility] [address]  [phone]    Schedule an appointment as soon as possible for a visit in 1 week(s)      Milli Lora, DO  [address] [place] [address] [address] [place] [facility] [address]  [phone]    Schedule an appointment as soon as possible for a visit in 1 week(s)      Layla Angel MD  [address] [facility] [address] Shanell Escalante, DO  [address] [place] [address] [place] [facility] [address] [facility]  [phone]           DISCHARGE MEDICATIONS:   New Prescriptions    No medications on file        DISCONTINUED MEDICATIONS:   Discontinued Medications    No medications on file              (Please note that portions of this note were completed with a voice recognition program.  Efforts were made to edit the dictations but occasionally words are mis-transcribed.)       Kye Gonzalez DO (electronically signed)              Kye Gonzalez DO  02/13/23 DO Missael  02/25/23 1248

## 2023-02-13 NOTE — Clinical Note
Tello went to ER after I saw him, was admitted for thrombocytopenia and possible progression of cancer-platelets 10 today.

## 2023-02-13 NOTE — ED NOTES
Verbal report called to Humzapilar Farr RN. Per ER charge and floor RN, OK to send up patient before starting platelet transfusion. This RN to obtain informed consent from patient.       Aleyda 91CATHERINE Jimenez  02/13/23 4648

## 2023-02-14 ENCOUNTER — APPOINTMENT (OUTPATIENT)
Dept: ULTRASOUND IMAGING | Age: 78
DRG: 841 | End: 2023-02-14
Payer: MEDICARE

## 2023-02-14 LAB
ANION GAP SERPL CALCULATED.3IONS-SCNC: 13 MMOL/L (ref 3–16)
ATYPICAL LYMPHOCYTE RELATIVE PERCENT: 1 % (ref 0–6)
BANDED NEUTROPHILS RELATIVE PERCENT: 5 % (ref 0–7)
BASOPHILS ABSOLUTE: 0 K/UL (ref 0–0.2)
BASOPHILS RELATIVE PERCENT: 0 %
BUN BLDV-MCNC: 52 MG/DL (ref 7–20)
CALCIUM SERPL-MCNC: 8.1 MG/DL (ref 8.3–10.6)
CHLORIDE BLD-SCNC: 110 MMOL/L (ref 99–110)
CO2: 19 MMOL/L (ref 21–32)
CREAT SERPL-MCNC: 4.1 MG/DL (ref 0.8–1.3)
CREATININE URINE: 83.2 MG/DL (ref 39–259)
EOSINOPHILS ABSOLUTE: 0.2 K/UL (ref 0–0.6)
EOSINOPHILS RELATIVE PERCENT: 2 %
GFR SERPL CREATININE-BSD FRML MDRD: 14 ML/MIN/{1.73_M2}
GLUCOSE BLD-MCNC: 104 MG/DL (ref 70–99)
HCT VFR BLD CALC: 28.2 % (ref 40.5–52.5)
HEMATOLOGY PATH CONSULT: NO
HEMATOLOGY PATH CONSULT: NORMAL
HEMOGLOBIN: 9.6 G/DL (ref 13.5–17.5)
INR BLD: 1.12 (ref 0.87–1.14)
LYMPHOCYTES ABSOLUTE: 3.5 K/UL (ref 1–5.1)
LYMPHOCYTES RELATIVE PERCENT: 28 %
MCH RBC QN AUTO: 33.3 PG (ref 26–34)
MCHC RBC AUTO-ENTMCNC: 33.9 G/DL (ref 31–36)
MCV RBC AUTO: 98.2 FL (ref 80–100)
METAMYELOCYTES RELATIVE PERCENT: 6 %
MONOCYTES ABSOLUTE: 0.6 K/UL (ref 0–1.3)
MONOCYTES RELATIVE PERCENT: 5 %
MYELOCYTE PERCENT: 1 %
NEUTROPHILS ABSOLUTE: 3 K/UL (ref 1.7–7.7)
NEUTROPHILS RELATIVE PERCENT: 13 %
PDW BLD-RTO: 15.4 % (ref 12.4–15.4)
PLASMA CELLS PERCENT: 39 %
PLATELET # BLD: 36 K/UL (ref 135–450)
PLATELET SLIDE REVIEW: ABNORMAL
PMV BLD AUTO: 6.7 FL (ref 5–10.5)
POTASSIUM REFLEX MAGNESIUM: 4.2 MMOL/L (ref 3.5–5.1)
PROTHROMBIN TIME: 14.3 SEC (ref 11.7–14.5)
RBC # BLD: 2.87 M/UL (ref 4.2–5.9)
SLIDE REVIEW: ABNORMAL
SODIUM BLD-SCNC: 142 MMOL/L (ref 136–145)
SODIUM URINE: 61 MMOL/L
WBC # BLD: 12 K/UL (ref 4–11)

## 2023-02-14 PROCEDURE — 2500000003 HC RX 250 WO HCPCS: Performed by: INTERNAL MEDICINE

## 2023-02-14 PROCEDURE — 6370000000 HC RX 637 (ALT 250 FOR IP): Performed by: INTERNAL MEDICINE

## 2023-02-14 PROCEDURE — 36415 COLL VENOUS BLD VENIPUNCTURE: CPT

## 2023-02-14 PROCEDURE — 84300 ASSAY OF URINE SODIUM: CPT

## 2023-02-14 PROCEDURE — 82570 ASSAY OF URINE CREATININE: CPT

## 2023-02-14 PROCEDURE — 88184 FLOWCYTOMETRY/ TC 1 MARKER: CPT

## 2023-02-14 PROCEDURE — 97530 THERAPEUTIC ACTIVITIES: CPT

## 2023-02-14 PROCEDURE — 97165 OT EVAL LOW COMPLEX 30 MIN: CPT

## 2023-02-14 PROCEDURE — 85610 PROTHROMBIN TIME: CPT

## 2023-02-14 PROCEDURE — 76770 US EXAM ABDO BACK WALL COMP: CPT

## 2023-02-14 PROCEDURE — 83883 ASSAY NEPHELOMETRY NOT SPEC: CPT

## 2023-02-14 PROCEDURE — 1200000000 HC SEMI PRIVATE

## 2023-02-14 PROCEDURE — 2580000003 HC RX 258: Performed by: INTERNAL MEDICINE

## 2023-02-14 PROCEDURE — 97161 PT EVAL LOW COMPLEX 20 MIN: CPT

## 2023-02-14 PROCEDURE — 80048 BASIC METABOLIC PNL TOTAL CA: CPT

## 2023-02-14 PROCEDURE — 85025 COMPLETE CBC W/AUTO DIFF WBC: CPT

## 2023-02-14 RX ORDER — PSEUDOEPHEDRINE HCL 30 MG
30 TABLET ORAL EVERY 4 HOURS PRN
Status: DISCONTINUED | OUTPATIENT
Start: 2023-02-14 | End: 2023-02-14

## 2023-02-14 RX ORDER — LISINOPRIL 20 MG/1
20 TABLET ORAL DAILY
Status: ON HOLD | COMMUNITY

## 2023-02-14 RX ORDER — 0.9 % SODIUM CHLORIDE 0.9 %
1000 INTRAVENOUS SOLUTION INTRAVENOUS ONCE
Status: COMPLETED | OUTPATIENT
Start: 2023-02-14 | End: 2023-02-14

## 2023-02-14 RX ORDER — BUSPIRONE HYDROCHLORIDE 7.5 MG/1
7.5 TABLET ORAL 2 TIMES DAILY
Status: ON HOLD | COMMUNITY

## 2023-02-14 RX ORDER — BUSPIRONE HYDROCHLORIDE 5 MG/1
10 TABLET ORAL 2 TIMES DAILY
Status: DISCONTINUED | OUTPATIENT
Start: 2023-02-14 | End: 2023-02-21 | Stop reason: HOSPADM

## 2023-02-14 RX ORDER — LORAZEPAM 1 MG/1
1 TABLET ORAL EVERY 6 HOURS PRN
Status: DISCONTINUED | OUTPATIENT
Start: 2023-02-14 | End: 2023-02-21 | Stop reason: HOSPADM

## 2023-02-14 RX ADMIN — CHOLESTYRAMINE 4 G: 4 POWDER, FOR SUSPENSION ORAL at 08:56

## 2023-02-14 RX ADMIN — SODIUM BICARBONATE: 84 INJECTION, SOLUTION INTRAVENOUS at 15:42

## 2023-02-14 RX ADMIN — CHOLESTYRAMINE 4 G: 4 POWDER, FOR SUSPENSION ORAL at 18:29

## 2023-02-14 RX ADMIN — BUSPIRONE HYDROCHLORIDE 10 MG: 5 TABLET ORAL at 19:20

## 2023-02-14 RX ADMIN — CHOLESTYRAMINE 4 G: 4 POWDER, FOR SUSPENSION ORAL at 11:53

## 2023-02-14 RX ADMIN — SERTRALINE 75 MG: 50 TABLET, FILM COATED ORAL at 08:56

## 2023-02-14 RX ADMIN — PANTOPRAZOLE SODIUM 40 MG: 40 TABLET, DELAYED RELEASE ORAL at 08:56

## 2023-02-14 RX ADMIN — SODIUM CHLORIDE 1000 ML: 9 INJECTION, SOLUTION INTRAVENOUS at 13:38

## 2023-02-14 RX ADMIN — LORAZEPAM 1 MG: 1 TABLET ORAL at 14:38

## 2023-02-14 ASSESSMENT — ENCOUNTER SYMPTOMS
GASTROINTESTINAL NEGATIVE: 1
RESPIRATORY NEGATIVE: 1

## 2023-02-14 NOTE — PROGRESS NOTES
Hospitalist Progress Note      PCP: Maria Dolores Vick DO    Date of Admission: 2/13/2023    Chief Complaint: fatigue    Hospital Course:   65 y.o. male who presented to [facility] with above c/o . He has H/o M, Myeloma , CKD developed malaise for weeks ago . Its worsened over the time and currently he cannot carry out daily activities as usual . No focal weakness . He has poor appetite . Denies chest pain , cough , fever . Sob , ab pain , N , V or D    Subjective: PLTs improved following transfusion. Remains very fatigued. Medications:  Reviewed    Infusion Medications    sodium chloride      sodium chloride 75 mL/hr at 02/13/23 2255    sodium chloride       Scheduled Medications    cholestyramine  4 g Oral TID WC    OLANZapine  5 mg Oral Nightly    pantoprazole  40 mg Oral Daily    sertraline  75 mg Oral Daily    sodium chloride flush  5-40 mL IntraVENous 2 times per day     PRN Meds: LORazepam, oxyCODONE, sodium chloride flush, sodium chloride, ondansetron **OR** ondansetron, polyethylene glycol, acetaminophen **OR** acetaminophen, sodium chloride      Intake/Output Summary (Last 24 hours) at 2/14/2023 0914  Last data filed at 2/14/2023 0354  Gross per 24 hour   Intake 1295 ml   Output 470 ml   Net 825 ml       Physical Exam Performed:    /67   Pulse 79   Temp 97.9 °F (36.6 °C) (Oral)   Resp 18   Ht 5' 9\" (1.753 m)   Wt 163 lb (73.9 kg)   SpO2 95%   BMI 24.07 kg/m²     General appearance: No apparent distress, appears stated age and cooperative. HEENT: Pupils equal, round, and reactive to light. Conjunctivae/corneas clear. Neck: Supple, with full range of motion. No jugular venous distention. Trachea midline. Respiratory:  Normal respiratory effort. Clear to auscultation, bilaterally without Rales/Wheezes/Rhonchi. Cardiovascular: Regular rate and rhythm with normal S1/S2 without murmurs, rubs or gallops.   Abdomen: Soft, non-tender, non-distended with normal bowel sounds. Musculoskeletal: No clubbing, cyanosis or edema bilaterally. Full range of motion without deformity. Skin: Skin color, texture, turgor normal.  No rashes or lesions. Neurologic:  Neurovascularly intact without any focal sensory/motor deficits.  Cranial nerves: II-XII intact, grossly non-focal.  Psychiatric: Alert and oriented, thought content appropriate, normal insight  Capillary Refill: Brisk, 3 seconds, normal   Peripheral Pulses: +2 palpable, equal bilaterally       Labs:   Recent Labs     02/13/23  1150 02/13/23  1702 02/14/23  0722   WBC 16.6* 14.9* 12.0*   HGB 11.2* 10.0* 9.6*   HCT 34.0* 31.1* 28.2*   PLT 10* 10* 36*     Recent Labs     02/13/23  0948 02/13/23  1150 02/14/23  0722    141 142   K 4.8 4.6 4.2    106 110   CO2 18* 20* 19*   BUN 31* 39* 52*   CREATININE 2.7* 2.9* 4.1*   CALCIUM 9.1 9.4 8.1*     Recent Labs     02/13/23  1150   AST 33   ALT 16   BILITOT 0.5   ALKPHOS 74     Recent Labs     02/14/23  0722   INR 1.12     Recent Labs     02/13/23  1150   TROPONINI <0.01       Urinalysis:      Lab Results   Component Value Date/Time    NITRU Negative 02/09/2023 12:08 PM    WBCUA 0-2 02/09/2023 12:08 PM    BACTERIA 1+ 02/09/2023 12:08 PM    RBCUA 0-2 02/09/2023 12:08 PM    BLOODU TRACE 02/09/2023 12:08 PM    SPECGRAV 1.012 02/09/2023 12:08 PM    GLUCOSEU Negative 02/09/2023 12:08 PM       Radiology:  XR CHEST PORTABLE   Final Result   No acute cardiopulmonary findings             IP CONSULT TO ONCOLOGY  IP CONSULT TO HOSPITALIST  IP CONSULT TO NEPHROLOGY    Assessment/Plan:    Active Hospital Problems    Diagnosis     Malaise and fatigue [R53.81, R53.83]      Priority: Medium     MM with possible progression  - given elevated plasma cells on diff, 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

## 2023-02-14 NOTE — CONSULTS
[facility]    Consult Note      Requesting Physician:  Surya Jennings    CHIEF COMPLAINT:  Thrombocytopenia + Leukocytosis with increased plasma cells       HISTORY OF PRESENT ILLNESS:    Rachel Ibrahim is a 65year old male with PMHX multiple myeloma (currently being treated at [facility]). Treatment has been held due to low platelets and subsequent platelet transfusions. Presented to the ED with increasing fatigue over the last several days, decreased appetite. Denies fever, chills, N/V/D/C, chest pain, or SOB. 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 He has no increased pain or other concerns on consult. Mr. Ifrah Suggs  is a 65 y.o. male we are seeing in consultation for     ICD-10-CM    1. Other fatigue  R53.83       2. Plasma cell hyperplasia  D72.822       3. Thrombocytopenia ([address]  D69.6       4. Orthostatic hypotension  I95.1       5.  Acute renal failure superimposed on chronic kidney disease, unspecified CKD stage, unspecified acute renal failure type ([address]  N17.9     N18.9              Past Medical History:  Past Medical History:   Diagnosis Date    Anxiety     Arthritis     Bladder cancer ([address]     DVT (deep venous thrombosis) ([address] 07/01/2014    two spontaneous DVTs left leg    Elevated fasting glucose     Hyperlipidemia     Hypertension     Malignant neoplasm of urinary bladder ([address] 02/04/2019    Multiple myeloma (HCC)     MVP (mitral valve prolapse)     Polyp of gallbladder     Prostate CA ([address] 2005       Past Surgical History:  Past Surgical History:   Procedure Laterality Date    BLADDER SURGERY      tumor removed    CATARACT REMOVAL Bilateral     CHOLECYSTECTOMY, LAPAROSCOPIC N/A 1/10/2022    VIDEO LAPAROSCOPIC CHOLECYSTECTOMY performed by Kelly Singh MD at [address]  6/29/12 Polyps    COLONOSCOPY  07/12/2017    Polyp    COLONOSCOPY N/A 9/6/2022    COLONOSCOPY POLYPECTOMY SNARE/COLD BIOPSY performed by López Martinez MD at [address] BONE  3/3/2022    CT BIOPSY PERCUTANEOUS DEEP BONE 3/3/2022 Manjula Gillette MD [facility] AT [place] CT SCAN    INGUINAL HERNIA REPAIR Right 06/1999    PENILE PROSTHESIS PLACEMENT      PROSTATECTOMY  2005 approx    Dr. Stef Smiley      In [place] - had one tonsil burned out    UPPER GASTROINTESTINAL ENDOSCOPY N/A 9/6/2022    EGD BIOPSY performed by López Martinez MD at [address] [facility]       Current Medications:  Current Facility-Administered Medications   Medication Dose Route Frequency Provider Last Rate Last Admin    cholestyramine (QUESTRAN) packet 4 g  4 g Oral TID  Tegan Hinson MD   4 g at 02/13/23 2243    OLANZapine (ZYPREXA) tablet 5 mg  5 mg Oral Nightly Tegan Hinson MD   5 mg at 02/13/23 2243    oxyCODONE (ROXICODONE) immediate release tablet 5 mg  5 mg Oral Q6H PRN Tegan Hinson MD        pantoprazole (PROTONIX) tablet 40 mg  40 mg Oral Daily Tegan Hinson MD   40 mg at 02/13/23 2243    sertraline (ZOLOFT) tablet 75 mg  75 mg Oral Daily Tegan Hinson MD   75 mg at 02/13/23 2244    sodium chloride flush 0.9 % injection 5-40 mL  5-40 mL IntraVENous 2 times per day Tegan Hinson MD        sodium chloride flush 0.9 % injection 5-40 mL  5-40 mL IntraVENous PRN Tegan Hinson MD        0.9 % sodium chloride infusion   IntraVENous PRN Tegan Hinson MD        ondansetron (ZOFRAN-ODT) disintegrating tablet 4 mg  4 mg Oral Q8H PRN Tegan Hinson MD        Or    ondansetron (ZOFRAN) injection 4 mg  4 mg IntraVENous Q6H PRN Tegan Hinson MD        polyethylene glycol (GLYCOLAX) packet 17 g  17 g Oral Daily PRN Tegan Hinson MD        acetaminophen (TYLENOL) tablet 650 mg  650 mg Oral Q6H PRN Tegan Hinson MD        Or    acetaminophen (TYLENOL) suppository 650 mg  650 mg Rectal Q6H PRN Sadiq Rodríguez MD        0.9 % sodium chloride infusion   IntraVENous Continuous Sadiq Rodríguez MD 75 mL/hr at 02/13/23 2255 New Bag at 02/13/23 2255    0.9 % sodium chloride infusion   IntraVENous PRN Sadiq Rodríguez MD           Allergies: Allergies   Allergen Reactions    Atorvastatin     Lipitor Rash       Social History:  Social History     Socioeconomic History    Marital status:      Spouse name: Not on file    Number of children: 2    Years of education: Not on file    Highest education level: Not on file   Occupational History    Occupation:      Comment: retired   Tobacco Use    Smoking status: Never    Smokeless tobacco: Never   Vaping Use    Vaping Use: Never used   Substance and Sexual Activity    Alcohol use: Not Currently    Drug use: No    Sexual activity: Not Currently   Other Topics Concern    Not on file   Social History Narrative    , retired 1/2015     Social Determinants of Health     Financial Resource Strain: Not on file   Food Insecurity: Not on file   Transportation Needs: Not on file   Physical Activity: Unknown    Days of Exercise per Week: 0 days    Minutes of Exercise per Session: Not on file   Stress: Not on file   Social Connections: Not on file   Intimate Partner Violence: Not At Risk    Fear of Current or Ex-Partner: No    Emotionally Abused: No    Physically Abused: No    Sexually Abused: No   Housing Stability: Not on file          Family History:  Family History   Problem Relation Age of Onset    Lung Cancer Mother     Heart Disease Mother     Diabetes Father     Prostate Cancer Father     Diabetes Brother        REVIEW OF SYSTEMS:      Constitutional: Anorexia, fatigue. Denies fever, sweats, weight loss  Eyes: No visual changes or diplopia. No scleral icterus. Ent: No headaches, hearing loss or vertigo. No mouth sores or sore throat.   Cardiovascular: No chest pain, dyspnea on exertion, palpitations or loss of consciousness. Respiratory: No cough or wheezing, no sputum production. No hemoptysis. Gastrointestinal: No abdominal pain, appetite loss, blood in stools. No change in bowel habits. Genitourinary: No dysuria, trouble voiding, or hematuria. Musculoskeletal: Generalized weakness. No joint complaints. Integumentary: No rash or pruritus. Neurological: No headache, diplopia. No change in gait, balance, or coordination. No paresthesias. Endocrine: No temperature intolerance. No excessive thirst, fluid intake, urination. Hematologic/lymphatic: No abnormal bruising or ecchymosis, blood clots or swollen lymph nodes. Allergic/immunologic: No nasal congestion or hives. PHYSICAL EXAM:      Vitals:  Patient Vitals for the past 24 hrs:   BP Temp Temp src Pulse Resp SpO2 Height Weight   02/14/23 0354 96/60 98.3 °F (36.8 °C) Oral 78 18 95 % -- --   02/14/23 0024 (!) 96/56 98 °F (36.7 °C) Oral 84 18 92 % -- --   02/13/23 2258 110/68 98.2 °F (36.8 °C) Oral 75 18 92 % -- --   02/13/23 2052 104/63 99.6 °F (37.6 °C) Oral 76 18 92 % -- --   02/13/23 2047 112/61 98.7 °F (37.1 °C) Oral 77 18 93 % -- --   02/13/23 2042 (!) 107/59 99.2 °F (37.3 °C) Oral 79 18 92 % -- --   02/13/23 2027 99/60 98.8 °F (37.1 °C) -- 79 20 94 % -- --   02/13/23 1943 100/61 99.2 °F (37.3 °C) Oral 75 18 95 % -- --   02/13/23 1815 128/71 98.3 °F (36.8 °C) Oral 79 18 98 % -- --   02/13/23 1733 121/81 -- -- 74 19 98 % -- --   02/13/23 1617 116/63 -- -- 75 16 100 % -- --   02/13/23 1447 112/60 -- -- 73 18 94 % -- --   02/13/23 1240 -- -- -- (!) 107 23 96 % -- --   02/13/23 1239 118/68 -- -- 97 22 -- -- --   02/13/23 1236 -- -- -- -- 15 98 % -- --   02/13/23 1056 93/69 98.8 °F (37.1 °C) Oral (!) 106 20 95 % 5' 9\" (1.753 m) 163 lb (73.9 kg)           CONSTITUTIONAL: awake, alert, cooperative, no apparent distres. Appears chronically ill. Pale in complexion. Scattered bruising noted on upper lip.    EYES: pupils equal, round and reactive to light, sclera clear and conjunctiva normal  ENT: Normocephalic, without obvious abnormality, atraumatic  NECK: supple, symmetrical, no jugular venous distension and no carotid bruits   HEMATOLOGIC/LYMPHATIC: no cervical, supraclavicular or axillary lymphadenopathy   LUNGS: no increased work of breathing and clear to auscultation   CARDIOVASCULAR: regular rate and rhythm, normal S1 and S2, no murmur noted  ABDOMEN: normal bowel sounds x 4, soft, non-distended, non-tender, no masses palpated, no hepatosplenomegaly   MUSCULOSKELETAL: full range of motion noted, tone is normal  NEUROLOGIC: awake, alert, oriented to name, place and time. Motor skills grossly intact. SKIN: Normal skin color, texture, turgor and no jaundice. appears intact   EXTREMITIES: no LE edema     DATA:    PT/INR:    Recent Labs     02/13/23  1150 01/19/23  1239   PROT 6.7 6.4     PTT:  No results for input(s): APTT in the last 720 hours. CMP:    Recent Labs     02/13/23  1150      K 4.6      CO2 20*   BUN 39*   PROT 6.7     Mg:    Recent Labs     02/13/23  0948 01/19/23  1239   MG 2.20 2.50*       Lab Results   Component Value Date    CALCIUM 9.4 02/13/2023    PHOS 3.4 02/09/2023       CBC:    Recent Labs     02/13/23  1702 02/13/23  1150 02/13/23  0948 01/19/23  1239   WBC 14.9* 16.6* 22.4* 14.7*   NEUTROABS  --  3.5 5.8 13.2*   LYMPHOPCT  --  8.0 6.0 3.0   RBC 3.09* 3.48* 3.73* 3.66*   HGB 10.0* 11.2* 12.0* 12.3*   HCT 31.1* 34.0* 36.9* 36.5*   .7* 97.9 99.0 99.7   MCH 32.3 32.1 32.1 33.7   MCHC 32.0 32.8 32.4 33.8   RDW 15.5* 15.4 15.5* 14.8   PLT 10* 10* 10* 153        LDH:No results for input(s): LDH in the last 720 hours. Radiology Review:  XR CHEST PORTABLE  Narrative: EXAMINATION:  ONE XRAY VIEW OF THE CHEST    2/13/2023 11:33 am    COMPARISON:  None.     HISTORY:  ORDERING SYSTEM PROVIDED HISTORY: weakness  TECHNOLOGIST PROVIDED HISTORY:  Reason for exam:->weakness  Reason for Exam:  weakness    FINDINGS:  Normal cardiomediastinal silhouette. No acute airspace infiltrate. No  pneumothorax or pleural effusion  Impression: No acute cardiopulmonary findings      Problem List  Patient Active Problem List   Diagnosis    GERD (gastroesophageal reflux disease)    MANISH (generalized anxiety disorder)    Essential hypertension, benign    CKD (chronic kidney disease) stage 3, GFR 30-59 ml/min (HCC)    Recurrent deep vein thrombosis (DVT) of right lower extremity (HCC)    Mixed hyperlipidemia    Ni's esophagus without dysplasia    Primary insomnia    Multiple myeloma not having achieved remission ([facility]    Current moderate episode of major depressive disorder without prior episode ([facility]    Cancer, metastatic to bone ([facility]    Malaise and fatigue       IMPRESSION/RECOMMENDATIONS:    Multiple Myeloma  Plasma Cell Hyperplasia  Leukocytosis   Thrombocytopenia  - being treated at [facility] outpatient for MM. Was receiving Velcade, cytoxan, and Xgeva. - Cycle 11 day 1 received on 01/11/2023. Received subsequent Velcade on time. Due for C12D1 on 02/15/2023.  - patient likely has progressed while on treatment   - 2-4% of MM cases can progress to rare variant, Plasma Cell Leukemia. - concern for evidence of this occurring as plasma cells noted in diff, 39 on admission. Could also just be a progression of myeloma. - HGB 9.6 this morning. Continues to trend downward. - Plts 36 after receiving platelet transfusion yesterday   - spoke with pathology this morning, plasma cells increased to 55%  - Repeat bone marrow biopsy ordered for further workup and determination of next steps for treatment. Can give another platelet transfusion prior to biopsy but this will likely not stay consistently elevated due to underlying pathology. ELEONORA on CKD   - likely secondary to MM  - Cr 4.1 this AM. Has been trending upward.   - agree with IV fluids     Thank you for asking me to see the patient.      TANNER Grimaldo  Please Contact Through [employer]

## 2023-02-14 NOTE — CONSULTS
Nephrology Consult Note  [facility] [employer]        Reason for Consult: ELEONORA on CKD  Consulting Physician: Danny Campbell MD    HISTORY OF PRESENT ILLNESS:      The patient is a 65 y.o. male with significant past medical history of HTN, HLD, prostate and bladder cancer, DVT and GERD who presents with fatigue and generalized weakness the past few weeks. He was diagnosed to have multiple myeloma in 03/2022 and was getting treatment from [facility], last received on 1/11/23. Labs showed elevated WBC with peripheral smear having large population of plasma cells, platelets decreased to 10K from normal on 1/19/23. Hematology was consulted. Serum creatinine was 2.7mg/dL. Has a known history of CKD and follows with Dr. Minh Maria in the office. Baseline serum creatinine of 1.2-1.5mg/dL. Patient reports decreased PO intake, denies any intake of NSAIDs. He was started on IVF and we were consulted for further evaluation and management.       Past Medical History:        Diagnosis Date    Anxiety     Arthritis     Bladder cancer ([address]     DVT (deep venous thrombosis) ([address] 07/01/2014    two spontaneous DVTs left leg    Elevated fasting glucose     Hyperlipidemia     Hypertension     Malignant neoplasm of urinary bladder ([address] 02/04/2019    Multiple myeloma (HCC)     MVP (mitral valve prolapse)     Polyp of gallbladder     Prostate CA ([address] 2005       Past Surgical History:        Procedure Laterality Date    BLADDER SURGERY      tumor removed    CATARACT REMOVAL Bilateral     CHOLECYSTECTOMY, LAPAROSCOPIC N/A 1/10/2022    VIDEO LAPAROSCOPIC CHOLECYSTECTOMY performed by Yannick Lion MD at [address] [facility]  6/29/12    Polyps    COLONOSCOPY  07/12/2017    Polyp    COLONOSCOPY N/A 9/6/2022    COLONOSCOPY POLYPECTOMY SNARE/COLD BIOPSY performed by Mohinder Martinez MD at [address] [facility] BONE  3/3/2022    CT BIOPSY PERCUTANEOUS DEEP BONE 3/3/2022 Walker Hernández MD [address] [facility] [address] Right 06/1999    PENILE PROSTHESIS PLACEMENT      PROSTATECTOMY  2005 approx    Dr. Leonor Avery      In [place] - had one tonsil burned out    UPPER GASTROINTESTINAL ENDOSCOPY N/A 9/6/2022    EGD BIOPSY performed by Henrry Camarena MD at [address] [place] [address]       Current Medications:    No current facility-administered medications on file prior to encounter. Current Outpatient Medications on File Prior to Encounter   Medication Sig Dispense Refill    ondansetron (ZOFRAN-ODT) 8 MG TBDP disintegrating tablet DISSOLVE ONE TABLET BY MOUTH EVERY 8 HOURS AS NEEDED FOR NAUSEA OR VOMITING 60 tablet 0    rosuvastatin (CRESTOR) 20 MG tablet TAKE ONE TABLET BY MOUTH DAILY 90 tablet 1    fenofibrate (TRICOR) 54 MG tablet Take 1 tablet by mouth daily s [facility] pharmacy: Please dispense generic fenofibrate unless prescriber denote 90 tablet 1    cholestyramine (QUESTRAN) 4 GM/DOSE powder Take 1 packet by mouth 3 times daily (with meals) 378 g 5    OLANZapine (ZYPREXA) 5 MG tablet Take 5 mg by mouth nightly      bortezomib 5 mg/2 mLs in sodium chloride (PF) injection Inject 1.3 mg/m2 into the skin once      cyclophosphamide (CYTOXAN) 25 MG CAPS capsule Take by mouth daily      dexamethasone (DECADRON) 1 MG tablet Take 1 mg by mouth 2 times daily (with meals)      sertraline (ZOLOFT) 50 MG tablet Take 1.5 tablets by mouth daily 135 tablet 1    LORazepam (ATIVAN) 1 MG tablet Take 1 mg by mouth 4 times daily as needed. dronabinol (MARINOL) 5 MG capsule in the morning and at bedtime.       oxyCODONE (ROXICODONE) 5 MG immediate release tablet Take 5 mg by mouth every 4 hours as needed for Pain.      pantoprazole (PROTONIX) 40 MG tablet Take 1 tablet by mouth daily 30 tablet 5    apixaban (ELIQUIS) 5 MG TABS tablet TAKE 1 TABLET BY MOUTH TWO  TIMES DAILY (Patient not taking: Reported on 2/14/2023) 180 tablet 3       Allergies:  Atorvastatin and Lipitor    Social History:    Social History     Socioeconomic History Marital status:      Spouse name: Not on file    Number of children: 2    Years of education: Not on file    Highest education level: Not on file   Occupational History    Occupation:      Comment: retired   Tobacco Use    Smoking status: Never    Smokeless tobacco: Never   Vaping Use    Vaping Use: Never used   Substance and Sexual Activity    Alcohol use: Not Currently    Drug use: No    Sexual activity: Not Currently   Other Topics Concern    Not on file   Social History Narrative    , retired 2015     Social Determinants of Health     Financial Resource Strain: Not on file   Food Insecurity: Not on file   Transportation Needs: Not on file   Physical Activity: Unknown    Days of Exercise per Week: 0 days    Minutes of Exercise per Session: Not on file   Stress: Not on file   Social Connections: Not on file   Intimate Partner Violence: Not At Risk    Fear of Current or Ex-Partner: No    Emotionally Abused: No    Physically Abused: No    Sexually Abused: No   Housing Stability: Not on file       Family History:       Problem Relation Age of Onset    Lung Cancer Mother     Heart Disease Mother     Diabetes Father     Prostate Cancer Father     Diabetes Brother          REVIEW OF SYSTEMS:    Review of Systems   Constitutional:  Positive for activity change, appetite change and fatigue. Negative for chills and fever. HENT:  Positive for mouth sores. Respiratory: Negative. Cardiovascular: Negative. Gastrointestinal: Negative. Genitourinary: Negative. Musculoskeletal: Negative. Neurological:  Positive for weakness. Psychiatric/Behavioral: Negative. PHYSICAL EXAM:    Vitals:    /62 Comment: once sittin/71 once standing 109/63  Pulse 73   Temp 97.9 °F (36.6 °C) (Oral)   Resp 18   Ht 5' 9\" (1.753 m)   Wt 163 lb (73.9 kg)   SpO2 95%   BMI 24.07 kg/m²   I/O last 3 completed shifts:   In: 1295 [Blood:295; IV Piggyback:1000]  Out: 470 [Urine:470]  No intake/output data recorded. Physical Exam  Vitals reviewed. Constitutional:       General: He is not in acute distress. HENT:      Head: Normocephalic and atraumatic. Eyes:      General: No scleral icterus. Conjunctiva/sclera: Conjunctivae normal.   Cardiovascular:      Rate and Rhythm: Normal rate. Heart sounds: No friction rub. Pulmonary:      Effort: Pulmonary effort is normal. No respiratory distress. Breath sounds: No wheezing or rales. Abdominal:      General: Bowel sounds are normal. There is no distension. Tenderness: There is no abdominal tenderness. Musculoskeletal:      Right lower leg: No edema. Left lower leg: No edema. Neurological:      Mental Status: He is alert. DATA:    Recent Labs     02/13/23  1150 02/13/23  1702 02/14/23  0722   WBC 16.6* 14.9* 12.0*   HGB 11.2* 10.0* 9.6*   HCT 34.0* 31.1* 28.2*   MCV 97.9 100.7* 98.2   PLT 10* 10* 36*     Recent Labs     02/13/23  0948 02/13/23  1150 02/14/23  0722    141 142   K 4.8 4.6 4.2    106 110   CO2 18* 20* 19*   GLUCOSE 133* 128* 104*   MG 2.20  --   --    BUN 31* 39* 52*   CREATININE 2.7* 2.9* 4.1*   LABGLOM 23* 21* 14*           IMPRESSION/RECOMMENDATIONS:      Acute Kidney Injury. - Concern for myeloma kidney + pre-renal components (low BP, decreased PO intake). - UA on 2/9/23 showed RBC 0-2, proteinuria. Urine MAC 107mg/g. Will repeat UA with urine electrolytes. - Will check renal US.  - Clinically still looks dry, will continue with IVF hydration.  - Will check SFLC. If elevated, will discuss with heme-onc if there is benefit for TPE. - Avoid hypotension and nephrotoxic medications. CKD Stage 3b. - Presumed from HTN. - Previous baseline of 1.2-1.5mg/dL. - Follows with Dr. Amy Quiros in the office. Metabolic Acidosis. - Suspect from worsening ELEONORA +/- lactic acidosis. - Will add bicarb to IVF.     Hypertension.  - BP is on the low side. Not on any antihypertensive medications.    Multiple Myeloma/Leukocytosis/Thrmbocytopenia.  - Heme-onc following.  - Plan for BMB noted.    Thank you for allowing me to participate in the care of this patient.Please do not hesitate to contact me at [phone] if with questions.    Viviana Pedraza MD  [facility]  [employer]  2/14/2023

## 2023-02-14 NOTE — PLAN OF CARE
Problem: Safety - Adult  Goal: Free from fall injury  Outcome: Progressing  Flowsheets (Taken 2/14/2023 1009)  Free From Fall Injury: Instruct family/caregiver on patient safety

## 2023-02-14 NOTE — CARE COORDINATION
[facility]    Referral received from  to follow for home care services. I will follow for needs, and speak with patient to verify demos.     Douglas Chirinos RN, BSN CTN  [facility] [phone]

## 2023-02-14 NOTE — CARE COORDINATION
Case Management Assessment  Initial Evaluation    Date/Time of Evaluation: 2/14/2023 2:38 PM  Assessment Completed by: Ziyad Mai RN    If patient is discharged prior to next notation, then this note serves as note for discharge by case management. 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 Yes    Chart Reviewed: Yes      History Provided by: Patient, Significant Other  Patient Orientation: Alert and Oriented, Person, Place, Situation, Self    Patient Cognition: Alert    Hospitalization in the last 30 days (Readmission):  No    If yes, Readmission Assessment in CM Navigator will be completed. Advance Directives:      Code Status: Full Code   Patient's Primary Decision Maker is: Legal Next of Kin    Primary Decision Maker: Win Monsalve - Spouse - [phone]    Discharge Planning:    Patient lives with: Spouse/Significant Other Type of Home: House  Primary Care Giver: Self  Patient Support Systems include: Spouse/Significant Other   Current Financial resources: Medicare  Current community resources: None  Current services prior to admission: None            Current DME:              Type of Home Care services:  None    ADLS  Prior functional level:  Independent in ADLs/IADLs  Current functional level: Assistance with the following:, Toileting, Dressing, Bathing, Mobility    PT AM-PAC: 19 /24  OT AM-PAC: 20 /24    Family can provide assistance at DC: Yes  Would you like Case Management to discuss the discharge plan with any other family members/significant others, and if so, who? Yes (wife maryann)  Plans to Return to Present Housing: Yes (therapy with recs for Adriana Paez)  Other Identified Issues/Barriers to RETURNING to current housing: none  Potential Assistance needed at discharge: Outpatient PT/OT            Potential DME:    Patient expects to discharge to: House  Plan for transportation at discharge: Family    Financial    Payor: [address] [place] [address] / Plan: MEDICARE PART A AND B / Product Type: *No Product type* /     Does insurance require precert for SNF: No    Potential assistance Purchasing Medications: No  Meds-to-Beds request: Yes      [facility] [id number] - [address] [facility], [address] [facility]  [address]  Phone: [phone] Fax: [phone]    [facility] ([facility]) - [address] [facility] [phone] - F [phone]  [address] [address]  Phone: [phone] Fax: [phone]      Notes:    Factors facilitating achievement of predicted outcomes: Family support, Cooperative, and Pleasant    Barriers to discharge: none    Additional Case Management Notes: spoke with patient and wife. Reported had been IPTA but recently has had increased weakness. Difficulty managing stairs to bedroom. Discussed moving bed to first floor, there is a 1/2 bath there. Also discussed therapy recs for Adriana Paez ok with referral to any agency, no preference, not sure he will want at d/c. CM will continue to follow for any [facility] needs. Plan for [facility]. Referral to [facility].  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 Freedom of choice list with basic dialogue that supports the patient's individualized plan of care/goals and shares the quality data associated with the providers was provided to:     Patient Representative Name:       The Patient and/or Patient Representative Agree with the Discharge Plan?       Patricia Hercules RN  Case Management Department

## 2023-02-14 NOTE — PROGRESS NOTES
Comprehensive Nutrition Assessment    Type and Reason for Visit:  Initial, Positive Nutrition Screen    Nutrition Recommendations/Plan:   Continue regular diet   Add Ensure BID - monitor need for adjustment  Monitor po intakes, nutrition adequacy, weights, pertinent labs, BMs     Malnutrition Assessment:  Malnutrition Status: At risk for malnutrition (Comment) (02/14/23 1408)      Nutrition Assessment:    Positive nutrition screen for weight loss. Pt with PMHx of M myeloma with mets, CKD, admitted with c/o fatigue. Pt working with other providers today. Currently on a regular diet. No po intakes recorded yet. Noted ~4% loss x 1 year per EMR. Will add ONS to help pt meet nutrition needs. Will continue to monitor. Nutrition Related Findings:    Active BS. Last BM on 2/13. +1 pitting BLE edema Wound Type: None       Current Nutrition Intake & Therapies:    Average Meal Intake: Unable to assess  Average Supplements Intake: None Ordered  ADULT DIET; Regular    Anthropometric Measures:  Height: 5' 9\" (175.3 cm)  Ideal Body Weight (IBW): 160 lbs (73 kg)       Current Body Weight: 163 lb (73.9 kg),   IBW.  Weight Source: Not Specified  Current BMI (kg/m2): 24.1                          BMI Categories: Normal Weight (BMI 22.0 to 24.9) age over 72    Estimated Daily Nutrient Needs:  Energy Requirements Based On: Kcal/kg  Weight Used for Energy Requirements: Current  Energy (kcal/day): 7560-2376  Weight Used for Protein Requirements: Current  Protein (g/day): 74-89  Method Used for Fluid Requirements: 1 ml/kcal  Fluid (ml/day): 4659-8901    Nutrition Diagnosis:   Predicted inadequate energy intake related to inadequate protein-energy intake as evidenced by  (No intakes recorded yet)    Nutrition Interventions:   Food and/or Nutrient Delivery: Continue Current Diet, Start Oral Nutrition Supplement  Nutrition Education/Counseling: No recommendation at this time  Coordination of Nutrition Care: Continue to monitor while inpatient       Goals:     Goals: PO intake 50% or greater, prior to discharge       Nutrition Monitoring and Evaluation:   Behavioral-Environmental Outcomes: None Identified  Food/Nutrient Intake Outcomes: Food and Nutrient Intake, Supplement Intake  Physical Signs/Symptoms Outcomes: Weight    Discharge Planning:    Continue current diet, Continue Oral Nutrition Supplement     Krzysztof Chew RD, LD  Contact: [phone]

## 2023-02-14 NOTE — PROGRESS NOTES
A&Ox4. Transfused 1 Unit platelet. No active bleeding. No transfusion reaction. No complaints of /SOB, chest pain or heaviness. No cyanosis or pallor. Ambulates with walker x1. With blanchable redness on buttocks and bilateral heels (silicone foam applied). Will continue to monitor patient.

## 2023-02-14 NOTE — PROGRESS NOTES
4 Eyes Skin Assessment     The patient is being assess for  Shift Handoff    I agree that 2 RN's have performed a thorough Head to Toe Skin Assessment on the patient. ALL assessment sites listed below have been assessed. Areas assessed by both nurses: CATHERINE Choudhury/CATHERINE Wilson  [x]   Head, Face, and Ears   [x]   Shoulders, Back, and Chest  [x]   Arms, Elbows, and Hands   [x]   Coccyx, Sacrum, and IschIum  [x]   Legs, Feet, and Heels  Blanchable redness on buttocks and bilat heels. Does the Patient have Skin Breakdown?   No         Brant Prevention initiated:  No   Wound Care Orders initiated:  No      WOC nurse consulted for Pressure Injury (Stage 3,4, Unstageable, DTI, NWPT, and Complex wounds), New and Established Ostomies:  No      Nurse 1 eSignature: Electronically signed by Samuel Sánchez RN on 2/13/23 at 11:38 PM EST    **SHARE this note so that the co-signing nurse is able to place an eSignature**    Nurse 2 eSignature: {Esignature:[id number]}

## 2023-02-14 NOTE — PROGRESS NOTES
Occupational Therapy  Facility/Department: [facility] C3 TELE/MED SURG/ONC  Occupational Therapy Initial Assessment    Name: Thuy Macias  : 1945  MRN: [id number]  Date of Service: 2023    Discharge Recommendations:  24 hour supervision or assist, Home with Home health OT  OT Equipment Recommendations  Equipment Needed: No       Patient Diagnosis(es): The primary encounter diagnosis was Other fatigue. Diagnoses of Plasma cell hyperplasia, Thrombocytopenia (HCC), Orthostatic hypotension, and Acute renal failure superimposed on chronic kidney disease, unspecified CKD stage, unspecified acute renal failure type [facility]) were also pertinent to this visit. Past Medical History:  has a past medical history of Anxiety, Arthritis, Bladder cancer ([facility] [address] DVT (deep venous thrombosis) ([facility] [address] Elevated fasting glucose, Hyperlipidemia, Hypertension, Malignant neoplasm of urinary bladder ([address] Multiple myeloma ([facility] [address] MVP (mitral valve prolapse), Polyp of gallbladder, and Prostate CA ([facility] [address] Past Surgical History:  has a past surgical history that includes Tonsillectomy; Colonoscopy (12); Colonoscopy (2017); Bladder surgery; Cataract removal (Bilateral); Prostatectomy ( approx); Inguinal hernia repair (Right, 1999); Penile prosthesis placement; Cholecystectomy, laparoscopic (N/A, 1/10/2022); CT BIOPSY DEEP BONE PERCUTANEOUS (3/3/2022); Colonoscopy (N/A, 2022); and Upper gastrointestinal endoscopy (N/A, 2022). Assessment   Performance deficits / Impairments: Decreased functional mobility ; Decreased endurance;Decreased ADL status; Decreased posture;Decreased ROM; Decreased balance;Decreased strength;Decreased safe awareness    Assessment: Pt is 64 yo male presenting from home with spouse where he is IND with all ADLs and mobility. He is functioning below baseline this date, requiring CGA for all mobility and SBA for seated ADLs.  Pt is limited by fatigue this date, requiring seated rest after mobility from bed to chair. Pt would benefit from continued acute OT at this time. Recommend home with 24hr and HHOT upon d/c to increase function back to baseline. Prognosis: Good  Decision Making: Low Complexity  REQUIRES OT FOLLOW-UP: Yes  Activity Tolerance  Activity Tolerance: Patient limited by fatigue        Plan   Occupational Therapy Plan  Times Per Week: 2-3x/wk  Current Treatment Recommendations: Strengthening, ROM, Balance training, Functional mobility training, Endurance training, Equipment evaluation, education, & procurement, Patient/Caregiver education & training, Safety education & training, Self-Care / ADL, Home management training     Restrictions  Restrictions/Precautions  Restrictions/Precautions: Fall Risk, General Precautions, Up as Tolerated  Required Braces or Orthoses?: No  Position Activity Restriction  Other position/activity restrictions: IV    Subjective   General  Chart Reviewed: Yes, Orders, Progress Notes, History and Physical, Labs, Imaging  Patient assessed for rehabilitation services?: Yes  Family / Caregiver Present: Yes (Pt's wife was present throughout evaluation)  Referring Practitioner: Trena Renee MD  Diagnosis: Malaise and fatigue  Subjective  Subjective: Pt semi fowlers in bed upon OT arrival. Agreeable to OT evaluation.      Social/Functional History  Social/Functional History  Lives With: Spouse  Type of Home: Condo  Home Layout: Two level, Bed/Bath upstairs (full flight upstairs 1 rail, on left)  Home Access: Stairs to enter without rails  Entrance Stairs - Number of Steps: 2  Bathroom Shower/Tub: Tub/Shower unit, Shower chair without back  Bathroom Toilet: Standard  Bathroom Equipment: Shower chair  Bathroom Accessibility: Accessible  Has the patient had two or more falls in the past year or any fall with injury in the past year?: No  ADL Assistance: [facility] [address]: Independent  Homemaking Responsibilities: Yes (wife helps cook and clean)  Ambulation Assistance: Independent  Transfer Assistance: Independent  Active : Yes  Mode of Transportation: Car  Occupation: Retired  Type of Occupation: used to   Leisure & Hobbies: likes to watch TV, play chess       Objective   Heart Rate: 73  Heart Rate Source: Monitor  BP: 105/62 (once sittin/71 once standing 109/63)  BP Location: Left upper arm  BP Method: Automatic  Patient Position: Sitting  MAP (Calculated): 76  Resp: 18  SpO2: 95 %  O2 Device: None (Room air)          Observation/Palpation  Posture: Fair  Safety Devices  Type of Devices: All carrie prominences offloaded; All fall risk precautions in place;Call light within reach; Chair alarm in place;Gait belt;Nurse notified; Left in chair;Patient at risk for falls  Restraints  Restraints Initially in Place: No  Bed Mobility Training  Bed Mobility Training: Yes  Overall Level of Assistance: Stand-by assistance  Interventions: Safety awareness training;Verbal cues  Supine to Sit: Stand-by assistance (bed flat, no use of rails)  Sit to Supine: Stand-by assistance (bed flat, no use of rails)  Scooting: Stand-by assistance (to EOB)  Balance  Sitting: Intact  Standing: Impaired  Standing - Static: Fair  Standing - Dynamic: Fair;Constant support (with RW)  Transfer Training  Transfer Training: Yes  Overall Level of Assistance: Contact-guard assistance  Interventions: Verbal cues; Safety awareness training  Sit to Stand: Contact-guard assistance (VC for hand placement)  Stand to Sit: Contact-guard assistance (VC for hand placement)  Bed to Chair: Contact-guard assistance (use of RW)  Gait Training  Gait Training: Yes  Gait  Overall Level of Assistance: Contact-guard assistance;Assist X1 (with RW)  Interventions: Verbal cues; Visual cues; Safety awareness training  Base of Support: Widened  Speed/Ramila: Delayed  Step Length: Left shortened;Right shortened  Gait Abnormalities: Shuffling gait  Distance (ft): 10 Feet  Assistive Device: Walker, rolling        ADL  LE Dressing: Supervision  LE Dressing Skilled Clinical Factors: seated in chair to don/doff socks using figure 4 position              Vision  Vision: Impaired  Vision Exceptions: Wears glasses for reading  Hearing  Hearing: Within functional limits  Cognition  Overall Cognitive Status: WFL  Attention Span: Appears intact  Orientation  Overall Orientation Status: Within Functional Limits  Orientation Level: Oriented X4                  Education Given To: Patient  Education Provided: Role of Therapy;Plan of Care;Precautions; ADL Adaptive Strategies;Transfer Training;Energy Conservation  Education Method: Verbal;Demonstration  Barriers to Learning: None  Education Outcome: Verbalized understanding;Demonstrated understanding  LUE AROM (degrees)  LUE AROM : WFL  Left Hand AROM (degrees)  Left Hand AROM: WFL  RUE AROM (degrees)  RUE AROM : WFL  Right Hand AROM (degrees)  Right Hand AROM: WFL                AM-PAC Score        AM-PAC Inpatient Daily Activity Raw Score: 20 (02/14/23 1344)  AM-PAC Inpatient ADL T-Scale Score : 42.03 (02/14/23 1344)  ADL Inpatient CMS 0-100% Score: 38.32 (02/14/23 1344)  ADL Inpatient CMS G-Code Modifier : CJ (02/14/23 1344)      Goals  Short Term Goals  Time Frame for Short Term Goals: Short term goals to be met by 2/21  Short Term Goal 1: Pt will perform LB dressing with IND by 2/17  Short Term Goal 2: Pt will tolerate standing for 5 mins to perform ADL tasks  Short Term Goal 3: Pt will perform toileting with SPV  Patient Goals   Patient goals : \"get better\"       Therapy Time   Individual Concurrent Group Co-treatment   Time In 1045         Time Out 1109         Minutes 24         Timed Code Treatment Minutes: 14 Minutes (+10 min OT eval)     If pt is unable to be seen after this session, please let this note serve as discharge summary. Please see case management note for discharge disposition. Thank you.     Marzena Hinkle OT

## 2023-02-14 NOTE — PROGRESS NOTES
Shift assessment completed and charted. VSS. A/O x4. Pt platelets went up after transfusion last night. Pt understands conditions. All meds given per [facility]. Med rec completed. Tolerated breakfast. No further needs at this time.

## 2023-02-14 NOTE — DISCHARGE INSTR - COC
Continuity of Care Form    Patient Name: Hilario Hernandez   :  1945  MRN:  [id number]    Admit date:  2023  Discharge date:  ***    Code Status Order: Full Code   Advance Directives:     Admitting Physician:  Myah Navarro MD  PCP: Wilber Barboza DO    Discharging Nurse: [facility] Unit/Room#: 9660/8854-64  Discharging Unit Phone Number: ***    Emergency Contact:   Extended Emergency Contact Information  Primary Emergency Contact: Damaris Vick  Address: [address] [place] [address]           [facility], [address] [place] [address] [address] [facility] [address]  Phone: [phone]  Mobile Phone: [phone]  Relation: Spouse    Past Surgical History:  Past Surgical History:   Procedure Laterality Date    BLADDER SURGERY      tumor removed    CATARACT REMOVAL Bilateral     CHOLECYSTECTOMY, LAPAROSCOPIC N/A 1/10/2022    VIDEO LAPAROSCOPIC CHOLECYSTECTOMY performed by Nohelia Wadsworth MD at [facility]  12    Polyps    COLONOSCOPY  2017    Polyp    COLONOSCOPY N/A 2022    COLONOSCOPY POLYPECTOMY SNARE/COLD BIOPSY performed by Jere Mckeon MD at [address] [facility] BONE  3/3/2022    CT BIOPSY PERCUTANEOUS DEEP BONE 3/3/2022 Garland Guadarrama MD [facility] AT [place] CT SCAN    INGUINAL HERNIA REPAIR Right 1999    PENILE PROSTHESIS PLACEMENT      PROSTATECTOMY  2005 approx    Dr. Vee Lopez      In [facility] - had one tonsil burned out    UPPER GASTROINTESTINAL ENDOSCOPY N/A 2022    EGD BIOPSY performed by Jere Mckeon MD at [address] [facility]       Immunization History:   Immunization History   Administered Date(s) Administered    COVID-19, [address] [facility] border, Primary or Immunocompromised, (age 12y+), IM, 100 mcg/0.5mL 2021, 2021, 2021    COVID-19, PFIZER Bivalent BOOSTER, DO NOT Dilute, (age 12y+), IM, 30 mcg/0.3 mL 10/11/2022    Influenza Vaccine, unspecified formulation 2016    Influenza Virus Vaccine 2016    Influenza, FLUAD, (age 72 y+), Adjuvanted, 0.5mL 10/15/2020, 2021    Influenza, Muna Niraj (age 72 y+), High Dose, 0.7mL 10/29/2022    Influenza, High Dose (Fluzone 65 yrs and older) 2016, 2018    PPD Test 2003    Pneumococcal Conjugate 13-valent (Rubvfnm63) 2016    Pneumococcal Polysaccharide (Efoivanpx50) 2014    Td, unspecified formulation 2004    Tdap (Boostrix, Adacel) 10/15/2012, 2017    Zoster Recombinant (Shingrix) 2020, 2020       Active Problems:  Patient Active Problem List   Diagnosis Code    GERD (gastroesophageal reflux disease) K21.9    MANISH (generalized anxiety disorder) F41.1    Essential hypertension, benign I10    CKD (chronic kidney disease) stage 3, GFR 30-59 ml/min ([facility]) N18.30    Recurrent deep vein thrombosis (DVT) of right lower extremity ([facility] I82.401    Mixed hyperlipidemia E78.2    Ni's esophagus without dysplasia K22.70    Primary insomnia F51.01    Multiple myeloma not having achieved remission ([facility]) C90.00    Current moderate episode of major depressive disorder without prior episode ([facility] F32.1    Cancer, metastatic to bone ([facility]) C79.51    Malaise and fatigue R53.81, R53.83       Isolation/Infection:   Isolation            No Isolation          Patient Infection Status       Infection Onset Added Last Indicated Last Indicated By Review Planned Expiration Resolved Resolved By    None active    Resolved    COVID-19 (Rule Out) 23 COVID-19 & Influenza Combo (Ordered)   23 Rule-Out Test Resulted    COVID-19 (Rule Out) 22 COVID-19 (Ordered)   22 Rule-Out Test Resulted            Nurse Assessment:  Last Vital Signs: /62 Comment: once sittin/71 once standing 109/63  Pulse 73   Temp 97.9 °F (36.6 °C) (Oral)   Resp 18   Ht 5' 9\" (1.753 m)   Wt 163 lb (73.9 kg)   SpO2 95%   BMI 24.07 kg/m²     Last documented pain score (0-10 scale):    Last Weight:   Wt Readings from Last 1 Encounters:   23 163 lb (73.9 kg)     Mental Status:  {IP PT MENTAL STATUS:73683}    IV Access:  { JAJA IV ACCESS:942376290}    Nursing Mobility/ADLs:  Walking   {CHP DME UADH:930157760}  Transfer  {CHP DME MXGP:420905425}  Bathing  {CHP DME ATKO:554144659}  Dressing  {CHP DME JTWJ:892645818}  Toileting  {CHP DME JWIT:035474825}  Feeding  {CHP DME QZIK:945866366}  Med Admin  {CHP DME JQPI:165233186}  Med Delivery   { JAJA MED Delivery:909549213}    Wound Care Documentation and Therapy:  Incision Abdomen Medial;Upper (Active)   Number of days:         Elimination:  Continence: Bowel: {YES / ZJ:59272}  Bladder: {YES / T}  Urinary Catheter: {Urinary Catheter:564018083}   Colostomy/Ileostomy/Ileal Conduit: {YES / MP:11942}       Date of Last BM: ***    Intake/Output Summary (Last 24 hours) at 2023 1446  Last data filed at 2023 0354  Gross per 24 hour   Intake 1295 ml   Output 470 ml   Net 825 ml     I/O last 3 completed shifts:   In: 1 [Blood:295; IV Piggyback:1000]  Out: 470 [Urine:470]    Safety Concerns:     [employer] Safety Concerns:936505944}    Impairments/Disabilities:      [employer] Impairments/Disabilities:809521527}    Nutrition Therapy:  Current Nutrition Therapy:   [employer] Diet List:095889684}    Routes of Feeding: {P DME Other Feedings:889373220}  Liquids: {Slp liquid thickness:08955}  Daily Fluid Restriction: {CHP DME Yes amt example:208686574}  Last Modified Barium Swallow with Video (Video Swallowing Test): {Done Not Done YYZW:951673778}    Treatments at the Time of Hospital Discharge:   Respiratory Treatments: ***  Oxygen Therapy:  {Therapy; copd oxygen:48525}  Ventilator:    {[facility] Vent JZMP:342177591}    Rehab Therapies: {THERAPEUTIC INTERVENTION:9700300395}  Weight Bearing Status/Restrictions: { CC Weight Bearin}  Other Medical Equipment (for information only, NOT a DME order):  {EQUIPMENT:603709020}  Other Treatments: HOME HEALTH CARE: LEVEL 3 SAFETY     Home health agency to establish plan of care for patient over 60 day period   Nursing  Initial home SN evaluation visit to occur within 24-48 hours for:  1)  medication management  2)  VS and clinical assessment  3)  S&S chronic disease exacerbation education + when to contact MD/NP  4)  care coordination  Medication Reconciliation during 1st SN visit  PT/OT/Speech   Evaluations in home within 24-48 hours of discharge to include DME and home safety   Frontload therapy 5 days, then 3x a week   OT to evaluate if patient has Home Health Aide needs for personal care    evaluation within 24-48 hours to evaluate resources & insurance for potential AL, [place], LTC, and Medicaid options   Palliative Care referral within 5 days of hospital discharge   PCP Visit scheduled within 3 - 7 days of hospital discharge    Tele [facility] - Home care Vitals (If patient is agreeable and meets guidelines)      Patient's personal belongings (please select all that are sent with patient):  {CHP DME Belongings:673418540}    RN SIGNATURE:  {Esignature:440781763}    CASE MANAGEMENT/SOCIAL WORK SECTION    Inpatient Status Date: 2/13/23    Readmission Risk Assessment Score:  Readmission Risk              Risk of Unplanned Readmission:  28           Discharging to Facility/ Agency     [facility]   [address] [facility] [address]   [phone]      [employer] infusion supplies; picc line care and flush per protocol    / signature: Electronically signed by Joshua Wong RN on 2/21/23 at 10:22 AM EST    PHYSICIAN SECTION    Prognosis: Good    Condition at Discharge: Stable    Rehab Potential (if transferring to Rehab): Good    Recommended Labs or Other Treatments After Discharge: PT/OT, skilled nursing; HRS if patient agreeable  PICC  Care: change dressing weekly and prn  Flushes:  Sodium chloride 0.9% 10 mL syringe, flush vascular catheter device per policy  and procedure      Physician Certification: I certify the above information and transfer of Nayeli Dutton  is necessary for the continuing treatment of the diagnosis listed and that he requires [address] for less 30 days.      Update Admission H&P: No change in H&P    PHYSICIAN SIGNATURE:  Electronically signed by Jonathon Mendoza MD on 2/21/23 at 10:16 AM EST

## 2023-02-14 NOTE — PROGRESS NOTES
Physical Therapy  Facility/Department: [place] [address] TELE/MED SURG/ONC  Physical Therapy Initial Assessment    Name: Dimas Bunch  : 1945  MRN: [id number]  Date of Service: 2023    Discharge Recommendations:  Home with Home health PT, 24 hour supervision or assist   PT Equipment Recommendations  Equipment Needed: Yes  Mobility Devices: Dearl Stalling: Rolling      Patient Diagnosis(es): The primary encounter diagnosis was Other fatigue. Diagnoses of Plasma cell hyperplasia, Thrombocytopenia (HCC), Orthostatic hypotension, and Acute renal failure superimposed on chronic kidney disease, unspecified CKD stage, unspecified acute renal failure type [facility]) were also pertinent to this visit. Past Medical History:  has a past medical history of Anxiety, Arthritis, Bladder cancer ([facility] DVT (deep venous thrombosis) ([facility] Elevated fasting glucose, Hyperlipidemia, Hypertension, Malignant neoplasm of urinary bladder ([facility] Multiple myeloma ([facility] MVP (mitral valve prolapse), Polyp of gallbladder, and Prostate CA ([facility] Past Surgical History:  has a past surgical history that includes Tonsillectomy; Colonoscopy (12); Colonoscopy (2017); Bladder surgery; Cataract removal (Bilateral); Prostatectomy (2005); Inguinal hernia repair (Right, 1999); Penile prosthesis placement; Cholecystectomy, laparoscopic (N/A, 1/10/2022); CT BIOPSY DEEP BONE PERCUTANEOUS (3/3/2022); Colonoscopy (N/A, 2022); and Upper gastrointestinal endoscopy (N/A, 2022). Assessment   Body Structures, Functions, Activity Limitations Requiring Skilled Therapeutic Intervention: Decreased functional mobility ; Decreased strength;Decreased endurance;Decreased body mechanics; Decreased balance  Assessment: Pt is a 65year old male admitted to [facility] for malaise and fatigue.  Pt notes at baseline he lives with his wife in [facility], 2 steps to enter and full flight to bed/bath upstairs, typically indep at baseline with all ADL's although notes Lio Tobar he has been more fatigued and the stairs are becoming more difficult. Pt presents this date needing SBA for bed mobility and CGA for transfer and AMB up to 10ft with RW. Pt notes he does not had a RW at home and would need one upon DC. Pt will be kept on acute PT caseload to prevent further deconditioning and improve functional mobility. Upon DC pt is appropriate to return home with 24/7 assist and HHPT. Co-tx collaboration this date to safely meet goals and will have better occupational performance outcomes with in a co-treatment than 1:1 session.     Treatment Diagnosis: general weakness  Therapy Prognosis: Good  Decision Making: Low Complexity  Requires PT Follow-Up: Yes     Plan   Physcial Therapy Plan  General Plan: 3-5 times per week  Current Treatment Recommendations: Strengthening, Balance training, Functional mobility training, Transfer training, ADL/Self-care training, IADL training, Endurance training, Gait training, Stair training, Neuromuscular re-education, Home exercise program, Safety education & training, Patient/Caregiver education & training, Equipment evaluation, education, & procurement, Therapeutic activities  Safety Devices  Type of Devices: Call light within reach, Chair alarm in place, Gait belt, Left in chair  Restraints  Restraints Initially in Place: No     Restrictions  Restrictions/Precautions  Restrictions/Precautions: Fall Risk, General Precautions, Up as Tolerated  Required Braces or Orthoses?: No  Position Activity Restriction  Other position/activity restrictions: IV     Subjective   Pain: no pain noted  General  Chart Reviewed: Yes  Patient assessed for rehabilitation services?: Yes  Additional Pertinent Hx: H/o M, Myeloma , CKD  Family / Caregiver Present: Yes  Referring Practitioner: Sandra Dejesus MD  Referral Date : 02/14/23  Diagnosis: Malaise and Fatigue  Follows Commands: Within Functional Limits  Subjective  Subjective: Pt agreeable to therapy Social/Functional History  Social/Functional History  Lives With: Spouse  Type of Home: Condo  Home Layout: Two level, Bed/Bath upstairs (full flight upstairs 1 rail, on left)  Home Access: Stairs to enter without rails  Entrance Stairs - Number of Steps: 2  Bathroom Shower/Tub: Tub/Shower unit, Shower chair without back  Bathroom Toilet: Standard  Bathroom Equipment: Shower chair  Bathroom Accessibility: Accessible  Has the patient had two or more falls in the past year or any fall with injury in the past year?: No  ADL Assistance: Independent  Homemaking Assistance: Independent  Homemaking Responsibilities: Yes (wife helps cook and clean)  Ambulation Assistance: Independent  Transfer Assistance: Independent  Active : Yes  Mode of Transportation: Car  Occupation: Retired  Type of Occupation: used to   Leisure & Hobbies: likes to watch TV, play chess  Vision/Hearing  Vision  Vision: Impaired  Vision Exceptions: Wears glasses for reading  Hearing  Hearing: Within functional limits    Cognition   Orientation  Overall Orientation Status: Within Normal Limits  Orientation Level: Oriented X4  Cognition  Overall Cognitive Status: Exceptions  Attention Span: Appears intact     Objective   Heart Rate: 73  Heart Rate Source: Monitor  BP: 105/62 (once sittin/71 once standing 109/63)  BP Location: Left upper arm  BP Method: Automatic  Patient Position: Sitting  MAP (Calculated): 76  Resp: 18  SpO2: 95 %  O2 Device: None (Room air)     Observation/Palpation  Posture: Fair  Gross Assessment  AROM: Within functional limits  PROM: Within functional limits  Strength: Generally decreased, functional  Coordination: Within functional limits  Tone: Normal  Sensation: Intact                 Bed Mobility Training  Bed Mobility Training: Yes  Overall Level of Assistance: Stand-by assistance  Interventions: Safety awareness training;Verbal cues  Supine to Sit: Stand-by assistance  Sit to Supine:  (pt in chair at end of session)  Scooting: Stand-by assistance  Balance  Sitting: Intact  Standing: Impaired  Standing - Static: Fair  Standing - Dynamic: Fair;Constant support (with RW)  Transfer Training  Transfer Training: Yes  Overall Level of Assistance: Contact-guard assistance  Interventions: Verbal cues; Safety awareness training  Sit to Stand: Contact-guard assistance  Stand to Sit: Contact-guard assistance  Bed to Chair: Contact-guard assistance  Gait Training  Gait Training: Yes  Gait  Overall Level of Assistance: Contact-guard assistance;Assist X1 (with RW)  Interventions: Verbal cues; Visual cues; Safety awareness training  Base of Support: Widened  Speed/Ramila: Delayed  Step Length: Left shortened;Right shortened  Gait Abnormalities: Shuffling gait  Distance (ft): 10 Feet  Assistive Device: Walker, rolling                 Exercise Treatment: Pt tolerated 1x10 AP, LAQ, seated marches, hip ABD and ADD                                                     AM-PAC Score  AM-PAC Inpatient Mobility Raw Score : 19 (02/14/23 1124)  AM-PAC Inpatient T-Scale Score : 45.44 (02/14/23 1124)  Mobility Inpatient CMS 0-100% Score: 41.77 (02/14/23 1124)  Mobility Inpatient CMS G-Code Modifier : CK (02/14/23 1124)             Goals  Short Term Goals  Time Frame for Short Term Goals: 1 week, by 2/21/2023  Short Term Goal 1: pt will complete bed mobility mod I  Short Term Goal 2: Pt will complete functional transfer with Mod I and LRAD  Short Term Goal 3: Pt will AMB 150ft with mod I and LRAD  Short Term Goal 4: Pt will ascend and decend 2 steps with CGA  Short Term Goal 5: by 2/18/2023 pt will participate in 12-15 reps of LE strengthening exercises  Patient Goals   Patient Goals : \"to go home\"       Education  Patient Education  Education Given To: Patient  Education Provided: Role of Therapy;Plan of Care;Home Exercise Program  Education Method: Demonstration;Verbal  Barriers to Learning: None  Education Outcome: Verbalized understanding      Therapy Time   Individual Concurrent Group Co-treatment   Time In       1045   Time Out       1109   Minutes       24   Timed Code Treatment Minutes: 14 Minutes     If pt is unable to be seen after this session, please let this note serve as discharge summary. Please see case management note for discharge disposition. Thank you.     Stephy Aquino, PT

## 2023-02-15 ENCOUNTER — APPOINTMENT (OUTPATIENT)
Dept: CT IMAGING | Age: 78
DRG: 841 | End: 2023-02-15
Payer: MEDICARE

## 2023-02-15 LAB
ALBUMIN SERPL-MCNC: 3.4 G/DL (ref 3.4–5)
ANION GAP SERPL CALCULATED.3IONS-SCNC: 12 MMOL/L (ref 3–16)
ANISOCYTOSIS: ABNORMAL
ATYPICAL LYMPHOCYTE RELATIVE PERCENT: 70 % (ref 0–6)
BANDED NEUTROPHILS RELATIVE PERCENT: 4 % (ref 0–7)
BASOPHILS ABSOLUTE: 0 K/UL (ref 0–0.2)
BASOPHILS RELATIVE PERCENT: 0 %
BLOOD BANK DISPENSE STATUS: NORMAL
BLOOD BANK PRODUCT CODE: NORMAL
BPU ID: NORMAL
BUN BLDV-MCNC: 45 MG/DL (ref 7–20)
CALCIUM SERPL-MCNC: 7.8 MG/DL (ref 8.3–10.6)
CHLORIDE BLD-SCNC: 111 MMOL/L (ref 99–110)
CO2: 21 MMOL/L (ref 21–32)
CREAT SERPL-MCNC: 3.1 MG/DL (ref 0.8–1.3)
DESCRIPTION BLOOD BANK: NORMAL
EOSINOPHILS ABSOLUTE: 0.1 K/UL (ref 0–0.6)
EOSINOPHILS RELATIVE PERCENT: 1 %
GFR SERPL CREATININE-BSD FRML MDRD: 20 ML/MIN/{1.73_M2}
GLUCOSE BLD-MCNC: 97 MG/DL (ref 70–99)
HCT VFR BLD CALC: 26.3 % (ref 40.5–52.5)
HEMATOLOGY PATH CONSULT: NO
HEMOGLOBIN: 9.1 G/DL (ref 13.5–17.5)
KAPPA, FREE LIGHT CHAINS, SERUM: 5.57 MG/L (ref 3.3–19.4)
KAPPA/LAMBDA RATIO: 0 (ref 0.26–1.65)
KAPPA/LAMBDA TEST COMMENT: ABNORMAL
LAMBDA, FREE LIGHT CHAINS, SERUM: 3523 MG/L (ref 5.71–26.3)
LYMPHOCYTES ABSOLUTE: 8.1 K/UL (ref 1–5.1)
LYMPHOCYTES RELATIVE PERCENT: 7 %
MACROCYTES: ABNORMAL
MCH RBC QN AUTO: 33.9 PG (ref 26–34)
MCHC RBC AUTO-ENTMCNC: 34.7 G/DL (ref 31–36)
MCV RBC AUTO: 97.8 FL (ref 80–100)
METAMYELOCYTES RELATIVE PERCENT: 1 %
MONOCYTES ABSOLUTE: 0.5 K/UL (ref 0–1.3)
MONOCYTES RELATIVE PERCENT: 5 %
NEUTROPHILS ABSOLUTE: 1.8 K/UL (ref 1.7–7.7)
NEUTROPHILS RELATIVE PERCENT: 12 %
NUCLEATED RED BLOOD CELLS: 2 /100 WBC
PDW BLD-RTO: 15.2 % (ref 12.4–15.4)
PHOSPHORUS: 2.9 MG/DL (ref 2.5–4.9)
PLATELET # BLD: 23 K/UL (ref 135–450)
PLATELET SLIDE REVIEW: ABNORMAL
PMV BLD AUTO: 6.9 FL (ref 5–10.5)
POTASSIUM SERPL-SCNC: 3.8 MMOL/L (ref 3.5–5.1)
RBC # BLD: 2.69 M/UL (ref 4.2–5.9)
SODIUM BLD-SCNC: 144 MMOL/L (ref 136–145)
WBC # BLD: 10.5 K/UL (ref 4–11)

## 2023-02-15 PROCEDURE — 88342 IMHCHEM/IMCYTCHM 1ST ANTB: CPT

## 2023-02-15 PROCEDURE — 6370000000 HC RX 637 (ALT 250 FOR IP): Performed by: INTERNAL MEDICINE

## 2023-02-15 PROCEDURE — 80069 RENAL FUNCTION PANEL: CPT

## 2023-02-15 PROCEDURE — 88305 TISSUE EXAM BY PATHOLOGIST: CPT

## 2023-02-15 PROCEDURE — 38221 DX BONE MARROW BIOPSIES: CPT

## 2023-02-15 PROCEDURE — 88311 DECALCIFY TISSUE: CPT

## 2023-02-15 PROCEDURE — 6360000002 HC RX W HCPCS: Performed by: INTERNAL MEDICINE

## 2023-02-15 PROCEDURE — 07DR3ZX EXTRACTION OF ILIAC BONE MARROW, PERCUTANEOUS APPROACH, DIAGNOSTIC: ICD-10-PCS | Performed by: STUDENT IN AN ORGANIZED HEALTH CARE EDUCATION/TRAINING PROGRAM

## 2023-02-15 PROCEDURE — 36430 TRANSFUSION BLD/BLD COMPNT: CPT

## 2023-02-15 PROCEDURE — 1200000000 HC SEMI PRIVATE

## 2023-02-15 PROCEDURE — 6360000002 HC RX W HCPCS: Performed by: STUDENT IN AN ORGANIZED HEALTH CARE EDUCATION/TRAINING PROGRAM

## 2023-02-15 PROCEDURE — 85025 COMPLETE CBC W/AUTO DIFF WBC: CPT

## 2023-02-15 PROCEDURE — 36415 COLL VENOUS BLD VENIPUNCTURE: CPT

## 2023-02-15 PROCEDURE — 77012 CT SCAN FOR NEEDLE BIOPSY: CPT

## 2023-02-15 PROCEDURE — 2580000003 HC RX 258: Performed by: INTERNAL MEDICINE

## 2023-02-15 PROCEDURE — 88313 SPECIAL STAINS GROUP 2: CPT

## 2023-02-15 PROCEDURE — 2500000003 HC RX 250 WO HCPCS: Performed by: INTERNAL MEDICINE

## 2023-02-15 RX ORDER — ONDANSETRON 2 MG/ML
INJECTION INTRAMUSCULAR; INTRAVENOUS
Status: COMPLETED | OUTPATIENT
Start: 2023-02-15 | End: 2023-02-15

## 2023-02-15 RX ORDER — SODIUM CHLORIDE 9 MG/ML
INJECTION, SOLUTION INTRAVENOUS PRN
Status: DISCONTINUED | OUTPATIENT
Start: 2023-02-15 | End: 2023-02-17 | Stop reason: SDUPTHER

## 2023-02-15 RX ORDER — FENTANYL CITRATE 50 UG/ML
INJECTION, SOLUTION INTRAMUSCULAR; INTRAVENOUS
Status: COMPLETED | OUTPATIENT
Start: 2023-02-15 | End: 2023-02-15

## 2023-02-15 RX ORDER — MIDAZOLAM HYDROCHLORIDE 1 MG/ML
INJECTION INTRAMUSCULAR; INTRAVENOUS
Status: COMPLETED | OUTPATIENT
Start: 2023-02-15 | End: 2023-02-15

## 2023-02-15 RX ADMIN — PANTOPRAZOLE SODIUM 40 MG: 40 TABLET, DELAYED RELEASE ORAL at 12:55

## 2023-02-15 RX ADMIN — BUSPIRONE HYDROCHLORIDE 10 MG: 5 TABLET ORAL at 19:14

## 2023-02-15 RX ADMIN — FENTANYL CITRATE 25 MCG: 50 INJECTION INTRAMUSCULAR; INTRAVENOUS at 11:18

## 2023-02-15 RX ADMIN — ONDANSETRON 4 MG: 2 INJECTION INTRAMUSCULAR; INTRAVENOUS at 11:00

## 2023-02-15 RX ADMIN — SODIUM BICARBONATE: 84 INJECTION, SOLUTION INTRAVENOUS at 01:38

## 2023-02-15 RX ADMIN — SODIUM BICARBONATE: 84 INJECTION, SOLUTION INTRAVENOUS at 15:18

## 2023-02-15 RX ADMIN — CHOLESTYRAMINE 4 G: 4 POWDER, FOR SUSPENSION ORAL at 19:14

## 2023-02-15 RX ADMIN — FENTANYL CITRATE 25 MCG: 50 INJECTION INTRAMUSCULAR; INTRAVENOUS at 11:21

## 2023-02-15 RX ADMIN — BUSPIRONE HYDROCHLORIDE 10 MG: 5 TABLET ORAL at 12:55

## 2023-02-15 RX ADMIN — ONDANSETRON 4 MG: 4 TABLET, ORALLY DISINTEGRATING ORAL at 15:11

## 2023-02-15 RX ADMIN — CHOLESTYRAMINE 4 G: 4 POWDER, FOR SUSPENSION ORAL at 12:55

## 2023-02-15 RX ADMIN — SERTRALINE 75 MG: 50 TABLET, FILM COATED ORAL at 12:55

## 2023-02-15 RX ADMIN — MIDAZOLAM 0.5 MG: 1 INJECTION INTRAMUSCULAR; INTRAVENOUS at 11:18

## 2023-02-15 RX ADMIN — OLANZAPINE 5 MG: 5 TABLET, FILM COATED ORAL at 19:14

## 2023-02-15 RX ADMIN — ONDANSETRON 4 MG: 2 INJECTION INTRAMUSCULAR; INTRAVENOUS at 00:13

## 2023-02-15 RX ADMIN — MIDAZOLAM 0.5 MG: 1 INJECTION INTRAMUSCULAR; INTRAVENOUS at 11:21

## 2023-02-15 RX ADMIN — LORAZEPAM 1 MG: 1 TABLET ORAL at 15:11

## 2023-02-15 RX ADMIN — LORAZEPAM 1 MG: 1 TABLET ORAL at 00:13

## 2023-02-15 NOTE — PROGRESS NOTES
Shift assessment completed. Pt A&O x4;anxious. VSS. AM medications held till [facility] biopsy completed per IR. Pt NPO per order. Pt updated on plan for today. Pt denies any pain at this time. Denies any needs at this time. Bed locked and in lowest position. Call light within reach. Will continue to monitor.  Electronically signed by Porsha Singh RN on 2/15/2023 at 11:12 AM

## 2023-02-15 NOTE — CARE COORDINATION
Chart reviewed. Plan for BMBX today, PLT's. Spoke with patient. Discussed therapy recs for Adriana Paez and RW. Updated [facility] is following for needs. Would like to see how he is doing prior to d/c before confirming he will accept C. Is agreeable to RW. CM will continue to follow for needs.  Chantelle Wiley RN

## 2023-02-15 NOTE — PRE SEDATION
Sedation Pre-Procedure Note    Patient Name: Ming Rouse   YOB: 1945  Room/Bed: 4670/6437-06  Medical Record Number: [id number]  Date: 2/15/2023   Time: 10:57 AM       Indication:  pt with concerns for plasma cell hyperplasia here for bone marrow aspiration and biopsy. Consent: I have discussed with the patient and/or the patient representative the indication, alternatives, and the possible risks and/or complications of the planned procedure and the anesthesia methods. The patient and/or patient representative appear to understand and agree to proceed. Vital Signs:   Vitals:    02/15/23 1052   BP: 123/65   Pulse: 68   Resp: 17   Temp:    SpO2: 99%       Past Medical History:   has a past medical history of Anxiety, Arthritis, Bladder cancer ([address] DVT (deep venous thrombosis) ([address] Elevated fasting glucose, Hyperlipidemia, Hypertension, Malignant neoplasm of urinary bladder (HCC), Multiple myeloma ([address] MVP (mitral valve prolapse), Polyp of gallbladder, and Prostate CA ([address] Past Surgical History:   has a past surgical history that includes Tonsillectomy; Colonoscopy (6/29/12); Colonoscopy (07/12/2017); Bladder surgery; Cataract removal (Bilateral); Prostatectomy (2005 approx); Inguinal hernia repair (Right, 06/1999); Penile prosthesis placement; Cholecystectomy, laparoscopic (N/A, 1/10/2022); CT BIOPSY DEEP BONE PERCUTANEOUS (3/3/2022); Colonoscopy (N/A, 9/6/2022); and Upper gastrointestinal endoscopy (N/A, 9/6/2022).     Medications:   Scheduled Meds:    busPIRone  10 mg Oral BID    cholestyramine  4 g Oral TID WC    OLANZapine  5 mg Oral Nightly    pantoprazole  40 mg Oral Daily    sertraline  75 mg Oral Daily    sodium chloride flush  5-40 mL IntraVENous 2 times per day     Continuous Infusions:    sodium chloride      sodium bicarbonate infusion 100 mL/hr at 02/15/23 0138    sodium chloride      sodium chloride       PRN Meds: sodium chloride, LORazepam, oxyCODONE, sodium chloride flush, sodium chloride, ondansetron **OR** ondansetron, polyethylene glycol, acetaminophen **OR** acetaminophen, sodium chloride  Home Meds:   Prior to Admission medications    Medication Sig Start Date End Date Taking? Authorizing Provider   busPIRone (BUSPAR) 7.5 MG tablet Take 7.5 mg by mouth 2 times daily   Yes Historical Provider, MD   lisinopril (PRINIVIL;ZESTRIL) 20 MG tablet Take 20 mg by mouth daily   Yes Historical Provider, MD   ondansetron (ZOFRAN-ODT) 8 MG TBDP disintegrating tablet DISSOLVE ONE TABLET BY MOUTH EVERY 8 HOURS AS NEEDED FOR NAUSEA OR VOMITING 1/25/23   Vee Whitten DO   rosuvastatin (CRESTOR) 20 MG tablet TAKE ONE TABLET BY MOUTH DAILY 1/23/23   Vee Whitten DO   fenofibrate (TRICOR) 54 MG tablet Take 1 tablet by mouth daily s [facility] pharmacy: Please dispense generic fenofibrate unless prescriber denote 1/23/23   Vee Whitten DO   cholestyramine Kenard Spindle) 4 GM/DOSE powder Take 1 packet by mouth 3 times daily (with meals) 12/23/22   Vee Whitten DO   OLANZapine (ZYPREXA) 5 MG tablet Take 5 mg by mouth nightly    Historical Provider, MD   bortezomib 5 mg/2 mLs in sodium chloride (PF) injection Inject 1.3 mg/m2 into the skin once    Historical Provider, MD   cyclophosphamide (CYTOXAN) 25 MG CAPS capsule Take by mouth daily    Historical Provider, MD   dexamethasone (DECADRON) 1 MG tablet Take 1 mg by mouth 2 times daily (with meals)    Historical Provider, MD   sertraline (ZOLOFT) 50 MG tablet Take 1.5 tablets by mouth daily 11/22/22   Vee Whitten DO   LORazepam (ATIVAN) 1 MG tablet Take 1 mg by mouth 4 times daily as needed. Historical Provider, MD   dronabinol (MARINOL) 5 MG capsule in the morning and at bedtime. 10/20/22   Historical Provider, MD   oxyCODONE (ROXICODONE) 5 MG immediate release tablet Take 5 mg by mouth every 4 hours as needed for Pain.     Historical Provider, MD   pantoprazole (PROTONIX) 40 MG tablet Take 1 tablet by mouth daily 10/10/22   Muna Alexandre DO Carlos   apixaban (ELIQUIS) 5 MG TABS tablet TAKE 1 TABLET BY MOUTH TWO  TIMES DAILY  Patient not taking: Reported on 2/14/2023 1/18/22   Liliam Streeter DO     Coumadin Use Last 7 Days:  no  Antiplatelet drug therapy use last 7 days: no  Other anticoagulant use last 7 days: no  Additional Medication Information:  none     Pre-Sedation Documentation and Exam:   I have reviewed the patient's history and review of systems.     Mallampati Airway Assessment:  Mallampati Class II - (soft palate, fauces & uvula are visible)    Prior History of Anesthesia Complications:   none    ASA Classification:  Class 3 - A patient with severe systemic disease that limits activity but is not incapacitating    Sedation/ Anesthesia Plan:   intravenous sedation    Medications Planned:   midazolam (Versed) intravenously and fentanyl intravenously    Patient is an appropriate candidate for plan of sedation: yes    Electronically signed by Rao Nunes MD on 2/15/2023 at 10:57 AM

## 2023-02-15 NOTE — OR NURSING
Pt arrived for image guided bone marrow biopsy w/aspiration . Procedure explained including the risk and benefits of the procedure. All questions answered. Pt verbalizes understanding of the of procedure and states no more questions. Consent signed. Vital signs stable, labs, allergies, medications, and code status reviewed. No contraindications noted.      Vitals:    02/15/23 1052   BP: 123/65   Pulse: 68   Resp: 17   Temp:    SpO2: 99%    (vital signs in table format)    Rama Score  2 - Able to move 4 extremities voluntarily on command  2 - BP+/- 20mmHg of normal  2 - Fully awake  2 - Able to maintain oxygen saturation >92% on room air  2 - Able to breathe deeply and cough freely    Allergies  Atorvastatin and Lipitor    Labs  Lab Results   Component Value Date    INR 1.12 02/14/2023    PROTIME 14.3 02/14/2023       Lab Results   Component Value Date    CREATININE 3.1 (H) 02/15/2023    BUN 45 (H) 02/15/2023    GFR 50 (L) 07/20/2012     02/15/2023    K 3.8 02/15/2023     (H) 02/15/2023    CO2 21 02/15/2023       Lab Results   Component Value Date    WBC 10.5 02/15/2023    HGB 9.1 (L) 02/15/2023    HCT 26.3 (L) 02/15/2023    MCV 97.8 02/15/2023    PLT 23 (L) 02/15/2023

## 2023-02-15 NOTE — BRIEF OP NOTE
Brief Postoperative Note    Rachel Ibrahim  YOB: 1945  [id number]    Pre-operative Diagnosis: plasma cell hyperplasia concern    Post-operative Diagnosis: Same    Procedure: CT guided bone marrow aspirate and biopsy    Anesthesia: Moderate Sedation    Surgeons/Assistants: Dr. J Luis Last    Estimated Blood Loss: less than 5ml     Complications: None    Specimens: Was Obtained: right iliac bone    Findings: bone marrow aspirate and core biopsy    Electronically signed by Deyvi Chapman MD on 2/15/2023 at 11:35 AM

## 2023-02-15 NOTE — OR NURSING
Time out completed prior to procedure. Pt was place prone on the CT table. Pt was placed on all cardiac monitoring. Pt placed on 2 L NC for sedation.

## 2023-02-15 NOTE — ONCOLOGY
ONCOLOGY HEMATOLOGY CARE PROGRESS NOTE      SUBJECTIVE:    Afebrile and on room air. NPO. He denies pain. No emesis. Mild nausea. Appears frail and weak. Slow to respond. ROS:     Constitutional:  No weight loss, No fever, No chills, No night sweats. Energy level good.   Eyes:  No impairment or change in vision  ENT / Mouth:  No pain, abnormal ulceration, bleeding, nasal drip or change in voice or hearing  Cardiovascular:  No chest pain, palpitations, new edema, or calf discomfort  Respiratory:  No pain, hemoptysis, change to breathing  Breast:  No pain, discharge, change in appearance or texture  Gastrointestinal:  No pain, cramping, jaundice, change to eating and bowel habits  Urinary:  No pain, bleeding or change in continence  Genitalia: No pain, bleeding or discharge  Musculoskeletal:  No redness, pain, edema or weakness  Skin:  No pruritus, rash, change to nodules or lesions  Neurologic:  No discomfort, change in mental status, speech, sensory or motor activity  Psychiatric:  No change in concentration or change to affect or mood  Endocrine:  No hot flashes, increased thirst, or change to urine production  Hematologic: No petechiae, ecchymosis or bleeding  Lymphatic:  No lymphadenopathy or lymphedema  Allergy / Immunologic:  No eczema, hives, frequent or recurrent infections    OBJECTIVE        Physical    VITALS:  Patient Vitals for the past 24 hrs:   BP Temp Temp src Pulse Resp SpO2   02/15/23 0344 (!) 104/53 97.9 °F (36.6 °C) Oral 76 20 97 %   02/15/23 0008 (!) 94/55 98.6 °F (37 °C) Oral 68 18 94 %   02/14/23 1916 (!) 94/58 98.3 °F (36.8 °C) Oral 69 18 97 %   02/14/23 1545 116/63 98 °F (36.7 °C) Oral 78 18 99 %   02/14/23 1054 105/62 -- -- 73 -- 95 %   02/14/23 0745 106/67 97.9 °F (36.6 °C) Oral 79 18 95 %       24HR INTAKE/OUTPUT:    Intake/Output Summary (Last 24 hours) at 2/15/2023 0630  Last data filed at 2/15/2023 0358  Gross per 24 hour   Intake 2030.36 ml   Output 850 ml   Net 1180.36 ml       CONSTITUTIONAL: awake, alert, cooperative, no apparent distress   EYES: pupils equal, round and reactive to light, sclera clear and conjunctiva normal  ENT: Normocephalic, without obvious abnormality, atraumatic  NECK: supple, symmetrical, no jugular venous distension and no carotid bruits   HEMATOLOGIC/LYMPHATIC: no cervical, supraclavicular or axillary lymphadenopathy   LUNGS: no increased work of breathing and clear to auscultation   CARDIOVASCULAR: regular rate and rhythm, normal S1 and S2, no murmur noted  ABDOMEN: normal bowel sounds x 4, soft, non-distended, non-tender, no masses palpated, no hepatosplenomgaly   MUSCULOSKELETAL: full range of motion noted, tone is normal  NEUROLOGIC: awake, alert, oriented to name, place and time. Motor skills grossly intact. SKIN: Normal skin color, texture, turgor and no jaundice. appears intact   EXTREMITIES: no LE edema     DATA:  CBC:    Recent Labs     02/15/23  0511 02/14/23  0722 02/13/23  1702 02/13/23  1150 02/13/23  0948   WBC 10.5 12.0* 14.9* 16.6* 22.4*   NEUTROABS 1.8 3.0  --  3.5 5.8   LYMPHOPCT 7.0 28.0  --  8.0 6.0   RBC 2.69* 2.87* 3.09* 3.48* 3.73*   HGB 9.1* 9.6* 10.0* 11.2* 12.0*   HCT 26.3* 28.2* 31.1* 34.0* 36.9*   MCV 97.8 98.2 100.7* 97.9 99.0   MCH 33.9 33.3 32.3 32.1 32.1   MCHC 34.7 33.9 32.0 32.8 32.4   RDW 15.2 15.4 15.5* 15.4 15.5*   PLT 23* 36* 10* 10* 10*       PT/INR:    Recent Labs     02/14/23  0722 02/13/23  1150 01/19/23  1239   PROT  --  6.7 6.4   INR 1.12  --   --      PTT:  No results for input(s): APTT in the last 720 hours.     CMP:    Recent Labs     02/15/23  0511 02/14/23  0722 02/13/23  1150 02/13/23  0948 02/09/23  1208 01/19/23  1239    142 141 142 142 138   K 3.8 4.2 4.6 4.8 4.1 4.5   * 110 106 106 108 104   CO2 21 19* 20* 18* 22 21   GLUCOSE 97 104* 128* 133* 143* 109*   BUN 45* 52* 39* 31* 29* 26*   CREATININE 3.1* 4.1* 2.9* 2.7* 2.5* 1.9*   LABGLOM 20* 14* 21* 23* 26* 36*   CALCIUM 7.8* 8.1* 9.4 9.1 8.8 9.3   PROT  --   --  6.7  --   --  6.4   LABALBU 3.4  --  4.5  --  4.0 4.5   AGRATIO  --   --  2.0  --   --  2.4*   BILITOT  --   --  0.5  --   --  <0.2   ALKPHOS  --   --  74  --   --  46   ALT  --   --  16  --   --  11   AST  --   --  33  --   --  14*   MG  --   --   --  2.20  --  2.50*       Lab Results   Component Value Date    CALCIUM 7.8 (L) 02/15/2023    PHOS 2.9 02/15/2023       LDH:No results for input(s): LDH in the last 720 hours. Radiology Review:  US RENAL COMPLETE  Narrative: EXAMINATION:  RETROPERITONEAL ULTRASOUND OF THE KIDNEYS AND URINARY BLADDER    2/14/2023    COMPARISON:  MRI on 12/30/2022, CT on 12/12/2022    HISTORY:  Acute on chronic renal disease. FINDINGS:    Kidneys: The right kidney measures 10.2 cm in length and the left kidney measures 10.4  cm in length. Kidneys demonstrate normal cortical echogenicity. No hydronephrosis. Remarkable right kidney. The left kidney contains several simple cysts  measuring up to 2.9 cm along with a 1.9 cm solid-appearing lesion with  vascularity. Bladder:    Mildly distended and unremarkable. Ureteral jets were not seen. Impression: A 1.9 cm left renal lesion remains suspicious for potential renal cell  carcinoma.       Problem List  Patient Active Problem List   Diagnosis    GERD (gastroesophageal reflux disease)    MANISH (generalized anxiety disorder)    Essential hypertension, benign    CKD (chronic kidney disease) stage 3, GFR 30-59 ml/min (HCC)    Recurrent deep vein thrombosis (DVT) of right lower extremity (HCC)    Mixed hyperlipidemia    Ni's esophagus without dysplasia    Primary insomnia    Multiple myeloma not having achieved remission ([address]    Current moderate episode of major depressive disorder without prior episode ([address]    Cancer, metastatic to bone ([address]    Malaise and fatigue       ASSESSMENT AND PLAN:    Lambda light chain multiple myeloma  Plasma Cell Hyperplasia  Leukocytosis   Thrombocytopenia  - being treated at [facility] outpatient for MM. Was receiving Velcade, cytoxan, and Xgeva. - Cycle 11 day 1 received on 01/11/2023. Received subsequent Velcade on time. Due for C12D1 on 02/15/2023.  - patient likely has progressed while on treatment   - 2-4% of MM cases can progress to rare variant, Plasma Cell Leukemia. - concern for evidence of this occurring as plasma cells noted in diff, 39 on admission. Could also just be a progression of myeloma. - Plts 23 after receiving platelet transfusion on 2/13.  - spoke with pathology on 2/14, plasma cells increased to 55%  - Await light chains.  - Repeat bone marrow biopsy ordered for today to further workup and determination of next steps for treatment. Can give another platelet transfusion prior to biopsy but this will likely not stay consistently elevated due to underlying pathology and it should not really be necessary to perform a bone marrow biopsy safely. ELEONORA on CKD   - likely secondary to MM  - Cr 3.1 this AM which is slightly improved.    - agree with IV fluids       ONCOLOGIC DISPOSITION:  BECKY Henley MD  Please contact through [facility]

## 2023-02-15 NOTE — PROGRESS NOTES
A&Ox4. No active bleeding. No complaints of /SOB, chest pain or heaviness. No cyanosis or pallor. Will continue to monitor patient.

## 2023-02-15 NOTE — PROGRESS NOTES
Platelet transfusion admin per order. Writer at bedside for entire transfusion. VSS.  Electronically signed by Dex Woody RN on 2/15/2023 at 3:34 PM

## 2023-02-15 NOTE — PROGRESS NOTES
Physical Therapy  Attempted to see pt for therapy. Pt declining therapy at this time, wanting to rest. Educated on importance of OOB mobility. Per RN, pt also scheduled for biopsy at 10am. Will re-attempt as schedule permits. Thank you.   Elizabeth Barker, PT, DPT

## 2023-02-15 NOTE — OR NURSING
Image guided bone marrow biopsy w/aspiration biopsy completed by Dr. Raylene Mcardle. Pt tolerated procedure without any signs or symptoms of distress. Vital signs stable. Report given  to  RN. [facility]. Received: Versed: 1 mg       Fentanyl: 50 mcg  Bandage to right lower back that is clean dry and intact. Patient to lay on back side for 1 hour.      Vital Signs  Vitals:    02/15/23 1133   BP: (!) 113/59   Pulse: 76   Resp: 13   Temp:    SpO2: 96%    (vital signs in table format)    Post Rama  2 - Able to move 4 extremities voluntarily on command  2 - BP+/- 20mmHg of normal  2 - Fully awake  1 - Needs oxygen to maintain oxygen saturation >90%  2 - Able to breathe deeply and cough freely

## 2023-02-15 NOTE — PROGRESS NOTES
Hospitalist Progress Note      PCP: Parth Mckeon DO    Date of Admission: 2/13/2023    Chief Complaint: fatigue    Hospital Course:   65 y.o. male who presented to [facility] with above c/o . He has H/o M, Myeloma , CKD developed malaise for weeks ago . Its worsened over the time and currently he cannot carry out daily activities as usual . No focal weakness . He has poor appetite . Denies chest pain , cough , fever . Sob , ab pain , N , V or D    Subjective: PLTs improved following transfusion. Remains very fatigued. Medications:  Reviewed    Infusion Medications    sodium chloride      sodium bicarbonate infusion 100 mL/hr at 02/15/23 0138    sodium chloride      sodium chloride       Scheduled Medications    busPIRone  10 mg Oral BID    cholestyramine  4 g Oral TID WC    OLANZapine  5 mg Oral Nightly    pantoprazole  40 mg Oral Daily    sertraline  75 mg Oral Daily    sodium chloride flush  5-40 mL IntraVENous 2 times per day     PRN Meds: sodium chloride, LORazepam, oxyCODONE, sodium chloride flush, sodium chloride, ondansetron **OR** ondansetron, polyethylene glycol, acetaminophen **OR** acetaminophen, sodium chloride      Intake/Output Summary (Last 24 hours) at 2/15/2023 0844  Last data filed at 2/15/2023 0358  Gross per 24 hour   Intake 2030.36 ml   Output 850 ml   Net 1180.36 ml       Physical Exam Performed:    BP (!) 104/53   Pulse 76   Temp 97.9 °F (36.6 °C) (Oral)   Resp 20   Ht 5' 9\" (1.753 m)   Wt 163 lb (73.9 kg)   SpO2 97%   BMI 24.07 kg/m²     General appearance: No apparent distress, appears stated age and cooperative. HEENT: Pupils equal, round, and reactive to light. Conjunctivae/corneas clear. Neck: Supple, with full range of motion. No jugular venous distention. Trachea midline. Respiratory:  Normal respiratory effort. Clear to auscultation, bilaterally without Rales/Wheezes/Rhonchi.   Cardiovascular: Regular rate and rhythm with normal S1/S2 without murmurs, rubs or gallops. Abdomen: Soft, non-tender, non-distended with normal bowel sounds. Musculoskeletal: No clubbing, cyanosis or edema bilaterally. Full range of motion without deformity. Skin: Skin color, texture, turgor normal.  No rashes or lesions. Neurologic:  Neurovascularly intact without any focal sensory/motor deficits. Cranial nerves: II-XII intact, grossly non-focal.  Psychiatric: Alert and oriented, thought content appropriate, normal insight  Capillary Refill: Brisk, 3 seconds, normal   Peripheral Pulses: +2 palpable, equal bilaterally       Labs:   Recent Labs     02/13/23  1702 02/14/23  0722 02/15/23  0511   WBC 14.9* 12.0* 10.5   HGB 10.0* 9.6* 9.1*   HCT 31.1* 28.2* 26.3*   PLT 10* 36* 23*     Recent Labs     02/13/23  1150 02/14/23  0722 02/15/23  0511    142 144   K 4.6 4.2 3.8    110 111*   CO2 20* 19* 21   BUN 39* 52* 45*   CREATININE 2.9* 4.1* 3.1*   CALCIUM 9.4 8.1* 7.8*   PHOS  --   --  2.9     Recent Labs     02/13/23  1150   AST 33   ALT 16   BILITOT 0.5   ALKPHOS 74     Recent Labs     02/14/23  0722   INR 1.12     Recent Labs     02/13/23  1150   TROPONINI <0.01       Urinalysis:      Lab Results   Component Value Date/Time    NITRU Negative 02/09/2023 12:08 PM    WBCUA 0-2 02/09/2023 12:08 PM    BACTERIA 1+ 02/09/2023 12:08 PM    RBCUA 0-2 02/09/2023 12:08 PM    BLOODU TRACE 02/09/2023 12:08 PM    SPECGRAV 1.012 02/09/2023 12:08 PM    GLUCOSEU Negative 02/09/2023 12:08 PM       Radiology:  US RENAL COMPLETE   Final Result   A 1.9 cm left renal lesion remains suspicious for potential renal cell   carcinoma.          XR CHEST PORTABLE   Final Result   No acute cardiopulmonary findings         CT BIOPSY BONE MARROW    (Results Pending)       IP CONSULT TO ONCOLOGY  IP CONSULT TO HOSPITALIST  IP CONSULT TO NEPHROLOGY    Assessment/Plan:    Active Hospital Problems    Diagnosis     Malaise and fatigue [R53.81, R53.83]      Priority: Medium     MM with possible progression  - given elevated plasma cells on diff, concern for plasma cell lymphoma  - hem/onc consulted  - plan for bone marrow biopsy today  - continue pain control    Thrombocytopenia  - s/p 1u PLTS  - continue to monitor    ELEONORA on CKD  - unclear etiology  - nephrology consulted  - improving with IVF  - continue to monitor    DVT  - holding eliquis with severe thrombocytopenia    HLD  - holding statin    DVT Prophylaxis: none  Diet: Diet NPO  Code Status: Full Code  PT/OT Eval Status: ordered    Dispo - home in possibly 1-3 days    Appropriate for A1 Discharge Unit: No      Maricel Zacarias MD

## 2023-02-15 NOTE — PROGRESS NOTES
Nephrology Progress Note   [facility]      This patient is a 65year old male whom we are following for ELEONORA on CKD. Subjective: The patient was seen and examined. Still feels weak. Await BMB today. Episodes of low BP last night noted. Family History: No family at bedside  ROS: No SOB or chest pain      Vitals:  /68   Pulse 72   Temp 97.4 °F (36.3 °C) (Oral)   Resp 18   Ht 5' 9\" (1.753 m)   Wt 163 lb (73.9 kg)   SpO2 95%   BMI 24.07 kg/m²   I/O last 3 completed shifts: In: 2325.4 [I.V.:1035.5; Blood:295; IV Piggyback:994.9]  Out: 1320 [ZVBNV:7724]  I/O this shift: In: 0   Out: 500 [Urine:500]    Physical Exam  Vitals reviewed. Constitutional:       General: He is not in acute distress. HENT:      Head: Normocephalic and atraumatic. Eyes:      General: No scleral icterus. Conjunctiva/sclera: Conjunctivae normal.   Cardiovascular:      Rate and Rhythm: Normal rate. Heart sounds: No friction rub. Pulmonary:      Effort: Pulmonary effort is normal. No respiratory distress. Breath sounds: No wheezing. Abdominal:      General: Bowel sounds are normal. There is no distension. Tenderness: There is no abdominal tenderness. Musculoskeletal:      Right lower leg: No edema. Left lower leg: No edema. Neurological:      Mental Status: He is alert.          Medications:   busPIRone  10 mg Oral BID    cholestyramine  4 g Oral TID WC    OLANZapine  5 mg Oral Nightly    pantoprazole  40 mg Oral Daily    sertraline  75 mg Oral Daily    sodium chloride flush  5-40 mL IntraVENous 2 times per day         Labs:  Recent Labs     02/13/23  1702 02/14/23  0722 02/15/23  0511   WBC 14.9* 12.0* 10.5   HGB 10.0* 9.6* 9.1*   HCT 31.1* 28.2* 26.3*   .7* 98.2 97.8   PLT 10* 36* 23*     Recent Labs     02/13/23  0948 02/13/23  1150 02/14/23  0722 02/15/23  0511    141 142 144   K 4.8 4.6 4.2 3.8    106 110 111*   CO2 18* 20* 19* 21   GLUCOSE 133* 128* 104* 97 PHOS  --   --   --  2.9   MG 2.20  --   --   --    BUN 31* 39* 52* 45*   CREATININE 2.7* 2.9* 4.1* 3.1*   LABGLOM 23* 21* 14* 20*           Assessment/Plan:    Acute Kidney Injury. - Concern for myeloma kidney + pre-renal components (low BP, decreased PO intake). - UA on 2/9/23 showed RBC 0-2, proteinuria. Urine MAC 107mg/g. FeNa 2%. - Renal US with no evidence of hydronephrosis, 1.9cm L renal lesion suspicious for potential RCC.  - Kidney function is improving, continue with IVF hydration.  - Await SFLC. If elevated, will discuss with heme-onc if there is benefit for TPE. - Avoid hypotension and nephrotoxic medications. CKD Stage 3b. - Presumed from HTN. - Previous baseline of 1.2-1.5mg/dL. - Follows with Dr. Christina Wood in the office. Metabolic Acidosis. - Suspect from worsening ELEONORA +/- lactic acidosis. - Improving with bicarb drip. Hypertension.  - BP is on the low side. Not on any antihypertensive medications. Multiple Myeloma/Leukocytosis/Thrmbocytopenia.  - Heme-onc following.  - Plan for BMB noted. Please do not hesitate to contact me at [phone] if with questions. Thank you! Yisel Kim MD  [facility] [employer]  2/15/2023

## 2023-02-15 NOTE — PROGRESS NOTES
Pt returned to room in stable condition. VS obtained; VSS. Pt instructed to lay flat on back for one hour per IR team; pt verbalizes understanding. Family at bedside.  Electronically signed by Pj Alvarado RN on 2/15/2023 at 1:01 PM

## 2023-02-16 LAB
ALBUMIN SERPL-MCNC: 3.4 G/DL (ref 3.4–5)
ANION GAP SERPL CALCULATED.3IONS-SCNC: 12 MMOL/L (ref 3–16)
ANISOCYTOSIS: ABNORMAL
ATYPICAL LYMPHOCYTE RELATIVE PERCENT: 7 % (ref 0–6)
BANDED NEUTROPHILS RELATIVE PERCENT: 13 % (ref 0–7)
BASOPHILS ABSOLUTE: 0 K/UL (ref 0–0.2)
BASOPHILS RELATIVE PERCENT: 0 %
BUN BLDV-MCNC: 33 MG/DL (ref 7–20)
CALCIUM SERPL-MCNC: 7.8 MG/DL (ref 8.3–10.6)
CHLORIDE BLD-SCNC: 112 MMOL/L (ref 99–110)
CO2: 22 MMOL/L (ref 21–32)
CREAT SERPL-MCNC: 2.3 MG/DL (ref 0.8–1.3)
EOSINOPHILS ABSOLUTE: 0.1 K/UL (ref 0–0.6)
EOSINOPHILS RELATIVE PERCENT: 1 %
GFR SERPL CREATININE-BSD FRML MDRD: 28 ML/MIN/{1.73_M2}
GLUCOSE BLD-MCNC: 92 MG/DL (ref 70–99)
HCT VFR BLD CALC: 25.6 % (ref 40.5–52.5)
HEMATOLOGY PATH CONSULT: NO
HEMOGLOBIN: 8.9 G/DL (ref 13.5–17.5)
LYMPHOCYTES ABSOLUTE: 3.3 K/UL (ref 1–5.1)
LYMPHOCYTES RELATIVE PERCENT: 25 %
MCH RBC QN AUTO: 33.8 PG (ref 26–34)
MCHC RBC AUTO-ENTMCNC: 34.6 G/DL (ref 31–36)
MCV RBC AUTO: 97.6 FL (ref 80–100)
METAMYELOCYTES RELATIVE PERCENT: 3 %
MONOCYTES ABSOLUTE: 0.7 K/UL (ref 0–1.3)
MONOCYTES RELATIVE PERCENT: 7 %
NEUTROPHILS ABSOLUTE: 2.8 K/UL (ref 1.7–7.7)
NEUTROPHILS RELATIVE PERCENT: 11 %
PDW BLD-RTO: 15.2 % (ref 12.4–15.4)
PHOSPHORUS: 2.5 MG/DL (ref 2.5–4.9)
PLASMA CELLS PERCENT: 33 %
PLATELET # BLD: 32 K/UL (ref 135–450)
PLATELET SLIDE REVIEW: ABNORMAL
PMV BLD AUTO: 7.3 FL (ref 5–10.5)
POTASSIUM SERPL-SCNC: 3.7 MMOL/L (ref 3.5–5.1)
RBC # BLD: 2.62 M/UL (ref 4.2–5.9)
SLIDE REVIEW: ABNORMAL
SODIUM BLD-SCNC: 146 MMOL/L (ref 136–145)
WBC # BLD: 10.4 K/UL (ref 4–11)

## 2023-02-16 PROCEDURE — 6370000000 HC RX 637 (ALT 250 FOR IP): Performed by: INTERNAL MEDICINE

## 2023-02-16 PROCEDURE — 97110 THERAPEUTIC EXERCISES: CPT

## 2023-02-16 PROCEDURE — 2500000003 HC RX 250 WO HCPCS: Performed by: INTERNAL MEDICINE

## 2023-02-16 PROCEDURE — 6360000002 HC RX W HCPCS: Performed by: INTERNAL MEDICINE

## 2023-02-16 PROCEDURE — 1200000000 HC SEMI PRIVATE

## 2023-02-16 PROCEDURE — P9035 PLATELET PHERES LEUKOREDUCED: HCPCS

## 2023-02-16 PROCEDURE — 97116 GAIT TRAINING THERAPY: CPT

## 2023-02-16 PROCEDURE — 86900 BLOOD TYPING SEROLOGIC ABO: CPT

## 2023-02-16 PROCEDURE — 86850 RBC ANTIBODY SCREEN: CPT

## 2023-02-16 PROCEDURE — 2580000003 HC RX 258: Performed by: INTERNAL MEDICINE

## 2023-02-16 PROCEDURE — 80069 RENAL FUNCTION PANEL: CPT

## 2023-02-16 PROCEDURE — 86901 BLOOD TYPING SEROLOGIC RH(D): CPT

## 2023-02-16 PROCEDURE — 85025 COMPLETE CBC W/AUTO DIFF WBC: CPT

## 2023-02-16 PROCEDURE — 36415 COLL VENOUS BLD VENIPUNCTURE: CPT

## 2023-02-16 RX ORDER — SODIUM CHLORIDE 450 MG/100ML
INJECTION, SOLUTION INTRAVENOUS CONTINUOUS
Status: DISCONTINUED | OUTPATIENT
Start: 2023-02-16 | End: 2023-02-18

## 2023-02-16 RX ADMIN — ONDANSETRON 4 MG: 2 INJECTION INTRAMUSCULAR; INTRAVENOUS at 15:41

## 2023-02-16 RX ADMIN — CHOLESTYRAMINE 4 G: 4 POWDER, FOR SUSPENSION ORAL at 16:31

## 2023-02-16 RX ADMIN — OLANZAPINE 5 MG: 5 TABLET, FILM COATED ORAL at 20:57

## 2023-02-16 RX ADMIN — ONDANSETRON 4 MG: 2 INJECTION INTRAMUSCULAR; INTRAVENOUS at 09:32

## 2023-02-16 RX ADMIN — CHOLESTYRAMINE 4 G: 4 POWDER, FOR SUSPENSION ORAL at 09:25

## 2023-02-16 RX ADMIN — LORAZEPAM 1 MG: 1 TABLET ORAL at 15:41

## 2023-02-16 RX ADMIN — LORAZEPAM 1 MG: 1 TABLET ORAL at 09:32

## 2023-02-16 RX ADMIN — SERTRALINE 75 MG: 50 TABLET, FILM COATED ORAL at 09:25

## 2023-02-16 RX ADMIN — SODIUM CHLORIDE: 4.5 INJECTION, SOLUTION INTRAVENOUS at 09:25

## 2023-02-16 RX ADMIN — SODIUM BICARBONATE: 84 INJECTION, SOLUTION INTRAVENOUS at 04:24

## 2023-02-16 RX ADMIN — CHOLESTYRAMINE 4 G: 4 POWDER, FOR SUSPENSION ORAL at 12:27

## 2023-02-16 RX ADMIN — Medication 10 ML: at 20:57

## 2023-02-16 RX ADMIN — PANTOPRAZOLE SODIUM 40 MG: 40 TABLET, DELAYED RELEASE ORAL at 09:26

## 2023-02-16 RX ADMIN — BUSPIRONE HYDROCHLORIDE 10 MG: 5 TABLET ORAL at 09:25

## 2023-02-16 RX ADMIN — LORAZEPAM 1 MG: 1 TABLET ORAL at 21:55

## 2023-02-16 RX ADMIN — Medication 10 ML: at 09:32

## 2023-02-16 RX ADMIN — BUSPIRONE HYDROCHLORIDE 10 MG: 5 TABLET ORAL at 20:57

## 2023-02-16 ASSESSMENT — PAIN SCALES - GENERAL
PAINLEVEL_OUTOF10: 0
PAINLEVEL_OUTOF10: 0

## 2023-02-16 NOTE — PROGRESS NOTES
Hospitalist Progress Note      PCP: Carito Jerome DO    Date of Admission: 2/13/2023    Chief Complaint: fatigue    Hospital Course:   65 y.o. male who presented to [facility] with above c/o . He has H/o M, Myeloma , CKD developed malaise for weeks ago . Its worsened over the time and currently he cannot carry out daily activities as usual . No focal weakness . He has poor appetite . Denies chest pain , cough , fever . Sob , ab pain , N , V or D    Subjective: PLTs improved following transfusion. Remains very fatigued. Medications:  Reviewed    Infusion Medications    sodium chloride      sodium chloride      sodium chloride      sodium chloride       Scheduled Medications    busPIRone  10 mg Oral BID    cholestyramine  4 g Oral TID WC    OLANZapine  5 mg Oral Nightly    pantoprazole  40 mg Oral Daily    sertraline  75 mg Oral Daily    sodium chloride flush  5-40 mL IntraVENous 2 times per day     PRN Meds: sodium chloride, LORazepam, oxyCODONE, sodium chloride flush, sodium chloride, ondansetron **OR** ondansetron, polyethylene glycol, acetaminophen **OR** acetaminophen, sodium chloride      Intake/Output Summary (Last 24 hours) at 2/16/2023 0857  Last data filed at 2/16/2023 0424  Gross per 24 hour   Intake 5474.71 ml   Output 1765 ml   Net 3709.71 ml       Physical Exam Performed:    /62   Pulse 67   Temp 98 °F (36.7 °C) (Oral)   Resp 18   Ht 5' 9\" (1.753 m)   Wt 163 lb (73.9 kg)   SpO2 96%   BMI 24.07 kg/m²     General appearance: No apparent distress, appears stated age and cooperative. HEENT: Pupils equal, round, and reactive to light. Conjunctivae/corneas clear. Neck: Supple, with full range of motion. No jugular venous distention. Trachea midline. Respiratory:  Normal respiratory effort. Clear to auscultation, bilaterally without Rales/Wheezes/Rhonchi. Cardiovascular: Regular rate and rhythm with normal S1/S2 without murmurs, rubs or gallops.   Abdomen: Soft, non-tender, non-distended with normal bowel sounds. Musculoskeletal: No clubbing, cyanosis or edema bilaterally. Full range of motion without deformity. Skin: Skin color, texture, turgor normal.  No rashes or lesions. Neurologic:  Neurovascularly intact without any focal sensory/motor deficits. Cranial nerves: II-XII intact, grossly non-focal.  Psychiatric: Alert and oriented, thought content appropriate, normal insight  Capillary Refill: Brisk, 3 seconds, normal   Peripheral Pulses: +2 palpable, equal bilaterally       Labs:   Recent Labs     02/14/23 0722 02/15/23  0511 02/16/23  0539   WBC 12.0* 10.5 10.4   HGB 9.6* 9.1* 8.9*   HCT 28.2* 26.3* 25.6*   PLT 36* 23* 32*     Recent Labs     02/14/23  0722 02/15/23  0511 02/16/23  0539    144 146*   K 4.2 3.8 3.7    111* 112*   CO2 19* 21 22   BUN 52* 45* 33*   CREATININE 4.1* 3.1* 2.3*   CALCIUM 8.1* 7.8* 7.8*   PHOS  --  2.9 2.5     Recent Labs     02/13/23  1150   AST 33   ALT 16   BILITOT 0.5   ALKPHOS 74     Recent Labs     02/14/23  0722   INR 1.12     Recent Labs     02/13/23  1150   TROPONINI <0.01       Urinalysis:      Lab Results   Component Value Date/Time    NITRU Negative 02/09/2023 12:08 PM    WBCUA 0-2 02/09/2023 12:08 PM    BACTERIA 1+ 02/09/2023 12:08 PM    RBCUA 0-2 02/09/2023 12:08 PM    BLOODU TRACE 02/09/2023 12:08 PM    SPECGRAV 1.012 02/09/2023 12:08 PM    GLUCOSEU Negative 02/09/2023 12:08 PM       Radiology:  CT BIOPSY BONE MARROW   Final Result   Successful CT guided bone marrow aspiration and core biopsy of the right   iliac bone. CT GUIDED NEEDLE PLACEMENT   Final Result   Successful CT guided bone marrow aspiration and core biopsy of the right   iliac bone. US RENAL COMPLETE   Final Result   A 1.9 cm left renal lesion remains suspicious for potential renal cell   carcinoma.          XR CHEST PORTABLE   Final Result   No acute cardiopulmonary findings             IP CONSULT TO ONCOLOGY  IP CONSULT TO HOSPITALIST  IP CONSULT TO NEPHROLOGY    Assessment/Plan:    Active Hospital Problems    Diagnosis     Malaise and fatigue [R53.81, R53.83]      Priority: Medium     MM with possible progression  - given elevated plasma cells on diff, concern for plasma cell lymphoma  - hem/onc consulted  - s/p bone marrow biopsy, results pending  - continue pain control    Thrombocytopenia  - s/p 2u PLTS  - continue to monitor    ELEONORA on CKD  - unclear etiology  - nephrology consulted  - improving with IVF  - continue to monitor    DVT  - holding eliquis with severe thrombocytopenia    HLD  - holding statin    DVT Prophylaxis: none  Diet: ADULT DIET;  Regular  ADULT ORAL NUTRITION SUPPLEMENT; Breakfast, Dinner; Standard High Calorie/High Protein Oral Supplement  Code Status: Full Code  PT/OT Eval Status: ordered    Dispo - [address] in 1-2 days    Appropriate for A1 Discharge Unit: Mavis Pascual MD

## 2023-02-16 NOTE — PROGRESS NOTES
Shift assessment completed. Pt A&O x4. VSS. PM medications admin whole, see MAR. Pt tolerating diet per order. pt denies any pain. Denies any needs at this time. Bed locked and in lowest position. Call light within reach. Will continue to monitor.  Electronically signed by Susanne Meyer RN on 2/15/2023 at 9:01 PM

## 2023-02-16 NOTE — CARE COORDINATION
Chart reviewed day 3. Spoke with Dr Chandra Ivey. Awaiting BMBX results. Pending results, may need tx [facility]. Current plan to return home with  and [facility], if patient agrees.  Astrid Herrera RN

## 2023-02-16 NOTE — PROGRESS NOTES
ONCOLOGY HEMATOLOGY CARE PROGRESS NOTE      SUBJECTIVE:    Kin Mansfield continues to endorse fatigue and poor appetite. He is a little more alert today than previous visits. No new concerns. ROS:     Constitutional: Anorexia, weakness. No weight loss, No fever, No chills, No night sweats.   Eyes:  No impairment or change in vision  ENT / Mouth:  No pain, abnormal ulceration, bleeding, nasal drip or change in voice or hearing  Cardiovascular:  No chest pain, palpitations, new edema, or calf discomfort  Respiratory:  No pain, hemoptysis, change to breathing  Breast:  No pain, discharge, change in appearance or texture  Gastrointestinal:  No pain, cramping, jaundice, change to eating and bowel habits  Urinary:  No pain, bleeding or change in continence  Genitalia: No pain, bleeding or discharge  Musculoskeletal:  No redness, pain, edema or weakness  Skin:  No pruritus, rash, change to nodules or lesions  Neurologic:  No discomfort, change in mental status, speech, sensory or motor activity  Psychiatric:  No change in concentration or change to affect or mood  Endocrine:  No hot flashes, increased thirst, or change to urine production  Hematologic: No petechiae, ecchymosis or bleeding  Lymphatic:  No lymphadenopathy or lymphedema  Allergy / Immunologic:  No eczema, hives, frequent or recurrent infections    OBJECTIVE        Physical    VITALS:  Patient Vitals for the past 24 hrs:   BP Temp Temp src Pulse Resp SpO2   02/16/23 0426 109/62 98 °F (36.7 °C) Oral 67 18 96 %   02/15/23 1913 (!) 91/51 98.4 °F (36.9 °C) Oral 64 18 96 %   02/15/23 1749 103/64 -- -- -- -- 96 %   02/15/23 1745 (!) 102/59 98.3 °F (36.8 °C) Oral -- 16 --   02/15/23 1521 102/63 97.8 °F (36.6 °C) Axillary 67 18 96 %   02/15/23 1520 -- 97.8 °F (36.6 °C) -- -- -- --   02/15/23 1451 110/69 98.1 °F (36.7 °C) Oral 73 18 95 %   02/15/23 1424 107/62 97.9 °F (36.6 °C) Oral 67 18 96 %   02/15/23 1257 -- -- -- -- -- 97 % 02/15/23 1215 105/65 98.3 °F (36.8 °C) Oral 70 18 95 %   02/15/23 1133 (!) 113/59 -- -- 76 13 96 %   02/15/23 1130 109/67 -- -- 81 14 91 %   02/15/23 1128 (!) 117/52 -- -- 83 15 94 %   02/15/23 1124 112/62 -- -- 73 15 99 %   02/15/23 1121 111/76 -- -- 77 15 94 %   02/15/23 1118 112/60 -- -- 76 11 95 %   02/15/23 1052 123/65 -- -- 68 17 99 %   02/15/23 1022 112/68 -- -- -- -- 95 %   02/15/23 0955 127/66 97.4 °F (36.3 °C) Oral 72 18 98 %       24HR INTAKE/OUTPUT:    Intake/Output Summary (Last 24 hours) at 2/16/2023 0811  Last data filed at 2/16/2023 0424  Gross per 24 hour   Intake 5474.71 ml   Output 1765 ml   Net 3709.71 ml       CONSTITUTIONAL: awake, alert, cooperative, no apparent distress. Appears chronically ill. EYES: pupils equal, round and reactive to light, sclera clear and conjunctiva normal  ENT: Normocephalic, without obvious abnormality, atraumatic  NECK: supple, symmetrical, no jugular venous distension and no carotid bruits   HEMATOLOGIC/LYMPHATIC: no cervical, supraclavicular or axillary lymphadenopathy   LUNGS: no increased work of breathing and clear to auscultation   CARDIOVASCULAR: regular rate and rhythm, normal S1 and S2, no murmur noted  ABDOMEN: normal bowel sounds x 4, soft, non-distended, non-tender, no masses palpated, no hepatosplenomgaly   MUSCULOSKELETAL: full range of motion noted, tone is normal  NEUROLOGIC: awake, alert, oriented to name, place and time. Motor skills grossly intact. SKIN: Normal skin color, texture, turgor and no jaundice.  appears intact   EXTREMITIES: no LE edema     DATA:  CBC:    Recent Labs     02/16/23  0539 02/15/23  0511 02/14/23  0722 02/13/23  1702 02/13/23  1150 02/13/23  0948   WBC 10.4 10.5 12.0* 14.9* 16.6* 22.4*   NEUTROABS  --  1.8 3.0  --  3.5 5.8   LYMPHOPCT  --  7.0 28.0  --  8.0 6.0   RBC 2.62* 2.69* 2.87* 3.09* 3.48* 3.73*   HGB 8.9* 9.1* 9.6* 10.0* 11.2* 12.0*   HCT 25.6* 26.3* 28.2* 31.1* 34.0* 36.9*   MCV 97.6 97.8 98.2 100.7* 97.9 99.0 MCH 33.8 33.9 33.3 32.3 32.1 32.1   MCHC 34.6 34.7 33.9 32.0 32.8 32.4   RDW 15.2 15.2 15.4 15.5* 15.4 15.5*   PLT 32* 23* 36* 10* 10* 10*       PT/INR:    Recent Labs     02/14/23  0722 02/13/23  1150 01/19/23  1239   PROT  --  6.7 6.4   INR 1.12  --   --      PTT:  No results for input(s): APTT in the last 720 hours. CMP:    Recent Labs     02/16/23  0539 02/15/23  0511 02/14/23  0722 02/13/23  1150 02/13/23  0948 02/09/23  1208 01/19/23  1239   * 144 142 141 142 142 138   K 3.7 3.8 4.2 4.6 4.8 4.1 4.5   * 111* 110 106 106 108 104   CO2 22 21 19* 20* 18* 22 21   GLUCOSE 92 97 104* 128* 133* 143* 109*   BUN 33* 45* 52* 39* 31* 29* 26*   CREATININE 2.3* 3.1* 4.1* 2.9* 2.7* 2.5* 1.9*   LABGLOM 28* 20* 14* 21* 23* 26* 36*   CALCIUM 7.8* 7.8* 8.1* 9.4 9.1 8.8 9.3   PROT  --   --   --  6.7  --   --  6.4   LABALBU 3.4 3.4  --  4.5  --  4.0 4.5   AGRATIO  --   --   --  2.0  --   --  2.4*   BILITOT  --   --   --  0.5  --   --  <0.2   ALKPHOS  --   --   --  74  --   --  46   ALT  --   --   --  16  --   --  11   AST  --   --   --  33  --   --  14*   MG  --   --   --   --  2.20  --  2.50*       Lab Results   Component Value Date    CALCIUM 7.8 (L) 02/16/2023    PHOS 2.5 02/16/2023       LDH:No results for input(s): LDH in the last 720 hours. Radiology Review:  CT GUIDED NEEDLE PLACEMENT  Narrative: PROCEDURE:  CT GUIDED BONE MARROW ASPIRATION AND CORE NEEDLE BONE BIOPSY OF THE RIGHT  ILIAC BONE.     MODERATE CONSCIOUS SEDATION    2/15/2023    HISTORY:  ORDERING SYSTEM PROVIDED HISTORY: history of MM with plasma cell  hyperplasia/leukocytosis/thrombocytopenia  TECHNOLOGIST PROVIDED HISTORY:  Plasma cell leukemia workup; need FISH cytology for myeloma  Reason for exam:->history of MM with plasma cell  hyperplasia/leukocytosis/thrombocytopenia    SEDATION:  MODERATE SEDATION WAS PROVIDED BY THE INTERVENTIONAL RADIOLOGY NURSE UNDER  THE SUPERVISION OF THE INTERVENTIONAL RADIOLOGIST FOR 13 MINUTES.  1 mg  Versed and 50 mcg fentanyl was administered. TECHNIQUE:  Informed consent was obtained following a detailed explanation of the  procedure including risks, benefits, and alternatives. Universal protocol  was followed. Sterile gowns, masks, hats and gloves utilized for maximal  sterile barrier. Patient was laid prone on the CT suite table. Axial images  were obtained through the iliac bones using CT guidance and a suitable skin  site was prepped and draped in sterile fashion. Local anesthesia was  achieved with lidocaine. An 11 gauge Kadmon bone marrow biopsy needle was  advanced into the right iliac bone and approximately 20 mL of bone marrow  aspirate was obtained. A single core biopsy specimen was obtained and the  patient tolerated the procedure well. A clean dry sterile dressing was  placed. Dose modulation, iterative reconstruction, and/or weight based adjustment of  the mA/kV was utilized to reduce the radiation dose to as low as reasonably  achievable. Time: 2.3 seconds    Dose: 322.27 mGy-cm    Estimated blood loss: Less than 5 cc  Impression: Successful CT guided bone marrow aspiration and core biopsy of the right  iliac bone. CT BIOPSY BONE MARROW  Narrative: PROCEDURE:  CT GUIDED BONE MARROW ASPIRATION AND CORE NEEDLE BONE BIOPSY OF THE RIGHT  ILIAC BONE. MODERATE CONSCIOUS SEDATION    2/15/2023    HISTORY:  ORDERING SYSTEM PROVIDED HISTORY: history of MM with plasma cell  hyperplasia/leukocytosis/thrombocytopenia  TECHNOLOGIST PROVIDED HISTORY:  Plasma cell leukemia workup; need FISH cytology for myeloma  Reason for exam:->history of MM with plasma cell  hyperplasia/leukocytosis/thrombocytopenia    SEDATION:  MODERATE SEDATION WAS PROVIDED BY THE INTERVENTIONAL RADIOLOGY NURSE UNDER  THE SUPERVISION OF THE INTERVENTIONAL RADIOLOGIST FOR 13 MINUTES.  1 mg  Versed and 50 mcg fentanyl was administered.     TECHNIQUE:  Informed consent was obtained following a detailed explanation of the  procedure including risks, benefits, and alternatives. Universal protocol  was followed. Sterile gowns, masks, hats and gloves utilized for maximal  sterile barrier. Patient was laid prone on the CT suite table. Axial images  were obtained through the iliac bones using CT guidance and a suitable skin  site was prepped and draped in sterile fashion. Local anesthesia was  achieved with lidocaine. An 11 gauge [employer] bone marrow biopsy needle was  advanced into the right iliac bone and approximately 20 mL of bone marrow  aspirate was obtained. A single core biopsy specimen was obtained and the  patient tolerated the procedure well. A clean dry sterile dressing was  placed. Dose modulation, iterative reconstruction, and/or weight based adjustment of  the mA/kV was utilized to reduce the radiation dose to as low as reasonably  achievable. Time: 2.3 seconds    Dose: 322.27 mGy-cm    Estimated blood loss: Less than 5 cc  Impression: Successful CT guided bone marrow aspiration and core biopsy of the right  iliac bone. Problem List  Patient Active Problem List   Diagnosis    GERD (gastroesophageal reflux disease)    MANISH (generalized anxiety disorder)    Essential hypertension, benign    CKD (chronic kidney disease) stage 3, GFR 30-59 ml/min (HCC)    Recurrent deep vein thrombosis (DVT) of right lower extremity (HCC)    Mixed hyperlipidemia    Ni's esophagus without dysplasia    Primary insomnia    Multiple myeloma not having achieved remission ([address]    Current moderate episode of major depressive disorder without prior episode ([address]    Cancer, metastatic to bone ([address]    Malaise and fatigue       ASSESSMENT AND PLAN:    Lambda light chain multiple myeloma  Plasma Cell Hyperplasia  Leukocytosis   Thrombocytopenia  - being treated at [facility] outpatient for MM. Was receiving Velcade, cytoxan, and Xgeva. - Cycle 11 day 1 received on 01/11/2023. Received subsequent Velcade on time.  Due for C12D1 on 02/15/2023.  - patient likely has progressed while on treatment   - 2-4% of MM cases can progress to rare variant, Plasma Cell Leukemia. - concern for evidence of this occurring as plasma cells noted in diff, 39 on admission. Could also just be a progression of myeloma. - Plts 32 today after receiving platelet transfusion on 2/13.  - spoke with pathology on 2/14, plasma cells increased to 55%  - Plasma cell % today is 33 on diff  - Light chains are 3,523  - repeat bone marrow biopsy performed 02/15. Results are pending. Will continue to monitor. ELEONORA on CKD   - likely secondary to MM and poor PO intake  - Previous Cr baseline of 1.2-1.5 mg/dL. - Cr 2.3 this AM which greatly is improved since admission  - Reviewed Renal US from 02/14. There is a 1.9cm left renal lesion with vascularity suspicious for potential RCC. This was also identified on MRI 12/30. Patient was referred from our office to Urology for further evaluation. They recommended continued monitoring given other ongoing health issues.     ONCOLOGIC DISPOSITION:  - Pending bone marrow bx results    TANNER Bajwa  Please contact through [facility]

## 2023-02-16 NOTE — PLAN OF CARE
Problem: Safety - Adult  Goal: Free from fall injury  2/15/2023 2059 by Oriana Gonzalez RN  Outcome: Progressing  2/15/2023 1013 by Oriana Gonzalez RN  Outcome: Progressing     Problem: Nutrition Deficit:  Goal: Optimize nutritional status  2/15/2023 2059 by Oriana Gonzalze RN  Outcome: Progressing  2/15/2023 1013 by Oriana Gonzalez RN  Outcome: Progressing

## 2023-02-16 NOTE — PROGRESS NOTES
Physical Therapy  Facility/Department: [facility] C3 TELE/MED SURG/ONC  Daily Treatment Note  NAME: Ming Rouse  : 1945  MRN: [id number]    Date of Service: 2023    Discharge Recommendations:  Home with Home health PT, 24 hour supervision or assist   PT Equipment Recommendations  Equipment Needed: Yes  Mobility Devices: Flor Bolds: Rolling    Patient Diagnosis(es): The primary encounter diagnosis was Other fatigue. Diagnoses of Plasma cell hyperplasia, Thrombocytopenia (HCC), Orthostatic hypotension, and Acute renal failure superimposed on chronic kidney disease, unspecified CKD stage, unspecified acute renal failure type [facility]) were also pertinent to this visit. Assessment   Assessment: Pt tolerated therapy well this date. Grossly SBA for mobility with RW. Tolerated seated exercises well with good posture and intermittent cues for balance. Limited d/t fatigue and decreased activity tolerance. Pt would benefit from continued skilled therapy to address deficits. Continue to recommend home with home PT at d/c. Activity Tolerance: Patient tolerated treatment well;Patient limited by fatigue;Patient limited by endurance  Equipment Needed: Yes  Mobility Devices: [address]    Physcial Therapy Plan  General Plan: 3-5 times per week  Current Treatment Recommendations: Strengthening;Balance training;Functional mobility training;Transfer training;ADL/Self-care training;IADL training; Endurance training;Gait training;Stair training;Neuromuscular re-education;Home exercise program;Safety education & training;Patient/Caregiver education & training;Equipment evaluation, education, & procurement; Therapeutic activities     Restrictions  Restrictions/Precautions  Restrictions/Precautions: Fall Risk, General Precautions, Up as Tolerated  Required Braces or Orthoses?: No  Position Activity Restriction  Other position/activity restrictions: IV     Subjective    Subjective  Subjective: Pt agreeable to therapy.  RN cleared pt for therapy  Pain: denies pain     Objective   Vitals  /65, HR 73; SpO2 96% on RA       Bed Mobility Training  Bed Mobility Training: Yes  Sit to Supine: Stand-by assistance    Balance  Sitting: Intact  Standing: Impaired  Standing - Static: Good  Standing - Dynamic: Fair    Transfer Training  Transfer Training: Yes  Overall Level of Assistance: Stand-by assistance  Interventions: Verbal cues; Safety awareness training  Sit to Stand: Stand-by assistance  Stand to Sit: Stand-by assistance    Gait Training  Gait Training: Yes  Gait  Overall Level of Assistance: Stand-by assistance  Interventions: Verbal cues; Visual cues; Safety awareness training  Base of Support: Widened  Speed/Ramila: Delayed  Step Length: Left shortened;Right shortened  Gait Abnormalities: Shuffling gait (Cues for posture and to maintain SWATI within RW. No overt LOB, increased time)  Distance (ft): 20 Feet  Assistive Device: Walker, rolling       PT Exercises  Exercise Treatment: Seated EOB x 15 BLE: ankle pumps, LAQ, marches, hip abd/add; BUE x 15 : shoulder flex/ext, forward shouler rolls, scapular retraction -- cues for technique       Safety Devices  Type of Devices: All fall risk precautions in place;Call light within reach; Bed alarm in place;Nurse notified; Left in bed       Goals  Short Term Goals  Time Frame for Short Term Goals: 1 week, by 2/21/2023 -- PROGRESSING 2/16  Short Term Goal 1: pt will complete bed mobility mod I  Short Term Goal 2: Pt will complete functional transfer with Mod I and LRAD  Short Term Goal 3: Pt will AMB 150ft with mod I and LRAD  Short Term Goal 4: Pt will ascend and decend 2 steps with CGA  Short Term Goal 5: by 2/18/2023 pt will participate in 12-15 reps of LE strengthening exercises -- GOAL MET 2/16  Patient Goals   Patient Goals : \"to go home\"    Education  Patient Education  Education Given To: Patient  Education Provided: Role of Therapy;Plan of Care;Home Exercise Program;Transfer Training;Energy Conservation; Fall Prevention Strategies  Education Method: Demonstration;Verbal  Barriers to Learning: None  Education Outcome: Verbalized understanding      [facility] 6 Clicks Inpatient Mobility:  AM-PAC Basic Mobility - Inpatient   How much help is needed turning from your back to your side while in a flat bed without using bedrails?: None  How much help is needed moving from lying on your back to sitting on the side of a flat bed without using bedrails?: None  How much help is needed moving to and from a bed to a chair?: A Little  How much help is needed standing up from a chair using your arms?: A Little  How much help is needed walking in hospital room?: A Little  How much help is needed climbing 3-5 steps with a railing?: A Little  AM-PAC Inpatient Mobility Raw Score : 20  AM-PAC Inpatient T-Scale Score : 47.67  Mobility Inpatient CMS 0-100% Score: 35.83  Mobility Inpatient CMS G-Code Modifier : CJ    Therapy Time   Individual Concurrent Group Co-treatment   Time In 0938         Time Out 1002         Minutes 24         Timed Code Treatment Minutes: 24 Minutes       Tatianna Noguera, PT, DPT  If pt is unable to be seen after this session, please let this note serve as discharge summary. Please see case management note for discharge disposition. Thank you.

## 2023-02-16 NOTE — PROGRESS NOTES
Nephrology Progress Note   [facility]. [facility]      This patient is a 65year old male whom we are following for ELEONORA on CKD. Subjective: The patient was seen and examined. Had BMB yesterday. Appetite still not good. Family History: Family at bedside and questions were answered  ROS: No SOB or chest pain      Vitals:  /65   Pulse 73   Temp 98.1 °F (36.7 °C) (Oral)   Resp 18   Ht 5' 9\" (1.753 m)   Wt 163 lb (73.9 kg)   SpO2 96%   BMI 24.07 kg/m²   I/O last 3 completed shifts: In: 5474.7 [P.O.:1740; I.V.:3518.7; Blood:216]  Out: 6460 [Urine:2615]  I/O this shift: In: 732.2 [P.O.:240; I.V.:492.2]  Out: 350 [Urine:350]    Physical Exam  Vitals reviewed. Constitutional:       General: He is not in acute distress. HENT:      Head: Normocephalic and atraumatic. Eyes:      General: No scleral icterus. Conjunctiva/sclera: Conjunctivae normal.   Cardiovascular:      Rate and Rhythm: Normal rate. Heart sounds: No friction rub. Pulmonary:      Effort: Pulmonary effort is normal. No respiratory distress. Breath sounds: No wheezing. Abdominal:      General: Bowel sounds are normal. There is no distension. Tenderness: There is no abdominal tenderness. Musculoskeletal:      Right lower leg: No edema. Left lower leg: No edema. Neurological:      Mental Status: He is alert.          Medications:   busPIRone  10 mg Oral BID    cholestyramine  4 g Oral TID WC    OLANZapine  5 mg Oral Nightly    pantoprazole  40 mg Oral Daily    sertraline  75 mg Oral Daily    sodium chloride flush  5-40 mL IntraVENous 2 times per day         Labs:  Recent Labs     02/14/23  0722 02/15/23  0511 02/16/23  0539   WBC 12.0* 10.5 10.4   HGB 9.6* 9.1* 8.9*   HCT 28.2* 26.3* 25.6*   MCV 98.2 97.8 97.6   PLT 36* 23* 32*       Recent Labs     02/14/23  0722 02/15/23  0511 02/16/23  0539    144 146*   K 4.2 3.8 3.7    111* 112*   CO2 19* 21 22   GLUCOSE 104* 97 92   PHOS  --  2.9 2.5   BUN 52* 45* 33*   CREATININE 4.1* 3.1* 2.3*   LABGLOM 14* 20* 28*             Assessment/Plan:    Acute Kidney Injury. - Concern for myeloma kidney + pre-renal components (low BP, decreased PO intake). - UA on 2/9/23 showed RBC 0-2, proteinuria. Urine MAC 107mg/g. FeNa 2%. - Renal US with no evidence of hydronephrosis, 1.9cm L renal lesion suspicious for potential RCC. - Lambda light chains 3,523. Holding off on TPE with kidney function improving on supportive care. - Continue IVF hydration.  - Avoid hypotension and nephrotoxic medications. CKD Stage 3b. - Presumed from HTN. - Previous baseline of 1.2-1.5mg/dL. - Follows with Dr. Deena Lacey in the office. Metabolic Acidosis. - Suspect from worsening ELEONORA +/- lactic acidosis. - Improved with bicarb drip. Hypernatremia, mild. - Change IVF to 1/2NS at 75cc/hr. Hypertension.  - BP is on the low side. Not on any antihypertensive medications. Multiple Myeloma/Leukocytosis/Thrmbocytopenia.  - Heme-onc following.  - S/P BMB on 2/15/23, await result. Please do not hesitate to contact me at [phone] if with questions. Thank you! Jake Jacob MD  [facility] [employer]  2/16/2023

## 2023-02-17 ENCOUNTER — APPOINTMENT (OUTPATIENT)
Dept: INTERVENTIONAL RADIOLOGY/VASCULAR | Age: 78
DRG: 841 | End: 2023-02-17
Payer: MEDICARE

## 2023-02-17 LAB
ABO/RH: NORMAL
ALBUMIN SERPL-MCNC: 3.3 G/DL (ref 3.4–5)
ANION GAP SERPL CALCULATED.3IONS-SCNC: 12 MMOL/L (ref 3–16)
ANTIBODY SCREEN: NORMAL
ATYPICAL LYMPHOCYTE RELATIVE PERCENT: 4 % (ref 0–6)
BANDED NEUTROPHILS RELATIVE PERCENT: 7 % (ref 0–7)
BASOPHILS ABSOLUTE: 0.1 K/UL (ref 0–0.2)
BASOPHILS RELATIVE PERCENT: 1 %
BLOOD CULTURE, ROUTINE: NORMAL
BUN BLDV-MCNC: 27 MG/DL (ref 7–20)
CALCIUM SERPL-MCNC: 7.9 MG/DL (ref 8.3–10.6)
CHLORIDE BLD-SCNC: 113 MMOL/L (ref 99–110)
CO2: 18 MMOL/L (ref 21–32)
CREAT SERPL-MCNC: 1.9 MG/DL (ref 0.8–1.3)
CULTURE, BLOOD 2: NORMAL
EOSINOPHILS ABSOLUTE: 0.1 K/UL (ref 0–0.6)
EOSINOPHILS RELATIVE PERCENT: 1 %
GFR SERPL CREATININE-BSD FRML MDRD: 36 ML/MIN/{1.73_M2}
GLUCOSE BLD-MCNC: 126 MG/DL (ref 70–99)
HCT VFR BLD CALC: 25.8 % (ref 40.5–52.5)
HEMATOLOGY PATH CONSULT: NO
HEMOGLOBIN: 8.3 G/DL (ref 13.5–17.5)
LYMPHOCYTES ABSOLUTE: 3.9 K/UL (ref 1–5.1)
LYMPHOCYTES RELATIVE PERCENT: 37 %
MCH RBC QN AUTO: 32.6 PG (ref 26–34)
MCHC RBC AUTO-ENTMCNC: 32.3 G/DL (ref 31–36)
MCV RBC AUTO: 100.8 FL (ref 80–100)
METAMYELOCYTES RELATIVE PERCENT: 1 %
MONOCYTES ABSOLUTE: 0.3 K/UL (ref 0–1.3)
MONOCYTES RELATIVE PERCENT: 3 %
NEUTROPHILS ABSOLUTE: 2.3 K/UL (ref 1.7–7.7)
NEUTROPHILS RELATIVE PERCENT: 16 %
PDW BLD-RTO: 15.9 % (ref 12.4–15.4)
PHOSPHORUS: 2.2 MG/DL (ref 2.5–4.9)
PLASMA CELLS PERCENT: 30 %
PLATELET # BLD: 21 K/UL (ref 135–450)
PLATELET # BLD: 60 K/UL (ref 135–450)
PLATELET SLIDE REVIEW: ABNORMAL
PMV BLD AUTO: 7.1 FL (ref 5–10.5)
POTASSIUM SERPL-SCNC: 3.8 MMOL/L (ref 3.5–5.1)
RBC # BLD: 2.56 M/UL (ref 4.2–5.9)
SLIDE REVIEW: ABNORMAL
SODIUM BLD-SCNC: 143 MMOL/L (ref 136–145)
WBC # BLD: 9.5 K/UL (ref 4–11)

## 2023-02-17 PROCEDURE — 1200000000 HC SEMI PRIVATE

## 2023-02-17 PROCEDURE — 2580000003 HC RX 258: Performed by: INTERNAL MEDICINE

## 2023-02-17 PROCEDURE — 80069 RENAL FUNCTION PANEL: CPT

## 2023-02-17 PROCEDURE — 97110 THERAPEUTIC EXERCISES: CPT

## 2023-02-17 PROCEDURE — 02HV33Z INSERTION OF INFUSION DEVICE INTO SUPERIOR VENA CAVA, PERCUTANEOUS APPROACH: ICD-10-PCS | Performed by: STUDENT IN AN ORGANIZED HEALTH CARE EDUCATION/TRAINING PROGRAM

## 2023-02-17 PROCEDURE — 97535 SELF CARE MNGMENT TRAINING: CPT

## 2023-02-17 PROCEDURE — 36573 INSJ PICC RS&I 5 YR+: CPT

## 2023-02-17 PROCEDURE — 36415 COLL VENOUS BLD VENIPUNCTURE: CPT

## 2023-02-17 PROCEDURE — 85049 AUTOMATED PLATELET COUNT: CPT

## 2023-02-17 PROCEDURE — 6360000002 HC RX W HCPCS: Performed by: INTERNAL MEDICINE

## 2023-02-17 PROCEDURE — 6370000000 HC RX 637 (ALT 250 FOR IP): Performed by: INTERNAL MEDICINE

## 2023-02-17 PROCEDURE — C1751 CATH, INF, PER/CENT/MIDLINE: HCPCS

## 2023-02-17 PROCEDURE — 36430 TRANSFUSION BLD/BLD COMPNT: CPT

## 2023-02-17 PROCEDURE — 6360000002 HC RX W HCPCS: Performed by: NURSE PRACTITIONER

## 2023-02-17 PROCEDURE — 85025 COMPLETE CBC W/AUTO DIFF WBC: CPT

## 2023-02-17 RX ORDER — PROCHLORPERAZINE EDISYLATE 5 MG/ML
10 INJECTION INTRAMUSCULAR; INTRAVENOUS EVERY 6 HOURS PRN
Status: DISCONTINUED | OUTPATIENT
Start: 2023-02-17 | End: 2023-02-21 | Stop reason: HOSPADM

## 2023-02-17 RX ORDER — SODIUM CHLORIDE 9 MG/ML
INJECTION, SOLUTION INTRAVENOUS PRN
Status: DISCONTINUED | OUTPATIENT
Start: 2023-02-17 | End: 2023-02-21 | Stop reason: HOSPADM

## 2023-02-17 RX ORDER — ONDANSETRON 2 MG/ML
8 INJECTION INTRAMUSCULAR; INTRAVENOUS EVERY 12 HOURS SCHEDULED
Status: COMPLETED | OUTPATIENT
Start: 2023-02-17 | End: 2023-02-19

## 2023-02-17 RX ORDER — SODIUM CHLORIDE 0.9 % (FLUSH) 0.9 %
5-40 SYRINGE (ML) INJECTION EVERY 12 HOURS SCHEDULED
Status: DISCONTINUED | OUTPATIENT
Start: 2023-02-17 | End: 2023-02-17 | Stop reason: SDUPTHER

## 2023-02-17 RX ORDER — SODIUM CHLORIDE 0.9 % (FLUSH) 0.9 %
5-40 SYRINGE (ML) INJECTION PRN
Status: DISCONTINUED | OUTPATIENT
Start: 2023-02-17 | End: 2023-02-17 | Stop reason: SDUPTHER

## 2023-02-17 RX ORDER — LIDOCAINE HYDROCHLORIDE 10 MG/ML
5 INJECTION, SOLUTION INFILTRATION; PERINEURAL ONCE
Status: DISCONTINUED | OUTPATIENT
Start: 2023-02-17 | End: 2023-02-21 | Stop reason: HOSPADM

## 2023-02-17 RX ORDER — SODIUM CHLORIDE 9 MG/ML
25 INJECTION, SOLUTION INTRAVENOUS PRN
Status: DISCONTINUED | OUTPATIENT
Start: 2023-02-17 | End: 2023-02-17 | Stop reason: SDUPTHER

## 2023-02-17 RX ADMIN — Medication 10 ML: at 08:48

## 2023-02-17 RX ADMIN — PANTOPRAZOLE SODIUM 40 MG: 40 TABLET, DELAYED RELEASE ORAL at 08:47

## 2023-02-17 RX ADMIN — CHOLESTYRAMINE 4 G: 4 POWDER, FOR SUSPENSION ORAL at 08:47

## 2023-02-17 RX ADMIN — LORAZEPAM 1 MG: 1 TABLET ORAL at 16:37

## 2023-02-17 RX ADMIN — ONDANSETRON 4 MG: 2 INJECTION INTRAMUSCULAR; INTRAVENOUS at 16:28

## 2023-02-17 RX ADMIN — BUSPIRONE HYDROCHLORIDE 10 MG: 5 TABLET ORAL at 20:59

## 2023-02-17 RX ADMIN — LORAZEPAM 1 MG: 1 TABLET ORAL at 08:47

## 2023-02-17 RX ADMIN — ONDANSETRON 4 MG: 2 INJECTION INTRAMUSCULAR; INTRAVENOUS at 08:47

## 2023-02-17 RX ADMIN — BUSPIRONE HYDROCHLORIDE 10 MG: 5 TABLET ORAL at 08:47

## 2023-02-17 RX ADMIN — SODIUM CHLORIDE: 4.5 INJECTION, SOLUTION INTRAVENOUS at 20:30

## 2023-02-17 RX ADMIN — Medication 10 ML: at 20:59

## 2023-02-17 RX ADMIN — SERTRALINE 75 MG: 50 TABLET, FILM COATED ORAL at 08:47

## 2023-02-17 RX ADMIN — OLANZAPINE 5 MG: 5 TABLET, FILM COATED ORAL at 20:59

## 2023-02-17 RX ADMIN — CYCLOPHOSPHAMIDE 500 MG: 500 INJECTION, POWDER, FOR SOLUTION INTRAVENOUS; ORAL at 18:31

## 2023-02-17 RX ADMIN — DEXAMETHASONE SODIUM PHOSPHATE 40 MG: 4 INJECTION, SOLUTION INTRAMUSCULAR; INTRAVENOUS at 16:32

## 2023-02-17 RX ADMIN — ONDANSETRON 8 MG: 2 INJECTION INTRAMUSCULAR; INTRAVENOUS at 18:15

## 2023-02-17 RX ADMIN — CHOLESTYRAMINE 4 G: 4 POWDER, FOR SUSPENSION ORAL at 11:57

## 2023-02-17 RX ADMIN — CHOLESTYRAMINE 4 G: 4 POWDER, FOR SUSPENSION ORAL at 16:37

## 2023-02-17 RX ADMIN — PROCHLORPERAZINE EDISYLATE 10 MG: 5 INJECTION INTRAMUSCULAR; INTRAVENOUS at 19:13

## 2023-02-17 RX ADMIN — SODIUM CHLORIDE: 4.5 INJECTION, SOLUTION INTRAVENOUS at 13:23

## 2023-02-17 RX ADMIN — SODIUM CHLORIDE: 4.5 INJECTION, SOLUTION INTRAVENOUS at 03:33

## 2023-02-17 ASSESSMENT — PAIN SCALES - GENERAL
PAINLEVEL_OUTOF10: 0
PAINLEVEL_OUTOF10: 0

## 2023-02-17 NOTE — PROGRESS NOTES
Occupational Therapy  Facility/Department: [facility] C3 TELE/MED SURG/ONC  Daily Treatment Note  NAME: Sabine Sharma  : 1945  MRN: [id number]    Date of Service: 2023    Discharge Recommendations:  24 hour supervision or assist, Home with Home health OT, S Level 1  OT Equipment Recommendations  Equipment Needed: No      Patient Diagnosis(es): The primary encounter diagnosis was Other fatigue. Diagnoses of Plasma cell hyperplasia, Thrombocytopenia (HCC), Orthostatic hypotension, and Acute renal failure superimposed on chronic kidney disease, unspecified CKD stage, unspecified acute renal failure type [facility]) were also pertinent to this visit. Assessment      Assessment: Pt tolerated session well, grossly requiring SPV for all transfers, mobility with RW, and ADLs. He displayed good strength to perform x20 BUE AROM while seated EOB. Pt and wife both state they have no concerns about returning home and did not have any questions at this time. Continue to recommend 24hr SPV and HHOT upon d/c to increase pt safety with mobility and ADLs. Activity Tolerance: Patient tolerated treatment well  Discharge Recommendations: 24 hour supervision or assist;Home with Home health OT;S Level 1  Equipment Needed: No      Plan   Occupational Therapy Plan  Times Per Week: 2-3x/wk  Current Treatment Recommendations: Strengthening;ROM;Balance training;Functional mobility training; Endurance training; Safety education & training;Equipment evaluation, education, & procurement;Patient/Caregiver education & training;Self-Care / ADL; Home management training     Restrictions  Restrictions/Precautions  Restrictions/Precautions: Fall Risk, General Precautions, Up as Tolerated  Required Braces or Orthoses?: No  Position Activity Restriction  Other position/activity restrictions: IV       Subjective   Subjective  Subjective: Pt in bed upon OT arrival, pleasant and agreeable to OT session.   Orientation  Overall Orientation Status: Within Normal Limits  Orientation Level: Oriented X4  Cognition  Overall Cognitive Status: WFL        Objective         Bed Mobility Training  Bed Mobility Training: Yes  Supine to Sit: Supervision (HOB slightly elevated, use of rails)  Sit to Supine: Supervision (HOB flat, no use of rails)  Transfer Training  Transfer Training: Yes  Sit to Stand: Supervision (from EOB to RW)  Stand to Sit: Supervision (from RW to EOB)     ADL  Grooming: Supervision  Grooming Skilled Clinical Factors: standing at sink with RW to perform oral hygiene, hand hygiene, and wash face  LE Dressing: Supervision  LE Dressing Skilled Clinical Factors: sitting EOB to thread BLE into hospital pants, standing at RW to pull up/down over hips  OT Exercises  A/AROM Exercises: x20 BUE- scapular elevation, shoulder flexion, elbow flexion/extension (seated unsupported EOB)  Dynamic Standing Balance Exercises: standing at sink with RW to perform grooming tasks for 5 mins     Safety Devices  Type of Devices: All carrie prominences offloaded; All fall risk precautions in place; Bed alarm in place;Call light within reach;Gait belt;Left in bed;Nurse notified; Patient at risk for falls  Restraints  Restraints Initially in Place: No     Patient Education  Education Given To: Patient  Education Provided: Role of Therapy;Plan of Care;Home Exercise Program;Precautions; ADL Adaptive Strategies;Transfer Training;Energy Conservation  Education Method: Demonstration;Verbal  Barriers to Learning: None  Education Outcome: Verbalized understanding;Demonstrated understanding    AM-PAC Daily Activity - Inpatient   How much help is needed for putting on and taking off regular lower body clothing?: A Little  How much help is needed for bathing (which includes washing, rinsing, drying)?: A Little  How much help is needed for toileting (which includes using toilet, bedpan, or urinal)?: A Little  How much help is needed for putting on and taking off regular upper body clothing?: None  How much help is needed for taking care of personal grooming?: A Little  How much help for eating meals?: None  AM-PAC Inpatient Daily Activity Raw Score: 20  AM-PAC Inpatient ADL T-Scale Score : 42.03  ADL Inpatient CMS 0-100% Score: 38.32  ADL Inpatient CMS G-Code Modifier : CJ    Goals  Short Term Goals  Time Frame for Short Term Goals: Short term goals to be met by 2/21  Short Term Goal 1: Pt will perform LB dressing with IND by 2/17- progressing  Short Term Goal 2: Pt will tolerate standing for 5 mins to perform ADL tasks- goal met  Short Term Goal 3: Pt will perform toileting with SPV  Patient Goals   Patient goals : \"get better\"       Therapy Time   Individual Concurrent Group Co-treatment   Time In 0905         Time Out 0929         Minutes 24         Timed Code Treatment Minutes: 24 Minutes     If pt is unable to be seen after this session, please let this note serve as discharge summary. Please see case management note for discharge disposition. Thank you.     Warren Lynn OT

## 2023-02-17 NOTE — PROGRESS NOTES
Original chemotherapy orders reviewed and acknowledged. Appropriateness of chemotherapy treatment regimen Cytoxan for diagnosis of Plasma Cell Leukemia was verified. Patient educated on chemotherapy regimen. Acknowledgement of informed consent for chemotherapy obtained. Pt height, weight, and BSA verified. Appropriate dosing calculations of chemotherapy based on height, weight, and BSA verified. Administration: Chemotherapy drug Cytoxan independently verified with Abeba Varghese and Yobany Lugo RN prior to administration. Original order, appropriateness of regimen, drug supplied, height, weight, BSA, dose calculations, expiration dates/times, drug appearance, and two patient identifiers were verified by both RNs. Drug checked for vesicant/irritant status and for risk of hypersensitivity. Most recent laboratory values and allergies, were reviewed. Appropriate IV access available: Positive, brisk blood return via CVC was confirmed prior to administration. Chest x-ray for correct line placement reviewed  Abeba Varghese RN and Yobany Lugo RN verified correct rate of chemotherapy and maintenance IV fluids. Patient was educated on chemotherapy regimen prior to administration including indication for treatment related to disease & side effects of chemotherapy drug. Patient verbalizes understanding of all instructions. Completion of Chemotherapy: Monitoring during infusion done per policy, see Flowsheets. Blood return verified before, during, and after infusion per policy; no signs of extravasation. Pt  with some nausea during  chemotherapy well and without incident. Chemotherapy infusion end time on the [facility] Will continue to monitor.

## 2023-02-17 NOTE — PROGRESS NOTES
ONCOLOGY HEMATOLOGY CARE PROGRESS NOTE      SUBJECTIVE:    Afebrile and on room air. He does not feel ready to stop treatment. ROS:     Constitutional: Anorexia, weakness. No weight loss, No fever, No chills, No night sweats. Eyes:  No impairment or change in vision  ENT / Mouth:  No pain, abnormal ulceration, bleeding, nasal drip or change in voice or hearing  Cardiovascular:  No chest pain, palpitations, new edema, or calf discomfort  Respiratory:  No pain, hemoptysis, change to breathing  Breast:  No pain, discharge, change in appearance or texture  Gastrointestinal:  No pain, cramping, jaundice, change to eating and bowel habits  Urinary:  No pain, bleeding or change in continence  Genitalia: No pain, bleeding or discharge  Musculoskeletal:  No redness, pain, edema or weakness  Skin:  No pruritus, rash, change to nodules or lesions  Neurologic:  No discomfort, change in mental status, speech, sensory or motor activity  Psychiatric:  No change in concentration or change to affect or mood  Endocrine:  No hot flashes, increased thirst, or change to urine production  Hematologic: No petechiae, ecchymosis or bleeding  Lymphatic:  No lymphadenopathy or lymphedema  Allergy / Immunologic:  No eczema, hives, frequent or recurrent infections    OBJECTIVE        Physical    VITALS:  Patient Vitals for the past 24 hrs:   BP Temp Temp src Pulse Resp SpO2   02/17/23 0035 112/67 98.1 °F (36.7 °C) Oral 64 17 94 %   02/16/23 2052 105/61 98.2 °F (36.8 °C) Oral 67 18 94 %   02/16/23 1544 115/67 98.1 °F (36.7 °C) Oral 68 16 96 %   02/16/23 0922 113/65 98.1 °F (36.7 °C) Oral 73 18 96 %         24HR INTAKE/OUTPUT:    Intake/Output Summary (Last 24 hours) at 2/17/2023 9366  Last data filed at 2/16/2023 2155  Gross per 24 hour   Intake 1683.98 ml   Output 925 ml   Net 758.98 ml         CONSTITUTIONAL: awake, alert, cooperative, no apparent distress. Appears chronically ill.    EYES: pupils equal, round and reactive to light, sclera clear and conjunctiva normal  ENT: Normocephalic, without obvious abnormality, atraumatic  NECK: supple, symmetrical, no jugular venous distension and no carotid bruits   HEMATOLOGIC/LYMPHATIC: no cervical, supraclavicular or axillary lymphadenopathy   LUNGS: no increased work of breathing and clear to auscultation   CARDIOVASCULAR: regular rate and rhythm, normal S1 and S2, no murmur noted  ABDOMEN: normal bowel sounds x 4, soft, non-distended, non-tender, no masses palpated, no hepatosplenomgaly   MUSCULOSKELETAL: full range of motion noted, tone is normal  NEUROLOGIC: awake, alert, oriented to name, place and time. Motor skills grossly intact. SKIN: Normal skin color, texture, turgor and no jaundice. appears intact   EXTREMITIES: no LE edema     DATA:  CBC:    Recent Labs     02/17/23  0534 02/16/23  0539 02/15/23  0511 02/14/23  0722 02/13/23  1702 02/13/23  1150   WBC 9.5 10.4 10.5 12.0*   < > 16.6*   NEUTROABS  --  2.8 1.8 3.0  --  3.5   LYMPHOPCT  --  25.0 7.0 28.0  --  8.0   RBC 2.56* 2.62* 2.69* 2.87*   < > 3.48*   HGB 8.3* 8.9* 9.1* 9.6*   < > 11.2*   HCT 25.8* 25.6* 26.3* 28.2*   < > 34.0*   .8* 97.6 97.8 98.2   < > 97.9   MCH 32.6 33.8 33.9 33.3   < > 32.1   MCHC 32.3 34.6 34.7 33.9   < > 32.8   RDW 15.9* 15.2 15.2 15.4   < > 15.4   PLT 21* 32* 23* 36*   < > 10*    < > = values in this interval not displayed. PT/INR:    Recent Labs     02/14/23  0722 02/13/23  1150 01/19/23  1239   PROT  --  6.7 6.4   INR 1.12  --   --        PTT:  No results for input(s): APTT in the last 720 hours.     CMP:    Recent Labs     02/17/23  0534 02/16/23  0539 02/15/23  0511 02/14/23  0722 02/13/23  1150 02/13/23  0948 02/09/23  1208 01/19/23  1239    146* 144 142 141 142   < > 138   K 3.8 3.7 3.8 4.2 4.6 4.8   < > 4.5   * 112* 111* 110 106 106   < > 104   CO2 18* 22 21 19* 20* 18*   < > 21   GLUCOSE 126* 92 97 104* 128* 133*   < > 109*   BUN 27* 33* 45* 52* 39* 31*   < > 26*   CREATININE 1.9* 2.3* 3.1* 4.1* 2.9* 2.7*   < > 1.9*   LABGLOM 36* 28* 20* 14* 21* 23*   < > 36*   CALCIUM 7.9* 7.8* 7.8* 8.1* 9.4 9.1   < > 9.3   PROT  --   --   --   --  6.7  --   --  6.4   LABALBU 3.3* 3.4 3.4  --  4.5  --    < > 4.5   AGRATIO  --   --   --   --  2.0  --   --  2.4*   BILITOT  --   --   --   --  0.5  --   --  <0.2   ALKPHOS  --   --   --   --  74  --   --  46   ALT  --   --   --   --  16  --   --  11   AST  --   --   --   --  33  --   --  14*   MG  --   --   --   --   --  2.20  --  2.50*    < > = values in this interval not displayed. Lab Results   Component Value Date    CALCIUM 7.9 (L) 02/17/2023    PHOS 2.2 (L) 02/17/2023       LDH:No results for input(s): LDH in the last 720 hours. Radiology Review:  CT GUIDED NEEDLE PLACEMENT  Narrative: PROCEDURE:  CT GUIDED BONE MARROW ASPIRATION AND CORE NEEDLE BONE BIOPSY OF THE RIGHT  ILIAC BONE. MODERATE CONSCIOUS SEDATION    2/15/2023    HISTORY:  ORDERING SYSTEM PROVIDED HISTORY: history of MM with plasma cell  hyperplasia/leukocytosis/thrombocytopenia  TECHNOLOGIST PROVIDED HISTORY:  Plasma cell leukemia workup; need FISH cytology for myeloma  Reason for exam:->history of MM with plasma cell  hyperplasia/leukocytosis/thrombocytopenia    SEDATION:  MODERATE SEDATION WAS PROVIDED BY THE INTERVENTIONAL RADIOLOGY NURSE UNDER  THE SUPERVISION OF THE INTERVENTIONAL RADIOLOGIST FOR 13 MINUTES.  1 mg  Versed and 50 mcg fentanyl was administered. TECHNIQUE:  Informed consent was obtained following a detailed explanation of the  procedure including risks, benefits, and alternatives. Universal protocol  was followed. Sterile gowns, masks, hats and gloves utilized for maximal  sterile barrier. Patient was laid prone on the CT suite table. Axial images  were obtained through the iliac bones using CT guidance and a suitable skin  site was prepped and draped in sterile fashion.   Local anesthesia was  achieved with lidocaine.  An 11 gauge [email or web address] bone marrow biopsy needle was  advanced into the right iliac bone and approximately 20 mL of bone marrow  aspirate was obtained.  A single core biopsy specimen was obtained and the  patient tolerated the procedure well.  A clean dry sterile dressing was  placed.    Dose modulation, iterative reconstruction, and/or weight based adjustment of  the mA/kV was utilized to reduce the radiation dose to as low as reasonably  achievable.    Time: 2.3 seconds    Dose: 322.27 mGy-cm    Estimated blood loss: Less than 5 cc  Impression: Successful CT guided bone marrow aspiration and core biopsy of the right  iliac bone.  CT BIOPSY BONE MARROW  Narrative: PROCEDURE:  CT GUIDED BONE MARROW ASPIRATION AND CORE NEEDLE BONE BIOPSY OF THE RIGHT  ILIAC BONE.    MODERATE CONSCIOUS SEDATION    2/15/2023    HISTORY:  ORDERING SYSTEM PROVIDED HISTORY: history of MM with plasma cell  hyperplasia/leukocytosis/thrombocytopenia  TECHNOLOGIST PROVIDED HISTORY:  Plasma cell leukemia workup; need FISH cytology for myeloma  Reason for exam:->history of MM with plasma cell  hyperplasia/leukocytosis/thrombocytopenia    SEDATION:  MODERATE SEDATION WAS PROVIDED BY THE INTERVENTIONAL RADIOLOGY NURSE UNDER  THE SUPERVISION OF THE INTERVENTIONAL RADIOLOGIST FOR 13 MINUTES.  1 mg  Versed and 50 mcg fentanyl was administered.    TECHNIQUE:  Informed consent was obtained following a detailed explanation of the  procedure including risks, benefits, and alternatives.  Universal protocol  was followed. Sterile gowns, masks, hats and gloves utilized for maximal  sterile barrier.  Patient was laid prone on the CT suite table.  Axial images  were obtained through the iliac bones using CT guidance and a suitable skin  site was prepped and draped in sterile fashion.  Local anesthesia was  achieved with lidocaine.  An 11 gauge Boundless bone marrow biopsy needle was  advanced into the right iliac bone and approximately 20 mL of  bone marrow  aspirate was obtained. A single core biopsy specimen was obtained and the  patient tolerated the procedure well. A clean dry sterile dressing was  placed. Dose modulation, iterative reconstruction, and/or weight based adjustment of  the mA/kV was utilized to reduce the radiation dose to as low as reasonably  achievable. Time: 2.3 seconds    Dose: 322.27 mGy-cm    Estimated blood loss: Less than 5 cc  Impression: Successful CT guided bone marrow aspiration and core biopsy of the right  iliac bone. Problem List  Patient Active Problem List   Diagnosis    GERD (gastroesophageal reflux disease)    MANISH (generalized anxiety disorder)    Essential hypertension, benign    CKD (chronic kidney disease) stage 3, GFR 30-59 ml/min (HCC)    Recurrent deep vein thrombosis (DVT) of right lower extremity (HCC)    Mixed hyperlipidemia    Ni's esophagus without dysplasia    Primary insomnia    Multiple myeloma not having achieved remission ([address]    Current moderate episode of major depressive disorder without prior episode ([address]    Cancer, metastatic to bone ([address]    Malaise and fatigue       ASSESSMENT AND PLAN:    Plasma cell leukemia  Leukocytosis   Thrombocytopenia  - being treated at [facility] outpatient for MM. Was receiving Velcade, cytoxan, and Xgeva. - Cycle 11 day 1 received on 01/11/2023. Received subsequent Velcade on time. Due for C12D1 on 02/15/2023.  - patient likely has progressed while on treatment   - 2-4% of MM cases can progress to rare variant, Plasma Cell Leukemia.  - Bone marrow biopsy performed 2/15/2023 showed plasma cell leukemia.   - Lambda 352.3 mg/dL, kappa 0.557 mg/dL, ratio 0.00.  - Will discuss with my BMT partners. With his creat, PACE seems like a poor choice. Maybe Carfilzomib/Jeimy/Dex? Hospice is reasonable too. He does not want to stop treatment, though. ELEONORA on CKD   - likely secondary to MM and poor PO intake  - Previous Cr baseline of 1.2-1.5 mg/dL.    - Cr 1.9 this AM which greatly is improved since admission  - Reviewed Renal US from 02/14. There is a 1.9 cm left renal lesion with vascularity suspicious for potential RCC. This was also identified on MRI 12/30. Patient was referred from our office to Urology for further evaluation. They recommended continued monitoring given other ongoing health issues. I discussed the case with my BMT partners. Will plan on hyperfractionated cyclophosphamide x4 doses and pulse Dex. Then pursue Carfilzomib/Jeimy/Dex as an outpatient. Will order a PICC line now. Chemo will start this evening, hopefully. The hope is that the hyperfractionated cyclophosphamide will debulk him a bit faster. ONCOLOGIC DISPOSITION:  - After chemo.     Cynthia Cespedes MD  Please contact through [facility]

## 2023-02-17 NOTE — PROGRESS NOTES
02/17/23 1641   Encounter Summary   Encounter Overview/Reason  Initial Encounter   Service Provided For: Patient and family together   Referral/Consult From: [facility] Family members   Last Encounter  02/17/23  (Initial visit, no concerns at this time)   Complexity of Encounter Low   Begin Time 1105   End Time  1110   Total Time Calculated 5 min

## 2023-02-17 NOTE — CARE COORDINATION
Chart reviewed. Care per nephrology, hem/onc and IM. Continue to wait for BMBX results. May need to transfer to [facility] for BMT if +.  Current plan for [facility] and Cailin Conte RN

## 2023-02-17 NOTE — PROGRESS NOTES
PICC/CVC insertion Procedure Note    Lura Mortimer   Admitted- 2/13/2023 10:52 AM  Admission diagnosis- Orthostatic hypotension [I95.1]  Thrombocytopenia (HCC) [D69.6]  Malaise and fatigue [R53.81, R53.83]  Plasma cell hyperplasia [D72.822]  Other fatigue [R53.83]  Acute renal failure superimposed on chronic kidney disease, unspecified CKD stage, unspecified acute renal failure type ([facility] [N17.9, N18.9]      Attending Physician- Trena Renee MD  Ordering Physician- [facility]  Indication for Insertion: Home Antibiotics    Lab Results   Component Value Date    INR 1.12 02/14/2023    PROTIME 14.3 02/14/2023     Lab Results   Component Value Date    WBC 9.5 02/17/2023    HGB 8.3 (L) 02/17/2023    HCT 25.8 (L) 02/17/2023    PLT 60 (L) 02/17/2023     Lab Results   Component Value Date    CREATININE 1.9 (H) 02/17/2023    BUN 27 (H) 02/17/2023    GFR 50 (L) 07/20/2012       Catheter Insertion Date- 2/17/2023   Catheter Brand- BLOVES PICC  Lot Number- [id number]  Lumen-Single    Insertion Site- Right Brachial  Vein Diameter- .46 cm  Swedish Size- 4  Catheter Length- 42 cm  Exposed Catheter Length- 1cm   PICC Tip Terminates in the Cavo Atrial Junction- Yes  Easy insertion- Yes  Able to Aspirate Blood- Yes  Easy Flush- Yes    PICC Placement Confirmation-  xray  PICC insertion successful- Yes  Ultrasound- yes    Okay To Use PICC- \"Yes    NURSES:  *please replace all existing IV tubing with new IV tubing prior to using the PICC for current IV infusions. *please remove any PIVs from right arm. *please refrain from obtaining BPs in the right arm. All of the above may be sources of infection or damage to the PICC line/site.     Electronically signed by Adore Huang, RN, RN on 2/17/2023 at 3:36 PM

## 2023-02-17 NOTE — PROGRESS NOTES
Nephrology Progress Note   [facility]      This patient is a 65year old male whom we are following for ELEONORA on CKD. Subjective: The patient was seen and examined. Complains of fatigue. Family History: Family at bedside and questions were answered  ROS: No SOB or chest pain      Vitals:  /72   Pulse 74   Temp 98.1 °F (36.7 °C) (Oral)   Resp 16   Ht 5' 9\" (1.753 m)   Wt 172 lb (78 kg)   SpO2 96%   BMI 25.40 kg/m²   I/O last 3 completed shifts: In: 5985.5 [P.O.:480; I.V.:5505.5]  Out: 2000 [Urine:2000]  I/O this shift:  In: 137.6 [I.V.:137.6]  Out: 800 [Urine:800]    Physical Exam  Vitals reviewed. Constitutional:       General: He is not in acute distress. HENT:      Head: Normocephalic and atraumatic. Eyes:      General: No scleral icterus. Conjunctiva/sclera: Conjunctivae normal.   Cardiovascular:      Rate and Rhythm: Normal rate. Heart sounds: No friction rub. Pulmonary:      Effort: Pulmonary effort is normal. No respiratory distress. Breath sounds: No wheezing. Abdominal:      General: Bowel sounds are normal. There is no distension. Tenderness: There is no abdominal tenderness. Musculoskeletal:      Right lower leg: No edema. Left lower leg: No edema. Neurological:      Mental Status: He is alert.          Medications:   lidocaine 1 % injection  5 mL IntraDERmal Once    dexamethasone  40 mg IntraVENous Q24H    ondansetron  8 mg IntraVENous 2 times per day    cyclophosphamide (CYTOXAN) chemo IVPB  300 mg/m2 IntraVENous Q12H    busPIRone  10 mg Oral BID    cholestyramine  4 g Oral TID WC    OLANZapine  5 mg Oral Nightly    pantoprazole  40 mg Oral Daily    sertraline  75 mg Oral Daily    sodium chloride flush  5-40 mL IntraVENous 2 times per day         Labs:  Recent Labs     02/15/23  0511 02/16/23  0539 02/17/23  0534   WBC 10.5 10.4 9.5   HGB 9.1* 8.9* 8.3*   HCT 26.3* 25.6* 25.8*   MCV 97.8 97.6 100.8*   PLT 23* 32* 21*       Recent Labs 02/15/23  0511 02/16/23  0539 02/17/23  0534    146* 143   K 3.8 3.7 3.8   * 112* 113*   CO2 21 22 18*   GLUCOSE 97 92 126*   PHOS 2.9 2.5 2.2*   BUN 45* 33* 27*   CREATININE 3.1* 2.3* 1.9*   LABGLOM 20* 28* 36*             Assessment/Plan:    Acute Kidney Injury. - Concern for myeloma kidney + pre-renal components (low BP, decreased PO intake). - UA on 2/9/23 showed RBC 0-2, proteinuria. Urine MAC 107mg/g. FeNa 2%. Lambda light chains 3,523.   - Renal US with no evidence of hydronephrosis, 1.9cm L renal lesion suspicious for potential RCC. Lesion was present on 12/30/22 MRI and per heme-onc's note, he was referred to urology and recommendation was to continue with monitoring.  - Kidney function is improving on supportive care. - Continue IVF hydration.  - Avoid hypotension and nephrotoxic medications. CKD Stage 3b. - Presumed from HTN. - Previous baseline of 1.2-1.5mg/dL. - Follows with Dr. Dre Magaña in the office. Metabolic Acidosis. - Suspect from ELEONORA +/- lactic acidosis. - May need to add bicarb to IVF if worsening. Hypernatremia, mild. - Improving on 1/2NS at 75cc/hr. Hypertension.  - BP is acceptable. Not on any antihypertensive medications. Multiple Myeloma/Leukocytosis/Thrmbocytopenia.  - Heme-onc following.  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 To start chemo this admission. Ok to place PICC in dominant arm. Patient is going to receive chemo this admission and outpatient. With thrombocytopenia, riskier to place a chest port at this time. Ideally, would want to preserve superficial veins for dialysis permanent access placement but given his advanced age and cancer status, he does not seem to be a good candidate for long term dialysis. Please do not hesitate to contact me at [phone] if with questions. Thank you! Josesito Romeo MD  [facility]  2/17/2023

## 2023-02-17 NOTE — PROGRESS NOTES
Comprehensive Nutrition Assessment    Type and Reason for Visit:  Reassess    Nutrition Recommendations/Plan:   Continue Regular diet for now  Discontinue Ensure for now, monitor need for alternate ONS if po intake drops below 50%     Malnutrition Assessment:  Malnutrition Status: At risk for malnutrition (Comment) (02/14/23 1408)    Context:  Chronic Illness      Nutrition Assessment:    Follow up:  Continues on a regular diet with Ensure nutrition supplements as tolerated. Po intake % the last 2 meals and 0% intake of Ensure per flow sheets. Chemo starting per oncology soon, picc line being placed today. Out of room at this time. Will stop Ensure for now due to lack of intake    Nutrition Related Findings:    Phos 2.2 on 2/17/23; last BM on 2/15/23; Wound Type: None       Current Nutrition Intake & Therapies:    Average Meal Intake: %  Average Supplements Intake: 0%  ADULT DIET; Regular  ADULT ORAL NUTRITION SUPPLEMENT; Breakfast, Dinner; Standard High Calorie/High Protein Oral Supplement    Anthropometric Measures:  Height: 5' 9\" (175.3 cm)  Ideal Body Weight (IBW): 160 lbs (73 kg)       Current Body Weight: 172 lb (78 kg),   IBW. Weight Source: Not Specified  Current BMI (kg/m2): 25.4                          BMI Categories: Normal Weight (BMI 18.5-24. 9)    Estimated Daily Nutrient Needs:  Energy Requirements Based On: Kcal/kg  Weight Used for Energy Requirements: Current  Energy (kcal/day): 0260-6912  Weight Used for Protein Requirements: Current  Protein (g/day): 74-89  Method Used for Fluid Requirements: 1 ml/kcal  Fluid (ml/day): 5622-0158    Nutrition Diagnosis:   Predicted inadequate energy intake related to inadequate protein-energy intake as evidenced by  (No intakes recorded yet)    Nutrition Interventions:   Food and/or Nutrient Delivery: Continue Current Diet, Discontinue Oral Nutrition Supplement  Nutrition Education/Counseling: No recommendation at this time  Coordination of Nutrition Care: Continue to monitor while inpatient       Goals:     Goals: PO intake 75% or greater       Nutrition Monitoring and Evaluation:   Behavioral-Environmental Outcomes: None Identified  Food/Nutrient Intake Outcomes: Food and Nutrient Intake, Supplement Intake  Physical Signs/Symptoms Outcomes: Biochemical Data, Nutrition Focused Physical Findings    Discharge Planning:     Too soon to determine     JOSEPH, [address] [facility], [address] [place] [address],   Contact: [phone]

## 2023-02-17 NOTE — PROGRESS NOTES
Hospitalist Progress Note      PCP: Carito Jerome DO    Date of Admission: 2/13/2023    Chief Complaint: fatigue    Hospital Course:   65 y.o. male who presented to [facility] with above c/o . He has H/o M, Myeloma , CKD developed malaise for weeks ago . Its worsened over the time and currently he cannot carry out daily activities as usual . No focal weakness . He has poor appetite . Denies chest pain , cough , fever . Sob , ab pain , N , V or D    Subjective: biopsy confirms progression to leukemia. Patient remains very fatigued.       Medications:  Reviewed    Infusion Medications    sodium chloride      sodium chloride      sodium chloride 75 mL/hr at 02/17/23 0846    sodium chloride      sodium chloride      sodium chloride       Scheduled Medications    lidocaine 1 % injection  5 mL IntraDERmal Once    sodium chloride flush  5-40 mL IntraVENous 2 times per day    dexamethasone  40 mg IntraVENous Q24H    ondansetron  8 mg IntraVENous 2 times per day    cyclophosphamide (CYTOXAN) chemo IVPB  300 mg/m2 IntraVENous Q12H    busPIRone  10 mg Oral BID    cholestyramine  4 g Oral TID WC    OLANZapine  5 mg Oral Nightly    pantoprazole  40 mg Oral Daily    sertraline  75 mg Oral Daily    sodium chloride flush  5-40 mL IntraVENous 2 times per day     PRN Meds: sodium chloride flush, sodium chloride, sodium chloride, sodium chloride, LORazepam, oxyCODONE, sodium chloride flush, sodium chloride, ondansetron **OR** ondansetron, polyethylene glycol, acetaminophen **OR** acetaminophen, sodium chloride      Intake/Output Summary (Last 24 hours) at 2/17/2023 0942  Last data filed at 2/17/2023 0846  Gross per 24 hour   Intake 1752.13 ml   Output 1525 ml   Net 227.13 ml       Physical Exam Performed:    /72   Pulse 74   Temp 98.1 °F (36.7 °C) (Oral)   Resp 16   Ht 5' 9\" (1.753 m)   Wt 172 lb (78 kg)   SpO2 96%   BMI 25.40 kg/m²     General appearance: No apparent distress, appears stated age and cooperative. HEENT: Pupils equal, round, and reactive to light. Conjunctivae/corneas clear. Neck: Supple, with full range of motion. No jugular venous distention. Trachea midline. Respiratory:  Normal respiratory effort. Clear to auscultation, bilaterally without Rales/Wheezes/Rhonchi. Cardiovascular: Regular rate and rhythm with normal S1/S2 without murmurs, rubs or gallops. Abdomen: Soft, non-tender, non-distended with normal bowel sounds. Musculoskeletal: No clubbing, cyanosis or edema bilaterally. Full range of motion without deformity. Skin: Skin color, texture, turgor normal.  No rashes or lesions. Neurologic:  Neurovascularly intact without any focal sensory/motor deficits. Cranial nerves: II-XII intact, grossly non-focal.  Psychiatric: Alert and oriented, thought content appropriate, normal insight  Capillary Refill: Brisk, 3 seconds, normal   Peripheral Pulses: +2 palpable, equal bilaterally       Labs:   Recent Labs     02/15/23  0511 02/16/23  0539 02/17/23  0534   WBC 10.5 10.4 9.5   HGB 9.1* 8.9* 8.3*   HCT 26.3* 25.6* 25.8*   PLT 23* 32* 21*     Recent Labs     02/15/23  0511 02/16/23  0539 02/17/23  0534    146* 143   K 3.8 3.7 3.8   * 112* 113*   CO2 21 22 18*   BUN 45* 33* 27*   CREATININE 3.1* 2.3* 1.9*   CALCIUM 7.8* 7.8* 7.9*   PHOS 2.9 2.5 2.2*     No results for input(s): AST, ALT, BILIDIR, BILITOT, ALKPHOS in the last 72 hours. No results for input(s): INR in the last 72 hours. No results for input(s): Marvie Forget in the last 72 hours.       Urinalysis:      Lab Results   Component Value Date/Time    NITRU Negative 02/09/2023 12:08 PM    WBCUA 0-2 02/09/2023 12:08 PM    BACTERIA 1+ 02/09/2023 12:08 PM    RBCUA 0-2 02/09/2023 12:08 PM    BLOODU TRACE 02/09/2023 12:08 PM    SPECGRAV 1.012 02/09/2023 12:08 PM    GLUCOSEU Negative 02/09/2023 12:08 PM       Radiology:  CT BIOPSY BONE MARROW   Final Result   Successful CT guided bone marrow aspiration and core biopsy of the right   iliac bone. CT GUIDED NEEDLE PLACEMENT   Final Result   Successful CT guided bone marrow aspiration and core biopsy of the right   iliac bone. US RENAL COMPLETE   Final Result   A 1.9 cm left renal lesion remains suspicious for potential renal cell   carcinoma. 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  - hem/onc consulted  - bone marrow biopsy confirms progression  - plan to start induction chemo today  - continue pain control    Thrombocytopenia  - s/p 4u PLTS mostly for procedures  - no evidence of current bleeding  - continue to monitor    ELEONORA on CKD  - unclear etiology  - nephrology consulted  - improving with IVF  - continue to monitor    DVT  - holding eliquis with severe thrombocytopenia    HLD  - holding statin    DVT Prophylaxis: none  Diet: ADULT DIET;  Regular  ADULT ORAL NUTRITION SUPPLEMENT; Breakfast, Dinner; Standard High Calorie/High Protein Oral Supplement  Code Status: Full Code  PT/OT Eval Status: ordered    Dispo - following chemo induction    Appropriate for A1 Discharge Unit: No      Aba Iniguez MD

## 2023-02-17 NOTE — PROGRESS NOTES
Consent for chemotherapy administration obtained. Obtained standing scale weight for accurate BSA. Verbal and written chemotherapy education provided to pt, wife and daughter to include: medication type, side effects, and regimen. All questions answered.

## 2023-02-17 NOTE — PROGRESS NOTES
Unable to place PICC line due to platelets of 21. Nurse made aware.  Platelets need to be 50 or above

## 2023-02-18 LAB
ANION GAP SERPL CALCULATED.3IONS-SCNC: 11 MMOL/L (ref 3–16)
BASOPHILS ABSOLUTE: 0 K/UL (ref 0–0.2)
BASOPHILS RELATIVE PERCENT: 0 %
BUN BLDV-MCNC: 28 MG/DL (ref 7–20)
CALCIUM SERPL-MCNC: 7.9 MG/DL (ref 8.3–10.6)
CHLORIDE BLD-SCNC: 110 MMOL/L (ref 99–110)
CO2: 20 MMOL/L (ref 21–32)
CREAT SERPL-MCNC: 1.9 MG/DL (ref 0.8–1.3)
EOSINOPHILS ABSOLUTE: 0.1 K/UL (ref 0–0.6)
EOSINOPHILS RELATIVE PERCENT: 1 %
GFR SERPL CREATININE-BSD FRML MDRD: 36 ML/MIN/{1.73_M2}
GLUCOSE BLD-MCNC: 163 MG/DL (ref 70–99)
HCT VFR BLD CALC: 23.3 % (ref 40.5–52.5)
HEMATOLOGY PATH CONSULT: NO
HEMOGLOBIN: 7.9 G/DL (ref 13.5–17.5)
LYMPHOCYTES ABSOLUTE: 6.2 K/UL (ref 1–5.1)
LYMPHOCYTES RELATIVE PERCENT: 44 %
MCH RBC QN AUTO: 33.1 PG (ref 26–34)
MCHC RBC AUTO-ENTMCNC: 34 G/DL (ref 31–36)
MCV RBC AUTO: 97.2 FL (ref 80–100)
METAMYELOCYTES RELATIVE PERCENT: 1 %
MONOCYTES ABSOLUTE: 0.1 K/UL (ref 0–1.3)
MONOCYTES RELATIVE PERCENT: 1 %
MYELOCYTE PERCENT: 2 %
NEUTROPHILS ABSOLUTE: 4.4 K/UL (ref 1.7–7.7)
NEUTROPHILS RELATIVE PERCENT: 28 %
NUCLEATED RED BLOOD CELLS: 1 /100 WBC
PDW BLD-RTO: 15.6 % (ref 12.4–15.4)
PLASMA CELLS PERCENT: 23 %
PLATELET # BLD: 45 K/UL (ref 135–450)
PLATELET SLIDE REVIEW: ABNORMAL
PMV BLD AUTO: 7.1 FL (ref 5–10.5)
POTASSIUM REFLEX MAGNESIUM: 3.8 MMOL/L (ref 3.5–5.1)
RBC # BLD: 2.39 M/UL (ref 4.2–5.9)
SLIDE REVIEW: ABNORMAL
SODIUM BLD-SCNC: 141 MMOL/L (ref 136–145)
WBC # BLD: 14.1 K/UL (ref 4–11)

## 2023-02-18 PROCEDURE — 2580000003 HC RX 258: Performed by: INTERNAL MEDICINE

## 2023-02-18 PROCEDURE — 6370000000 HC RX 637 (ALT 250 FOR IP): Performed by: INTERNAL MEDICINE

## 2023-02-18 PROCEDURE — 80048 BASIC METABOLIC PNL TOTAL CA: CPT

## 2023-02-18 PROCEDURE — 36592 COLLECT BLOOD FROM PICC: CPT

## 2023-02-18 PROCEDURE — 85025 COMPLETE CBC W/AUTO DIFF WBC: CPT

## 2023-02-18 PROCEDURE — 6360000002 HC RX W HCPCS: Performed by: NURSE PRACTITIONER

## 2023-02-18 PROCEDURE — 6360000002 HC RX W HCPCS: Performed by: INTERNAL MEDICINE

## 2023-02-18 PROCEDURE — 1200000000 HC SEMI PRIVATE

## 2023-02-18 PROCEDURE — 2500000003 HC RX 250 WO HCPCS: Performed by: INTERNAL MEDICINE

## 2023-02-18 RX ADMIN — LORAZEPAM 1 MG: 1 TABLET ORAL at 15:11

## 2023-02-18 RX ADMIN — OLANZAPINE 5 MG: 5 TABLET, FILM COATED ORAL at 20:57

## 2023-02-18 RX ADMIN — CYCLOPHOSPHAMIDE 500 MG: 500 INJECTION, POWDER, FOR SOLUTION INTRAVENOUS; ORAL at 08:01

## 2023-02-18 RX ADMIN — SODIUM BICARBONATE: 84 INJECTION, SOLUTION INTRAVENOUS at 16:48

## 2023-02-18 RX ADMIN — CYCLOPHOSPHAMIDE 500 MG: 500 INJECTION, POWDER, FOR SOLUTION INTRAVENOUS; ORAL at 19:32

## 2023-02-18 RX ADMIN — BUSPIRONE HYDROCHLORIDE 10 MG: 5 TABLET ORAL at 20:58

## 2023-02-18 RX ADMIN — ONDANSETRON 8 MG: 2 INJECTION INTRAMUSCULAR; INTRAVENOUS at 07:43

## 2023-02-18 RX ADMIN — PANTOPRAZOLE SODIUM 40 MG: 40 TABLET, DELAYED RELEASE ORAL at 07:51

## 2023-02-18 RX ADMIN — SODIUM CHLORIDE: 4.5 INJECTION, SOLUTION INTRAVENOUS at 08:37

## 2023-02-18 RX ADMIN — CHOLESTYRAMINE 4 G: 4 POWDER, FOR SUSPENSION ORAL at 16:52

## 2023-02-18 RX ADMIN — LORAZEPAM 1 MG: 1 TABLET ORAL at 21:06

## 2023-02-18 RX ADMIN — PROCHLORPERAZINE EDISYLATE 10 MG: 5 INJECTION INTRAMUSCULAR; INTRAVENOUS at 19:26

## 2023-02-18 RX ADMIN — PROCHLORPERAZINE EDISYLATE 10 MG: 5 INJECTION INTRAMUSCULAR; INTRAVENOUS at 12:53

## 2023-02-18 RX ADMIN — Medication 10 ML: at 07:44

## 2023-02-18 RX ADMIN — DEXAMETHASONE SODIUM PHOSPHATE 40 MG: 4 INJECTION, SOLUTION INTRAMUSCULAR; INTRAVENOUS at 16:51

## 2023-02-18 RX ADMIN — SODIUM CHLORIDE: 4.5 INJECTION, SOLUTION INTRAVENOUS at 08:49

## 2023-02-18 RX ADMIN — Medication 10 ML: at 20:58

## 2023-02-18 RX ADMIN — SERTRALINE 75 MG: 50 TABLET, FILM COATED ORAL at 07:51

## 2023-02-18 RX ADMIN — BUSPIRONE HYDROCHLORIDE 10 MG: 5 TABLET ORAL at 07:51

## 2023-02-18 RX ADMIN — LORAZEPAM 1 MG: 1 TABLET ORAL at 08:09

## 2023-02-18 RX ADMIN — CHOLESTYRAMINE 4 G: 4 POWDER, FOR SUSPENSION ORAL at 12:53

## 2023-02-18 RX ADMIN — CHOLESTYRAMINE 4 G: 4 POWDER, FOR SUSPENSION ORAL at 07:51

## 2023-02-18 RX ADMIN — ONDANSETRON 8 MG: 2 INJECTION INTRAMUSCULAR; INTRAVENOUS at 20:57

## 2023-02-18 ASSESSMENT — PAIN SCALES - GENERAL: PAINLEVEL_OUTOF10: 0

## 2023-02-18 NOTE — PLAN OF CARE
Problem: Safety - Adult  Goal: Free from fall injury  Outcome: Progressing  Flowsheets (Taken 2/18/2023 1804)  Free From Fall Injury: Instruct family/caregiver on patient safety     Problem: Chronic Conditions and Co-morbidities  Goal: Patient's chronic conditions and co-morbidity symptoms are monitored and maintained or improved  Outcome: Progressing  Flowsheets (Taken 2/18/2023 1804)  Care Plan - Patient's Chronic Conditions and Co-Morbidity Symptoms are Monitored and Maintained or Improved:   Monitor and assess patient's chronic conditions and comorbid symptoms for stability, deterioration, or improvement   Collaborate with multidisciplinary team to address chronic and comorbid conditions and prevent exacerbation or deterioration     Problem: Gastrointestinal - Adult  Goal: Minimal or absence of nausea and vomiting  Flowsheets (Taken 2/18/2023 1804)  Minimal or absence of nausea and vomiting:   Administer IV fluids as ordered to ensure adequate hydration   Administer ordered antiemetic medications as needed   Provide nonpharmacologic comfort measures as appropriate

## 2023-02-18 NOTE — PROGRESS NOTES
Pt assessment completed and charted. VSS. Pt a/o. Pt denies pain @ this time. Pt c/o nausea. Scheduled and PRN medication administered per MAR. Pt wife @ bedside. Chemo precautions in place. Pt ambulates SBA w/ FWW. Pt encouraged to turn. Bed in lowest position and wheels locked. Call light within reach. Bedside table within reach. Non-skid footwear in place. Pt denies any other needs at this time. Pt calls out appropriately.

## 2023-02-18 NOTE — PROGRESS NOTES
Original chemotherapy orders reviewed and acknowledged. Appropriateness of chemotherapy treatment regimen Cytoxan  for diagnosis of Plasma Cell Leukemia was verified. Patient educated on chemotherapy regimen. Acknowledgement of informed consent for chemotherapy verified. Pt height, weight, and BSA verified. Appropriate dosing calculations of chemotherapy based on height, weight, and BSA verified. Administration: Chemotherapy drug Cytoxan independently verified with Santi Bautista and Angeline Krabbe, RN prior to administration. Original order, appropriateness of regimen, drug supplied, height, weight, BSA, dose calculations, expiration dates/times, drug appearance, and two patient identifiers were verified by both RNs. Drug checked for vesicant/irritant status and for risk of hypersensitivity. Most recent laboratory values and allergies, were reviewed. Appropriate IV access available: Positive, brisk blood return via CVC was confirmed prior to administration. Chest x-ray for correct line placement reviewed  Santi Bautista RN and Angeline Krabbe, RN verified correct rate of chemotherapy and maintenance IV fluids. Patient was educated on chemotherapy regimen prior to administration including indication for treatment related to disease & side effects of chemotherapy drug. Patient verbalizes understanding of all instructions. Completion of Chemotherapy: Monitoring during infusion done per policy, see Flowsheets. Blood return verified before, during, and after infusion per policy; no signs of extravasation. Pt tolerated chemotherapy well and without incident. Chemotherapy infusion end time on the [facility]. Will continue to monitor.

## 2023-02-18 NOTE — PROGRESS NOTES
Nephrology Progress Note   [facility]. [facility]      This patient is a 65year old male whom we are following for ELEONORA on CKD. Subjective: The patient was seen and examined. Complains of fatigue. Renal function stable  Cytoxan started    Family History: Family at bedside and questions were answered  ROS: No SOB or chest pain      Vitals:  BP 99/63   Pulse 59   Temp 97.7 °F (36.5 °C) (Oral)   Resp 16   Ht 5' 9\" (1.753 m)   Wt 173 lb 4.8 oz (78.6 kg)   SpO2 96%   BMI 25.59 kg/m²   I/O last 3 completed shifts: In: 4078.6 [P.O.:600; I.V.:2919.9; Blood:558.7]  Out: 2350 [Urine:2350]  I/O this shift:  In: 1041.7 [P.O.:360; I.V.:314.3; IV Piggyback:367.4]  Out: -     Physical Exam  Vitals reviewed. Constitutional:       General: He is not in acute distress. HENT:      Head: Normocephalic and atraumatic. Eyes:      General: No scleral icterus. Conjunctiva/sclera: Conjunctivae normal.   Cardiovascular:      Rate and Rhythm: Normal rate. Heart sounds: No friction rub. Pulmonary:      Effort: Pulmonary effort is normal. No respiratory distress. Breath sounds: No wheezing. Abdominal:      General: Bowel sounds are normal. There is no distension. Tenderness: There is no abdominal tenderness. Musculoskeletal:      Right lower leg: No edema. Left lower leg: No edema. Neurological:      Mental Status: He is alert.          Medications:   lidocaine 1 % injection  5 mL IntraDERmal Once    dexamethasone  40 mg IntraVENous Q24H    ondansetron  8 mg IntraVENous 2 times per day    cyclophosphamide (CYTOXAN) chemo IVPB  500 mg IntraVENous Q12H    busPIRone  10 mg Oral BID    cholestyramine  4 g Oral TID WC    OLANZapine  5 mg Oral Nightly    pantoprazole  40 mg Oral Daily    sertraline  75 mg Oral Daily    sodium chloride flush  5-40 mL IntraVENous 2 times per day         Labs:  Recent Labs     02/16/23  0539 02/17/23  0534 02/17/23  1328 02/18/23  1045   WBC 10.4 9.5  --  14.1*   HGB 8. 9* 8.3*  --  7.9*   HCT 25.6* 25.8*  --  23.3*   MCV 97.6 100.8*  --  97.2   PLT 32* 21* 60* 45*       Recent Labs     02/16/23  0539 02/17/23  0534 02/18/23  1045   * 143 141   K 3.7 3.8 3.8   * 113* 110   CO2 22 18* 20*   GLUCOSE 92 126* 163*   PHOS 2.5 2.2*  --    BUN 33* 27* 28*   CREATININE 2.3* 1.9* 1.9*   LABGLOM 28* 36* 36*             Assessment/Plan:    Acute Kidney Injury. - Concern for myeloma kidney + pre-renal components (low BP, decreased PO intake). - UA on 2/9/23 showed RBC 0-2, proteinuria. Urine MAC 107mg/g. FeNa 2%. Lambda light chains 3,523.   - Renal US with no evidence of hydronephrosis, 1.9cm L renal lesion suspicious for potential RCC. Lesion was present on 12/30/22 MRI and per heme-onc's note, he was referred to urology and recommendation was to continue with monitoring.  - Kidney function is improving on supportive care. - Continue IVF hydration.  - Avoid hypotension and nephrotoxic medications. CKD Stage 3b. - Presumed from HTN. - Previous baseline of 1.2-1.5mg/dL. - Follows with Dr. Ryan Cool in the office. Metabolic Acidosis. - Suspect from ELEONORA +/- lactic acidosis. -Change IV fluid to add bicarb. Hypernatremia, mild. - Improved on hypotonic fluids     Hypertension.  -Blood pressure is soft     Multiple Myeloma/Leukocytosis/Thrmbocytopenia.  - Heme-onc following.  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 Cyclophosphamide and pulsed dexamethasone started 2/17/2023 with plans to pursue Carfilzomib/Jeimy/Dex as an outpatient per Dr. Derick Roach to place PICC in dominant arm. Patient is going to receive chemo this admission and outpatient. With thrombocytopenia, riskier to place a chest port at this time. Ideally, would want to preserve superficial veins for dialysis permanent access placement but given his advanced age and cancer status, he does not seem to be a good candidate for long term dialysis.     Please do not hesitate to contact me at [phone] if with questions. Thank you! Guido Andujar MD  [facility]  2/18/2023

## 2023-02-18 NOTE — PROGRESS NOTES
ONCOLOGY HEMATOLOGY CARE PROGRESS NOTE      SUBJECTIVE:    The patient states that he feels pretty stable today.  He offers no new complaints    ROS:     Constitutional: Anorexia, weakness. No weight loss, No fever, No chills, No night sweats.  Eyes:  No impairment or change in vision  ENT / Mouth:  No pain, abnormal ulceration, bleeding, nasal drip or change in voice or hearing  Cardiovascular:  No chest pain, palpitations, new edema, or calf discomfort  Respiratory:  No pain, hemoptysis, change to breathing  Breast:  No pain, discharge, change in appearance or texture  Gastrointestinal:  No pain, cramping, jaundice, change to eating and bowel habits  Urinary:  No pain, bleeding or change in continence  Genitalia: No pain, bleeding or discharge  Musculoskeletal:  No redness, pain, edema or weakness  Skin:  No pruritus, rash, change to nodules or lesions  Neurologic:  No discomfort, change in mental status, speech, sensory or motor activity  Psychiatric:  No change in concentration or change to affect or mood  Endocrine:  No hot flashes, increased thirst, or change to urine production  Hematologic: No petechiae, ecchymosis or bleeding  Lymphatic:  No lymphadenopathy or lymphedema  Allergy / Immunologic:  No eczema, hives, frequent or recurrent infections    OBJECTIVE        Physical    VITALS:  Patient Vitals for the past 24 hrs:   BP Temp Temp src Pulse Resp SpO2 Weight   02/18/23 0832 99/63 97.7 °F (36.5 °C) Oral 59 16 96 % --   02/18/23 0816 96/61 98.1 °F (36.7 °C) Oral 64 18 96 % --   02/18/23 0807 (!) 90/49 -- Oral 63 16 96 % --   02/18/23 0721 (!) 91/48 98 °F (36.7 °C) Oral 78 16 97 % --   02/18/23 0719 -- -- -- -- -- -- 173 lb 4.8 oz (78.6 kg)   02/18/23 0423 (!) 92/57 97.8 °F (36.6 °C) Oral 62 18 93 % --   02/17/23 2052 99/61 98.4 °F (36.9 °C) Oral 66 16 93 % --   02/17/23 1846 117/70 98.4 °F (36.9 °C) Oral 74 16 94 % --   02/17/23 1815 124/77 98.1 °F (36.7 °C) Oral 84  16 96 % --         24HR INTAKE/OUTPUT:    Intake/Output Summary (Last 24 hours) at 2/18/2023 1442  Last data filed at 2/18/2023 1220  Gross per 24 hour   Intake 2618 ml   Output 825 ml   Net 1793 ml         CONSTITUTIONAL: awake, alert, cooperative, no apparent distress. Appears chronically ill. EYES: pupils equal, round and reactive to light, sclera clear and conjunctiva normal  ENT: Normocephalic, without obvious abnormality, atraumatic  NECK: supple, symmetrical, no jugular venous distension and no carotid bruits   HEMATOLOGIC/LYMPHATIC: no cervical, supraclavicular or axillary lymphadenopathy   LUNGS: no increased work of breathing and clear to auscultation   CARDIOVASCULAR: regular rate and rhythm, normal S1 and S2, no murmur noted  ABDOMEN: normal bowel sounds x 4, soft, non-distended, non-tender, no masses palpated, no hepatosplenomgaly   MUSCULOSKELETAL: full range of motion noted, tone is normal  NEUROLOGIC: awake, alert, oriented to name, place and time. Motor skills grossly intact. SKIN: Normal skin color, texture, turgor and no jaundice. appears intact   EXTREMITIES: no LE edema     DATA:  CBC:    Recent Labs     02/18/23  1045 02/17/23  1328 02/17/23  0534 02/16/23  0539 02/15/23  0511 02/14/23  0722   WBC 14.1*  --  9.5 10.4 10.5 12.0*   NEUTROABS  --   --  2.3 2.8 1.8 3.0   LYMPHOPCT  --   --  37.0 25.0 7.0 28.0   RBC 2.39*  --  2.56* 2.62* 2.69* 2.87*   HGB 7.9*  --  8.3* 8.9* 9.1* 9.6*   HCT 23.3*  --  25.8* 25.6* 26.3* 28.2*   MCV 97.2  --  100.8* 97.6 97.8 98.2   MCH 33.1  --  32.6 33.8 33.9 33.3   MCHC 34.0  --  32.3 34.6 34.7 33.9   RDW 15.6*  --  15.9* 15.2 15.2 15.4   PLT 45* 60* 21* 32* 23* 36*         PT/INR:    Recent Labs     02/14/23  0722 02/13/23  1150   PROT  --  6.7   INR 1.12  --        PTT:  No results for input(s): APTT in the last 720 hours.     CMP:    Recent Labs     02/18/23  1045 02/17/23  0534 02/16/23  0539 02/15/23  0511 02/14/23  0722 02/13/23  1150 02/13/23  0948   NA 141 143 146* 144   < > 141 142   K 3.8 3.8 3.7 3.8   < > 4.6 4.8    113* 112* 111*   < > 106 106   CO2 20* 18* 22 21   < > 20* 18*   GLUCOSE 163* 126* 92 97   < > 128* 133*   BUN 28* 27* 33* 45*   < > 39* 31*   CREATININE 1.9* 1.9* 2.3* 3.1*   < > 2.9* 2.7*   LABGLOM 36* 36* 28* 20*   < > 21* 23*   CALCIUM 7.9* 7.9* 7.8* 7.8*   < > 9.4 9.1   PROT  --   --   --   --   --  6.7  --    LABALBU  --  3.3* 3.4 3.4  --  4.5  --    AGRATIO  --   --   --   --   --  2.0  --    BILITOT  --   --   --   --   --  0.5  --    ALKPHOS  --   --   --   --   --  74  --    ALT  --   --   --   --   --  16  --    AST  --   --   --   --   --  33  --    MG  --   --   --   --   --   --  2.20    < > = values in this interval not displayed. Lab Results   Component Value Date    CALCIUM 7.9 (L) 02/18/2023    PHOS 2.2 (L) 02/17/2023       LDH:No results for input(s): LDH in the last 720 hours. Radiology Review:  IR PICC WO SQ PORT/PUMP > 5 YEARS  Narrative: EXAMINATION:  LIMITED ULTRASOUND OF THE ARM FOR PICC ACCESS, 2/17/2023    TECHNIQUE:  The PICC team used ultrasound and VPS guidance to place a PICC line. HISTORY:  ORDERING SYSTEM PROVIDED HISTORY: home abx  TECHNOLOGIST PROVIDED HISTORY:  Reason for exam:->home abx  How many lumens are being requested?->1  What site is the preferred site? ->No preference  What side should this line be placed? ->Either  Reason for Exam: limited access    FLUOROSCOPY DOSE AND TYPE:    Radiation Exposure Index: Kerma mGy, 2.6    Time: 19 seconds    Estimated blood loss: Less than 5 cc    FINDINGS:  Ultrasound images demonstrate patency of the right brachial vein which was  used for access for placement of a PICC by the PICC team, without a  radiologist present. VPS guidance was used by the PICC team    By report, a 42 cm single-lumen PICC was placed. Impression: Successful placement of PICC line.       Problem List  Patient Active Problem List   Diagnosis    GERD (gastroesophageal reflux disease)    MANISH (generalized anxiety disorder)    Essential hypertension, benign    CKD (chronic kidney disease) stage 3, GFR 30-59 ml/min (HCC)    Recurrent deep vein thrombosis (DVT) of right lower extremity (HCC)    Mixed hyperlipidemia    Ni's esophagus without dysplasia    Primary insomnia    Multiple myeloma not having achieved remission (HCC)    Current moderate episode of major depressive disorder without prior episode (HCC)    Cancer, metastatic to bone (HCC)    Malaise and fatigue       ASSESSMENT AND PLAN:    Plasma cell leukemia  Leukocytosis   Thrombocytopenia  - being treated at [facility] outpatient for MM. Was receiving Velcade, cytoxan, and Xgeva.  - Cycle 11 day 1 received on 01/11/2023. Received subsequent Velcade on time. Due for C12D1 on 02/15/2023.  - patient likely has progressed while on treatment   - 2-4% of MM cases can progress to rare variant, Plasma Cell Leukemia.  - Bone marrow biopsy performed 2/15/2023 showed plasma cell leukemia.   - Lambda 352.3 mg/dL, kappa 0.557 mg/dL, ratio 0.00.  - Will discuss with my BMT partners.  With his creat, PACE seems like a poor choice.  Maybe Carfilzomib/Jeimy/Dex?  Hospice is reasonable too.  He does not want to stop treatment, though.     ELEONORA on CKD   - likely secondary to MM and poor PO intake  - Previous Cr baseline of 1.2-1.5 mg/dL.   - Cr 1.9 this AM which greatly is improved since admission  - Reviewed Renal US from 02/14. There is a 1.9 cm left renal lesion with vascularity suspicious for potential RCC. This was also identified on MRI 12/30. Patient was referred from our office to Urology for further evaluation. They recommended continued monitoring given other ongoing health issues.    I discussed the case with my BMT partners.  Will plan on hyperfractionated cyclophosphamide x4 doses and pulse Dex.  Then pursue Carfilzomib/Jeimy/Dex as an outpatient.  Will order a PICC line now.  Chemo will start this evening, hopefully. The hope is that the  hyperfractionated cyclophosphamide will debulk him a bit faster.  -This was started on 2/17/2023  -He tolerated this quite well  -Serum creatinine is stable at 1.9  -No evidence of tumor lysis syndrome    ONCOLOGIC DISPOSITION:  - After chemo.     Angelica Jefferson MD  Please contact through [address]

## 2023-02-18 NOTE — PROGRESS NOTES
Hospitalist Progress Note      PCP: Danay Archer DO    Date of Admission: 2/13/2023    Chief Complaint: fatigue    Hospital Course:   65 y.o. male who presented to [facility] with above c/o . He has H/o M, Myeloma , CKD developed malaise for weeks ago . Its worsened over the time and currently he cannot carry out daily activities as usual . No focal weakness . He has poor appetite . Denies chest pain , cough , fever . Sob , ab pain , N , V or D    Subjective: some nausea during chemo. Otherwise tolerating induction well. Medications:  Reviewed    Infusion Medications    sodium chloride      sodium chloride      sodium chloride 100 mL/hr at 02/18/23 0849    sodium chloride       Scheduled Medications    lidocaine 1 % injection  5 mL IntraDERmal Once    dexamethasone  40 mg IntraVENous Q24H    ondansetron  8 mg IntraVENous 2 times per day    cyclophosphamide (CYTOXAN) chemo IVPB  500 mg IntraVENous Q12H    busPIRone  10 mg Oral BID    cholestyramine  4 g Oral TID WC    OLANZapine  5 mg Oral Nightly    pantoprazole  40 mg Oral Daily    sertraline  75 mg Oral Daily    sodium chloride flush  5-40 mL IntraVENous 2 times per day     PRN Meds: sodium chloride, sodium chloride, prochlorperazine, LORazepam, oxyCODONE, sodium chloride flush, sodium chloride, ondansetron **OR** ondansetron, polyethylene glycol, acetaminophen **OR** acetaminophen      Intake/Output Summary (Last 24 hours) at 2/18/2023 0956  Last data filed at 2/18/2023 0813  Gross per 24 hour   Intake 3600.64 ml   Output 925 ml   Net 2675.64 ml       Physical Exam Performed:    BP 99/63   Pulse 59   Temp 97.7 °F (36.5 °C) (Oral)   Resp 16   Ht 5' 9\" (1.753 m)   Wt 173 lb 4.8 oz (78.6 kg)   SpO2 96%   BMI 25.59 kg/m²     General appearance: No apparent distress, appears stated age and cooperative. HEENT: Pupils equal, round, and reactive to light. Conjunctivae/corneas clear. Neck: Supple, with full range of motion.  No jugular venous distention. Trachea midline.  Respiratory:  Normal respiratory effort. Clear to auscultation, bilaterally without Rales/Wheezes/Rhonchi.  Cardiovascular: Regular rate and rhythm with normal S1/S2 without murmurs, rubs or gallops.  Abdomen: Soft, non-tender, non-distended with normal bowel sounds.  Musculoskeletal: No clubbing, cyanosis or edema bilaterally.  Full range of motion without deformity.  Skin: Skin color, texture, turgor normal.  No rashes or lesions.  Neurologic:  Neurovascularly intact without any focal sensory/motor deficits. Cranial nerves: II-XII intact, grossly non-focal.  Psychiatric: Alert and oriented, thought content appropriate, normal insight  Capillary Refill: Brisk, 3 seconds, normal   Peripheral Pulses: +2 palpable, equal bilaterally       Labs:   Recent Labs     02/16/23  0539 02/17/23  0534 02/17/23  1328   WBC 10.4 9.5  --    HGB 8.9* 8.3*  --    HCT 25.6* 25.8*  --    PLT 32* 21* 60*     Recent Labs     02/16/23  0539 02/17/23  0534   * 143   K 3.7 3.8   * 113*   CO2 22 18*   BUN 33* 27*   CREATININE 2.3* 1.9*   CALCIUM 7.8* 7.9*   PHOS 2.5 2.2*     No results for input(s): AST, ALT, BILIDIR, BILITOT, ALKPHOS in the last 72 hours.    No results for input(s): INR in the last 72 hours.    No results for input(s): CKTOTAL, TROPONINI in the last 72 hours.      Urinalysis:      Lab Results   Component Value Date/Time    NITRU Negative 02/09/2023 12:08 PM    WBCUA 0-2 02/09/2023 12:08 PM    BACTERIA 1+ 02/09/2023 12:08 PM    RBCUA 0-2 02/09/2023 12:08 PM    BLOODU TRACE 02/09/2023 12:08 PM    SPECGRAV 1.012 02/09/2023 12:08 PM    GLUCOSEU Negative 02/09/2023 12:08 PM       Radiology:  IR PICC WO SQ PORT/PUMP > 5 YEARS   Final Result   Successful placement of PICC line.         CT BIOPSY BONE MARROW   Final Result   Successful CT guided bone marrow aspiration and core biopsy of the right   iliac bone.         CT GUIDED NEEDLE PLACEMENT   Final Result   Successful CT guided bone  marrow aspiration and core biopsy of the right   iliac bone. US RENAL COMPLETE   Final Result   A 1.9 cm left renal lesion remains suspicious for potential renal cell   carcinoma. 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  - hem/onc consulted  - bone marrow biopsy confirms progression  - continue chemo induction  - continue pain control    Thrombocytopenia  - s/p 4u PLTS mostly for procedures  - no evidence of current bleeding  - continue to monitor    ELEONORA on CKD  - unclear etiology  - nephrology consulted  - improving with IVF  - continue to monitor    DVT  - holding eliquis with severe thrombocytopenia    HLD  - holding statin    DVT Prophylaxis: none  Diet: ADULT DIET;  Regular  ADULT ORAL NUTRITION SUPPLEMENT; Breakfast, Dinner; Standard High Calorie/High Protein Oral Supplement  Code Status: Full Code  PT/OT Eval Status: ordered    Dispo - following chemo induction, likely home Tuesday    Appropriate for A1 Discharge Unit: No      Casandra Prince MD

## 2023-02-19 LAB
ANION GAP SERPL CALCULATED.3IONS-SCNC: 11 MMOL/L (ref 3–16)
ANISOCYTOSIS: ABNORMAL
ATYPICAL LYMPHOCYTE RELATIVE PERCENT: 5 % (ref 0–6)
BANDED NEUTROPHILS RELATIVE PERCENT: 5 % (ref 0–7)
BASOPHILS ABSOLUTE: 0 K/UL (ref 0–0.2)
BASOPHILS RELATIVE PERCENT: 0 %
BLOOD BANK DISPENSE STATUS: NORMAL
BLOOD BANK DISPENSE STATUS: NORMAL
BLOOD BANK PRODUCT CODE: NORMAL
BLOOD BANK PRODUCT CODE: NORMAL
BPU ID: NORMAL
BPU ID: NORMAL
BUN BLDV-MCNC: 29 MG/DL (ref 7–20)
CALCIUM SERPL-MCNC: 8.3 MG/DL (ref 8.3–10.6)
CHLORIDE BLD-SCNC: 112 MMOL/L (ref 99–110)
CO2: 20 MMOL/L (ref 21–32)
CREAT SERPL-MCNC: 2 MG/DL (ref 0.8–1.3)
DESCRIPTION BLOOD BANK: NORMAL
DESCRIPTION BLOOD BANK: NORMAL
EKG ATRIAL RATE: 73 BPM
EKG DIAGNOSIS: NORMAL
EKG P AXIS: 33 DEGREES
EKG P-R INTERVAL: 138 MS
EKG Q-T INTERVAL: 416 MS
EKG QRS DURATION: 102 MS
EKG QTC CALCULATION (BAZETT): 458 MS
EKG R AXIS: 13 DEGREES
EKG T AXIS: 30 DEGREES
EKG VENTRICULAR RATE: 73 BPM
EOSINOPHILS ABSOLUTE: 0 K/UL (ref 0–0.6)
EOSINOPHILS RELATIVE PERCENT: 0 %
GFR SERPL CREATININE-BSD FRML MDRD: 33 ML/MIN/{1.73_M2}
GLUCOSE BLD-MCNC: 120 MG/DL (ref 70–99)
HCT VFR BLD CALC: 23.9 % (ref 40.5–52.5)
HEMATOLOGY PATH CONSULT: NO
HEMOGLOBIN: 8 G/DL (ref 13.5–17.5)
LYMPHOCYTES ABSOLUTE: 5.7 K/UL (ref 1–5.1)
LYMPHOCYTES RELATIVE PERCENT: 28 %
MACROCYTES: ABNORMAL
MCH RBC QN AUTO: 32.6 PG (ref 26–34)
MCHC RBC AUTO-ENTMCNC: 33.3 G/DL (ref 31–36)
MCV RBC AUTO: 97.7 FL (ref 80–100)
METAMYELOCYTES RELATIVE PERCENT: 6 %
MONOCYTES ABSOLUTE: 0.7 K/UL (ref 0–1.3)
MONOCYTES RELATIVE PERCENT: 4 %
MYELOCYTE PERCENT: 2 %
NEUTROPHILS ABSOLUTE: 6.4 K/UL (ref 1.7–7.7)
NEUTROPHILS RELATIVE PERCENT: 24 %
PDW BLD-RTO: 15.5 % (ref 12.4–15.4)
PLASMA CELLS PERCENT: 26 %
PLATELET # BLD: 36 K/UL (ref 135–450)
PLATELET SLIDE REVIEW: ABNORMAL
PMV BLD AUTO: 7.3 FL (ref 5–10.5)
POTASSIUM REFLEX MAGNESIUM: 4.1 MMOL/L (ref 3.5–5.1)
RBC # BLD: 2.45 M/UL (ref 4.2–5.9)
SODIUM BLD-SCNC: 143 MMOL/L (ref 136–145)
URIC ACID, SERUM: 7.3 MG/DL (ref 3.5–7.2)
WBC # BLD: 17.3 K/UL (ref 4–11)

## 2023-02-19 PROCEDURE — 80048 BASIC METABOLIC PNL TOTAL CA: CPT

## 2023-02-19 PROCEDURE — 6360000002 HC RX W HCPCS: Performed by: INTERNAL MEDICINE

## 2023-02-19 PROCEDURE — 2580000003 HC RX 258: Performed by: INTERNAL MEDICINE

## 2023-02-19 PROCEDURE — 6360000002 HC RX W HCPCS: Performed by: NURSE PRACTITIONER

## 2023-02-19 PROCEDURE — 93005 ELECTROCARDIOGRAM TRACING: CPT | Performed by: INTERNAL MEDICINE

## 2023-02-19 PROCEDURE — 36592 COLLECT BLOOD FROM PICC: CPT

## 2023-02-19 PROCEDURE — 85025 COMPLETE CBC W/AUTO DIFF WBC: CPT

## 2023-02-19 PROCEDURE — 6370000000 HC RX 637 (ALT 250 FOR IP): Performed by: INTERNAL MEDICINE

## 2023-02-19 PROCEDURE — 2500000003 HC RX 250 WO HCPCS: Performed by: INTERNAL MEDICINE

## 2023-02-19 PROCEDURE — 1200000000 HC SEMI PRIVATE

## 2023-02-19 PROCEDURE — 93010 ELECTROCARDIOGRAM REPORT: CPT | Performed by: INTERNAL MEDICINE

## 2023-02-19 PROCEDURE — 84550 ASSAY OF BLOOD/URIC ACID: CPT

## 2023-02-19 RX ORDER — DIPHENHYDRAMINE HYDROCHLORIDE 50 MG/ML
25 INJECTION INTRAMUSCULAR; INTRAVENOUS ONCE
Status: COMPLETED | OUTPATIENT
Start: 2023-02-19 | End: 2023-02-19

## 2023-02-19 RX ORDER — DEXAMETHASONE SODIUM PHOSPHATE 10 MG/ML
10 INJECTION INTRAMUSCULAR; INTRAVENOUS ONCE
Status: COMPLETED | OUTPATIENT
Start: 2023-02-19 | End: 2023-02-19

## 2023-02-19 RX ORDER — DIPHENHYDRAMINE HYDROCHLORIDE 50 MG/ML
25 INJECTION INTRAMUSCULAR; INTRAVENOUS EVERY 12 HOURS
Status: DISCONTINUED | OUTPATIENT
Start: 2023-02-19 | End: 2023-02-19

## 2023-02-19 RX ADMIN — PROCHLORPERAZINE EDISYLATE 10 MG: 5 INJECTION INTRAMUSCULAR; INTRAVENOUS at 07:29

## 2023-02-19 RX ADMIN — Medication 10 ML: at 07:00

## 2023-02-19 RX ADMIN — CHOLESTYRAMINE 4 G: 4 POWDER, FOR SUSPENSION ORAL at 12:14

## 2023-02-19 RX ADMIN — DIPHENHYDRAMINE HYDROCHLORIDE 25 MG: 50 INJECTION, SOLUTION INTRAMUSCULAR; INTRAVENOUS at 18:30

## 2023-02-19 RX ADMIN — PANTOPRAZOLE SODIUM 40 MG: 40 TABLET, DELAYED RELEASE ORAL at 07:59

## 2023-02-19 RX ADMIN — CYCLOPHOSPHAMIDE 500 MG: 500 INJECTION, POWDER, FOR SOLUTION INTRAVENOUS; ORAL at 07:11

## 2023-02-19 RX ADMIN — LORAZEPAM 1 MG: 1 TABLET ORAL at 14:17

## 2023-02-19 RX ADMIN — OLANZAPINE 5 MG: 5 TABLET, FILM COATED ORAL at 20:47

## 2023-02-19 RX ADMIN — BUSPIRONE HYDROCHLORIDE 10 MG: 5 TABLET ORAL at 07:59

## 2023-02-19 RX ADMIN — SODIUM BICARBONATE: 84 INJECTION, SOLUTION INTRAVENOUS at 08:07

## 2023-02-19 RX ADMIN — ONDANSETRON 4 MG: 2 INJECTION INTRAMUSCULAR; INTRAVENOUS at 20:47

## 2023-02-19 RX ADMIN — Medication 10 ML: at 23:06

## 2023-02-19 RX ADMIN — BUSPIRONE HYDROCHLORIDE 10 MG: 5 TABLET ORAL at 20:48

## 2023-02-19 RX ADMIN — ACETAMINOPHEN 650 MG: 325 TABLET ORAL at 20:47

## 2023-02-19 RX ADMIN — DIPHENHYDRAMINE HYDROCHLORIDE 25 MG: 50 INJECTION, SOLUTION INTRAMUSCULAR; INTRAVENOUS at 13:08

## 2023-02-19 RX ADMIN — ONDANSETRON 8 MG: 2 INJECTION INTRAMUSCULAR; INTRAVENOUS at 09:11

## 2023-02-19 RX ADMIN — Medication 10 ML: at 20:47

## 2023-02-19 RX ADMIN — Medication 10 ML: at 18:37

## 2023-02-19 RX ADMIN — DEXAMETHASONE SODIUM PHOSPHATE 40 MG: 4 INJECTION, SOLUTION INTRAMUSCULAR; INTRAVENOUS at 15:45

## 2023-02-19 RX ADMIN — Medication 10 ML: at 05:07

## 2023-02-19 RX ADMIN — DEXAMETHASONE SODIUM PHOSPHATE 10 MG: 10 INJECTION INTRAMUSCULAR; INTRAVENOUS at 13:09

## 2023-02-19 RX ADMIN — PROCHLORPERAZINE EDISYLATE 10 MG: 5 INJECTION INTRAMUSCULAR; INTRAVENOUS at 15:45

## 2023-02-19 RX ADMIN — LORAZEPAM 1 MG: 1 TABLET ORAL at 08:05

## 2023-02-19 RX ADMIN — SERTRALINE 75 MG: 50 TABLET, FILM COATED ORAL at 07:59

## 2023-02-19 RX ADMIN — LORAZEPAM 1 MG: 1 TABLET ORAL at 20:47

## 2023-02-19 RX ADMIN — SODIUM BICARBONATE: 84 INJECTION, SOLUTION INTRAVENOUS at 15:44

## 2023-02-19 RX ADMIN — PROCHLORPERAZINE EDISYLATE 10 MG: 5 INJECTION INTRAMUSCULAR; INTRAVENOUS at 23:05

## 2023-02-19 RX ADMIN — CHOLESTYRAMINE 4 G: 4 POWDER, FOR SUSPENSION ORAL at 07:59

## 2023-02-19 ASSESSMENT — PAIN SCALES - GENERAL
PAINLEVEL_OUTOF10: 0
PAINLEVEL_OUTOF10: 3
PAINLEVEL_OUTOF10: 8
PAINLEVEL_OUTOF10: 0
PAINLEVEL_OUTOF10: 6
PAINLEVEL_OUTOF10: 0

## 2023-02-19 ASSESSMENT — PAIN DESCRIPTION - DESCRIPTORS
DESCRIPTORS: SHARP
DESCRIPTORS: ACHING

## 2023-02-19 ASSESSMENT — PAIN DESCRIPTION - ORIENTATION: ORIENTATION: ANTERIOR

## 2023-02-19 ASSESSMENT — PAIN DESCRIPTION - LOCATION
LOCATION: CHEST
LOCATION: BUTTOCKS

## 2023-02-19 NOTE — PROGRESS NOTES
Hospitalist Progress Note      PCP: Parth Mckeon DO    Date of Admission: 2/13/2023    Chief Complaint: fatigue    Hospital Course:   65 y.o. male who presented to [facility] with above c/o . He has H/o M, Myeloma , CKD developed malaise for weeks ago . Its worsened over the time and currently he cannot carry out daily activities as usual . No focal weakness . He has poor appetite . Denies chest pain , cough , fever . Sob , ab pain , N , V or D    Subjective: some nausea during chemo. Otherwise tolerating induction well. Medications:  Reviewed    Infusion Medications    IV infusion builder 100 mL/hr at 02/19/23 0807    sodium chloride      sodium chloride      sodium chloride       Scheduled Medications    lidocaine 1 % injection  5 mL IntraDERmal Once    dexamethasone  40 mg IntraVENous Q24H    busPIRone  10 mg Oral BID    cholestyramine  4 g Oral TID WC    OLANZapine  5 mg Oral Nightly    pantoprazole  40 mg Oral Daily    sertraline  75 mg Oral Daily    sodium chloride flush  5-40 mL IntraVENous 2 times per day     PRN Meds: sodium chloride, sodium chloride, prochlorperazine, LORazepam, oxyCODONE, sodium chloride flush, sodium chloride, ondansetron **OR** ondansetron, polyethylene glycol, acetaminophen **OR** acetaminophen      Intake/Output Summary (Last 24 hours) at 2/19/2023 1028  Last data filed at 2/19/2023 0916  Gross per 24 hour   Intake 5402.27 ml   Output 1050 ml   Net 4352.27 ml       Physical Exam Performed:    /71   Pulse 72   Temp 97.4 °F (36.3 °C) (Oral)   Resp 16   Ht 5' 9\" (1.753 m)   Wt 176 lb 8 oz (80.1 kg)   SpO2 94%   BMI 26.06 kg/m²     General appearance: No apparent distress, appears stated age and cooperative. HEENT: Pupils equal, round, and reactive to light. Conjunctivae/corneas clear. Neck: Supple, with full range of motion. No jugular venous distention. Trachea midline. Respiratory:  Normal respiratory effort.  Clear to auscultation, bilaterally without Rales/Wheezes/Rhonchi. Cardiovascular: Regular rate and rhythm with normal S1/S2 without murmurs, rubs or gallops. Abdomen: Soft, non-tender, non-distended with normal bowel sounds. Musculoskeletal: No clubbing, cyanosis or edema bilaterally. Full range of motion without deformity. Skin: Skin color, texture, turgor normal.  No rashes or lesions. Neurologic:  Neurovascularly intact without any focal sensory/motor deficits. Cranial nerves: II-XII intact, grossly non-focal.  Psychiatric: Alert and oriented, thought content appropriate, normal insight  Capillary Refill: Brisk, 3 seconds, normal   Peripheral Pulses: +2 palpable, equal bilaterally       Labs:   Recent Labs     02/17/23  0534 02/17/23  1328 02/18/23  1045 02/19/23  0509   WBC 9.5  --  14.1* 17.3*   HGB 8.3*  --  7.9* 8.0*   HCT 25.8*  --  23.3* 23.9*   PLT 21* 60* 45* 36*     Recent Labs     02/17/23  0534 02/18/23  1045 02/19/23  0509    141 143   K 3.8 3.8 4.1   * 110 112*   CO2 18* 20* 20*   BUN 27* 28* 29*   CREATININE 1.9* 1.9* 2.0*   CALCIUM 7.9* 7.9* 8.3   PHOS 2.2*  --   --      No results for input(s): AST, ALT, BILIDIR, BILITOT, ALKPHOS in the last 72 hours. No results for input(s): INR in the last 72 hours. No results for input(s): [place]ounds in the last 72 hours. Urinalysis:      Lab Results   Component Value Date/Time    NITRU Negative 02/09/2023 12:08 PM    WBCUA 0-2 02/09/2023 12:08 PM    BACTERIA 1+ 02/09/2023 12:08 PM    RBCUA 0-2 02/09/2023 12:08 PM    BLOODU TRACE 02/09/2023 12:08 PM    SPECGRAV 1.012 02/09/2023 12:08 PM    GLUCOSEU Negative 02/09/2023 12:08 PM       Radiology:  IR PICC WO SQ PORT/PUMP > 5 YEARS   Final Result   Successful placement of PICC line. CT BIOPSY BONE MARROW   Final Result   Successful CT guided bone marrow aspiration and core biopsy of the right   iliac bone.          CT GUIDED NEEDLE PLACEMENT   Final Result   Successful CT guided bone marrow aspiration and core biopsy of the right   iliac bone.         US RENAL COMPLETE   Final Result   A 1.9 cm left renal lesion remains suspicious for potential renal cell   carcinoma.         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  - hem/onc consulted  - bone marrow biopsy confirms progression  - continue chemo induction  - continue pain control    Thrombocytopenia  - s/p 4u PLTS mostly for procedures  - no evidence of current bleeding  - continue to monitor    ELEONORA on CKD  - unclear etiology  - nephrology consulted  - improving with IVF  - continue to monitor    DVT  - holding eliquis with severe thrombocytopenia    HLD  - holding statin    DVT Prophylaxis: none  Diet: ADULT DIET; Regular  ADULT ORAL NUTRITION SUPPLEMENT; Breakfast, Dinner; Standard High Calorie/High Protein Oral Supplement  Code Status: Full Code  PT/OT Eval Status: ordered    Dispo - following chemo induction, likely home Tuesday    Appropriate for A1 Discharge Unit: No      Toni Alvarez MD

## 2023-02-19 NOTE — PLAN OF CARE
Problem: Gastrointestinal - Adult  Goal: Minimal or absence of nausea and vomiting  Outcome: Progressing  Flowsheets (Taken 2/19/2023 1036)  Minimal or absence of nausea and vomiting:   Administer IV fluids as ordered to ensure adequate hydration   Provide nonpharmacologic comfort measures as appropriate   Administer ordered antiemetic medications as needed

## 2023-02-19 NOTE — PLAN OF CARE
Problem: Safety - Adult  Goal: Free from fall injury  2/19/2023 0539 by Jesus Head RN  Outcome: Progressing  2/19/2023 0538 by Jesus Head RN  Outcome: Progressing  2/18/2023 1804 by Moe Churchill RN  Outcome: Robert Philip (Taken 2/18/2023 1804)  Free From Fall Injury: Instruct family/caregiver on patient safety     Problem: Nutrition Deficit:  Goal: Optimize nutritional status  2/19/2023 0539 by Jesus Head RN  Outcome: Progressing  2/19/2023 0538 by Jesus Head RN  Outcome: Progressing     Problem: Pain  Goal: Verbalizes/displays adequate comfort level or baseline comfort level  2/19/2023 0539 by Jesus Head RN  Outcome: Progressing  2/19/2023 0538 by Jesus Head RN  Outcome: Progressing

## 2023-02-19 NOTE — PROGRESS NOTES
Original chemotherapy orders reviewed and acknowledged. Appropriateness of chemotherapy treatment regimen CYTOXAN for diagnosis of Plasma Cell Leukemia was verified. Patient educated on chemotherapy regimen. Acknowledgement of informed consent for chemotherapy verified. Pt height, weight, and BSA verified. Appropriate dosing calculations of chemotherapy based on height, weight, and BSA verified. Administration: Chemotherapy drug CYTOXAN independently verified with Joon Cardenas and Rachel Wilson RN prior to administration. Original order, appropriateness of regimen, drug supplied, height, weight, BSA, dose calculations, expiration dates/times, drug appearance, and two patient identifiers were verified by both RNs. Drug checked for vesicant/irritant status and for risk of hypersensitivity. Most recent laboratory values and allergies, were reviewed. Appropriate IV access available: Positive, brisk blood return via CVC was confirmed prior to administration. Chest x-ray for correct line placement reviewed  Ramiro Herron RN and Joon Cardenas RN verified correct rate of chemotherapy and maintenance IV fluids. Patient was educated on chemotherapy regimen prior to administration including indication for treatment related to disease & side effects of chemotherapy drug. Patient verbalizes understanding of all instructions. Completion of Chemotherapy: Monitoring during infusion done per policy, see Flowsheets. Blood return verified before, during, and after infusion per policy; no signs of extravasation. Pt tolerated chemotherapy well and without incident. Chemotherapy infusion end time on the [facility]. Will continue to monitor.

## 2023-02-19 NOTE — CARE COORDINATION
Chart reviewed day 6. Care managed per nephrology, hem/onc and IM. BMBX resulted with plasma cell leukemia, started chemo 2/17. PICC in place for infusion. Following renal function. Therapy with recs for [address] [place] [address]. D/c pending induction completion.  Jenny Beach RN

## 2023-02-19 NOTE — PROGRESS NOTES
Original chemotherapy orders reviewed and acknowledged. Appropriateness of chemotherapy treatment regimen Cytoxan for diagnosis of Plasma Cell Leukemia was verified. Patient educated on chemotherapy regimen. Acknowledgement of informed consent for chemotherapy verified. Pt height, weight, and BSA verified. Appropriate dosing calculations of chemotherapy based on height, weight, and BSA verified. Administration: Chemotherapy drug Cytoxan independently verified with Manuel Douglas RN and Christopher Morales, RN prior to administration. Original order, appropriateness of regimen, drug supplied, height, weight, BSA, dose calculations, expiration dates/times, drug appearance, and two patient identifiers were verified by both RNs. Drug checked for vesicant/irritant status and for risk of hypersensitivity. Most recent laboratory values and allergies, were reviewed. Appropriate IV access available: Positive, brisk blood return via CVC was confirmed prior to administration. Chest x-ray for correct line placement reviewed  Yuval Colunga RN and Christopher Morales RN verified correct rate of chemotherapy and maintenance IV fluids. Patient was educated on chemotherapy regimen prior to administration including indication for treatment related to disease & side effects of chemotherapy drug. Patient verbalizes understanding of all instructions. Completion of Chemotherapy: Monitoring during infusion done per policy, see Flowsheets. Blood return verified before, during, and after infusion per policy; no signs of extravasation. Pt tolerated chemotherapy well and without incident. Chemotherapy infusion end time on the [facility] Will continue to monitor.

## 2023-02-19 NOTE — PROGRESS NOTES
Nephrology Progress Note   [facility]. [facility]      This patient is a 65year old male whom we are following for ELEONORA on CKD. Subjective: The patient was seen and examined. Complains of fatigue. Creatinine slightly up  Patient denies any nausea vomiting diarrhea  Cytoxan started    Family History: No family at bedside and questions were answered  ROS: No SOB or chest pain      Vitals:  /71   Pulse 72   Temp 97.4 °F (36.3 °C) (Oral)   Resp 16   Ht 5' 9\" (1.753 m)   Wt 176 lb 8 oz (80.1 kg)   SpO2 94%   BMI 26.06 kg/m²   I/O last 3 completed shifts: In: 6817.6 [P.O.:2045; I.V.:3817.8; IV Piggyback:954.9]  Out: 7731 [Urine:1675]  I/O this shift:  In: 1322.7 [P.O.:720; I.V.:318.3; IV Piggyback:284.3]  Out: -     Physical Exam  Vitals reviewed. Constitutional:       General: He is not in acute distress. HENT:      Head: Normocephalic and atraumatic. Eyes:      General: No scleral icterus. Conjunctiva/sclera: Conjunctivae normal.   Cardiovascular:      Rate and Rhythm: Normal rate. Heart sounds: No friction rub. Pulmonary:      Effort: Pulmonary effort is normal. No respiratory distress. Breath sounds: No wheezing. Abdominal:      General: Bowel sounds are normal. There is no distension. Tenderness: There is no abdominal tenderness. Musculoskeletal:      Right lower leg: No edema. Left lower leg: No edema. Neurological:      Mental Status: He is alert.          Medications:   lidocaine 1 % injection  5 mL IntraDERmal Once    dexamethasone  40 mg IntraVENous Q24H    busPIRone  10 mg Oral BID    cholestyramine  4 g Oral TID WC    OLANZapine  5 mg Oral Nightly    pantoprazole  40 mg Oral Daily    sertraline  75 mg Oral Daily    sodium chloride flush  5-40 mL IntraVENous 2 times per day         Labs:  Recent Labs     02/17/23  0534 02/17/23  1328 02/18/23  1045 02/19/23  0509   WBC 9.5  --  14.1* 17.3*   HGB 8.3*  --  7.9* 8.0*   HCT 25.8*  --  23.3* 23.9*   .8* --  97.2 97.7   PLT 21* 60* 45* 36*       Recent Labs     02/17/23  0534 02/18/23  1045 02/19/23  0509    141 143   K 3.8 3.8 4.1   * 110 112*   CO2 18* 20* 20*   GLUCOSE 126* 163* 120*   PHOS 2.2*  --   --    BUN 27* 28* 29*   CREATININE 1.9* 1.9* 2.0*   LABGLOM 36* 36* 33*             Assessment/Plan:    Acute Kidney Injury. - Concern for myeloma kidney + pre-renal components (low BP, decreased PO intake). - UA on 2/9/23 showed RBC 0-2, proteinuria. Urine MAC 107mg/g. FeNa 2%. Lambda light chains 3,523.   - Renal US with no evidence of hydronephrosis, 1.9cm L renal lesion suspicious for potential RCC. Lesion was present on 12/30/22 MRI and per heme-onc's note, he was referred to urology and recommendation was to continue with monitoring.  - Kidney function is improving on supportive care. - Continue IVF hydration, adjust rate to 125 mL/h  -Follow uric acid  - Avoid hypotension and nephrotoxic medications. CKD Stage 3b. - Presumed from HTN. - Previous baseline of 1.2-1.5mg/dL. - Follows with Dr. Stephane Forbes in the office. Metabolic Acidosis. - Suspect from ELEONORA +/- lactic acidosis. -Change IV fluid to add bicarb. Hypernatremia, mild. - Improved on hypotonic fluids     Hypertension.  -Blood pressure is soft     Multiple Myeloma/Leukocytosis/Thrmbocytopenia.  - Heme-onc following.  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 Cyclophosphamide and pulsed dexamethasone started 2/17/2023 with plans to pursue Carfilzomib/Jeimy/Dex as an outpatient per Dr. Suzanne Villagran    PICC in dominant arm has been placed. Patient is going to receive chemo this admission and outpatient. With thrombocytopenia, riskier to place a chest port at this time. Ideally, would want to preserve superficial veins for dialysis permanent access placement but given his advanced age and cancer status, he does not seem to be a good candidate for long term dialysis.     Please do not hesitate to contact me at [phone] if with questions. Thank you! Samuel Gonzales MD  [facility] [email or web address]  2/19/2023

## 2023-02-19 NOTE — PROGRESS NOTES
Assessment complete and charted. Pt denies needs overnight. VSS on RA. Writer stressed importance of using urinal for I&Os- pt with 1150 mL UOP overnight. Call light within reach, will continue to monitor.

## 2023-02-20 LAB
ANION GAP SERPL CALCULATED.3IONS-SCNC: 12 MMOL/L (ref 3–16)
ATYPICAL LYMPHOCYTE RELATIVE PERCENT: 2 % (ref 0–6)
BANDED NEUTROPHILS RELATIVE PERCENT: 8 % (ref 0–7)
BASOPHILS ABSOLUTE: 0 K/UL (ref 0–0.2)
BASOPHILS RELATIVE PERCENT: 0 %
BUN BLDV-MCNC: 30 MG/DL (ref 7–20)
CALCIUM SERPL-MCNC: 8 MG/DL (ref 8.3–10.6)
CHLORIDE BLD-SCNC: 107 MMOL/L (ref 99–110)
CO2: 23 MMOL/L (ref 21–32)
CREAT SERPL-MCNC: 1.8 MG/DL (ref 0.8–1.3)
EOSINOPHILS ABSOLUTE: 0 K/UL (ref 0–0.6)
EOSINOPHILS RELATIVE PERCENT: 0 %
GFR SERPL CREATININE-BSD FRML MDRD: 38 ML/MIN/{1.73_M2}
GLUCOSE BLD-MCNC: 107 MG/DL (ref 70–99)
HCT VFR BLD CALC: 23 % (ref 40.5–52.5)
HEMATOLOGY PATH CONSULT: NO
HEMOGLOBIN: 7.5 G/DL (ref 13.5–17.5)
LYMPHOCYTES ABSOLUTE: 5.3 K/UL (ref 1–5.1)
LYMPHOCYTES RELATIVE PERCENT: 36 %
MCH RBC QN AUTO: 32 PG (ref 26–34)
MCHC RBC AUTO-ENTMCNC: 32.8 G/DL (ref 31–36)
MCV RBC AUTO: 97.4 FL (ref 80–100)
METAMYELOCYTES RELATIVE PERCENT: 2 %
MONOCYTES ABSOLUTE: 0.6 K/UL (ref 0–1.3)
MONOCYTES RELATIVE PERCENT: 4 %
MYELOCYTE PERCENT: 1 %
NEUTROPHILS ABSOLUTE: 3.9 K/UL (ref 1.7–7.7)
NEUTROPHILS RELATIVE PERCENT: 17 %
PDW BLD-RTO: 15.5 % (ref 12.4–15.4)
PLASMA CELLS PERCENT: 30 %
PLATELET # BLD: 21 K/UL (ref 135–450)
PLATELET SLIDE REVIEW: ABNORMAL
PMV BLD AUTO: 7.8 FL (ref 5–10.5)
POTASSIUM REFLEX MAGNESIUM: 4 MMOL/L (ref 3.5–5.1)
RBC # BLD: 2.36 M/UL (ref 4.2–5.9)
SLIDE REVIEW: ABNORMAL
SODIUM BLD-SCNC: 142 MMOL/L (ref 136–145)
URIC ACID, SERUM: 7.1 MG/DL (ref 3.5–7.2)
WBC # BLD: 13.9 K/UL (ref 4–11)

## 2023-02-20 PROCEDURE — 36592 COLLECT BLOOD FROM PICC: CPT

## 2023-02-20 PROCEDURE — 2580000003 HC RX 258: Performed by: INTERNAL MEDICINE

## 2023-02-20 PROCEDURE — 97530 THERAPEUTIC ACTIVITIES: CPT

## 2023-02-20 PROCEDURE — 1200000000 HC SEMI PRIVATE

## 2023-02-20 PROCEDURE — 6360000002 HC RX W HCPCS: Performed by: INTERNAL MEDICINE

## 2023-02-20 PROCEDURE — 80048 BASIC METABOLIC PNL TOTAL CA: CPT

## 2023-02-20 PROCEDURE — 2500000003 HC RX 250 WO HCPCS: Performed by: INTERNAL MEDICINE

## 2023-02-20 PROCEDURE — 84550 ASSAY OF BLOOD/URIC ACID: CPT

## 2023-02-20 PROCEDURE — 97110 THERAPEUTIC EXERCISES: CPT

## 2023-02-20 PROCEDURE — 85025 COMPLETE CBC W/AUTO DIFF WBC: CPT

## 2023-02-20 PROCEDURE — 6370000000 HC RX 637 (ALT 250 FOR IP): Performed by: INTERNAL MEDICINE

## 2023-02-20 PROCEDURE — 6360000002 HC RX W HCPCS: Performed by: NURSE PRACTITIONER

## 2023-02-20 RX ORDER — GABAPENTIN 100 MG/1
100 CAPSULE ORAL 2 TIMES DAILY
Status: DISCONTINUED | OUTPATIENT
Start: 2023-02-20 | End: 2023-02-21 | Stop reason: HOSPADM

## 2023-02-20 RX ORDER — METOCLOPRAMIDE HYDROCHLORIDE 5 MG/ML
10 INJECTION INTRAMUSCULAR; INTRAVENOUS EVERY 6 HOURS
Status: DISCONTINUED | OUTPATIENT
Start: 2023-02-20 | End: 2023-02-21

## 2023-02-20 RX ADMIN — LORAZEPAM 1 MG: 1 TABLET ORAL at 15:40

## 2023-02-20 RX ADMIN — METOCLOPRAMIDE HYDROCHLORIDE 10 MG: 5 INJECTION INTRAMUSCULAR; INTRAVENOUS at 06:01

## 2023-02-20 RX ADMIN — GABAPENTIN 100 MG: 100 CAPSULE ORAL at 10:52

## 2023-02-20 RX ADMIN — GABAPENTIN 100 MG: 100 CAPSULE ORAL at 20:51

## 2023-02-20 RX ADMIN — SODIUM BICARBONATE: 84 INJECTION, SOLUTION INTRAVENOUS at 00:31

## 2023-02-20 RX ADMIN — CHOLESTYRAMINE 4 G: 4 POWDER, FOR SUSPENSION ORAL at 08:42

## 2023-02-20 RX ADMIN — Medication 10 ML: at 08:49

## 2023-02-20 RX ADMIN — SERTRALINE 75 MG: 50 TABLET, FILM COATED ORAL at 08:42

## 2023-02-20 RX ADMIN — METOCLOPRAMIDE HYDROCHLORIDE 10 MG: 5 INJECTION INTRAMUSCULAR; INTRAVENOUS at 17:46

## 2023-02-20 RX ADMIN — Medication 20 ML: at 20:49

## 2023-02-20 RX ADMIN — OLANZAPINE 5 MG: 5 TABLET, FILM COATED ORAL at 20:51

## 2023-02-20 RX ADMIN — ONDANSETRON 4 MG: 2 INJECTION INTRAMUSCULAR; INTRAVENOUS at 15:40

## 2023-02-20 RX ADMIN — DEXAMETHASONE SODIUM PHOSPHATE 40 MG: 4 INJECTION, SOLUTION INTRAMUSCULAR; INTRAVENOUS at 15:42

## 2023-02-20 RX ADMIN — PROCHLORPERAZINE EDISYLATE 10 MG: 5 INJECTION INTRAMUSCULAR; INTRAVENOUS at 13:30

## 2023-02-20 RX ADMIN — ONDANSETRON 4 MG: 2 INJECTION INTRAMUSCULAR; INTRAVENOUS at 08:42

## 2023-02-20 RX ADMIN — METOCLOPRAMIDE HYDROCHLORIDE 10 MG: 5 INJECTION INTRAMUSCULAR; INTRAVENOUS at 13:29

## 2023-02-20 RX ADMIN — CHOLESTYRAMINE 4 G: 4 POWDER, FOR SUSPENSION ORAL at 14:08

## 2023-02-20 RX ADMIN — Medication 10 ML: at 20:50

## 2023-02-20 RX ADMIN — BUSPIRONE HYDROCHLORIDE 10 MG: 5 TABLET ORAL at 08:42

## 2023-02-20 RX ADMIN — LORAZEPAM 1 MG: 1 TABLET ORAL at 08:45

## 2023-02-20 RX ADMIN — METOCLOPRAMIDE HYDROCHLORIDE 10 MG: 5 INJECTION INTRAMUSCULAR; INTRAVENOUS at 00:39

## 2023-02-20 RX ADMIN — BUSPIRONE HYDROCHLORIDE 10 MG: 5 TABLET ORAL at 20:51

## 2023-02-20 RX ADMIN — Medication 10 ML: at 05:56

## 2023-02-20 RX ADMIN — PROCHLORPERAZINE EDISYLATE 10 MG: 5 INJECTION INTRAMUSCULAR; INTRAVENOUS at 20:51

## 2023-02-20 RX ADMIN — PANTOPRAZOLE SODIUM 40 MG: 40 TABLET, DELAYED RELEASE ORAL at 08:42

## 2023-02-20 RX ADMIN — SODIUM BICARBONATE: 84 INJECTION, SOLUTION INTRAVENOUS at 10:16

## 2023-02-20 RX ADMIN — CHOLESTYRAMINE 4 G: 4 POWDER, FOR SUSPENSION ORAL at 17:08

## 2023-02-20 NOTE — CARE COORDINATION
Chart reviewed day 7. Anticipate d/c tomorrow. Plan for [facility] to service Adriana Paez needs. Spoke with patient wife and daughter today. Patient worked with therapy, they were able to talk about steps and they are planning on using recliner vs bed on first floor till regains strength.  Kindra Lara RN

## 2023-02-20 NOTE — PROGRESS NOTES
Physical Therapy  Facility/Department: [facility] C3 TELE/MED SURG/ONC  Daily Treatment Note  NAME: Nicole Flor  : 1945  MRN: [id number]    Date of Service: 2023    Discharge Recommendations:  Home with Home health PT, 24 hour supervision or assist   PT Equipment Recommendations  Equipment Needed: Yes  Mobility Devices: Cynthea Kelsea: Rolling    Patient Diagnosis(es): The primary encounter diagnosis was Other fatigue. Diagnoses of Plasma cell hyperplasia, Thrombocytopenia (HCC), Orthostatic hypotension, and Acute renal failure superimposed on chronic kidney disease, unspecified CKD stage, unspecified acute renal failure type [facility]) were also pertinent to this visit. Assessment   Assessment: Pt tolerated treatment well this date. Pt is grossly supv for bed mobility and SBA for transfers and gait in room up to 25 ft. Pt demonstrates fatigue quickly and shuffled gait pattern. Pt's family asking about stairs with RW, discussed and edcuation provided on transfers and safety, pt and family voiced understanding. Pt would continue to benefit from skilled therapy to improve activity tolerance. Continue to recommend home with home PT at d/c. Activity Tolerance: Patient tolerated treatment well;Patient limited by fatigue;Patient limited by endurance  Equipment Needed: Yes  Mobility Devices: [address]    Physcial Therapy Plan  General Plan: 3-5 times per week  Current Treatment Recommendations: Strengthening;Balance training;Functional mobility training;Transfer training;ADL/Self-care training;IADL training; Endurance training;Gait training;Stair training;Neuromuscular re-education;Home exercise program;Safety education & training;Patient/Caregiver education & training;Equipment evaluation, education, & procurement; Therapeutic activities     Restrictions  Restrictions/Precautions  Restrictions/Precautions: Fall Risk, General Precautions, Up as Tolerated  Required Braces or Orthoses?: No  Position Activity Restriction  Other position/activity restrictions: IV, chemo precautions     Subjective    Subjective  Subjective: pt found in bed, agreeable to therapy, reports nausea, RN aware  Pain: denies pain at this time  Orientation  Overall Orientation Status: Within Normal Limits  Orientation Level: Oriented X4  Cognition  Overall Cognitive Status: [facility]     RN present during beginning of session to administer anti-nausea medication     Objective   Vitals    At rest in bed:  129/76  91 bpm  92% on RA       Bed Mobility Training  Bed Mobility Training: Yes  Overall Level of Assistance: Supervision; Additional time  Interventions: Safety awareness training;Verbal cues  Supine to Sit: Supervision (HOB slightly elevated, use of rails)  Sit to Supine: Other (comment) (not assessed, pt left in chair at end of session)  Scooting: Supervision  Balance  Sitting: Intact  Standing: Impaired  Standing - Static: Good;Constant support (with RW)  Standing - Dynamic: Fair;Constant support (with RW)  Transfer Training  Transfer Training: Yes  Overall Level of Assistance: Stand-by assistance  Interventions: Verbal cues; Safety awareness training;Visual cues  Sit to Stand: Supervision (with RW)  Stand to Sit: Supervision (with RW)  Bed to Chair: Stand-by assistance (via ambulation with RW)  Gait Training  Gait Training: Yes  Gait  Overall Level of Assistance: Stand-by assistance  Interventions: Verbal cues; Visual cues; Safety awareness training  Base of Support: Widened  Speed/Ramila: Delayed  Step Length: Left shortened;Right shortened  Gait Abnormalities: Shuffling gait (Cues for posture and to maintain SWATI within RW. No overt LOB, increased time)  Distance (ft): 25 Feet (in room)  Assistive Device: Walker, rolling;Gait belt     PT Exercises  Exercise Treatment: seated in chair therex: BLE ankle pumps, LAQ, marches x15     Safety Devices  Type of Devices: All carrie prominences offloaded; All fall risk precautions in place;Call light within reach;Gait belt;Nurse notified;Patient at risk for falls;Left in chair;Chair alarm in place  Restraints  Restraints Initially in Place: No     [facility] 6 Clicks Inpatient Mobility:  AM-PAC Basic Mobility - Inpatient   How much help is needed turning from your back to your side while in a flat bed without using bedrails?: None  How much help is needed moving from lying on your back to sitting on the side of a flat bed without using bedrails?: None  How much help is needed moving to and from a bed to a chair?: A Little  How much help is needed standing up from a chair using your arms?: A Little  How much help is needed walking in hospital room?: A Little  How much help is needed climbing 3-5 steps with a railing?: A Little  AM-[facility] Inpatient Mobility Raw Score : 20  AM-PAC Inpatient T-Scale Score : 47.67  Mobility Inpatient CMS 0-100% Score: 35.83  Mobility Inpatient CMS G-Code Modifier : CJ    Goals  Short Term Goals  Time Frame for Short Term Goals: 1 week, by 2/21/2023  Short Term Goal 1: pt will complete bed mobility mod I  Short Term Goal 2: Pt will complete functional transfer with Mod I and LRAD  Short Term Goal 3: Pt will AMB 150ft with mod I and LRAD  Short Term Goal 4: Pt will ascend and decend 2 steps with CGA  Short Term Goal 5: by 2/18/2023 pt will participate in 12-15 reps of LE strengthening exercises -- GOAL MET 2/16  Patient Goals   Patient Goals : \"to go home\"    Education  Patient Education  Education Given To: Patient;Family (daughter and wife)  Education Provided: Role of Therapy;Plan of Care;Home Exercise Program;Transfer Training;Energy Conservation;Fall Prevention Strategies;Family Education;Equipment  Education Provided Comments: family education, home modifications, stair navigation, car transfers, safety  Education Method: Demonstration;Verbal  Barriers to Learning: None  Education Outcome: Verbalized understanding    Therapy Time   Individual Concurrent Group Co-treatment   Time In 1320        Time Out 1359         Minutes 39         Timed Code Treatment Minutes: 44 Minutes     If pt is unable to be seen after this session, please let this note serve as discharge summary. Please see case management note for discharge disposition. Thank you.     Carmen Winn, PT, DPT

## 2023-02-20 NOTE — PROGRESS NOTES
Pt alert and oriented, can be forgetful. VSS. Shift assessment completed and documented. Still with c/o nausea, see eMAR for PRNs given. C/o jaw tingling/numbness, which is side-effect of cytoxan. Denies any further needs at this time. Bed locked and in lowest position, call light within reach. Will continue to monitor.

## 2023-02-20 NOTE — PROGRESS NOTES
Occupational Therapy  Facility/Department: [facility] C3 TELE/MED SURG/ONC  Daily Treatment Note  NAME: Randy Hess  : 1945  MRN: [id number]    Date of Service: 2023    Discharge Recommendations:  24 hour supervision or assist, Home with Home health OT, S Level 1  OT Equipment Recommendations  Equipment Needed: No      Patient Diagnosis(es): The primary encounter diagnosis was Other fatigue. Diagnoses of Plasma cell hyperplasia, Thrombocytopenia (HCC), Orthostatic hypotension, and Acute renal failure superimposed on chronic kidney disease, unspecified CKD stage, unspecified acute renal failure type [facility]) were also pertinent to this visit. Assessment    Assessment: Pt limited by fatigue tis date, requesting bed level ax. Pt was able to perform x20 BUE AROM while in semi fowlers, with good display of strenght and endurance. He also completed bed level grooming tasks with set up. Pt continues to be limited by weakness, fatigue, and activity tolerance. Continue to recommend home with 24hr and HHOT. Activity Tolerance: Patient limited by fatigue  Discharge Recommendations: 24 hour supervision or assist;Home with Home health OT;S Level 1  Equipment Needed: No      Plan   Occupational Therapy Plan  Times Per Week: 2-3x/wk  Current Treatment Recommendations: Strengthening;ROM;Balance training;Functional mobility training; Endurance training;Equipment evaluation, education, & procurement;Patient/Caregiver education & training; Safety education & training;Self-Care / ADL; Home management training     Restrictions  Restrictions/Precautions  Restrictions/Precautions: Fall Risk;General Precautions; Up as Tolerated  Required Braces or Orthoses?: No  Position Activity Restriction  Other position/activity restrictions: IV, chemo precautions    Subjective   Subjective  Subjective: Pt in bed upon OT arrival, fatigued but agreeable to bed level ax.   Pain: Pt denied pain throughout session  Orientation  Overall Orientation Status: Within Normal Limits  Orientation Level: Oriented X4  Pain: denies pain at this time  Cognition  Overall Cognitive Status: WFL  Arousal/Alertness: Appropriate responses to stimuli  Following Commands: Follows one step commands consistently  Attention Span: Attends with cues to redirect  Memory: Appears intact  Safety Judgement: Decreased awareness of need for assistance;Decreased awareness of need for safety  Problem Solving: Assistance required to generate solutions  Insights: Decreased awareness of deficits  Initiation: Requires cues for some  Sequencing: Does not require cues        Objective       Bed Mobility Training  Bed Mobility Training: Yes  Overall Level of Assistance: Supervision; Additional time  Interventions: Safety awareness training;Verbal cues  Supine to Sit: Supervision (HOB slightly elevated, use of rails)  Sit to Supine: Other (comment) (not assessed, pt left in chair at end of session)  Scooting: Supervision  Balance  Sitting: Intact  Standing: Impaired  Standing - Static: Good;Constant support (with RW)  Standing - Dynamic: Fair;Constant support (with RW)  Transfer Training  Transfer Training: Yes  Overall Level of Assistance: Stand-by assistance  Interventions: Verbal cues; Safety awareness training;Visual cues  Sit to Stand: Supervision (with RW)  Stand to Sit: Supervision (with RW)  Bed to Chair: Stand-by assistance (via ambulation with RW)  Gait Training  Gait Training: Yes  Gait  Overall Level of Assistance: Stand-by assistance  Interventions: Verbal cues; Visual cues; Safety awareness training  Base of Support: Widened  Speed/Ramila: Delayed  Step Length: Left shortened;Right shortened  Gait Abnormalities: Shuffling gait (Cues for posture and to maintain SWATI within RW.  No overt LOB, increased time)  Distance (ft): 25 Feet (in room)  Assistive Device: Walker, rolling;Gait belt     ADL  Grooming: Setup;Modified independent   Grooming Skilled Clinical Factors: wiped face with warm cloth while semi fowlers in bed  OT Exercises  A/AROM Exercises: x20 BUE: shoulder flexion; scapular elevation, retraction; elbow flexion     Safety Devices  Type of Devices: All fall risk precautions in place; All carrie prominences offloaded;Bed alarm in place;Call light within reach;Nurse notified; Patient at risk for falls; Left in bed  Restraints  Restraints Initially in Place: No     Patient Education  Education Given To: Patient  Education Provided: Role of Therapy;Plan of Care;Home Exercise Program;Precautions; ADL Adaptive Strategies; Energy Conservation  Education Method: Demonstration;Verbal  Barriers to Learning: None  Education Outcome: Verbalized understanding;Demonstrated understanding    Goals  Short Term Goals  Time Frame for Short Term Goals: Short term goals to be met by 2/21  Short Term Goal 1: Pt will perform LB dressing with IND by 2/17  Short Term Goal 2: Pt will tolerate standing for 5 mins to perform ADL tasks  Short Term Goal 3: Pt will perform toileting with SPV  Patient Goals   Patient goals : \"get better\"       Therapy Time   Individual Concurrent Group Co-treatment   Time In 1430         Time Out 1445         Minutes 15         Timed Code Treatment Minutes: 15 Minutes     If pt is unable to be seen after this session, please let this note serve as discharge summary. Please see case management note for discharge disposition. Thank you.     Renetta Greer OT

## 2023-02-20 NOTE — PROGRESS NOTES
Assessment complete and charted. Pt without needs overnight beyond assistance to restroom and PRN tylenol x1.writer emphasized use of urinal but pt has used restroom majority of the night. VSS on RA. Call light within reach, will continue to monitor.

## 2023-02-20 NOTE — PROGRESS NOTES
Nephrology Progress Note   [facility]      This patient is a 65year old male whom we are following for ELEONORA on CKD. Subjective: The patient was seen and examined. Complains of fatigue and nausea. Still not eating well. Creatinine better   Patient denies any nausea vomiting diarrhea  Cytoxan started    Family History: No family at bedside and questions were answered  ROS: No SOB or chest pain, + edema       Vitals:  /67   Pulse 83   Temp 97.8 °F (36.6 °C) (Oral)   Resp 18   Ht 5' 9\" (1.753 m)   Wt 176 lb 8 oz (80.1 kg)   SpO2 96%   BMI 26.06 kg/m²   I/O last 3 completed shifts: In: 8916.4 [P.O.:4365; I.V.:3679.7; IV Piggyback:871.8]  Out: 1050 [Urine:1050]  I/O this shift: In: 952 [I.V.:952]  Out: -     Physical Exam  Vitals reviewed. Constitutional:       General: He is not in acute distress. HENT:      Head: Normocephalic and atraumatic. Eyes:      General: No scleral icterus. Conjunctiva/sclera: Conjunctivae normal.   Cardiovascular:      Rate and Rhythm: Normal rate. Heart sounds: No friction rub. Comments: + edema  Pulmonary:      Effort: Pulmonary effort is normal. No respiratory distress. Breath sounds: No wheezing. Abdominal:      General: Bowel sounds are normal. There is no distension. Tenderness: There is no abdominal tenderness. Musculoskeletal:      Right lower leg: No edema. Left lower leg: No edema. Neurological:      Mental Status: He is alert.          Medications:   metoclopramide  10 mg IntraVENous Q6H    gabapentin  100 mg Oral BID    lidocaine 1 % injection  5 mL IntraDERmal Once    busPIRone  10 mg Oral BID    cholestyramine  4 g Oral TID WC    OLANZapine  5 mg Oral Nightly    pantoprazole  40 mg Oral Daily    sertraline  75 mg Oral Daily    sodium chloride flush  5-40 mL IntraVENous 2 times per day         Labs:  Recent Labs     02/18/23  1045 02/19/23  0509 02/20/23  0601   WBC 14.1* 17.3* 13.9*   HGB 7.9* 8.0* 7.5*   HCT 23.3* 23.9* 23.0*   MCV 97.2 97.7 97.4   PLT 45* 36* 21*       Recent Labs     02/18/23  1045 02/19/23  0509 02/20/23  0601    143 142   K 3.8 4.1 4.0    112* 107   CO2 20* 20* 23   GLUCOSE 163* 120* 107*   BUN 28* 29* 30*   CREATININE 1.9* 2.0* 1.8*   LABGLOM 36* 33* 38*             Assessment/Plan:    Acute Kidney Injury. - Concern for myeloma kidney + pre-renal components (low BP, decreased PO intake). - UA on 2/9/23 showed RBC 0-2, proteinuria. Urine MAC 107mg/g. FeNa 2%. Lambda light chains 3,523.   - Renal US with no evidence of hydronephrosis, 1.9cm L renal lesion suspicious for potential RCC. Lesion was present on 12/30/22 MRI and per heme-onc's note, he was referred to urology and recommendation was to continue with monitoring.  - Kidney function is improving on supportive care but getting some edema  - He is eating ok. Will hold IV fluids and monitor kidney function      CKD Stage 3b. - Presumed from HTN and has had episodes of ELEONORA. Concern for MGRS but as patient started on chemotherapy it would not have impacted treatment plan to get a kidney biopsy.    - Previous baseline of 1.2-1.5mg/dL. Metabolic Acidosis. - Suspect from ELEONORA +/- lactic acidosis. -Change IV fluid to add bicarb. Hypernatremia, mild. - Improved on hypotonic fluids     Hypertension.  -Blood pressure is soft     Multiple Myeloma/Leukocytosis/Thrmbocytopenia.  - Heme-onc following.  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 Cyclophosphamide and pulsed dexamethasone started 2/17/2023 with plans to pursue Carfilzomib/Jeimy/Dex as an outpatient per Dr. Chris Sandoval    PICC in dominant arm has been placed. Patient is going to receive chemo this admission and outpatient. Might need outpatient IV fluids as well. Please do not hesitate to contact me at [phone] if with questions. Thank you! Gurpreet Syed MD  [facility] [employer]  2/20/2023

## 2023-02-20 NOTE — PROGRESS NOTES
Hospitalist Progress Note      PCP: Carito Jerome DO    Date of Admission: 2/13/2023    Chief Complaint: fatigue    Hospital Course:   65 y.o. male who presented to [facility] with above c/o . He has H/o M, Myeloma , CKD developed malaise for weeks ago . Its worsened over the time and currently he cannot carry out daily activities as usual . No focal weakness . He has poor appetite . Denies chest pain , cough , fever . Sob , ab pain , N , V or D    Subjective: some nausea during chemo. Otherwise tolerating induction well. Medications:  Reviewed    Infusion Medications    IV infusion builder 125 mL/hr at 02/20/23 0031    sodium chloride      sodium chloride      sodium chloride       Scheduled Medications    metoclopramide  10 mg IntraVENous Q6H    lidocaine 1 % injection  5 mL IntraDERmal Once    dexamethasone  40 mg IntraVENous Q24H    busPIRone  10 mg Oral BID    cholestyramine  4 g Oral TID WC    OLANZapine  5 mg Oral Nightly    pantoprazole  40 mg Oral Daily    sertraline  75 mg Oral Daily    sodium chloride flush  5-40 mL IntraVENous 2 times per day     PRN Meds: sodium chloride, sodium chloride, prochlorperazine, LORazepam, oxyCODONE, sodium chloride flush, sodium chloride, ondansetron **OR** ondansetron, polyethylene glycol, acetaminophen **OR** acetaminophen      Intake/Output Summary (Last 24 hours) at 2/20/2023 1001  Last data filed at 2/20/2023 0556  Gross per 24 hour   Intake 4709 ml   Output --   Net 4709 ml       Physical Exam Performed:    /67   Pulse 83   Temp 97.8 °F (36.6 °C) (Oral)   Resp 18   Ht 5' 9\" (1.753 m)   Wt 176 lb 8 oz (80.1 kg)   SpO2 96%   BMI 26.06 kg/m²     General appearance: No apparent distress, appears stated age and cooperative. HEENT: Pupils equal, round, and reactive to light. Conjunctivae/corneas clear. Neck: Supple, with full range of motion. No jugular venous distention. Trachea midline. Respiratory:  Normal respiratory effort.  Clear to auscultation, bilaterally without Rales/Wheezes/Rhonchi. Cardiovascular: Regular rate and rhythm with normal S1/S2 without murmurs, rubs or gallops. Abdomen: Soft, non-tender, non-distended with normal bowel sounds. Musculoskeletal: No clubbing, cyanosis or edema bilaterally. Full range of motion without deformity. Skin: Skin color, texture, turgor normal.  No rashes or lesions. Neurologic:  Neurovascularly intact without any focal sensory/motor deficits. Cranial nerves: II-XII intact, grossly non-focal.  Psychiatric: Alert and oriented, thought content appropriate, normal insight  Capillary Refill: Brisk, 3 seconds, normal   Peripheral Pulses: +2 palpable, equal bilaterally       Labs:   Recent Labs     02/18/23  1045 02/19/23  0509 02/20/23  0601   WBC 14.1* 17.3* 13.9*   HGB 7.9* 8.0* 7.5*   HCT 23.3* 23.9* 23.0*   PLT 45* 36* 21*     Recent Labs     02/18/23  1045 02/19/23  0509 02/20/23  0601    143 142   K 3.8 4.1 4.0    112* 107   CO2 20* 20* 23   BUN 28* 29* 30*   CREATININE 1.9* 2.0* 1.8*   CALCIUM 7.9* 8.3 8.0*     No results for input(s): AST, ALT, BILIDIR, BILITOT, ALKPHOS in the last 72 hours. No results for input(s): INR in the last 72 hours. No results for input(s): Carlos Ebclementine in the last 72 hours. Urinalysis:      Lab Results   Component Value Date/Time    NITRU Negative 02/09/2023 12:08 PM    WBCUA 0-2 02/09/2023 12:08 PM    BACTERIA 1+ 02/09/2023 12:08 PM    RBCUA 0-2 02/09/2023 12:08 PM    BLOODU TRACE 02/09/2023 12:08 PM    SPECGRAV 1.012 02/09/2023 12:08 PM    GLUCOSEU Negative 02/09/2023 12:08 PM       Radiology:  IR PICC WO SQ PORT/PUMP > 5 YEARS   Final Result   Successful placement of PICC line. CT BIOPSY BONE MARROW   Final Result   Successful CT guided bone marrow aspiration and core biopsy of the right   iliac bone.          CT GUIDED NEEDLE PLACEMENT   Final Result   Successful CT guided bone marrow aspiration and core biopsy of the right iliac bone. US RENAL COMPLETE   Final Result   A 1.9 cm left renal lesion remains suspicious for potential renal cell   carcinoma. 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  - hem/onc consulted  - bone marrow biopsy confirms progression  - continue chemo induction  - continue pain control    Thrombocytopenia  - s/p 4u PLTS mostly for procedures  - no evidence of current bleeding  - continue to monitor    ELEONORA on CKD  - unclear etiology  - nephrology consulted  - improving with IVF  - continue to monitor    DVT  - holding eliquis with severe thrombocytopenia    HLD  - holding statin    DVT Prophylaxis: none  Diet: ADULT DIET;  Regular  ADULT ORAL NUTRITION SUPPLEMENT; Breakfast, Dinner; Standard High Calorie/High Protein Oral Supplement  Code Status: Full Code  PT/OT Eval Status: ordered    Dispo - HHC likely tomorrow    Appropriate for A1 Discharge Unit: Mavis Pascual MD

## 2023-02-20 NOTE — PROGRESS NOTES
ONCOLOGY HEMATOLOGY CARE PROGRESS NOTE      SUBJECTIVE:    Kin Mansfield states his only concern today is nausea. Antiemetics are helping. Still overall feeling week. Denies other concerns. ROS:     Constitutional:  No weight loss, No fever, No chills, No night sweats. Fatigue. Eyes:  No impairment or change in vision  ENT / Mouth:  No pain, abnormal ulceration, bleeding, nasal drip or change in voice or hearing  Cardiovascular:  No chest pain, palpitations, new edema, or calf discomfort  Respiratory:  No pain, hemoptysis, change to breathing  Breast:  No pain, discharge, change in appearance or texture  Gastrointestinal: Nauseated.   No pain, cramping, jaundice, change to eating and bowel habits  Urinary:  No pain, bleeding or change in continence  Genitalia: No pain, bleeding or discharge  Musculoskeletal:  No redness, pain, edema or weakness  Skin:  No pruritus, rash, change to nodules or lesions  Neurologic:  No discomfort, change in mental status, speech, sensory or motor activity  Psychiatric:  No change in concentration or change to affect or mood  Endocrine:  No hot flashes, increased thirst, or change to urine production  Hematologic: No petechiae, ecchymosis or bleeding  Lymphatic:  No lymphadenopathy or lymphedema  Allergy / Immunologic:  No eczema, hives, frequent or recurrent infections    OBJECTIVE        Physical    VITALS:  Patient Vitals for the past 24 hrs:   BP Temp Temp src Pulse Resp SpO2   02/19/23 2303 -- 98.1 °F (36.7 °C) Oral 75 16 93 %   02/19/23 1833 125/76 98.1 °F (36.7 °C) Oral 75 20 93 %   02/19/23 1641 133/76 98 °F (36.7 °C) Oral 75 20 94 %   02/19/23 1235 125/79 98.2 °F (36.8 °C) Oral 81 22 98 %   02/19/23 0909 111/71 97.4 °F (36.3 °C) Oral 72 16 94 %       24HR INTAKE/OUTPUT:    Intake/Output Summary (Last 24 hours) at 2/20/2023 0814  Last data filed at 2/20/2023 0556  Gross per 24 hour   Intake 5189 ml   Output --   Net 5189 ml CONSTITUTIONAL: awake, alert, cooperative, no apparent distress. Appears chronically ill. EYES: pupils equal, round and reactive to light, sclera clear and conjunctiva normal  ENT: Normocephalic, without obvious abnormality, atraumatic  NECK: supple, symmetrical, no jugular venous distension and no carotid bruits   HEMATOLOGIC/LYMPHATIC: no cervical, supraclavicular or axillary lymphadenopathy   LUNGS: no increased work of breathing and clear to auscultation   CARDIOVASCULAR: regular rate and rhythm, normal S1 and S2, no murmur noted  ABDOMEN: normal bowel sounds x 4, soft, non-distended, non-tender, no masses palpated, no hepatosplenomgaly   MUSCULOSKELETAL: full range of motion noted, tone is normal  NEUROLOGIC: awake, alert, oriented to name, place and time. Motor skills grossly intact. SKIN: Normal skin color, texture, turgor and no jaundice. appears intact. Scattered bruising bilateral upper extremities. EXTREMITIES: no LE edema     DATA:  CBC:    Recent Labs     02/20/23  0601 02/19/23  0509 02/18/23  1045 02/17/23  1328 02/17/23  0534 02/16/23  0539   WBC 13.9* 17.3* 14.1*  --  9.5 10.4   NEUTROABS  --  6.4 4.4  --  2.3 2.8   LYMPHOPCT  --  28.0 44.0  --  37.0 25.0   RBC 2.36* 2.45* 2.39*  --  2.56* 2.62*   HGB 7.5* 8.0* 7.9*  --  8.3* 8.9*   HCT 23.0* 23.9* 23.3*  --  25.8* 25.6*   MCV 97.4 97.7 97.2  --  100.8* 97.6   MCH 32.0 32.6 33.1  --  32.6 33.8   MCHC 32.8 33.3 34.0  --  32.3 34.6   RDW 15.5* 15.5* 15.6*  --  15.9* 15.2   PLT 21* 36* 45* 60* 21* 32*       PT/INR:    Recent Labs     02/14/23  0722 02/13/23  1150   PROT  --  6.7   INR 1.12  --      PTT:  No results for input(s): APTT in the last 720 hours.     CMP:    Recent Labs     02/20/23  0601 02/19/23  0509 02/18/23  1045 02/17/23  0534 02/16/23  0539 02/15/23  0511 02/14/23  0722 02/13/23  1150 02/13/23  0948    143 141 143 146* 144   < > 141 142   K 4.0 4.1 3.8 3.8 3.7 3.8   < > 4.6 4.8    112* 110 113* 112* 111*   < > 106 106   CO2 23 20* 20* 18* 22 21   < > 20* 18*   GLUCOSE 107* 120* 163* 126* 92 97   < > 128* 133*   BUN 30* 29* 28* 27* 33* 45*   < > 39* 31*   CREATININE 1.8* 2.0* 1.9* 1.9* 2.3* 3.1*   < > 2.9* 2.7*   LABGLOM 38* 33* 36* 36* 28* 20*   < > 21* 23*   CALCIUM 8.0* 8.3 7.9* 7.9* 7.8* 7.8*   < > 9.4 9.1   PROT  --   --   --   --   --   --   --  6.7  --    LABALBU  --   --   --  3.3* 3.4 3.4  --  4.5  --    AGRATIO  --   --   --   --   --   --   --  2.0  --    BILITOT  --   --   --   --   --   --   --  0.5  --    ALKPHOS  --   --   --   --   --   --   --  74  --    ALT  --   --   --   --   --   --   --  16  --    AST  --   --   --   --   --   --   --  33  --    MG  --   --   --   --   --   --   --   --  2.20    < > = values in this interval not displayed. Lab Results   Component Value Date    CALCIUM 8.0 (L) 02/20/2023    PHOS 2.2 (L) 02/17/2023       LDH:No results for input(s): LDH in the last 720 hours. Radiology Review:  IR PICC WO SQ PORT/PUMP > 5 YEARS  Narrative: EXAMINATION:  LIMITED ULTRASOUND OF THE ARM FOR PICC ACCESS, 2/17/2023    TECHNIQUE:  The PICC team used ultrasound and VPS guidance to place a PICC line. HISTORY:  ORDERING SYSTEM PROVIDED HISTORY: home abx  TECHNOLOGIST PROVIDED HISTORY:  Reason for exam:->home abx  How many lumens are being requested?->1  What site is the preferred site? ->No preference  What side should this line be placed? ->Either  Reason for Exam: limited access    FLUOROSCOPY DOSE AND TYPE:    Radiation Exposure Index: Kerma mGy, 2.6    Time: 19 seconds    Estimated blood loss: Less than 5 cc    FINDINGS:  Ultrasound images demonstrate patency of the right brachial vein which was  used for access for placement of a PICC by the PICC team, without a  radiologist present. VPS guidance was used by the PICC team    By report, a 42 cm single-lumen PICC was placed. Impression: Successful placement of PICC line.       Problem List  Patient Active Problem List   Diagnosis GERD (gastroesophageal reflux disease)    MANISH (generalized anxiety disorder)    Essential hypertension, benign    CKD (chronic kidney disease) stage 3, GFR 30-59 ml/min (HCC)    Recurrent deep vein thrombosis (DVT) of right lower extremity (HCC)    Mixed hyperlipidemia    Ni's esophagus without dysplasia    Primary insomnia    Multiple myeloma not having achieved remission (HCC)    Current moderate episode of major depressive disorder without prior episode ([facility] [address]    Cancer, metastatic to bone ([facility] [address]    Malaise and fatigue       ASSESSMENT AND PLAN:    Plasma cell leukemia  Leukocytosis   Thrombocytopenia  - being treated at [facility] outpatient for MM. Was receiving Velcade, cytoxan, and Xgeva. - Cycle 11 day 1 received on 01/11/2023. Received subsequent Velcade on time. Due for C12D1 on 02/15/2023.  - patient progressed on treatment    - 2-4% of MM cases can progress to rare variant, Plasma Cell Leukemia.  - Bone marrow biopsy performed 2/15/2023 showed plasma cell leukemia.   - Lambda 352.3 mg/dL, kappa 0.557 mg/dL, ratio 0.00.  - Discussed case with BMT partners. Hope is that hyperfractionated cyclophosphamide will debulk him more quickly. Started 02/17/23 with hyperfractionated cyclophosphamide x4 doses and pulse Dex. - Plan to pursue Carfilzomib/Jeimy/Dex as an outpatient with Dr. Naga Bill   - PICC line in place  - No evidence of tumor lysis syndrome; Uric acid 7.1 this morning. Potassium normal at 4.0. Calcium 8.0. Continue to monitor      ELEONORA on CKD   - likely secondary to MM and poor PO intake  - Previous Cr baseline of 1.2-1.5 mg/dL. - Cr 1.8 this AM which greatly is improved since admission  - Reviewed Renal US from 02/14. There is a 1.9 cm left renal lesion with vascularity suspicious for potential RCC. This was also identified on MRI 12/30. Patient was referred from our office to Urology for further evaluation. They recommended continued monitoring given other ongoing health issues.      Nausea:  -He has not had any vomiting  - remains stable at this time  -Encouraged him to take his antiemetics quickly rather than trying to fight the nausea himself.    ONCOLOGIC DISPOSITION:  - with completion of chemo induction, possible D/C tomorrow with [facility]    TANNER KLINE  Please contact through [employer]

## 2023-02-20 NOTE — PLAN OF CARE
Problem: Safety - Adult  Goal: Free from fall injury  2/20/2023 1515 by Richard Ortega RN  Outcome: Progressing  2/20/2023 0448 by Rafael Stevens RN  Outcome: Progressing     Problem: Pain  Goal: Verbalizes/displays adequate comfort level or baseline comfort level  Outcome: Progressing     Problem: Chronic Conditions and Co-morbidities  Goal: Patient's chronic conditions and co-morbidity symptoms are monitored and maintained or improved  Outcome: Progressing     Problem: Skin/Tissue Integrity  Goal: Absence of new skin breakdown  Description: 1. Monitor for areas of redness and/or skin breakdown  2. Assess vascular access sites hourly  3. Every 4-6 hours minimum:  Change oxygen saturation probe site  4. Every 4-6 hours:  If on nasal continuous positive airway pressure, respiratory therapy assess nares and determine need for appliance change or resting period.   Outcome: Progressing

## 2023-02-21 VITALS
OXYGEN SATURATION: 92 % | HEART RATE: 78 BPM | BODY MASS INDEX: 26.14 KG/M2 | DIASTOLIC BLOOD PRESSURE: 71 MMHG | SYSTOLIC BLOOD PRESSURE: 121 MMHG | WEIGHT: 176.5 LBS | RESPIRATION RATE: 16 BRPM | HEIGHT: 69 IN | TEMPERATURE: 98 F

## 2023-02-21 LAB
ANION GAP SERPL CALCULATED.3IONS-SCNC: 11 MMOL/L (ref 3–16)
ANISOCYTOSIS: ABNORMAL
BANDED NEUTROPHILS RELATIVE PERCENT: 8 % (ref 0–7)
BASOPHILS ABSOLUTE: 0 K/UL (ref 0–0.2)
BASOPHILS RELATIVE PERCENT: 0 %
BUN BLDV-MCNC: 34 MG/DL (ref 7–20)
CALCIUM SERPL-MCNC: 7.9 MG/DL (ref 8.3–10.6)
CHLORIDE BLD-SCNC: 110 MMOL/L (ref 99–110)
CO2: 23 MMOL/L (ref 21–32)
CREAT SERPL-MCNC: 1.8 MG/DL (ref 0.8–1.3)
EOSINOPHILS ABSOLUTE: 0 K/UL (ref 0–0.6)
EOSINOPHILS RELATIVE PERCENT: 0 %
GFR SERPL CREATININE-BSD FRML MDRD: 38 ML/MIN/{1.73_M2}
GLUCOSE BLD-MCNC: 114 MG/DL (ref 70–99)
HCT VFR BLD CALC: 22.8 % (ref 40.5–52.5)
HEMATOLOGY PATH CONSULT: NO
HEMOGLOBIN: 7.5 G/DL (ref 13.5–17.5)
LYMPHOCYTES ABSOLUTE: 4.6 K/UL (ref 1–5.1)
LYMPHOCYTES RELATIVE PERCENT: 29 %
MCH RBC QN AUTO: 32.1 PG (ref 26–34)
MCHC RBC AUTO-ENTMCNC: 33.1 G/DL (ref 31–36)
MCV RBC AUTO: 97.2 FL (ref 80–100)
METAMYELOCYTES RELATIVE PERCENT: 2 %
MONOCYTES ABSOLUTE: 0.5 K/UL (ref 0–1.3)
MONOCYTES RELATIVE PERCENT: 3 %
NEUTROPHILS ABSOLUTE: 4.5 K/UL (ref 1.7–7.7)
NEUTROPHILS RELATIVE PERCENT: 18 %
PDW BLD-RTO: 15.7 % (ref 12.4–15.4)
PLASMA CELLS PERCENT: 40 %
PLATELET # BLD: 17 K/UL (ref 135–450)
PLATELET SLIDE REVIEW: ABNORMAL
PMV BLD AUTO: 7.3 FL (ref 5–10.5)
POTASSIUM REFLEX MAGNESIUM: 4 MMOL/L (ref 3.5–5.1)
RBC # BLD: 2.35 M/UL (ref 4.2–5.9)
SLIDE REVIEW: ABNORMAL
SODIUM BLD-SCNC: 144 MMOL/L (ref 136–145)
WBC # BLD: 16 K/UL (ref 4–11)

## 2023-02-21 PROCEDURE — 6360000002 HC RX W HCPCS: Performed by: INTERNAL MEDICINE

## 2023-02-21 PROCEDURE — 6370000000 HC RX 637 (ALT 250 FOR IP): Performed by: INTERNAL MEDICINE

## 2023-02-21 PROCEDURE — 85025 COMPLETE CBC W/AUTO DIFF WBC: CPT

## 2023-02-21 PROCEDURE — 6370000000 HC RX 637 (ALT 250 FOR IP)

## 2023-02-21 PROCEDURE — 80048 BASIC METABOLIC PNL TOTAL CA: CPT

## 2023-02-21 PROCEDURE — 6360000002 HC RX W HCPCS: Performed by: NURSE PRACTITIONER

## 2023-02-21 PROCEDURE — 2580000003 HC RX 258: Performed by: INTERNAL MEDICINE

## 2023-02-21 RX ORDER — GABAPENTIN 100 MG/1
100 CAPSULE ORAL 2 TIMES DAILY
Qty: 60 CAPSULE | Refills: 0 | Status: ON HOLD | OUTPATIENT
Start: 2023-02-21 | End: 2023-03-23

## 2023-02-21 RX ORDER — SCOLOPAMINE TRANSDERMAL SYSTEM 1 MG/1
1 PATCH, EXTENDED RELEASE TRANSDERMAL
Status: DISCONTINUED | OUTPATIENT
Start: 2023-02-21 | End: 2023-02-21 | Stop reason: HOSPADM

## 2023-02-21 RX ORDER — PROMETHAZINE HYDROCHLORIDE 25 MG/1
25 TABLET ORAL 4 TIMES DAILY PRN
Qty: 40 TABLET | Refills: 0 | Status: ON HOLD | OUTPATIENT
Start: 2023-02-21 | End: 2023-03-07

## 2023-02-21 RX ORDER — ONDANSETRON 8 MG/1
8 TABLET, ORALLY DISINTEGRATING ORAL EVERY 8 HOURS PRN
Qty: 60 TABLET | Refills: 0 | Status: ON HOLD | OUTPATIENT
Start: 2023-02-21

## 2023-02-21 RX ORDER — METOCLOPRAMIDE HYDROCHLORIDE 5 MG/ML
10 INJECTION INTRAMUSCULAR; INTRAVENOUS EVERY 6 HOURS PRN
Status: DISCONTINUED | OUTPATIENT
Start: 2023-02-21 | End: 2023-02-21 | Stop reason: HOSPADM

## 2023-02-21 RX ADMIN — GABAPENTIN 100 MG: 100 CAPSULE ORAL at 09:45

## 2023-02-21 RX ADMIN — PANTOPRAZOLE SODIUM 40 MG: 40 TABLET, DELAYED RELEASE ORAL at 09:46

## 2023-02-21 RX ADMIN — SERTRALINE 75 MG: 50 TABLET, FILM COATED ORAL at 09:46

## 2023-02-21 RX ADMIN — LORAZEPAM 1 MG: 1 TABLET ORAL at 13:48

## 2023-02-21 RX ADMIN — BUSPIRONE HYDROCHLORIDE 10 MG: 5 TABLET ORAL at 09:46

## 2023-02-21 RX ADMIN — METOCLOPRAMIDE HYDROCHLORIDE 10 MG: 5 INJECTION INTRAMUSCULAR; INTRAVENOUS at 01:45

## 2023-02-21 RX ADMIN — Medication 10 ML: at 09:47

## 2023-02-21 RX ADMIN — ONDANSETRON 4 MG: 2 INJECTION INTRAMUSCULAR; INTRAVENOUS at 10:03

## 2023-02-21 RX ADMIN — PROCHLORPERAZINE EDISYLATE 10 MG: 5 INJECTION INTRAMUSCULAR; INTRAVENOUS at 13:49

## 2023-02-21 RX ADMIN — METOCLOPRAMIDE HYDROCHLORIDE 10 MG: 5 INJECTION INTRAMUSCULAR; INTRAVENOUS at 06:18

## 2023-02-21 RX ADMIN — CHOLESTYRAMINE 4 G: 4 POWDER, FOR SUSPENSION ORAL at 10:03

## 2023-02-21 NOTE — CARE COORDINATION
CASE MANAGEMENT DISCHARGE SUMMARY      Discharge to: [facility]      IMM given: 2/21/23    New Durable Medical Equipment ordered/agency: RW provided by [employer]    Transportation: private    Confirmed discharge plan with:     Patient: yes     Family:  yes wife at bedside       RN, name: Sangeetha Guerra RN

## 2023-02-21 NOTE — PROGRESS NOTES
Awake on bed, alert and oriented. With PICC line on right arm and peripheral IV line, saline lock on right forearm. Kept side rails up. Placed call light within reach. Bed alarm on. Instructed to call for assistance. Kept siode rails u[. Will continue to monitor.

## 2023-02-21 NOTE — DISCHARGE SUMMARY
Hospital Medicine Discharge Summary    Patient ID: Randy Hess      Patient's PCP: Deri Litten, DO    Admit Date: 2/13/2023     Discharge Date: 2/21/2023      Admitting Provider: No admitting provider for patient encounter. Discharge Provider: Farooq Anderson MD     Discharge Diagnoses: Active Hospital Problems    Diagnosis     Malaise and fatigue [R53.81, R53.83]      Priority: Medium       The patient was seen and examined on day of discharge and this discharge summary is in conjunction with any daily progress note from day of discharge. Hospital Course:   65 y.o. male who presented to [facility] with above c/o . He has H/o M, Myeloma , CKD developed malaise for weeks ago . Its worsened over the time and currently he cannot carry out daily activities as usual . No focal weakness . He has poor appetite . Denies chest pain , cough , fever . Sob , ab pain , N , V or D    MM with progression to plasma cell leukemia  - hem/onc consulted  - bone marrow biopsy confirms progression  - completed chemo induction  - continue pain control, antiemetics  - close follow up with hem/onc     Thrombocytopenia  - s/p 4u PLTS mostly for procedures  - no evidence of current bleeding     ELEONORA on CKD  - unclear etiology  - nephrology consulted  - improved with IVF though baseline CKD has likely worsened     DVT  - stopped eliquis due to severe persistent thrombocytopenia     HLD  - restarted statin    Physical Exam Performed:     /71   Pulse 78   Temp 98 °F (36.7 °C) (Oral)   Resp 16   Ht 5' 9\" (1.753 m)   Wt 176 lb 8 oz (80.1 kg)   SpO2 92%   BMI 26.06 kg/m²       General appearance: No apparent distress, appears stated age and cooperative. HEENT: Pupils equal, round, and reactive to light. Conjunctivae/corneas clear. Neck: Supple, with full range of motion. No jugular venous distention. Trachea midline. Respiratory:  Normal respiratory effort.  Clear to auscultation, bilaterally without Rales/Wheezes/Rhonchi. Cardiovascular: Regular rate and rhythm with normal S1/S2 without murmurs, rubs or gallops. Abdomen: Soft, non-tender, non-distended with normal bowel sounds. Musculoskeletal: No clubbing, cyanosis or edema bilaterally. Full range of motion without deformity. Skin: Skin color, texture, turgor normal.  No rashes or lesions. Neurologic:  Neurovascularly intact without any focal sensory/motor deficits. Cranial nerves: II-XII intact, grossly non-focal.  Psychiatric: Alert and oriented, thought content appropriate, normal insight  Capillary Refill: Brisk, 3 seconds, normal   Peripheral Pulses: +2 palpable, equal bilaterally       Labs: For convenience and continuity at follow-up the following most recent labs are provided:      CBC:    Lab Results   Component Value Date/Time    WBC 16.0 02/21/2023 05:24 AM    HGB 7.5 02/21/2023 05:24 AM    HCT 22.8 02/21/2023 05:24 AM    PLT 17 02/21/2023 05:24 AM       Renal:    Lab Results   Component Value Date/Time     02/21/2023 05:24 AM    K 4.0 02/21/2023 05:24 AM     02/21/2023 05:24 AM    CO2 23 02/21/2023 05:24 AM    BUN 34 02/21/2023 05:24 AM    CREATININE 1.8 02/21/2023 05:24 AM    CALCIUM 7.9 02/21/2023 05:24 AM    PHOS 2.2 02/17/2023 05:34 AM         Significant Diagnostic Studies    Radiology:   IR PICC WO SQ PORT/PUMP > 5 YEARS   Final Result   Successful placement of PICC line. CT BIOPSY BONE MARROW   Final Result   Successful CT guided bone marrow aspiration and core biopsy of the right   iliac bone. CT GUIDED NEEDLE PLACEMENT   Final Result   Successful CT guided bone marrow aspiration and core biopsy of the right   iliac bone. US RENAL COMPLETE   Final Result   A 1.9 cm left renal lesion remains suspicious for potential renal cell   carcinoma.          XR CHEST PORTABLE   Final Result   No acute cardiopulmonary findings                Consults:     IP CONSULT TO ONCOLOGY  IP CONSULT TO HOSPITALIST  IP CONSULT TO NEPHROLOGY  IP CONSULT TO HOME CARE NEEDS    Disposition:  [facility]    Condition at Discharge: Stable    Discharge Instructions/Follow-up:  Follow up with PCP, Hem/Onc, nephrology within 1-2 weeks    Code Status:  Full Code     Activity: activity as tolerated    Diet: regular diet      Discharge Medications:     Discharge Medication List as of 2/21/2023  2:19 PM             Details   gabapentin (NEURONTIN) 100 MG capsule Take 1 capsule by mouth 2 times daily for 30 days. , Disp-60 capsule, R-0Normal      promethazine (PHENERGAN) 25 MG tablet Take 1 tablet by mouth 4 times daily as needed for Nausea, Disp-40 tablet, R-0Normal                Details   ondansetron (ZOFRAN-ODT) 8 MG TBDP disintegrating tablet Take 1 tablet by mouth every 8 hours as needed for Nausea or Vomiting, Disp-60 tablet, R-0Normal                Details   busPIRone (BUSPAR) 7.5 MG tablet Take 7.5 mg by mouth 2 times dailyHistorical Med      lisinopril (PRINIVIL;ZESTRIL) 20 MG tablet Take 20 mg by mouth dailyHistorical Med      rosuvastatin (CRESTOR) 20 MG tablet TAKE ONE TABLET BY MOUTH DAILY, Disp-90 tablet, R-1Normal      fenofibrate (TRICOR) 54 MG tablet Take 1 tablet by mouth daily s [facility] pharmacy: Please dispense generic fenofibrate unless prescriber denote, Disp-90 tablet, R-1Normal      cholestyramine (QUESTRAN) 4 GM/DOSE powder Take 1 packet by mouth 3 times daily (with meals), Disp-378 g, R-5Normal      OLANZapine (ZYPREXA) 5 MG tablet Take 5 mg by mouth nightlyHistorical Med      bortezomib 5 mg/2 mLs in sodium chloride (PF) injection Inject 1.3 mg/m2 into the skin onceHistorical Med      sertraline (ZOLOFT) 50 MG tablet Take 1.5 tablets by mouth daily, Disp-135 tablet, R-1Normal      LORazepam (ATIVAN) 1 MG tablet Take 1 mg by mouth 4 times daily as needed. Historical Med      dronabinol (MARINOL) 5 MG capsule in the morning and at bedtime. Historical Med      oxyCODONE (ROXICODONE) 5 MG immediate release tablet Take 5 mg by mouth every 4 hours as needed for Pain. Historical Med      pantoprazole (PROTONIX) 40 MG tablet Take 1 tablet by mouth daily, Disp-30 tablet, R-5Normal             Time Spent on discharge: 39 minutes in the examination, evaluation, counseling and review of medications and discharge plan. Signed:    Ana Lan MD   2/21/2023      Thank you Declan Leos DO for the opportunity to be involved in this patient's care. If you have any questions or concerns, please feel free to contact me at [phone].

## 2023-02-21 NOTE — PROGRESS NOTES
Relayed latest platelet count 17 K/uL this morning to Dr. Mitra Coburn through [employer]. No signs of bleeding. Will continue to monitor.

## 2023-02-21 NOTE — CARE COORDINATION
[facility]    DC order noted, all docs needed have been faxed to [facility] for home care services.     Home care to see patient within 24-48 hrs    [employer] notified need for daily flush supplies for picc and weekly dressing change kits;caps  [employer] will deliver supplies to home    [facility] oncologist    Mika Macario RN, BSN CTN  [facility] [phone]

## 2023-02-21 NOTE — PROGRESS NOTES
ONCOLOGY HEMATOLOGY CARE PROGRESS NOTE      SUBJECTIVE:    Yahaira Mcmahon still having considerable nausea. Taking ativan 4x daily with reglan and olanzapine scheduled. Still not having adequate relief with this. Otherwise stable and no new concerns. ROS:     Constitutional: nausea. Weakness. No weight loss, No fever, No chills, No night sweats.    Eyes:  No impairment or change in vision  ENT / Mouth:  No pain, abnormal ulceration, bleeding, nasal drip or change in voice or hearing  Cardiovascular:  No chest pain, palpitations, new edema, or calf discomfort  Respiratory:  No pain, hemoptysis, change to breathing  Breast:  No pain, discharge, change in appearance or texture  Gastrointestinal:  No pain, cramping, jaundice, change to eating and bowel habits  Urinary:  No pain, bleeding or change in continence  Genitalia: No pain, bleeding or discharge  Musculoskeletal:  No redness, pain, edema or weakness  Skin:  No pruritus, rash, change to nodules or lesions  Neurologic:  No discomfort, change in mental status, speech, sensory or motor activity  Psychiatric:  No change in concentration or change to affect or mood  Endocrine:  No hot flashes, increased thirst, or change to urine production  Hematologic: No petechiae, ecchymosis or bleeding  Lymphatic:  No lymphadenopathy or lymphedema  Allergy / Immunologic:  No eczema, hives, frequent or recurrent infections    OBJECTIVE        Physical    VITALS:  Patient Vitals for the past 24 hrs:   BP Temp Temp src Pulse Resp SpO2   02/21/23 0141 127/69 98 °F (36.7 °C) Oral 70 16 92 %   02/20/23 2036 121/72 98.1 °F (36.7 °C) Oral 90 16 91 %   02/20/23 1745 128/79 98.3 °F (36.8 °C) Oral 86 18 91 %   02/20/23 1330 129/76 -- -- 90 -- 92 %   02/20/23 0838 133/67 97.8 °F (36.6 °C) Oral 83 18 96 %       24HR INTAKE/OUTPUT:    Intake/Output Summary (Last 24 hours) at 2/21/2023 0814  Last data filed at 2/20/2023 1745  Gross per 24 hour   Intake 1359 ml Output --   Net 1359 ml       CONSTITUTIONAL: awake, alert, cooperative, no apparent distress. EYES: pupils equal, round and reactive to light, sclera clear and conjunctiva normal  ENT: Normocephalic, without obvious abnormality, atraumatic  NECK: supple, symmetrical, no jugular venous distension and no carotid bruits   HEMATOLOGIC/LYMPHATIC: no cervical, supraclavicular or axillary lymphadenopathy   LUNGS: no increased work of breathing and clear to auscultation   CARDIOVASCULAR: regular rate and rhythm, normal S1 and S2, no murmur noted  ABDOMEN: normal bowel sounds x 4, soft, non-distended, non-tender, no masses palpated, no hepatosplenomgaly   MUSCULOSKELETAL: full range of motion noted, tone is normal  NEUROLOGIC: awake, alert, oriented to name, place and time. Motor skills grossly intact. SKIN: Normal skin color, texture, turgor and no jaundice. appears intact   EXTREMITIES: no LE edema     DATA:  CBC:    Recent Labs     02/21/23  0524 02/20/23  0601 02/19/23  0509 02/18/23  1045 02/17/23  1328 02/17/23  0534   WBC 16.0* 13.9* 17.3* 14.1*  --  9.5   NEUTROABS  --  3.9 6.4 4.4  --  2.3   LYMPHOPCT  --  36.0 28.0 44.0  --  37.0   RBC 2.35* 2.36* 2.45* 2.39*  --  2.56*   HGB 7.5* 7.5* 8.0* 7.9*  --  8.3*   HCT 22.8* 23.0* 23.9* 23.3*  --  25.8*   MCV 97.2 97.4 97.7 97.2  --  100.8*   MCH 32.1 32.0 32.6 33.1  --  32.6   MCHC 33.1 32.8 33.3 34.0  --  32.3   RDW 15.7* 15.5* 15.5* 15.6*  --  15.9*   PLT 17* 21* 36* 45*   < > 21*    < > = values in this interval not displayed. PT/INR:    Recent Labs     02/14/23  0722 02/13/23  1150   PROT  --  6.7   INR 1.12  --      PTT:  No results for input(s): APTT in the last 720 hours.     CMP:    Recent Labs     02/21/23  0524 02/20/23  0601 02/19/23  0509 02/18/23  1045 02/17/23  0534 02/16/23  0539 02/15/23  0511 02/14/23  0722 02/13/23  1150 02/13/23  0948    142 143 141 143 146* 144   < > 141 142   K 4.0 4.0 4.1 3.8 3.8 3.7 3.8   < > 4.6 4.8    107 112* 110 113* 112* 111*   < > 106 106   CO2 23 23 20* 20* 18* 22 21   < > 20* 18*   GLUCOSE 114* 107* 120* 163* 126* 92 97   < > 128* 133*   BUN 34* 30* 29* 28* 27* 33* 45*   < > 39* 31*   CREATININE 1.8* 1.8* 2.0* 1.9* 1.9* 2.3* 3.1*   < > 2.9* 2.7*   LABGLOM 38* 38* 33* 36* 36* 28* 20*   < > 21* 23*   CALCIUM 7.9* 8.0* 8.3 7.9* 7.9* 7.8* 7.8*   < > 9.4 9.1   PROT  --   --   --   --   --   --   --   --  6.7  --    LABALBU  --   --   --   --  3.3* 3.4 3.4  --  4.5  --    AGRATIO  --   --   --   --   --   --   --   --  2.0  --    BILITOT  --   --   --   --   --   --   --   --  0.5  --    ALKPHOS  --   --   --   --   --   --   --   --  74  --    ALT  --   --   --   --   --   --   --   --  16  --    AST  --   --   --   --   --   --   --   --  33  --    MG  --   --   --   --   --   --   --   --   --  2.20    < > = values in this interval not displayed. Lab Results   Component Value Date    CALCIUM 7.9 (L) 02/21/2023    PHOS 2.2 (L) 02/17/2023       LDH:No results for input(s): LDH in the last 720 hours. Radiology Review:  IR PICC WO SQ PORT/PUMP > 5 YEARS  Narrative: EXAMINATION:  LIMITED ULTRASOUND OF THE ARM FOR PICC ACCESS, 2/17/2023    TECHNIQUE:  The PICC team used ultrasound and VPS guidance to place a PICC line. HISTORY:  ORDERING SYSTEM PROVIDED HISTORY: home abx  TECHNOLOGIST PROVIDED HISTORY:  Reason for exam:->home abx  How many lumens are being requested?->1  What site is the preferred site? ->No preference  What side should this line be placed? ->Either  Reason for Exam: limited access    FLUOROSCOPY DOSE AND TYPE:    Radiation Exposure Index: Kerma mGy, 2.6    Time: 19 seconds    Estimated blood loss: Less than 5 cc    FINDINGS:  Ultrasound images demonstrate patency of the right brachial vein which was  used for access for placement of a PICC by the PICC team, without a  radiologist present.     VPS guidance was used by the PICC team    By report, a 42 cm single-lumen PICC was placed. Impression: Successful placement of PICC line. Problem List  Patient Active Problem List   Diagnosis    GERD (gastroesophageal reflux disease)    MANISH (generalized anxiety disorder)    Essential hypertension, benign    CKD (chronic kidney disease) stage 3, GFR 30-59 ml/min (HCC)    Recurrent deep vein thrombosis (DVT) of right lower extremity (HCC)    Mixed hyperlipidemia    Ni's esophagus without dysplasia    Primary insomnia    Multiple myeloma not having achieved remission ([facility] [address]    Current moderate episode of major depressive disorder without prior episode ([facility] [address]    Cancer, metastatic to bone ([facility] [address]    Malaise and fatigue       ASSESSMENT AND PLAN:       Plasma cell leukemia  Leukocytosis   Thrombocytopenia  - being treated at [facility] outpatient for MM. Was receiving Velcade, cytoxan, and Xgeva. - Cycle 11 day 1 received on 01/11/2023. Received subsequent Velcade on time. Due for C12D1 on 02/15/2023.  - patient progressed on treatment    - 2-4% of MM cases can progress to rare variant, Plasma Cell Leukemia.  - Bone marrow biopsy performed 2/15/2023 showed plasma cell leukemia.   - Lambda 352.3 mg/dL, kappa 0.557 mg/dL, ratio 0.00.  - Discussed case with BMT partners. Hope is that hyperfractionated cyclophosphamide will debulk him more quickly. Started 02/17/23 with hyperfractionated cyclophosphamide x4 doses and pulse Dex. - Plan to pursue Carfilzomib/Jeimy/Dex as an outpatient    - PICC line in place  - No evidence of tumor lysis syndrome. This can happen up to 7 days post cytoxan induction so will need continued monitoring   - PLTs 17 this morning. HGB 7.5 stable from yesterday. - plan to D/C home today. Will contact office to schedule outpatient follow up     ELEONORA on CKD   - likely secondary to MM and poor PO intake  - Previous Cr baseline of 1.2-1.5 mg/dL. - Cr 1.8 this AM which greatly is improved since admission and remains stable   - Reviewed Renal US from 02/14.  There is a 1.9 cm left renal lesion with vascularity suspicious for potential RCC. This was also identified on MRI 12/30. Patient was referred from our office to Urology for further evaluation. They recommended continued monitoring given other ongoing health issues.      Nausea:  -Encouraged him take antiemetics regularly even at discharge   - not adequate enough nausea control with reglan/olanzapine/ativan scheduled  - ordered scopolamine patch with reglan PRN  - will continue to monitor      ONCOLOGIC DISPOSITION:  - possible d/c today with [address], [place]  Please contact through [facility]

## 2023-02-21 NOTE — CARE COORDINATION
Messaged MD to add in PICC line flushes to bin as patient will have line for chemo.  Gilma Mancini RN

## 2023-02-21 NOTE — PLAN OF CARE
Problem: Safety - Adult  Goal: Free from fall injury  2/20/2023 2249 by Anamaria Choudhary RN  Flowsheets (Taken 2/20/2023 2249)  Free From Fall Injury:   Instruct family/caregiver on patient safety   Based on caregiver fall risk screen, instruct family/caregiver to ask for assistance with transferring infant if caregiver noted to have fall risk factors  2/20/2023 1515 by Kareem Marshall RN  Outcome: Progressing

## 2023-02-21 NOTE — PROGRESS NOTES
Nephrology Progress Note   [facility]      This patient is a 65year old male whom we are following for ELEONORA on CKD. Subjective: The patient was seen and examined. Complains of fatigue and nausea. Still not eating well. Creatinine stable  Cytoxan started    Family History:  family at bedside and questions were answered  ROS: No SOB or chest pain, + edema       Vitals:  /69   Pulse 70   Temp 98 °F (36.7 °C) (Oral)   Resp 16   Ht 5' 9\" (1.753 m)   Wt 176 lb 8 oz (80.1 kg)   SpO2 92%   BMI 26.06 kg/m²   I/O last 3 completed shifts: In: 2424 [P.O.:1320; I.V.:3488]  Out: -   No intake/output data recorded. Physical Exam  Vitals reviewed. Constitutional:       General: He is not in acute distress. HENT:      Head: Normocephalic and atraumatic. Eyes:      General: No scleral icterus. Conjunctiva/sclera: Conjunctivae normal.   Cardiovascular:      Rate and Rhythm: Normal rate. Heart sounds: No friction rub. Comments: + edema  Pulmonary:      Effort: Pulmonary effort is normal. No respiratory distress. Breath sounds: No wheezing. Abdominal:      General: Bowel sounds are normal. There is no distension. Tenderness: There is no abdominal tenderness. Musculoskeletal:      Right lower leg: No edema. Left lower leg: No edema. Neurological:      Mental Status: He is alert.          Medications:   scopolamine  1 patch TransDERmal Q72H    metoclopramide  10 mg IntraVENous Q6H    gabapentin  100 mg Oral BID    lidocaine 1 % injection  5 mL IntraDERmal Once    busPIRone  10 mg Oral BID    cholestyramine  4 g Oral TID WC    OLANZapine  5 mg Oral Nightly    pantoprazole  40 mg Oral Daily    sertraline  75 mg Oral Daily    sodium chloride flush  5-40 mL IntraVENous 2 times per day         Labs:  Recent Labs     02/19/23  0509 02/20/23  0601 02/21/23  0524   WBC 17.3* 13.9* 16.0*   HGB 8.0* 7.5* 7.5*   HCT 23.9* 23.0* 22.8*   MCV 97.7 97.4 97.2   PLT 36* 21* 17* Recent Labs     02/19/23  0509 02/20/23  0601 02/21/23  0524    142 144   K 4.1 4.0 4.0   * 107 110   CO2 20* 23 23   GLUCOSE 120* 107* 114*   BUN 29* 30* 34*   CREATININE 2.0* 1.8* 1.8*   LABGLOM 33* 38* 38*             Assessment/Plan:    Acute Kidney Injury. - Concern for myeloma kidney + pre-renal components (low BP, decreased PO intake). - UA on 2/9/23 showed RBC 0-2, proteinuria. Urine MAC 107mg/g. FeNa 2%. Lambda light chains 3,523.   - Renal US with no evidence of hydronephrosis, 1.9cm L renal lesion suspicious for potential RCC. Lesion was present on 12/30/22 MRI and per heme-onc's note, he was referred to urology and recommendation was to continue with monitoring.  - Kidney function improved on supportive care but getting some edema  -off ivf now, stable     CKD Stage 3b. - Presumed from HTN and has had episodes of ELEONORA. Concern for MGRS but as patient started on chemotherapy it would not have impacted treatment plan to get a kidney biopsy.    - Previous baseline of 1.2-1.5mg/dL. Metabolic Acidosis. - Suspect from ELEONORA +/- lactic acidosis. corrected    Hypernatremia, mild. - Improved on hypotonic fluids     Hypertension.  -Blood pressure is soft     Multiple Myeloma/Leukocytosis/Thrmbocytopenia.  - Heme-onc following.  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 Cyclophosphamide and pulsed dexamethasone started 2/17/2023 with plans to pursue Carfilzomib/Jeimy/Dex as an outpatient per Dr. Ana María South    PICC in dominant arm has been placed. Patient is going to receive chemo this admission and outpatient. Might need outpatient IV fluids as well. DISPO- f/u with dr Scott Figures as outpatient    Diogenes Bowman MD  [facility]  2/21/2023

## 2023-02-22 ENCOUNTER — TELEPHONE (OUTPATIENT)
Dept: FAMILY MEDICINE CLINIC | Age: 78
End: 2023-02-22

## 2023-02-22 ENCOUNTER — APPOINTMENT (OUTPATIENT)
Dept: GENERAL RADIOLOGY | Age: 78
End: 2023-02-22
Payer: MEDICARE

## 2023-02-22 ENCOUNTER — HOSPITAL ENCOUNTER (INPATIENT)
Age: 78
LOS: 8 days | Discharge: OTHER INSTITUTION | End: 2023-03-02
Attending: EMERGENCY MEDICINE | Admitting: HOSPITALIST
Payer: MEDICARE

## 2023-02-22 DIAGNOSIS — R53.1 GENERALIZED WEAKNESS: Primary | ICD-10-CM

## 2023-02-22 DIAGNOSIS — R33.9 URINARY RETENTION: ICD-10-CM

## 2023-02-22 DIAGNOSIS — D69.6 THROMBOCYTOPENIA (HCC): ICD-10-CM

## 2023-02-22 PROBLEM — Z86.718 HISTORY OF DVT IN ADULTHOOD: Status: ACTIVE | Noted: 2023-02-22

## 2023-02-22 PROBLEM — C90.10 PLASMA CELL LEUKEMIA (HCC): Status: ACTIVE | Noted: 2023-02-22

## 2023-02-22 LAB
A/G RATIO: 1.6 (ref 1.1–2.2)
ABO/RH: NORMAL
ALBUMIN SERPL-MCNC: 3.4 G/DL (ref 3.4–5)
ALP BLD-CCNC: 66 U/L (ref 40–129)
ALT SERPL-CCNC: 17 U/L (ref 10–40)
AMORPHOUS: NORMAL /HPF
ANION GAP SERPL CALCULATED.3IONS-SCNC: 11 MMOL/L (ref 3–16)
ANISOCYTOSIS: ABNORMAL
ANTIBODY SCREEN: NORMAL
AST SERPL-CCNC: 30 U/L (ref 15–37)
ATYPICAL LYMPHOCYTE RELATIVE PERCENT: 3 % (ref 0–6)
BANDED NEUTROPHILS RELATIVE PERCENT: 3 % (ref 0–7)
BASOPHILS ABSOLUTE: 0 K/UL (ref 0–0.2)
BASOPHILS RELATIVE PERCENT: 0 %
BILIRUB SERPL-MCNC: 0.6 MG/DL (ref 0–1)
BILIRUBIN URINE: NEGATIVE
BLOOD BANK DISPENSE STATUS: NORMAL
BLOOD BANK DISPENSE STATUS: NORMAL
BLOOD BANK PRODUCT CODE: NORMAL
BLOOD BANK PRODUCT CODE: NORMAL
BLOOD, URINE: ABNORMAL
BPU ID: NORMAL
BPU ID: NORMAL
BUN BLDV-MCNC: 33 MG/DL (ref 7–20)
CALCIUM SERPL-MCNC: 8.5 MG/DL (ref 8.3–10.6)
CHLORIDE BLD-SCNC: 107 MMOL/L (ref 99–110)
CLARITY: CLEAR
CO2: 22 MMOL/L (ref 21–32)
COLOR: YELLOW
CREAT SERPL-MCNC: 1.8 MG/DL (ref 0.8–1.3)
DESCRIPTION BLOOD BANK: NORMAL
DESCRIPTION BLOOD BANK: NORMAL
EOSINOPHILS ABSOLUTE: 0.2 K/UL (ref 0–0.6)
EOSINOPHILS RELATIVE PERCENT: 1 %
GFR SERPL CREATININE-BSD FRML MDRD: 38 ML/MIN/{1.73_M2}
GLUCOSE BLD-MCNC: 99 MG/DL (ref 70–99)
GLUCOSE URINE: NEGATIVE MG/DL
HCT VFR BLD CALC: 24.1 % (ref 40.5–52.5)
HEMATOLOGY PATH CONSULT: NO
HEMOGLOBIN: 8.2 G/DL (ref 13.5–17.5)
KETONES, URINE: NEGATIVE MG/DL
LEUKOCYTE ESTERASE, URINE: NEGATIVE
LYMPHOCYTES ABSOLUTE: 2.8 K/UL (ref 1–5.1)
LYMPHOCYTES RELATIVE PERCENT: 13 %
MACROCYTES: ABNORMAL
MCH RBC QN AUTO: 33.5 PG (ref 26–34)
MCHC RBC AUTO-ENTMCNC: 34 G/DL (ref 31–36)
MCV RBC AUTO: 98.4 FL (ref 80–100)
MICROSCOPIC EXAMINATION: YES
MONOCYTES ABSOLUTE: 1.1 K/UL (ref 0–1.3)
MONOCYTES RELATIVE PERCENT: 6 %
NEUTROPHILS ABSOLUTE: 2.3 K/UL (ref 1.7–7.7)
NEUTROPHILS RELATIVE PERCENT: 10 %
NITRITE, URINE: NEGATIVE
NUCLEATED RED BLOOD CELLS: 1 /100 WBC
PDW BLD-RTO: 15.8 % (ref 12.4–15.4)
PH UA: 6 (ref 5–8)
PLASMA CELLS PERCENT: 64 %
PLATELET # BLD: 8 K/UL (ref 135–450)
PLATELET SLIDE REVIEW: ABNORMAL
PMV BLD AUTO: 8.1 FL (ref 5–10.5)
POIKILOCYTES: ABNORMAL
POTASSIUM REFLEX MAGNESIUM: 3.9 MMOL/L (ref 3.5–5.1)
PROTEIN UA: 30 MG/DL
RBC # BLD: 2.45 M/UL (ref 4.2–5.9)
RBC UA: NORMAL /HPF (ref 0–4)
SARS-COV-2, NAAT: NOT DETECTED
SCHISTOCYTES: ABNORMAL
SLIDE REVIEW: ABNORMAL
SODIUM BLD-SCNC: 140 MMOL/L (ref 136–145)
SPECIFIC GRAVITY UA: 1.01 (ref 1–1.03)
TOTAL PROTEIN: 5.5 G/DL (ref 6.4–8.2)
URINE REFLEX TO CULTURE: ABNORMAL
URINE TYPE: ABNORMAL
UROBILINOGEN, URINE: 0.2 E.U./DL
WBC # BLD: 17.6 K/UL (ref 4–11)
WBC UA: NORMAL /HPF (ref 0–5)

## 2023-02-22 PROCEDURE — 51798 US URINE CAPACITY MEASURE: CPT

## 2023-02-22 PROCEDURE — P9016 RBC LEUKOCYTES REDUCED: HCPCS

## 2023-02-22 PROCEDURE — 02HV33Z INSERTION OF INFUSION DEVICE INTO SUPERIOR VENA CAVA, PERCUTANEOUS APPROACH: ICD-10-PCS | Performed by: FAMILY MEDICINE

## 2023-02-22 PROCEDURE — 80053 COMPREHEN METABOLIC PANEL: CPT

## 2023-02-22 PROCEDURE — 86900 BLOOD TYPING SEROLOGIC ABO: CPT

## 2023-02-22 PROCEDURE — 87635 SARS-COV-2 COVID-19 AMP PRB: CPT

## 2023-02-22 PROCEDURE — 86850 RBC ANTIBODY SCREEN: CPT

## 2023-02-22 PROCEDURE — 36430 TRANSFUSION BLD/BLD COMPNT: CPT

## 2023-02-22 PROCEDURE — 99285 EMERGENCY DEPT VISIT HI MDM: CPT

## 2023-02-22 PROCEDURE — P9035 PLATELET PHERES LEUKOREDUCED: HCPCS

## 2023-02-22 PROCEDURE — 81001 URINALYSIS AUTO W/SCOPE: CPT

## 2023-02-22 PROCEDURE — 85025 COMPLETE CBC W/AUTO DIFF WBC: CPT

## 2023-02-22 PROCEDURE — 2580000003 HC RX 258: Performed by: HOSPITALIST

## 2023-02-22 PROCEDURE — 86923 COMPATIBILITY TEST ELECTRIC: CPT

## 2023-02-22 PROCEDURE — 86901 BLOOD TYPING SEROLOGIC RH(D): CPT

## 2023-02-22 PROCEDURE — 1200000000 HC SEMI PRIVATE

## 2023-02-22 PROCEDURE — 71045 X-RAY EXAM CHEST 1 VIEW: CPT

## 2023-02-22 PROCEDURE — 6370000000 HC RX 637 (ALT 250 FOR IP): Performed by: HOSPITALIST

## 2023-02-22 PROCEDURE — 93005 ELECTROCARDIOGRAM TRACING: CPT | Performed by: EMERGENCY MEDICINE

## 2023-02-22 RX ORDER — POLYETHYLENE GLYCOL 3350 17 G/17G
17 POWDER, FOR SOLUTION ORAL DAILY PRN
Status: DISCONTINUED | OUTPATIENT
Start: 2023-02-22 | End: 2023-03-02 | Stop reason: HOSPADM

## 2023-02-22 RX ORDER — SCOLOPAMINE TRANSDERMAL SYSTEM 1 MG/1
1 PATCH, EXTENDED RELEASE TRANSDERMAL
Status: DISCONTINUED | OUTPATIENT
Start: 2023-02-24 | End: 2023-03-02 | Stop reason: HOSPADM

## 2023-02-22 RX ORDER — LORAZEPAM 1 MG/1
1 TABLET ORAL EVERY 6 HOURS PRN
Status: DISCONTINUED | OUTPATIENT
Start: 2023-02-22 | End: 2023-03-02 | Stop reason: HOSPADM

## 2023-02-22 RX ORDER — METOCLOPRAMIDE HYDROCHLORIDE 5 MG/ML
10 INJECTION INTRAMUSCULAR; INTRAVENOUS EVERY 6 HOURS PRN
Status: DISCONTINUED | OUTPATIENT
Start: 2023-02-22 | End: 2023-03-02 | Stop reason: HOSPADM

## 2023-02-22 RX ORDER — SODIUM CHLORIDE 9 MG/ML
INJECTION, SOLUTION INTRAVENOUS PRN
Status: DISCONTINUED | OUTPATIENT
Start: 2023-02-22 | End: 2023-03-02 | Stop reason: HOSPADM

## 2023-02-22 RX ORDER — OXYCODONE HYDROCHLORIDE 5 MG/1
5 TABLET ORAL EVERY 6 HOURS PRN
Status: DISCONTINUED | OUTPATIENT
Start: 2023-02-22 | End: 2023-03-02 | Stop reason: HOSPADM

## 2023-02-22 RX ORDER — SODIUM CHLORIDE 0.9 % (FLUSH) 0.9 %
5-40 SYRINGE (ML) INJECTION PRN
Status: DISCONTINUED | OUTPATIENT
Start: 2023-02-22 | End: 2023-03-02 | Stop reason: HOSPADM

## 2023-02-22 RX ORDER — SODIUM CHLORIDE 0.9 % (FLUSH) 0.9 %
5-40 SYRINGE (ML) INJECTION EVERY 12 HOURS SCHEDULED
Status: DISCONTINUED | OUTPATIENT
Start: 2023-02-22 | End: 2023-03-02 | Stop reason: HOSPADM

## 2023-02-22 RX ORDER — GABAPENTIN 100 MG/1
100 CAPSULE ORAL 2 TIMES DAILY
Status: DISCONTINUED | OUTPATIENT
Start: 2023-02-22 | End: 2023-03-01

## 2023-02-22 RX ORDER — OLANZAPINE 5 MG/1
5 TABLET ORAL NIGHTLY
Status: DISCONTINUED | OUTPATIENT
Start: 2023-02-22 | End: 2023-03-02 | Stop reason: HOSPADM

## 2023-02-22 RX ORDER — PROCHLORPERAZINE EDISYLATE 5 MG/ML
10 INJECTION INTRAMUSCULAR; INTRAVENOUS EVERY 6 HOURS PRN
Status: DISCONTINUED | OUTPATIENT
Start: 2023-02-22 | End: 2023-03-02 | Stop reason: HOSPADM

## 2023-02-22 RX ORDER — BUSPIRONE HYDROCHLORIDE 5 MG/1
10 TABLET ORAL 2 TIMES DAILY
Status: DISCONTINUED | OUTPATIENT
Start: 2023-02-22 | End: 2023-03-02 | Stop reason: HOSPADM

## 2023-02-22 RX ORDER — ACETAMINOPHEN 325 MG/1
650 TABLET ORAL EVERY 6 HOURS PRN
Status: DISCONTINUED | OUTPATIENT
Start: 2023-02-22 | End: 2023-03-02 | Stop reason: HOSPADM

## 2023-02-22 RX ORDER — ONDANSETRON 4 MG/1
4 TABLET, ORALLY DISINTEGRATING ORAL EVERY 8 HOURS PRN
Status: DISCONTINUED | OUTPATIENT
Start: 2023-02-22 | End: 2023-03-02 | Stop reason: HOSPADM

## 2023-02-22 RX ORDER — ACETAMINOPHEN 650 MG/1
650 SUPPOSITORY RECTAL EVERY 6 HOURS PRN
Status: DISCONTINUED | OUTPATIENT
Start: 2023-02-22 | End: 2023-03-02 | Stop reason: HOSPADM

## 2023-02-22 RX ORDER — PANTOPRAZOLE SODIUM 40 MG/1
40 TABLET, DELAYED RELEASE ORAL DAILY
Status: DISCONTINUED | OUTPATIENT
Start: 2023-02-22 | End: 2023-02-28

## 2023-02-22 RX ORDER — ONDANSETRON 2 MG/ML
4 INJECTION INTRAMUSCULAR; INTRAVENOUS EVERY 6 HOURS PRN
Status: DISCONTINUED | OUTPATIENT
Start: 2023-02-22 | End: 2023-03-02 | Stop reason: HOSPADM

## 2023-02-22 RX ADMIN — GABAPENTIN 100 MG: 100 CAPSULE ORAL at 20:52

## 2023-02-22 RX ADMIN — ONDANSETRON 4 MG: 4 TABLET, ORALLY DISINTEGRATING ORAL at 21:53

## 2023-02-22 RX ADMIN — LORAZEPAM 1 MG: 1 TABLET ORAL at 21:53

## 2023-02-22 RX ADMIN — PANTOPRAZOLE SODIUM 40 MG: 40 TABLET, DELAYED RELEASE ORAL at 20:52

## 2023-02-22 RX ADMIN — BUSPIRONE HYDROCHLORIDE 10 MG: 5 TABLET ORAL at 20:52

## 2023-02-22 RX ADMIN — Medication 10 ML: at 20:51

## 2023-02-22 RX ADMIN — OLANZAPINE 5 MG: 5 TABLET, FILM COATED ORAL at 20:52

## 2023-02-22 ASSESSMENT — PAIN - FUNCTIONAL ASSESSMENT: PAIN_FUNCTIONAL_ASSESSMENT: NONE - DENIES PAIN

## 2023-02-22 NOTE — ED NOTES
Pt post void bladder scan 285 mL at maximum. Pt denies any discomfort or urge to urinate.       Barrera Potts RN  02/22/23 0033

## 2023-02-22 NOTE — ED PROVIDER NOTES
[facility]  ED  EMERGENCY DEPARTMENT ENCOUNTER        Pt Name: Shade Leblanc  MRN: [id number]  [place] 1945  Date of evaluation: 2/22/2023  Provider: KATIE Wang - HILLARY  PCP: Jacob Ha DO  Note Started: 3:50 PM EST 2/22/23       I have seen and evaluated this patient with my supervising physician Carley Wright MD.      [facility]       Chief Complaint   Patient presents with    Fatigue     Patient was admitted 2/13/2023 discharged yesterday. Returns to the ED today with generalized weakness and lack of energy        HISTORY OF PRESENT ILLNESS: 1 or more Elements     History From: patient and wife  Limitations to history : None    Shade Leblanc is a 65 y.o. male who presents to the emergency department today with complaints of fatigue. Patient just went home yesterday, the has not made a 24 hours at home, wife states that when he got home he just could not walk or care for himself. Wife did mention that she thought that the patient should be on hospice. Patient was recently admitted to the hospital for an ELEONORA and thrombocytopenia as well as his generalized weakness. He is here for further evaluation. Nursing Notes were all reviewed and agreed with or any disagreements were addressed in the HPI. REVIEW OF SYSTEMS :      Review of Systems    Positives and Pertinent negatives as per HPI.      SURGICAL HISTORY     Past Surgical History:   Procedure Laterality Date    BLADDER SURGERY      tumor removed    CATARACT REMOVAL Bilateral     CHOLECYSTECTOMY, LAPAROSCOPIC N/A 1/10/2022    VIDEO LAPAROSCOPIC CHOLECYSTECTOMY performed by Katie Damico MD at [facility]  6/29/12    Polyps    COLONOSCOPY  07/12/2017    Polyp    COLONOSCOPY N/A 9/6/2022    COLONOSCOPY POLYPECTOMY SNARE/COLD BIOPSY performed by Marta Robbins MD at [address] [facility] BONE  3/3/2022    CT BIOPSY PERCUTANEOUS DEEP BONE 3/3/2022 [place]ne Tam [place] [id number] [facility] CT SCAN    CT BONE MARROW BIOPSY  2/15/2023    CT BONE MARROW BIOPSY 2/15/2023 [facility] CT SCAN    INGUINAL HERNIA REPAIR Right 06/1999    PENILE PROSTHESIS PLACEMENT      PROSTATECTOMY  2005 approx    Dr. Yeimi Avelar      In [place] - had one tonsil burned out    UPPER GASTROINTESTINAL ENDOSCOPY N/A 9/6/2022    EGD BIOPSY performed by Joseline Gallardo MD at [address]       Previous Medications    BORTEZOMIB 5 MG/2 MLS IN SODIUM CHLORIDE (PF) INJECTION    Inject 1.3 mg/m2 into the skin once    BUSPIRONE (BUSPAR) 7.5 MG TABLET    Take 7.5 mg by mouth 2 times daily    CHOLESTYRAMINE (QUESTRAN) 4 GM/DOSE POWDER    Take 1 packet by mouth 3 times daily (with meals)    DRONABINOL (MARINOL) 5 MG CAPSULE    in the morning and at bedtime. FENOFIBRATE (TRICOR) 54 MG TABLET    Take 1 tablet by mouth daily [facility] pharmacy: Please dispense generic fenofibrate unless prescriber denote    GABAPENTIN (NEURONTIN) 100 MG CAPSULE    Take 1 capsule by mouth 2 times daily for 30 days. LISINOPRIL (PRINIVIL;ZESTRIL) 20 MG TABLET    Take 20 mg by mouth daily    LORAZEPAM (ATIVAN) 1 MG TABLET    Take 1 mg by mouth 4 times daily as needed. OLANZAPINE (ZYPREXA) 5 MG TABLET    Take 5 mg by mouth nightly    ONDANSETRON (ZOFRAN-ODT) 8 MG TBDP DISINTEGRATING TABLET    Take 1 tablet by mouth every 8 hours as needed for Nausea or Vomiting    OXYCODONE (ROXICODONE) 5 MG IMMEDIATE RELEASE TABLET    Take 5 mg by mouth every 4 hours as needed for Pain.     PANTOPRAZOLE (PROTONIX) 40 MG TABLET    Take 1 tablet by mouth daily    PROMETHAZINE (PHENERGAN) 25 MG TABLET    Take 1 tablet by mouth 4 times daily as needed for Nausea    ROSUVASTATIN (CRESTOR) 20 MG TABLET    TAKE ONE TABLET BY MOUTH DAILY    SERTRALINE (ZOLOFT) 50 MG TABLET    Take 1.5 tablets by mouth daily       ALLERGIES     Atorvastatin and Lipitor    FAMILYHISTORY       Family History   Problem Relation Age of Onset    Lung Cancer Mother Heart Disease Mother     Diabetes Father     Prostate Cancer Father     Diabetes Brother         SOCIAL HISTORY       Social History     Tobacco Use    Smoking status: Never    Smokeless tobacco: Never   Vaping Use    Vaping Use: Never used   Substance Use Topics    Alcohol use: Not Currently    Drug use: No       SCREENINGS        Home Coma Scale  Eye Opening: Spontaneous  Best Verbal Response: Oriented  Best Motor Response: Obeys commands  [place] Coma Scale Score: 15                CIWA Assessment  BP: 120/60  Heart Rate: 80           PHYSICAL EXAM  1 or more Elements     ED Triage Vitals [02/22/23 1442]   BP Temp Temp Source Heart Rate Resp SpO2 Height Weight   131/66 98 °F (36.7 °C) Oral 84 20 94 % 5' 9\" (1.753 m) 180 lb (81.6 kg)       Physical Exam    Abdomen is soft, nontender, nondistended, heart is regular rate and rhythm. Lungs are clear to auscultation bilaterally, extremities are atraumatic.     DIAGNOSTIC RESULTS   LABS:    Labs Reviewed   CBC WITH AUTO DIFFERENTIAL - Abnormal; Notable for the following components:       Result Value    WBC 17.6 (*)     RBC 2.45 (*)     Hemoglobin 8.2 (*)     Hematocrit 24.1 (*)     RDW 15.8 (*)     Platelets 8 (*)     Plasma Cells Relative 64 (*)     nRBC 1 (*)     Anisocytosis 1+ (*)     Macrocytes Occasional (*)     Poikilocytes Occasional (*)     Schistocytes Occasional (*)     All other components within normal limits    Narrative:     Delisa Abhishek tel. [phone],  Hematology results called to and read back by Hazel Davila RN, 02/22/2023  15:32, by [facility]   COMPREHENSIVE METABOLIC PANEL W/ REFLEX TO MG FOR LOW K - Abnormal; Notable for the following components:    BUN 33 (*)     Creatinine 1.8 (*)     Est, Glom Filt Rate 38 (*)     Total Protein 5.5 (*)     All other components within normal limits   URINALYSIS WITH REFLEX TO CULTURE - Abnormal; Notable for the following components:    Blood, Urine TRACE-LYSED (*)     Protein, UA 30 (*)     All other components within normal limits   COVID-19, RAPID   MICROSCOPIC URINALYSIS   TYPE AND SCREEN   PREPARE PLATELETS       When ordered only abnormal lab results are displayed. All other labs were within normal range or not returned as of this dictation. EKG: When ordered, EKG's are interpreted by the Emergency Department Physician in the absence of a cardiologist.  Please see their note for interpretation of EKG. RADIOLOGY:   Non-plain film images such as CT, Ultrasound and MRI are read by the radiologist. Plain radiographic images are visualized and preliminarily interpreted by the ED Provider with the below findings:        Interpretation per the Radiologist below, if available at the time of this note:    XR CHEST PORTABLE   Final Result   1. New right PICC line extending the cavoatrial junction. 2. Otherwise stable appearance of the chest with no acute findings. IR PICC WO SQ PORT/PUMP > 5 YEARS    Result Date: 2/17/2023  EXAMINATION: LIMITED ULTRASOUND OF THE ARM FOR PICC ACCESS, 2/17/2023 TECHNIQUE: The PICC team used ultrasound and VPS guidance to place a PICC line. HISTORY: ORDERING SYSTEM PROVIDED HISTORY: home abx TECHNOLOGIST PROVIDED HISTORY: Reason for exam:->home abx How many lumens are being requested?->1 What site is the preferred site? ->No preference What side should this line be placed? ->Either Reason for Exam: limited access FLUOROSCOPY DOSE AND TYPE: Radiation Exposure Index: Kerma mGy, 2.6 Time: 19 seconds Estimated blood loss: Less than 5 cc FINDINGS: Ultrasound images demonstrate patency of the right brachial vein which was used for access for placement of a PICC by the PICC team, without a radiologist present. VPS guidance was used by the PICC team By report, a 42 cm single-lumen PICC was placed. Successful placement of PICC line. No results found.     PROCEDURES   Unless otherwise noted below, none     Procedures    CRITICAL CARE TIME (.cctime)   I personally saw the patient and independently provided 42 minutes of non-concurrent critical care out of the total shared critical care time provided not including separate billable procedure. The critical condition I managed or considered was severe thrombocytopenia     PAST MEDICAL HISTORY      has a past medical history of Anxiety, Arthritis, Bladder cancer ([address] DVT (deep venous thrombosis) ([address] (07/01/2014), Elevated fasting glucose, Hyperlipidemia, Hypertension, Malignant neoplasm of urinary bladder ([address] (02/04/2019), Multiple myeloma ([address] MVP (mitral valve prolapse), Polyp of gallbladder, and Prostate CA ([address] (2005). EMERGENCY DEPARTMENT COURSE and DIFFERENTIAL DIAGNOSIS/MDM:   Vitals:    Vitals:    02/22/23 1442 02/22/23 1602   BP: 131/66 120/60   Pulse: 84 80   Resp: 20 18   Temp: 98 °F (36.7 °C)    TempSrc: Oral    SpO2: 94% 93%   Weight: 180 lb (81.6 kg)    Height: 5' 9\" (1.753 m)        Patient was given the following medications:  Medications   0.9 % sodium chloride infusion (has no administration in time range)             Is this patient to be included in the SEP-1 Core Measure due to severe sepsis or septic shock? No   Exclusion criteria - the patient is NOT to be included for SEP-1 Core Measure due to: Infection is not suspected    Chronic Conditions affecting care:    has a past medical history of Anxiety, Arthritis, Bladder cancer ([address] DVT (deep venous thrombosis) ([address] (07/01/2014), Elevated fasting glucose, Hyperlipidemia, Hypertension, Malignant neoplasm of urinary bladder ([address] (02/04/2019), Multiple myeloma ([address] MVP (mitral valve prolapse), Polyp of gallbladder, and Prostate CA ([address] (2005).     CONSULTS: (Who and What was discussed)  IP CONSULT TO HEM/ONC      Social Determinants Significantly Affecting Health : None    Records Reviewed (External and Source) all recent notes reviewed    CC/HPI Summary, DDx, ED Course, and Reassessment: Patient presented to the emergency department today with complaints of general weakness. Patient was just discharged less than 24 hours ago, has a history of plasma cell leukemia. His platelets were severely low at 8, they were 17 yesterday, I spoke with oncology who did recommend platelet administration. Patient also reported some difficulty urinating, he did have some urinary retention with greater than 250 mL left in his bladder post void so I did place a Fu catheter. Patient wife at the bedside does want hospice consultation however does not want us to speak with the patient about it currently, I did have social work evaluate the patient she is aware of this plan. Patient evaluated by the attending who agrees to plan of care. Disposition Considerations (tests considered but not done, Admit vs D/C, Shared Decision Making, Pt Expectation of Test or Tx.):        I am the Primary Clinician of Record. FINAL IMPRESSION      1. Generalized weakness    2. Thrombocytopenia ([address]    3. Urinary retention          DISPOSITION/PLAN     DISPOSITION Decision to Admit    PATIENT REFERRED TO:  No follow-up provider specified.     DISCHARGE MEDICATIONS:  New Prescriptions    No medications on file       DISCONTINUED MEDICATIONS:  Discontinued Medications    No medications on file              (Please note that portions of this note were completed with a voice recognition program.  Efforts were made to edit the dictations but occasionally words are mis-transcribed.)    KATIE Arizmendi CNP (electronically signed)       KATIE Arizmendi CNP  02/22/23 6153

## 2023-02-22 NOTE — ED PROVIDER NOTES
I independently performed a history and physical on Nic Zapata. All diagnostic, treatment, and disposition decisions were made by myself in conjunction with the advanced practice provider. I personally saw the patient and performed a substantive portion of the visit including all aspects of the medical decision making. For further details of [facility] emergency department encounter, please see Wil Valenzuela NP's documentation. Patient is a 69-year-old male presenting today after being discharged yesterday from the hospital with plan for home care but coming back today since he is still severely fatigued and wife being unable to take care of him. He has needed multiple platelet transfusions related to history of multiple myeloma that did progress to plasma cell leukemia. No reported recent falls or trauma. He denies any headache. No chest pain. No hematuria or blood in the stool. Due to concern for worsening generalized fatigue and being unable to take care of himself, he came back to the emergency department for further evaluation. Physical:   Gen: No acute distress. Alert. Psych: Depressed mood and affect  HEENT: NCAT, dry MM  Neck: supple, NTTP  Cardiac: RRR, 3/6 systolic murmur noted - family aware   Lungs: normal effort  Abdomen: soft and nontender with no R/D/G  Neuro: strength 4+/5 with  strength bilaterally     The Ekg interpreted by me shows  normal sinus rhythm with a rate of 82  Axis is   Normal  QTc is  normal  Intervals and Durations are unremarkable. ST Segments: no acute change and normal  No significant change from prior EKG dated - 2/19/23  No STEMI     I was the primary provider along with NP Francia Kumar. Critical Care Time:    I personally saw the patient and independently provided 10 minutes of non-concurrent critical care out of the total shared critical care time provided.   Includes examination, lab interpretation, charting, treating for severe thrombocytopenia needing IV platelet transfusion  Excludes separate billable procedures. Patient at risk for serious decompensation if not treated for this life-threatening condition. MDM: Patient was evaluated due to worsening generalized fatigue since being discharged yesterday with initially trying home care. Other than generalized fatigue, no reported falls or trauma since being discharged. He denies any significant headache or any other concerns for bleeding. His platelets were severely low and ultimately hematology was consulted and it was determined that admission would be best for the patient with platelet transfusion and at that point they can determine further course of action for the patient based on his worsening medical condition. He was stable for the floor. Since he denied any headache and seems to be answering questions appropriately and denied any focal neurological new deficit, CT of the head was not ordered at this point.            Elder Cantu MD  02/23/23 1440

## 2023-02-22 NOTE — CARE COORDINATION
Advance Care Planning     General Advance Care Planning (ACP) Conversation    Date of Conversation: 2/22/2023  Conducted with: Patient with Decision Making Capacity and wife, Devin Arita Decision Maker:    Primary Decision Maker: Veronique Lance - Spouse - [phone]  Click here to complete Healthcare Decision Makers including selection of the Healthcare Decision Maker Relationship (ie \"Primary\"). Today we documented Decision Maker(s) consistent with Legal Next of Kin hierarchy. Content/Action Overview:   Has ACP document(s) on file - reflects the patient's care preferences    Length of Voluntary ACP Conversation in minutes:  12 minutes    BOY Hercules   Electronically signed by BOY Hercules Student on 2/22/2023 at 4:38 PM [place]

## 2023-02-22 NOTE — ED NOTES
ED Oncology Consult     1616- Paged Patty Right Through PS  RE: Low Platelets  0513-  Called Back Transferred to [facility]  02/22/23 6301

## 2023-02-22 NOTE — ED NOTES
Pt signed blood and blood product consent voluntarily at this time.       Mahendra Schwartz RN  02/22/23 9124

## 2023-02-22 NOTE — CARE COORDINATION
CASE MANAGEMENT INITIAL ASSESSMENT      Reviewed chart and completed assessment with patient:Yes  Family present: Peter Draper, wife   Explained Case Management role/services. Yes    Primary contact information:Karmen, wife     Health Care Decision Maker :   Primary Decision Maker: Liana Hopson - Spouse - [phone]          Can this person be reached and be able to respond quickly, such as within a few minutes or hours? Yes    Admit date/status:2/22/2023  Diagnosis:Fatigue    Is this a Readmission?:  Yes    Readmission Screening completed?: Yes     Insurance:Medicare    Precert required for SNF: Yes       3 night stay required: No    Living arrangements, Adls, care needs, prior to admission:Lives at home w/wife, not independent in ADLs     Durable Medical Equipment at home:  Walker_X_Cane__RTS__ BSC__Shower Chair__  02__ HHN__ CPAP__  BiPap__  Hospital Bed__ W/C___ Other_____    Services in the home and/or outpatient, prior to [address]                                Medications:Yes Prescription coverage? Yes Will pt require financial assistance with medications No     Transportation needs: Family     PT/OT recs:    Hospital Exemption Notification (HEN):needed for snf     Barriers to discharge:medical complications     Plan/comments:Referred to patient and wife for discharge planning. Patient is a readmit and a 65year old male presenting to [facility] ED with fatigue. Patient is diagnosed with Cancer, metastatic to bone. Patient's wife reports he is progressively getting worse. Wife reports, he cannot get out of the chair on his own or walk on his own anymore. Patient is agreeable to SNF, wife would like to wait to see what oncology has to say. Wife reports she is interested in hospice for patient, patient refused hospice in the past. Wife does not want SW to discuss hospice with patient at this time. Wife was educated that patient must be agreeable for hospice services.  Plan is for MD, palliative care and oncology to discuss with wife and patient discharge options, SNF vs. Hospice.      Electronically signed by BOY Hudson Student on 2/22/2023 at 4:34 PM  Electronically signed by Hoyt Moritz, MSW LISW-S on 2/22/2023 at 4:42 PM     ECOC on chart for MD signature

## 2023-02-22 NOTE — ED NOTES
Pt tolerated first fifteen minutes of platelet transfusion well. No signs/symptoms of transfusion reaction at this time.       Renita Daniel RN  02/22/23 7382

## 2023-02-22 NOTE — TELEPHONE ENCOUNTER
[facility] Trans[facility] Initial Follow Up Call    Outreach made within 2 business days of discharge: Yes    Patient: Octavia Tian Patient : 1945   MRN: [id number]  Reason for Admission: There are no discharge diagnoses documented for the most recent discharge. Discharge Date: 23       Spoke with: PT's Wife scheduled TCM. Discharge department/facility: [facility]    Non-face-to-face services provided:  Scheduled appointment with PCP-2023  Obtained and reviewed discharge summary and/or continuity of care documents    TCM Interactive Patient Contact:  Was patient able to fill all prescriptions: Yes  Was patient instructed to bring all medications to the follow-up visit: Yes  Is patient taking all medications as directed in the discharge summary?  Yes  Does patient understand their discharge instructions: Yes  Does patient have questions or concerns that need addressed prior to 7-14 day follow up office visit: no    Scheduled appointment with PCP within 7-14 days    Follow Up  Future Appointments   Date Time Provider Austin Colunga   2023 11:30 AM DO FRED Noble   2023  9:30 AM DO FRED Noble LPN

## 2023-02-23 LAB
ANION GAP SERPL CALCULATED.3IONS-SCNC: 10 MMOL/L (ref 3–16)
ANISOCYTOSIS: ABNORMAL
ATYPICAL LYMPHOCYTE RELATIVE PERCENT: 4 % (ref 0–6)
BASOPHILS ABSOLUTE: 0 K/UL (ref 0–0.2)
BASOPHILS RELATIVE PERCENT: 0 %
BUN BLDV-MCNC: 30 MG/DL (ref 7–20)
CALCIUM SERPL-MCNC: 8.4 MG/DL (ref 8.3–10.6)
CHLORIDE BLD-SCNC: 107 MMOL/L (ref 99–110)
CO2: 23 MMOL/L (ref 21–32)
CREAT SERPL-MCNC: 1.8 MG/DL (ref 0.8–1.3)
EKG ATRIAL RATE: 166 BPM
EKG ATRIAL RATE: 82 BPM
EKG DIAGNOSIS: NORMAL
EKG DIAGNOSIS: NORMAL
EKG P AXIS: 18 DEGREES
EKG P-R INTERVAL: 132 MS
EKG Q-T INTERVAL: 362 MS
EKG Q-T INTERVAL: 390 MS
EKG QRS DURATION: 96 MS
EKG QRS DURATION: 96 MS
EKG QTC CALCULATION (BAZETT): 455 MS
EKG QTC CALCULATION (BAZETT): 480 MS
EKG R AXIS: 20 DEGREES
EKG R AXIS: 6 DEGREES
EKG T AXIS: 25 DEGREES
EKG T AXIS: 33 DEGREES
EKG VENTRICULAR RATE: 106 BPM
EKG VENTRICULAR RATE: 82 BPM
EOSINOPHILS ABSOLUTE: 0.1 K/UL (ref 0–0.6)
EOSINOPHILS RELATIVE PERCENT: 1 %
GFR SERPL CREATININE-BSD FRML MDRD: 38 ML/MIN/{1.73_M2}
GLUCOSE BLD-MCNC: 98 MG/DL (ref 70–99)
HCT VFR BLD CALC: 20.5 % (ref 40.5–52.5)
HEMATOLOGY PATH CONSULT: NO
HEMOGLOBIN: 7.2 G/DL (ref 13.5–17.5)
LV EF: 55 %
LVEF MODALITY: NORMAL
LYMPHOCYTES ABSOLUTE: 1.3 K/UL (ref 1–5.1)
LYMPHOCYTES RELATIVE PERCENT: 10 %
MACROCYTES: ABNORMAL
MCH RBC QN AUTO: 33.8 PG (ref 26–34)
MCHC RBC AUTO-ENTMCNC: 35.2 G/DL (ref 31–36)
MCV RBC AUTO: 96 FL (ref 80–100)
MONOCYTES ABSOLUTE: 0.8 K/UL (ref 0–1.3)
MONOCYTES RELATIVE PERCENT: 9 %
NEUTROPHILS ABSOLUTE: 1.1 K/UL (ref 1.7–7.7)
NEUTROPHILS RELATIVE PERCENT: 12 %
PDW BLD-RTO: 15.4 % (ref 12.4–15.4)
PLASMA CELLS PERCENT: 64 %
PLATELET # BLD: 40 K/UL (ref 135–450)
PLATELET SLIDE REVIEW: ABNORMAL
PMV BLD AUTO: 7.3 FL (ref 5–10.5)
POTASSIUM REFLEX MAGNESIUM: 4 MMOL/L (ref 3.5–5.1)
RBC # BLD: 2.13 M/UL (ref 4.2–5.9)
SODIUM BLD-SCNC: 140 MMOL/L (ref 136–145)
TSH REFLEX FT4: 2.09 UIU/ML (ref 0.27–4.2)
WBC # BLD: 9.3 K/UL (ref 4–11)

## 2023-02-23 PROCEDURE — 97535 SELF CARE MNGMENT TRAINING: CPT

## 2023-02-23 PROCEDURE — 99222 1ST HOSP IP/OBS MODERATE 55: CPT | Performed by: INTERNAL MEDICINE

## 2023-02-23 PROCEDURE — 93306 TTE W/DOPPLER COMPLETE: CPT

## 2023-02-23 PROCEDURE — 97162 PT EVAL MOD COMPLEX 30 MIN: CPT

## 2023-02-23 PROCEDURE — 6370000000 HC RX 637 (ALT 250 FOR IP): Performed by: HOSPITALIST

## 2023-02-23 PROCEDURE — 80048 BASIC METABOLIC PNL TOTAL CA: CPT

## 2023-02-23 PROCEDURE — 97530 THERAPEUTIC ACTIVITIES: CPT

## 2023-02-23 PROCEDURE — 93010 ELECTROCARDIOGRAM REPORT: CPT | Performed by: INTERNAL MEDICINE

## 2023-02-23 PROCEDURE — 6360000002 HC RX W HCPCS: Performed by: HOSPITALIST

## 2023-02-23 PROCEDURE — 85025 COMPLETE CBC W/AUTO DIFF WBC: CPT

## 2023-02-23 PROCEDURE — 97166 OT EVAL MOD COMPLEX 45 MIN: CPT

## 2023-02-23 PROCEDURE — 93005 ELECTROCARDIOGRAM TRACING: CPT | Performed by: NURSE PRACTITIONER

## 2023-02-23 PROCEDURE — 6370000000 HC RX 637 (ALT 250 FOR IP): Performed by: INTERNAL MEDICINE

## 2023-02-23 PROCEDURE — 84443 ASSAY THYROID STIM HORMONE: CPT

## 2023-02-23 PROCEDURE — 1200000000 HC SEMI PRIVATE

## 2023-02-23 PROCEDURE — 2580000003 HC RX 258: Performed by: HOSPITALIST

## 2023-02-23 RX ORDER — CHOLESTYRAMINE 4 G/9G
4 POWDER, FOR SUSPENSION ORAL
Status: DISCONTINUED | OUTPATIENT
Start: 2023-02-23 | End: 2023-03-02 | Stop reason: HOSPADM

## 2023-02-23 RX ADMIN — PROCHLORPERAZINE EDISYLATE 10 MG: 5 INJECTION INTRAMUSCULAR; INTRAVENOUS at 16:13

## 2023-02-23 RX ADMIN — BUSPIRONE HYDROCHLORIDE 10 MG: 5 TABLET ORAL at 20:31

## 2023-02-23 RX ADMIN — GABAPENTIN 100 MG: 100 CAPSULE ORAL at 08:38

## 2023-02-23 RX ADMIN — SERTRALINE 75 MG: 50 TABLET, FILM COATED ORAL at 08:38

## 2023-02-23 RX ADMIN — LORAZEPAM 1 MG: 1 TABLET ORAL at 08:38

## 2023-02-23 RX ADMIN — PANTOPRAZOLE SODIUM 40 MG: 40 TABLET, DELAYED RELEASE ORAL at 08:38

## 2023-02-23 RX ADMIN — Medication 10 ML: at 20:31

## 2023-02-23 RX ADMIN — Medication 10 ML: at 08:39

## 2023-02-23 RX ADMIN — CHOLESTYRAMINE 4 G: 4 POWDER, FOR SUSPENSION ORAL at 18:12

## 2023-02-23 RX ADMIN — LORAZEPAM 1 MG: 1 TABLET ORAL at 15:25

## 2023-02-23 RX ADMIN — BUSPIRONE HYDROCHLORIDE 10 MG: 5 TABLET ORAL at 08:38

## 2023-02-23 RX ADMIN — ONDANSETRON 4 MG: 4 TABLET, ORALLY DISINTEGRATING ORAL at 08:38

## 2023-02-23 RX ADMIN — OLANZAPINE 5 MG: 5 TABLET, FILM COATED ORAL at 20:31

## 2023-02-23 RX ADMIN — ONDANSETRON 4 MG: 2 INJECTION INTRAMUSCULAR; INTRAVENOUS at 13:28

## 2023-02-23 RX ADMIN — GABAPENTIN 100 MG: 100 CAPSULE ORAL at 20:31

## 2023-02-23 ASSESSMENT — ENCOUNTER SYMPTOMS
SHORTNESS OF BREATH: 0
STRIDOR: 0
RIGHT EYE: 0
LEFT EYE: 0
HEMATEMESIS: 0
HEMATOCHEZIA: 0
WHEEZING: 0

## 2023-02-23 NOTE — PROGRESS NOTES
Physical Therapy  Facility/Department: [facility] C3 TELE/MED SURG/ONC  Physical Therapy Initial Assessment    Name: Gunjan Freedman  : 1945  MRN: [id number]  Date of Service: 2023    Discharge Recommendations:  Subacute/Skilled Nursing Facility   PT Equipment Recommendations  Equipment Needed: No  Other: defer, pt states went home with RW from [facility] past admit      Patient Diagnosis(es): The primary encounter diagnosis was Generalized weakness. Diagnoses of Thrombocytopenia ([address] and Urinary retention were also pertinent to this visit. Past Medical History:  has a past medical history of Anxiety, Arthritis, Bladder cancer ([address] DVT (deep venous thrombosis) ([address] Elevated fasting glucose, Hyperlipidemia, Hypertension, Malignant neoplasm of urinary bladder ([address] Multiple myeloma ([address] MVP (mitral valve prolapse), Polyp of gallbladder, and Prostate CA ([address] Past Surgical History:  has a past surgical history that includes Tonsillectomy; Colonoscopy (12); Colonoscopy (2017); Bladder surgery; Cataract removal (Bilateral); Prostatectomy ( approx); Inguinal hernia repair (Right, 1999); Penile prosthesis placement; Cholecystectomy, laparoscopic (N/A, 1/10/2022); CT BIOPSY DEEP BONE PERCUTANEOUS (3/3/2022); Colonoscopy (N/A, 2022); Upper gastrointestinal endoscopy (N/A, 2022); and CT BIOPSY BONE MARROW (2/15/2023).     Assessment   Body Structures, Functions, Activity Limitations Requiring Skilled Therapeutic Intervention: Decreased functional mobility   Assessment: pt is 66 yo male admit to [facility] 2/2 thrombocytopenia; PMH: chemo, leukemia; pt is independent prior to recent admit to [facility], dishcarged to home for one day, unable to negotiate stairs up to bedroom, slept in recliner, declined to unable to stand from recliner; pt presents requiring Min A to stand from elevated bed, Min A to stand from recliner, Min A to transfer bed to chair, unable to ambulate further than bed to chair due to fatigue, pt will benefit from Acute Inpatient Skilled PT to address functional mobility deficits, PT recommends Short-term Skilled PT at SNF [facility] [place]  Therapy Prognosis: Good  Decision Making: Medium Complexity  Barriers to Learning: none  Requires PT Follow-Up: Yes  Activity Tolerance  Activity Tolerance: Patient tolerated evaluation without incident;Patient limited by endurance     Plan   Physcial Therapy Plan  General Plan: 3-5 times per week  Current Treatment Recommendations: Strengthening, ROM, Balance training, Functional mobility training, Transfer training, Endurance training, Gait training, Stair training, Neuromuscular re-education, Home exercise program, Safety education & training, Patient/Caregiver education & training, Equipment evaluation, education, & procurement, Modalities, Positioning, Therapeutic activities  Safety Devices  Type of Devices:  All fall risk precautions in place, Call light within reach, Chair alarm in place, Gait belt, Patient at risk for falls, Left in chair, Nurse notified     Restrictions  Restrictions/Precautions  Restrictions/Precautions: General Precautions  Position Activity Restriction  Other position/activity restrictions: up with assist, chemo precautions     Subjective   Pain: denies pain at this time  General  Chart Reviewed: Yes  Patient assessed for rehabilitation services?: Yes  Additional Pertinent Hx: chemo  Response To Previous Treatment: Not applicable  Family / Caregiver Present: Yes (spouse Elijah Kilpatrick)  Referring Practitioner: Ángel Muhammad  Referral Date : 02/23/23  Diagnosis: thrombocytopenia  Follows Commands: Within Functional Limits  General Comment  Comments: RN cleared pt for PT  Subjective  Subjective: pt motivated to participate despite finishing lunch         Social/Functional History  Social/Functional History  Lives With: Spouse  Type of Home: Condo  Home Layout: Two level, Bed/Bath upstairs (full flight upstairs with one rail on L ascending)  Home Access: Stairs to enter without rails  Entrance Stairs - Number of Steps: 3  Bathroom Shower/Tub: Tub/Shower unit, Shower chair without back (wooden bench)  Bathroom Toilet: Standard  Bathroom Accessibility: Accessible  Home Equipment: diana Schofield (recliner)  Has the patient had two or more falls in the past year or any fall with injury in the past year?: No  ADL Assistance: [address]: Independent  Homemaking Responsibilities:  (spouse primary cook and clean)  Ambulation Assistance: Independent (discharged with RW last admit)  Transfer Assistance: Independent  Active : Yes  Mode of Transportation: Car  Occupation: Retired  Type of Occupation: special   Leisure & Hobbies: likes to watch TV, play chess  Additional Comments: pt never able to ascend stairs to bed/bath, difficulty ascending 3 steps into house with daughter  Vision/Hearing  Vision  Vision: Impaired  Vision Exceptions: Wears glasses for reading  Hearing  Hearing: Within functional limits    Cognition   Orientation  Overall Orientation Status: Within Normal Limits  Orientation Level: Oriented X4  Cognition  Overall Cognitive Status: WFL     Objective   Heart Rate: 88  Heart Rate Source: Monitor  BP: 120/68  BP Location: Left upper arm  BP Method: Automatic  Patient Position: Semi fowlers  MAP (Calculated): 85  Resp: 18  SpO2: 92 %  O2 Device: None (Room air)     Observation/Palpation  Posture: Poor  Observation: trunk and cervical flexion in standing  Edema: B feet  Gross Assessment  AROM: Generally decreased, functional  PROM: Generally decreased, functional  Strength: Generally decreased, functional  Coordination: Within functional limits  Tone: Normal  Sensation: Intact                    Bed mobility  Rolling to Right: Minimal assistance  Supine to Sit: Minimal assistance  Sit to Supine:  (pt in chair at end of session)  Scooting: Minimal assistance (to eob)  Bed Mobility Comments: increased time to complete, frequent verbal cues for sequencing  Transfers  Sit to Stand: Minimal Assistance (from elevated bed, from armchair)  Stand to Sit: Contact guard assistance  Bed to Chair: Minimal assistance (small, shuffling steps bed to chair)  Ambulation  Surface: Level tile  Device: Rolling Walker  Assistance: Minimal assistance  Quality of Gait: verbal cues to prompt R LE to take sidesteps, tactile cues for walker management to turn for bed to chair transfer  Gait Deviations: Slow Ramila;Decreased step length;Decreased step height;Staggers; Shuffles  Distance: 3ft  Comments: verbal cues for posture corrections  More Ambulation?: No  Stairs/Curb  Stairs?: No     Balance  Posture: Poor  Sitting - Static: Fair  Sitting - Dynamic: Fair  Standing - Static: Fair;-  Standing - Dynamic: Poor  Exercise Treatment: seated in chair therex: BLE ankle pumps, LAQ, marches x15          AM-PAC Score  AM-PAC Inpatient Mobility Raw Score : 12 (02/23/23 1305)  AM-PAC Inpatient T-Scale Score : 35.33 (02/23/23 1305)  Mobility Inpatient CMS 0-100% Score: 68.66 (02/23/23 1305)  Mobility Inpatient CMS G-Code Modifier : CL (02/23/23 1305)            Goals  Short Term Goals  Time Frame for Short Term Goals: one week 3/2 unless otherwise indicated  Short Term Goal 1: pt will transfer supine to and from sit with sup from flat bed by 2/28  Short Term Goal 2: pt will transfer sit to and from stand with sup  Short Term Goal 3: pt will ambulate 25ft with RW with cga  Short Term Goal 4: pt will negotiate 3 steps without rails with cga to enter home if discharges home  Short Term Goal 5: pt will negotiate a flight of stairs with one rail with cga if discharges home  Patient Goals   Patient Goals : get stronger before I go home again       Education  Patient Education  Education Given To: Patient; Family  Education Provided: Role of Therapy;Plan of Care;Home Exercise Program;Transfer Training;Equipment; Fall Prevention Strategies  Education Method: Verbal  Barriers to Learning: None  Education Outcome: Verbalized understanding;Demonstrated understanding      Therapy Time   Individual Concurrent Group Co-treatment   Time In 1150         Time Out 1244         Minutes 54         Timed Code Treatment Minutes: 44 Minutes     If pt is unable to be seen after this session, please let this note serve as discharge summary. Please see case management note for discharge disposition. Thank you.    Aravind Tafoya, PT

## 2023-02-23 NOTE — CARE COORDINATION
Chart reviewed. Care managed per cardiology,EP hem/onc and IM. ECHO done. Palliative consult pending, spoke with Isrrael Rivero, stated she spoke with wife and wife wants to speak with her after speaks with oncology.  Rosangela Clements RN

## 2023-02-23 NOTE — PROGRESS NOTES
Pt admitted from ER to C3 in room with PICC line, single lumen on the right; PIV on the left forearm, normal saline locked; lombardo catheter attached to urine bag, draining well. Pt a/o. VSS. Oriented to room. Admission notes done at bedside. Kept comfortable. Instructed to use call bell when needing assistance. For platelet transfusion tonight, current platelet is 8. Chemotherapy isolation and fall risk initiated.

## 2023-02-23 NOTE — PROGRESS NOTES
4 Eyes Skin Assessment     The patient is being assess for  Admission    I agree that 2 RN's have performed a thorough Head to Toe Skin Assessment on the patient. ALL assessment sites listed below have been assessed. Areas assessed by both nurses: CATHERINE Suresh/CATHERINE Bahena  [x]   Head, Face, and Ears   [x]   Shoulders, Back, and Chest  [x]   Arms, Elbows, and Hands   [x]   Coccyx, Sacrum, and IschIum  [x]   Legs, Feet, and Heels        Does the Patient have Skin Breakdown?   No         Brant Prevention initiated:  Yes   Wound Care Orders initiated:  NA      [facility] nurse consulted for Pressure Injury (Stage 3,4, Unstageable, DTI, NWPT, and Complex wounds), New and Established Ostomies:  NA      Nurse 1 eSignature: Electronically signed by Michelle Hernandez RN on 2/23/23 at 4:20 AM EST    **SHARE this note so that the co-signing nurse is able to place an eSignature**    Nurse 2 eSignature: Electronically signed by Alfred Lema RN on 2/23/23 at 5:30 AM EST

## 2023-02-23 NOTE — PLAN OF CARE
RN alerted me about rhythm changes    EKG shows A-fib with RVR, this is apparently new for the patient without prior history    We will order telemetry, TSH, 2D echo and cardiology consult  Rate is mostly <110, hold off on any rate control meds  Platelets 8k,  hold off anticoagulation

## 2023-02-23 NOTE — CONSULTS
PALLIATIVE MEDICINE CONSULTATION     Patient name:Joshua Zavala   TTX:[id number]    :1945  Room/Bed:0339/0339-01   LOS: 2 days         Date of consult:2023    Consult Information  Palliative Medicine Consult performed by: KATIE Glaser CNP, CNP    Palliative Care Eval  Consult performed by: KATIE Alcazar CNP  Consult ordered by: Heather Tesfaye MD             ASSESSMENT/RECOMMENDATIONS     65 y.o. 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 Wife anticipates they will change goals to comfort care with hospice, but patient wants to talk to oncology before moving forward with this goals of care change. There is consideration to try SNF before going with hospice. Oncology consult still pending. Palliative will continue to follow. Debility - PS poor. Appetite decreased. BMI 26.5 Weight seems to have remained stable in the 170s/180s for the past 2 years. Needs maximum assistance at home, was home less than 24 hours between admissions. PT/OT recommends SNF, patient unsure if he could tolerate  Plasma Cell Leukemia - Followed at [facility]. 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 Had 4 or 5 treatments while admitted last week, but fatigue after was  severe and patient was readmitted. Patient would like oncology's recommendations about seeking further treatment vs hospice       Patient/Family Goals of Care :    23    Received call from wife asking to talk. Oncology hasn't been by yet, but patient is sleeping and she would like to talk. Patient remained deeply sleeping during our bedside conversation. Wife expressed surprise that he did not wake up for any of it. She asked that he not be disturbed and be allowed to continue sleeping.     Wife states she knows that patient is terminal and has a very bad prognosis. She says they are basically out of treatment options. He had 5 days of chemo last week, but she wonders if there was any point to it. She is not sure how he tolerated it other than he became so weak he wasn't even able to last a day away from the hospital.  She doubts they will be doing any more chemo, but patient wants to talk to the oncologist about it first.  Wife feels like hospice is the next step at this time. Patient does not like the word \"hospice\". He knows he has a poor prognosis but hospice seems too final to him and he is still accepting his current diagnosis. Wife and I talked about hospice at length. She is not sure she can handle patient at home with hospice since she can't manage him as he is now. She has 2 children, but they both work. When they do decide on hospice, she assumes they will use [facility] due to location. I asked if I could have someone from [facility] talk with her and provide basic information while we are waiting on oncology to come by. She declined and states she would rather hear all about hospice when her  hears about it - which will be after they have the oncologist's recommendations what to do. Discussed code status. She confirms patient is a DNR/DNI. I explained to her that I would change code status to reflect these wishes. Once she has spoken with oncology she will let us know if its okay to move forward with the hospice consult or if they are going to try SNF first.      2/23/23    Called wife. Left message on Cell and home numbers @ [phone]. Wife has indicated that patient has refused hospice in the past, however, it sounds like she thinks its appropriate. She has asked that we do not yet talk about hospice to the patient. Wife called back @ 80 and would like to wait to have any conversation until hem/onc has been able to come by and see patient today.   She will call me when they are ready to talk, assumes it will be later today. Disposition/Discharge Plan:   - pending GOC. Patient is considering comfort care with hospice, but wants to discuss with oncology further       Advance Directives: The patient has the following advanced directives on file:  [address] Will ACP-Advance Directive ACP-Power of     Not on File Not on File Not on File Not on File            The patient has appointed the following active healthcare agents:    Primary Decision Maker: Essie Pete - Spouse - [phone]    The Patient has the following current code status:    Code Status: Limited x  4 NOs      Case discussed with: patient, family, floor RN, case management, hospice   Thank you for allowing us to participate in the care of this patient. HISTORY     CC: fatigue  HPI: The patient is a 65 y.o. male who presents to the emergency department today with complaints of fatigue. Patient just went home yesterday, the has not made a 24 hours at home, wife states that when he got home he just could not walk or care for himself. Wife did mention that she thought that the patient should be on hospice. Patient was recently admitted to the hospital for an ELEONORA and thrombocytopenia as well as his generalized weakness. He is here for further evaluation. Palliative Medicine SymptomScreening/ROS:    Review of Systems   Reason unable to perform ROS: patient sleeping deeply. Patient unable to complete full ROS due to sleeping. Information that is obtained from nursing and chart. Palliative Performance Scale:     [] 60%  Amb reduced; Sig dz. Can't do hobbies/housework; Intake normal or reduced, Occasional assist; LOC full/confusion   [] 50%  Mainly sit/lie; Extensive disease. Mainly assist, Intake normal or reduced; Occasional assist; LOC full/confusion   [] 40%  Mainly in bed; Extensive disease; Mainly assist; Intake normal or reduced;  Occasional assist; LOC full/confusion   [x] 30%  Bed bound, Extensive disease; Total care; Intake reduced; LOC full/confusion   [] 20%  Bed bound; Extensive disease; Total care; Intake minimal; Drowsy/coma   [] 10%  Bed bound; Extensive disease; Total care; Mouth care only; Drowsy/coma   []  0%   Death       Home med list and hospital medications reviewed in chart as of 2/24/2023     EXAM     Vitals:    02/24/23 1203   BP: 125/74   Pulse: 87   Resp: 18   Temp: 97.8 °F (36.6 °C)   SpO2: 97%       Physical Exam  Vitals reviewed. Constitutional:       General: He is not in acute distress. Appearance: He is ill-appearing. Comments: Sleeping, snoring   HENT:      Head: Normocephalic and atraumatic. Nose: Nose normal.      Mouth/Throat:      Mouth: Mucous membranes are dry. Eyes:      Comments: Glasses on   Cardiovascular:      Rate and Rhythm: Normal rate. Pulses: Normal pulses. Pulmonary:      Effort: Pulmonary effort is normal. No respiratory distress. Musculoskeletal:      Cervical back: Normal range of motion and neck supple. Skin:     General: Skin is warm and dry. Capillary Refill: Capillary refill takes 2 to 3 seconds. Coloration: Skin is pale. OBJECTIVE   /74   Pulse 87   Temp 97.8 °F (36.6 °C) (Axillary)   Resp 18   Ht 5' 9\" (1.753 m)   Wt 180 lb (81.6 kg)   SpO2 97%   BMI 26.58 kg/m²   I/O last 3 completed shifts: In: 1583.3 [P.O.:1100; Blood:483.3]  Out: 3070 [Urine:3070]  No intake/output data recorded.       Palliative Medicine Interventions:    Patient/family support  Basic disease process education and treatment options education  Medical status updates and questions answered   Goals of Care discussions with patient/surrogate  Spiritual Interventions:  offered             DATA:  Current labs in the epic chart reviewed as of 2/24/2023   Review of previous notes, admits, labs, radiology and testing relevant to this consult done in this chart today 2/24/2023    Data Reviewed related to this consultation:    Review of prior external note(s) from each unique source relevant to today's visit: Hospitalist, Case management, Nursing, PT/OT/ST, hem/onc  Discussion of management or test with external physician/qualified health care professional: Hospitalist, Case management, Nursing  Unique test results reviewed: CBC and BMP, GI-Liver profile, Cardiac labs, CXR, echo, bone marrow path    I have spent a total of 120 minutes on: Performing a medical appropriate examination and/or evaluation    Counseling and educating the patient/family/caregiver  Preparing to see the patient (e.g., review of tests)  Referring / communicating with other healthcare professionals including care coordination (not separately reported):  Hospitalist, Case management  Documenting clinical information in the electronic health record              Signed By: Electronically signed by KATIE Viramontes CNP on 2/24/2023 at 1:31 PM  Palliative Medicine   [phone]    February 24, 2023

## 2023-02-23 NOTE — PROGRESS NOTES
Comprehensive Nutrition Assessment    Type and Reason for Visit:  Initial, Positive Nutrition Screen (weight loss prior to admission)    Nutrition Recommendations/Plan:   Continue Regular diet as tolerated  Discontinue Ensure plus, patient doesn't like  Will monitor nutritional adequacy, nutrition-related labs, weights, BMs, and clinical progress   Encourage food sources of protein as tolerated by patient  Actual weight if possible please     Malnutrition Assessment:  Malnutrition Status: At risk for malnutrition (Comment) (02/23/23 1157)      Nutrition Assessment:    Patient admitted with thrombocytopenia, history of plasma cell leukemia, CKD and HTN. Currently on a regular diet with Ensure bid as tolerated. Po intake % meals so far. Nutrition risk triggered due to weight loss prior to admission. Wife stated weight has trended down 20 lbs in the past year or 2, weight was in the 190's back in 2021. Difficult to assess current weight trends due to no actual weight this admission; and weight fluctuations in recent months. At home patient doesn't feel like eating but does anyway per wife. Wife helping patient eat lunch to prevent making a mess by putting the plate cover under patient's mouth with the veggie pasta. Patient does not like Ensure and wife feels would not like any of the other supplements, will discontinue. RD encouraged patient to eat food sources of protein at home and gave examples. Patient verbalized understanding. Nutrition Related Findings:    swollen edematous legs; Wound Type: None       Current Nutrition Intake & Therapies:    Average Meal Intake: % (so far this admission)  Average Supplements Intake: Unable to assess  ADULT DIET; Regular    Anthropometric Measures:  Height: 5' 9\" (175.3 cm)  Ideal Body Weight (IBW): 160 lbs (73 kg)       Current Body Weight: 180 lb (81.6 kg),   IBW.  Weight Source: Stated  Current BMI (kg/m2): 26.6                          BMI Categories: Overweight (BMI 25.0-29. 9)    Estimated Daily Nutrient Needs:  Energy Requirements Based On: Kcal/kg  Weight Used for Energy Requirements: Ideal  Energy (kcal/day): 8489-8215 (25-30 kcal/72.7 kg)  Weight Used for Protein Requirements: Ideal  Protein (g/day): 109-131 (1.5-1.8 g/72.7 kg)  Method Used for Fluid Requirements: 1 ml/kcal  Fluid (ml/day):      Nutrition Diagnosis:   Increased nutrient needs related to increase demand for energy/nutrients as evidenced by weight loss    Nutrition Interventions:   Food and/or Nutrient Delivery: Continue Current Diet, Continue Oral Nutrition Supplement  Nutrition Education/Counseling:  (monitor need)  Coordination of Nutrition Care: Continue to monitor while inpatient       Goals:     Goals: PO intake 75% or greater       Nutrition Monitoring and Evaluation:      Food/Nutrient Intake Outcomes: Food and Nutrient Intake, Supplement Intake  Physical Signs/Symptoms Outcomes: Chewing or Swallowing, Nutrition Focused Physical Findings, Weight, Biochemical Data    Discharge Planning:     Too soon to determine     JOSEPH, [address] [facility], [address] [place] [address],   Contact: [phone]

## 2023-02-23 NOTE — PROGRESS NOTES
Sky 82 with wife this morning who requested to hold any GOC conversations until hem/onc has been able to see patient and offer recommendations/information. Wife will call my cell phone as soon as hem/onc has been in and they are ready to talk. Hem/onc has not yet seen this afternoon. Will follow up with wife and patient in the morning.

## 2023-02-23 NOTE — PLAN OF CARE
Problem: Safety - Adult  Goal: Free from fall injury  Outcome: Progressing  Flowsheets (Taken 2/23/2023 4655)  Free From Fall Injury: Instruct family/caregiver on patient safety     Problem: Skin/Tissue Integrity  Goal: Absence of new skin breakdown  Description: 1. Monitor for areas of redness and/or skin breakdown  2. Assess vascular access sites hourly  3. Every 4-6 hours minimum:  Change oxygen saturation probe site  4. Every 4-6 hours:  If on nasal continuous positive airway pressure, respiratory therapy assess nares and determine need for appliance change or resting period.   Outcome: Progressing  Note: Patient has generalized weakness; lombardo catheter attached

## 2023-02-23 NOTE — PROGRESS NOTES
Perfect serve sent to domi miranda MD, Dr. Dianna Ivy. \"Message sent to [facility] before he left for the night. [facility] called earlier and said the patient flipped from NSR to A fib. I looked at the patients history and there is not history of a fib listed. STAT EKG ordered. It says A fib with RVR rate was at 106 on the EKG strip that was printed. [facility] tele monitor says lower 100's for his HR. VS take panfilo 0430 and they are stable. Pt just recently had chemo on his last admission from 2/17-2/21. Thanks!'     0529: Message back from MD \"thanks. I put in some orders and a small note. 0541:Message back to MD \"Pt HR elevated to 130's-140's. At that time pt was attempting to get out of bed with another RN. I explained to the pt that he probably should not get out of bed right now until we figure out what is going on. Once we get him back into bed he came back down to the low 100's like before. Also lab called with a critical hematocrit of 20.5.

## 2023-02-23 NOTE — PLAN OF CARE
Problem: ABCDS Injury Assessment  Goal: Absence of physical injury  Outcome: Progressing  Flowsheets (Taken 2/23/2023 1051)  Absence of Physical Injury: Implement safety measures based on patient assessment     Problem: Safety - Adult  Goal: Free from fall injury  2/23/2023 1051 by Kindra Villasenor RN  Outcome: Progressing  Flowsheets (Taken 2/23/2023 1051)  Free From Fall Injury: Instruct family/caregiver on patient safety

## 2023-02-23 NOTE — PROGRESS NOTES
Occupational Therapy  Facility/Department: [facility] C3 TELE/MED SURG/ONC  Occupational Therapy Initial Assessment and Treatment Note    Name: Sonia De La Garza  : 1945  MRN: [id number]  Date of Service: 2023    Discharge Recommendations:  [address]      Patient Diagnosis(es): The primary encounter diagnosis was Generalized weakness. Diagnoses of Thrombocytopenia ([address] and Urinary retention were also pertinent to this visit. Past Medical History:  has a past medical history of Anxiety, Arthritis, Bladder cancer ([address] DVT (deep venous thrombosis) ([address] Elevated fasting glucose, Hyperlipidemia, Hypertension, Malignant neoplasm of urinary bladder ([address] Multiple myeloma ([address] MVP (mitral valve prolapse), Polyp of gallbladder, and Prostate CA ([address] Past Surgical History:  has a past surgical history that includes Tonsillectomy; Colonoscopy (12); Colonoscopy (2017); Bladder surgery; Cataract removal (Bilateral); Prostatectomy (2005); Inguinal hernia repair (Right, 1999); Penile prosthesis placement; Cholecystectomy, laparoscopic (N/A, 1/10/2022); CT BIOPSY DEEP BONE PERCUTANEOUS (3/3/2022); Colonoscopy (N/A, 2022); Upper gastrointestinal endoscopy (N/A, 2022); and CT BIOPSY BONE MARROW (2/15/2023). Assessment   Performance deficits / Impairments: Decreased functional mobility ; Decreased ADL status; Decreased safe awareness;Decreased cognition;Decreased balance;Decreased posture;Decreased endurance  Assessment: Pt seen for OT eval and tx completed. Pt admitted for thrombocytopenia and urinary retention, hx of multiple myeloma with bone mets. He was recently d/c'd home from hospital and was unable to care for himself at home. During OT session, pt required min/mod A for standing balance and transfer with RW . Max/D LE ADLs required. Needed vc's for safe technique. Cont OT in acute care. Recommend SNF for skilled OT to improve function.   Prognosis: Good  Decision Making: Medium Complexity  Activity Tolerance  Activity Tolerance: Patient limited by fatigue      Plan   Occupational Therapy Plan  Times Per Week: 3-5x/wk  Current Treatment Recommendations: Self-Care / ADL, Balance training, Strengthening, Functional mobility training, Endurance training, Safety education & training     Restrictions  Restrictions/Precautions  Restrictions/Precautions: General Precautions  Position Activity Restriction  Other position/activity restrictions: up with assist, chemo precautions    Subjective   General  Chart Reviewed: Yes  Patient assessed for rehabilitation services?: Yes  Additional Pertinent Hx: multiple myeloma, bone mets  Family / Caregiver Present: Yes (wife)  Referring Practitioner: DICK Garrett  Diagnosis: urinary retention, Thrombocytopenia  Subjective  Subjective: Pt agreeable to OT. Pt reported \"high\" amount of discomfort on bottom. Pt was seated on cushion in chair. RN notified to check skin.   General Comment  Comments: RN approved therapy     Social/Functional History  Social/Functional History  Lives With: Spouse  Type of Home: Condo  Home Layout: Two level, Bed/Bath upstairs (full flight upstairs with one rail on L ascending)  Home Access: Stairs to enter without rails  Entrance Stairs - Number of Steps: 3  Bathroom Shower/Tub: Tub/Shower unit, Shower chair without back (wooden bench)  Bathroom Toilet: Standard  Bathroom Accessibility: Accessible  Home Equipment: Walker, rolling (recliner)  Has the patient had two or more falls in the past year or any fall with injury in the past year?: No  ADL Assistance: [address]: Independent  Homemaking Responsibilities:  (spouse primary cook and clean)  Ambulation Assistance: Independent (discharged with RW last admit)  Transfer Assistance: Independent  Active : Yes  Mode of Transportation: Car  Occupation: Retired  Type of Occupation: special   Leisure & Hobbies: likes to watch TV, play chess  Additional Comments: pt never able to ascend stairs to bed/bath, difficulty ascending 3 steps into house with daughter     Objective   Heart Rate: 100  Heart Rate Source: Monitor  BP: 101/66  BP Location: Left upper arm  BP Method: Automatic  Patient Position: Sitting;Up in chair  MAP (Calculated): 78  Resp: 18  SpO2: 95 %  O2 Device: None (Room air)        Observation/Palpation  Posture: Poor  Observation: trunk and cervical flexion in standing  Edema: B feet  Safety Devices  Type of Devices: All fall risk precautions in place;Call light within reach; Chair alarm in place;Gait belt;Patient at risk for falls; Left in chair;Nurse notified      AROM: Generally decreased, functional (Arthritic changes in B hands)  Strength: Generally decreased, functional  Coordination: Generally decreased, functional  Tone: Normal  Sensation: Intact  ADL  Feeding: Supervision;Setup; Increased time to complete  Feeding Skilled Clinical Factors: seated in chair  Grooming: Moderate assistance; Increased time to complete  Grooming Skilled Clinical Factors: to comb hair while seated  UE Dressing: Moderate assistance  UE Dressing Skilled Clinical Factors: for gown  LE Dressing: Maximum assistance  LE Dressing Skilled Clinical Factors: socks  Toileting: Dependent/Total  Toileting Skilled Clinical Factors: lombardo     Activity Tolerance  Activity Tolerance: Patient tolerated evaluation without incident;Patient limited by endurance  Bed mobility  Bed Mobility Comments: Pt declined returning to bed to rest d/t discomfort on bottom. He returned to sitting in chair  Transfers  Stand Pivot Transfers: Minimal assistance (RW)  Sit to stand: Moderate assistance (to stand from chair)  Stand to sit: Moderate assistance (to control descent)  Transfer Comments: Pt took steps in area around bed with min A and RW, assist to move walker as he backed up to chair. Vc's for upright posture.   Vision  Vision: Impaired  Vision Exceptions: Wears glasses for reading  Hearing  Hearing: Within functional limits  Cognition  Overall Cognitive Status: Exceptions  Arousal/Alertness: Delayed responses to stimuli  Following Commands: Follows one step commands with increased time  Attention Span: Attends with cues to redirect  Safety Judgement: Decreased awareness of need for safety  Problem Solving: Decreased awareness of errors  Sequencing: Requires cues for some  Orientation  Overall Orientation Status: Within Normal Limits  Orientation Level: Oriented X4           A/AROM Exercises: Performed BUE exer 10x each for shoulder flex, horiz abd/add, bicep curls and hand squeezes  Education Given To: Patient; Family  Education Provided: Role of Therapy;Plan of Care;Transfer Training;ADL Adaptive Strategies; Home Exercise Program;Family Education  Education Method: Verbal;Demonstration  Barriers to Learning: Cognition  Education Outcome: Verbalized understanding;Continued education needed      Disease Specific Education: Pt educated on importance of OOB mobility, prevention of complications of bedrest, and general safety during hospitalization. Pt verbalized understanding    AM-PAC Score      AM-[facility] Inpatient Daily Activity Raw Score: 14 (02/23/23 1339)  AM-PAC Inpatient ADL T-Scale Score : 33.39 (02/23/23 1339)  ADL Inpatient CMS 0-100% Score: 59.67 (02/23/23 1339)  ADL Inpatient CMS G-Code Modifier : CK (02/23/23 1339)  Goals  Short Term Goals  Time Frame for Short Term Goals: 1 week (3/2) unless noted  Short Term Goal 1: Perform functional transfers with CGA and RW  Short Term Goal 2: Perform bathroom mobility with CGA and RW  Short Term Goal 3: Perform 2-3 UE ADL tasks while seated with SBA by 2/28  Short Term Goal 4: Perform UE exer 15x -20x each for endurance  Patient Goals   Patient goals :  \"Be able to walk\"     Therapy Time   Individual Concurrent Group Co-treatment   Time In 2296         Time Out 1333         Minutes 36         Timed Code Treatment Minutes: 26 Minutes (10 min eval)   If pt is discharged prior to next OT session, this note will serve as the discharge summary.   Jada Pick OT

## 2023-02-23 NOTE — PROGRESS NOTES
Shift assessment completed. Pt A&O x4, VSS. Pt reporting mild nausea, PRN oral Zofran given per MAR. Non skid socks on. Pt requesting to get up to the chair, waiting on PT evaluation. Bed alarm active for safety. Bed locked and in lowest position. Call light and bedside table within reach. Will continue to monitor.

## 2023-02-23 NOTE — H&P
Hospital Medicine History & Physical      PCP: Goldy Calles DO    Date of Admission: 2/22/2023    Chief Complaint:  Fatigue       History Of Present Illness:      65 y.o. male who presents to the emergency department today with complaints of fatigue. Patient just went home yesterday, the has not made a 24 hours at home, wife states that when he got home he just could not walk or care for himself. Wife did mention that she thought that the patient should be on hospice. Patient was recently admitted to the hospital for an ELEONORA and thrombocytopenia as well as his generalized weakness. He is here for further evaluation.     Past Medical History:          Diagnosis Date    Anxiety     Arthritis     Bladder cancer ([address]     DVT (deep venous thrombosis) ([facility] 07/01/2014    two spontaneous DVTs left leg    Elevated fasting glucose     Hyperlipidemia     Hypertension     Malignant neoplasm of urinary bladder ([address] 02/04/2019    Multiple myeloma (HCC)     MVP (mitral valve prolapse)     Polyp of gallbladder     Prostate CA ([facility] 2005       Past Surgical History:          Procedure Laterality Date    BLADDER SURGERY      tumor removed    CATARACT REMOVAL Bilateral     CHOLECYSTECTOMY, LAPAROSCOPIC N/A 1/10/2022    VIDEO LAPAROSCOPIC CHOLECYSTECTOMY performed by Alise Valencia MD at [facility]  6/29/12    Polyps    COLONOSCOPY  07/12/2017    Polyp    COLONOSCOPY N/A 9/6/2022    COLONOSCOPY POLYPECTOMY SNARE/COLD BIOPSY performed by William Willis MD at [address] [facility] BONE  3/3/2022    CT BIOPSY PERCUTANEOUS DEEP BONE 3/3/2022 Diamond Renee MD [address] [facility] CT SCAN    CT BONE MARROW BIOPSY  2/15/2023    CT BONE MARROW BIOPSY 2/15/2023 [id number] [facility] CT SCAN    INGUINAL HERNIA REPAIR Right 06/1999    PENILE PROSTHESIS PLACEMENT      PROSTATECTOMY  2005 approx    Dr. Glo Jefferson      In [facility] - had one tonsil burned out    UPPER GASTROINTESTINAL ENDOSCOPY N/A 9/6/2022 EGD BIOPSY performed by Felipa Harp MD at [address] [facility]       Medications Prior to Admission:      Prior to Admission medications    Medication Sig Start Date End Date Taking? Authorizing Provider   gabapentin (NEURONTIN) 100 MG capsule Take 1 capsule by mouth 2 times daily for 30 days. 2/21/23 3/23/23  Christiane Skiff, MD   ondansetron (ZOFRAN-ODT) 8 MG TBDP disintegrating tablet Take 1 tablet by mouth every 8 hours as needed for Nausea or Vomiting 2/21/23   Christiane Skiff, MD   promethazine (PHENERGAN) 25 MG tablet Take 1 tablet by mouth 4 times daily as needed for Nausea  Patient not taking: Reported on 2/22/2023 2/21/23 3/7/23  Christiane Skiff, MD   busPIRone (BUSPAR) 7.5 MG tablet Take 7.5 mg by mouth 2 times daily    Historical Provider, MD   lisinopril (PRINIVIL;ZESTRIL) 20 MG tablet Take 20 mg by mouth daily  Patient not taking: Reported on 2/22/2023    Historical Provider, MD   rosuvastatin (CRESTOR) 20 MG tablet TAKE ONE TABLET BY MOUTH DAILY  Patient not taking: Reported on 2/22/2023 1/23/23   Huber Whitten DO   fenofibrate (TRICOR) 54 MG tablet Take 1 tablet by mouth daily [facility] pharmacy: Please dispense generic fenofibrate unless prescriber denote 1/23/23   Huber Whitten DO   cholestyramine Myrla Spry) 4 GM/DOSE powder Take 1 packet by mouth 3 times daily (with meals) 12/23/22   Huber Whitten DO   OLANZapine (ZYPREXA) 5 MG tablet Take 5 mg by mouth nightly    Historical Provider, MD   bortezomib 5 mg/2 mLs in sodium chloride (PF) injection Inject 1.3 mg/m2 into the skin once    Historical Provider, MD   sertraline (ZOLOFT) 50 MG tablet Take 1.5 tablets by mouth daily 11/22/22   Huber Whitten DO   LORazepam (ATIVAN) 1 MG tablet Take 1 mg by mouth 4 times daily as needed. Historical Provider, MD   dronabinol (MARINOL) 5 MG capsule in the morning and at bedtime.  10/20/22   Historical Provider, MD   oxyCODONE (ROXICODONE) 5 MG immediate release tablet Take 5 mg by mouth every 4 hours as needed for Pain. Historical Provider, MD   pantoprazole (PROTONIX) 40 MG tablet Take 1 tablet by mouth daily 10/10/22   Noe Wakefield DO       Allergies:  Atorvastatin and Lipitor    Social History:      TOBACCO:   reports that he has never smoked. He has never used smokeless tobacco.  ETOH:   reports that he does not currently use alcohol. E-cigarette/Vaping       Questions Responses    E-cigarette/Vaping Use Never User    Start Date     Passive Exposure     Quit Date     Counseling Given     Comments               Family History:     Reviewed and negative in regards to presenting illness/complaint. Problem Relation Age of Onset    Lung Cancer Mother     Heart Disease Mother     Diabetes Father     Prostate Cancer Father     Diabetes Brother        REVIEW OF SYSTEMS COMPLETED:   Pertinent positives as noted in the HPI. All other systems reviewed and negative. PHYSICAL EXAM PERFORMED:    /66   Pulse 83   Temp 98.2 °F (36.8 °C) (Oral)   Resp 18   Ht 5' 9\" (1.753 m)   Wt 180 lb (81.6 kg)   SpO2 94%   BMI 26.58 kg/m²     General appearance:  No apparent distress, appears stated age and cooperative. HEENT:  Normal cephalic, atraumatic without obvious deformity. Pupils equal, round, and reactive to light. Extra ocular muscles intact. Conjunctivae/corneas clear. Neck: Supple, with full range of motion. No jugular venous distention. Trachea midline. Respiratory:  Normal respiratory effort. Clear to auscultation, bilaterally without Rales/Wheezes/Rhonchi. Cardiovascular:  Regular rate and rhythm with normal S1/S2 without murmurs, rubs or gallops. Abdomen: Soft, non-tender, non-distended with normal bowel sounds. Musculoskeletal:  No clubbing, cyanosis or edema bilaterally. Full range of motion without deformity. Skin: Skin color, texture, turgor normal.  No rashes or lesions. Neurologic:  Neurovascularly intact without any focal sensory/motor deficits.  Cranial nerves: II-XII intact, grossly non-focal.  Psychiatric:  Alert and oriented, thought content appropriate, normal insight  Capillary Refill: Brisk,3 seconds, normal  Peripheral Pulses: +2 palpable, equal bilaterally       Labs:     Recent Labs     02/21/23  0524 02/22/23  1445 02/23/23  0520   WBC 16.0* 17.6* 9.3   HGB 7.5* 8.2* 7.2*   HCT 22.8* 24.1* 20.5*   PLT 17* 8* 40*     Recent Labs     02/21/23  0524 02/22/23  1445 02/23/23  0520    140 140   K 4.0 3.9 4.0    107 107   CO2 23 22 23   BUN 34* 33* 30*   CREATININE 1.8* 1.8* 1.8*   CALCIUM 7.9* 8.5 8.4     Recent Labs     02/22/23  1445   AST 30   ALT 17   BILITOT 0.6   ALKPHOS 66     No results for input(s): INR in the last 72 hours. No results for input(s): Hazeline Balk in the last 72 hours. Urinalysis:      Lab Results   Component Value Date/Time    NITRU Negative 02/22/2023 02:45 PM    WBCUA None seen 02/22/2023 02:45 PM    BACTERIA 1+ 02/09/2023 12:08 PM    RBCUA 3-4 02/22/2023 02:45 PM    BLOODU TRACE-LYSED 02/22/2023 02:45 PM    SPECGRAV 1.015 02/22/2023 02:45 PM    GLUCOSEU Negative 02/22/2023 02:45 PM       Radiology:     CXR: I have reviewed the CXR   EKG:  I have reviewed the EKG     XR CHEST PORTABLE   Final Result   1. New right PICC line extending the cavoatrial junction. 2. Otherwise stable appearance of the chest with no acute findings.              Consults:    IP CONSULT TO HEM/ONC  PALLIATIVE CARE EVAL  IP CONSULT TO HOSPITALIST  IP CONSULT TO HOME CARE NEEDS  IP CONSULT TO CARDIOLOGY    ASSESSMENT:    Active Hospital Problems    Diagnosis Date Noted    Thrombocytopenia ([facility] [D69.6] 02/22/2023     Priority: Medium    Plasma cell leukemia ([facility] [C90.10] 02/22/2023     Priority: Medium    Malaise and fatigue [R53.81, R53.83] 02/13/2023     Priority: Medium    History of DVT in adulthood [Z86.718] 02/22/2023    CKD (chronic kidney disease) stage 3, GFR 30-59 ml/min ([facility] [N18.30] 05/18/2015    Essential hypertension, benign [I10] 01/23/2014 - Fatigue   -MM with progression to plasma cell leukemia  -Thrombocytopenia  -Anemia  -ELEONORA on CKD  DVT  - stopped eliquis due to severe persistent thrombocytopenia    PLAN:    Admit to medsurg unit   Monitor CBC   Oncology consult   PT OT consult   Monitor GFR, avoid nephrotoxic agent   Continue home meds    DVT Prophylaxis:   Diet: ADULT DIET; Regular  ADULT ORAL NUTRITION SUPPLEMENT; Breakfast, Dinner; Standard High Calorie/High Protein Oral Supplement  Code Status: Full Code    PT/OT Eval Status:     Dispo - Pending clinical improvement        Olman Savage MD    Thank you Reji Haskins DO for the opportunity to be involved in this patient's care. If you have any questions or concerns please feel free to contact me at [phone].

## 2023-02-23 NOTE — CONSULTS
Consult placed    Who:brenda  Date:2/22/2023,  Time:10:09 PM        Electronically signed by Baldomero Ivan on 2/22/2023 at 10:09 PM

## 2023-02-23 NOTE — CONSULTS
Cardiac Electrophysiology Consult Note      Physician requesting consult: Vanessa Maxwell MD   Reason for Consult: atrial fibrillation   Admission Date: 2/22/2023  Room/Bed:  0339/0339-01    Assessment:     1. Atrial fibrillation: patient has first documented episode of atrial fibrillation. Associated symptoms: None     History of cardioversion: No prior history of cardioversion  History of AF ablation: No history of atrial fibrillation ablation in the past  History of heart surgery/procedure: no    Current use of anti-arrhythmic drugs: Not currently on anti-arrhythmic drugs  Previous use of anti-arrhythmic drugs: Not previously on any anti-arrhythmic drugs    Overall LV function: Normal left ventricular systolic function (3411)  Size of left atrium: Echocardiogram results pending  Significant cardiac valvular disease: No significant valve disease  Family history of atrial fibrillation: Unknown    Alcohol consumption: No alcohol consumption  Caffeine consumption: No/minimal caffeine intake  Smoking status: non-smoker  Obstructive sleep apnea: No/low suspicion  Exercise status: Does not exercise regularly (sedentary lifestyle)    I had a detailed discussion with the patient about issues related to atrial fibrillation (including etiology, disease progression patterns, stroke risk and rate control issues). Patient had his questions answered to his satisfaction. Patient has a [id number] score of 3 [Age over 75 (2 points) and Hypertension (1 point)]  Longterm anticoagulation is: recommended  Current anticoagulation: None  Bleeding issues reported: no (has thrombocytopenia)  Renal function: Abnormal  CKD  Thyroid function: Normal (2021)    Patient with first documented episode of atrial fibrillation (mostly with controlled ventricular rates) between approximately 1AM and 7AM today. No prior history of atrial fibrillation as per patient and family. He had no symptoms as he was sleeping at the time.      No prior known personal history of heart disease. Given lack of very rapid rates during atrial fibrillation and lack of symptoms, would not pursue any treatment with beta-blocker or calcium channel blocker type medications at this time. Can just monitor on telemetry and re-assess need for treatment if further episodes of atrial fibrillation arise. Patient was until recently on apixaban given history of DVT. It was stopped due to marked thrombocytopenia. Now, with additional anticoagulation indication for atrial fibrillation, would need team and patient discussion regarding risks and benefits of re-starting anticoagulation. Follow up echocardiogram done today for overall cardiac structure and function (previously normal in 2018). 2. Hypertension: good overall blood pressure control    3. Malignancy: undergoing chemotherapy. Management as per primary team and oncology. Plan:     1. Follow up echocardiogram  2. Continuous telemetry monitoring  3. Discuss with primary team indication for restarting anticoagulation     Subjective:     History of Present Illness:    Patient is a 65 y.o. male with past medical history significant for hypertension, dyslipidemia, CKD. Also has multiple myeloma and bladder cancer. Undergoing chemotherapy protocol. Also being managed for thrombocytopenia. He was re-admitted to the hospital yesterday with generalized weakness (one day after being discharged). Patient has been having generalized weakness and poor appetite. However, he denies any cardiovascular symptoms. Denies any chest pain, shortness of breath, dyspnea on exertion, marked dizziness or syncope. Does not feel any palpitations. Denies any recent bleeding issues  Intermittent leg edema    Family and social history reviewed.      Problem List:  Patient Active Problem List   Diagnosis    GERD (gastroesophageal reflux disease)    MANISH (generalized anxiety disorder)    Essential hypertension, benign    CKD (chronic kidney disease) stage 3, GFR 30-59 ml/min (HCC)    Recurrent deep vein thrombosis (DVT) of right lower extremity (HCC)    Mixed hyperlipidemia    Ni's esophagus without dysplasia    Primary insomnia    Multiple myeloma not having achieved remission ([address]    Current moderate episode of major depressive disorder without prior episode ([address]    Cancer, metastatic to bone ([address]    Malaise and fatigue    Thrombocytopenia ([address]    Plasma cell leukemia ([address]    History of DVT in adulthood       History:  Past Medical History:   Diagnosis Date    Anxiety     Arthritis     Bladder cancer ([address]     DVT (deep venous thrombosis) ([address] 07/01/2014    two spontaneous DVTs left leg    Elevated fasting glucose     Hyperlipidemia     Hypertension     Malignant neoplasm of urinary bladder ([address] 02/04/2019    Multiple myeloma (HCC)     MVP (mitral valve prolapse)     Polyp of gallbladder     Prostate CA ([address] 2005     Past Surgical History:   Procedure Laterality Date    BLADDER SURGERY      tumor removed    CATARACT REMOVAL Bilateral     CHOLECYSTECTOMY, LAPAROSCOPIC N/A 1/10/2022    VIDEO LAPAROSCOPIC CHOLECYSTECTOMY performed by Rigo Khan MD at [facility]  6/29/12    Polyps    COLONOSCOPY  07/12/2017    Polyp    COLONOSCOPY N/A 9/6/2022    COLONOSCOPY POLYPECTOMY SNARE/COLD BIOPSY performed by Gem Freedman MD at [address] BONE  3/3/2022    CT BIOPSY PERCUTANEOUS DEEP BONE 3/3/2022 [place]am Deng [place] [id number] SCAN    CT BONE MARROW BIOPSY  2/15/2023    CT BONE MARROW BIOPSY 2/15/2023 [id number] SCAN    INGUINAL HERNIA REPAIR Right 06/1999    PENILE PROSTHESIS PLACEMENT      PROSTATECTOMY  2005 approx    Dr. Everardo Halsted      In [facility] - had one tonsil burned out    UPPER GASTROINTESTINAL ENDOSCOPY N/A 9/6/2022    EGD BIOPSY performed by Gem Freedman MD at [address]     Family History   Problem Relation Age of Onset    Lung Cancer Mother     Heart Disease Mother     Diabetes Father     Prostate Cancer Father     Diabetes Brother      Social History     Socioeconomic History    Marital status:      Spouse name: None    Number of children: 2    Years of education: None    Highest education level: None   Occupational History    Occupation:      Comment: retired   Tobacco Use    Smoking status: Never    Smokeless tobacco: Never   Vaping Use    Vaping Use: Never used   Substance and Sexual Activity    Alcohol use: Not Currently    Drug use: No    Sexual activity: Not Currently   Social History Narrative    , retired 1/2015     Social Determinants of Health     Physical Activity: Unknown    Days of Exercise per Week: 0 days   Intimate Partner Violence: Not At Risk    Fear of Current or Ex-Partner: No    Emotionally Abused: No    Physically Abused: No    Sexually Abused: No         Review of Systems:  Review of Systems   Constitutional: Positive for decreased appetite, malaise/fatigue and weight loss. Negative for weight gain. HENT:  Negative for nosebleeds and stridor. Eyes:  Negative for vision loss in left eye and vision loss in right eye. Cardiovascular:  Negative for chest pain, dyspnea on exertion, leg swelling and syncope. Respiratory:  Negative for shortness of breath and wheezing. Hematologic/Lymphatic: Negative for bleeding problem. Does not bruise/bleed easily. Skin:  Negative for itching and rash. Musculoskeletal:  Negative for joint pain and joint swelling. Gastrointestinal:  Negative for hematemesis and hematochezia. Genitourinary:  Negative for dysuria and hematuria. Neurological:  Negative for dizziness and light-headedness. Psychiatric/Behavioral:  Negative for altered mental status. The patient is not nervous/anxious.       Objective:     Vital Signs (last 24 hours):  Patient Vitals for the past 24 hrs:   BP Temp Temp src Pulse Resp SpO2 Height Weight   02/23/23 0813 110/66 98.2 °F (36.8 °C) Oral 83 18 94 % -- --   02/23/23 0430 111/73 98.2 °F (36.8 °C) Oral (!) 108 18 92 % -- --   02/23/23 0059 104/63 97.8 °F (36.6 °C) Axillary (!) 106 18 96 % -- --   02/22/23 2345 100/64 97.9 °F (36.6 °C) Axillary 73 20 93 % -- --   02/22/23 2312 110/64 97.8 °F (36.6 °C) Axillary 74 18 93 % -- --   02/22/23 2300 111/66 97.8 °F (36.6 °C) Axillary 74 18 92 % -- --   02/22/23 2246 116/70 97.7 °F (36.5 °C) Oral 78 20 92 % -- --   02/22/23 1908 128/67 98.2 °F (36.8 °C) Oral 88 20 94 % -- --   02/22/23 1837 136/66 98.1 °F (36.7 °C) Oral 81 20 93 % -- --   02/22/23 1803 120/61 98.1 °F (36.7 °C) Oral 80 18 94 % -- --   02/22/23 1744 123/64 98.1 °F (36.7 °C) Oral 79 16 94 % -- --   02/22/23 1602 120/60 -- -- 80 18 93 % -- --   02/22/23 1442 131/66 98 °F (36.7 °C) Oral 84 20 94 % 5' 9\" (1.753 m) 180 lb (81.6 kg)         Physical Examination:     Physical Exam  Constitutional:       Appearance: He is ill-appearing. HENT:      Head: Normocephalic and atraumatic. Nose: Nose normal. No rhinorrhea. Eyes:      General: No scleral icterus. Conjunctiva/sclera: Conjunctivae normal.   Cardiovascular:      Rate and Rhythm: Normal rate and regular rhythm. Pulmonary:      Effort: Pulmonary effort is normal.      Breath sounds: Normal breath sounds. Abdominal:      General: There is no distension. Musculoskeletal:         General: Normal range of motion. Cervical back: Normal range of motion and neck supple. Skin:     General: Skin is warm and dry. Neurological:      General: No focal deficit present. Mental Status: He is alert and oriented to person, place, and time.    Psychiatric:         Mood and Affect: Mood normal.         Behavior: Behavior normal.       02/21 1901 - 02/23 0700  In: 1865.8 [P.O.:500]  Out: 1370 [Urine:1370]    Medications:    Current Facility-Administered Medications   Medication Dose Route Frequency Provider Last Rate Last Admin    0.9 % sodium chloride infusion   IntraVENous PRN Jazmine Rodrigues APRN - CNP        OLANZapine (ZYPREXA) tablet 5 mg  5 mg Oral Nightly Vidhya Hatfield MD   5 mg at 02/22/23 2052    oxyCODONE (ROXICODONE) immediate release tablet 5 mg  5 mg Oral Q6H PRN Vidhya Hatfield MD        pantoprazole (PROTONIX) tablet 40 mg  40 mg Oral Daily Vidhya Hatfield MD   40 mg at 02/23/23 7412    sertraline (ZOLOFT) tablet 75 mg  75 mg Oral Daily Vidhya Hatfield MD   75 mg at 02/23/23 1052    sodium chloride flush 0.9 % injection 5-40 mL  5-40 mL IntraVENous 2 times per day Vidhya Hatfield MD   10 mL at 02/23/23 0839    sodium chloride flush 0.9 % injection 5-40 mL  5-40 mL IntraVENous PRN Vidhya Hatfield MD        0.9 % sodium chloride infusion   IntraVENous PRN Vidhya Hatfield MD        ondansetron (ZOFRAN-ODT) disintegrating tablet 4 mg  4 mg Oral Q8H PRN Vidhya Hatfield MD   4 mg at 02/23/23 9107    Or    ondansetron (ZOFRAN) injection 4 mg  4 mg IntraVENous Q6H PRN Vidhya Hatfield MD        polyethylene glycol (GLYCOLAX) packet 17 g  17 g Oral Daily PRN Vidhya Hatfield MD        acetaminophen (TYLENOL) tablet 650 mg  650 mg Oral Q6H PRN Vidhya Hatfield MD        Or    acetaminophen (TYLENOL) suppository 650 mg  650 mg Rectal Q6H PRN Vidhya Hatfield MD        LORazepam (ATIVAN) tablet 1 mg  1 mg Oral Q6H PRN Vidhya Hatfield MD   1 mg at 02/23/23 8156    busPIRone (BUSPAR) tablet 10 mg  10 mg Oral BID Vidhya Hatfield MD   10 mg at 02/23/23 0838    0.9 % sodium chloride infusion   IntraVENous PRN Vidhya Hatfield MD        0.9 % sodium chloride infusion   IntraVENous PRN Vidhya Hatfield MD        prochlorperazine (COMPAZINE) injection 10 mg  10 mg IntraVENous Q6H PRN Vidhya Hatfield MD        gabapentin (NEURONTIN) capsule 100 mg  100 mg Oral BID Vidhya Hatfield MD   100 mg at 02/23/23 0838    [START ON 2/24/2023] scopolamine (TRANSDERM-SCOP) transdermal patch 1 patch  1 patch TransDERmal Q72H Vidhya Hatfield MD        metoclopramide (REGLAN) injection 10 mg  10 mg IntraVENous Q6H PRN Nina Chavez MD             ECG Interpretation:  (Date: 2/23/2023)  Rhythm: Atrial fibrillation  Rate: 106 BPM  PAC's / PVC's present: None  Conduction abnormalities:  no  Axis: normal      Echocardiogram: (Stress echo, [facility], 2018)    Study Conclusions     - Left ventricle: The cavity size was normal. Wall thickness was     normal. The estimated ejection fraction was in the range of 60%     to 65%. Wall motion was normal; there were no regional wall     motion abnormalities. - Stress ECG conclusions: There were no stress arrhythmias or     conduction abnormalities. The stress ECG was negative for     ischemia. Telemetry:    Currently in sinus rhythm    Lab Review:    Recent Labs     02/21/23  0524 02/22/23  1445 02/23/23  0520   WBC 16.0* 17.6* 9.3   HGB 7.5* 8.2* 7.2*   HCT 22.8* 24.1* 20.5*   MCV 97.2 98.4 96.0   PLT 17* 8* 40*       Recent Labs     02/21/23  0524 02/22/23  1445 02/23/23  0520    140 140   K 4.0 3.9 4.0    107 107   CO2 23 22 23   BUN 34* 33* 30*   CREATININE 1.8* 1.8* 1.8*       No results for input(s): INR, PROTIME in the last 72 hours. No results for input(s): CKMB, TROPONINI in the last 72 hours. No results for input(s): PROBNP in the last 72 hours. Imaging:    XR CHEST PORTABLE    Result Date: 2/22/2023  1. New right PICC line extending the cavoatrial junction. 2. Otherwise stable appearance of the chest with no acute findings.          Signed by: Katelynn Cole MD

## 2023-02-23 NOTE — PROGRESS NOTES
Perfect serve sent to cross cover NP Debbie Ma. \" Bryce Palma called and said that the patient conveted from normal simus to a fib. Do you want me to get a stat EKG now or order in the morning? It does not look like patient has a history of a fib. Patient did recently get chemo on the last admit which was 2/17-2-21. Not sure if this is related to the chemo. Thanks\"    Order placed for STAT EKG per cross cover NP Debbie Ma.

## 2023-02-23 NOTE — H&P
HOSPITALISTS HISTORY AND PHYSICAL    2/22/2023 7:59 PM    Patient Information:  Luis Duval is a 65 y.o. male [id number]  PCP:  Cristina Morrissey DO (Tel: [phone] )    Chief complaint:    Chief Complaint   Patient presents with    Fatigue     Patient was admitted 2/13/2023 discharged yesterday. Returns to the ED today with generalized weakness and lack of energy         History of Present Illness:  Spike Sinha is a 65 y.o. male who presented to the ED less than 24 hours from hospital discharge where he was admitted for thrombocytopenia and ELEONORA on CKD. 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 During his recent hospital stay oncology performed bone marrow biopsy and initiated chemo therapy. He reports that he has been so weak since returning home that he has been unable to ambulate to the toilet and feed himself. Upon arrival to the ED EKG was obtained and notable for NSR without evidence of ischemia. CXR confirmed PICC line presence but was without evidence of acute cardiopulmonary disease. Labs were obtained and notable for severe thrombocytopenia of 8, as well as anemia with H/H 8.2/24. 1. Patient will be readmitted to the hospital with consultation to palliative care and case management to assist with placement. He will receive platelet transfusion overnight.       History obtained from patient and review of Epic chart     REVIEW OF SYSTEMS:   Constitutional: Negative for fever,chill; severe generalized weakness  ENT: Negative for headache, positive dyspnea without wheezing, and cough  Cardiovascular: Negative for chest pain, palpitations, peripheral edema, orthopnea or PND  Gastrointestinal: Negative for N/V/D and abdominal pain; no hematemesis, hematochezia, or melena; positive anorexia  Genitourinary: Negative for dysuria, frequency, retention; no incontinence  Hematologic/Lymphatic: Positive gingival bleeding with toothbrushing   Musculoskeletal: Negative for myalgias and arthalgias; able to ambulate without difficulty  Neurologic: Somnolent and lethargic without seizure activity, paresthesias, dysarthria, vertigo, and gait disturbance  Skin: Negative for itching,rash, decubitus  Psychiatric: Appears very depressed regarding new leukemia diagnosis  Endocrine: Negative for polyuria/polydipsia/polyphagia; no heat/cold intolerance    Past Medical History:   has a past medical history of Anxiety, Arthritis, Bladder cancer (HCC), DVT (deep venous thrombosis) (HCC), Elevated fasting glucose, Hyperlipidemia, Hypertension, Malignant neoplasm of urinary bladder (HCC), Multiple myeloma (HCC), MVP (mitral valve prolapse), Polyp of gallbladder, and Prostate CA (HCC).     Past Surgical History:   has a past surgical history that includes Tonsillectomy; Colonoscopy (6/29/12); Colonoscopy (07/12/2017); Bladder surgery; Cataract removal (Bilateral); Prostatectomy (2005 approx); Inguinal hernia repair (Right, 06/1999); Penile prosthesis placement; Cholecystectomy, laparoscopic (N/A, 1/10/2022); CT BIOPSY DEEP BONE PERCUTANEOUS (3/3/2022); Colonoscopy (N/A, 9/6/2022); Upper gastrointestinal endoscopy (N/A, 9/6/2022); and CT BIOPSY BONE MARROW (2/15/2023).     Medications:  No current facility-administered medications on file prior to encounter.     Current Outpatient Medications on File Prior to Encounter   Medication Sig Dispense Refill    gabapentin (NEURONTIN) 100 MG capsule Take 1 capsule by mouth 2 times daily for 30 days. 60 capsule 0    ondansetron (ZOFRAN-ODT) 8 MG TBDP disintegrating tablet Take 1 tablet by mouth every 8 hours as needed for Nausea or Vomiting 60 tablet 0    promethazine (PHENERGAN) 25 MG tablet Take 1 tablet by mouth 4 times daily as needed for Nausea 40 tablet 0    busPIRone (BUSPAR) 7.5 MG tablet Take 7.5 mg by mouth 2 times daily    lisinopril (PRINIVIL;ZESTRIL) 20 MG tablet Take 20 mg by mouth daily      rosuvastatin (CRESTOR) 20 MG tablet TAKE ONE TABLET BY MOUTH DAILY 90 tablet 1    fenofibrate (TRICOR) 54 MG tablet Take 1 tablet by mouth daily s [facility]: Please dispense generic fenofibrate unless prescriber denote 90 tablet 1    cholestyramine (QUESTRAN) 4 GM/DOSE powder Take 1 packet by mouth 3 times daily (with meals) 378 g 5    OLANZapine (ZYPREXA) 5 MG tablet Take 5 mg by mouth nightly      bortezomib 5 mg/2 mLs in sodium chloride (PF) injection Inject 1.3 mg/m2 into the skin once      sertraline (ZOLOFT) 50 MG tablet Take 1.5 tablets by mouth daily 135 tablet 1    LORazepam (ATIVAN) 1 MG tablet Take 1 mg by mouth 4 times daily as needed. dronabinol (MARINOL) 5 MG capsule in the morning and at bedtime. oxyCODONE (ROXICODONE) 5 MG immediate release tablet Take 5 mg by mouth every 4 hours as needed for Pain.      pantoprazole (PROTONIX) 40 MG tablet Take 1 tablet by mouth daily 30 tablet 5       Allergies: Allergies   Allergen Reactions    Atorvastatin     Lipitor Rash        Social History:   reports that he has never smoked. He has never used smokeless tobacco. He reports that he does not currently use alcohol. He reports that he does not use drugs. Family History:  family history includes Diabetes in his brother and father; Heart Disease in his mother; Edenilson Fleming in his mother; Prostate Cancer in his father.      Physical Exam:  /67   Pulse 88   Temp 98.2 °F (36.8 °C) (Oral)   Resp 20   Ht 5' 9\" (1.753 m)   Wt 180 lb (81.6 kg)   SpO2 94%   BMI 26.58 kg/m²     General appearance: Pleasant elderly male resting in bed, appears chronically ill and fatigued  Eyes: Sclera clear without conjunctival injection; PERRLA; EOMI  ENT: Mucous membranes moist without thrush; normal dentition with mild gingival oozing  Neck: Supple without meningismus; no goiter; no carotid bruit bilaterally  Cardiovascular: Regular rhythm without ectopy; normal S1-S2 with no murmurs; 2+ BLE pitting edema JVD  Respiratory: No tachypnea; CTAB with adequate air exchange, no wheeze, rhonchi or rales; I:E intact  Gastrointestinal: Abdomen soft, non-tender, not distended; bowel sounds normal; no masses/organomegaly appreciated  Musculoskeletal: FROM spine and extremities x4; no gross deformity  Neurology: A&O x3; cranial nerves 2-12 grossly intact; motor 5/5  BUE/BLE; no seizure activity; finger-to-nose/heel-to-shin intact; no pronator drift  Psychiatry: Well-groomed with good eye contact; appropriate affect; no visual/auditory hallucination  Skin: Warm, dry, normal turgor, no rash  PV: 2/4 radial and dorsalis pedis bilaterally; brisk capillary refill    Labs:  CBC:   Lab Results   Component Value Date/Time    WBC 17.6 02/22/2023 02:45 PM    RBC 2.45 02/22/2023 02:45 PM    RBC 5.45 12/09/2015 08:46 AM    HGB 8.2 02/22/2023 02:45 PM    HCT 24.1 02/22/2023 02:45 PM    MCV 98.4 02/22/2023 02:45 PM    MCH 33.5 02/22/2023 02:45 PM    MCHC 34.0 02/22/2023 02:45 PM    RDW 15.8 02/22/2023 02:45 PM    PLT 8 02/22/2023 02:45 PM    MPV 8.1 02/22/2023 02:45 PM     BMP:    Lab Results   Component Value Date/Time     02/22/2023 02:45 PM    K 3.9 02/22/2023 02:45 PM     02/22/2023 02:45 PM    CO2 22 02/22/2023 02:45 PM    BUN 33 02/22/2023 02:45 PM    CREATININE 1.8 02/22/2023 02:45 PM    CALCIUM 8.5 02/22/2023 02:45 PM    GFRAA 55 08/01/2022 10:04 AM    GFRAA 60 07/20/2012 11:10 AM    LABGLOM 38 02/22/2023 02:45 PM    GLUCOSE 99 02/22/2023 02:45 PM    GLUCOSE 108 12/09/2015 08:46 AM     XR CHEST PORTABLE   Final Result   1. New right PICC line extending the cavoatrial junction. 2. Otherwise stable appearance of the chest with no acute findings. EKG:     Ventricular Rate 82 P BPM QTc Calculation (Bazett) 455 P ms   Atrial Rate 82 P BPM P Axis 18 P degrees   P-R Interval 132 P ms R Axis 6 P degrees   QRS Duration 96 P ms T Axis 25 P degrees   Q-T Interval 390 P ms Diagnosis Normal sinus rhythmNormal      I visualized CXR images and EKG strips personally and agree with documented interpretation    Discussed case  with ED provider    Problem List:  Principal Problem: Thrombocytopenia (HCC)  Active Problems:    Plasma cell leukemia (HCC)    Malaise and fatigue    History of DVT in adulthood    Essential hypertension, benign    CKD (chronic kidney disease) stage 3, GFR 30-59 ml/min (HCC)  Resolved Problems:    * No resolved hospital problems. *        Consults:  IP CONSULT TO HEM/ONC  PALLIATIVE CARE EVAL  IP CONSULT TO HOSPITALIST  IP CONSULT TO HOME CARE NEEDS      Assessment/Plan:     MM with progression to plasma cell leukemia  - hem/onc consulted  - bone marrow biopsy confirms progression  - completed chemo induction  - continue pain control, antiemetics  - close follow up with hem/onc  -Palliative care consult placed  -Consult placed to case management to assist with home care versus placement     Thrombocytopenia  - s/p 4u PLTS mostly for procedures  -We will trend serial H&H to ensure no occult bleeding  -Plan to transfuse for hemoglobin < 7.5     ELEONORA on CKD  - unclear etiology  - nephrology consulted during last STAT  - improved with IVF though baseline CKD has likely worsened     DVT  - stopped eliquis due to severe persistent thrombocytopenia     HLD  - restarted statin    DVT prophylaxis-none due to platelet count of 8  Code status-full code  Diet-cardiac ELOISE restriction added due to BLE edema  IV access-PIV established in ED      Admit as inpatient. I anticipate hospitalization spanning more than two midnights for investigation and treatment of the above medically necessary diagnoses. Comment: Please note this report has been produced using speech recognition software and may contain errors related to that system including errors in grammar, punctuation, and spelling, as well as words and phrases that may be inappropriate.  If there are any questions or concerns please feel free to contact the dictating provider for clarification.          Jett Bonilla MD    2/22/2023 7:59 PM

## 2023-02-24 ENCOUNTER — APPOINTMENT (OUTPATIENT)
Dept: VASCULAR LAB | Age: 78
End: 2023-02-24
Payer: MEDICARE

## 2023-02-24 LAB
A/G RATIO: 2.6 (ref 1.1–2.2)
ALBUMIN SERPL-MCNC: 3.7 G/DL (ref 3.4–5)
ALP BLD-CCNC: 69 U/L (ref 40–129)
ALT SERPL-CCNC: 14 U/L (ref 10–40)
ANION GAP SERPL CALCULATED.3IONS-SCNC: 10 MMOL/L (ref 3–16)
ANISOCYTOSIS: ABNORMAL
APTT: 27.9 SEC (ref 23–34.3)
AST SERPL-CCNC: 18 U/L (ref 15–37)
ATYPICAL LYMPHOCYTE RELATIVE PERCENT: 46 % (ref 0–6)
BANDED NEUTROPHILS RELATIVE PERCENT: 6 % (ref 0–7)
BASOPHILS ABSOLUTE: 0 K/UL (ref 0–0.2)
BASOPHILS RELATIVE PERCENT: 0 %
BILIRUB SERPL-MCNC: 0.5 MG/DL (ref 0–1)
BLOOD BANK DISPENSE STATUS: NORMAL
BLOOD BANK PRODUCT CODE: NORMAL
BPU ID: NORMAL
BUN BLDV-MCNC: 29 MG/DL (ref 7–20)
CALCIUM SERPL-MCNC: 8.3 MG/DL (ref 8.3–10.6)
CHLORIDE BLD-SCNC: 107 MMOL/L (ref 99–110)
CO2: 24 MMOL/L (ref 21–32)
CREAT SERPL-MCNC: 1.7 MG/DL (ref 0.8–1.3)
DESCRIPTION BLOOD BANK: NORMAL
EOSINOPHILS ABSOLUTE: 0.1 K/UL (ref 0–0.6)
EOSINOPHILS RELATIVE PERCENT: 1 %
GFR SERPL CREATININE-BSD FRML MDRD: 41 ML/MIN/{1.73_M2}
GLUCOSE BLD-MCNC: 140 MG/DL (ref 70–99)
HCT VFR BLD CALC: 21.5 % (ref 40.5–52.5)
HEMATOLOGY PATH CONSULT: NO
HEMOGLOBIN: 7 G/DL (ref 13.5–17.5)
INR BLD: 1.1 (ref 0.87–1.14)
LYMPHOCYTES ABSOLUTE: 7.6 K/UL (ref 1–5.1)
LYMPHOCYTES RELATIVE PERCENT: 37 %
MACROCYTES: ABNORMAL
MCH RBC QN AUTO: 32 PG (ref 26–34)
MCHC RBC AUTO-ENTMCNC: 32.7 G/DL (ref 31–36)
MCV RBC AUTO: 97.9 FL (ref 80–100)
MICROCYTES: ABNORMAL
MONOCYTES ABSOLUTE: 0.4 K/UL (ref 0–1.3)
MONOCYTES RELATIVE PERCENT: 4 %
NEUTROPHILS ABSOLUTE: 1.1 K/UL (ref 1.7–7.7)
NEUTROPHILS RELATIVE PERCENT: 6 %
OVALOCYTES: ABNORMAL
PDW BLD-RTO: 15.6 % (ref 12.4–15.4)
PLATELET # BLD: 26 K/UL (ref 135–450)
PMV BLD AUTO: 7.3 FL (ref 5–10.5)
POIKILOCYTES: ABNORMAL
POTASSIUM REFLEX MAGNESIUM: 3.9 MMOL/L (ref 3.5–5.1)
PROTHROMBIN TIME: 14.2 SEC (ref 11.7–14.5)
RBC # BLD: 2.19 M/UL (ref 4.2–5.9)
SCHISTOCYTES: ABNORMAL
SODIUM BLD-SCNC: 141 MMOL/L (ref 136–145)
TOTAL PROTEIN: 5.1 G/DL (ref 6.4–8.2)
TOXIC GRANULATION: PRESENT
WBC # BLD: 9.2 K/UL (ref 4–11)

## 2023-02-24 PROCEDURE — 6370000000 HC RX 637 (ALT 250 FOR IP): Performed by: HOSPITALIST

## 2023-02-24 PROCEDURE — 99232 SBSQ HOSP IP/OBS MODERATE 35: CPT | Performed by: NURSE PRACTITIONER

## 2023-02-24 PROCEDURE — 93971 EXTREMITY STUDY: CPT

## 2023-02-24 PROCEDURE — 6370000000 HC RX 637 (ALT 250 FOR IP): Performed by: INTERNAL MEDICINE

## 2023-02-24 PROCEDURE — 6360000002 HC RX W HCPCS: Performed by: HOSPITALIST

## 2023-02-24 PROCEDURE — 2500000003 HC RX 250 WO HCPCS: Performed by: INTERNAL MEDICINE

## 2023-02-24 PROCEDURE — 85610 PROTHROMBIN TIME: CPT

## 2023-02-24 PROCEDURE — 85730 THROMBOPLASTIN TIME PARTIAL: CPT

## 2023-02-24 PROCEDURE — 2580000003 HC RX 258: Performed by: HOSPITALIST

## 2023-02-24 PROCEDURE — 36430 TRANSFUSION BLD/BLD COMPNT: CPT

## 2023-02-24 PROCEDURE — 80053 COMPREHEN METABOLIC PANEL: CPT

## 2023-02-24 PROCEDURE — 1200000000 HC SEMI PRIVATE

## 2023-02-24 PROCEDURE — 85025 COMPLETE CBC W/AUTO DIFF WBC: CPT

## 2023-02-24 RX ORDER — HEPARIN SODIUM 1000 [USP'U]/ML
3300 INJECTION, SOLUTION INTRAVENOUS; SUBCUTANEOUS PRN
Status: DISCONTINUED | OUTPATIENT
Start: 2023-02-24 | End: 2023-03-02 | Stop reason: HOSPADM

## 2023-02-24 RX ORDER — HEPARIN SODIUM 1000 [USP'U]/ML
60 INJECTION, SOLUTION INTRAVENOUS; SUBCUTANEOUS ONCE
Status: DISCONTINUED | OUTPATIENT
Start: 2023-02-24 | End: 2023-02-24

## 2023-02-24 RX ORDER — HEPARIN SODIUM 10000 [USP'U]/100ML
5-30 INJECTION, SOLUTION INTRAVENOUS CONTINUOUS
Status: DISCONTINUED | OUTPATIENT
Start: 2023-02-24 | End: 2023-02-24 | Stop reason: DRUGHIGH

## 2023-02-24 RX ORDER — HEPARIN SODIUM 1000 [USP'U]/ML
80 INJECTION, SOLUTION INTRAVENOUS; SUBCUTANEOUS ONCE
Status: DISCONTINUED | OUTPATIENT
Start: 2023-02-24 | End: 2023-02-24

## 2023-02-24 RX ORDER — HEPARIN SODIUM 1000 [USP'U]/ML
30 INJECTION, SOLUTION INTRAVENOUS; SUBCUTANEOUS PRN
Status: DISCONTINUED | OUTPATIENT
Start: 2023-02-24 | End: 2023-02-24 | Stop reason: DRUGHIGH

## 2023-02-24 RX ORDER — HEPARIN SODIUM 1000 [USP'U]/ML
6500 INJECTION, SOLUTION INTRAVENOUS; SUBCUTANEOUS PRN
Status: DISCONTINUED | OUTPATIENT
Start: 2023-02-24 | End: 2023-03-02 | Stop reason: HOSPADM

## 2023-02-24 RX ORDER — HEPARIN SODIUM 10000 [USP'U]/100ML
1390 INJECTION, SOLUTION INTRAVENOUS CONTINUOUS
Status: DISCONTINUED | OUTPATIENT
Start: 2023-02-24 | End: 2023-03-02 | Stop reason: HOSPADM

## 2023-02-24 RX ORDER — HEPARIN SODIUM 1000 [USP'U]/ML
60 INJECTION, SOLUTION INTRAVENOUS; SUBCUTANEOUS PRN
Status: DISCONTINUED | OUTPATIENT
Start: 2023-02-24 | End: 2023-02-24

## 2023-02-24 RX ORDER — SODIUM CHLORIDE 9 MG/ML
INJECTION, SOLUTION INTRAVENOUS PRN
Status: DISCONTINUED | OUTPATIENT
Start: 2023-02-24 | End: 2023-03-02 | Stop reason: HOSPADM

## 2023-02-24 RX ADMIN — SERTRALINE 75 MG: 50 TABLET, FILM COATED ORAL at 09:12

## 2023-02-24 RX ADMIN — CHOLESTYRAMINE 4 G: 4 POWDER, FOR SUSPENSION ORAL at 16:05

## 2023-02-24 RX ADMIN — Medication 10 ML: at 20:45

## 2023-02-24 RX ADMIN — OLANZAPINE 5 MG: 5 TABLET, FILM COATED ORAL at 20:44

## 2023-02-24 RX ADMIN — BUSPIRONE HYDROCHLORIDE 10 MG: 5 TABLET ORAL at 20:44

## 2023-02-24 RX ADMIN — OXYCODONE 5 MG: 5 TABLET ORAL at 22:08

## 2023-02-24 RX ADMIN — Medication 10 ML: at 09:12

## 2023-02-24 RX ADMIN — GABAPENTIN 100 MG: 100 CAPSULE ORAL at 20:44

## 2023-02-24 RX ADMIN — LORAZEPAM 1 MG: 1 TABLET ORAL at 16:06

## 2023-02-24 RX ADMIN — CHOLESTYRAMINE 4 G: 4 POWDER, FOR SUSPENSION ORAL at 11:53

## 2023-02-24 RX ADMIN — GABAPENTIN 100 MG: 100 CAPSULE ORAL at 09:12

## 2023-02-24 RX ADMIN — BUSPIRONE HYDROCHLORIDE 10 MG: 5 TABLET ORAL at 09:12

## 2023-02-24 RX ADMIN — CHOLESTYRAMINE 4 G: 4 POWDER, FOR SUSPENSION ORAL at 09:15

## 2023-02-24 RX ADMIN — HEPARIN SODIUM 1470 UNITS/HR: 10000 INJECTION, SOLUTION INTRAVENOUS at 19:59

## 2023-02-24 RX ADMIN — ONDANSETRON 4 MG: 2 INJECTION INTRAMUSCULAR; INTRAVENOUS at 16:05

## 2023-02-24 RX ADMIN — PANTOPRAZOLE SODIUM 40 MG: 40 TABLET, DELAYED RELEASE ORAL at 09:12

## 2023-02-24 ASSESSMENT — PAIN SCALES - GENERAL: PAINLEVEL_OUTOF10: 8

## 2023-02-24 ASSESSMENT — PAIN DESCRIPTION - LOCATION: LOCATION: BUTTOCKS

## 2023-02-24 ASSESSMENT — PAIN DESCRIPTION - DESCRIPTORS: DESCRIPTORS: ACHING

## 2023-02-24 ASSESSMENT — PAIN DESCRIPTION - ORIENTATION: ORIENTATION: MID

## 2023-02-24 NOTE — FLOWSHEET NOTE
02/24/23 1200   Assessment   Charting Type Shift assessment   Psychosocial   Psychosocial (WDL) WDL   Patient Behaviors Flat affect   Neurological   Neuro (WDL) X   Level of Consciousness 0   Orientation Level Oriented X4   Cognition Follows commands   Speech Delayed responses   [place] Coma Scale   Eye Opening 4   Best Verbal Response 5   Best Motor Response 6   [place] Coma Scale Score 15   HEENT (Head, Ears, Eyes, Nose, & Throat)   HEENT (WDL) X   Right Eye Glasses; Impaired vision   Left Eye Glasses; Impaired vision   Incentive Spirometry Tx   Treatment Effort Subsequent; Needs encouragement   Respiratory   Respiratory (WDL) WDL   Cardiac   Cardiac (WDL) WDL   Cardiac Regularity Regular   Heart Sounds S1, S2   Cardiac Rhythm Sinus billy;Sinus rhythm   Cardiac Monitor   Cardiac/Telemetry Monitor On Portable telemetry pack applied   Alarm Audible Yes   Alarms Set Yes   Gastrointestinal   Abdominal (WDL) X   RUQ Bowel Sounds Active   LUQ Bowel Sounds Active   RLQ Bowel Sounds Active   LLQ Bowel Sounds Active   Abdomen Inspection Soft;Rounded   GI Symptoms Reduction in appetite;Nausea   Tenderness Soft; No guarding;Nontender   Care Plan - Gastrointestinal Goals   Minimal or absence of nausea and vomiting Administer IV fluids as ordered to ensure adequate hydration;Administer ordered antiemetic medications as needed;Provide nonpharmacologic comfort measures as appropriate; Advance diet as tolerated, if ordered;Nutrition consult to assist patient with adequate nutrition and appropriate food choices   Maintains or returns to baseline bowel function Assess bowel function;Encourage oral fluids to ensure adequate hydration;Administer IV fluids as ordered to ensure adequate hydration;Administer ordered medications as needed   Maintains adequate nutritional intake Monitor percentage of each meal consumed; Identify factors contributing to decreased intake, treat as appropriate;Assist with meals as needed;Monitor intake and output, weight and lab values; Obtain nutritional consult as needed   Genitourinary   Genitourinary (WDL) X  (lombardo)   Suprapubic Tenderness No   Dysuria (Pain/Burning w/Urination) LY   Difficulty Urinating/Starting Stream LY   Urine Assessment   Urinary Status Lombardo   Urine Color Yellow/straw   Urine Appearance Clear   Urine Odor No odor   Peripheral Vascular   Peripheral Vascular (WDL) X   Edema Right lower extremity; Left lower extremity   RUE Edema Non-pitting   RLE Edema +1   LLE Edema +1   Skin Integumentary    Skin Integumentary (WDL) X   Skin Color Pale   Skin Condition/Temp Dry;Warm;Fragile   Skin Integrity Ecchymosis   Location scattered   Skin Integrity Site 2   Skin Integrity Location 2 Wound (see LDA)   Location 2 Left upper buttock   Preventative Dressing Yes   Assessed this shift Yes   Dressing Site   (coccyx)   Date Applied 02/24/23   Skin Integrity Site 3   Skin Integrity Location 3 Redness  (blanches)    Location 3 bilateral heels   Preventative Dressing No   Assessed this shift Yes   Musculoskeletal   Musculoskeletal (WDL) X  (generalized weakness)   RUE Weakness   LUE Weakness   RL Extremity Weakness   LL Extremity Weakness

## 2023-02-24 NOTE — PROGRESS NOTES
Vascular called with critical results. Deep vein thrombosis involving the right brachial vein. Acute partially occluded superficial venous thrombosis involving the right basilic vein. Isolated acute totally occluded superficial venous thrombosis involving the right cephalic vein at the antecubital fossa. Notified Dr. Clinton Epps and primary RN, Angy Breaux.

## 2023-02-24 NOTE — CONSULTS
[facility] Wound Ostomy Continence Nurse  Consult Note       NAME:  Spike Sinha  MEDICAL RECORD NUMBER:  [id number]  AGE: 65 y.o. GENDER: male  : 1945  TODAY'S DATE:  2023    Subjective; Patient confused. Kept asking about colostomy. Patient does not nor getting one. Reason for WOCN Evaluation and Assessment: ck buttocks      Spike Sinha is a 65 y.o. male referred by:   [] Physician  [x] Nursing  [] Other:     Wound Identification:L24. A9 Irritant contact dermatitis due friction or contact with other specified body fluids. Wound Type: pressure, movement  Contributing Factors: decreased mobility and shear force    Wound History: 65 y.o. male who presents to the emergency department today with complaints of fatigue. Patient just went home yesterday, the has not made a 24 hours at home, wife states that when he got home he just could not walk or care for himself. Wife did mention that she thought that the patient should be on hospice. Patient was recently admitted to the hospital for an ELEONORA and thrombocytopenia as well as his generalized weakness.   Current Wound Care Treatment:  zinc sacral heart    Patient Goal of Care:  [x] Wound Healing  [] Odor Control  [] Palliative Care  [x] Pain Control   [] Other:         PAST MEDICAL HISTORY        Diagnosis Date    Anxiety     Arthritis     Bladder cancer ([address]     DVT (deep venous thrombosis) ([facility] [address] 2014    two spontaneous DVTs left leg    Elevated fasting glucose     Hyperlipidemia     Hypertension     Malignant neoplasm of urinary bladder ([address] 2019    Multiple myeloma (HCC)     MVP (mitral valve prolapse)     Polyp of gallbladder     Prostate CA ([address]        PAST SURGICAL HISTORY    Past Surgical History:   Procedure Laterality Date    BLADDER SURGERY      tumor removed    CATARACT REMOVAL Bilateral     CHOLECYSTECTOMY, LAPAROSCOPIC N/A 1/10/2022    VIDEO LAPAROSCOPIC CHOLECYSTECTOMY performed by Charissa Tolliver MD at [facility] COLONOSCOPY  6/29/12    Polyps    COLONOSCOPY  07/12/2017    Polyp    COLONOSCOPY N/A 9/6/2022    COLONOSCOPY POLYPECTOMY SNARE/COLD BIOPSY performed by Manju Archibald MD at [address] BONE  3/3/2022    CT BIOPSY PERCUTANEOUS DEEP BONE 3/3/2022 Otoniel Smith MD [facility] CT SCAN    CT BONE MARROW BIOPSY  2/15/2023    CT BONE MARROW BIOPSY 2/15/2023 [facility] CT SCAN    INGUINAL HERNIA REPAIR Right 06/1999    PENILE PROSTHESIS PLACEMENT      PROSTATECTOMY  2005 approx    Dr. Ford Dexter      In [facility] - had one tonsil burned out    UPPER GASTROINTESTINAL ENDOSCOPY N/A 9/6/2022    EGD BIOPSY performed by Manju Archibald MD at [address] [place] [address]    Family History   Problem Relation Age of Onset    Lung Cancer Mother     Heart Disease Mother     Diabetes Father     Prostate Cancer Father     Diabetes Brother        SOCIAL HISTORY    Social History     Tobacco Use    Smoking status: Never    Smokeless tobacco: Never   Vaping Use    Vaping Use: Never used   Substance Use Topics    Alcohol use: Not Currently    Drug use: No       ALLERGIES    Allergies   Allergen Reactions    Atorvastatin     Lipitor Rash       MEDICATIONS    No current facility-administered medications on file prior to encounter. Current Outpatient Medications on File Prior to Encounter   Medication Sig Dispense Refill    gabapentin (NEURONTIN) 100 MG capsule Take 1 capsule by mouth 2 times daily for 30 days.  60 capsule 0    ondansetron (ZOFRAN-ODT) 8 MG TBDP disintegrating tablet Take 1 tablet by mouth every 8 hours as needed for Nausea or Vomiting 60 tablet 0    promethazine (PHENERGAN) 25 MG tablet Take 1 tablet by mouth 4 times daily as needed for Nausea (Patient not taking: Reported on 2/22/2023) 40 tablet 0    busPIRone (BUSPAR) 7.5 MG tablet Take 7.5 mg by mouth 2 times daily      lisinopril (PRINIVIL;ZESTRIL) 20 MG tablet Take 20 mg by mouth daily (Patient not taking: Reported on 2/22/2023)      rosuvastatin (CRESTOR) 20 MG tablet TAKE ONE TABLET BY MOUTH DAILY (Patient not taking: Reported on 2/22/2023) 90 tablet 1    fenofibrate (TRICOR) 54 MG tablet Take 1 tablet by mouth daily [facility] pharmacy: Please dispense generic fenofibrate unless prescriber denote 90 tablet 1    cholestyramine (QUESTRAN) 4 GM/DOSE powder Take 1 packet by mouth 3 times daily (with meals) 378 g 5    OLANZapine (ZYPREXA) 5 MG tablet Take 5 mg by mouth nightly      bortezomib 5 mg/2 mLs in sodium chloride (PF) injection Inject 1.3 mg/m2 into the skin once      sertraline (ZOLOFT) 50 MG tablet Take 1.5 tablets by mouth daily 135 tablet 1    LORazepam (ATIVAN) 1 MG tablet Take 1 mg by mouth 4 times daily as needed. dronabinol (MARINOL) 5 MG capsule in the morning and at bedtime. oxyCODONE (ROXICODONE) 5 MG immediate release tablet Take 5 mg by mouth every 4 hours as needed for Pain.      pantoprazole (PROTONIX) 40 MG tablet Take 1 tablet by mouth daily 30 tablet 5       Objective; lying in bed    /68   Pulse 82   Temp 98.3 °F (36.8 °C) (Oral)   Resp 18   Ht 5' 9\" (1.753 m)   Wt 180 lb (81.6 kg)   SpO2 95%   BMI 26.58 kg/m²     LABS:  WBC:    Lab Results   Component Value Date/Time    WBC 9.2 02/24/2023 12:00 PM     H/H:    Lab Results   Component Value Date/Time    HGB 7.0 02/24/2023 12:00 PM    HCT 21.5 02/24/2023 12:00 PM     PTT:  No results found for: APTT, PTT[APTT}  PT/INR:    Lab Results   Component Value Date/Time    PROTIME 14.3 02/14/2023 07:22 AM    INR 1.12 02/14/2023 07:22 AM     HgBA1c:    Lab Results   Component Value Date/Time    LABA1C 5.7 03/18/2020 02:09 PM       Assessment; bi lateral buttocks red, blanchable, rough, with left buttock small discoloration.    Brant Risk Score: Brant Scale Score: 17    Patient Active Problem List   Diagnosis    GERD (gastroesophageal reflux disease)    MANISH (generalized anxiety disorder)    Essential hypertension, benign    CKD (chronic kidney disease) stage 3, GFR 30-59 ml/min (HCC)    Recurrent deep vein thrombosis (DVT) of right lower extremity (HCC)    Mixed hyperlipidemia    Ni's esophagus without dysplasia    Primary insomnia    Multiple myeloma not having achieved remission ([facility] [address]    Current moderate episode of major depressive disorder without prior episode ([facility] [address]    Cancer, metastatic to bone ([facility] [address]    Malaise and fatigue    Thrombocytopenia ([facility] [address]    Plasma cell leukemia ([facility] [address]    History of DVT in adulthood       Measurements:  Wound 02/24/23 Buttocks Left dusky purplish discoloration to left buttocks, bi lateral dry rough (Active)   Wound Image   02/24/23 1707   Wound Etiology Other 02/24/23 1707   Wound Cleansed Not Cleansed 02/24/23 1707   Dressing/Treatment Zinc paste 02/24/23 1707   Distance Tunneling (cm) 0 cm 02/24/23 1707   Tunneling Position ___ O'Clock 0 02/24/23 1707   Undermining Starts ___ O'Clock 0 02/24/23 1707   Undermining Ends___ O'Clock 0 02/24/23 1707   Undermining Maxium Distance (cm) 0 02/24/23 1707   Wound Assessment Dry;Pink/red 02/24/23 1707   Drainage Amount None 02/24/23 1707   Odor None 02/24/23 1707   Ashley-wound Assessment Blanchable erythema;Dry/flaky 02/24/23 1707   Margins Undefined edges 02/24/23 1707   Wound Thickness Description not for Pressure Injury Partial thickness 02/24/23 1707   Number of days: 0       Incision Abdomen Medial;Upper (Active)   Number of days:          Response to treatment:  Well tolerated by patient. Pain Assessment:  Severity:  0 / 10  Quality of pain: N/A  Wound Pain Timing/Severity: none  Premedicated: No    Plan   Plan of Care: Wound 02/24/23 Buttocks Left dusky purplish discoloration to left buttocks, bi lateral dry rough-Dressing/Treatment: Zinc paste    Recommend;  BID and PRN with ashley care cleanse with bath wipes or normal saline, pat dry. Apply Zinc to bi lateral buttocks. Monitor left buttock discoloration and notify Wound Care if enlarges or opens.      Specialty Bed Required : Yes   [x] Low Air Loss   [x] Pressure Redistribution  [] Fluid Immersion  [] Bariatric  [] Total Pressure Relief  [] Other:     Current Diet: ADULT DIET;  Regular  Dietician consult:  Yes    Discharge Plan:  Placement for patient upon discharge: unknown at this time    Patient appropriate for Outpatient [address]: No    Referrals:  [x]  / discharge planner  following  [] [address] [facility]  [] Supplies  [] Other    Patient/Caregiver Teaching:  Level of patient/caregiver understanding able to:   [] Indicates understanding       [] Needs reinforcement  [] Unsuccessful      [] Verbal Understanding  [] Demonstrated understanding       [] No evidence of learning  [] Refused teaching         [x] N/A       Electronically signed by Ja Rodgers RN, BSN on 2/24/2023 at 5:10 PM

## 2023-02-24 NOTE — PLAN OF CARE
Problem: ABCDS Injury Assessment  Goal: Absence of physical injury  Outcome: Progressing  Flowsheets (Taken 2/24/2023 1726)  Absence of Physical Injury: Implement safety measures based on patient assessment     Problem: Safety - Adult  Goal: Free from fall injury  Outcome: Progressing  Flowsheets (Taken 2/24/2023 1726)  Free From Fall Injury: Instruct family/caregiver on patient safety     Problem: Skin/Tissue Integrity  Goal: Absence of new skin breakdown  Description: 1.   Monitor for areas of redness and/or skin breakdown  Outcome: Progressing     Problem: Nutrition Deficit:  Goal: Optimize nutritional status  Outcome: Progressing  Flowsheets (Taken 2/24/2023 1726)  Nutrient intake appropriate for improving, restoring, or maintaining nutritional needs:   Assess nutritional status and recommend course of action   Monitor oral intake, labs, and treatment plans   Recommend appropriate diets, oral nutritional supplements, and vitamin/mineral supplements   Order, calculate, and assess calorie counts as needed   Recommend, monitor, and adjust tube feedings and TPN/PPN based on assessed needs   Provide specific nutrition education to patient or family as appropriate

## 2023-02-24 NOTE — PROGRESS NOTES
Hospitalist Progress Note      PCP: Charity Loja DO    Date of Admission: 2/22/2023    Chief Complaint:  Fatigue         History Of Present Illness:       65 y.o. male who presents to the emergency department today with complaints of fatigue. Patient just went home yesterday, the has not made a 24 hours at home, wife states that when he got home he just could not walk or care for himself. Wife did mention that she thought that the patient should be on hospice. Patient was recently admitted to the hospital for an ELEONORA and thrombocytopenia as well as his generalized weakness. He is here for further evaluation. Subjective:     Pt c/o fatigue. Denies dyspnea or chest pain   Vitals stable. Hgb 7. Platelet 26. Medications:  Reviewed    Infusion Medications    sodium chloride      sodium chloride      sodium chloride      sodium chloride       Scheduled Medications    cholestyramine  4 g Oral TID WC    OLANZapine  5 mg Oral Nightly    pantoprazole  40 mg Oral Daily    sertraline  75 mg Oral Daily    sodium chloride flush  5-40 mL IntraVENous 2 times per day    busPIRone  10 mg Oral BID    gabapentin  100 mg Oral BID    scopolamine  1 patch TransDERmal Q72H     PRN Meds: sodium chloride, oxyCODONE, sodium chloride flush, sodium chloride, ondansetron **OR** ondansetron, polyethylene glycol, acetaminophen **OR** acetaminophen, LORazepam, sodium chloride, sodium chloride, prochlorperazine, metoclopramide      Intake/Output Summary (Last 24 hours) at 2/24/2023 1335  Last data filed at 2/24/2023 7557  Gross per 24 hour   Intake 360 ml   Output 1700 ml   Net -1340 ml       Physical Exam Performed:    /74   Pulse 87   Temp 97.8 °F (36.6 °C) (Axillary)   Resp 18   Ht 5' 9\" (1.753 m)   Wt 180 lb (81.6 kg)   SpO2 97%   BMI 26.58 kg/m²     General appearance: No apparent distress, appears stated age and cooperative. HEENT: Pupils equal, round, and reactive to light.  Conjunctivae/corneas clear.  Neck: Supple, with full range of motion. No jugular venous distention. Trachea midline. Respiratory:  Normal respiratory effort. Clear to auscultation, bilaterally without Rales/Wheezes/Rhonchi. Cardiovascular: Regular rate and rhythm with normal S1/S2 without murmurs, rubs or gallops. Abdomen: Soft, non-tender, non-distended with normal bowel sounds. Musculoskeletal: No clubbing, cyanosis or edema bilaterally. Full range of motion without deformity. Skin: Skin color, texture, turgor normal.  No rashes or lesions. Neurologic:  Neurovascularly intact without any focal sensory/motor deficits. Cranial nerves: II-XII intact, grossly non-focal.  Psychiatric: Alert and oriented, thought content appropriate, normal insight  Capillary Refill: Brisk, 3 seconds, normal   Peripheral Pulses: +2 palpable, equal bilaterally       Labs:   Recent Labs     02/22/23  1445 02/23/23  0520 02/24/23  1200   WBC 17.6* 9.3 9.2   HGB 8.2* 7.2* 7.0*   HCT 24.1* 20.5* 21.5*   PLT 8* 40* 26*     Recent Labs     02/22/23  1445 02/23/23  0520 02/24/23  1200    140 141   K 3.9 4.0 3.9    107 107   CO2 22 23 24   BUN 33* 30* 29*   CREATININE 1.8* 1.8* 1.7*   CALCIUM 8.5 8.4 8.3     Recent Labs     02/22/23  1445 02/24/23  1200   AST 30 18   ALT 17 14   BILITOT 0.6 0.5   ALKPHOS 66 69     No results for input(s): INR in the last 72 hours. No results for input(s): Gabriel Net in the last 72 hours. Urinalysis:      Lab Results   Component Value Date/Time    NITRU Negative 02/22/2023 02:45 PM    WBCUA None seen 02/22/2023 02:45 PM    BACTERIA 1+ 02/09/2023 12:08 PM    RBCUA 3-4 02/22/2023 02:45 PM    BLOODU TRACE-LYSED 02/22/2023 02:45 PM    SPECGRAV 1.015 02/22/2023 02:45 PM    GLUCOSEU Negative 02/22/2023 02:45 PM       Radiology:  XR CHEST PORTABLE   Final Result   1. New right PICC line extending the cavoatrial junction. 2. Otherwise stable appearance of the chest with no acute findings.              IP CONSULT TO HEM/ONC  PALLIATIVE CARE EVAL  IP CONSULT TO HOSPITALIST  IP CONSULT TO HOME CARE NEEDS  IP CONSULT TO CARDIOLOGY  IP CONSULT TO CASE MANAGEMENT    Assessment/Plan:    Active Hospital Problems    Diagnosis     Thrombocytopenia ([facility] [address] [D69.6]      Priority: Medium    Plasma cell leukemia (HCC) [C90.10]      Priority: Medium    Malaise and fatigue [R53.81, R53.83]      Priority: Medium    History of DVT in adulthood [Z86.718]     CKD (chronic kidney disease) stage 3, GFR 30-59 ml/min ([facility]) [N18.30]     Essential hypertension, benign [I10]      - Fatigue   -MM with progression to plasma cell leukemia  -Thrombocytopenia  -Anemia  -ELEONORA on CKD  DVT  - stopped eliquis due to severe persistent thrombocytopenia     PLAN:     Monitor CBC   Oncology consult   PT OT consult   Monitor GFR, avoid nephrotoxic agent   Continue home meds     DVT Prophylaxis:   Diet: ADULT DIET;  Regular  ADULT ORAL NUTRITION SUPPLEMENT; Breakfast, Dinner; Standard High Calorie/High Protein Oral Supplement  Code Status: Full Code     PT/OT Eval Status:      Dispo - Pending clinical improvement [id number]      Tyler Drake MD

## 2023-02-24 NOTE — PROGRESS NOTES
4 Eyes Skin Assessment     The patient is being assess for  Low rossi    I agree that 2 RN's have performed a thorough Head to Toe Skin Assessment on the patient. ALL assessment sites listed below have been assessed. Areas assessed by both nurses: Laura Paez RN and Gloria Carmona RN  [x]   Head, Face, and Ears   [x]   Shoulders, Back, and Chest  [x]   Arms, Elbows, and Hands   [x]   Coccyx, Sacrum, and Ischum  [x]   Legs, Feet, and Heels    Dark purple area, Non blanching L upper buttock. Skin looks fragile but not open. Bilateral heels red and peewee. Does the Patient have Skin Breakdown?   Yes a wound was noted on the Admission Assessment and an WOUND LDA was Initiated documentation include the Heather-wound, Wound Assessment, Measurements, Dressing Treatment, Drainage, and Color\",         Rossi Prevention initiated:  Yes   Wound Care Orders initiated:  No-Mepi applied      WO nurse consulted for Pressure Injury (Stage 3,4, Unstageable, DTI, NWPT, and Complex wounds):  Yes      Nurse 1 eSignature: Electronically signed by Ajay Biswas RN on 2/24/23 at 3:22 PM EST    **SHARE this note so that the co-signing nurse is able to place an eSignature**    Nurse 2 eSignature: Electronically signed by Seema Arita RN on 2/24/23 at 4:54 PM EST

## 2023-02-24 NOTE — PLAN OF CARE
Problem: ABCDS Injury Assessment  Goal: Absence of physical injury  2/24/2023 0039 by Sheyla De Paz RN  Outcome: Progressing  2/23/2023 1051 by Jennifer Foley RN  Outcome: Progressing  Flowsheets (Taken 2/23/2023 1051)  Absence of Physical Injury: Implement safety measures based on patient assessment     Problem: Safety - Adult  Goal: Free from fall injury  2/24/2023 0039 by Sheyla De Paz RN  Outcome: Progressing  2/23/2023 1051 by Jennifer Foley RN  Outcome: Progressing  Flowsheets (Taken 2/23/2023 1051)  Free From Fall Injury: Instruct family/caregiver on patient safety     Problem: Skin/Tissue Integrity  Goal: Absence of new skin breakdown  Description: 1. Monitor for areas of redness and/or skin breakdown  2. Assess vascular access sites hourly  3. Every 4-6 hours minimum:  Change oxygen saturation probe site  4. Every 4-6 hours:  If on nasal continuous positive airway pressure, respiratory therapy assess nares and determine need for appliance change or resting period.   Outcome: Progressing     Problem: Nutrition Deficit:  Goal: Optimize nutritional status  Outcome: Progressing

## 2023-02-24 NOTE — PROGRESS NOTES
F/C removed as ordered. Tolerated well. Wound care consult order placed today d/t non blanching reddish purple area L upper buttock. Mepilex in place. Turning and repositioning q2 hr.     C/O nausea and anxiety; requested zofran and ativan. IV zofran and oral ativan given. R upper arm with PICC in place. Swollen from upper arm down the underside distal to elbow. No redness or pain. Elevated onto 2 pillows. Updated Dr. Allyn Mayorga via perfect serve. 1649: New order for stat doppler US RUE. Called Vascular, spoke with Winter; she states she will be up to complete US.

## 2023-02-24 NOTE — ACP (ADVANCE CARE PLANNING)
Advance Care Planning     General Advance Care Planning (ACP) Conversation    Date of Conversation: 2/22/2023  Conducted with: Patient with Decision Making Capacity    Healthcare Decision Maker:    Primary Decision Maker: Liana Hopson - Spouse - [phone]  Click here to complete Healthcare Decision Makers including selection of the Healthcare Decision Maker Relationship (ie \"Primary\"). Today we documented Decision Maker(s) consistent with Legal Next of Kin hierarchy. Content/Action Overview: Has ACP document(s) NOT on file - requested patient to provide  Reviewed DNR/DNI and patient elects DNR order - completed portable DNR form & placed order  treatment goals, benefit/burden of treatment options, ventilation preferences, hospitalization preferences, resuscitation preferences, and hospice care    Wife confirms patient declared himself a DNR/DNI already and the chart needs to reflect that.   As patient is still unsure about continuing to pursue further cancer treatment, code status will be changed to Limited x 4 Nos    Length of Voluntary ACP Conversation in minutes:  <16 minutes (Non-Billable)    Myranda Thomas, KATIE - CNP

## 2023-02-24 NOTE — CARE COORDINATION
Chart reviewed day 2. Care managed per cardiology, EP and IM. Hem onc with consult order 2/22, has not seen as of yet, not on treatment team. Spoke with Jahaira Ace RN to see if she can coordinate. Family would like input from them prior to any [address] discussion. Therapy with recs for SNF. Patient was rapid readmission when d/c'd with [facility]. Will continue to follow.  Gerardo Blair RN

## 2023-02-24 NOTE — CONSULTS
[facility]    Consult Note      Requesting Physician:  Dr. Letty Duckworth:  Plasma cell leukemia      HISTORY OF PRESENT ILLNESS:    Mr. Elda Dallas  is a 65 y.o. male we are seeing in consultation for     ICD-10-CM    1. Generalized weakness  R53.1       2. Thrombocytopenia ([address]  D69.6       3. Urinary retention  R33.9          This patient is cared for by my partner, Dr. Fernando Damian. He was recently diagnosed with plasma cell leukemia. He was given hyperfractionated Cyclophosphamide between 2/17 - 2/19/2023. He was readmitted to [facility] on 2/22/2023 with weakness to the point that he cannot event ambulate to the bathroom. He is afebrile and one room air.     Past Medical History:  Past Medical History:   Diagnosis Date    Anxiety     Arthritis     Bladder cancer ([address]     DVT (deep venous thrombosis) ([address] 07/01/2014    two spontaneous DVTs left leg    Elevated fasting glucose     Hyperlipidemia     Hypertension     Malignant neoplasm of urinary bladder ([address] 02/04/2019    Multiple myeloma (HCC)     MVP (mitral valve prolapse)     Polyp of gallbladder     Prostate CA ([address] 2005       Past Surgical History:  Past Surgical History:   Procedure Laterality Date    BLADDER SURGERY      tumor removed    CATARACT REMOVAL Bilateral     CHOLECYSTECTOMY, LAPAROSCOPIC N/A 1/10/2022    VIDEO LAPAROSCOPIC CHOLECYSTECTOMY performed by Alise Valencia MD at [facility]  6/29/12    Polyps    COLONOSCOPY  07/12/2017    Polyp    COLONOSCOPY N/A 9/6/2022    COLONOSCOPY POLYPECTOMY SNARE/COLD BIOPSY performed by William Willis MD at [address] [facility] BONE  3/3/2022    CT BIOPSY PERCUTANEOUS DEEP BONE 3/3/2022 Diamond Renee MD [facility] CT SCAN    CT BONE MARROW BIOPSY  2/15/2023    CT BONE MARROW BIOPSY 2/15/2023 [facility] CT SCAN    INGUINAL HERNIA REPAIR Right 06/1999    PENILE PROSTHESIS PLACEMENT      PROSTATECTOMY  2005 approx    Dr. David Blevins - had one tonsil burned out    UPPER GASTROINTESTINAL ENDOSCOPY N/A 9/6/2022    EGD BIOPSY performed by Wilma Jiang MD at [address]       Current Medications:  Current Facility-Administered Medications   Medication Dose Route Frequency Provider Last Rate Last Admin    cholestyramine (QUESTRAN) packet 4 g  4 g Oral TID  Cuong Gabriel, DO   4 g at 02/24/23 1153    0.9 % sodium chloride infusion   IntraVENous PRN KATIE Guerrero - CNP        OLANZapine (ZYPREXA) tablet 5 mg  5 mg Oral Nightly Breanna Roca MD   5 mg at 02/23/23 2031    oxyCODONE (ROXICODONE) immediate release tablet 5 mg  5 mg Oral Q6H PRN Breanna Roca MD        pantoprazole (PROTONIX) tablet 40 mg  40 mg Oral Daily Breanna Roca MD   40 mg at 02/24/23 0912    sertraline (ZOLOFT) tablet 75 mg  75 mg Oral Daily Breanna Roca MD   75 mg at 02/24/23 0912    sodium chloride flush 0.9 % injection 5-40 mL  5-40 mL IntraVENous 2 times per day Breanna Roca MD   10 mL at 02/24/23 0912    sodium chloride flush 0.9 % injection 5-40 mL  5-40 mL IntraVENous PRN Breanna Roca MD        0.9 % sodium chloride infusion   IntraVENous PRN Breanna Roca MD        ondansetron (ZOFRAN-ODT) disintegrating tablet 4 mg  4 mg Oral Q8H PRN Breanna Roca MD   4 mg at 02/23/23 4087    Or    ondansetron (ZOFRAN) injection 4 mg  4 mg IntraVENous Q6H PRN Breanna Roca MD   4 mg at 02/23/23 1328    polyethylene glycol (GLYCOLAX) packet 17 g  17 g Oral Daily PRN Breanna Roca MD        acetaminophen (TYLENOL) tablet 650 mg  650 mg Oral Q6H PRN Breanna Roca MD        Or    acetaminophen (TYLENOL) suppository 650 mg  650 mg Rectal Q6H PRN Breanna Roca MD        LORazepam (ATIVAN) tablet 1 mg  1 mg Oral Q6H PRN Breanna Roca MD   1 mg at 02/23/23 1525    busPIRone (BUSPAR) tablet 10 mg  10 mg Oral BID Ana JENNINGS Trice Espinosa MD   10 mg at 02/24/23 0912    0.9 % sodium chloride infusion   IntraVENous PRN Mercedes Craven MD        0.9 % sodium chloride infusion   IntraVENous PRN Mercedes Craven MD        prochlorperazine (COMPAZINE) injection 10 mg  10 mg IntraVENous Q6H PRN Mercedes Craven MD   10 mg at 02/23/23 1613    gabapentin (NEURONTIN) capsule 100 mg  100 mg Oral BID Mercedes Craven MD   100 mg at 02/24/23 0912    scopolamine (TRANSDERM-SCOP) transdermal patch 1 patch  1 patch TransDERmal Q72H Mercedes Craven MD   1 patch at 02/24/23 0915    metoclopramide (REGLAN) injection 10 mg  10 mg IntraVENous Q6H PRN Mercedes Craven MD           Allergies:   Allergies   Allergen Reactions    Atorvastatin     Lipitor Rash       Social History:  Social History     Socioeconomic History    Marital status:      Spouse name: Not on file    Number of children: 2    Years of education: Not on file    Highest education level: Not on file   Occupational History    Occupation:      Comment: retired   Tobacco Use    Smoking status: Never    Smokeless tobacco: Never   Vaping Use    Vaping Use: Never used   Substance and Sexual Activity    Alcohol use: Not Currently    Drug use: No    Sexual activity: Not Currently   Other Topics Concern    Not on file   Social History Narrative    , retired 1/2015     Social Determinants of Health     Financial Resource Strain: Not on file   Food Insecurity: Not on file   Transportation Needs: Not on file   Physical Activity: Unknown    Days of Exercise per Week: 0 days    Minutes of Exercise per Session: Not on file   Stress: Not on file   Social Connections: Not on file   Intimate Partner Violence: Not At Risk    Fear of Current or Ex-Partner: No    Emotionally Abused: No    Physically Abused: No    Sexually Abused: No   Housing Stability: Not on file          Family History:  Family History   Problem Relation Age of Onset    Lung Cancer Mother Heart Disease Mother     Diabetes Father     Prostate Cancer Father     Diabetes Brother        REVIEW OF SYSTEMS:      Constitutional: Denies fever, sweats, weight loss  Eyes: No visual changes or diplopia. No scleral icterus. Ent: No headaches, hearing loss or vertigo. No mouth sores or sore throat. Cardiovascular: No chest pain, dyspnea on exertion, palpitations or loss of consciousness. Respiratory: No cough or wheezing, no sputum production. No hemoptysis. Gastrointestinal: No abdominal pain, appetite loss, blood in stools. No change in bowel habits. Genitourinary: No dysuria, trouble voiding, or hematuria. Musculoskeletal: No generalized weakness. No joint complaints. Integumentary: No rash or pruritus. Neurological: No headache, diplopia. No change in gait, balance, or coordination. No paresthesias. Endocrine: No temperature intolerance. No excessive thirst, fluid intake, urination. Hematologic/lymphatic: No abnormal bruising or ecchymosis, blood clots or swollen lymph nodes. Allergic/immunologic: No nasal congestion or hives.         PHYSICAL EXAM:      Vitals:  Patient Vitals for the past 24 hrs:   BP Temp Temp src Pulse Resp SpO2   02/24/23 0912 128/73 98.2 °F (36.8 °C) Oral 78 18 95 %   02/23/23 2315 110/71 -- -- 83 18 95 %   02/23/23 2015 116/79 98 °F (36.7 °C) Oral 91 18 93 %   02/23/23 1436 101/66 98.4 °F (36.9 °C) Oral 100 18 95 %   02/23/23 1211 120/68 -- -- 88 -- 92 %       Date 02/24/23 0000 - 02/24/23 2359   Shift 2109-1223 7864-9642 8597-2985 24 Hour Total   INTAKE   P.O.(mL/kg/hr) 240(0.4)   240   Shift Total(mL/kg) 240(2.9)   240(2.9)   OUTPUT   Urine(mL/kg/hr) 850(1.3)   850   Shift Total(mL/kg) 850(10.4)   850(10.4)   Weight (kg) 81.6 81.6 81.6 81.6       CONSTITUTIONAL: awake, alert, cooperative, no apparent distress   EYES: pupils equal, round and reactive to light, sclera clear and conjunctiva normal  ENT: Normocephalic, without obvious abnormality, atraumatic  NECK: supple, symmetrical, no jugular venous distension and no carotid bruits   HEMATOLOGIC/LYMPHATIC: no cervical, supraclavicular or axillary lymphadenopathy   LUNGS: no increased work of breathing and clear to auscultation   CARDIOVASCULAR: regular rate and rhythm, normal S1 and S2, no murmur noted  ABDOMEN: normal bowel sounds x 4, soft, non-distended, non-tender, no masses palpated, no hepatosplenomegaly   MUSCULOSKELETAL: full range of motion noted, tone is normal  NEUROLOGIC: awake, alert, oriented to name, place and time. Motor skills grossly intact. SKIN: Normal skin color, texture, turgor and no jaundice. appears intact   EXTREMITIES: no LE edema       DATA:    PT/INR:    Recent Labs     02/22/23  1445 02/14/23  0722 02/13/23  1150   PROT 5.5*  --  6.7   INR  --  1.12  --      PTT:  No results for input(s): APTT in the last 720 hours. CMP:    Recent Labs     02/23/23  0520 02/22/23  1445    140   K 4.0 3.9    107   CO2 23 22   BUN 30* 33*   PROT  --  5.5*     Mg:    Recent Labs     02/13/23  0948   MG 2.20       Lab Results   Component Value Date    CALCIUM 8.4 02/23/2023    PHOS 2.2 (L) 02/17/2023       CBC:    Recent Labs     02/23/23  0520 02/22/23  1445 02/21/23  0524 02/20/23  0601 02/19/23  0509   WBC 9.3 17.6* 16.0* 13.9* 17.3*   NEUTROABS 1.1* 2.3 4.5 3.9 6.4   LYMPHOPCT 10.0 13.0 29.0 36.0 28.0   RBC 2.13* 2.45* 2.35* 2.36* 2.45*   HGB 7.2* 8.2* 7.5* 7.5* 8.0*   HCT 20.5* 24.1* 22.8* 23.0* 23.9*   MCV 96.0 98.4 97.2 97.4 97.7   MCH 33.8 33.5 32.1 32.0 32.6   MCHC 35.2 34.0 33.1 32.8 33.3   RDW 15.4 15.8* 15.7* 15.5* 15.5*   PLT 40* 8* 17* 21* 36*        LDH:No results for input(s): LDH in the last 720 hours. Radiology Review:  XR CHEST PORTABLE  Narrative: EXAMINATION:  ONE XRAY VIEW OF THE CHEST    2/22/2023 12:29 pm    COMPARISON:  02/13/2023.     HISTORY:  ORDERING SYSTEM PROVIDED HISTORY: pain or SOB  TECHNOLOGIST PROVIDED HISTORY:  Reason for exam:->pain or SOB  Reason for Exam: fatigue    FINDINGS:  A new right PICC extends to the cavoatrial junction. The there is no  confluent airspace consolidation or effusion. The cardiomediastinal  silhouette and pulmonary vessels appear normal.  Impression: 1. New right PICC line extending the cavoatrial junction. 2. Otherwise stable appearance of the chest with no acute findings. Problem List  Patient Active Problem List   Diagnosis    GERD (gastroesophageal reflux disease)    MANISH (generalized anxiety disorder)    Essential hypertension, benign    CKD (chronic kidney disease) stage 3, GFR 30-59 ml/min (HCC)    Recurrent deep vein thrombosis (DVT) of right lower extremity (HCC)    Mixed hyperlipidemia    Ni's esophagus without dysplasia    Primary insomnia    Multiple myeloma not having achieved remission ([address]    Current moderate episode of major depressive disorder without prior episode ([address]    Cancer, metastatic to bone ([address]    Malaise and fatigue    Thrombocytopenia ([address]    Plasma cell leukemia ([address]    History of DVT in adulthood       IMPRESSION/RECOMMENDATIONS:    Plasma cell leukemia  - being treated at [facility] outpatient for MM. Was receiving Velcade, cytoxan, and Xgeva. - Cycle 11 day 1 received on 01/11/2023. Received subsequent Velcade on time. Due for C12D1 on 02/15/2023.  - patient progressed on treatment    - 2-4% of MM cases can progress to rare variant, Plasma Cell Leukemia.  - Bone marrow biopsy performed 2/15/2023 showed plasma cell leukemia.   - Lambda 352.3 mg/dL, kappa 0.557 mg/dL, ratio 0.00.  - Discussed case with BMT partners. Hope is that hyperfractionated cyclophosphamide will debulk him more quickly. Started 02/17/23 with hyperfractionated cyclophosphamide x4 doses and pulse Dex. - Plan to pursue Carfilzomib/Jeimy/Dex as an outpatient      Chemo-induced myelosuppression  - Baseline creat 1.2-1.5 mg/dL.   - Creat 1.8 mg/dL    Chemo-induced myelosuppression  - Transfused as needed for HGB <7 g/dL or PLT <10 K/mcL. Fatigue  - Either due to hyperfractionated Cyclophosphamide or plasma cell leukemia.    - Would observe for a bit and see how he does next week. If he doesn't improve as he gets past his chari, then readdress hospice. Thank you for asking me to see the patient.        Juarez Londono MD  Please Contact Through [employer]

## 2023-02-24 NOTE — PROGRESS NOTES
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 EP consulted for paroxysmal atrial fibrillation. Has also been treated for chemo induced myelosuppression. Rhythm has been sinus. Subjective: He is drowsy. Unable to assess. Vitals:  Blood pressure 125/74, pulse 87, temperature 97.8 °F (36.6 °C), temperature source Axillary, resp. rate 18, height 5' 9\" (1.753 m), weight 180 lb (81.6 kg), SpO2 97 %.   Temp  Av.1 °F (36.7 °C)  Min: 97.8 °F (36.6 °C)  Max: 98.4 °F (36.9 °C)  Pulse  Av.8  Min: 78  Max: 100  BP  Min: 101/66  Max: 128/73  SpO2  Av %  Min: 93 %  Max: 97 %    24 hour I/O    Intake/Output Summary (Last 24 hours) at 2023 1433  Last data filed at 2023 7301  Gross per 24 hour   Intake 360 ml   Output 1700 ml   Net -1340 ml     Current Facility-Administered Medications   Medication Dose Route Frequency Provider Last Rate Last Admin    cholestyramine (QUESTRAN) packet 4 g  4 g Oral TID  Cuongcarlo Rios, DO   4 g at 23 1153    0.9 % sodium chloride infusion   IntraVENous PRN KATIE Hammond - CNP        OLANZapine (ZYPREXA) tablet 5 mg  5 mg Oral Nightly Minna Davidson MD   5 mg at 23    oxyCODONE (ROXICODONE) immediate release tablet 5 mg  5 mg Oral Q6H PRN Minna Davidson MD        pantoprazole (PROTONIX) tablet 40 mg  40 mg Oral Daily Minna Davidson MD   40 mg at 23    sertraline (ZOLOFT) tablet 75 mg  75 mg Oral Daily Minna Davidson MD   75 mg at 2312    sodium chloride flush 0.9 % injection 5-40 mL  5-40 mL IntraVENous 2 times per day Minna Davidson MD   10 mL at 23 0912    sodium chloride flush 0.9 % injection 5-40 mL  5-40 mL IntraVENous PRN Tura Ebbs, MD        0.9 % sodium chloride infusion   IntraVENous PRN Tura Ebbs, MD        ondansetron (ZOFRAN-ODT) disintegrating tablet 4 mg  4 mg Oral Q8H PRN Heather Tesfaye MD   4 mg at 02/23/23 7043    Or    ondansetron (ZOFRAN) injection 4 mg  4 mg IntraVENous Q6H PRN Heather Tesfaye MD   4 mg at 02/23/23 1328    polyethylene glycol (GLYCOLAX) packet 17 g  17 g Oral Daily PRN Heather Tesfaye MD        acetaminophen (TYLENOL) tablet 650 mg  650 mg Oral Q6H PRN Heather Tesfaye MD        Or    acetaminophen (TYLENOL) suppository 650 mg  650 mg Rectal Q6H PRN Heather Tesfaye MD        LORazepam (ATIVAN) tablet 1 mg  1 mg Oral Q6H PRN Heather Tesfaye MD   1 mg at 02/23/23 1525    busPIRone (BUSPAR) tablet 10 mg  10 mg Oral BID Heather Tesfaye MD   10 mg at 02/24/23 0912    0.9 % sodium chloride infusion   IntraVENous PRN Heather Tesfaye MD        0.9 % sodium chloride infusion   IntraVENous PRN Heather Tesfaye MD        prochlorperazine (COMPAZINE) injection 10 mg  10 mg IntraVENous Q6H PRN Heather Tesfaye MD   10 mg at 02/23/23 1613    gabapentin (NEURONTIN) capsule 100 mg  100 mg Oral BID Heather Tesfaye MD   100 mg at 02/24/23 0912    scopolamine (TRANSDERM-SCOP) transdermal patch 1 patch  1 patch TransDERmal Q72H Heather Tesfaye MD   1 patch at 02/24/23 0915    metoclopramide (REGLAN) injection 10 mg  10 mg IntraVENous Q6H PRN Heather Tesfaye MD           Objective:     Telemetry monitor: SR    Physical Exam:  Constitutional and general appearance: alert, cooperative, no distress, and appears stated age  HEENT: PERRL, no cervical lymphadenopathy. No masses palpable. Normal oral mucosa  Respiratory:  Normal excursion and expansion without use of accessory muscles  Resp auscultation: Normal breath sounds without wheezing, rhonchi, and rales  Cardiovascular:   The apical impulse is not displaced  Heart tones are crisp and normal. regular S1 and S2.  Jugular venous pulsation Normal  The carotid upstroke is normal in amplitude and contour without delay or bruit  Peripheral pulses are symmetrical and full   Abdomen:  No masses or tenderness  Bowel sounds present  Extremities:   No cyanosis or clubbing   No lower extremity edema   Skin: warm and dry  Neurological:  Alert and oriented  Moves all extremities well  No abnormalities of mood, affect, memory, mentation, or behavior are noted    Data        Echo 2/2023:   Conclusions      Summary   The left ventricle is normal in size with mild concentric hypertrophy. Left ventricular systolic function is normal with a visually estimated   ejection fraction of 55%   No obvious regional wall motion abnormalities noted. Grade I diastolic dysfunction with normal LV pressure. Normal right ventricular size and function. Mild aortic stenosis. Trace aortic valve regurgitation. All labs and testing reviewed.   Lab Review     Renal Profile:   Lab Results   Component Value Date/Time    CREATININE 1.7 02/24/2023 12:00 PM    BUN 29 02/24/2023 12:00 PM     02/24/2023 12:00 PM    K 3.9 02/24/2023 12:00 PM     02/24/2023 12:00 PM    CO2 24 02/24/2023 12:00 PM     CBC:    Lab Results   Component Value Date/Time    WBC 9.2 02/24/2023 12:00 PM    RBC 2.19 02/24/2023 12:00 PM    RBC 5.45 12/09/2015 08:46 AM    HGB 7.0 02/24/2023 12:00 PM    HCT 21.5 02/24/2023 12:00 PM    MCV 97.9 02/24/2023 12:00 PM    RDW 15.6 02/24/2023 12:00 PM    PLT 26 02/24/2023 12:00 PM     BNP:  No results found for: BNP  Fasting Lipid Panel:    Lab Results   Component Value Date/Time    CHOL 177 07/22/2020 09:10 AM    HDL 54 08/10/2021 09:59 AM    HDL 60 12/30/2011 02:24 PM    TRIG 62 12/15/2021 04:51 AM     Cardiac Enzymes:  CK/MbTroponin  Lab Results   Component Value Date/Time    TROPONINI <0.01 02/13/2023 11:50 AM     PT/ INR   Lab Results   Component Value Date/Time    INR 1.12 02/14/2023 07:22 AM    INR 1.05 03/03/2022 07:30 AM    INR 1.26 12/13/2021 09:03 AM    PROTIME 14.3 02/14/2023 07:22 AM    PROTIME 11.9 03/03/2022 07:30 AM    PROTIME 14.4 12/13/2021 09:03 AM     PTT No results found for: PTT   Lab Results   Component Value Date/Time    MG 2.20 02/13/2023 09:48 AM      Lab Results   Component Value Date/Time    TSH 1.60 07/22/2020 09:10 AM       Assessment:  Paroxysmal atrial fibrillation: stable   -[id number] score 4 (age, DVT)  Mild AS  History of DVT   -previously on DOAC  Thrombocytopenia s/p multiple transfusion   Chemo induced myelosuppression   Plasma cell leukemia: [facility] by [facility]   CKD  GERD    Plan:   Patient at significant stroke risk with newly identified AF. However, given ongoing thrombocytopenia, cannot safely resume anticoagulation at this time. Code status amended today to DNR/DNI and goals of care are still being clarified as family is considering hospice. If more aggressive treatment is pursued, would revisit anticoagulation if it is felt that benefits would outweigh risks. No george agents as rates were controlled in AF. Nothing further to add from an EP perspective. EP will sign off but remains available if needed.       Emilio Bowman, APRN-CNP  [facility]  [phone]

## 2023-02-25 LAB
A/G RATIO: 2 (ref 1.1–2.2)
ALBUMIN SERPL-MCNC: 3.6 G/DL (ref 3.4–5)
ALP BLD-CCNC: 68 U/L (ref 40–129)
ALT SERPL-CCNC: 12 U/L (ref 10–40)
ANION GAP SERPL CALCULATED.3IONS-SCNC: 10 MMOL/L (ref 3–16)
ANTI-XA UNFRAC HEPARIN: 0.38 IU/ML (ref 0.3–0.7)
ANTI-XA UNFRAC HEPARIN: 0.62 IU/ML (ref 0.3–0.7)
AST SERPL-CCNC: 17 U/L (ref 15–37)
BANDED NEUTROPHILS RELATIVE PERCENT: 4 % (ref 0–7)
BASOPHILS ABSOLUTE: 0 K/UL (ref 0–0.2)
BASOPHILS RELATIVE PERCENT: 0 %
BILIRUB SERPL-MCNC: 0.5 MG/DL (ref 0–1)
BLOOD BANK DISPENSE STATUS: NORMAL
BLOOD BANK PRODUCT CODE: NORMAL
BPU ID: NORMAL
BUN BLDV-MCNC: 26 MG/DL (ref 7–20)
CALCIUM SERPL-MCNC: 8.5 MG/DL (ref 8.3–10.6)
CHLORIDE BLD-SCNC: 109 MMOL/L (ref 99–110)
CO2: 22 MMOL/L (ref 21–32)
CREAT SERPL-MCNC: 1.8 MG/DL (ref 0.8–1.3)
DESCRIPTION BLOOD BANK: NORMAL
EOSINOPHILS ABSOLUTE: 0.3 K/UL (ref 0–0.6)
EOSINOPHILS RELATIVE PERCENT: 2 %
GFR SERPL CREATININE-BSD FRML MDRD: 38 ML/MIN/{1.73_M2}
GLUCOSE BLD-MCNC: 115 MG/DL (ref 70–99)
HCT VFR BLD CALC: 24.4 % (ref 40.5–52.5)
HEMATOLOGY PATH CONSULT: NO
HEMOGLOBIN: 8.4 G/DL (ref 13.5–17.5)
LYMPHOCYTES ABSOLUTE: 10.5 K/UL (ref 1–5.1)
LYMPHOCYTES RELATIVE PERCENT: 83 %
MCH RBC QN AUTO: 33.1 PG (ref 26–34)
MCHC RBC AUTO-ENTMCNC: 34.4 G/DL (ref 31–36)
MCV RBC AUTO: 96.2 FL (ref 80–100)
METAMYELOCYTES RELATIVE PERCENT: 1 %
MICROCYTES: ABNORMAL
MONOCYTES ABSOLUTE: 0.4 K/UL (ref 0–1.3)
MONOCYTES RELATIVE PERCENT: 3 %
MYELOCYTE PERCENT: 1 %
NEUTROPHILS ABSOLUTE: 1.5 K/UL (ref 1.7–7.7)
NEUTROPHILS RELATIVE PERCENT: 6 %
PDW BLD-RTO: 14.7 % (ref 12.4–15.4)
PLATELET # BLD: 20 K/UL (ref 135–450)
PLATELET SLIDE REVIEW: ABNORMAL
PMV BLD AUTO: 6.6 FL (ref 5–10.5)
POTASSIUM REFLEX MAGNESIUM: 4.4 MMOL/L (ref 3.5–5.1)
RBC # BLD: 2.53 M/UL (ref 4.2–5.9)
SLIDE REVIEW: ABNORMAL
SODIUM BLD-SCNC: 141 MMOL/L (ref 136–145)
TOTAL PROTEIN: 5.4 G/DL (ref 6.4–8.2)
WBC # BLD: 12.7 K/UL (ref 4–11)

## 2023-02-25 PROCEDURE — 85520 HEPARIN ASSAY: CPT

## 2023-02-25 PROCEDURE — 6370000000 HC RX 637 (ALT 250 FOR IP): Performed by: INTERNAL MEDICINE

## 2023-02-25 PROCEDURE — 2580000003 HC RX 258: Performed by: HOSPITALIST

## 2023-02-25 PROCEDURE — 36415 COLL VENOUS BLD VENIPUNCTURE: CPT

## 2023-02-25 PROCEDURE — 36430 TRANSFUSION BLD/BLD COMPNT: CPT

## 2023-02-25 PROCEDURE — 2500000003 HC RX 250 WO HCPCS: Performed by: INTERNAL MEDICINE

## 2023-02-25 PROCEDURE — 1200000000 HC SEMI PRIVATE

## 2023-02-25 PROCEDURE — 80053 COMPREHEN METABOLIC PANEL: CPT

## 2023-02-25 PROCEDURE — 6360000002 HC RX W HCPCS: Performed by: HOSPITALIST

## 2023-02-25 PROCEDURE — 85025 COMPLETE CBC W/AUTO DIFF WBC: CPT

## 2023-02-25 PROCEDURE — 6370000000 HC RX 637 (ALT 250 FOR IP): Performed by: HOSPITALIST

## 2023-02-25 RX ORDER — SODIUM CHLORIDE 9 MG/ML
INJECTION, SOLUTION INTRAVENOUS PRN
Status: DISCONTINUED | OUTPATIENT
Start: 2023-02-25 | End: 2023-03-02 | Stop reason: HOSPADM

## 2023-02-25 RX ADMIN — CHOLESTYRAMINE 4 G: 4 POWDER, FOR SUSPENSION ORAL at 16:20

## 2023-02-25 RX ADMIN — ONDANSETRON 4 MG: 4 TABLET, ORALLY DISINTEGRATING ORAL at 11:47

## 2023-02-25 RX ADMIN — CHOLESTYRAMINE 4 G: 4 POWDER, FOR SUSPENSION ORAL at 10:08

## 2023-02-25 RX ADMIN — HEPARIN SODIUM 1470 UNITS/HR: 10000 INJECTION, SOLUTION INTRAVENOUS at 12:51

## 2023-02-25 RX ADMIN — GABAPENTIN 100 MG: 100 CAPSULE ORAL at 10:09

## 2023-02-25 RX ADMIN — GABAPENTIN 100 MG: 100 CAPSULE ORAL at 19:50

## 2023-02-25 RX ADMIN — Medication 10 ML: at 19:50

## 2023-02-25 RX ADMIN — ONDANSETRON 4 MG: 2 INJECTION INTRAMUSCULAR; INTRAVENOUS at 20:33

## 2023-02-25 RX ADMIN — BUSPIRONE HYDROCHLORIDE 10 MG: 5 TABLET ORAL at 19:50

## 2023-02-25 RX ADMIN — PANTOPRAZOLE SODIUM 40 MG: 40 TABLET, DELAYED RELEASE ORAL at 06:42

## 2023-02-25 RX ADMIN — SERTRALINE 75 MG: 50 TABLET, FILM COATED ORAL at 10:09

## 2023-02-25 RX ADMIN — LORAZEPAM 1 MG: 1 TABLET ORAL at 11:47

## 2023-02-25 RX ADMIN — OLANZAPINE 5 MG: 5 TABLET, FILM COATED ORAL at 19:50

## 2023-02-25 RX ADMIN — CHOLESTYRAMINE 4 G: 4 POWDER, FOR SUSPENSION ORAL at 14:20

## 2023-02-25 RX ADMIN — BUSPIRONE HYDROCHLORIDE 10 MG: 5 TABLET ORAL at 10:09

## 2023-02-25 NOTE — PROGRESS NOTES
ONCOLOGY HEMATOLOGY CARE PROGRESS NOTE      SUBJECTIVE:      ROS:     Constitutional:  No weight loss, No fever, No chills, No night sweats. Energy level good.   Eyes:  No impairment or change in vision  ENT / Mouth:  No pain, abnormal ulceration, bleeding, nasal drip or change in voice or hearing  Cardiovascular:  No chest pain, palpitations, new edema, or calf discomfort  Respiratory:  No pain, hemoptysis, change to breathing  Breast:  No pain, discharge, change in appearance or texture  Gastrointestinal:  No pain, cramping, jaundice, change to eating and bowel habits  Urinary:  No pain, bleeding or change in continence  Genitalia: No pain, bleeding or discharge  Musculoskeletal:  No redness, pain, edema or weakness  Skin:  No pruritus, rash, change to nodules or lesions  Neurologic:  No discomfort, change in mental status, speech, sensory or motor activity  Psychiatric:  No change in concentration or change to affect or mood  Endocrine:  No hot flashes, increased thirst, or change to urine production  Hematologic: No petechiae, ecchymosis or bleeding  Lymphatic:  No lymphadenopathy or lymphedema  Allergy / Immunologic:  No eczema, hives, frequent or recurrent infections    OBJECTIVE        Physical    VITALS:  Patient Vitals for the past 24 hrs:   BP Temp Temp src Pulse Resp SpO2   02/25/23 1224 130/74 98.3 °F (36.8 °C) Oral 85 16 92 %   02/25/23 1006 115/68 97.8 °F (36.6 °C) Oral 84 16 95 %   02/25/23 0415 128/77 98.1 °F (36.7 °C) Oral 68 16 94 %   02/24/23 2355 118/71 98.3 °F (36.8 °C) Oral 77 16 94 %   02/24/23 2345 118/71 98.3 °F (36.8 °C) Oral 80 16 93 %   02/24/23 2208 -- -- -- -- 16 --   02/24/23 2057 120/67 98.1 °F (36.7 °C) Oral 80 16 93 %   02/24/23 2042 122/69 98.1 °F (36.7 °C) Oral 76 16 91 %   02/24/23 2030 121/73 98.2 °F (36.8 °C) Oral 80 16 92 %   02/24/23 2000 129/77 98.1 °F (36.7 °C) Oral 80 16 92 %   02/24/23 1858 127/75 98.6 °F (37 °C) Oral 76 18 97 % 02/24/23 1600 123/68 98.3 °F (36.8 °C) Oral 82 18 95 %       24HR INTAKE/OUTPUT:    Intake/Output Summary (Last 24 hours) at 2/25/2023 1232  Last data filed at 2/25/2023 0141  Gross per 24 hour   Intake 593.36 ml   Output 1250 ml   Net -656.64 ml       CONSTITUTIONAL: awake, alert, cooperative, no apparent distress   EYES: pupils equal, round and reactive to light, sclera clear and conjunctiva normal  ENT: Normocephalic, without obvious abnormality, atraumatic  NECK: supple, symmetrical, no jugular venous distension and no carotid bruits   HEMATOLOGIC/LYMPHATIC: no cervical, supraclavicular or axillary lymphadenopathy   LUNGS: no increased work of breathing and clear to auscultation   CARDIOVASCULAR: regular rate and rhythm, normal S1 and S2, no murmur noted  ABDOMEN: normal bowel sounds x 4, soft, non-distended, non-tender, no masses palpated, no hepatosplenomgaly   MUSCULOSKELETAL: full range of motion noted, tone is normal  NEUROLOGIC: awake, alert, oriented to name, place and time. Motor skills grossly intact. SKIN: Normal skin color, texture, turgor and no jaundice.  appears intact   EXTREMITIES: no LE edema     DATA:  CBC:    Recent Labs     02/25/23 0218 02/24/23  1200 02/23/23  0520 02/22/23  1445   WBC 12.7* 9.2 9.3 17.6*   NEUTROABS 1.5* 1.1* 1.1* 2.3   LYMPHOPCT 83.0 37.0 10.0 13.0   RBC 2.53* 2.19* 2.13* 2.45*   HGB 8.4* 7.0* 7.2* 8.2*   HCT 24.4* 21.5* 20.5* 24.1*   MCV 96.2 97.9 96.0 98.4   MCH 33.1 32.0 33.8 33.5   MCHC 34.4 32.7 35.2 34.0   RDW 14.7 15.6* 15.4 15.8*   PLT 20* 26* 40* 8*       PT/INR:    Recent Labs     02/25/23  0218 02/24/23  1911 02/24/23  1200 02/22/23  1445 02/14/23  0722 02/13/23  1150   PROT 5.4*  --  5.1* 5.5*  --  6.7   INR  --  1.10  --   --  1.12  --      PTT:    Recent Labs     02/24/23  1911   APTT 27.9       CMP:    Recent Labs     02/25/23  0218 02/24/23  1200 02/23/23  0520 02/22/23  1445 02/18/23  1045 02/17/23  0534 02/14/23  0722 02/13/23  1150 02/13/23  9872  141 140 140   < > 143   < > 141 142   K 4.4 3.9 4.0 3.9   < > 3.8   < > 4.6 4.8    107 107 107   < > 113*   < > 106 106   CO2 22 24 23 22   < > 18*   < > 20* 18*   GLUCOSE 115* 140* 98 99   < > 126*   < > 128* 133*   BUN 26* 29* 30* 33*   < > 27*   < > 39* 31*   CREATININE 1.8* 1.7* 1.8* 1.8*   < > 1.9*   < > 2.9* 2.7*   LABGLOM 38* 41* 38* 38*   < > 36*   < > 21* 23*   CALCIUM 8.5 8.3 8.4 8.5   < > 7.9*   < > 9.4 9.1   PROT 5.4* 5.1*  --  5.5*  --   --   --  6.7  --    LABALBU 3.6 3.7  --  3.4  --  3.3*   < > 4.5  --    AGRATIO 2.0 2.6*  --  1.6  --   --   --  2.0  --    BILITOT 0.5 0.5  --  0.6  --   --   --  0.5  --    ALKPHOS 68 69  --  66  --   --   --  74  --    ALT 12 14  --  17  --   --   --  16  --    AST 17 18  --  30  --   --   --  33  --    MG  --   --   --   --   --   --   --   --  2.20    < > = values in this interval not displayed. Lab Results   Component Value Date    CALCIUM 8.5 02/25/2023    PHOS 2.2 (L) 02/17/2023       LDH:No results for input(s): LDH in the last 720 hours. Radiology Review:  VL Extremity Venous Right  Vascular Upper Extremities Veins Procedure    -- PRELIMINARY SONOGRAPHER REPORT --      Demographics      Patient Name        Apolonia Davison      Date of Study       02/24/2023      Gender               Male      Patient Number      [id number]      Date of Birth        1945      Visit Number        [id number]       Age                  65 year(s)      Accession Number    [id number]      Room Number          8891      Corporate ID        [id number]         [address] [place] [address]      Ordering Physician  Madalyn Hollingsworth MD  Interpreting         [facility]                                       Physician            Readers     Procedure    Type of Study:      Veins:Upper Extremities Veins, VL EXTREMITY VENOUS DUPLEX RIGHT.      Tech Comments  Right  Technically difficult and limited study due to bandages. There is a PICC line noted in the right upper extremity. Acute partially occluded deep vein thrombosis involving the right brachial  vein (1 of 2). Acute partially occluded superficial venous thrombosis involving the right  basilic vein. Isolated acute totally occluded superficial venous thrombosis involving the  right cephalic vein at the antecubital fossa. Within the limits of this exam, there is no other evidence of deep vein  thrombosis or superficial vein thrombosis of the right upper extremity. Left  Unable to compress the left subclavian vein due to patient's intolerance,  however, adequate color doppler obtained. Within the limits of this exam, there is no evidence of deep vein thrombosis  involving the left internal jugular vein and subclavian vein. There is no previous exam for comparison. Problem List  Patient Active Problem List   Diagnosis    GERD (gastroesophageal reflux disease)    MANISH (generalized anxiety disorder)    Essential hypertension, benign    CKD (chronic kidney disease) stage 3, GFR 30-59 ml/min (HCC)    Recurrent deep vein thrombosis (DVT) of right lower extremity (HCC)    Mixed hyperlipidemia    Ni's esophagus without dysplasia    Primary insomnia    Multiple myeloma not having achieved remission ([address]    Current moderate episode of major depressive disorder without prior episode ([address]    Cancer, metastatic to bone ([address]    Malaise and fatigue    Thrombocytopenia ([address]    Plasma cell leukemia ([address]    History of DVT in adulthood       ASSESSMENT AND PLAN:    Plasma cell leukemia  being treated at [facility] outpatient for MM. Was receiving Velcade, cytoxan, and Xgeva. - Cycle 11 day 1 received on 01/11/2023. Received subsequent Velcade on time.  Due for C12D1 on 02/15/2023.  - patient progressed on treatment    - Bone marrow biopsy performed 2/15/2023 showed plasma cell leukemia.   -After discussion with BMT team, patient was started on hyperfractionated cyclophosphamide by Dr. Shahnaz Perkins on 2/17/2023 for 4 doses plus pulse dexamethasone.  -Plan to start carfilzomib/Jeimy/Dex as an outpatient from 3/1/2023    2. Thrombocytopenia  -Secondary to underlying leukemia and chronic bone marrow suppression secondary to chemotherapy.  -Ordered for 1 unit platelet transfusion today since patient has been started on IV heparin for newly found PICC line induced DVT to maintain platelet count around 30 K. 3.  DVT of the right upper extremity.  -Ultrasound venous Doppler revealed virtually occluded DVT involving the right brachial vein with partially occluded superficial venous thrombosis involving the right basilic vein.  -As per the patient and his wife, he was on Eliquis for approximately 15 years which was discontinued probably months back due to thrombocytopenia.  -Discussed with the  primary team-PICC line removed for now to prevent the clot propagation and speed resolution of thrombosis.  -Underlying malignancy, his previous history of DVT are  his personal risk factors that put him at risk of embolism though the brachial vein DVT is not notorious for causing pulmonary embolism. --Clinically, there is no evidence of edema or erythema of the right arm.  -Started the patient on IV heparin without bolus in a very guarded fashion with careful monitoring of the hematological parameters. Patient has been counseled to report immediately if he notices any bleeding. We will discontinue heparin if patient reports any bleeding      -Patient will need PICC line in the other arm or Mediport prior to discharge since he is scheduled for outpatient chemotherapy. 4.  Chronic fatigue  -Secondary to underlying plasma cell leukemia and chemotherapy. - If he doesn't improve as he gets past his chari, then readdress hospice.     5.  Anemia  -Secondary to chemotherapy/underlying bone marrow disorder.  -Recommend 1 unit packed RBC transfusion to maintain hemoglobin around 7 g%.  -Patient denies any overt bleeding at this time.         ONCOLOGIC DISPOSITION:      Mohinder Glass MD  Please contact through [address]

## 2023-02-25 NOTE — PROGRESS NOTES
4 Eyes Skin Assessment     The patient is being assess for  Shift Handoff    I agree that 2 RN's have performed a thorough Head to Toe Skin Assessment on the patient. ALL assessment sites listed below have been assessed. Areas assessed by both nurses:   [x]   Head, Face, and Ears   [x]   Shoulders, Back, and Chest  [x]   Arms, Elbows, and Hands   [x]   Coccyx, Sacrum, and IschIum  [x]   Legs, Feet, and Heels        Does the Patient have Skin Breakdown?   Yes LDA WOUND CARE was Initiated documentation include the Heather-wound, Wound Assessment, Measurements, Dressing Treatment, Drainage, and Color\",         Brant Prevention initiated:  Yes   Wound Care Orders initiated:  NA      [facility] nurse consulted for Pressure Injury (Stage 3,4, Unstageable, DTI, NWPT, and Complex wounds), New and Established Ostomies:  Yes      Nurse 1 eSignature: Electronically signed by Mikki Nicholas RN on 2/25/23 at 3:57 AM EST    Nurse 2 eSignature: Electronically signed by Derick Younger RN on 2/25/23 at 3:58 AM EST

## 2023-02-25 NOTE — PLAN OF CARE
Problem: ABCDS Injury Assessment  Goal: Absence of physical injury  2/24/2023 2307 by Elpidio Villanueva RN  Outcome: Progressing  2/24/2023 1726 by Dale Macias RN  Outcome: Progressing  Flowsheets (Taken 2/24/2023 1726)  Absence of Physical Injury: Implement safety measures based on patient assessment     Problem: Safety - Adult  Goal: Free from fall injury  2/24/2023 2307 by Elpidio Villanueva RN  Outcome: Progressing  2/24/2023 1726 by Dale Macias RN  Outcome: Progressing  Flowsheets (Taken 2/24/2023 1726)  Free From Fall Injury: Instruct family/caregiver on patient safety     Problem: Skin/Tissue Integrity  Goal: Absence of new skin breakdown  Description: 1. Monitor for areas of redness and/or skin breakdown  2. Assess vascular access sites hourly  3. Every 4-6 hours minimum:  Change oxygen saturation probe site  4. Every 4-6 hours:  If on nasal continuous positive airway pressure, respiratory therapy assess nares and determine need for appliance change or resting period.   2/24/2023 2307 by Elpidio Villanueva RN  Outcome: Progressing  2/24/2023 1726 by Dale Macias RN  Outcome: Progressing     Problem: Nutrition Deficit:  Goal: Optimize nutritional status  2/24/2023 2307 by Elpidio Villanueva RN  Outcome: Progressing  2/24/2023 1726 by Dale Macias RN  Outcome: Progressing  Flowsheets (Taken 2/24/2023 1726)  Nutrient intake appropriate for improving, restoring, or maintaining nutritional needs:   Assess nutritional status and recommend course of action   Monitor oral intake, labs, and treatment plans   Recommend appropriate diets, oral nutritional supplements, and vitamin/mineral supplements   Order, calculate, and assess calorie counts as needed   Recommend, monitor, and adjust tube feedings and TPN/PPN based on assessed needs   Provide specific nutrition education to patient or family as appropriate

## 2023-02-25 NOTE — PLAN OF CARE
Problem: Safety - Adult  Goal: Free from fall injury  Outcome: Progressing  Flowsheets (Taken 2/24/2023 1726 by Chuck Marrero RN)  Free From Fall Injury: Instruct family/caregiver on patient safety

## 2023-02-25 NOTE — PROGRESS NOTES
Shift assessment completed. Pt A&O, VSS. One unit of blood admin. Heparin drip at this time. Denies any needs at this time. Bed locked and in lowest position. Call light within reach. Will continue to monitor.

## 2023-02-25 NOTE — CONSULTS
Pharmacy to Manage Heparin Infusion per [facility]    Dx: Acute DVT  Pt wt = 81.6 kg. Baseline aPTT = in process. Oral factor Xa-inhibitors may alter and elevate anti-Xa levels used for unfractionated heparin monitoring. As a result, anti-Xa monitoring is not accurate while Xa-inhibitor activity is detectable. Utilize aPTT monitoring when patient received an oral factor Xa-inhibitor (apixaban, betrixaban, edoxaban or rivaroxaban) within 72 hours prior to admission (please document last administration time). The goal is to allow a washout of oral factor Xa-inhibitors by using aPTT for 72 hours, then change to ant-Xa levels for UFH. Heparin (weight-based) Infusion: VTE/DVT/PE  Heparin infusion at 18 units/kg/hr (recommended max initial rate: 2100 units/hr). Recheck anti-Xa (unless aPTT being used) in 6 hours. Goal anti-Xa 0.3-0.7 IU/mL  Goal aPTT =  seconds. Pharmacy to manage Heparin - contact for questions. Heparin 80 units/kg IV x 1 (max 10,000 units), then 18 units/kg/hr (recommended max  initial rate 2100 units/hr). Adjust infusion rate based off anti-Xa results below. anti-Xa < 0.1  Heparin 80 units/kg bolus  Increase infusion by 4 units/kg/hr  anti-Xa 0.1-0.29    Heparin 40 units/kg bolus Increase infusion by 2 units/kg/hr  anti-Xa 0.3-0.7  No bolus No change   anti-Xa 0.71-0.8    No bolus Decrease infusion by 1 units/kg/hr  anti-Xa 0.81-0.99   No bolus Decrease infusion by 2 units/kg/hr  anti-Xa 1 or more  Hold heparin for 1 hour Decrease infusion by 3 units/kg/hr    Obtain anti-Xa 6 hours after bolus and 6 hours after any dose change until two consecutive therapeutic anti-Xa are achieved- then daily.     ** Carefully monitor platelets **  Per Dr. Jose Martin Beavers, begin Heparin for acute DVT w/o initial bolus  Becky Silver, PharmD 2/24/2023  7:18 PM    -----------------------------------------------  2/25 0242  High-Dose Heparin Drip  Current Rate: 1470 units/hr  2/25 @ 8472 Anti-Xa Level= 0.38  Plan: Per pharmacy dosing, we will not make any changes at this time  Plt= 20K    Next Anti-Xa Level: 2/25 @ 0800  Sravanthi Montoya PharmD 2/25/2023 2:41 AM    2/25 0742  Anti Xa 0.62 IU/mL  Continue with heparin drip rate of 1470 units/hr  Daily Anti Xa ordered  Bj Rangel PharmD  2/25/2023 at 9:10 AM    ---------------------------------------------  2/26 0636  High-Dose Heparin Drip  Current Rate: 1470 units/hr  2/26 @ 0510 Anti-Xa Level= 0.68  Plan: Per pharmacy dosing, we will not make any changes at this time  Plts=30K (oncology aware and replacing platelets prn)    Next Anti-Xa Level: 2/27 @ 0600  Sravanthi Montoya PharmD 2/26/2023 6:34 AM    ------------------------------------  2/27 0524  High-Dose Heparin Drip  Current Rate: 1470 units/hr  2/27 @ 8967 Anti-Xa Level= 0.66  Plan: Per pharmacy dosing, we will not make any changes at this time    Next Anti-Xa Level: 2/28 @ 0600  Sravanthi Montoya PharmD 2/27/2023 5:23 AM    ----------------------------------------  2/28 0556  High-Dose Heparin Drip  Current Rate: 1470 units/hr  2/28 @ 0518 Anti-Xa Level= 0.74  Plan: Per pharmacy dosing, we will decrease heparin drip by 80 units/hr, making new drip rate 1390 units/hr    Plt=14K; heme/onc following closely    Next Anti-Xa Level: 2/28 @ 1901 Mena Coburn PharmD 2/28/2023 5:53 AM    ----------------------------------------------    3/1/2023  Heparin remains held by provider  BRITTNEY ab negative  Plt 4k  Niko Watters PharmD  3/1/2023 9:50 AM    -------------------------------------------    3/2/2023  Heparin order still on hold by MD  Plt 4k --> 2k --> 4k   Pt having platelet transfusion this morning  Niko Watters PharmD  3/2/2023 11:28 AM

## 2023-02-25 NOTE — PROGRESS NOTES
A/O x4, VSS. New order noted by Dr. Mihir Gregory for heparin gtt. Awaiting pharmacy verification at this time. PIV in LFA patent and flushes well. Heparin to be administered into LFA PIV. Dr. Mihir Gregory states he has spoken to oncologist and updated on this patient. Received order, may keep PICC in RUE to administer blood, then PICC needs discontinued.

## 2023-02-25 NOTE — PROGRESS NOTES
Another IV placed for platelet infusion. Verified that there is a consent for blood transfusion in chart. Will start platelets.

## 2023-02-25 NOTE — PROGRESS NOTES
Hospitalist Progress Note      PCP: Sharee Rai DO    Date of Admission: 2/22/2023    Chief Complaint:  Fatigue         History Of Present Illness:       65 y.o. male who presents to the emergency department today with complaints of fatigue. Patient just went home yesterday, the has not made a 24 hours at home, wife states that when he got home he just could not walk or care for himself. Wife did mention that she thought that the patient should be on hospice. Patient was recently admitted to the hospital for an ELEONORA and thrombocytopenia as well as his generalized weakness. He is here for further evaluation. Subjective:     Pt has R UE DVT. Acute partially occluded deep vein thrombosis involving the right brachial   vein (1 of 2). Acute partially occluded superficial venous thrombosis involving the right   basilic vein. Isolated acute totally occluded superficial venous thrombosis involving the   right cephalic vein at the antecubital fossa. On heparin drip   R UE swelling improving.        Medications:  Reviewed    Infusion Medications    heparin (PORCINE) Infusion 1,470 Units/hr (02/25/23 0141)    sodium chloride      sodium chloride      sodium chloride      sodium chloride      sodium chloride       Scheduled Medications    cholestyramine  4 g Oral TID WC    OLANZapine  5 mg Oral Nightly    pantoprazole  40 mg Oral Daily    sertraline  75 mg Oral Daily    sodium chloride flush  5-40 mL IntraVENous 2 times per day    busPIRone  10 mg Oral BID    gabapentin  100 mg Oral BID    scopolamine  1 patch TransDERmal Q72H     PRN Meds: heparin (porcine), heparin (porcine), sodium chloride, sodium chloride, oxyCODONE, sodium chloride flush, sodium chloride, ondansetron **OR** ondansetron, polyethylene glycol, acetaminophen **OR** acetaminophen, LORazepam, sodium chloride, sodium chloride, prochlorperazine, metoclopramide      Intake/Output Summary (Last 24 hours) at 2/25/2023 9800  Last data filed at 2/25/2023 0141  Gross per 24 hour   Intake 593.36 ml   Output 1250 ml   Net -656.64 ml       Physical Exam Performed:    /77   Pulse 68   Temp 98.1 °F (36.7 °C) (Oral)   Resp 16   Ht 5' 9\" (1.753 m)   Wt 180 lb (81.6 kg)   SpO2 94%   BMI 26.58 kg/m²     General appearance: No apparent distress, appears stated age and cooperative. HEENT: Pupils equal, round, and reactive to light. Conjunctivae/corneas clear. Neck: Supple, with full range of motion. No jugular venous distention. Trachea midline. Respiratory:  Normal respiratory effort. Clear to auscultation, bilaterally without Rales/Wheezes/Rhonchi. Cardiovascular: Regular rate and rhythm with normal S1/S2 without murmurs, rubs or gallops. Abdomen: Soft, non-tender, non-distended with normal bowel sounds. Musculoskeletal: No clubbing, cyanosis or edema bilaterally. Full range of motion without deformity. Skin: Skin color, texture, turgor normal.  No rashes or lesions. Neurologic:  Neurovascularly intact without any focal sensory/motor deficits. Cranial nerves: II-XII intact, grossly non-focal.  Psychiatric: Alert and oriented, thought content appropriate, normal insight  Capillary Refill: Brisk, 3 seconds, normal   Peripheral Pulses: +2 palpable, equal bilaterally       Labs:   Recent Labs     02/23/23  0520 02/24/23  1200 02/25/23  0218   WBC 9.3 9.2 12.7*   HGB 7.2* 7.0* 8.4*   HCT 20.5* 21.5* 24.4*   PLT 40* 26* 20*     Recent Labs     02/23/23  0520 02/24/23  1200 02/25/23  0218    141 141   K 4.0 3.9 4.4    107 109   CO2 23 24 22   BUN 30* 29* 26*   CREATININE 1.8* 1.7* 1.8*   CALCIUM 8.4 8.3 8.5     Recent Labs     02/22/23  1445 02/24/23  1200 02/25/23  0218   AST 30 18 17   ALT 17 14 12   BILITOT 0.6 0.5 0.5   ALKPHOS 66 69 68     Recent Labs     02/24/23  1911   INR 1.10     No results for input(s): Ceil Coke in the last 72 hours.     Urinalysis:      Lab Results   Component Value Date/Time    NITRU Negative 02/22/2023 02:45 PM    WBCUA None seen 02/22/2023 02:45 PM    BACTERIA 1+ 02/09/2023 12:08 PM    RBCUA 3-4 02/22/2023 02:45 PM    BLOODU TRACE-LYSED 02/22/2023 02:45 PM    SPECGRAV 1.015 02/22/2023 02:45 PM    GLUCOSEU Negative 02/22/2023 02:45 PM       Radiology:  VL Extremity Venous Right         XR CHEST PORTABLE   Final Result   1. New right PICC line extending the cavoatrial junction. 2. Otherwise stable appearance of the chest with no acute findings. 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 Acute partially occluded superficial venous thrombosis involving the right   basilic vein. Isolated acute totally occluded superficial venous thrombosis involving the   right cephalic vein at the antecubital fossa. 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

## 2023-02-26 LAB
A/G RATIO: 2 (ref 1.1–2.2)
ALBUMIN SERPL-MCNC: 3.6 G/DL (ref 3.4–5)
ALP BLD-CCNC: 67 U/L (ref 40–129)
ALT SERPL-CCNC: 11 U/L (ref 10–40)
ANION GAP SERPL CALCULATED.3IONS-SCNC: 11 MMOL/L (ref 3–16)
ANTI-XA UNFRAC HEPARIN: 0.68 IU/ML (ref 0.3–0.7)
AST SERPL-CCNC: 15 U/L (ref 15–37)
BANDED NEUTROPHILS RELATIVE PERCENT: 3 % (ref 0–7)
BASOPHILS ABSOLUTE: 0.1 K/UL (ref 0–0.2)
BASOPHILS RELATIVE PERCENT: 1 %
BILIRUB SERPL-MCNC: 0.4 MG/DL (ref 0–1)
BUN BLDV-MCNC: 25 MG/DL (ref 7–20)
CALCIUM SERPL-MCNC: 8.6 MG/DL (ref 8.3–10.6)
CHLORIDE BLD-SCNC: 109 MMOL/L (ref 99–110)
CO2: 21 MMOL/L (ref 21–32)
CREAT SERPL-MCNC: 1.8 MG/DL (ref 0.8–1.3)
EOSINOPHILS ABSOLUTE: 0.1 K/UL (ref 0–0.6)
EOSINOPHILS RELATIVE PERCENT: 1 %
GFR SERPL CREATININE-BSD FRML MDRD: 38 ML/MIN/{1.73_M2}
GLUCOSE BLD-MCNC: 103 MG/DL (ref 70–99)
HCT VFR BLD CALC: 23.1 % (ref 40.5–52.5)
HEMATOLOGY PATH CONSULT: NO
HEMOGLOBIN: 7.7 G/DL (ref 13.5–17.5)
LYMPHOCYTES ABSOLUTE: 8.3 K/UL (ref 1–5.1)
LYMPHOCYTES RELATIVE PERCENT: 81 %
MACROCYTES: ABNORMAL
MCH RBC QN AUTO: 32.5 PG (ref 26–34)
MCHC RBC AUTO-ENTMCNC: 33.4 G/DL (ref 31–36)
MCV RBC AUTO: 97.5 FL (ref 80–100)
MICROCYTES: ABNORMAL
MONOCYTES ABSOLUTE: 0.5 K/UL (ref 0–1.3)
MONOCYTES RELATIVE PERCENT: 5 %
NEUTROPHILS ABSOLUTE: 1.1 K/UL (ref 1.7–7.7)
NEUTROPHILS RELATIVE PERCENT: 8 %
OVALOCYTES: ABNORMAL
PDW BLD-RTO: 15 % (ref 12.4–15.4)
PLASMA CELLS PERCENT: 1 %
PLATELET # BLD: 30 K/UL (ref 135–450)
PLATELET SLIDE REVIEW: ABNORMAL
PMV BLD AUTO: 7.6 FL (ref 5–10.5)
POTASSIUM REFLEX MAGNESIUM: 4.2 MMOL/L (ref 3.5–5.1)
RBC # BLD: 2.37 M/UL (ref 4.2–5.9)
SLIDE REVIEW: ABNORMAL
SODIUM BLD-SCNC: 141 MMOL/L (ref 136–145)
TOTAL PROTEIN: 5.4 G/DL (ref 6.4–8.2)
WBC # BLD: 10.2 K/UL (ref 4–11)

## 2023-02-26 PROCEDURE — 6370000000 HC RX 637 (ALT 250 FOR IP): Performed by: HOSPITALIST

## 2023-02-26 PROCEDURE — 36415 COLL VENOUS BLD VENIPUNCTURE: CPT

## 2023-02-26 PROCEDURE — 2580000003 HC RX 258: Performed by: HOSPITALIST

## 2023-02-26 PROCEDURE — 85520 HEPARIN ASSAY: CPT

## 2023-02-26 PROCEDURE — 6360000002 HC RX W HCPCS: Performed by: HOSPITALIST

## 2023-02-26 PROCEDURE — 85025 COMPLETE CBC W/AUTO DIFF WBC: CPT

## 2023-02-26 PROCEDURE — 6370000000 HC RX 637 (ALT 250 FOR IP): Performed by: INTERNAL MEDICINE

## 2023-02-26 PROCEDURE — 2500000003 HC RX 250 WO HCPCS: Performed by: INTERNAL MEDICINE

## 2023-02-26 PROCEDURE — 1200000000 HC SEMI PRIVATE

## 2023-02-26 PROCEDURE — 80053 COMPREHEN METABOLIC PANEL: CPT

## 2023-02-26 RX ADMIN — LORAZEPAM 1 MG: 1 TABLET ORAL at 19:37

## 2023-02-26 RX ADMIN — HEPARIN SODIUM 1470 UNITS/HR: 10000 INJECTION, SOLUTION INTRAVENOUS at 09:06

## 2023-02-26 RX ADMIN — BUSPIRONE HYDROCHLORIDE 10 MG: 5 TABLET ORAL at 09:14

## 2023-02-26 RX ADMIN — SERTRALINE 75 MG: 50 TABLET, FILM COATED ORAL at 09:14

## 2023-02-26 RX ADMIN — ONDANSETRON 4 MG: 2 INJECTION INTRAMUSCULAR; INTRAVENOUS at 09:08

## 2023-02-26 RX ADMIN — Medication 10 ML: at 09:08

## 2023-02-26 RX ADMIN — BUSPIRONE HYDROCHLORIDE 10 MG: 5 TABLET ORAL at 19:37

## 2023-02-26 RX ADMIN — GABAPENTIN 100 MG: 100 CAPSULE ORAL at 09:14

## 2023-02-26 RX ADMIN — Medication 10 ML: at 19:40

## 2023-02-26 RX ADMIN — CHOLESTYRAMINE 4 G: 4 POWDER, FOR SUSPENSION ORAL at 16:54

## 2023-02-26 RX ADMIN — OLANZAPINE 5 MG: 5 TABLET, FILM COATED ORAL at 19:37

## 2023-02-26 RX ADMIN — CHOLESTYRAMINE 4 G: 4 POWDER, FOR SUSPENSION ORAL at 08:59

## 2023-02-26 RX ADMIN — CHOLESTYRAMINE 4 G: 4 POWDER, FOR SUSPENSION ORAL at 13:02

## 2023-02-26 RX ADMIN — GABAPENTIN 100 MG: 100 CAPSULE ORAL at 19:37

## 2023-02-26 RX ADMIN — PANTOPRAZOLE SODIUM 40 MG: 40 TABLET, DELAYED RELEASE ORAL at 06:33

## 2023-02-26 RX ADMIN — ONDANSETRON 4 MG: 2 INJECTION INTRAMUSCULAR; INTRAVENOUS at 16:54

## 2023-02-26 RX ADMIN — PROCHLORPERAZINE EDISYLATE 10 MG: 5 INJECTION INTRAMUSCULAR; INTRAVENOUS at 19:39

## 2023-02-26 NOTE — PROGRESS NOTES
Hospitalist Progress Note      PCP: Luis Alfredo Richardson DO    Date of Admission: 2/22/2023    Chief Complaint:  Fatigue         History Of Present Illness:       65 y.o. male who presents to the emergency department today with complaints of fatigue. Patient just went home yesterday, the has not made a 24 hours at home, wife states that when he got home he just could not walk or care for himself. Wife did mention that she thought that the patient should be on hospice. Patient was recently admitted to the hospital for an ELEONORA and thrombocytopenia as well as his generalized weakness. He is here for further evaluation. Subjective:     R UE swelling improving.    Today patient has LUE swelling     Medications:  Reviewed    Infusion Medications    sodium chloride      heparin (PORCINE) Infusion 1,470 Units/hr (02/26/23 0906)    sodium chloride      sodium chloride      sodium chloride      sodium chloride      sodium chloride       Scheduled Medications    cholestyramine  4 g Oral TID WC    OLANZapine  5 mg Oral Nightly    pantoprazole  40 mg Oral Daily    sertraline  75 mg Oral Daily    sodium chloride flush  5-40 mL IntraVENous 2 times per day    busPIRone  10 mg Oral BID    gabapentin  100 mg Oral BID    scopolamine  1 patch TransDERmal Q72H     PRN Meds: sodium chloride, heparin (porcine), heparin (porcine), sodium chloride, sodium chloride, oxyCODONE, sodium chloride flush, sodium chloride, ondansetron **OR** ondansetron, polyethylene glycol, acetaminophen **OR** acetaminophen, LORazepam, sodium chloride, sodium chloride, prochlorperazine, metoclopramide      Intake/Output Summary (Last 24 hours) at 2/26/2023 1043  Last data filed at 2/26/2023 0603  Gross per 24 hour   Intake 698.83 ml   Output 2400 ml   Net -1701.17 ml       Physical Exam Performed:    /66   Pulse 84   Temp 97.3 °F (36.3 °C) (Oral)   Resp 16   Ht 5' 9\" (1.753 m)   Wt 180 lb (81.6 kg)   SpO2 94%   BMI 26.58 kg/m²     General appearance: No apparent distress, appears stated age and cooperative. HEENT: Pupils equal, round, and reactive to light. Conjunctivae/corneas clear. Neck: Supple, with full range of motion. No jugular venous distention. Trachea midline. Respiratory:  Normal respiratory effort. Clear to auscultation, bilaterally without Rales/Wheezes/Rhonchi. Cardiovascular: Regular rate and rhythm with normal S1/S2 without murmurs, rubs or gallops. Abdomen: Soft, non-tender, non-distended with normal bowel sounds. Musculoskeletal: No clubbing, cyanosis or edema bilaterally. Full range of motion without deformity. Skin: Skin color, texture, turgor normal.  No rashes or lesions. Neurologic:  Neurovascularly intact without any focal sensory/motor deficits. Cranial nerves: II-XII intact, grossly non-focal.  Psychiatric: Alert and oriented, thought content appropriate, normal insight  Capillary Refill: Brisk, 3 seconds, normal   Peripheral Pulses: +2 palpable, equal bilaterally       Labs:   Recent Labs     02/24/23  1200 02/25/23  0218 02/26/23  0510   WBC 9.2 12.7* 10.2   HGB 7.0* 8.4* 7.7*   HCT 21.5* 24.4* 23.1*   PLT 26* 20* 30*     Recent Labs     02/24/23  1200 02/25/23  0218 02/26/23  0510    141 141   K 3.9 4.4 4.2    109 109   CO2 24 22 21   BUN 29* 26* 25*   CREATININE 1.7* 1.8* 1.8*   CALCIUM 8.3 8.5 8.6     Recent Labs     02/24/23  1200 02/25/23  0218 02/26/23  0510   AST 18 17 15   ALT 14 12 11   BILITOT 0.5 0.5 0.4   ALKPHOS 69 68 67     Recent Labs     02/24/23  1911   INR 1.10     No results for input(s): Paulino Rosenberg in the last 72 hours.     Urinalysis:      Lab Results   Component Value Date/Time    NITRU Negative 02/22/2023 02:45 PM    WBCUA None seen 02/22/2023 02:45 PM    BACTERIA 1+ 02/09/2023 12:08 PM    RBCUA 3-4 02/22/2023 02:45 PM    BLOODU TRACE-LYSED 02/22/2023 02:45 PM    SPECGRAV 1.015 02/22/2023 02:45 PM    GLUCOSEU Negative 02/22/2023 02:45 PM       Radiology:  VL Extremity Venous Right         XR CHEST PORTABLE   Final Result   1. New right PICC line extending the cavoatrial junction. 2. Otherwise stable appearance of the chest with no acute findings. VL Extremity Venous Left    (Results Pending)       IP CONSULT TO HEM/ONC  PALLIATIVE CARE EVAL  IP CONSULT TO HOSPITALIST  IP CONSULT TO HOME CARE NEEDS  IP CONSULT TO CARDIOLOGY  IP CONSULT TO CASE MANAGEMENT  IP CONSULT TO PHARMACY    Assessment/Plan:    Active Hospital Problems    Diagnosis     Thrombocytopenia ([address] [D69.6]      Priority: Medium    Plasma cell leukemia (HCC) [C90.10]      Priority: Medium    Malaise and fatigue [R53.81, R53.83]      Priority: Medium    History of DVT in adulthood [Z86.718]     CKD (chronic kidney disease) stage 3, GFR 30-59 ml/min ([facility]) [N18.30]     Essential hypertension, benign [I10]      -Acute DVT R upper extremity   Doppler showed  Acute partially occluded deep vein thrombosis involving the right brachial   vein (1 of 2). Acute partially occluded superficial venous thrombosis involving the right   basilic vein. Isolated acute totally occluded superficial venous thrombosis involving the   right cephalic vein at the antecubital fossa. 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

## 2023-02-26 NOTE — PROGRESS NOTES
Pt a/o. VSS. Shift assessment updated and documented. Swelling and warmth noted to L forearm this am. Both IV's with good blood return and no pain. Will notify Dr. Mihir Gregory for further evaluation.

## 2023-02-26 NOTE — PROGRESS NOTES
4 Eyes Skin Assessment     The patient is being assess for  Shift Handoff    I agree that 2 RN's have performed a thorough Head to Toe Skin Assessment on the patient. ALL assessment sites listed below have been assessed. Areas assessed by both nurses:   [x]   Head, Face, and Ears   [x]   Shoulders, Back, and Chest  [x]   Arms, Elbows, and Hands   [x]   Coccyx, Sacrum, and IschIum  [x]   Legs, Feet, and Heels        Does the Patient have Skin Breakdown?   No         Brant Prevention initiated:  Yes   Wound Care Orders initiated:  NA      [facility] nurse consulted for Pressure Injury (Stage 3,4, Unstageable, DTI, NWPT, and Complex wounds), New and Established Ostomies:  NA      Nurse 1 eSignature: Electronically signed by Deyvi Chau RN on 2/25/23 at 9:43 PM EST    Nurse 2 eSignature: Electronically signed by Emma Alberto RN on 2/25/23 at 9:43 PM EST

## 2023-02-26 NOTE — PROGRESS NOTES
.4 Eyes Skin Assessment     The patient is being assess for  4 Eye Shift Assessment     I agree that 2 RN's have performed a thorough Head to Toe Skin Assessment on the patient. ALL assessment sites listed below have been assessed. Areas assessed by both nurses: Michell Licea RN  [x]   Head, Face, and Ears   [x]   Shoulders, Back, and Chest  [x]   Arms, Elbows, and Hands   [x]   Coccyx, Sacrum, and IschIum  [x]   Legs, Feet, and Heels        Does the Patient have Skin Breakdown?   No         Brant Prevention initiated:  Yes   Wound Care Orders initiated:  NA      [facility] nurse consulted for Pressure Injury (Stage 3,4, Unstageable, DTI, NWPT, and Complex wounds), New and Established Ostomies:  NA      Nurse 1 eSignature: Electronically signed by Ricardo Newton RN on 2/26/23 at 5:06 PM EST    **SHARE this note so that the co-signing nurse is able to place an eSignature**    Nurse 2 eSignature: Electronically signed by Gerhard Libman, RN on 2/26/23 at 5:20 PM EST

## 2023-02-26 NOTE — PLAN OF CARE
Problem: Safety - Adult  Goal: Free from fall injury  2/26/2023 0739 by Gavin De La Torre RN  Outcome: Progressing  Flowsheets (Taken 2/24/2023 1726 by Rajani Brambila RN)  Free From Fall Injury: Instruct family/caregiver on patient safety

## 2023-02-26 NOTE — PLAN OF CARE
Problem: ABCDS Injury Assessment  Goal: Absence of physical injury  Outcome: Progressing     Problem: Safety - Adult  Goal: Free from fall injury  2/25/2023 2051 by Shaniqua Smith RN  Outcome: Progressing  2/25/2023 1331 by Etienne Scanlon RN  Outcome: Progressing  Flowsheets (Taken 2/24/2023 1726 by Dre Melgar RN)  Free From Fall Injury: Instruct family/caregiver on patient safety     Problem: Skin/Tissue Integrity  Goal: Absence of new skin breakdown  Description: 1. Monitor for areas of redness and/or skin breakdown  2. Assess vascular access sites hourly  3. Every 4-6 hours minimum:  Change oxygen saturation probe site  4. Every 4-6 hours:  If on nasal continuous positive airway pressure, respiratory therapy assess nares and determine need for appliance change or resting period.   Outcome: Progressing     Problem: Nutrition Deficit:  Goal: Optimize nutritional status  Outcome: Progressing

## 2023-02-26 NOTE — PROGRESS NOTES
Shift assessment completed. Pt A&O, VSS. R hand persistent bleeding through night, reinforced dressing, stable at this time. Catherine Nichols functioning, replaced for cleanliness. Denies any needs at this time. Bed locked and in lowest position. Call light within reach. Will continue to monitor.

## 2023-02-26 NOTE — PROGRESS NOTES
ONCOLOGY HEMATOLOGY CARE PROGRESS NOTE      SUBJECTIVE:    Patient is resting comfortably at the time of my evaluation. Denies any overt bleeding. On heparin drip.          Physical    VITALS:  Patient Vitals for the past 24 hrs:   BP Temp Temp src Pulse Resp SpO2   02/26/23 1422 117/69 97.9 °F (36.6 °C) Oral 78 16 91 %   02/26/23 1301 113/69 98.5 °F (36.9 °C) Oral 81 16 93 %   02/26/23 0905 111/66 97.3 °F (36.3 °C) Oral 84 16 94 %   02/26/23 0417 133/81 97.7 °F (36.5 °C) Oral 70 16 94 %   02/25/23 2345 115/70 98.3 °F (36.8 °C) Oral 84 16 92 %   02/25/23 1930 122/71 98.9 °F (37.2 °C) Oral 83 16 94 %   02/25/23 1820 114/68 99.2 °F (37.3 °C) Oral 76 16 94 %   02/25/23 1814 114/69 99 °F (37.2 °C) Oral 77 16 95 %   02/25/23 1809 115/69 98.5 °F (36.9 °C) Oral 75 16 94 %   02/25/23 1804 121/72 99.1 °F (37.3 °C) Oral 77 16 93 %   02/25/23 1759 121/72 98.7 °F (37.1 °C) Oral 79 16 94 %   02/25/23 1754 130/74 98.9 °F (37.2 °C) Oral 85 16 95 %   02/25/23 1736 135/77 99.2 °F (37.3 °C) Axillary 89 16 96 %   02/25/23 1619 120/65 98.4 °F (36.9 °C) Oral 78 16 95 %       24HR INTAKE/OUTPUT:    Intake/Output Summary (Last 24 hours) at 2/26/2023 1451  Last data filed at 2/26/2023 3599  Gross per 24 hour   Intake 698.83 ml   Output 2400 ml   Net -1701.17 ml       CONSTITUTIONAL: awake, alert, cooperative, no apparent distress   EYES: pupils equal, round and reactive to light, sclera clear and conjunctiva normal  ENT: Normocephalic, without obvious abnormality, atraumatic  NECK: supple, symmetrical, no jugular venous distension and no carotid bruits   HEMATOLOGIC/LYMPHATIC: no cervical, supraclavicular or axillary lymphadenopathy   LUNGS: no increased work of breathing and clear to auscultation   CARDIOVASCULAR: regular rate and rhythm, normal S1 and S2, no murmur noted  ABDOMEN: normal bowel sounds x 4, soft, non-distended, non-tender, no masses palpated, no hepatosplenomgaly   MUSCULOSKELETAL: full range of motion noted, tone is normal  NEUROLOGIC: awake, alert, oriented to name, place and time. Motor skills grossly intact. SKIN: Normal skin color, texture, turgor and no jaundice. appears intact   EXTREMITIES: no LE edema     DATA:  CBC:    Recent Labs     02/26/23  0510 02/25/23  0218 02/24/23  1200 02/23/23  0520   WBC 10.2 12.7* 9.2 9.3   NEUTROABS 1.1* 1.5* 1.1* 1.1*   LYMPHOPCT 81.0 83.0 37.0 10.0   RBC 2.37* 2.53* 2.19* 2.13*   HGB 7.7* 8.4* 7.0* 7.2*   HCT 23.1* 24.4* 21.5* 20.5*   MCV 97.5 96.2 97.9 96.0   MCH 32.5 33.1 32.0 33.8   MCHC 33.4 34.4 32.7 35.2   RDW 15.0 14.7 15.6* 15.4   PLT 30* 20* 26* 40*       PT/INR:    Recent Labs     02/26/23  0510 02/25/23  0218 02/24/23 1911 02/24/23  1200 02/22/23  1445 02/14/23  0722 02/13/23  1150   PROT 5.4* 5.4*  --  5.1* 5.5*  --  6.7   INR  --   --  1.10  --   --  1.12  --      PTT:    Recent Labs     02/24/23 1911   APTT 27.9       CMP:    Recent Labs     02/26/23  0510 02/25/23  0218 02/24/23  1200 02/23/23  0520 02/22/23  1445 02/13/23  1150 02/13/23  0948    141 141 140 140   < > 142   K 4.2 4.4 3.9 4.0 3.9   < > 4.8    109 107 107 107   < > 106   CO2 21 22 24 23 22   < > 18*   GLUCOSE 103* 115* 140* 98 99   < > 133*   BUN 25* 26* 29* 30* 33*   < > 31*   CREATININE 1.8* 1.8* 1.7* 1.8* 1.8*   < > 2.7*   LABGLOM 38* 38* 41* 38* 38*   < > 23*   CALCIUM 8.6 8.5 8.3 8.4 8.5   < > 9.1   PROT 5.4* 5.4* 5.1*  --  5.5*   < >  --    LABALBU 3.6 3.6 3.7  --  3.4   < >  --    AGRATIO 2.0 2.0 2.6*  --  1.6   < >  --    BILITOT 0.4 0.5 0.5  --  0.6   < >  --    ALKPHOS 67 68 69  --  66   < >  --    ALT 11 12 14  --  17   < >  --    AST 15 17 18  --  30   < >  --    MG  --   --   --   --   --   --  2.20    < > = values in this interval not displayed. Lab Results   Component Value Date    CALCIUM 8.6 02/26/2023    PHOS 2.2 (L) 02/17/2023       LDH:No results for input(s): LDH in the last 720 hours.     Radiology Review:  VL Extremity Venous Right  Vascular Upper Extremities Veins Procedure    -- PRELIMINARY SONOGRAPHER REPORT --      Demographics      Patient Name        Loida Kim      Date of Study       02/24/2023      Gender               Male      Patient Number      [id number]      Date of Birth        1945      Visit Number        [id number]       Age                  65 year(s)      Accession Number    [id number]      Room Number          0626      Corporate ID        [id number]         [address] [place] [address]      Ordering Physician  Cherylene Mailman MD  Interpreting         [facility]                                       Physician            Readers     Procedure    Type of Study:      Veins:Upper Extremities Veins, VL EXTREMITY VENOUS DUPLEX RIGHT. Tech Comments  Right  Technically difficult and limited study due to bandages. There is a PICC line noted in the right upper extremity. Acute partially occluded deep vein thrombosis involving the right brachial  vein (1 of 2). Acute partially occluded superficial venous thrombosis involving the right  basilic vein. Isolated acute totally occluded superficial venous thrombosis involving the  right cephalic vein at the antecubital fossa. Within the limits of this exam, there is no other evidence of deep vein  thrombosis or superficial vein thrombosis of the right upper extremity. Left  Unable to compress the left subclavian vein due to patient's intolerance,  however, adequate color doppler obtained. Within the limits of this exam, there is no evidence of deep vein thrombosis  involving the left internal jugular vein and subclavian vein. There is no previous exam for comparison.       Problem List  Patient Active Problem List   Diagnosis    GERD (gastroesophageal reflux disease)    MANISH (generalized anxiety disorder)    Essential hypertension, benign    CKD (chronic kidney disease) stage 3, GFR 30-59 ml/min ([facility]    Recurrent deep vein thrombosis (DVT) of right lower extremity (HCC)    Mixed hyperlipidemia    Ni's esophagus without dysplasia    Primary insomnia    Multiple myeloma not having achieved remission (HCC)    Current moderate episode of major depressive disorder without prior episode ([facility]    Cancer, metastatic to bone (HCC)    Malaise and fatigue    Thrombocytopenia ([address]    Plasma cell leukemia ([facility]    History of DVT in adulthood       ASSESSMENT AND PLAN:    Plasma cell leukemia  being treated at [facility] outpatient for MM. Was receiving Velcade, cytoxan, and Xgeva. - Cycle 11 day 1 received on 01/11/2023. Received subsequent Velcade on time. Due for C12D1 on 02/15/2023.  - patient progressed on treatment    - Bone marrow biopsy performed 2/15/2023 showed plasma cell leukemia.   -After discussion with BMT team, patient was started on hyperfractionated cyclophosphamide by Dr. Álvaro Harris on 2/17/2023 for 4 doses plus pulse dexamethasone.  -Plan to start carfilzomib/Jeimy/Dex as an outpatient from 3/1/2023     2. Thrombocytopenia  -Secondary to underlying leukemia and chronic bone marrow suppression secondary to chemotherapy. -Received  1 unit platelet transfusion on 2 /25 /2023 since patient has been started on IV heparin for newly found PICC line induced DVT to maintain platelet count around 30 K. 3.  DVT of the right upper extremity.   -PICC line induced  -Ultrasound venous Doppler revealed virtually occluded DVT involving the right brachial vein with partially occluded superficial venous thrombosis involving the right basilic vein.  -As per the patient and his wife, he was on Eliquis for approximately 15 years which was discontinued probably months back due to thrombocytopenia.  -Discussed with the  primary team-PICC line removed for now to prevent the clot propagation and speed resolution of thrombosis.  -Underlying malignancy, his previous history of DVT are  his personal risk factors that put him at risk of embolism though the brachial vein DVT is not notorious for causing pulmonary embolism. --Clinically, there is no evidence of edema or erythema of the right arm.  -Started the patient on IV heparin without bolus in a very guarded fashion with careful monitoring of the hematological parameters. Patient has been counseled to report immediately if he notices any bleeding. We will discontinue heparin if patient reports any bleeding        -Patient will need PICC line in the other arm or Mediport prior to discharge since he is scheduled for outpatient chemotherapy. 4.  Chronic fatigue  -Secondary to underlying plasma cell leukemia and chemotherapy. - If he doesn't improve as he gets past his chari, then readdress hospice. 5.  Anemia  -Secondary to chemotherapy/underlying bone marrow disorder.  -Recommend 1 unit packed RBC transfusion to maintain hemoglobin around 7 g%.  -Patient denies any overt bleeding at this time.              ONCOLOGIC DISPOSITION:      Kush Garcia MD  Please contact through [facility]

## 2023-02-27 ENCOUNTER — APPOINTMENT (OUTPATIENT)
Dept: VASCULAR LAB | Age: 78
End: 2023-02-27
Payer: MEDICARE

## 2023-02-27 LAB
A/G RATIO: 1.8 (ref 1.1–2.2)
ABO/RH: NORMAL
ALBUMIN SERPL-MCNC: 3.2 G/DL (ref 3.4–5)
ALP BLD-CCNC: 72 U/L (ref 40–129)
ALT SERPL-CCNC: 8 U/L (ref 10–40)
ANION GAP SERPL CALCULATED.3IONS-SCNC: 11 MMOL/L (ref 3–16)
ANISOCYTOSIS: ABNORMAL
ANTI-XA UNFRAC HEPARIN: 0.66 IU/ML (ref 0.3–0.7)
ANTIBODY SCREEN: NORMAL
AST SERPL-CCNC: 14 U/L (ref 15–37)
ATYPICAL LYMPHOCYTE RELATIVE PERCENT: 3 % (ref 0–6)
BANDED NEUTROPHILS RELATIVE PERCENT: 7 % (ref 0–7)
BASOPHILS ABSOLUTE: 0 K/UL (ref 0–0.2)
BASOPHILS RELATIVE PERCENT: 0 %
BILIRUB SERPL-MCNC: 0.4 MG/DL (ref 0–1)
BUN BLDV-MCNC: 25 MG/DL (ref 7–20)
CALCIUM SERPL-MCNC: 8.5 MG/DL (ref 8.3–10.6)
CHLORIDE BLD-SCNC: 108 MMOL/L (ref 99–110)
CO2: 21 MMOL/L (ref 21–32)
CREAT SERPL-MCNC: 1.9 MG/DL (ref 0.8–1.3)
EOSINOPHILS ABSOLUTE: 0.1 K/UL (ref 0–0.6)
EOSINOPHILS RELATIVE PERCENT: 1 %
GFR SERPL CREATININE-BSD FRML MDRD: 36 ML/MIN/{1.73_M2}
GLUCOSE BLD-MCNC: 105 MG/DL (ref 70–99)
HCT VFR BLD CALC: 20.5 % (ref 40.5–52.5)
HEMATOLOGY PATH CONSULT: NORMAL
HEMATOLOGY PATH CONSULT: YES
HEMOGLOBIN: 7 G/DL (ref 13.5–17.5)
LYMPHOCYTES ABSOLUTE: 10.5 K/UL (ref 1–5.1)
LYMPHOCYTES RELATIVE PERCENT: 87 %
MCH RBC QN AUTO: 33 PG (ref 26–34)
MCHC RBC AUTO-ENTMCNC: 34 G/DL (ref 31–36)
MCV RBC AUTO: 97 FL (ref 80–100)
MICROCYTES: ABNORMAL
MONOCYTES ABSOLUTE: 0.2 K/UL (ref 0–1.3)
MONOCYTES RELATIVE PERCENT: 2 %
NEUTROPHILS ABSOLUTE: 0.8 K/UL (ref 1.7–7.7)
NEUTROPHILS RELATIVE PERCENT: 0 %
OVALOCYTES: ABNORMAL
PDW BLD-RTO: 15.4 % (ref 12.4–15.4)
PLATELET # BLD: 21 K/UL (ref 135–450)
PMV BLD AUTO: 6.4 FL (ref 5–10.5)
POIKILOCYTES: ABNORMAL
POTASSIUM REFLEX MAGNESIUM: 4.4 MMOL/L (ref 3.5–5.1)
RBC # BLD: 2.11 M/UL (ref 4.2–5.9)
SCHISTOCYTES: ABNORMAL
SLIDE REVIEW: ABNORMAL
SODIUM BLD-SCNC: 140 MMOL/L (ref 136–145)
TOTAL PROTEIN: 5 G/DL (ref 6.4–8.2)
WBC # BLD: 11.7 K/UL (ref 4–11)

## 2023-02-27 PROCEDURE — 86900 BLOOD TYPING SEROLOGIC ABO: CPT

## 2023-02-27 PROCEDURE — 6370000000 HC RX 637 (ALT 250 FOR IP): Performed by: INTERNAL MEDICINE

## 2023-02-27 PROCEDURE — P9040 RBC LEUKOREDUCED IRRADIATED: HCPCS

## 2023-02-27 PROCEDURE — 6360000002 HC RX W HCPCS: Performed by: HOSPITALIST

## 2023-02-27 PROCEDURE — 85520 HEPARIN ASSAY: CPT

## 2023-02-27 PROCEDURE — 85025 COMPLETE CBC W/AUTO DIFF WBC: CPT

## 2023-02-27 PROCEDURE — 86923 COMPATIBILITY TEST ELECTRIC: CPT

## 2023-02-27 PROCEDURE — 93971 EXTREMITY STUDY: CPT

## 2023-02-27 PROCEDURE — 1200000000 HC SEMI PRIVATE

## 2023-02-27 PROCEDURE — 80053 COMPREHEN METABOLIC PANEL: CPT

## 2023-02-27 PROCEDURE — 86901 BLOOD TYPING SEROLOGIC RH(D): CPT

## 2023-02-27 PROCEDURE — 2500000003 HC RX 250 WO HCPCS: Performed by: INTERNAL MEDICINE

## 2023-02-27 PROCEDURE — 86850 RBC ANTIBODY SCREEN: CPT

## 2023-02-27 PROCEDURE — P9036 PLATELET PHERESIS IRRADIATED: HCPCS

## 2023-02-27 PROCEDURE — 6370000000 HC RX 637 (ALT 250 FOR IP): Performed by: HOSPITALIST

## 2023-02-27 PROCEDURE — 2580000003 HC RX 258: Performed by: HOSPITALIST

## 2023-02-27 RX ADMIN — Medication 10 ML: at 09:19

## 2023-02-27 RX ADMIN — CHOLESTYRAMINE 4 G: 4 POWDER, FOR SUSPENSION ORAL at 17:18

## 2023-02-27 RX ADMIN — GABAPENTIN 100 MG: 100 CAPSULE ORAL at 09:18

## 2023-02-27 RX ADMIN — ACETAMINOPHEN 650 MG: 325 TABLET ORAL at 13:01

## 2023-02-27 RX ADMIN — GABAPENTIN 100 MG: 100 CAPSULE ORAL at 21:44

## 2023-02-27 RX ADMIN — CHOLESTYRAMINE 4 G: 4 POWDER, FOR SUSPENSION ORAL at 09:18

## 2023-02-27 RX ADMIN — HEPARIN SODIUM 1470 UNITS/HR: 10000 INJECTION, SOLUTION INTRAVENOUS at 02:21

## 2023-02-27 RX ADMIN — PROCHLORPERAZINE EDISYLATE 10 MG: 5 INJECTION INTRAMUSCULAR; INTRAVENOUS at 13:10

## 2023-02-27 RX ADMIN — HEPARIN SODIUM 1470 UNITS/HR: 10000 INJECTION, SOLUTION INTRAVENOUS at 21:36

## 2023-02-27 RX ADMIN — CHOLESTYRAMINE 4 G: 4 POWDER, FOR SUSPENSION ORAL at 11:47

## 2023-02-27 RX ADMIN — OLANZAPINE 5 MG: 5 TABLET, FILM COATED ORAL at 21:44

## 2023-02-27 RX ADMIN — BUSPIRONE HYDROCHLORIDE 10 MG: 5 TABLET ORAL at 21:44

## 2023-02-27 RX ADMIN — SERTRALINE 75 MG: 50 TABLET, FILM COATED ORAL at 09:18

## 2023-02-27 RX ADMIN — LORAZEPAM 1 MG: 1 TABLET ORAL at 11:33

## 2023-02-27 RX ADMIN — BUSPIRONE HYDROCHLORIDE 10 MG: 5 TABLET ORAL at 09:18

## 2023-02-27 RX ADMIN — PANTOPRAZOLE SODIUM 40 MG: 40 TABLET, DELAYED RELEASE ORAL at 07:00

## 2023-02-27 RX ADMIN — ONDANSETRON 4 MG: 4 TABLET, ORALLY DISINTEGRATING ORAL at 09:43

## 2023-02-27 RX ADMIN — Medication 10 ML: at 21:44

## 2023-02-27 ASSESSMENT — PAIN SCALES - GENERAL
PAINLEVEL_OUTOF10: 0
PAINLEVEL_OUTOF10: 0
PAINLEVEL_OUTOF10: 7
PAINLEVEL_OUTOF10: 8
PAINLEVEL_OUTOF10: 9
PAINLEVEL_OUTOF10: 0
PAINLEVEL_OUTOF10: 3

## 2023-02-27 ASSESSMENT — PAIN DESCRIPTION - LOCATION
LOCATION: COCCYX

## 2023-02-27 NOTE — PROGRESS NOTES
Hospitalist Progress Note      PCP: Isabel Cullen DO    Date of Admission: 2/22/2023    Chief Complaint:  Fatigue         History Of Present Illness:       65 y.o. male who presents to the emergency department today with complaints of fatigue. Patient just went home yesterday, the has not made a 24 hours at home, wife states that when he got home he just could not walk or care for himself. Wife did mention that she thought that the patient should be on hospice. Patient was recently admitted to the hospital for an ELEONORA and thrombocytopenia as well as his generalized weakness. He is here for further evaluation. Subjective:     R UE swelling improving.    Patient has worsening LUE swelling     Medications:  Reviewed    Infusion Medications    sodium chloride      heparin (PORCINE) Infusion 1,470 Units/hr (02/27/23 0700)    sodium chloride      sodium chloride      sodium chloride      sodium chloride      sodium chloride       Scheduled Medications    cholestyramine  4 g Oral TID WC    OLANZapine  5 mg Oral Nightly    pantoprazole  40 mg Oral Daily    sertraline  75 mg Oral Daily    sodium chloride flush  5-40 mL IntraVENous 2 times per day    busPIRone  10 mg Oral BID    gabapentin  100 mg Oral BID    scopolamine  1 patch TransDERmal Q72H     PRN Meds: sodium chloride, heparin (porcine), heparin (porcine), sodium chloride, sodium chloride, oxyCODONE, sodium chloride flush, sodium chloride, ondansetron **OR** ondansetron, polyethylene glycol, acetaminophen **OR** acetaminophen, LORazepam, sodium chloride, sodium chloride, prochlorperazine, metoclopramide      Intake/Output Summary (Last 24 hours) at 2/27/2023 1756  Last data filed at 2/27/2023 1432  Gross per 24 hour   Intake 531.39 ml   Output 2000 ml   Net -1468.61 ml       Physical Exam Performed:    BP 96/61   Pulse 94   Temp 97.6 °F (36.4 °C) (Oral)   Resp 18   Ht 5' 9\" (1.753 m)   Wt 180 lb (81.6 kg)   SpO2 92%   BMI 26.58 kg/m² General appearance: No apparent distress, appears stated age and cooperative. HEENT: Pupils equal, round, and reactive to light. Conjunctivae/corneas clear. Neck: Supple, with full range of motion. No jugular venous distention. Trachea midline. Respiratory:  Normal respiratory effort. Clear to auscultation, bilaterally without Rales/Wheezes/Rhonchi. Cardiovascular: Regular rate and rhythm with normal S1/S2 without murmurs, rubs or gallops. Abdomen: Soft, non-tender, non-distended with normal bowel sounds. Musculoskeletal: No clubbing, cyanosis or edema bilaterally. Full range of motion without deformity. Skin: Skin color, texture, turgor normal.  No rashes or lesions. Neurologic:  Neurovascularly intact without any focal sensory/motor deficits. Cranial nerves: II-XII intact, grossly non-focal.  Psychiatric: Alert and oriented, thought content appropriate, normal insight  Capillary Refill: Brisk, 3 seconds, normal   Peripheral Pulses: +2 palpable, equal bilaterally       Labs:   Recent Labs     02/25/23 0218 02/26/23  0510 02/27/23 0445   WBC 12.7* 10.2 11.7*   HGB 8.4* 7.7* 7.0*   HCT 24.4* 23.1* 20.5*   PLT 20* 30* 21*     Recent Labs     02/25/23 0218 02/26/23  0510 02/27/23  0445    141 140   K 4.4 4.2 4.4    109 108   CO2 22 21 21   BUN 26* 25* 25*   CREATININE 1.8* 1.8* 1.9*   CALCIUM 8.5 8.6 8.5     Recent Labs     02/25/23 0218 02/26/23  0510 02/27/23  0445   AST 17 15 14*   ALT 12 11 8*   BILITOT 0.5 0.4 0.4   ALKPHOS 68 67 72     Recent Labs     02/24/23  1911   INR 1.10     No results for input(s): Coit Dilip in the last 72 hours.     Urinalysis:      Lab Results   Component Value Date/Time    NITRU Negative 02/22/2023 02:45 PM    WBCUA None seen 02/22/2023 02:45 PM    BACTERIA 1+ 02/09/2023 12:08 PM    RBCUA 3-4 02/22/2023 02:45 PM    BLOODU TRACE-LYSED 02/22/2023 02:45 PM    SPECGRAV 1.015 02/22/2023 02:45 PM    GLUCOSEU Negative 02/22/2023 02:45 PM Radiology:  VL Extremity Venous Left         VL Extremity Venous Right   Final Result      XR CHEST PORTABLE   Final Result   1. New right PICC line extending the cavoatrial junction. 2. Otherwise stable appearance of the chest with no acute findings. IP CONSULT TO HEM/ONC  PALLIATIVE CARE EVAL  IP CONSULT TO HOSPITALIST  IP CONSULT TO HOME CARE NEEDS  IP CONSULT TO CARDIOLOGY  IP CONSULT TO CASE MANAGEMENT  IP CONSULT TO PHARMACY  IP CONSULT TO PALLIATIVE CARE  IP CONSULT TO HOSPICE    Assessment/Plan:    Active Hospital Problems    Diagnosis     Thrombocytopenia ([facility] [address] [D69.6]      Priority: Medium    Plasma cell leukemia (HCC) [C90.10]      Priority: Medium    Malaise and fatigue [R53.81, R53.83]      Priority: Medium    History of DVT in adulthood [Z86.718]     CKD (chronic kidney disease) stage 3, GFR 30-59 ml/min ([facility]) [N18.30]     Essential hypertension, benign [I10]      - Left Upper extremity swelling - r/o DVT  -Acute DVT R upper extremity   Doppler showed  Acute partially occluded deep vein thrombosis involving the right brachial   vein (1 of 2). Acute partially occluded superficial venous thrombosis involving the right   basilic vein. Isolated acute totally occluded superficial venous thrombosis involving the   right cephalic vein at the antecubital fossa. - Fatigue   -MM with progression to plasma cell leukemia  -Thrombocytopenia  -Anemia: s/p 1 u PRBC  -ELEONORA on CKD  DVT  - stopped eliquis due to severe persistent thrombocytopenia     PLAN:     IV heparin drip  Monitor CBC   Oncology consult   PT OT consult   Monitor GFR, avoid nephrotoxic agent   Continue home meds     DVT Prophylaxis:   Diet: ADULT DIET;  Regular  ADULT ORAL NUTRITION SUPPLEMENT; Breakfast, Dinner; Standard High Calorie/High Protein Oral Supplement  Code Status: Full Code     PT/OT Eval Status:      Dispo - Pending clinical improvement     Liliana Flores MD

## 2023-02-27 NOTE — PROGRESS NOTES
Shift assessment completed. Pt A&O, VSS. Noted left arm swollen and warm, no redness. Doppler in AM for possible DVT. Heparin drip infusing. PRN Ativan tolerated by patient overnight. Denies any needs at this time. Bed locked and in lowest position. Call light within reach. Will continue to monitor.

## 2023-02-27 NOTE — PROGRESS NOTES
Physical Therapy    PT session attempted this morning, although pt politely declines 2* fatigue. Agreeable to try again tomorrow if feeling better. Will continue to follow. Thank you.     Heidi Galvez  PT, DPT #[id number]

## 2023-02-27 NOTE — PROGRESS NOTES
Shift assessment completed and charted. VSS. A/O x4. All meds given per [facility]. Bruising on Left Upper Extremity noted. Scattered Edema, swelling, and bruising. Refused bath this morning, will try again in a few hours. No further needs at this time.

## 2023-02-27 NOTE — PROGRESS NOTES
4 Eyes Skin Assessment     The patient is being assess for  Shift Assessment    I agree that 2 RN's have performed a thorough Head to Toe Skin Assessment on the patient. ALL assessment sites listed below have been assessed. Areas assessed by both nurses:   [x]   Head, Face, and Ears   [x]   Shoulders, Back, and Chest  [x]   Arms, Elbows, and Hands   [x]   Coccyx, Sacrum, and IschIum  [x]   Legs, Feet, and Heels        Does the Patient have Skin Breakdown?   No         Brant Prevention initiated:  NA   Wound Care Orders initiated:  NA      [facility] nurse consulted for Pressure Injury (Stage 3,4, Unstageable, DTI, NWPT, and Complex wounds), New and Established Ostomies:  NA      Nurse 1 eSignature: Electronically signed by Deyvi Chau RN on 2/27/23 at 4:07 AM EST    **SHARE this note so that the co-signing nurse is able to place an eSignature**    Nurse 2 eSignature: {Esignature:[id number]}

## 2023-02-27 NOTE — PLAN OF CARE
Problem: ABCDS Injury Assessment  Goal: Absence of physical injury  2/27/2023 1050 by Pratima Dodge LPN  Outcome: Progressing  2/26/2023 2128 by Balaji Fajardo RN  Outcome: Progressing     Problem: Safety - Adult  Goal: Free from fall injury  2/27/2023 1050 by Pratima Dodge LPN  Outcome: Progressing  2/26/2023 2128 by Balaji Fajardo RN  Outcome: Progressing     Problem: Skin/Tissue Integrity  Goal: Absence of new skin breakdown  Description: 1. Monitor for areas of redness and/or skin breakdown  2. Assess vascular access sites hourly  3. Every 4-6 hours minimum:  Change oxygen saturation probe site  4. Every 4-6 hours:  If on nasal continuous positive airway pressure, respiratory therapy assess nares and determine need for appliance change or resting period.   2/27/2023 1050 by Pratima Dodge LPN  Outcome: Progressing  2/26/2023 2128 by Balaji Fajardo RN  Outcome: Progressing     Problem: Nutrition Deficit:  Goal: Optimize nutritional status  2/27/2023 1050 by Pratima Dodge LPN  Outcome: Progressing  2/26/2023 2128 by Balaji Fajardo RN  Outcome: Progressing

## 2023-02-27 NOTE — PROGRESS NOTES
4 Eyes Skin Assessment     The patient is being assess for  Shift Handoff    I agree that 2 RN's have performed a thorough Head to Toe Skin Assessment on the patient. ALL assessment sites listed below have been assessed. Areas assessed by both nurses:     Holland Gomez / Satinder Bangura  [x]   Head, Face, and Ears   [x]   Shoulders, Back, and Chest  [x]   Arms, Elbows, and Hands   [x]   Coccyx, Sacrum, and IschIum  [x]   Legs, Feet, and Heels        Does the Patient have Skin Breakdown?   Yes LDA WOUND CARE was Initiated documentation include the Heather-wound, Wound Assessment, Measurements, Dressing Treatment, Drainage, and Color\",         Brant Prevention initiated:  Yes   Wound Care Orders initiated:  No      [facility] nurse consulted for Pressure Injury (Stage 3,4, Unstageable, DTI, NWPT, and Complex wounds), New and Established Ostomies:  No      Nurse 1 eSignature: Electronically signed by Kari Ayala LPN on 6/91/08 at 3:72 PM EST    **SHARE this note so that the co-signing nurse is able to place an eSignature**    Nurse 2 eSignature: {Esignature:[id number]}

## 2023-02-27 NOTE — PLAN OF CARE
Problem: ABCDS Injury Assessment  Goal: Absence of physical injury  Outcome: Progressing     Problem: Safety - Adult  Goal: Free from fall injury  2/26/2023 2128 by Loki Mendoza RN  Outcome: Progressing  2/26/2023 0739 by Angel Oh RN  Outcome: Progressing  Flowsheets (Taken 2/24/2023 1726 by Sruthi Love RN)  Free From Fall Injury: Instruct family/caregiver on patient safety     Problem: Skin/Tissue Integrity  Goal: Absence of new skin breakdown  Description: 1. Monitor for areas of redness and/or skin breakdown  2. Assess vascular access sites hourly  3. Every 4-6 hours minimum:  Change oxygen saturation probe site  4. Every 4-6 hours:  If on nasal continuous positive airway pressure, respiratory therapy assess nares and determine need for appliance change or resting period.   Outcome: Progressing     Problem: Nutrition Deficit:  Goal: Optimize nutritional status  Outcome: Progressing

## 2023-02-27 NOTE — PROGRESS NOTES
NELLA MI, Ubaldo Adams, Lab called regarding patient's hematocrit this AM. Critical lab value of 20.5. Any new orders at this time? Thank you.

## 2023-02-27 NOTE — CARE COORDINATION
Chart reviewed day 5. Spoke with daughter bedside. Discussed future plan of care as laid out by oncology. Continue to monitor through next week, if he doesn't improve as he gets past his chari, then readdress hospice. Stated mom is at dr valentin, will follow up with her tomorrow about SNF options. Patient declined therapy today due to fatigue.  Karla Cruz RN

## 2023-02-27 NOTE — PROGRESS NOTES
ONCOLOGY HEMATOLOGY CARE PROGRESS NOTE      SUBJECTIVE:    Afebrile, on room air. Resting comfortably. He has some discomfort in his left hand. Otherwise denies pain and has no other concerns. Denies bleeding. ROS:     Constitutional: fatigue and weakness. No fever, No chills, No night sweats.     Eyes:  No impairment or change in vision  ENT / Mouth:  No pain, abnormal ulceration, bleeding, nasal drip or change in voice or hearing  Cardiovascular:  No chest pain, palpitations, new edema, or calf discomfort  Respiratory:  No pain, hemoptysis, change to breathing  Breast:  No pain, discharge, change in appearance or texture  Gastrointestinal:  No pain, cramping, jaundice, change to eating and bowel habits  Urinary:  No pain, bleeding or change in continence  Genitalia: No pain, bleeding or discharge  Musculoskeletal: left hand pain  No redness, edema  Skin:  No pruritus, rash, change to nodules or lesions  Neurologic:  No discomfort, change in mental status, speech, sensory or motor activity  Psychiatric:  No change in concentration or change to affect or mood  Endocrine:  No hot flashes, increased thirst, or change to urine production  Hematologic: No petechiae, ecchymosis or bleeding  Lymphatic:  No lymphadenopathy or lymphedema  Allergy / Immunologic:  No eczema, hives, frequent or recurrent infections    OBJECTIVE        Physical    VITALS:  Patient Vitals for the past 24 hrs:   BP Temp Temp src Pulse Resp SpO2   02/27/23 0430 (!) 143/77 98.7 °F (37.1 °C) Oral 81 16 95 %   02/26/23 2345 113/70 98 °F (36.7 °C) Oral 82 16 94 %   02/26/23 1930 104/63 99.3 °F (37.4 °C) Oral 85 16 92 %   02/26/23 1906 113/67 98.6 °F (37 °C) Oral 71 16 93 %   02/26/23 1422 117/69 97.9 °F (36.6 °C) Oral 78 16 91 %   02/26/23 1301 113/69 98.5 °F (36.9 °C) Oral 81 16 93 %   02/26/23 0905 111/66 97.3 °F (36.3 °C) Oral 84 16 94 %       24HR INTAKE/OUTPUT:    Intake/Output Summary (Last 24 hours) at 2/27/2023 7638  Last data filed at 2/27/2023 0700  Gross per 24 hour   Intake 481.39 ml   Output 1300 ml   Net -818.61 ml       CONSTITUTIONAL: awake, alert, cooperative, no apparent distress. Chronically ill appearing. Pale complexion. EYES: pupils equal, round and reactive to light, sclera clear and conjunctiva normal  ENT: brusing noted on upper and lower lip. No active bleeding. Normocephalic, atraumatic  NECK: supple, symmetrical, no jugular venous distension   HEMATOLOGIC/LYMPHATIC: no cervical, supraclavicular or axillary lymphadenopathy   LUNGS: no increased work of breathing and clear to auscultation   CARDIOVASCULAR: regular rate and rhythm, normal S1 and S2, no murmur noted  ABDOMEN: normal bowel sounds x 4, soft, non-distended, non-tender, no masses palpated, no hepatosplenomgaly   MUSCULOSKELETAL: atrophic limbs. Weak with movements. NEUROLOGIC: awake, alert, oriented to name, place and time. Motor skills grossly intact. SKIN: Left arm from elbow down appears edematous. Warm to touch. Scattered ecchymosis noted on medial forearm through dorsal aspect of left hand. Slightly tender to touch. Pulses present but slightly weak due to swelling.      DATA:  CBC:    Recent Labs     02/27/23  0445 02/26/23  0510 02/25/23  0218 02/24/23  1200   WBC 11.7* 10.2 12.7* 9.2   NEUTROABS 0.8* 1.1* 1.5* 1.1*   LYMPHOPCT 87.0 81.0 83.0 37.0   RBC 2.11* 2.37* 2.53* 2.19*   HGB 7.0* 7.7* 8.4* 7.0*   HCT 20.5* 23.1* 24.4* 21.5*   MCV 97.0 97.5 96.2 97.9   MCH 33.0 32.5 33.1 32.0   MCHC 34.0 33.4 34.4 32.7   RDW 15.4 15.0 14.7 15.6*   PLT 21* 30* 20* 26*       PT/INR:    Recent Labs     02/27/23  0445 02/26/23  0510 02/25/23  0218 02/24/23  1911 02/24/23  1200 02/22/23  1445 02/14/23  0722   PROT 5.0* 5.4* 5.4*  --  5.1* 5.5*  --    INR  --   --   --  1.10  --   --  1.12     PTT:    Recent Labs     02/24/23  1911   APTT 27.9       CMP:    Recent Labs     02/27/23  0445 02/26/23  0510 02/25/23  0218 02/24/23  1200 02/13/23  1150 02/13/23  0948    141 141 141   < > 142   K 4.4 4.2 4.4 3.9   < > 4.8    109 109 107   < > 106   CO2 21 21 22 24   < > 18*   GLUCOSE 105* 103* 115* 140*   < > 133*   BUN 25* 25* 26* 29*   < > 31*   CREATININE 1.9* 1.8* 1.8* 1.7*   < > 2.7*   LABGLOM 36* 38* 38* 41*   < > 23*   CALCIUM 8.5 8.6 8.5 8.3   < > 9.1   PROT 5.0* 5.4* 5.4* 5.1*   < >  --    LABALBU 3.2* 3.6 3.6 3.7   < >  --    AGRATIO 1.8 2.0 2.0 2.6*   < >  --    BILITOT 0.4 0.4 0.5 0.5   < >  --    ALKPHOS 72 67 68 69   < >  --    ALT 8* 11 12 14   < >  --    AST 14* 15 17 18   < >  --    MG  --   --   --   --   --  2.20    < > = values in this interval not displayed. Lab Results   Component Value Date    CALCIUM 8.5 02/27/2023    PHOS 2.2 (L) 02/17/2023       LDH:No results for input(s): LDH in the last 720 hours. Radiology Review:  VL Extremity Venous Right  Vascular Upper Extremities Veins Procedure    -- PRELIMINARY SONOGRAPHER REPORT --      Demographics      Patient Name        Ines Corpus      Date of Study       02/24/2023      Gender               Male      Patient Number      [id number]      Date of Birth        1945      Visit Number        [id number]       Age                  65 year(s)      Accession Number    [id number]      Room Number          4519      Corporate ID        [id number]         [address] [place] [address]      Ordering Physician  Ruben Waller MD  Interpreting         [facility]                                       Physician            Readers     Procedure    Type of Study:      Veins:Upper Extremities Veins, VL EXTREMITY VENOUS DUPLEX RIGHT. Tech Comments  Right  Technically difficult and limited study due to bandages. There is a PICC line noted in the right upper extremity. Acute partially occluded deep vein thrombosis involving the right brachial  vein (1 of 2).   Acute partially occluded superficial venous thrombosis involving the right  basilic vein. Isolated acute totally occluded superficial venous thrombosis involving the  right cephalic vein at the antecubital fossa. Within the limits of this exam, there is no other evidence of deep vein  thrombosis or superficial vein thrombosis of the right upper extremity. Left  Unable to compress the left subclavian vein due to patient's intolerance,  however, adequate color doppler obtained. Within the limits of this exam, there is no evidence of deep vein thrombosis  involving the left internal jugular vein and subclavian vein. There is no previous exam for comparison. Problem List  Patient Active Problem List   Diagnosis    GERD (gastroesophageal reflux disease)    MANISH (generalized anxiety disorder)    Essential hypertension, benign    CKD (chronic kidney disease) stage 3, GFR 30-59 ml/min (HCC)    Recurrent deep vein thrombosis (DVT) of right lower extremity (HCC)    Mixed hyperlipidemia    Ni's esophagus without dysplasia    Primary insomnia    Multiple myeloma not having achieved remission ([address]    Current moderate episode of major depressive disorder without prior episode ([address]    Cancer, metastatic to bone ([address]    Malaise and fatigue    Thrombocytopenia ([address]    Plasma cell leukemia ([address]    History of DVT in adulthood       ASSESSMENT AND PLAN:    Plasma cell leukemia  - being treated at [facility] outpatient for MM. Was receiving Velcade, cytoxan, and Xgeva. - Cycle 11 day 1 received on 01/11/2023. Received subsequent Velcade on time. Due for C12D1 on 02/15/2023.  - patient progressed on treatment    - Bone marrow biopsy performed 2/15/2023 showed plasma cell leukemia.   -After discussion with BMT team, patient was started on hyperfractionated cyclophosphamide by Dr. Osman Gutiérrez on 2/17/2023 for 4 doses plus pulse dexamethasone.  -Plan to start carfilzomib/Jeimy/Dex as an outpatient from 3/1/2023     2.   Thrombocytopenia  -Secondary to underlying leukemia and chronic bone marrow suppression secondary to chemotherapy. -Received  1 unit platelet transfusion on 2 /25 /2023 since patient has been started on IV heparin for newly found PICC line induced DVT to maintain platelet count around 30 K. - plts this AM are 21 K/uL     3. DVT of the right upper extremity. -PICC line induced  -Ultrasound venous Doppler revealed virtually occluded DVT involving the right brachial vein with partially occluded superficial venous thrombosis involving the right basilic vein.  -As per the patient and his wife, he was on Eliquis for approximately 15 years which was discontinued months back due to thrombocytopenia.  -Discussed with the  primary team-PICC line removed for now to prevent the clot propagation and speed resolution of thrombosis.  -Underlying malignancy, his previous history of DVT are  his personal risk factors that put him at risk of embolism though the brachial vein DVT is not notorious for causing pulmonary embolism.  -Started the patient on IV heparin without bolus in a very guarded fashion with careful monitoring of the hematological parameters on 02/24. Patient has been counseled to report immediately if he notices any bleeding. We will discontinue heparin if patient reports any bleeding  -Patient will need PICC line in the other arm or Mediport prior to discharge since he is scheduled for outpatient chemotherapy. 4. Swelling/Ecchymosis of left forearm  - noted this AM  - LUE US has already been ordered and is pending     5. Chronic fatigue  -Secondary to underlying plasma cell leukemia and chemotherapy. - Continue to monitor through next week, if he doesn't improve as he gets past his chari, then readdress hospice. 6.  Anemia/Myelosuppression  -Secondary to chemotherapy/underlying bone marrow disorder. - transfuse if hgb <7 or PLTs <10 K/mcL  -Patient denies any overt bleeding at this time.   - baseline Cr 1.2-1.5mg/dL    ONCOLOGIC DISPOSITION:      Thom Suárez TANNER Banks  Please contact through [address]

## 2023-02-28 LAB
A/G RATIO: 1.4 (ref 1.1–2.2)
A/G RATIO: 1.7 (ref 1.1–2.2)
ALBUMIN SERPL-MCNC: 3 G/DL (ref 3.4–5)
ALBUMIN SERPL-MCNC: 3 G/DL (ref 3.4–5)
ALBUMIN SERPL-MCNC: 3.2 G/DL (ref 3.4–5)
ALP BLD-CCNC: 66 U/L (ref 40–129)
ALP BLD-CCNC: 73 U/L (ref 40–129)
ALT SERPL-CCNC: 10 U/L (ref 10–40)
ALT SERPL-CCNC: 13 U/L (ref 10–40)
ANION GAP SERPL CALCULATED.3IONS-SCNC: 11 MMOL/L (ref 3–16)
ANION GAP SERPL CALCULATED.3IONS-SCNC: 18 MMOL/L (ref 3–16)
ANION GAP SERPL CALCULATED.3IONS-SCNC: 19 MMOL/L (ref 3–16)
ANISOCYTOSIS: ABNORMAL
ANTI-XA UNFRAC HEPARIN: 0.74 IU/ML (ref 0.3–0.7)
AST SERPL-CCNC: 16 U/L (ref 15–37)
AST SERPL-CCNC: 28 U/L (ref 15–37)
BASE EXCESS VENOUS: -11.8 MMOL/L (ref -3–3)
BASE EXCESS VENOUS: -7.7 MMOL/L (ref -3–3)
BASOPHILS ABSOLUTE: 0 K/UL (ref 0–0.2)
BASOPHILS ABSOLUTE: 0 K/UL (ref 0–0.2)
BASOPHILS RELATIVE PERCENT: 0 %
BASOPHILS RELATIVE PERCENT: 0 %
BILIRUB SERPL-MCNC: 0.3 MG/DL (ref 0–1)
BILIRUB SERPL-MCNC: 0.4 MG/DL (ref 0–1)
BUN BLDV-MCNC: 39 MG/DL (ref 7–20)
BUN BLDV-MCNC: 45 MG/DL (ref 7–20)
BUN BLDV-MCNC: 62 MG/DL (ref 7–20)
BURR CELLS: ABNORMAL
CALCIUM SERPL-MCNC: 8.1 MG/DL (ref 8.3–10.6)
CALCIUM SERPL-MCNC: 8.3 MG/DL (ref 8.3–10.6)
CALCIUM SERPL-MCNC: 8.3 MG/DL (ref 8.3–10.6)
CARBOXYHEMOGLOBIN: 0.5 % (ref 0–1.5)
CARBOXYHEMOGLOBIN: 2.4 % (ref 0–1.5)
CHLORIDE BLD-SCNC: 105 MMOL/L (ref 99–110)
CHLORIDE BLD-SCNC: 107 MMOL/L (ref 99–110)
CHLORIDE BLD-SCNC: 108 MMOL/L (ref 99–110)
CO2: 13 MMOL/L (ref 21–32)
CO2: 17 MMOL/L (ref 21–32)
CO2: 19 MMOL/L (ref 21–32)
CREAT SERPL-MCNC: 2.3 MG/DL (ref 0.8–1.3)
CREAT SERPL-MCNC: 2.8 MG/DL (ref 0.8–1.3)
CREAT SERPL-MCNC: 3.3 MG/DL (ref 0.8–1.3)
EOSINOPHILS ABSOLUTE: 0 K/UL (ref 0–0.6)
EOSINOPHILS ABSOLUTE: 0 K/UL (ref 0–0.6)
EOSINOPHILS RELATIVE PERCENT: 0 %
EOSINOPHILS RELATIVE PERCENT: 0 %
GFR SERPL CREATININE-BSD FRML MDRD: 18 ML/MIN/{1.73_M2}
GFR SERPL CREATININE-BSD FRML MDRD: 22 ML/MIN/{1.73_M2}
GFR SERPL CREATININE-BSD FRML MDRD: 28 ML/MIN/{1.73_M2}
GLUCOSE BLD-MCNC: 118 MG/DL (ref 70–99)
GLUCOSE BLD-MCNC: 167 MG/DL (ref 70–99)
GLUCOSE BLD-MCNC: 172 MG/DL (ref 70–99)
HCO3 VENOUS: 13.1 MMOL/L (ref 23–29)
HCO3 VENOUS: 14.7 MMOL/L (ref 23–29)
HCT VFR BLD CALC: 16.3 % (ref 40.5–52.5)
HCT VFR BLD CALC: 17.6 % (ref 40.5–52.5)
HCT VFR BLD CALC: 21.8 % (ref 40.5–52.5)
HEMATOLOGY PATH CONSULT: NO
HEMOGLOBIN: 5.5 G/DL (ref 13.5–17.5)
HEMOGLOBIN: 5.5 G/DL (ref 13.5–17.5)
HEMOGLOBIN: 7.5 G/DL (ref 13.5–17.5)
LACTIC ACID: 2 MMOL/L (ref 0.4–2)
LACTIC ACID: 2.8 MMOL/L (ref 0.4–2)
LACTIC ACID: 5.8 MMOL/L (ref 0.4–2)
LYMPHOCYTES ABSOLUTE: 14.1 K/UL (ref 1–5.1)
LYMPHOCYTES ABSOLUTE: 32.1 K/UL (ref 1–5.1)
LYMPHOCYTES RELATIVE PERCENT: 91 %
LYMPHOCYTES RELATIVE PERCENT: 96 %
MACROCYTES: ABNORMAL
MCH RBC QN AUTO: 31.8 PG (ref 26–34)
MCH RBC QN AUTO: 32.4 PG (ref 26–34)
MCH RBC QN AUTO: 33 PG (ref 26–34)
MCHC RBC AUTO-ENTMCNC: 31.1 G/DL (ref 31–36)
MCHC RBC AUTO-ENTMCNC: 33.7 G/DL (ref 31–36)
MCHC RBC AUTO-ENTMCNC: 34.4 G/DL (ref 31–36)
MCV RBC AUTO: 103.9 FL (ref 80–100)
MCV RBC AUTO: 92.5 FL (ref 80–100)
MCV RBC AUTO: 98 FL (ref 80–100)
METAMYELOCYTES RELATIVE PERCENT: 1 %
METHEMOGLOBIN VENOUS: 1.2 %
METHEMOGLOBIN VENOUS: 1.2 %
MICROCYTES: ABNORMAL
MONOCYTES ABSOLUTE: 0.3 K/UL (ref 0–1.3)
MONOCYTES ABSOLUTE: 0.3 K/UL (ref 0–1.3)
MONOCYTES RELATIVE PERCENT: 1 %
MONOCYTES RELATIVE PERCENT: 2 %
NEUTROPHILS ABSOLUTE: 1 K/UL (ref 1.7–7.7)
NEUTROPHILS ABSOLUTE: 1.1 K/UL (ref 1.7–7.7)
NEUTROPHILS RELATIVE PERCENT: 3 %
NEUTROPHILS RELATIVE PERCENT: 6 %
O2 SAT, VEN: 88 %
O2 SAT, VEN: 98 %
O2 THERAPY: ABNORMAL
O2 THERAPY: ABNORMAL
OVALOCYTES: ABNORMAL
PCO2, VEN: 20.3 MMHG (ref 40–50)
PCO2, VEN: 25.8 MMHG (ref 40–50)
PDW BLD-RTO: 14.8 % (ref 12.4–15.4)
PDW BLD-RTO: 15.3 % (ref 12.4–15.4)
PDW BLD-RTO: 15.8 % (ref 12.4–15.4)
PH VENOUS: 7.33 (ref 7.35–7.45)
PH VENOUS: 7.48 (ref 7.35–7.45)
PHOSPHORUS: 6.6 MG/DL (ref 2.5–4.9)
PLATELET # BLD: 14 K/UL (ref 135–450)
PLATELET # BLD: 15 K/UL (ref 135–450)
PLATELET # BLD: 5 K/UL (ref 135–450)
PLATELET SLIDE REVIEW: ABNORMAL
PLATELET SLIDE REVIEW: ABNORMAL
PMV BLD AUTO: 10.6 FL (ref 5–10.5)
PMV BLD AUTO: 10.8 FL (ref 5–10.5)
PMV BLD AUTO: 6.8 FL (ref 5–10.5)
PO2, VEN: 150.7 MMHG (ref 25–40)
PO2, VEN: 58.9 MMHG (ref 25–40)
POTASSIUM REFLEX MAGNESIUM: 4.9 MMOL/L (ref 3.5–5.1)
POTASSIUM REFLEX MAGNESIUM: 6.3 MMOL/L (ref 3.5–5.1)
POTASSIUM SERPL-SCNC: 5.4 MMOL/L (ref 3.5–5.1)
RBC # BLD: 1.66 M/UL (ref 4.2–5.9)
RBC # BLD: 1.7 M/UL (ref 4.2–5.9)
RBC # BLD: 2.35 M/UL (ref 4.2–5.9)
SLIDE REVIEW: ABNORMAL
SLIDE REVIEW: ABNORMAL
SODIUM BLD-SCNC: 138 MMOL/L (ref 136–145)
SODIUM BLD-SCNC: 139 MMOL/L (ref 136–145)
SODIUM BLD-SCNC: 140 MMOL/L (ref 136–145)
TCO2 CALC VENOUS: 14 MMOL/L
TCO2 CALC VENOUS: 15 MMOL/L
TOTAL PROTEIN: 4.8 G/DL (ref 6.4–8.2)
TOTAL PROTEIN: 5.2 G/DL (ref 6.4–8.2)
URIC ACID, SERUM: 9.5 MG/DL (ref 3.5–7.2)
WBC # BLD: 15.5 K/UL (ref 4–11)
WBC # BLD: 26.5 K/UL (ref 4–11)
WBC # BLD: 33.4 K/UL (ref 4–11)

## 2023-02-28 PROCEDURE — 51798 US URINE CAPACITY MEASURE: CPT

## 2023-02-28 PROCEDURE — 83605 ASSAY OF LACTIC ACID: CPT

## 2023-02-28 PROCEDURE — 2580000003 HC RX 258: Performed by: INTERNAL MEDICINE

## 2023-02-28 PROCEDURE — 6370000000 HC RX 637 (ALT 250 FOR IP): Performed by: INTERNAL MEDICINE

## 2023-02-28 PROCEDURE — 86022 PLATELET ANTIBODIES: CPT

## 2023-02-28 PROCEDURE — 80053 COMPREHEN METABOLIC PANEL: CPT

## 2023-02-28 PROCEDURE — 36430 TRANSFUSION BLD/BLD COMPNT: CPT

## 2023-02-28 PROCEDURE — 85025 COMPLETE CBC W/AUTO DIFF WBC: CPT

## 2023-02-28 PROCEDURE — 2500000003 HC RX 250 WO HCPCS: Performed by: INTERNAL MEDICINE

## 2023-02-28 PROCEDURE — 6370000000 HC RX 637 (ALT 250 FOR IP): Performed by: HOSPITALIST

## 2023-02-28 PROCEDURE — 82803 BLOOD GASES ANY COMBINATION: CPT

## 2023-02-28 PROCEDURE — 2580000003 HC RX 258: Performed by: HOSPITALIST

## 2023-02-28 PROCEDURE — C9113 INJ PANTOPRAZOLE SODIUM, VIA: HCPCS | Performed by: INTERNAL MEDICINE

## 2023-02-28 PROCEDURE — 84550 ASSAY OF BLOOD/URIC ACID: CPT

## 2023-02-28 PROCEDURE — 6360000002 HC RX W HCPCS: Performed by: HOSPITALIST

## 2023-02-28 PROCEDURE — 85520 HEPARIN ASSAY: CPT

## 2023-02-28 PROCEDURE — 36415 COLL VENOUS BLD VENIPUNCTURE: CPT

## 2023-02-28 PROCEDURE — 1200000000 HC SEMI PRIVATE

## 2023-02-28 PROCEDURE — 6360000002 HC RX W HCPCS: Performed by: INTERNAL MEDICINE

## 2023-02-28 PROCEDURE — 85027 COMPLETE CBC AUTOMATED: CPT

## 2023-02-28 RX ORDER — SODIUM CHLORIDE 9 MG/ML
INJECTION, SOLUTION INTRAVENOUS PRN
Status: DISCONTINUED | OUTPATIENT
Start: 2023-02-28 | End: 2023-03-02 | Stop reason: HOSPADM

## 2023-02-28 RX ORDER — 0.9 % SODIUM CHLORIDE 0.9 %
500 INTRAVENOUS SOLUTION INTRAVENOUS ONCE
Status: COMPLETED | OUTPATIENT
Start: 2023-02-28 | End: 2023-02-28

## 2023-02-28 RX ORDER — PANTOPRAZOLE SODIUM 40 MG/10ML
40 INJECTION, POWDER, LYOPHILIZED, FOR SOLUTION INTRAVENOUS DAILY
Status: DISCONTINUED | OUTPATIENT
Start: 2023-02-28 | End: 2023-03-02 | Stop reason: HOSPADM

## 2023-02-28 RX ADMIN — SODIUM CHLORIDE 8 MG/HR: 9 INJECTION, SOLUTION INTRAVENOUS at 12:17

## 2023-02-28 RX ADMIN — ONDANSETRON 4 MG: 4 TABLET, ORALLY DISINTEGRATING ORAL at 10:21

## 2023-02-28 RX ADMIN — Medication 10 ML: at 20:50

## 2023-02-28 RX ADMIN — METOCLOPRAMIDE 10 MG: 5 INJECTION, SOLUTION INTRAMUSCULAR; INTRAVENOUS at 11:43

## 2023-02-28 RX ADMIN — SODIUM ZIRCONIUM CYCLOSILICATE 10 G: 10 POWDER, FOR SUSPENSION ORAL at 17:40

## 2023-02-28 RX ADMIN — SODIUM BICARBONATE: 84 INJECTION, SOLUTION INTRAVENOUS at 18:37

## 2023-02-28 RX ADMIN — GABAPENTIN 100 MG: 100 CAPSULE ORAL at 20:50

## 2023-02-28 RX ADMIN — GABAPENTIN 100 MG: 100 CAPSULE ORAL at 09:33

## 2023-02-28 RX ADMIN — BUSPIRONE HYDROCHLORIDE 10 MG: 5 TABLET ORAL at 09:33

## 2023-02-28 RX ADMIN — ACETAMINOPHEN 650 MG: 325 TABLET ORAL at 17:13

## 2023-02-28 RX ADMIN — BUSPIRONE HYDROCHLORIDE 10 MG: 5 TABLET ORAL at 20:50

## 2023-02-28 RX ADMIN — CHOLESTYRAMINE 4 G: 4 POWDER, FOR SUSPENSION ORAL at 09:33

## 2023-02-28 RX ADMIN — PANTOPRAZOLE SODIUM 40 MG: 40 INJECTION, POWDER, FOR SOLUTION INTRAVENOUS at 12:14

## 2023-02-28 RX ADMIN — SODIUM CHLORIDE 500 ML: 9 INJECTION, SOLUTION INTRAVENOUS at 13:19

## 2023-02-28 RX ADMIN — PANTOPRAZOLE SODIUM 40 MG: 40 TABLET, DELAYED RELEASE ORAL at 06:03

## 2023-02-28 RX ADMIN — OLANZAPINE 5 MG: 5 TABLET, FILM COATED ORAL at 20:50

## 2023-02-28 RX ADMIN — SERTRALINE 75 MG: 50 TABLET, FILM COATED ORAL at 09:33

## 2023-02-28 ASSESSMENT — PAIN SCALES - GENERAL
PAINLEVEL_OUTOF10: 0

## 2023-02-28 NOTE — PROGRESS NOTES
Pt w/episode of emesis w/blood. MD paged, awaiting response. PRN medications admin, see MAR.  Electronically signed by Renae Schuler RN on 2/28/2023 at 11:34 AM

## 2023-02-28 NOTE — CONSULTS
Duplicate palliative referral.  Goal is to continue to monitor through next week, if he doesn't improve as he gets past his chari, then readdress hospice. In the meantime family is considering SNF options.     Please see palliative notes from 2/23/23 and 2/24/23    Palliative continues to follow/monitor

## 2023-02-28 NOTE — PLAN OF CARE
Problem: ABCDS Injury Assessment  Goal: Absence of physical injury  Outcome: Progressing     Problem: Safety - Adult  Goal: Free from fall injury  Outcome: Progressing     Problem: Skin/Tissue Integrity  Goal: Absence of new skin breakdown  Description: 1. Monitor for areas of redness and/or skin breakdown  2. Assess vascular access sites hourly  3. Every 4-6 hours minimum:  Change oxygen saturation probe site  4. Every 4-6 hours:  If on nasal continuous positive airway pressure, respiratory therapy assess nares and determine need for appliance change or resting period.   Outcome: Progressing     Problem: Nutrition Deficit:  Goal: Optimize nutritional status  Outcome: Progressing     Problem: Pain  Goal: Verbalizes/displays adequate comfort level or baseline comfort level  Outcome: Progressing

## 2023-02-28 NOTE — PROGRESS NOTES
Per Dr.[place] TANNER Saleh paged regarding need for blood transfusion orders. Awaiting response.  Electronically signed by Lenora Zheng RN on 2/28/2023 at 10:28 AM

## 2023-02-28 NOTE — CONSULTS
Consultation Note    Patient Name: Van Bamberger  : 1945  Age: 65 y.o. Admitting Physician: Angel Luis Flores MD   Date of Admission: 2023  2:38 PM   Primary Care Physician: Annis Rinne, DO Van Bamberger is being seen at the request of Angel Luis Flores MD for hematemesis. History of Present Illness:  69-year-old who with a past medical history of plasma cell leukemia was admitted for weakness and found to have significant thrombocytopenia. The patient also had trouble with nausea throughout this hospitalization had an episode of hematemesis with bright red blood yesterday morning. Additionally, he was noted to have a drop in hemoglobin from 7 to 5.5. Patient also continues to have significant thrombocytopenia. He takes a once daily proton pump inhibitor at home and after bleeding episode, this was increased to a proton pump inhibitor drip.     GI History:  2022 EGD: Short segment Ni's esophagus and gastritis    Past Medical History:  Past Medical History:   Diagnosis Date    Anxiety     Arthritis     Bladder cancer ([address]     DVT (deep venous thrombosis) ([address] 2014    two spontaneous DVTs left leg    Elevated fasting glucose     Hyperlipidemia     Hypertension     Malignant neoplasm of urinary bladder ([address] 2019    Multiple myeloma (HCC)     MVP (mitral valve prolapse)     Polyp of gallbladder     Prostate CA ([address]         Past Surgical History:  Past Surgical History:   Procedure Laterality Date    BLADDER SURGERY      tumor removed    CATARACT REMOVAL Bilateral     CHOLECYSTECTOMY, LAPAROSCOPIC N/A 1/10/2022    VIDEO LAPAROSCOPIC CHOLECYSTECTOMY performed by Brian Carty MD at [facility]  12    Polyps    COLONOSCOPY  2017    Polyp    COLONOSCOPY N/A 2022    COLONOSCOPY POLYPECTOMY SNARE/COLD BIOPSY performed by Latanya Lawrence MD at [facility]  3/3/2022    CT BIOPSY PERCUTANEOUS DEEP BONE 3/3/2022 MD [facility] CT SCAN    CT BONE MARROW BIOPSY  2/15/2023    CT BONE MARROW BIOPSY 2/15/2023 [facility] CT SCAN    INGUINAL HERNIA REPAIR Right 06/1999    PENILE PROSTHESIS PLACEMENT      PROSTATECTOMY  2005 approx    Dr. Nathalia Nichols      In [place] - had one tonsil burned out    UPPER GASTROINTESTINAL ENDOSCOPY N/A 9/6/2022    EGD BIOPSY performed by Lenora Iverson MD at [address] [place] [address] Medications:  Prior to Visit Medications    Medication Sig Taking? Authorizing Provider   gabapentin (NEURONTIN) 100 MG capsule Take 1 capsule by mouth 2 times daily for 30 days.   Riky Justin MD   ondansetron (ZOFRAN-ODT) 8 MG TBDP disintegrating tablet Take 1 tablet by mouth every 8 hours as needed for Nausea or Vomiting  Riky Justin MD   promethazine (PHENERGAN) 25 MG tablet Take 1 tablet by mouth 4 times daily as needed for Nausea  Patient not taking: Reported on 2/22/2023  Riky Justin MD   busPIRone (BUSPAR) 7.5 MG tablet Take 7.5 mg by mouth 2 times daily  Historical Provider, MD   lisinopril (PRINIVIL;ZESTRIL) 20 MG tablet Take 20 mg by mouth daily  Patient not taking: Reported on 2/22/2023  Historical Provider, MD   rosuvastatin (CRESTOR) 20 MG tablet TAKE ONE TABLET BY MOUTH DAILY  Patient not taking: Reported on 2/22/2023  [facility],    fenofibrate (TRICOR) 54 MG tablet Take 1 tablet by mouth daily [facility] pharmacy: Please dispense generic fenofibrate unless prescriber denote  Demond Valenzuela, DO   cholestyramine Roseline Shelia) 4 GM/DOSE powder Take 1 packet by mouth 3 times daily (with meals)  Misha [facility] Maria Dolores, DO   OLANZapine (ZYPREXA) 5 MG tablet Take 5 mg by mouth nightly  Historical Provider, MD   bortezomib 5 mg/2 mLs in sodium chloride (PF) injection Inject 1.3 mg/m2 into the skin once  Historical Provider, MD   sertraline (ZOLOFT) 50 MG tablet Take 1.5 tablets by mouth daily  Misha [facility] Maria Dolores,    LORazepam (ATIVAN) 1 MG tablet Take 1 mg by mouth 4 times daily as needed. Historical Provider, MD   dronabinol (MARINOL) 5 MG capsule in the morning and at bedtime. Historical Provider, MD   oxyCODONE (ROXICODONE) 5 MG immediate release tablet Take 5 mg by mouth every 4 hours as needed for Pain.   Historical Provider, MD   pantoprazole (PROTONIX) 40 MG tablet Take 1 tablet by mouth daily  Adrian Mccormick [facility] Medications:  Current Facility-Administered Medications: 0.9 % sodium chloride infusion, , IntraVENous, PRN  pantoprazole (PROTONIX) injection 40 mg, 40 mg, IntraVENous, Daily  pantoprazole (PROTONIX) 80 mg in sodium chloride 0.9 % 100 mL infusion, 8 mg/hr, IntraVENous, Continuous  sodium bicarbonate 150 mEq in dextrose 5 % 1,000 mL infusion, , IntraVENous, Continuous  0.9 % sodium chloride infusion, , IntraVENous, PRN  heparin (porcine) injection 6,500 Units, 6,500 Units, IntraVENous, PRN  heparin (porcine) injection 3,300 Units, 3,300 Units, IntraVENous, PRN  [Held by provider] heparin 25,000 unit in sodium chloride 0.45% 250 mL (premix) infusion, 1,390 Units/hr, IntraVENous, Continuous  0.9 % sodium chloride infusion, , IntraVENous, PRN  cholestyramine (QUESTRAN) packet 4 g, 4 g, Oral, TID WC  0.9 % sodium chloride infusion, , IntraVENous, PRN  OLANZapine (ZYPREXA) tablet 5 mg, 5 mg, Oral, Nightly  oxyCODONE (ROXICODONE) immediate release tablet 5 mg, 5 mg, Oral, Q6H PRN  sertraline (ZOLOFT) tablet 75 mg, 75 mg, Oral, Daily  sodium chloride flush 0.9 % injection 5-40 mL, 5-40 mL, IntraVENous, 2 times per day  sodium chloride flush 0.9 % injection 5-40 mL, 5-40 mL, IntraVENous, PRN  0.9 % sodium chloride infusion, , IntraVENous, PRN  ondansetron (ZOFRAN-ODT) disintegrating tablet 4 mg, 4 mg, Oral, Q8H PRN **OR** ondansetron (ZOFRAN) injection 4 mg, 4 mg, IntraVENous, Q6H PRN  polyethylene glycol (GLYCOLAX) packet 17 g, 17 g, Oral, Daily PRN  acetaminophen (TYLENOL) tablet 650 mg, 650 mg, Oral, Q6H PRN **OR** acetaminophen (TYLENOL) suppository 650 mg, 650 mg, Rectal, Q6H PRN  LORazepam (ATIVAN) tablet 1 mg, 1 mg, Oral, Q6H PRN  busPIRone (BUSPAR) tablet 10 mg, 10 mg, Oral, BID  0.9 % sodium chloride infusion, , IntraVENous, PRN  0.9 % sodium chloride infusion, , IntraVENous, PRN  prochlorperazine (COMPAZINE) injection 10 mg, 10 mg, IntraVENous, Q6H PRN  gabapentin (NEURONTIN) capsule 100 mg, 100 mg, Oral, BID  scopolamine (TRANSDERM-SCOP) transdermal patch 1 patch, 1 patch, TransDERmal, Q72H  metoclopramide (REGLAN) injection 10 mg, 10 mg, IntraVENous, Q6H PRN     Social History:   Social History       Tobacco History       Smoking Status  Never      Smokeless Tobacco Use  Never              Alcohol History       Alcohol Use Status  Not Currently              Drug Use       Drug Use Status  No              Sexual Activity       Sexually Active  Not Currently                     Family History:  Family History   Problem Relation Age of Onset    Lung Cancer Mother     Heart Disease Mother     Diabetes Father     Prostate Cancer Father     Diabetes Brother         Allergies: Allergies   Allergen Reactions    Atorvastatin     Lipitor Rash        ROS:   General: No fever or weight change  Hematologic: No unexpected submucosal bleeding or bruising  HEENT: No sore throat or facial pain  Respiratory: No cough or dyspnea  Cardiovascular: No angina or dependent edema  Gastrointestinal: See HPI  Musculoskeletal: No usual joint pain or stiffness  Skin: No skin eruptions or changing lesions  Neurologic: No focal weakness or numbness  Psychiatric: No anxiety or sleep disturbance    Physical Exam:  Vital Signs:   Vitals:    03/01/23 0658   BP: 116/67   Pulse: 90   Resp: 16   Temp: 97.7 °F (36.5 °C)   SpO2: 92%       General: Well-nourished, well-developed  HEENT: Sclera anicteric, mucosal membranes moist  Cardiovascular: Regular rate and rhythm. No murmurs. Respiratory: Respirations nonlabored, no crepitus  GI: Abdomen nondistended, soft, and nontender. Normal active bowel sounds. No masses palpable. Rectal: Deferred  Musculoskeletal: No pitting edema of the lower legs. Neurological: Gross memory appears intact. Patient is alert and oriented      Recent Imaging:   VL Extremity Venous Left  Upper Extremities Veins     Demographics      Patient Name        Kenia Valente      Date of Study       02/27/2023      Gender               Male      Patient Number      [id number]      Date of Birth        1945      Visit Number        [id number]       Age                  65 year(s)      Accession Number    [id number]      Room Number          1959      Corporate ID        [id number]         Sonographer          Cooper Chen NAYANA      Ordering Physician  Clem Carrasquillo MD  Interpreting         Oneyda Salazar                                       Physician            Vascular                                                            Holly Stubbs MD     Procedure    Type of Study:      Veins:Upper Extremities Veins, VL EXTREMITY VENOUS DUPLEX LEFT. Vascular Sonographer Report     Indications for Study:Arm swelling. Additional Indications:Patient complains of left upper extremity swelling for  about four days. Venous Duplex Scan: B-mode imaging of the deep and superficial veins, with  compression maneuvers, including color and Doppler spectral analysis. Impressions  Right Impression  There was no evidence of deep venous thrombosis involving the right subclavian  vein. Left Impression  Technically difficult and limited exam due to swelling, patients inability to  position correctly and bandages. The basilic vein was not visualized due to limitations. The cephalic vein was  only partially visualized. Within the limits of this exam,, there is no evidence of deep or superifical  venous thrombosis involving the left upper extremity.   Previous exam on 02/24/2023, deep venous thrombosis involving the right  brachial vein. Conclusions      Summary      Within the limits of this exam, there is no evidence of deep or superficial   venous thrombosis involving the left upper extremity or right subclavian   vein. Signature      ------------------------------------------------------------------   Electronically signed by Ceci Post MD (Interpreting   physician) on 02/28/2023 at 09:46 AM   ------------------------------------------------------------------     Patient Status:Routine. Study [facility] - Vascular Lab. Technical Quality:Limited visualization due to dressings. Risk Factors  History  +---------+----+-----------------------------------+  ! Diagnosis! Date! Comments                           ! +---------+----+-----------------------------------+  ! Other    ! ! MVP, History of DVT Left Leg \"2014\"! +---------+----+-----------------------------------+      - The patient has cancer. Velocities are measured in cm/s ; Diameters are measured in mm    Right UE Vein Measurements  2D Measurements  +--------+----------+---------------+----------+  ! Location! Visualized! Compressibility! Thrombosis! +--------+----------+---------------+----------+  ! SCV     ! Yes       ! Yes            ! None      !  +--------+----------+---------------+----------+    Doppler Measurements  +--------+------+------+  ! Location! Signal!Reflux! +--------+------+------+  ! SCV     ! Phasic!      ! +--------+------+------+    Left UE Vein Measurements  2D Measurements  +--------+----------+---------------+----------+  ! Location! Visualized! Compressibility! Thrombosis! +--------+----------+---------------+----------+  ! IJV     ! Yes       ! Yes            ! None      !  +--------+----------+---------------+----------+  ! SCV     ! Yes       ! Yes            ! None      !  +--------+----------+---------------+----------+  ! Axillary! Yes       ! Yes            ! None !  +--------+----------+---------------+----------+  ! Brachial!Yes       ! Yes            ! None      !  +--------+----------+---------------+----------+  ! Radial  !Yes       ! Yes            ! None      !  +--------+----------+---------------+----------+  ! Ulnar   ! Yes       ! Yes            ! None      !  +--------+----------+---------------+----------+  ! Basilic ! No        !               !          !  +--------+----------+---------------+----------+  ! Cephalic! Partial   !Yes            ! None      !  +--------+----------+---------------+----------+    Doppler Measurements  +--------+------+------+  ! Location! Signal!Reflux! +--------+------+------+  ! IJV     ! Phasic!      ! +--------+------+------+  ! SCV     ! Phasic!      ! +--------+------+------+  ! Axillary! Phasic!      ! +--------+------+------+       Labs:   Recent Labs     02/27/23  0445 02/28/23  0518 02/28/23  1203 02/28/23  2244 03/01/23  0517   HGB 7.0* 5.5* 5.5* 7.5* 8.5*   WBC 11.7* 15.5* 33.4* 26.5* 18.5*   PROT 5.0* 4.8* 5.2*  --  5.0*   LABALBU 3.2* 3.0* 3.0* 3.2* 3.1*   ALKPHOS 72 66 73  --  70   ALT 8* 10 13  --  17   AST 14* 16 28  --  50*   BILITOT 0.4 0.3 0.4  --  0.4        Assessment:    Hematemesis in the setting of significant thrombocytopenia      Plan:    50-year-old male with GERD and Ni's esophagus and plasma cell leukemia who recently received cyclophosphamide who was admitted with weakness. He was found to have significant thrombocytopenia and started having hematemesis. The patient had been nauseated in the days before this event. Hemoglobin also dropped significantly. Patient receiving packed red blood cells as well as platelets. A proton pump inhibitor drip has been started. Will plan on upper endoscopy to further evaluate patient. Due to patient's significant thrombocytopenia, endoscopic interventions will be limited. This will be primarily a diagnostic exam.      Landon Santiago MD    [facility]    [phone].  Also available via [employer]

## 2023-02-28 NOTE — PROGRESS NOTES
ONCOLOGY HEMATOLOGY CARE PROGRESS NOTE      SUBJECTIVE:    Scott Richey is still feeling fatigued today. NO other new concerns. States he no longer has nausea. Afebrile on room air. ROS:     Constitutional:  No weight loss, No fever, No chills, No night sweats. fatigue and weakness.   Eyes:  No impairment or change in vision  ENT / Mouth:  No pain, abnormal ulceration, bleeding, nasal drip or change in voice or hearing  Cardiovascular:  No chest pain, palpitations, new edema, or calf discomfort  Respiratory:  No pain, hemoptysis, change to breathing  Breast:  No pain, discharge, change in appearance or texture  Gastrointestinal:  No pain, cramping, jaundice, change to eating and bowel habits  Urinary:  No pain, bleeding or change in continence  Genitalia: No pain, bleeding or discharge  Musculoskeletal:  No redness, pain, edema or weakness  Skin:  No pruritus, rash, change to nodules or lesions  Neurologic:  No discomfort, change in mental status, speech, sensory or motor activity  Psychiatric:  No change in concentration or change to affect or mood  Endocrine:  No hot flashes, increased thirst, or change to urine production  Hematologic: No petechiae, ecchymosis or bleeding  Lymphatic:  No lymphadenopathy or lymphedema  Allergy / Immunologic:  No eczema, hives, frequent or recurrent infections    OBJECTIVE        Physical    VITALS:  Patient Vitals for the past 24 hrs:   BP Temp Temp src Pulse Resp SpO2   02/28/23 0800 117/67 98.5 °F (36.9 °C) Oral 99 16 92 %   02/27/23 2331 (!) 143/50 98.2 °F (36.8 °C) Oral 89 16 94 %   02/27/23 2145 (!) 118/50 99.3 °F (37.4 °C) Axillary 92 17 92 %   02/27/23 1600 96/61 97.6 °F (36.4 °C) Oral 94 18 92 %   02/27/23 1415 101/63 97.9 °F (36.6 °C) Oral 93 18 92 %   02/27/23 1200 99/70 98.4 °F (36.9 °C) Oral 98 18 94 %   02/27/23 1115 99/63 98.4 °F (36.9 °C) Oral 92 18 94 %   02/27/23 0815 95/60 98.2 °F (36.8 °C) Oral 87 16 95 %       24HR INTAKE/OUTPUT:    Intake/Output Summary (Last 24 hours) at 2/28/2023 0813  Last data filed at 2/28/2023 0606  Gross per 24 hour   Intake 307.16 ml   Output 700 ml   Net -392.84 ml       CONSTITUTIONAL: awake, alert, cooperative, no apparent distress. Chronically ill appearing. Pale complexion. EYES: pupils equal, round and reactive to light, sclera clear and conjunctiva normal  ENT: brusing noted on upper and lower lip. No active bleeding. Normocephalic, atraumatic  NECK: supple, symmetrical, no jugular venous distension   HEMATOLOGIC/LYMPHATIC: no cervical, supraclavicular or axillary lymphadenopathy   LUNGS: no increased work of breathing and clear to auscultation   CARDIOVASCULAR: regular rate and rhythm, normal S1 and S2, no murmur noted  ABDOMEN: normal bowel sounds x 4, soft, non-distended, non-tender, no masses palpated, no hepatosplenomgaly   MUSCULOSKELETAL: atrophic limbs. Weak with movements. NEUROLOGIC: awake, alert, oriented to name, place and time. Motor skills grossly intact. SKIN: Left arm from elbow down appears edematous. Scattered ecchymosis noted on medial forearm through dorsal aspect of left hand. Slightly tender to touch. Pulses present. Right arm has scattered superficial bruising noted. DATA:  CBC:    Recent Labs     02/28/23 0518 02/27/23 0445 02/26/23  0510 02/25/23  0218   WBC 15.5* 11.7* 10.2 12.7*   NEUTROABS 1.1* 0.8* 1.1* 1.5*   LYMPHOPCT 91.0 87.0 81.0 83.0   RBC 1.66* 2.11* 2.37* 2.53*   HGB 5.5* 7.0* 7.7* 8.4*   HCT 16.3* 20.5* 23.1* 24.4*   MCV 98.0 97.0 97.5 96.2   MCH 33.0 33.0 32.5 33.1   MCHC 33.7 34.0 33.4 34.4   RDW 14.8 15.4 15.0 14.7   PLT 14* 21* 30* 20*       PT/INR:    Recent Labs     02/28/23 0518 02/27/23 0445 02/26/23  0510 02/25/23  0218 02/24/23  1911 02/24/23  1200 02/22/23  1445 02/14/23  0722   PROT 4.8* 5.0* 5.4* 5.4*  --  5.1*   < >  --    INR  --   --   --   --  1.10  --   --  1.12    < > = values in this interval not displayed.      PTT: Recent Labs     02/24/23  1911   APTT 27.9       CMP:    Recent Labs     02/28/23  0518 02/27/23  0445 02/26/23  0510 02/25/23  0218 02/13/23  1150 02/13/23  0948    140 141 141   < > 142   K 4.9 4.4 4.2 4.4   < > 4.8    108 109 109   < > 106   CO2 19* 21 21 22   < > 18*   GLUCOSE 118* 105* 103* 115*   < > 133*   BUN 39* 25* 25* 26*   < > 31*   CREATININE 2.3* 1.9* 1.8* 1.8*   < > 2.7*   LABGLOM 28* 36* 38* 38*   < > 23*   CALCIUM 8.3 8.5 8.6 8.5   < > 9.1   PROT 4.8* 5.0* 5.4* 5.4*   < >  --    LABALBU 3.0* 3.2* 3.6 3.6   < >  --    AGRATIO 1.7 1.8 2.0 2.0   < >  --    BILITOT 0.3 0.4 0.4 0.5   < >  --    ALKPHOS 66 72 67 68   < >  --    ALT 10 8* 11 12   < >  --    AST 16 14* 15 17   < >  --    MG  --   --   --   --   --  2.20    < > = values in this interval not displayed. Lab Results   Component Value Date    CALCIUM 8.3 02/28/2023    PHOS 2.2 (L) 02/17/2023       LDH:No results for input(s): LDH in the last 720 hours. Radiology Review:  VL Extremity Venous Left  Vascular Upper Extremities Veins Procedure    -- PRELIMINARY SONOGRAPHER REPORT --      Demographics      Patient Name        Romana Rumpf      Date of Study       02/27/2023      Gender               Male      Patient Number      [id number]      Date of Birth        1945      Visit Number        [id number]       Age                  65 year(s)      Accession Number    [id number]      Room Number          7031      Corporate ID        [id number]         [facility]      Ordering Physician  Lucy Ellison MD  Interpreting         [facility]                                       Physician            Vascular     Procedure    Type of Study:      Veins:Upper Extremities Veins, VL EXTREMITY VENOUS DUPLEX LEFT. Tech Comments  Right  There was no evidence of deep venous thrombosis involving the right subclavian  vein.   Left  Technically difficult and limited exam due to swelling, patients inability to  position correctly and bandages. The basilic vein was not visualized due to limitations. The cephalic vein was  only partially visualized. Within the limits of this exam,, there is no evidence of deep or superifical  venous thrombosis involving the left upper extremity. Previous exam on 02/24/2023, deep venous thrombosis involving the right  brachial vein. VL Extremity Venous Right  Upper Extremities Veins     Demographics      Patient Name        Maria Elena Rodriguez      Date of Study       02/24/2023      Gender               Male      Patient Number      [id number]      Date of Birth        1945      Visit Number        [id number]       Age                  65 year(s)      Accession Number    [id number]      Room Number          5484      Corporate ID        [id number]         [address] [place] [address], [place], [place]      Ordering Physician  Mirtha Miller MD  Interpreting         [facility]                                       Physician            Kelle Lofton MD     Procedure    Type of Study:      Veins:Upper Extremities Veins, VL EXTREMITY VENOUS DUPLEX RIGHT. Vascular Sonographer Report     Indications for Study:Arm swelling. Additional Indications:Patient scanned bedside with complaint of right upper  arm swelling after PICC line placement. History of DVT. Venous Duplex Scan: B-mode imaging of the deep and superficial veins, with  compression maneuvers, including color and Doppler spectral analysis. Impressions  Right Impression  Technically difficult and limited study due to bandages. There is a PICC line noted in the right upper extremity. Acute partially occluded deep vein thrombosis involving the right brachial  vein (1 of 2).   Acute partially occluded superficial venous thrombosis involving the right  basilic vein. Isolated acute totally occluded superficial venous thrombosis involving the  right cephalic vein at the antecubital fossa. Within the limits of this exam, there is no other evidence of deep vein  thrombosis or superficial vein thrombosis of the right upper extremity. Left Impression  Unable to compress the left subclavian vein due to patient's intolerance,  however, adequate color doppler obtained. Within the limits of this exam, there is no evidence of deep vein thrombosis  involving the left internal jugular vein and subclavian vein. There is no previous exam for comparison. Conclusions      Summary      Acute partially occluded deep vein thrombosis involving the right brachial   vein. Acute partially occluded superficial venous thrombosis involving the right   basilic vein. Isolated acute totally occluded superficial venous thrombosis involving the   right cephalic vein. Signature      ------------------------------------------------------------------   Electronically signed by Marcus Blair MD (Interpreting physician)   on 02/27/2023 at 12:58 PM   ------------------------------------------------------------------     Patient Status:STAT. Study [facility]  [address] - Vascular Lab. Technical Quality:Limited visualization due to dressings.      - Results were reported to:CATHERINE Walsh @ 6:15pm on 2/24/23. Risk Factors  History  +---------+----+-----------------------------------+  ! Diagnosis! Date! Comments                           ! +---------+----+-----------------------------------+  ! Other    ! ! MVP, History of DVT Left Leg \"2014\"! +---------+----+-----------------------------------+      - The patient has cancer.     Velocities are measured in cm/s ; Diameters are measured in mm    Right UE Vein Measurements  2D Measurements  +--------+----------+---------------+----------+  ! Location! Visualized! Compressibility! Thrombosis! +--------+----------+---------------+----------+  ! IJV     ! Yes       ! Yes            ! None      !  +--------+----------+---------------+----------+  ! SCV     ! Yes       ! Partial        !None      !  +--------+----------+---------------+----------+  ! Axillary! Yes       ! Partial        !None      !  +--------+----------+---------------+----------+  ! Brachial!Partial   !Partial        !Acute     !  +--------+----------+---------------+----------+  ! Radial  !Partial   !Yes            ! None      !  +--------+----------+---------------+----------+  ! Ulnar   ! Yes       ! Yes            ! None      !  +--------+----------+---------------+----------+  ! Basilic ! Partial   !Partial        !Acute     !  +--------+----------+---------------+----------+  ! Cephalic! Yes       ! Partial        !Acute     !  +--------+----------+---------------+----------+    Doppler Measurements  +--------+------+------+  ! Location! Signal!Reflux! +--------+------+------+  ! IJV     ! Phasic! No    !  +--------+------+------+  ! SCV     ! Phasic! No    !  +--------+------+------+  ! Axillary! Phasic! No    !  +--------+------+------+  ! Brachial!Phasic! No    !  +--------+------+------+    Left UE Vein Measurements  2D Measurements  +--------+----------+---------------+----------+  ! Location! Visualized! Compressibility! Thrombosis! +--------+----------+---------------+----------+  ! IJV     ! Yes       ! Yes            ! None      !  +--------+----------+---------------+----------+  ! SCV     ! Yes       ! No             !None      !  +--------+----------+---------------+----------+    Doppler Measurements  +--------+------+------+  ! Location! Signal!Reflux! +--------+------+------+  ! IJV     ! Phasic!      !   +--------+------+------+      Problem List  Patient Active Problem List   Diagnosis    GERD (gastroesophageal reflux disease)    MANISH (generalized anxiety disorder)    Essential hypertension, benign    CKD (chronic kidney disease) stage 3, GFR 30-59 ml/min (HCC)    Recurrent deep vein thrombosis (DVT) of right lower extremity (HCC)    Mixed hyperlipidemia    Ni's esophagus without dysplasia    Primary insomnia    Multiple myeloma not having achieved remission (HCC)    Current moderate episode of major depressive disorder without prior episode ([facility] [address]    Cancer, metastatic to bone (HCC)    Malaise and fatigue    Thrombocytopenia ([facility] [address]    Plasma cell leukemia ([facility] [address]    History of DVT in adulthood       ASSESSMENT AND PLAN:    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 Was receiving Velcade, cytoxan, and Xgeva. - Cycle 11 day 1 received on 01/11/2023. Received subsequent Velcade on time. Due for C12D1 on 02/15/2023.  - patient progressed on treatment    - Bone marrow biopsy performed 2/15/2023 showed plasma cell leukemia.   -After discussion with BMT team, patient was started on hyperfractionated cyclophosphamide by Dr. Ana Sims on 2/17/2023 for 4 doses plus pulse dexamethasone.  -Plan to start carfilzomib/Jeimy/Dex as an outpatient from 3/1/2023     2. Thrombocytopenia/Anemia/Myelosuppression  -Secondary to underlying leukemia and chronic bone marrow suppression secondary to chemotherapy. -Received  1 unit platelet transfusion on 2 /25 /2023 since patient has been started on IV heparin for newly found PICC line induced DVT to maintain platelet count around 30 K.  - transfuse if hgb <7 or PLTs <10 K/mcL  -Patient denies any overt bleeding at this time. - plts this AM are 14 K/uL. HGB is 5.5  - patient has been on heparin for slightly less than 4 days now. Will check HIT ab but low suspicion for HIT. - patient will need PRBC this AM     3. DVT of the right upper extremity.   -PICC line induced  -Ultrasound venous Doppler revealed virtually occluded DVT involving the right brachial vein with partially occluded superficial venous thrombosis involving the right basilic vein.  -As per the patient and his wife, he was on Eliquis for approximately 15 years which was discontinued months back due to thrombocytopenia.  -Discussed with the  primary team-PICC line removed for now to prevent the clot propagation and speed resolution of thrombosis.  -Underlying malignancy, his previous history of DVT are  his personal risk factors that put him at risk of embolism though the brachial vein DVT is not notorious for causing pulmonary embolism.  -Started the patient on IV heparin without bolus in a very guarded fashion with careful monitoring of the hematological parameters on 02/24. Patient has been counseled to report immediately if he notices any bleeding. We will discontinue heparin if patient reports any bleeding  -Patient will need PICC line in the other arm or Mediport prior to discharge since he is scheduled for outpatient chemotherapy. 4. Swelling/Ecchymosis of left forearm  - noted this AM  - LUE US completed 02/27 shows no evidence of deep or superficial venous thrombosis involving left upper extremity      5. Chronic fatigue  -Secondary to underlying plasma cell leukemia and chemotherapy. - patient has been unable to participate in PT/OT given functional status and critical lab values  - Continue to monitor through the week, if he doesn't improve as he gets past his chari, then readdress hospice.       ONCOLOGIC DISPOSITION:      TANNER Jacobs  Please contact through [facility]

## 2023-02-28 NOTE — CONSULTS
Consult broderick Page  Date:2/28/2023,  Time:12:49 PM        Electronically signed by Zhanna Barone on 2/28/2023 at 12:49 PM

## 2023-02-28 NOTE — PROGRESS NOTES
Physical Therapy/ Occupational Therapy    PT/OT will be held today following discussion with RN due to critical lab values.   Kirsten Fisher, PTA #[id number]

## 2023-02-28 NOTE — PROGRESS NOTES
Hospitalist Progress Note      PCP: Debbie Wyatt DO    Date of Admission: 2/22/2023    Chief Complaint: fatigued    Hospital Course: reviewed     Subjective: resting in bed, feels very fatigued, no pain currently, wife at bedside, hb of 5.5 this AM , had hematemesis       Medications:  Reviewed    Infusion Medications    sodium chloride      heparin (PORCINE) Infusion 1,390 Units/hr (02/28/23 0602)    sodium chloride      sodium chloride      sodium chloride      sodium chloride      sodium chloride       Scheduled Medications    cholestyramine  4 g Oral TID WC    OLANZapine  5 mg Oral Nightly    pantoprazole  40 mg Oral Daily    sertraline  75 mg Oral Daily    sodium chloride flush  5-40 mL IntraVENous 2 times per day    busPIRone  10 mg Oral BID    gabapentin  100 mg Oral BID    scopolamine  1 patch TransDERmal Q72H     PRN Meds: sodium chloride, heparin (porcine), heparin (porcine), sodium chloride, sodium chloride, oxyCODONE, sodium chloride flush, sodium chloride, ondansetron **OR** ondansetron, polyethylene glycol, acetaminophen **OR** acetaminophen, LORazepam, sodium chloride, sodium chloride, prochlorperazine, metoclopramide      Intake/Output Summary (Last 24 hours) at 2/28/2023 0909  Last data filed at 2/28/2023 0606  Gross per 24 hour   Intake 307.16 ml   Output 700 ml   Net -392.84 ml       Physical Exam Performed:    /67   Pulse 99   Temp 98.5 °F (36.9 °C) (Oral)   Resp 16   Ht 5' 9\" (1.753 m)   Wt 180 lb (81.6 kg)   SpO2 92%   BMI 26.58 kg/m²     General appearance: No apparent distress, appears stated age and cooperative. fatigued  HEENT: Pupils equal, round, and reactive to light. Conjunctivae/corneas clear. Neck: Supple, with full range of motion. No jugular venous distention. Trachea midline. Respiratory:  Normal respiratory effort. Clear to auscultation, bilaterally without Rales/Wheezes/Rhonchi.   Cardiovascular: Regular rate and rhythm with normal S1/S2 without murmurs, rubs or gallops. Abdomen: Soft, non-tender, non-distended with normal bowel sounds. Musculoskeletal: No clubbing, cyanosis or edema bilaterally. Full range of motion without deformity. Skin: Skin color, texture, turgor normal.  No rashes or lesions. Neurologic:  Neurovascularly intact without any focal sensory/motor deficits. Cranial nerves: II-XII intact, grossly non-focal.  Psychiatric: Alert and oriented, thought content appropriate, normal insight  Capillary Refill: Brisk, 3 seconds, normal   Peripheral Pulses: +2 palpable, equal bilaterally       Labs:   Recent Labs     02/26/23 0510 02/27/23 0445 02/28/23 0518   WBC 10.2 11.7* 15.5*   HGB 7.7* 7.0* 5.5*   HCT 23.1* 20.5* 16.3*   PLT 30* 21* 14*     Recent Labs     02/26/23 0510 02/27/23 0445 02/28/23 0518    140 138   K 4.2 4.4 4.9    108 108   CO2 21 21 19*   BUN 25* 25* 39*   CREATININE 1.8* 1.9* 2.3*   CALCIUM 8.6 8.5 8.3     Recent Labs     02/26/23 0510 02/27/23 0445 02/28/23 0518   AST 15 14* 16   ALT 11 8* 10   BILITOT 0.4 0.4 0.3   ALKPHOS 67 72 66     No results for input(s): INR in the last 72 hours. No results for input(s): Pooja Todd in the last 72 hours. Urinalysis:      Lab Results   Component Value Date/Time    NITRU Negative 02/22/2023 02:45 PM    WBCUA None seen 02/22/2023 02:45 PM    BACTERIA 1+ 02/09/2023 12:08 PM    RBCUA 3-4 02/22/2023 02:45 PM    BLOODU TRACE-LYSED 02/22/2023 02:45 PM    SPECGRAV 1.015 02/22/2023 02:45 PM    GLUCOSEU Negative 02/22/2023 02:45 PM       Radiology:  VL Extremity Venous Left         VL Extremity Venous Right   Final Result      XR CHEST PORTABLE   Final Result   1. New right PICC line extending the cavoatrial junction. 2. Otherwise stable appearance of the chest with no acute findings.              IP CONSULT TO HEM/ONC  PALLIATIVE CARE EVAL  IP CONSULT TO HOSPITALIST  IP CONSULT TO HOME CARE NEEDS  IP CONSULT TO CARDIOLOGY  IP CONSULT TO CASE MANAGEMENT  IP CONSULT TO PHARMACY  IP CONSULT TO PALLIATIVE CARE  IP CONSULT TO HOSPICE    Assessment/Plan:    Active Hospital Problems    Diagnosis     Thrombocytopenia ([facility] [address] [D69.6]      Priority: Medium    Plasma cell leukemia (HCC) [C90.10]      Priority: Medium    Malaise and fatigue [R53.81, R53.83]      Priority: Medium    History of DVT in adulthood [Z86.718]     CKD (chronic kidney disease) stage 3, GFR 30-59 ml/min ([facility]) [N18.30]     Essential hypertension, benign [I10]        - Left Upper extremity swelling - r/o DVT  -Acute DVT R upper extremity   Doppler showed  Acute partially occluded deep vein thrombosis involving the right brachial   vein (1 of 2). Acute partially occluded superficial venous thrombosis involving the right   basilic vein. Isolated acute totally occluded superficial venous thrombosis involving the   right cephalic vein at the antecubital fossa. - Fatigue   -MM with progression to plasma cell leukemia  -Thrombocytopenia  -Anemia: s/p 1 u PRBC  -ELEONORA on CKD  DVT  - stopped eliquis due to severe persistent thrombocytopenia     PLAN:     IV heparin drip, stopped 2/28 given worsening clinical status  Monitor CBC   Oncology consulted   PT OT consult   Monitor GFR, avoid nephrotoxic agent   Continue home meds    DVT Prophylaxis: on heparin ggt, held 2/28  Diet: ADULT DIET;  Regular  Code Status: Limited  PT/OT Eval Status: not possible    Dispo - GI consulted, switch to iv ppi, held heparin ggt, nephro consulted given worsening eleonora/acidosis/K, ivf bolus given, 2u prbc ordered    Appropriate for A1 Discharge Unit: No      Brooke Solis MD

## 2023-02-28 NOTE — PROGRESS NOTES
Shift assessment completed. Pt A&O x3, disoriented to place; flat affect. VSS. AM medications admin, see MAR. Pt assisted w/personal care and turning; male wick in place. Pt swollen throughout, w/scattered bruising. Denies any needs at this time. Bed locked and in lowest position. Call light within reach. Will continue to monitor.  Electronically signed by Nette Moreira RN on 2/28/2023 at 10:32 AM 01-Mar-2018 16:44

## 2023-02-28 NOTE — CONSULTS
Consult placed    Venice:pablito  Date:2/28/2023,  Time:3:49 PM        Electronically signed by Gene Kumar on 2/28/2023 at 3:49 PM

## 2023-02-28 NOTE — CARE COORDINATION
Chart reviewed day 6. Long conversation with wife, Sander Monroe. Discussed plan of care. Current plan per oncology to watch to see if any improvement in strength over next few days. Patients wife does not believe will be seeking any additional chemo. Also does not feel would be able to meet care needs at home. Discussed SNF, if patient is strong enough to work with therapy vs self pay for care at facility. Discussed going home with [facility] and private duty care givers, list provided. Discussed palliative vs hospice care. Offered to have re consult with Dalia Shea. For now Sander Monroe would like to follow Onc plan to watch for few days for improvement.  Syed Bedolla RN

## 2023-02-28 NOTE — PROGRESS NOTES
GI NOTE:   Consult received for hematemesis. Chart reviewed. Full consult to follow. Patient admitted with fatigue on 2/22. 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 He is under going chemotherapy. Admission has been complicated by ELEONORA and thrombocytopenia. GI consulted after episode of hematemesis this afternoon. He is hemodynamically stable. Hgb 5.5 this morning. Repeat after 1 unit of PRBCs is still 5.5. Platelet count is 13 K. Potassium elevated at 6.3. Last EGD/Colonoscopy on 3/6/2022 with Dr. Matti Tobin segment Ni's, diverticulosis, and colon polyps. Plan:   Monitor H/H and overt signs of GI bleeding  NPO for now  Continue PPI infusion  Maintain 2 PIV   Will need resuscitated with blood products--Hbg > 7 and Platelet closer to 50 K. Will discuss this with Hem/Onc  Plan for EGD at 0730 tomorrow morning. Please call with questions/concern. Dr. Marybeth Escalante is on call for our group tonight. [phone]. Also available via perfect serve.

## 2023-02-28 NOTE — PROGRESS NOTES
ONCOLOGY HEMATOLOGY CARE PROGRESS NOTE      Paged for patient with episode of hematemesis. Spoke with GI. Planning EGD on 03/01 at 0730AM. Patient HGB will need to be at least 7 and platelets closer to 57J. Placed orders for 2 units of PRBCs today as HGB still 5.5 after 1 unit this morning. Placed orders for scheduled PRBCs in the AM 03/01 ending 1 hour prior to planned procedure.      TANNER Washington  Please contact through [facility]

## 2023-02-28 NOTE — PROGRESS NOTES
Shift assessment complete as charted.  Meds administered as ordered, see eMAR.      - a/o x 4, VSS  - repositioned Q2H and prn by staff with pillow support for comfort and prevention  - denies pain at this time  - malewick for incontinence  - LUE edema; elevated on pillows    Bed alarm on.  Bed in low position, wheels locked, SR x 2, call light and bedside table within reach.      Electronically signed by Arely Zhou RN on 2/27/2023 at 10:16 PM

## 2023-02-28 NOTE — PROGRESS NOTES
Dr. Vik Carlos notified of the following critical labs:     Critical labs:  Hgb: 5.5  Hct: 16.3  platelets: 14    Awaiting response.       Electronically signed by Yoana Brown RN on 2/28/2023 at 5:55 AM

## 2023-02-28 NOTE — CONSULTS
[facility]  Nephrology Consult Note           Reason for Consult:  coty on CKD IIIb  Requesting Physician:  Dr. Roma Lopez    Chief Complaint:    Chief Complaint   Patient presents with    Fatigue     Patient was admitted 2/13/2023 discharged yesterday. Returns to the ED today with generalized weakness and lack of energy      History of Present Illness on 2/28/23:    65 y.o. yo male with PMH of CKD IIIb, HTN, Multiple myeloma consulted for worsening renal failure  Pt had BM bx on 2/15/23 which confirmed MM and Cyclophosphamide and pulsed dexamethasone started 2/17/2023 . His cr had increased to 3.1 and stabilized to 1.8 by discharge to SNF on 2/21 but he was readmitted within 24h  Cr increased to 1.9 on 2/27 and 2.3-->2.8 on 2/28 and nephrology has been consulted. pt has developed anemia with hb dropping to 5.5 from 7 and lactic acidosis as well  Plt ct is 15K    Past Medical History:        Diagnosis Date    Anxiety     Arthritis     Bladder cancer ([address]     DVT (deep venous thrombosis) ([address] 07/01/2014    two spontaneous DVTs left leg    Elevated fasting glucose     Hyperlipidemia     Hypertension     Malignant neoplasm of urinary bladder ([address] 02/04/2019    Multiple myeloma (HCC)     MVP (mitral valve prolapse)     Polyp of gallbladder     Prostate CA ([address] 2005       Past Surgical History:        Procedure Laterality Date    BLADDER SURGERY      tumor removed    CATARACT REMOVAL Bilateral     CHOLECYSTECTOMY, LAPAROSCOPIC N/A 1/10/2022    VIDEO LAPAROSCOPIC CHOLECYSTECTOMY performed by Janessa Salazar MD at [facility]  6/29/12    Polyps    COLONOSCOPY  07/12/2017    Polyp    COLONOSCOPY N/A 9/6/2022    COLONOSCOPY POLYPECTOMY SNARE/COLD BIOPSY performed by John Milian MD at [facility] BONE  3/3/2022    CT BIOPSY PERCUTANEOUS DEEP BONE 3/3/2022 Brenda Schmid MD [facility] CT SCAN    CT BONE MARROW BIOPSY  2/15/2023    CT BONE MARROW BIOPSY 2/15/2023 [facility] CT SCAN    INGUINAL HERNIA REPAIR Right 06/1999    PENILE PROSTHESIS PLACEMENT      PROSTATECTOMY  2005 approx    Dr. Oliva    TONSILLECTOMY      In [place] - had one tonsil burned out    UPPER GASTROINTESTINAL ENDOSCOPY N/A 9/6/2022    EGD BIOPSY performed by Fátima Chen MD at [facility] ENDOSCOPY       Home Medications:    No current facility-administered medications on file prior to encounter.     Current Outpatient Medications on File Prior to Encounter   Medication Sig Dispense Refill    gabapentin (NEURONTIN) 100 MG capsule Take 1 capsule by mouth 2 times daily for 30 days. 60 capsule 0    ondansetron (ZOFRAN-ODT) 8 MG TBDP disintegrating tablet Take 1 tablet by mouth every 8 hours as needed for Nausea or Vomiting 60 tablet 0    promethazine (PHENERGAN) 25 MG tablet Take 1 tablet by mouth 4 times daily as needed for Nausea (Patient not taking: Reported on 2/22/2023) 40 tablet 0    busPIRone (BUSPAR) 7.5 MG tablet Take 7.5 mg by mouth 2 times daily      lisinopril (PRINIVIL;ZESTRIL) 20 MG tablet Take 20 mg by mouth daily (Patient not taking: Reported on 2/22/2023)      rosuvastatin (CRESTOR) 20 MG tablet TAKE ONE TABLET BY MOUTH DAILY (Patient not taking: Reported on 2/22/2023) 90 tablet 1    fenofibrate (TRICOR) 54 MG tablet Take 1 tablet by mouth daily s [facility] pharmacy: Please dispense generic fenofibrate unless prescriber denote 90 tablet 1    cholestyramine (QUESTRAN) 4 GM/DOSE powder Take 1 packet by mouth 3 times daily (with meals) 378 g 5    OLANZapine (ZYPREXA) 5 MG tablet Take 5 mg by mouth nightly      bortezomib 5 mg/2 mLs in sodium chloride (PF) injection Inject 1.3 mg/m2 into the skin once      sertraline (ZOLOFT) 50 MG tablet Take 1.5 tablets by mouth daily 135 tablet 1    LORazepam (ATIVAN) 1 MG tablet Take 1 mg by mouth 4 times daily as needed.      dronabinol (MARINOL) 5 MG capsule in the morning and at bedtime.      oxyCODONE (ROXICODONE) 5 MG immediate release tablet Take 5 mg by mouth every  4 hours as needed for Pain.      pantoprazole (PROTONIX) 40 MG tablet Take 1 tablet by mouth daily 30 tablet 5       Allergies:  Atorvastatin and Lipitor    Social History:    Social History     Socioeconomic History    Marital status:      Spouse name: Not on file    Number of children: 2    Years of education: Not on file    Highest education level: Not on file   Occupational History    Occupation:      Comment: retired   Tobacco Use    Smoking status: Never    Smokeless tobacco: Never   Vaping Use    Vaping Use: Never used   Substance and Sexual Activity    Alcohol use: Not Currently    Drug use: No    Sexual activity: Not Currently   Other Topics Concern    Not on file   Social History Narrative    , retired 1/2015     Social Determinants of Health     Financial Resource Strain: Not on file   Food Insecurity: Not on file   Transportation Needs: Not on file   Physical Activity: Unknown    Days of Exercise per Week: 0 days    Minutes of Exercise per Session: Not on file   Stress: Not on file   Social Connections: Not on file   Intimate Partner Violence: Not At Risk    Fear of Current or Ex-Partner: No    Emotionally Abused: No    Physically Abused: No    Sexually Abused: No   Housing Stability: Not on file       Family History:   Family History   Problem Relation Age of Onset    Lung Cancer Mother     Heart Disease Mother     Diabetes Father     Prostate Cancer Father     Diabetes Brother        Review of Systems:   Denies vomiting or diarrhea, blood in stool     Physical exam:   Constitutional:  VITALS:  /66   Pulse (!) 116   Temp 98.9 °F (37.2 °C) (Axillary)   Resp 22   Ht 5' 9\" (1.753 m)   Wt 180 lb (81.6 kg)   SpO2 97%   BMI 26.58 kg/m²   Gen: sleepy  Neck: No JVD  Skin: Unremarkable  Cardiovascular:  S1, S2 without m/r/g   Respiratory: diminished on RA  Abdomen:  soft, nt, nd,   Extremities: no lower extremity edema  Neuro/Psy: AAoriented times 1-2 ; moves all 4 ext    Data/  Recent Labs     02/27/23  0445 02/28/23  0518 02/28/23  1203   WBC 11.7* 15.5* 33.4*   HGB 7.0* 5.5* 5.5*   HCT 20.5* 16.3* 17.6*   MCV 97.0 98.0 103.9*   PLT 21* 14* 15*     Recent Labs     02/27/23  0445 02/28/23  0518 02/28/23  1203    138 139   K 4.4 4.9 6.3*    108 107   CO2 21 19* 13*   GLUCOSE 105* 118* 167*   BUN 25* 39* 45*   CREATININE 1.9* 2.3* 2.8*   LABGLOM 36* 28* 22*       Assessment  -ELEONORA on CKD IIIb  likely from hemodynamic changes in the setting of acute anemia. BP on 2/27 was noted to be in the 90 s for few hrs. Other concern is lysis and progression of CKD    -Hyperkalemia   Hemolyzed sample   Also has acidosis    -Metabolic acidosis from lactic acidosis along with eleonora    -severe anemia and thrombocytopenia    -Multiple myeloma     -DVT RUE    -Leukocytoses      Plan  -agree with 2 PRBCs  -change IVF to bicarb as ordered  -add on uric acid  -loklema 10 gms   -overall prognosis is poor. Noted that the pt and wife not wanting to seek additional chemo  - bladder scan  -cont medical mgmt and would be a poor dialysis candidate  -Serial renal panel  -daily wts and strict i/o  -renal dose medications   -avoid nephrotoxins     Dispo- agree w plans for hospice if no significant clinical improvement    Thank you for the consultation. Please do not hesitate to call with questions. Tamir Singh MD  Office: [phone]  Fax:    [phone] [address] [facility]

## 2023-02-28 NOTE — PROGRESS NOTES
4 Eyes Skin Assessment     The patient is being assess for  Low rossi    I agree that 2 RN's have performed a thorough Head to Toe Skin Assessment on the patient. ALL assessment sites listed below have been assessed.        Areas assessed by both nurses: Niharika Ferreira / Kranthi Martinez  [x]   Head, Face, and Ears   [x]   Shoulders, Back, and Chest  [x]   Arms, Elbows, and Hands   [x]   Coccyx, Sacrum, and IschIum  [x]   Legs, Feet, and Heels    **see flowsheets        Nurse 1 eSignature: Electronically signed by Balaji Rucker RN on 2/27/23 at 9:56 PM EST    **SHARE this note so that the co-signing nurse is able to place an eSignature**    Nurse 2 eSignature: Electronically signed by Eligio Frost RN on 2/28/23 at 1:30 AM EST

## 2023-03-01 ENCOUNTER — ANESTHESIA (OUTPATIENT)
Dept: ENDOSCOPY | Age: 78
End: 2023-03-01
Payer: MEDICARE

## 2023-03-01 ENCOUNTER — ANESTHESIA EVENT (OUTPATIENT)
Dept: ENDOSCOPY | Age: 78
End: 2023-03-01
Payer: MEDICARE

## 2023-03-01 LAB
A/G RATIO: 1.6 (ref 1.1–2.2)
ALBUMIN SERPL-MCNC: 3.1 G/DL (ref 3.4–5)
ALP BLD-CCNC: 70 U/L (ref 40–129)
ALT SERPL-CCNC: 17 U/L (ref 10–40)
ANION GAP SERPL CALCULATED.3IONS-SCNC: 19 MMOL/L (ref 3–16)
ANISOCYTOSIS: ABNORMAL
AST SERPL-CCNC: 50 U/L (ref 15–37)
BASOPHILS ABSOLUTE: 0.2 K/UL (ref 0–0.2)
BASOPHILS RELATIVE PERCENT: 1 %
BILIRUB SERPL-MCNC: 0.4 MG/DL (ref 0–1)
BUN BLDV-MCNC: 66 MG/DL (ref 7–20)
CALCIUM SERPL-MCNC: 7.8 MG/DL (ref 8.3–10.6)
CHLORIDE BLD-SCNC: 105 MMOL/L (ref 99–110)
CO2: 15 MMOL/L (ref 21–32)
CREAT SERPL-MCNC: 3.4 MG/DL (ref 0.8–1.3)
EOSINOPHILS ABSOLUTE: 0.7 K/UL (ref 0–0.6)
EOSINOPHILS RELATIVE PERCENT: 4 %
GFR SERPL CREATININE-BSD FRML MDRD: 18 ML/MIN/{1.73_M2}
GLUCOSE BLD-MCNC: 175 MG/DL (ref 70–99)
HCT VFR BLD CALC: 26.3 % (ref 40.5–52.5)
HEMATOLOGY PATH CONSULT: NO
HEMOGLOBIN: 8.5 G/DL (ref 13.5–17.5)
HEPARIN INDUCED PLATELET ANTIBODY: NEGATIVE
LACTIC ACID: 1.5 MMOL/L (ref 0.4–2)
LYMPHOCYTES ABSOLUTE: 5.6 K/UL (ref 1–5.1)
LYMPHOCYTES RELATIVE PERCENT: 30 %
MCH RBC QN AUTO: 30.3 PG (ref 26–34)
MCHC RBC AUTO-ENTMCNC: 32.4 G/DL (ref 31–36)
MCV RBC AUTO: 93.5 FL (ref 80–100)
MONOCYTES ABSOLUTE: 0.6 K/UL (ref 0–1.3)
MONOCYTES RELATIVE PERCENT: 3 %
NEUTROPHILS ABSOLUTE: 0.9 K/UL (ref 1.7–7.7)
NEUTROPHILS RELATIVE PERCENT: 5 %
PDW BLD-RTO: 15.7 % (ref 12.4–15.4)
PLASMA CELLS PERCENT: 57 %
PLATELET # BLD: 4 K/UL (ref 135–450)
PLATELET SLIDE REVIEW: ABNORMAL
PMV BLD AUTO: 11.3 FL (ref 5–10.5)
POTASSIUM REFLEX MAGNESIUM: 5 MMOL/L (ref 3.5–5.1)
RBC # BLD: 2.81 M/UL (ref 4.2–5.9)
SODIUM BLD-SCNC: 139 MMOL/L (ref 136–145)
TOTAL PROTEIN: 5 G/DL (ref 6.4–8.2)
WBC # BLD: 18.5 K/UL (ref 4–11)

## 2023-03-01 PROCEDURE — 3609017100 HC EGD: Performed by: INTERNAL MEDICINE

## 2023-03-01 PROCEDURE — 6360000002 HC RX W HCPCS: Performed by: ANESTHESIOLOGY

## 2023-03-01 PROCEDURE — 36415 COLL VENOUS BLD VENIPUNCTURE: CPT

## 2023-03-01 PROCEDURE — 51701 INSERT BLADDER CATHETER: CPT

## 2023-03-01 PROCEDURE — 3700000000 HC ANESTHESIA ATTENDED CARE: Performed by: INTERNAL MEDICINE

## 2023-03-01 PROCEDURE — 36430 TRANSFUSION BLD/BLD COMPNT: CPT

## 2023-03-01 PROCEDURE — 80053 COMPREHEN METABOLIC PANEL: CPT

## 2023-03-01 PROCEDURE — 6360000002 HC RX W HCPCS

## 2023-03-01 PROCEDURE — 6360000002 HC RX W HCPCS: Performed by: INTERNAL MEDICINE

## 2023-03-01 PROCEDURE — 2709999900 HC NON-CHARGEABLE SUPPLY: Performed by: INTERNAL MEDICINE

## 2023-03-01 PROCEDURE — 2580000003 HC RX 258: Performed by: INTERNAL MEDICINE

## 2023-03-01 PROCEDURE — 2500000003 HC RX 250 WO HCPCS: Performed by: INTERNAL MEDICINE

## 2023-03-01 PROCEDURE — 2580000003 HC RX 258: Performed by: HOSPITALIST

## 2023-03-01 PROCEDURE — 83605 ASSAY OF LACTIC ACID: CPT

## 2023-03-01 PROCEDURE — 7100000010 HC PHASE II RECOVERY - FIRST 15 MIN: Performed by: INTERNAL MEDICINE

## 2023-03-01 PROCEDURE — 6370000000 HC RX 637 (ALT 250 FOR IP): Performed by: HOSPITALIST

## 2023-03-01 PROCEDURE — 85025 COMPLETE CBC W/AUTO DIFF WBC: CPT

## 2023-03-01 PROCEDURE — 6360000002 HC RX W HCPCS: Performed by: HOSPITALIST

## 2023-03-01 PROCEDURE — 51798 US URINE CAPACITY MEASURE: CPT

## 2023-03-01 PROCEDURE — 1200000000 HC SEMI PRIVATE

## 2023-03-01 PROCEDURE — 51702 INSERT TEMP BLADDER CATH: CPT

## 2023-03-01 PROCEDURE — C9113 INJ PANTOPRAZOLE SODIUM, VIA: HCPCS | Performed by: INTERNAL MEDICINE

## 2023-03-01 PROCEDURE — 0DJ08ZZ INSPECTION OF UPPER INTESTINAL TRACT, VIA NATURAL OR ARTIFICIAL OPENING ENDOSCOPIC: ICD-10-PCS | Performed by: INTERNAL MEDICINE

## 2023-03-01 PROCEDURE — 7100000011 HC PHASE II RECOVERY - ADDTL 15 MIN: Performed by: INTERNAL MEDICINE

## 2023-03-01 RX ORDER — PROPOFOL 10 MG/ML
INJECTION, EMULSION INTRAVENOUS PRN
Status: DISCONTINUED | OUTPATIENT
Start: 2023-03-01 | End: 2023-03-01 | Stop reason: SDUPTHER

## 2023-03-01 RX ORDER — ONDANSETRON 2 MG/ML
4 INJECTION INTRAMUSCULAR; INTRAVENOUS ONCE
Status: COMPLETED | OUTPATIENT
Start: 2023-03-01 | End: 2023-03-01

## 2023-03-01 RX ADMIN — SODIUM BICARBONATE: 84 INJECTION, SOLUTION INTRAVENOUS at 12:10

## 2023-03-01 RX ADMIN — ONDANSETRON 4 MG: 2 INJECTION INTRAMUSCULAR; INTRAVENOUS at 03:45

## 2023-03-01 RX ADMIN — BUSPIRONE HYDROCHLORIDE 10 MG: 5 TABLET ORAL at 21:28

## 2023-03-01 RX ADMIN — SODIUM CHLORIDE 8 MG/HR: 9 INJECTION, SOLUTION INTRAVENOUS at 00:01

## 2023-03-01 RX ADMIN — Medication 10 ML: at 21:28

## 2023-03-01 RX ADMIN — OXYCODONE 5 MG: 5 TABLET ORAL at 01:36

## 2023-03-01 RX ADMIN — OXYCODONE 5 MG: 5 TABLET ORAL at 10:01

## 2023-03-01 RX ADMIN — ONDANSETRON 4 MG: 2 INJECTION INTRAMUSCULAR; INTRAVENOUS at 18:54

## 2023-03-01 RX ADMIN — PANTOPRAZOLE SODIUM 40 MG: 40 INJECTION, POWDER, FOR SOLUTION INTRAVENOUS at 10:04

## 2023-03-01 RX ADMIN — BUSPIRONE HYDROCHLORIDE 10 MG: 5 TABLET ORAL at 10:00

## 2023-03-01 RX ADMIN — PROPOFOL 80 MG: 10 INJECTION, EMULSION INTRAVENOUS at 07:59

## 2023-03-01 RX ADMIN — ONDANSETRON 4 MG: 2 INJECTION INTRAMUSCULAR; INTRAVENOUS at 07:23

## 2023-03-01 RX ADMIN — GABAPENTIN 100 MG: 100 CAPSULE ORAL at 10:00

## 2023-03-01 RX ADMIN — OLANZAPINE 5 MG: 5 TABLET, FILM COATED ORAL at 21:28

## 2023-03-01 ASSESSMENT — PAIN DESCRIPTION - LOCATION
LOCATION: BUTTOCKS
LOCATION: GENERALIZED

## 2023-03-01 ASSESSMENT — PAIN DESCRIPTION - ORIENTATION: ORIENTATION: MID

## 2023-03-01 ASSESSMENT — PAIN DESCRIPTION - DESCRIPTORS
DESCRIPTORS: ACHING;DISCOMFORT
DESCRIPTORS: ACHING

## 2023-03-01 ASSESSMENT — PAIN SCALES - GENERAL
PAINLEVEL_OUTOF10: 0
PAINLEVEL_OUTOF10: 0
PAINLEVEL_OUTOF10: 8
PAINLEVEL_OUTOF10: 0
PAINLEVEL_OUTOF10: 8

## 2023-03-01 ASSESSMENT — PAIN - FUNCTIONAL ASSESSMENT: PAIN_FUNCTIONAL_ASSESSMENT: 0-10

## 2023-03-01 NOTE — PROGRESS NOTES
Unit 2/2 PRBC started. RN at bedside x 15 minutes. Pt without s/sx of adverse reaction. VSS.   Electronically signed by Era Marroquin RN on 3/1/2023 at 1:51 AM

## 2023-03-01 NOTE — PROGRESS NOTES
Dr Sarai Rendon at bedside assessing Iv access sites   [id number] to place iv in rt foot/ankle at kvo   Bilateral arms and hands grossly edemetous /ecchymotic /skin tight in appearance -    [id number]-Dr Roy at bedside agreed to D/c left arm iv's -due to  infiltrated appearance

## 2023-03-01 NOTE — ANESTHESIA POSTPROCEDURE EVALUATION
Department of Anesthesiology  Postprocedure Note    Patient: Latrice Pérez  MRN: [id number]  YOB: 1945  Date of evaluation: 3/1/2023      Procedure Summary     Date: 03/01/23 Room / Location: [facility]    Anesthesia Start: 4529 Anesthesia Stop: 3734    Procedure: EGD DIAGNOSTIC ONLY Diagnosis:       Hematemesis, unspecified whether nausea present      (Hematemesis, unspecified whether nausea present [K92.0])    Surgeons: Gisselle Florentino MD Responsible Provider: Rula Shi DO    Anesthesia Type: general ASA Status: 4          Anesthesia Type: No value filed.     Rama Phase I: Rama Score: 10    Rama Phase II: Rama Score: 9      Anesthesia Post Evaluation    Patient location during evaluation: PACU  Patient participation: complete - patient participated  Level of consciousness: awake and sleepy but conscious  Pain score: 0  Airway patency: patent  Nausea & Vomiting: no nausea and no vomiting  Complications: no  Cardiovascular status: blood pressure returned to baseline and hemodynamically stable  Respiratory status: acceptable and room air  Hydration status: euvolemic

## 2023-03-01 NOTE — PROGRESS NOTES
Antonia Pérez notified via PS message, critical platelets 5. Message received/read by Antonia Pérez CNP. No new orders.       Electronically signed by Ana Santillan RN on 3/1/2023 at 1:02 AM

## 2023-03-01 NOTE — PROGRESS NOTES
Occupational Therapy/Physical Therapy  OT/PT follow up attempted, pt off floor at [facility], will continue to follow per POC. 2nd attempt made 1045: Dr. Jose Packer present in room recommending therapies hold this date due to medical status. Spouse verbalizes wishes for OT/PT to continue attempting treatment. Will continu to follow.     JAMES Valentino/LARRY

## 2023-03-01 NOTE — PROGRESS NOTES
Assessment completed and documented. VSS. A/ox4. C/o 8/10 pain after EGD, prn pain medication given per mar. Struggled with oral pills this morning. Patient very drowsy, lack of appetite. Family at bedside. IV in right foot and left upper arm. Left lower arm, swollen and discolored, MD aware. Q2turn with pillow support as patient allows. Bed locked and in lowest position. Bedside table and call light within reach. Denies further needs at this time.

## 2023-03-01 NOTE — CARE COORDINATION
Chart reviewed day 7. Care managed per nephrology, GI, oncology and IM. EGD today, Large hiatal hernia  -Mild gastropathy in gastric body without active bleeding or stigmata. H&H now 8.5/26.3, WBC's 18.5, BUN/Creat ^66/3.4. Patient with Bicarb gtt. Condition guarded. Unclear if patient will choose SNF/LTC vs hospice care. Continuing to follow.  Yolande Obrien RN

## 2023-03-01 NOTE — PROGRESS NOTES
This RN paged Dr. Piedra Prior about concerns of patient left arm swelling/tightness and color. Also asked about the possibility of getting a central line to avoid lab draw. Responded, \"stop all IVF. Try gentle ace wrap or kerlex\"    Kept patient Mary BRADEN aware) and wrapped left arm gently, patient tolerated. Took right red \"no stick\" sleeve off right arm due to swelling  Also cut off left arm \"fall risk\" bracelet due to right arm edema/ tightness. Patient uncomfortable during bath/CHG wipedown, but refusing pain medication. Swabbed patients mouth and applied lip moisture.

## 2023-03-01 NOTE — PROGRESS NOTES
Unit 1/2 PRBC started. RN at bedside x 15 minutes. Pt tolerating well. VSS. No s/sx of adverse reaction.

## 2023-03-01 NOTE — PROGRESS NOTES
[employer]  Nephrology Follow up Note           Reason for Consult:  coty on CKD IIIb  Requesting Physician:  Dr. Christine Santamaria history  Pt is weak and opens eyes when asked  EGD 3/1  Received 4 PRBCs between 2/28-3/1  Needed straight cath in 3/1 am    Last 24 h uop 970 ml    ROS: No chest pain/shortness of breath/fever/nausea/vomiting  PSFH: + visitor    Scheduled Meds:   pantoprazole  40 mg IntraVENous Daily    cholestyramine  4 g Oral TID WC    OLANZapine  5 mg Oral Nightly    sertraline  75 mg Oral Daily    sodium chloride flush  5-40 mL IntraVENous 2 times per day    busPIRone  10 mg Oral BID    scopolamine  1 patch TransDERmal Q72H     Continuous Infusions:   sodium chloride      IV infusion builder 100 mL/hr at 02/28/23 1837    sodium chloride      [Held by provider] heparin (PORCINE) Infusion Stopped (02/28/23 1155)    sodium chloride      sodium chloride      sodium chloride      sodium chloride      sodium chloride       PRN Meds:.sodium chloride, sodium chloride, heparin (porcine), heparin (porcine), sodium chloride, sodium chloride, oxyCODONE, sodium chloride flush, sodium chloride, ondansetron **OR** ondansetron, polyethylene glycol, acetaminophen **OR** acetaminophen, LORazepam, sodium chloride, sodium chloride, prochlorperazine, metoclopramide      History of Present Illness on 2/28/23:    65 y.o. yo male with PMH of CKD IIIb, HTN, Multiple myeloma consulted for worsening renal failure  Pt had BM bx on 2/15/23 which confirmed MM and Cyclophosphamide and pulsed dexamethasone started 2/17/2023 . His cr had increased to 3.1 and stabilized to 1.8 by discharge to SNF on 2/21 but he was readmitted within 24h  Cr increased to 1.9 on 2/27 and 2.3-->2.8 on 2/28 and nephrology has been consulted. pt has developed anemia with hb dropping to 5.5 from 7 and lactic acidosis as well  Plt ct is 15K    Physical exam:   Constitutional:  VITALS:  BP (!) 194/76   Pulse 93   Temp 97.7 °F (36.5 °C) (Oral)   Resp 16   Ht 5' 9\" (1.753 m)   Wt 180 lb (81.6 kg)   SpO2 94%   BMI 26.58 kg/m²   Gen: sleepy  Neck: No JVD  Skin: Unremarkable  Cardiovascular:  S1, S2 without m/r/g   Respiratory: diminished on RA  Abdomen:  soft, nt, nd,   Extremities: no lower extremity edema  Neuro/Psy: AAoriented times 1-2 ; moves all 4 ext    Data/  Recent Labs     02/28/23  1203 02/28/23 2244 03/01/23  0517   WBC 33.4* 26.5* 18.5*   HGB 5.5* 7.5* 8.5*   HCT 17.6* 21.8* 26.3*   .9* 92.5 93.5   PLT 15* 5* 4*       Recent Labs     02/28/23  1203 02/28/23 2244 03/01/23  0517    140 139   K 6.3* 5.4* 5.0    105 105   CO2 13* 17* 15*   GLUCOSE 167* 172* 175*   PHOS  --  6.6*  --    BUN 45* 62* 66*   CREATININE 2.8* 3.3* 3.4*   LABGLOM 22* 18* 18*         Assessment  -ELEONORA on CKD IIIb  likely from hemodynamic changes in the setting of acute anemia. BP on 2/27 was noted to be in the 90 s for few hrs. Other concern is lysis and progression of CKD    -Hyperkalemia   Hemolyzed sample   Also has acidosis    -Metabolic acidosis from lactic acidosis along with eleonora    -severe anemia and thrombocytopenia   S/p 4 prbcs 2/28-3/1   EGD 3/1  hiatal hernia and gastropathy, no active bleed noted     -Multiple myeloma     -DVT RUE    -Leukocytosis    Down trending      Plan  -cont IVF to bicarb at 100 ml/h  -trend uric acid  -DC gabapentin  - bladder scan and lombardo if >200 ml  -Serial renal panel  -daily wts and strict i/o  -renal dose medications   -avoid nephrotoxins     Dispo- d/w pt's wife and she understands that he is critically sick and if the eleonora gets worse, pt would not be able to tolerate RRT. Thank you for the consultation. Please do not hesitate to call with questions. Zoraida Sandoval MD  Office: [phone]  Fax:    [phone] [address] [facility]

## 2023-03-01 NOTE — PROGRESS NOTES
Unit 1/2 PRBC complete. Pt tolerated well. No s/sx of adverse reaction. VSS.   Electronically signed by Blake Langford RN on 3/1/2023 at 1:01 AM

## 2023-03-01 NOTE — CONSULTS
[facility]  Nephrology Follow up Note           Reason for Consult:  coty on CKD IIIb  Requesting Physician:  Dr. Dee Cuello history  Pt is weak and opens eyes when asked  EGD 3/1  Received 4 PRBCs between 2/28-3/1  Needed straight cath in 3/1 am    Last 24 h uop 970 ml    ROS: No chest pain/shortness of breath/fever/nausea/vomiting  PSFH: + visitor    Scheduled Meds:   pantoprazole  40 mg IntraVENous Daily    cholestyramine  4 g Oral TID WC    OLANZapine  5 mg Oral Nightly    sertraline  75 mg Oral Daily    sodium chloride flush  5-40 mL IntraVENous 2 times per day    busPIRone  10 mg Oral BID    gabapentin  100 mg Oral BID    scopolamine  1 patch TransDERmal Q72H     Continuous Infusions:   sodium chloride      IV infusion builder 100 mL/hr at 02/28/23 1837    sodium chloride      [Held by provider] heparin (PORCINE) Infusion Stopped (02/28/23 1155)    sodium chloride      sodium chloride      sodium chloride      sodium chloride      sodium chloride       PRN Meds:.sodium chloride, sodium chloride, heparin (porcine), heparin (porcine), sodium chloride, sodium chloride, oxyCODONE, sodium chloride flush, sodium chloride, ondansetron **OR** ondansetron, polyethylene glycol, acetaminophen **OR** acetaminophen, LORazepam, sodium chloride, sodium chloride, prochlorperazine, metoclopramide      History of Present Illness on 2/28/23:    65 y.o. yo male with PMH of CKD IIIb, HTN, Multiple myeloma consulted for worsening renal failure  Pt had BM bx on 2/15/23 which confirmed MM and Cyclophosphamide and pulsed dexamethasone started 2/17/2023 . His cr had increased to 3.1 and stabilized to 1.8 by discharge to SNF on 2/21 but he was readmitted within 24h  Cr increased to 1.9 on 2/27 and 2.3-->2.8 on 2/28 and nephrology has been consulted. pt has developed anemia with hb dropping to 5.5 from 7 and lactic acidosis as well  Plt ct is 15K    Physical exam:   Constitutional:  VITALS:  BP (!) 194/76   Pulse 93 Temp 97.7 °F (36.5 °C) (Oral)   Resp 16   Ht 5' 9\" (1.753 m)   Wt 180 lb (81.6 kg)   SpO2 94%   BMI 26.58 kg/m²   Gen: sleepy  Neck: No JVD  Skin: Unremarkable  Cardiovascular:  S1, S2 without m/r/g   Respiratory: diminished on RA  Abdomen:  soft, nt, nd,   Extremities: no lower extremity edema  Neuro/Psy: AAoriented times 1-2 ; moves all 4 ext    Data/  Recent Labs     02/28/23  1203 02/28/23 2244 03/01/23  0517   WBC 33.4* 26.5* 18.5*   HGB 5.5* 7.5* 8.5*   HCT 17.6* 21.8* 26.3*   .9* 92.5 93.5   PLT 15* 5* 4*       Recent Labs     02/28/23  1203 02/28/23 2244 03/01/23  0517    140 139   K 6.3* 5.4* 5.0    105 105   CO2 13* 17* 15*   GLUCOSE 167* 172* 175*   PHOS  --  6.6*  --    BUN 45* 62* 66*   CREATININE 2.8* 3.3* 3.4*   LABGLOM 22* 18* 18*         Assessment  -ELEONORA on CKD IIIb  likely from hemodynamic changes in the setting of acute anemia. BP on 2/27 was noted to be in the 90 s for few hrs. Other concern is lysis and progression of CKD    -Hyperkalemia   Hemolyzed sample   Also has acidosis    -Metabolic acidosis from lactic acidosis along with eleonora    -severe anemia and thrombocytopenia   S/p 4 prbcs 2/28-3/1   EGD 3/1  hiatal hernia and gastropathy, no active bleed noted     -Multiple myeloma     -DVT RUE    -Leukocytosis    Down trending      Plan  -cont IVF to bicarb at 100 ml/h  -trend uric acid  -DC gabapentin  - bladder scan and lombardo if >200 ml  -Serial renal panel  -daily wts and strict i/o  -renal dose medications   -avoid nephrotoxins     Dispo- d/w pt's wife and she understands that he is critically sick and if the eleonora gets worse, pt would not be able to tolerate RRT. Thank you for the consultation. Please do not hesitate to call with questions. Maggy Giron MD  Office: [phone]  Fax:    [phone] [address] [facility]

## 2023-03-01 NOTE — ANESTHESIA PRE PROCEDURE
Department of Anesthesiology  Preprocedure Note       Name:  Torie Bonilla   Age:  65 y.o.  :  1945                                          MRN:  [id number]         Date:  3/1/2023      Surgeon: Minh Hsieh):  Beverly Welch MD    Procedure: Procedure(s):  EGD DIAGNOSTIC ONLY    Medications prior to admission:   Prior to Admission medications    Medication Sig Start Date End Date Taking? Authorizing Provider   gabapentin (NEURONTIN) 100 MG capsule Take 1 capsule by mouth 2 times daily for 30 days. 2/21/23 3/23/23  Isael Allen MD   ondansetron (ZOFRAN-ODT) 8 MG TBDP disintegrating tablet Take 1 tablet by mouth every 8 hours as needed for Nausea or Vomiting 23   Isael Allen MD   promethazine (PHENERGAN) 25 MG tablet Take 1 tablet by mouth 4 times daily as needed for Nausea  Patient not taking: Reported on 2023 2/21/23 3/7/23  Isael Allen MD   busPIRone (BUSPAR) 7.5 MG tablet Take 7.5 mg by mouth 2 times daily    Historical Provider, MD   lisinopril (PRINIVIL;ZESTRIL) 20 MG tablet Take 20 mg by mouth daily  Patient not taking: Reported on 2023    Historical Provider, MD   rosuvastatin (CRESTOR) 20 MG tablet TAKE ONE TABLET BY MOUTH DAILY  Patient not taking: Reported on 2023   Julissa Whitten DO   fenofibrate (TRICOR) 54 MG tablet Take 1 tablet by mouth daily [facility] pharmacy: Please dispense generic fenofibrate unless prescriber denote 23   Julissa Whitten DO   cholestyramine Daliann Giovanni) 4 GM/DOSE powder Take 1 packet by mouth 3 times daily (with meals) 22   Julissa Whitten DO   OLANZapine (ZYPREXA) 5 MG tablet Take 5 mg by mouth nightly    Historical Provider, MD   bortezomib 5 mg/2 mLs in sodium chloride (PF) injection Inject 1.3 mg/m2 into the skin once    Historical Provider, MD   sertraline (ZOLOFT) 50 MG tablet Take 1.5 tablets by mouth daily 22   Gattis  Schklar, DO   LORazepam (ATIVAN) 1 MG tablet Take 1 mg by mouth 4 times daily as needed. Historical Provider, MD   dronabinol (MARINOL) 5 MG capsule in the morning and at bedtime. 10/20/22   Historical Provider, MD   oxyCODONE (ROXICODONE) 5 MG immediate release tablet Take 5 mg by mouth every 4 hours as needed for Pain.    Historical Provider, MD   pantoprazole (PROTONIX) 40 MG tablet Take 1 tablet by mouth daily 10/10/22   Tamir Gonzalez DO       Current medications:    Current Facility-Administered Medications   Medication Dose Route Frequency Provider Last Rate Last Admin   • 0.9 % sodium chloride infusion   IntraVENous PRN TANNER Mitchell       • pantoprazole (PROTONIX) injection 40 mg  40 mg IntraVENous Daily Diallo Koroma MD   40 mg at 02/28/23 1214   • pantoprazole (PROTONIX) 80 mg in sodium chloride 0.9 % 100 mL infusion  8 mg/hr IntraVENous Continuous Diallo Koroma MD 10 mL/hr at 03/01/23 0146 8 mg/hr at 03/01/23 0146   • sodium bicarbonate 150 mEq in dextrose 5 % 1,000 mL infusion   IntraVENous Continuous Reggie Hernandez  mL/hr at 02/28/23 1837 New Bag at 02/28/23 1837   • 0.9 % sodium chloride infusion   IntraVENous PRN Lakisha Acuna MD       • heparin (porcine) injection 6,500 Units  6,500 Units IntraVENous PRN Messi Garrett MD       • heparin (porcine) injection 3,300 Units  3,300 Units IntraVENous PRN Messi Garrett MD       • [Held by provider] heparin 25,000 unit in sodium chloride 0.45% 250 mL (premix) infusion  1,390 Units/hr IntraVENous Continuous Messi Garrett MD   Stopped at 02/28/23 1155   • 0.9 % sodium chloride infusion   IntraVENous PRN Messi Garrett MD       • cholestyramine (QUESTRAN) packet 4 g  4 g Oral TID ELKE Rios DO   4 g at 02/28/23 0933   • 0.9 % sodium chloride infusion   IntraVENous PRN Emilio Carmona, APRN - CNP       • OLANZapine (ZYPREXA) tablet 5 mg  5 mg Oral Nightly Ana Gallegos MD   5 mg at 02/28/23 2050   • oxyCODONE (ROXICODONE) immediate release tablet 5 mg  5 mg Oral Q6H PRN Ana Gallegos MD   5 mg at 03/01/23 0130    • sertraline (ZOLOFT) tablet 75 mg  75 mg Oral Daily Ana Gallegos MD   75 mg at 02/28/23 0933   • sodium chloride flush 0.9 % injection 5-40 mL  5-40 mL IntraVENous 2 times per day Ana Gallegos MD   10 mL at 02/28/23 2050   • sodium chloride flush 0.9 % injection 5-40 mL  5-40 mL IntraVENous PRN Ana Gallegos MD       • 0.9 % sodium chloride infusion   IntraVENous PRN Ana Gallegos MD       • ondansetron (ZOFRAN-ODT) disintegrating tablet 4 mg  4 mg Oral Q8H PRN Ana Gallegos MD   4 mg at 02/28/23 1021    Or   • ondansetron (ZOFRAN) injection 4 mg  4 mg IntraVENous Q6H PRN Ana Gallegos MD   4 mg at 03/01/23 0345   • polyethylene glycol (GLYCOLAX) packet 17 g  17 g Oral Daily PRN Ana Gallegos MD       • acetaminophen (TYLENOL) tablet 650 mg  650 mg Oral Q6H PRN Ana Gallegos MD   650 mg at 02/28/23 1713    Or   • acetaminophen (TYLENOL) suppository 650 mg  650 mg Rectal Q6H PRN Ana Gallegos MD       • LORazepam (ATIVAN) tablet 1 mg  1 mg Oral Q6H PRN Ana Gallegos MD   1 mg at 02/27/23 1133   • busPIRone (BUSPAR) tablet 10 mg  10 mg Oral BID Ana Gallegos MD   10 mg at 02/28/23 2050   • 0.9 % sodium chloride infusion   IntraVENous PRN Ana Gallegos MD       • 0.9 % sodium chloride infusion   IntraVENous PRN Ana Gallegos MD       • prochlorperazine (COMPAZINE) injection 10 mg  10 mg IntraVENous Q6H PRN Ana Gallegos MD   10 mg at 02/27/23 1310   • gabapentin (NEURONTIN) capsule 100 mg  100 mg Oral BID Ana Gallegos MD   100 mg at 02/28/23 2050   • scopolamine (TRANSDERM-SCOP) transdermal patch 1 patch  1 patch TransDERmal Q72H Ana Gallegos MD   1 patch at 02/27/23 1147   • metoclopramide (REGLAN) injection 10 mg  10 mg IntraVENous Q6H PRN Ana Gallegos MD   10 mg at 02/28/23 1143       Allergies:    Allergies   Allergen Reactions   • Atorvastatin    • Lipitor Rash       Problem List:    Patient Active Problem List   Diagnosis Code   • GERD (gastroesophageal  reflux disease) K21.9    MANISH (generalized anxiety disorder) F41.1    Essential hypertension, benign I10    CKD (chronic kidney disease) stage 3, GFR 30-59 ml/min (HCC) N18.30    Recurrent deep vein thrombosis (DVT) of right lower extremity (HCC) I82.401    Mixed hyperlipidemia E78.2    Ni's esophagus without dysplasia K22.70    Primary insomnia F51.01    Multiple myeloma not having achieved remission ([facility]) C90.00    Current moderate episode of major depressive disorder without prior episode ([facility] [address] F32.1    Cancer, metastatic to bone (HCC) C79.51    Malaise and fatigue R53.81, R53.83    Thrombocytopenia (HCC) D69.6    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    History of DVT in adulthood Z86.718       Past Medical History:        Diagnosis Date    Anxiety     Arthritis     Bladder cancer ([facility]     DVT (deep venous thrombosis) ([facility] [address] 07/01/2014    two spontaneous DVTs left leg    Elevated fasting glucose     Hyperlipidemia     Hypertension     Malignant neoplasm of urinary bladder ([facility] [address] 02/04/2019    Multiple myeloma (HCC)     MVP (mitral valve prolapse)     Polyp of gallbladder     Prostate CA ([facility] 2005       Past Surgical History:        Procedure Laterality Date    BLADDER SURGERY      tumor removed    CATARACT REMOVAL Bilateral     CHOLECYSTECTOMY, LAPAROSCOPIC N/A 1/10/2022    VIDEO LAPAROSCOPIC CHOLECYSTECTOMY performed by Brian Carty MD at [address] [place] [address] Drive  6/29/12    Polyps    COLONOSCOPY  07/12/2017    Polyp    COLONOSCOPY N/A 9/6/2022    COLONOSCOPY POLYPECTOMY SNARE/COLD BIOPSY performed by Latanya Lawrence MD at [address] BIOPSY PERCUTANEOUS DEEP BONE  3/3/2022    CT BIOPSY PERCUTANEOUS DEEP BONE 3/3/2022 Aileen Cohn MD [facility] CT SCAN    CT BONE MARROW BIOPSY  2/15/2023    CT BONE MARROW BIOPSY 2/15/2023 [facility] CT SCAN    INGUINAL HERNIA REPAIR Right 06/1999    PENILE PROSTHESIS PLACEMENT      PROSTATECTOMY  2005 approx    Dr. Michelle Sanders TONSILLECTOMY      In [place] - had one tonsil burned out   • UPPER GASTROINTESTINAL ENDOSCOPY N/A 9/6/2022    EGD BIOPSY performed by Fátima Chen MD at [facility] ENDOSCOPY       Social History:    Social History     Tobacco Use   • Smoking status: Never   • Smokeless tobacco: Never   Substance Use Topics   • Alcohol use: Not Currently                                Counseling given: Not Answered      Vital Signs (Current):   Vitals:    03/01/23 0135 03/01/23 0149 03/01/23 0245 03/01/23 0344   BP: (!) 144/75 (!) 159/70 (!) 164/76 (!) 160/74   Pulse: 96 88 92 92   Resp: 20 21 20 20   Temp: 98.1 °F (36.7 °C) 98.2 °F (36.8 °C)  98.3 °F (36.8 °C)   TempSrc: Axillary Axillary  Oral   SpO2: 96% 97% 95% 95%   Weight:       Height:                                                  BP Readings from Last 3 Encounters:   03/01/23 (!) 160/74   02/21/23 121/71   02/13/23 102/60       NPO Status:                                                                                 BMI:   Wt Readings from Last 3 Encounters:   02/22/23 180 lb (81.6 kg)   02/19/23 176 lb 8 oz (80.1 kg)   02/13/23 163 lb (73.9 kg)     Body mass index is 26.58 kg/m².    CBC:   Lab Results   Component Value Date/Time    WBC 18.5 03/01/2023 05:17 AM    RBC 2.81 03/01/2023 05:17 AM    RBC 5.45 12/09/2015 08:46 AM    HGB 8.5 03/01/2023 05:17 AM    HCT 26.3 03/01/2023 05:17 AM    MCV 93.5 03/01/2023 05:17 AM    RDW 15.7 03/01/2023 05:17 AM    PLT 4 03/01/2023 05:17 AM       CMP:   Lab Results   Component Value Date/Time     03/01/2023 05:17 AM    K 5.0 03/01/2023 05:17 AM     03/01/2023 05:17 AM    CO2 15 03/01/2023 05:17 AM    BUN 66 03/01/2023 05:17 AM    CREATININE 3.4 03/01/2023 05:17 AM    GFRAA 55 08/01/2022 10:04 AM    GFRAA 60 07/20/2012 11:10 AM    AGRATIO 1.6 03/01/2023 05:17 AM    LABGLOM 18 03/01/2023 05:17 AM    GLUCOSE 175 03/01/2023 05:17 AM    GLUCOSE 108 12/09/2015 08:46 AM    PROT 5.0 03/01/2023 05:17 AM    PROT 7.5 12/09/2015 08:46  AM    CALCIUM 7.8 03/01/2023 05:17 AM    BILITOT 0.4 03/01/2023 05:17 AM    ALKPHOS 70 03/01/2023 05:17 AM    AST 50 03/01/2023 05:17 AM    ALT 17 03/01/2023 05:17 AM       POC Tests: No results for input(s): POCGLU, POCNA, POCK, POCCL, POCBUN, POCHEMO, POCHCT in the last 72 hours. Coags:   Lab Results   Component Value Date/Time    PROTIME 14.2 02/24/2023 07:11 PM    INR 1.10 02/24/2023 07:11 PM    APTT 27.9 02/24/2023 07:11 PM       HCG (If Applicable): No results found for: PREGTESTUR, PREGSERUM, HCG, HCGQUANT     ABGs: No results found for: PHART, PO2ART, HUM0WCJ, FPI1HWF, BEART, U7BEAMSW     Type & Screen (If Applicable):  No results found for: LABABO, LABRH    Drug/Infectious Status (If Applicable):  No results found for: HIV, HEPCAB    COVID-19 Screening (If Applicable):   Lab Results   Component Value Date/Time    COVID19 Not Detected 02/22/2023 05:00 PM    COVID19 NOT DETECTED 02/13/2023 12:34 PM           Anesthesia Evaluation  Patient summary reviewed and Nursing notes reviewed  Airway: Mallampati: II  TM distance: >3 FB   Neck ROM: full  Mouth opening: > = 3 FB   Dental:          Pulmonary:       (-) asthma, wheezes and rales                           Cardiovascular:  Exercise tolerance: poor (<4 METS),   (+) hypertension:, ARREDONDO:, hyperlipidemia    (-) past MI      Rhythm: regular  Rate: normal                    Neuro/Psych:   (+) depression/anxiety    (-) seizures, TIA and CVA           GI/Hepatic/Renal:   (+) GERD:,      (-) no renal disease       Endo/Other:    (+) : arthritis: OA., malignancy/cancer. (-) diabetes mellitus, hypothyroidism, hyperthyroidism               Abdominal:         (-) obese       Vascular:   + DVT, . Other Findings: SOMNOLENT  AWAKENS EASILY  ORIENTED X 3          Anesthesia Plan      general     ASA 4     (    PATIENT REVIEWED THE CONSENT DOCUMENTS AND SIGNED. DISCUSSED WITH PATIENT THE NATURE AND PURPOSE OF THE   PROPOSED ANESTHETIC.   RISKS OF ANESTHESIA INCLUDE, BUT,   NOT LIMITED TO : INFLAMMATION OF THE VEIN, ALLERGIC REACTIONS,  NAUSEA, VOMITING, DAMAGE TO TEETH OR ORAL TISSUES, DAMAGE  TO TISSUES AT INJECTION SITE,BRAIN DAMAGE, HEART DAMAGE,  LUNG DAMAGE, PARALYSIS OR DEATH. DISCUSSED BENEFITS AND   OTHER OPTIONS. PATIENT/FAMILY AGREE WITH ANESTHETIC PLAN.    )  Induction: intravenous. continuous noninvasive hemodynamic monitor    Anesthetic plan and risks discussed with patient. Plan discussed with CRNA. Summary    ECHO  02/2023  The left ventricle is normal in size with mild concentric hypertrophy. Left ventricular systolic function is normal with a visually estimated ejection fraction of 55%  No obvious regional wall motion abnormalities noted. Grade I diastolic dysfunction with normal LV pressure. Normal right ventricular size and function. Mild aortic stenosis. Trace aortic valve regurgitation.   Signature  Electronically signed by Olga Gaines MD      ADDITIONAL INFORMATION / SUMMARY LIST  HEMATEMESIS  NON SMOKER  BLADDER / PROSTATE CANCER  MULTIPLE MYELOMA  THROMBOCYTOPENIA  ANEMIA  GERD  DVT   LACTIC ACID  1.5  SODIUM  139  POTASSIUM  5.0  CREAT  3.4  HGB  8.5  PLAT  [address] [place] [address]   3/1/2023

## 2023-03-01 NOTE — PROGRESS NOTES
100 mL output thus far this shift. Bladder scan 410 mL. Order obtained to straight cath pt, 450 mL yellow urine. Heather care after straight cath and new malewick placed. Pt repositioned with pillow support. BUE remain edematous, elevated on pillows. PRN zofran administered for nausea. Bed alarm on. Bed in low position, wheels locked, SR x 2, call light and bedside table within reach.     Electronically signed by Ashwin Browning RN on 3/1/2023 at 4:29 AM

## 2023-03-01 NOTE — PROGRESS NOTES
Hospitalist Progress Note      PCP: Clearance DO Rashaad    Date of Admission: 2/22/2023    Chief Complaint: fatigued     Hospital Course: reviewed      Subjective: resting in bed,  returned from scope today, feels weak no pain currently, wife at bedside, ongoing LUE swelling and limited IV access      Medications:  Reviewed    Infusion Medications    sodium chloride      pantoprozole (PROTONIX) infusion 8 mg/hr (03/01/23 0146)    IV infusion builder 100 mL/hr at 02/28/23 1837    sodium chloride      [Held by provider] heparin (PORCINE) Infusion Stopped (02/28/23 1155)    sodium chloride      sodium chloride      sodium chloride      sodium chloride      sodium chloride       Scheduled Medications    pantoprazole  40 mg IntraVENous Daily    cholestyramine  4 g Oral TID WC    OLANZapine  5 mg Oral Nightly    sertraline  75 mg Oral Daily    sodium chloride flush  5-40 mL IntraVENous 2 times per day    busPIRone  10 mg Oral BID    gabapentin  100 mg Oral BID    scopolamine  1 patch TransDERmal Q72H     PRN Meds: sodium chloride, sodium chloride, heparin (porcine), heparin (porcine), sodium chloride, sodium chloride, oxyCODONE, sodium chloride flush, sodium chloride, ondansetron **OR** ondansetron, polyethylene glycol, acetaminophen **OR** acetaminophen, LORazepam, sodium chloride, sodium chloride, prochlorperazine, metoclopramide      Intake/Output Summary (Last 24 hours) at 3/1/2023 0917  Last data filed at 3/1/2023 0356  Gross per 24 hour   Intake 1698.31 ml   Output 971 ml   Net 727.31 ml       Physical Exam Performed:    BP (!) 150/63   Pulse 82   Temp 97.7 °F (36.5 °C) (Oral)   Resp 16   Ht 5' 9\" (1.753 m)   Wt 180 lb (81.6 kg)   SpO2 95%   BMI 26.58 kg/m²     General appearance: No apparent distress, appears stated age and cooperative. fatigued  HEENT: Pupils equal, round, and reactive to light. Conjunctivae/corneas clear. Neck: Supple, with full range of motion. No jugular venous distention.  Trachea midline. Respiratory:  Normal respiratory effort. Clear to auscultation, bilaterally without Rales/Wheezes/Rhonchi. Cardiovascular: Regular rate and rhythm with normal S1/S2 without murmurs, rubs or gallops. Abdomen: Soft, non-tender, non-distended with normal bowel sounds. Musculoskeletal: No clubbing, cyanosis or edema bilaterally. Full range of motion without deformity. LUE swelling noted  Skin: Skin color, texture, turgor normal.  No rashes or lesions. Neurologic:  Neurovascularly intact without any focal sensory/motor deficits. Cranial nerves: II-XII intact, grossly non-focal.  Psychiatric: Alert and oriented, thought content appropriate, normal insight  Capillary Refill: Brisk, 3 seconds, normal   Peripheral Pulses: +2 palpable, equal bilaterally       Labs:   Recent Labs     02/28/23 1203 02/28/23 2244 03/01/23  0517   WBC 33.4* 26.5* 18.5*   HGB 5.5* 7.5* 8.5*   HCT 17.6* 21.8* 26.3*   PLT 15* 5* 4*     Recent Labs     02/28/23 1203 02/28/23 2244 03/01/23  0517    140 139   K 6.3* 5.4* 5.0    105 105   CO2 13* 17* 15*   BUN 45* 62* 66*   CREATININE 2.8* 3.3* 3.4*   CALCIUM 8.3 8.1* 7.8*   PHOS  --  6.6*  --      Recent Labs     02/28/23 0518 02/28/23 1203 03/01/23  0517   AST 16 28 50*   ALT 10 13 17   BILITOT 0.3 0.4 0.4   ALKPHOS 66 73 70     No results for input(s): INR in the last 72 hours. No results for input(s): Roslynn Angst in the last 72 hours. Urinalysis:      Lab Results   Component Value Date/Time    NITRU Negative 02/22/2023 02:45 PM    WBCUA None seen 02/22/2023 02:45 PM    BACTERIA 1+ 02/09/2023 12:08 PM    RBCUA 3-4 02/22/2023 02:45 PM    BLOODU TRACE-LYSED 02/22/2023 02:45 PM    SPECGRAV 1.015 02/22/2023 02:45 PM    GLUCOSEU Negative 02/22/2023 02:45 PM       Radiology:  VL Extremity Venous Left   Final Result      VL Extremity Venous Right   Final Result      XR CHEST PORTABLE   Final Result   1. New right PICC line extending the cavoatrial junction.    2. Otherwise stable appearance of the chest with no acute findings. IP CONSULT TO HEM/ONC  PALLIATIVE CARE EVAL  IP CONSULT TO HOSPITALIST  IP CONSULT TO HOME CARE NEEDS  IP CONSULT TO CARDIOLOGY  IP CONSULT TO CASE MANAGEMENT  IP CONSULT TO PHARMACY  IP CONSULT TO PALLIATIVE CARE  IP CONSULT TO HOSPICE  IP CONSULT TO GI  IP CONSULT TO NEPHROLOGY    Assessment/Plan:    Active Hospital Problems    Diagnosis     Thrombocytopenia ([facility] [address] [D69.6]      Priority: Medium    Plasma cell leukemia ([facility] [address] [C90.10]      Priority: Medium    Malaise and fatigue [R53.81, R53.83]      Priority: Medium    History of DVT in adulthood [Z86.718]     CKD (chronic kidney disease) stage 3, GFR 30-59 ml/min ([facility]) [N18.30]     Essential hypertension, benign [I10]           Left Upper extremity swelling - ruled out DVT    Acute DVT R upper extremity   Doppler showed Acute partially occluded deep vein thrombosis involving the right brachial vein (1 of 2). Acute partially occluded superficial venous thrombosis involving the right   basilic vein. Isolated acute totally occluded superficial venous thrombosis involving the right cephalic vein at the antecubital fossa. -started on heparin ggt, stopped 2/28 given GIB concerns/hematemesis    Hematemesis- concern for GIB  -ppi ggt started  -trended h/h  -gi consulted, egd done 3/1    - Fatigue - likely multifactorial  -tx underlying issues    -MM with progression to plasma cell leukemia  -ONC on board, apprec recs    -Thrombocytopenia  -mgmt per hemonc    Anemia: s/p 1 u PRBC  -2u prbc 2/28 pm(was irradiated)    -ELEONORA on CKD/metabolic acidosis/hyperkalemia/lactic acidosis- likely related to above, ?tumor lysis  -nephro consulted, apprec recs  -ivfs given, lokelma    Hx of VTE/DVT- noted in 2014  - stopped eliquis due to severe persistent thrombocytopenia   -was on heparin ggt for new upper dvt    DVT Prophylaxis: on heparin ggt, held 2/28  Diet: ADULT DIET;  Clear Liquid  Code Status: Limited  PT/OT Eval Status: ordered and pending    Dispo - guarded prognosis, pending workup, stable /improved labs    Appropriate for A1 Discharge Unit: Mavis Simmons MD

## 2023-03-01 NOTE — PERIOP NOTE
0710 Bicarb infusion d/c from antecubital site - site bleeding /catheter intact - persistant bleeding noted from iv exit site -pressure applied for 5 minutes /bleeding stopped     Forearm iv site - iv catheter d/c intact/ bleeding stopped

## 2023-03-01 NOTE — PLAN OF CARE
Problem: ABCDS Injury Assessment  Goal: Absence of physical injury  Outcome: Progressing     Problem: Safety - Adult  Goal: Free from fall injury  Outcome: Progressing     Problem: Skin/Tissue Integrity  Goal: Absence of new skin breakdown  Description: 1. Monitor for areas of redness and/or skin breakdown  2. Assess vascular access sites hourly  3. Every 4-6 hours minimum:  Change oxygen saturation probe site  4. Every 4-6 hours:  If on nasal continuous positive airway pressure, respiratory therapy assess nares and determine need for appliance change or resting period.   Outcome: Progressing

## 2023-03-02 VITALS
DIASTOLIC BLOOD PRESSURE: 75 MMHG | WEIGHT: 180 LBS | OXYGEN SATURATION: 92 % | BODY MASS INDEX: 26.66 KG/M2 | TEMPERATURE: 100.1 F | SYSTOLIC BLOOD PRESSURE: 139 MMHG | HEART RATE: 96 BPM | HEIGHT: 69 IN | RESPIRATION RATE: 20 BRPM

## 2023-03-02 LAB
A/G RATIO: 1.7 (ref 1.1–2.2)
ALBUMIN SERPL-MCNC: 3.2 G/DL (ref 3.4–5)
ALP BLD-CCNC: 62 U/L (ref 40–129)
ALT SERPL-CCNC: 13 U/L (ref 10–40)
ANION GAP SERPL CALCULATED.3IONS-SCNC: 16 MMOL/L (ref 3–16)
APTT: 27.4 SEC (ref 23–34.3)
AST SERPL-CCNC: 36 U/L (ref 15–37)
BANDED NEUTROPHILS RELATIVE PERCENT: 2 % (ref 0–7)
BASOPHILS ABSOLUTE: 0 K/UL (ref 0–0.2)
BASOPHILS RELATIVE PERCENT: 0 %
BILIRUB SERPL-MCNC: 0.6 MG/DL (ref 0–1)
BLOOD BANK DISPENSE STATUS: NORMAL
BLOOD BANK PRODUCT CODE: NORMAL
BPU ID: NORMAL
BUN BLDV-MCNC: 69 MG/DL (ref 7–20)
CALCIUM SERPL-MCNC: 7.5 MG/DL (ref 8.3–10.6)
CHLORIDE BLD-SCNC: 106 MMOL/L (ref 99–110)
CO2: 21 MMOL/L (ref 21–32)
CREAT SERPL-MCNC: 3.3 MG/DL (ref 0.8–1.3)
DESCRIPTION BLOOD BANK: NORMAL
EOSINOPHILS ABSOLUTE: 0.2 K/UL (ref 0–0.6)
EOSINOPHILS RELATIVE PERCENT: 1 %
FIBRINOGEN: 454 MG/DL (ref 207–509)
GFR SERPL CREATININE-BSD FRML MDRD: 18 ML/MIN/{1.73_M2}
GLUCOSE BLD-MCNC: 138 MG/DL (ref 70–99)
HCT VFR BLD CALC: 21.8 % (ref 40.5–52.5)
HCT VFR BLD CALC: 22.4 % (ref 40.5–52.5)
HEMATOLOGY PATH CONSULT: NO
HEMOGLOBIN: 7.3 G/DL (ref 13.5–17.5)
HEMOGLOBIN: 7.5 G/DL (ref 13.5–17.5)
INR BLD: 1.31 (ref 0.87–1.14)
LYMPHOCYTES ABSOLUTE: 14.4 K/UL (ref 1–5.1)
LYMPHOCYTES RELATIVE PERCENT: 89 %
MCH RBC QN AUTO: 29.6 PG (ref 26–34)
MCH RBC QN AUTO: 31 PG (ref 26–34)
MCHC RBC AUTO-ENTMCNC: 32.4 G/DL (ref 31–36)
MCHC RBC AUTO-ENTMCNC: 34.2 G/DL (ref 31–36)
MCV RBC AUTO: 90.6 FL (ref 80–100)
MCV RBC AUTO: 91.4 FL (ref 80–100)
MICROCYTES: ABNORMAL
MONOCYTES ABSOLUTE: 0 K/UL (ref 0–1.3)
MONOCYTES RELATIVE PERCENT: 0 %
NEUTROPHILS ABSOLUTE: 0.6 K/UL (ref 1.7–7.7)
NEUTROPHILS RELATIVE PERCENT: 2 %
OVALOCYTES: ABNORMAL
PDW BLD-RTO: 15.2 % (ref 12.4–15.4)
PDW BLD-RTO: 15.8 % (ref 12.4–15.4)
PLASMA CELLS PERCENT: 6 %
PLATELET # BLD: 2 K/UL (ref 135–450)
PLATELET # BLD: 4 K/UL (ref 135–450)
PLATELET SLIDE REVIEW: ABNORMAL
PMV BLD AUTO: 12 FL (ref 5–10.5)
PMV BLD AUTO: 8.8 FL (ref 5–10.5)
POIKILOCYTES: ABNORMAL
POTASSIUM REFLEX MAGNESIUM: 3.9 MMOL/L (ref 3.5–5.1)
PROTHROMBIN TIME: 16.3 SEC (ref 11.7–14.5)
RBC # BLD: 2.41 M/UL (ref 4.2–5.9)
RBC # BLD: 2.45 M/UL (ref 4.2–5.9)
SLIDE REVIEW: ABNORMAL
SODIUM BLD-SCNC: 143 MMOL/L (ref 136–145)
TOTAL PROTEIN: 5.1 G/DL (ref 6.4–8.2)
WBC # BLD: 16.2 K/UL (ref 4–11)
WBC # BLD: 16.4 K/UL (ref 4–11)

## 2023-03-02 PROCEDURE — 6370000000 HC RX 637 (ALT 250 FOR IP): Performed by: INTERNAL MEDICINE

## 2023-03-02 PROCEDURE — 2580000003 HC RX 258: Performed by: INTERNAL MEDICINE

## 2023-03-02 PROCEDURE — 97530 THERAPEUTIC ACTIVITIES: CPT

## 2023-03-02 PROCEDURE — 6370000000 HC RX 637 (ALT 250 FOR IP): Performed by: HOSPITALIST

## 2023-03-02 PROCEDURE — 85730 THROMBOPLASTIN TIME PARTIAL: CPT

## 2023-03-02 PROCEDURE — 97110 THERAPEUTIC EXERCISES: CPT

## 2023-03-02 PROCEDURE — 85384 FIBRINOGEN ACTIVITY: CPT

## 2023-03-02 PROCEDURE — C9113 INJ PANTOPRAZOLE SODIUM, VIA: HCPCS | Performed by: INTERNAL MEDICINE

## 2023-03-02 PROCEDURE — 6360000002 HC RX W HCPCS: Performed by: INTERNAL MEDICINE

## 2023-03-02 PROCEDURE — 85610 PROTHROMBIN TIME: CPT

## 2023-03-02 PROCEDURE — 36415 COLL VENOUS BLD VENIPUNCTURE: CPT

## 2023-03-02 PROCEDURE — 85025 COMPLETE CBC W/AUTO DIFF WBC: CPT

## 2023-03-02 PROCEDURE — 97535 SELF CARE MNGMENT TRAINING: CPT

## 2023-03-02 PROCEDURE — 36430 TRANSFUSION BLD/BLD COMPNT: CPT

## 2023-03-02 PROCEDURE — 80053 COMPREHEN METABOLIC PANEL: CPT

## 2023-03-02 PROCEDURE — 85027 COMPLETE CBC AUTOMATED: CPT

## 2023-03-02 RX ORDER — SODIUM CHLORIDE 9 MG/ML
INJECTION, SOLUTION INTRAVENOUS PRN
Status: DISCONTINUED | OUTPATIENT
Start: 2023-03-02 | End: 2023-03-02 | Stop reason: HOSPADM

## 2023-03-02 RX ORDER — SODIUM CHLORIDE, SODIUM LACTATE, POTASSIUM CHLORIDE, CALCIUM CHLORIDE 600; 310; 30; 20 MG/100ML; MG/100ML; MG/100ML; MG/100ML
INJECTION, SOLUTION INTRAVENOUS CONTINUOUS
Status: DISCONTINUED | OUTPATIENT
Start: 2023-03-02 | End: 2023-03-02 | Stop reason: HOSPADM

## 2023-03-02 RX ORDER — SCOLOPAMINE TRANSDERMAL SYSTEM 1 MG/1
1 PATCH, EXTENDED RELEASE TRANSDERMAL
Qty: 1 PATCH | Refills: 0
Start: 2023-03-05

## 2023-03-02 RX ADMIN — SODIUM CHLORIDE, POTASSIUM CHLORIDE, SODIUM LACTATE AND CALCIUM CHLORIDE: 600; 310; 30; 20 INJECTION, SOLUTION INTRAVENOUS at 09:10

## 2023-03-02 RX ADMIN — BUSPIRONE HYDROCHLORIDE 10 MG: 5 TABLET ORAL at 09:09

## 2023-03-02 RX ADMIN — CHOLESTYRAMINE 4 G: 4 POWDER, FOR SUSPENSION ORAL at 09:09

## 2023-03-02 RX ADMIN — ACETAMINOPHEN 650 MG: 325 TABLET ORAL at 14:35

## 2023-03-02 RX ADMIN — PANTOPRAZOLE SODIUM 40 MG: 40 INJECTION, POWDER, FOR SOLUTION INTRAVENOUS at 09:09

## 2023-03-02 RX ADMIN — LORAZEPAM 1 MG: 1 TABLET ORAL at 14:35

## 2023-03-02 RX ADMIN — BUSPIRONE HYDROCHLORIDE 10 MG: 5 TABLET ORAL at 20:03

## 2023-03-02 RX ADMIN — SERTRALINE 75 MG: 50 TABLET, FILM COATED ORAL at 09:09

## 2023-03-02 RX ADMIN — OLANZAPINE 5 MG: 5 TABLET, FILM COATED ORAL at 20:03

## 2023-03-02 NOTE — PROGRESS NOTES
Progress Note    Patient Remedios Cantrell  MRN: [id number]  YOB: 1945 Age: 65 y.o. Sex: male  Room: [address] [place] [address]       Admitting Physician: Pj Blood MD   Date of Admission: 2/22/2023  2:38 PM   Primary Care Physician: Jeremiah Flowers DO     Subjective:  Remedios Cantrell was seen and examined. We are following for hematemesis. -- No acute events overnight  -- Lethargic this morning. Offers no GI complaints  -- No overt GI bleeding reported overnight    ROS:  Constitutional: Denies fever, no change in appetite  Respiratory: Denies cough or shortness of breath  Cardiovascular: Denies chest pain or edema    Objective:  Vital Signs:   Vitals:    03/02/23 1156   BP: 121/63   Pulse: 92   Resp: 22   Temp: 98.5 °F (36.9 °C)   SpO2: 94%         Physical Exam:  Constitutional: Alert and oriented x 4. No acute distress. Respiratory: Respirations nonlabored, no crepitus  GI: Abdomen nondistended, soft, and nontender. Neurological: No focal deficits noted. No asterixis.     Intake/Output:    Intake/Output Summary (Last 24 hours) at 3/2/2023 1158  Last data filed at 3/2/2023 1157  Gross per 24 hour   Intake 473.58 ml   Output 1500 ml   Net -1026.42 ml        Current Medications:  Current Facility-Administered Medications   Medication Dose Route Frequency Provider Last Rate Last Admin    0.9 % sodium chloride infusion   IntraVENous PRN Verda Kocher, MD        lactated ringers IV soln infusion   IntraVENous Continuous Amparo Lopez  mL/hr at 03/02/23 0910 New Bag at 03/02/23 0910    0.9 % sodium chloride infusion   IntraVENous PRN TANNER Purcell        pantoprazole (PROTONIX) injection 40 mg  40 mg IntraVENous Daily Neel Russ MD   40 mg at 03/02/23 0909    0.9 % sodium chloride infusion   IntraVENous PRN Mary Dangelo MD        heparin (porcine) injection 6,500 Units  6,500 Units IntraVENous PRN Messi Garrett MD        heparin (porcine) injection 3,300 Units  3,300 Units IntraVENous PRN Anshul De La Torre MD        [Held by provider] heparin 25,000 unit in sodium chloride 0.45% 250 mL (premix) infusion  1,390 Units/hr IntraVENous Continuous Messi Garrett MD   Stopped at 02/28/23 1155    0.9 % sodium chloride infusion   IntraVENous PRN Messi Garrett MD        cholestyramine (QUESTRAN) packet 4 g  4 g Oral TID WC Cuong Harkless, DO   4 g at 03/02/23 0909    0.9 % sodium chloride infusion   IntraVENous PRN Han Padilla, APRN - CNP        OLANZapine (ZYPREXA) tablet 5 mg  5 mg Oral Nightly Sri Mcmanus MD   5 mg at 03/01/23 2128    oxyCODONE (ROXICODONE) immediate release tablet 5 mg  5 mg Oral Q6H PRN Sri Mcmanus MD   5 mg at 03/01/23 1001    sertraline (ZOLOFT) tablet 75 mg  75 mg Oral Daily Sri Mcmanus MD   75 mg at 03/02/23 4038    sodium chloride flush 0.9 % injection 5-40 mL  5-40 mL IntraVENous 2 times per day Sri Mcmanus MD   10 mL at 03/01/23 2128    sodium chloride flush 0.9 % injection 5-40 mL  5-40 mL IntraVENous PRN Sri Mcmanus MD        0.9 % sodium chloride infusion   IntraVENous PRN Sri Mcmanus MD        ondansetron (ZOFRAN-ODT) disintegrating tablet 4 mg  4 mg Oral Q8H PRN Sri Mcmanus MD   4 mg at 02/28/23 1021    Or    ondansetron (ZOFRAN) injection 4 mg  4 mg IntraVENous Q6H PRN Sri Mcmanus MD   4 mg at 03/01/23 1854    polyethylene glycol (GLYCOLAX) packet 17 g  17 g Oral Daily PRN Sri Mcmanus MD        acetaminophen (TYLENOL) tablet 650 mg  650 mg Oral Q6H PRN Sri Mcmanus MD   650 mg at 02/28/23 1713    Or    acetaminophen (TYLENOL) suppository 650 mg  650 mg Rectal Q6H PRN Sri Mcmanus MD        LORazepam (ATIVAN) tablet 1 mg  1 mg Oral Q6H PRN Sri Mcmanus MD   1 mg at 02/27/23 1133    busPIRone (BUSPAR) tablet 10 mg  10 mg Oral BID Sri Mcmanus MD   10 mg at 03/02/23 0909    0.9 % sodium chloride infusion   IntraVENous PRN Sri Mcmanus MD        0.9 % sodium chloride infusion   IntraVENous PRN Ana JENNINGS Clarence Soto MD        prochlorperazine (COMPAZINE) injection 10 mg  10 mg IntraVENous Q6H PRN Darshan Dominguez MD   10 mg at 02/27/23 1310    scopolamine (TRANSDERM-SCOP) transdermal patch 1 patch  1 patch TransDERmal Q72H Darshan Dominguez MD   1 patch at 03/02/23 1142    metoclopramide (REGLAN) injection 10 mg  10 mg IntraVENous Q6H PRN Darshan Dominguez MD   10 mg at 02/28/23 1143         Recent Imaging:   VL Extremity Venous Left  Upper Extremities Veins     Demographics      Patient Name        Kenia Valente      Date of Study       02/27/2023      Gender               Male      Patient Number      [id number]      Date of Birth        1945      Visit Number        [id number]       Age                  65 year(s)      Accession Number    [id number]      Room Number          2626      Corporate ID        [id number]         Sonographer          Grzegorz Michael RDMS, T      Ordering Physician  Clem Carrasquillo MD  Interpreting         [facility]                                       Physician            Vascular                                                            Holly Stubbs MD     Procedure    Type of Study:      Veins:Upper Extremities Veins, VL EXTREMITY VENOUS DUPLEX LEFT. Vascular Sonographer Report     Indications for Study:Arm swelling. Additional Indications:Patient complains of left upper extremity swelling for  about four days. Venous Duplex Scan: B-mode imaging of the deep and superficial veins, with  compression maneuvers, including color and Doppler spectral analysis. Impressions  Right Impression  There was no evidence of deep venous thrombosis involving the right subclavian  vein. Left Impression  Technically difficult and limited exam due to swelling, patients inability to  position correctly and bandages. The basilic vein was not visualized due to limitations. The cephalic vein was  only partially visualized.   Within the limits of this exam,, there is no evidence of deep or superifical  venous thrombosis involving the left upper extremity. Previous exam on 02/24/2023, deep venous thrombosis involving the right  brachial vein. Conclusions      Summary      Within the limits of this exam, there is no evidence of deep or superficial   venous thrombosis involving the left upper extremity or right subclavian   vein. Signature      ------------------------------------------------------------------   Electronically signed by Priya España MD (Interpreting   physician) on 02/28/2023 at 09:46 AM   ------------------------------------------------------------------     Patient Status:Routine. Study Alondra Lin 46 - Vascular Lab. Technical Quality:Limited visualization due to dressings. Risk Factors  History  +---------+----+-----------------------------------+  ! Diagnosis! Date! Comments                           ! +---------+----+-----------------------------------+  ! Other    ! ! MVP, History of DVT Left Leg \"2014\"! +---------+----+-----------------------------------+      - The patient has cancer. Velocities are measured in cm/s ; Diameters are measured in mm    Right UE Vein Measurements  2D Measurements  +--------+----------+---------------+----------+  ! Location! Visualized! Compressibility! Thrombosis! +--------+----------+---------------+----------+  ! SCV     ! Yes       ! Yes            ! None      !  +--------+----------+---------------+----------+    Doppler Measurements  +--------+------+------+  ! Location! Signal!Reflux! +--------+------+------+  ! SCV     ! Phasic!      ! +--------+------+------+    Left UE Vein Measurements  2D Measurements  +--------+----------+---------------+----------+  ! Location! Visualized! Compressibility! Thrombosis! +--------+----------+---------------+----------+  ! IJV     ! Yes       ! Yes            ! None      !  +--------+----------+---------------+----------+  ! SCV !Yes       !Yes            ! None      !  +--------+----------+---------------+----------+  ! Axillary! Yes       ! Yes            ! None      !  +--------+----------+---------------+----------+  ! Brachial!Yes       ! Yes            ! None      !  +--------+----------+---------------+----------+  ! Radial  !Yes       ! Yes            ! None      !  +--------+----------+---------------+----------+  ! Ulnar   ! Yes       ! Yes            ! None      !  +--------+----------+---------------+----------+  ! Basilic ! No        !               !          !  +--------+----------+---------------+----------+  ! Cephalic! Partial   !Yes            ! None      !  +--------+----------+---------------+----------+    Doppler Measurements  +--------+------+------+  ! Location! Signal!Reflux! +--------+------+------+  ! IJV     ! Phasic!      ! +--------+------+------+  ! SCV     ! Phasic!      ! +--------+------+------+  ! Axillary! Phasic!      ! +--------+------+------+       Labs:   Recent Labs     02/28/23  0518 02/28/23  1203 02/28/23  2244 03/01/23  0517 03/02/23  0504 03/02/23  0735   HGB 5.5* 5.5* 7.5* 8.5* 7.5* 7.3*   WBC 15.5* 33.4* 26.5* 18.5* 16.2* 16.4*   PROT 4.8* 5.2*  --  5.0* 5.1*  --    LABALBU 3.0* 3.0* 3.2* 3.1* 3.2*  --    ALKPHOS 66 73  --  70 62  --    ALT 10 13  --  17 13  --    AST 16 28  --  50* 36  --    BILITOT 0.3 0.4  --  0.4 0.6  --           Assessment:    69-year-old male with GERD and Ni's esophagus and plasma cell leukemia who recently received cyclophosphamide who was admitted with weakness. He was found to have significant thrombocytopenia and started having hematemesis. The patient had been nauseated in the days before this event. Hemoglobin also dropped significantly. 3/1 EGD:  -Large hiatal hernia. -Mild gastropathy in gastric body without active bleeding or stigmata    No further overt bleeding. Hgb stable. Offers no GI complaints this morning. Family is planning to have meeting with hospice today. Plan:  Monitor H/H and overt signs of GI bleeding  Continue PPI twice daily  Okay to resume heparin gtt from GI standpoint  Advance diet as tolerated  GI will follow up as needed. Please call with questions or concerns  Supportive care       KATIE Ybarra - HILLARY    [facility]    [phone].  Also available via [employer]

## 2023-03-02 NOTE — PROGRESS NOTES
[facility]  Nephrology Follow up Note           Reason for Consult:  coty on CKD IIIb  Requesting Physician:  Dr. Александр Bhakta history  Pt is sleeping  Received 4 PRBCs between 2/28-3/1  Fu placed on 3/1 pm after PVR was noted to be over 500 ml  Needed straight cath in 3/1 am    Last 24 h uop 1500 ml    ROS: no fever  PSFH: No visitor    Scheduled Meds:   pantoprazole  40 mg IntraVENous Daily    cholestyramine  4 g Oral TID WC    OLANZapine  5 mg Oral Nightly    sertraline  75 mg Oral Daily    sodium chloride flush  5-40 mL IntraVENous 2 times per day    busPIRone  10 mg Oral BID    scopolamine  1 patch TransDERmal Q72H     Continuous Infusions:   sodium chloride      lactated ringers IV soln 100 mL/hr at 03/02/23 0910    sodium chloride      sodium chloride      [Held by provider] heparin (PORCINE) Infusion Stopped (02/28/23 8535)    sodium chloride      sodium chloride      sodium chloride      sodium chloride      sodium chloride       PRN Meds:.sodium chloride, sodium chloride, sodium chloride, heparin (porcine), heparin (porcine), sodium chloride, sodium chloride, oxyCODONE, sodium chloride flush, sodium chloride, ondansetron **OR** ondansetron, polyethylene glycol, acetaminophen **OR** acetaminophen, LORazepam, sodium chloride, sodium chloride, prochlorperazine, metoclopramide      History of Present Illness on 2/28/23:    65 y.o. yo male with PMH of CKD IIIb, HTN, Multiple myeloma consulted for worsening renal failure  Pt had BM bx on 2/15/23 which confirmed MM and Cyclophosphamide and pulsed dexamethasone started 2/17/2023 . His cr had increased to 3.1 and stabilized to 1.8 by discharge to SNF on 2/21 but he was readmitted within 24h  Cr increased to 1.9 on 2/27 and 2.3-->2.8 on 2/28 and nephrology has been consulted. pt has developed anemia with hb dropping to 5.5 from 7 and lactic acidosis as well  Plt ct is 15K    Physical exam:   Constitutional:  VITALS:  /68   Pulse 95 Temp 98.1 °F (36.7 °C) (Oral)   Resp 20   Ht 5' 9\" (1.753 m)   Wt 180 lb (81.6 kg)   SpO2 93%   BMI 26.58 kg/m²   Gen: sleepy  Neck: No JVD  Skin: Unremarkable  Cardiovascular:  S1, S2 without m/r/g   Respiratory: diminished on RA  Abdomen:  soft, nt, nd,   Extremities: no lower extremity edema  Neuro/Psy: AAoriented times 1-2 ; moves all 4 ext    Data/  Recent Labs     03/01/23  0517 03/02/23  0504 03/02/23  0735   WBC 18.5* 16.2* 16.4*   HGB 8.5* 7.5* 7.3*   HCT 26.3* 21.8* 22.4*   MCV 93.5 90.6 91.4   PLT 4* 2* 4*       Recent Labs     02/28/23  2244 03/01/23  0517 03/02/23  0504    139 143   K 5.4* 5.0 3.9    105 106   CO2 17* 15* 21   GLUCOSE 172* 175* 138*   PHOS 6.6*  --   --    BUN 62* 66* 69*   CREATININE 3.3* 3.4* 3.3*   LABGLOM 18* 18* 18*         Assessment  -ELEONORA on CKD IIIb  likely from hemodynamic changes in the setting of acute anemia. BP on 2/27 was noted to be in the 90 s for few hrs. Other concern is lysis and progression of CKD    -Hyperkalemia   Hemolyzed sample   Also has acidosis    -Metabolic acidosis from lactic acidosis along with eleonora    -severe anemia and thrombocytopenia   S/p 4 prbcs 2/28-3/1   EGD 3/1  hiatal hernia and gastropathy, no active bleed noted     -Multiple myeloma     -DVT RUE    -Leukocytosis    Down trending      Plan  -change IVF to LR at 100 ml/h, can stop when enrolled in hospice; noted pt's wife wanting hospice   -DC gabapentin  -will follow until officially enrolled in hospice     Thank you for the consultation. Please do not hesitate to call with questions. Ole Grigsby MD  Office: [phone]  Fax:    [phone] [address] [facility]

## 2023-03-02 NOTE — PROGRESS NOTES
Chart check. Wife requested referral to hospice today. Referral has been sent to [address] [place] [address] by case management.   Will follow up with patient/wife after hospice meeting (if needed) to answer questions/offer additional support

## 2023-03-02 NOTE — CARE COORDINATION
Spoke with patients wife. Would like referral to hospice, chose [facility]. Referral made, will schedule meeting time today. Would like to have daughter present at meeting. Abdias Lofton RN      Spoke with Arcelia Rust with [address] [place] [address]. Have [facility] bed available. Tonight, transport arranged for 730 pm with [facility]. Wife updated. Md messaged for DNR and orders. Transport packet provided.  Abdias Lofton RN

## 2023-03-02 NOTE — PLAN OF CARE
Problem: ABCDS Injury Assessment  Goal: Absence of physical injury  3/1/2023 2211 by Aditi Montes RN  Outcome: Progressing  3/1/2023 1304 by Shaniqua Schafer RN  Outcome: Progressing     Problem: Safety - Adult  Goal: Free from fall injury  3/1/2023 2211 by Aditi Montes RN  Outcome: Progressing  3/1/2023 1304 by Shaniqua Schafer RN  Outcome: Progressing     Problem: Skin/Tissue Integrity  Goal: Absence of new skin breakdown  Description: 1. Monitor for areas of redness and/or skin breakdown  2. Assess vascular access sites hourly  3. Every 4-6 hours minimum:  Change oxygen saturation probe site  4. Every 4-6 hours:  If on nasal continuous positive airway pressure, respiratory therapy assess nares and determine need for appliance change or resting period.   3/1/2023 2211 by Aditi Montes RN  Outcome: Progressing  3/1/2023 1304 by Shaniqua Schafer RN  Outcome: Progressing     Problem: Nutrition Deficit:  Goal: Optimize nutritional status  Outcome: Progressing     Problem: Pain  Goal: Verbalizes/displays adequate comfort level or baseline comfort level  Outcome: Progressing

## 2023-03-02 NOTE — PROGRESS NOTES
4 Eyes Skin Assessment     The patient is being assess for   Brant assessment    I agree that 2 RN's have performed a thorough Head to Toe Skin Assessment on the patient. ALL assessment sites listed below have been assessed. Areas assessed for pressure by both nurses:   [x]   Head, Face, and Ears   [x]   Shoulders, Back, and Chest, Abdomen  [x]   Arms, Elbows, and Hands   [x]   Coccyx, Sacrum, and Ischium  [x]   Legs, Feet, and Heels        Skin Assessed Under all Medical Devices by both nurses:  munira                **SHARE this note so that the co-signing nurse is able to place an eSignature**    Co-signer eSignature: Electronically signed by Cherylene Fleischer, RN on 3/2/23 at 6:37 AM EST    Does the Patient have Skin Breakdown related to pressure?   No         Brant Prevention initiated:  Yes   Wound Care Orders initiated:  No      [facility] nurse consulted for Pressure Injury (Stage 3,4, Unstageable, DTI, NWPT, Complex wounds)and New or Established Ostomies:  NA      Primary Nurse eSignature: Electronically signed by Tameka Kuo RN on 3/2/23 at 6:39 AM EST

## 2023-03-02 NOTE — PLAN OF CARE
Problem: ABCDS Injury Assessment  Goal: Absence of physical injury  3/2/2023 1751 by Shanice Parker RN  Outcome: Adequate for Discharge  3/2/2023 0934 by Shanice Parker RN  Outcome: Progressing     Problem: Safety - Adult  Goal: Free from fall injury  3/2/2023 1751 by Shanice Parker RN  Outcome: Adequate for Discharge  3/2/2023 0934 by Shanice Parker RN  Outcome: Progressing     Problem: Skin/Tissue Integrity  Goal: Absence of new skin breakdown  Description: 1. Monitor for areas of redness and/or skin breakdown  2. Assess vascular access sites hourly  3. Every 4-6 hours minimum:  Change oxygen saturation probe site  4. Every 4-6 hours:  If on nasal continuous positive airway pressure, respiratory therapy assess nares and determine need for appliance change or resting period.   3/2/2023 1751 by Shanice Parker RN  Outcome: Adequate for Discharge  3/2/2023 4330 by Shanice Parker RN  Outcome: Progressing     Problem: Nutrition Deficit:  Goal: Optimize nutritional status  3/2/2023 1751 by Shanice Parker RN  Outcome: Adequate for Discharge  3/2/2023 0934 by Shanice Parker RN  Outcome: Progressing     Problem: Pain  Goal: Verbalizes/displays adequate comfort level or baseline comfort level  3/2/2023 1751 by Shanice Parker RN  Outcome: Adequate for Discharge  3/2/2023 0934 by Shanice Parker RN  Outcome: Progressing

## 2023-03-02 NOTE — DISCHARGE SUMMARY
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 Hospital Course:   History Of Present Illness:     65 y.o. male who presents to the emergency department today with complaints of fatigue. Patient just went home yesterday, the has not made a 24 hours at home, wife states that when he got home he just could not walk or care for himself. Wife did mention that she thought that the patient should be on hospice. Patient was recently admitted to the hospital for an ELEONORA and thrombocytopenia as well as his generalized weakness. He is here for further evaluation. Left Upper extremity swelling - ruled out DVT     Acute DVT R upper extremity   Doppler showed Acute partially occluded deep vein thrombosis involving the right brachial vein (1 of 2). Acute partially occluded superficial venous thrombosis involving the right   basilic vein. Isolated acute totally occluded superficial venous thrombosis involving the right cephalic vein at the antecubital fossa.    -started on heparin ggt, stopped 2/28 given GIB concerns/hematemesis     Hematemesis- concern for GIB  -ppi ggt started  -trended h/h  -gi consulted, egd done 3/1     - Fatigue - likely multifactorial  -tx underlying issues     -MM with progression to plasma cell leukemia  -ONC on board, apprec recs   -pt not improving appreciably, decision made for hospice and pt transferred to [address] [place] [address],[address]    -Thrombocytopenia  -mgmt per hemonc     Anemia: s/p 1 u PRBC  -2u prbc 2/28 pm(was irradiated)     -ELEONORA on CKD/metabolic acidosis/hyperkalemia/lactic acidosis- likely related to above, ?tumor lysis  -nephro consulted, apprec recs  -ivfs given, lokelma     Hx of VTE/DVT- noted in 2014  - stopped eliquis due to severe persistent thrombocytopenia   -was on heparin ggt for new upper dvt       Physical Exam Performed:     /75   Pulse 96   Temp 100.1 °F (37.8 °C) (Oral)   Resp 20   Ht 5' 9\" (1.753 m)   Wt 180 lb (81.6 kg)   SpO2 92%   BMI 26.58 kg/m²        General appearance: No apparent distress, appears stated age and cooperative. fatigued  HEENT: Pupils equal, round, and reactive to light. Conjunctivae/corneas clear. Neck: Supple, with full range of motion. No jugular venous distention. Trachea midline. Respiratory:  Normal respiratory effort. Clear to auscultation, bilaterally without Rales/Wheezes/Rhonchi. Cardiovascular: Regular rate and rhythm with normal S1/S2 without murmurs, rubs or gallops. Abdomen: Soft, non-tender, non-distended with normal bowel sounds. Musculoskeletal: No clubbing, cyanosis or edema bilaterally. Full range of motion without deformity. LUE swelling noted  Skin: Skin color, texture, turgor normal.  No rashes or lesions. Neurologic:  Neurovascularly intact without any focal sensory/motor deficits. Cranial nerves: II-XII intact, grossly non-focal.  Psychiatric: Alert and oriented, thought content appropriate, normal insight  Capillary Refill: Brisk, 3 seconds, normal   Peripheral Pulses: +2 palpable, equal bilaterally        Labs:  For convenience and continuity at follow-up the following most recent labs are provided:      CBC:    Lab Results   Component Value Date/Time    WBC 16.4 03/02/2023 07:35 AM    HGB 7.3 03/02/2023 07:35 AM    HCT 22.4 03/02/2023 07:35 AM    PLT 4 03/02/2023 07:35 AM       Renal:    Lab Results   Component Value Date/Time     03/02/2023 05:04 AM    K 3.9 03/02/2023 05:04 AM     03/02/2023 05:04 AM    CO2 21 03/02/2023 05:04 AM    BUN 69 03/02/2023 05:04 AM    CREATININE 3.3 03/02/2023 05:04 AM    CALCIUM 7.5 03/02/2023 05:04 AM    PHOS 6.6 02/28/2023 10:44 PM         Significant Diagnostic Studies    Radiology:   VL Extremity Venous Left   Final Result      VL Extremity Venous Right   Final Result      XR CHEST PORTABLE   Final Result   1. New right PICC line extending the cavoatrial junction. 2. Otherwise stable appearance of the chest with no acute findings.                 Consults:     IP CONSULT TO HEM/ONC  PALLIATIVE CARE EVAL  IP CONSULT TO HOSPITALIST  IP CONSULT TO HOME CARE NEEDS  IP CONSULT TO CARDIOLOGY  IP CONSULT TO CASE MANAGEMENT  IP CONSULT TO PHARMACY  IP CONSULT TO PALLIATIVE CARE  IP CONSULT TO HOSPICE  IP CONSULT TO GI  IP CONSULT TO NEPHROLOGY    Disposition:  hospice     Condition at Discharge: Stable    Discharge Instructions/Follow-up:  none    Code Status:  DNRcc    Activity: activity as tolerated    Diet:  as tolerated      Discharge Medications:     Discharge Medication List as of 3/2/2023  3:06 PM             Details   scopolamine (TRANSDERM-SCOP) transdermal patch Place 1 patch onto the skin every 72 hours Apply patch to hairless area behind the ear., Disp-1 patch, R-0NO PRINT                Details   busPIRone (BUSPAR) 7.5 MG tablet Take 7.5 mg by mouth 2 times dailyHistorical Med      cholestyramine (QUESTRAN) 4 GM/DOSE powder Take 1 packet by mouth 3 times daily (with meals), Disp-378 g, R-5Normal      OLANZapine (ZYPREXA) 5 MG tablet Take 5 mg by mouth nightlyHistorical Med      sertraline (ZOLOFT) 50 MG tablet Take 1.5 tablets by mouth daily, Disp-135 tablet, R-1Normal      LORazepam (ATIVAN) 1 MG tablet Take 1 mg by mouth 4 times daily as needed. Historical Med      oxyCODONE (ROXICODONE) 5 MG immediate release tablet Take 5 mg by mouth every 4 hours as needed for Pain. Historical Med      pantoprazole (PROTONIX) 40 MG tablet Take 1 tablet by mouth daily, Disp-30 tablet, R-5Normal             Time Spent on discharge: 31 min in the examination, evaluation, counseling and review of medications and discharge plan. Signed:    Juliet Crawford MD   3/3/2023      Thank you Pastor Wei DO for the opportunity to be involved in this patient's care. If you have any questions or concerns, please feel free to contact me at [phone].

## 2023-03-02 NOTE — PROGRESS NOTES
[facility]    Met with wife Mariia Izquierdo to discuss [address] philosophy, levels of care and services, including support team. Time spent listening to events of illness, answering questions, and providing emotional support. Given [address] folder and contact info. Plan is transport to [facility] at [address] with [address] Ambulance. CM Kit notified. Thank you for the opportunity to be involved with the care of this patient.  Any needs call [address] Ricarda BSN, RN, [facility]

## 2023-03-02 NOTE — PROGRESS NOTES
Occupational Therapy  Facility/Department: [facility] C3 TELE/MED SURG/ONC  Daily Treatment Note  NAME: Spike Sinha  : 1945  MRN: [id number]    Date of Service: 3/2/2023    Discharge Recommendations:  Subacute/Skilled Nursing Facility       AM-PAC score  AM-PAC Inpatient Daily Activity Raw Score: 10 (23)  AM-PAC Inpatient ADL T-Scale Score : 27.31 (23)  ADL Inpatient CMS 0-100% Score: 74.7 (23)  ADL Inpatient CMS G-Code Modifier : CL (23)      Patient Diagnosis(es): The primary encounter diagnosis was Generalized weakness. Diagnoses of Thrombocytopenia ([facility] [address] and Urinary retention were also pertinent to this visit. Assessment    Assessment: Pt with fair tolerance of OT treatment, fatigued, motivated by drinking water and iced tea this session. Pt total A for LB ADLs including toileting. Pt requiring Ax2 for bed mobility and demos ability to sit EOB ~17 mins with initially CGA, requiring max A as session progressed. Co-tx collaboration this date with PT staff to safely progress pt toward goals. Pt will have better occupational performance outcomes within a co-treatment than 1:1 session. Pt functioning below his baseline, would benefit from continued skilled OT in SNF setting at d/c. Activity Tolerance: Patient limited by fatigue  Discharge Recommendations: Subacute/Skilled Nursing Facility      Plan   Occupational Therapy Plan  Times Per Week: 3-5x/wk     Restrictions  Restrictions/Precautions  Restrictions/Precautions: General Precautions  Position Activity Restriction  Other position/activity restrictions: up with assist, IV in R foot and LUGURDEEP, lombardo    Subjective   Subjective  Subjective: Pt resting in bed, agreeable to OT treatment. Orientation  Overall Orientation Status: Impaired  Orientation Level: Oriented to person;Oriented to place;Oriented to situation;Disoriented to time (unable to state birth date)    Pain: Pt denies pain.     Cognition  Overall Cognitive Status: Exceptions  Arousal/Alertness: Delayed responses to stimuli  Following Commands: Follows one step commands with repetition; Follows one step commands with increased time  Attention Span: Attends with cues to redirect  Memory: Decreased recall of recent events  Safety Judgement: Decreased awareness of need for assistance  Insights: Decreased awareness of deficits  Initiation: Requires cues for some  Sequencing: Requires cues for some        Objective    Vitals  Vitals  Heart Rate: (!) 101  Heart Rate Source: Monitor  BP: 129/67  BP Location: Left upper arm  BP Method: Automatic  Patient Position: Semi fowlers  MAP (Calculated): 88  SpO2: 95 %  O2 Device: None (Room air)    Bed Mobility Training  Bed Mobility Training: Yes  Interventions: Safety awareness training;Verbal cues; Tactile cues;Manual cues  Rolling: Maximum assistance  Supine to Sit: Maximum assistance;Assist X2  Sit to Supine: Maximum assistance;Assist X2  Scooting: Maximum assistance    Balance  Sitting: Impaired  Sitting - Static: Poor (constant support) (initially CGA, as pt fatigues requiring max A, EOB 17 mins total)       ADL  Feeding: Setup;Minimal assistance;Verbal cueing;Beverage management; Increased time to complete;Scoop assist  Feeding Skilled Clinical Factors: seated EOB  LE Dressing: Dependent/Total  LE Dressing Skilled Clinical Factors: L sock, R sock left off d/t IV  Toileting: Dependent/Total  Toileting Skilled Clinical Factors: lombardo, incontinent of bowel prior to entry, total A for hygiene          Safety Devices  Type of Devices: Left in bed;Bed alarm in place;Call light within reach;Nurse notified;Gait belt;Telesitter in use       Patient Education  Education Given To: Patient  Education Provided: Role of Therapy;Plan of Care;Transfer Training;ADL Adaptive Strategies  Education Method: Verbal  Barriers to Learning: Cognition  Education Outcome: Verbalized understanding;Continued education needed    Goals  Short Term Goals  Time Frame for Short Term Goals: 1 week (3/2) unless noted-- extend goals to 3/9 due to LOS, all goals ongoing 3/2/23  Short Term Goal 1: Perform functional transfers with CGA and RW  Short Term Goal 2: Perform bathroom mobility with CGA and RW  Short Term Goal 3: Perform 2-3 UE ADL tasks while seated with SBA  Short Term Goal 4: Perform UE exer 15x -20x each for endurance  Patient Goals   Patient goals :  \"Be able to walk\"       Therapy Time   Individual Concurrent Group Co-treatment   Time In 0811         Time Out 0836         Minutes [address], [place]/L

## 2023-03-02 NOTE — PROGRESS NOTES
ONCOLOGY HEMATOLOGY CARE PROGRESS NOTE      SUBJECTIVE:    Afebrile and on room air. A/O x3. \"Hanging in there. \"    No family present. ROS:     Constitutional:  No weight loss, No fever, No chills, No night sweats. fatigue and weakness.   Eyes:  No impairment or change in vision  ENT / Mouth:  No pain, abnormal ulceration, bleeding, nasal drip or change in voice or hearing  Cardiovascular:  No chest pain, palpitations, new edema, or calf discomfort  Respiratory:  No pain, hemoptysis, change to breathing  Breast:  No pain, discharge, change in appearance or texture  Gastrointestinal:  No pain, cramping, jaundice, change to eating and bowel habits  Urinary:  No pain, bleeding or change in continence  Genitalia: No pain, bleeding or discharge  Musculoskeletal:  No redness, pain, edema or weakness  Skin:  No pruritus, rash, change to nodules or lesions  Neurologic:  No discomfort, change in mental status, speech, sensory or motor activity  Psychiatric:  No change in concentration or change to affect or mood  Endocrine:  No hot flashes, increased thirst, or change to urine production  Hematologic: No petechiae, ecchymosis or bleeding  Lymphatic:  No lymphadenopathy or lymphedema  Allergy / Immunologic:  No eczema, hives, frequent or recurrent infections    OBJECTIVE        Physical    VITALS:  Patient Vitals for the past 24 hrs:   BP Temp Temp src Pulse Resp SpO2   03/02/23 0359 111/70 97.5 °F (36.4 °C) Oral 95 16 94 %   03/01/23 2115 124/65 98.8 °F (37.1 °C) Oral 97 16 95 %   03/01/23 1548 (!) 168/78 99.7 °F (37.6 °C) Oral 85 16 95 %   03/01/23 1209 (!) 162/75 98.2 °F (36.8 °C) Oral 92 16 93 %   03/01/23 0932 (!) 194/76 97.7 °F (36.5 °C) Oral 93 16 94 %   03/01/23 0910 (!) 150/63 -- -- 82 16 95 %   03/01/23 0838 128/74 -- -- 76 16 93 %   03/01/23 0820 134/62 -- -- 88 -- 92 %   03/01/23 0810 (!) 96/50 -- -- 90 16 93 %   03/01/23 0658 116/67 97.7 °F (36.5 °C) Oral 90 16 92 %         24HR INTAKE/OUTPUT:    Intake/Output Summary (Last 24 hours) at 3/2/2023 5339  Last data filed at 3/2/2023 0414  Gross per 24 hour   Intake 140.58 ml   Output 1500 ml   Net -1359.42 ml         CONSTITUTIONAL: awake, alert, cooperative, no apparent distress. Chronically ill appearing. Pale complexion. EYES: pupils equal, round and reactive to light, sclera clear and conjunctiva normal  ENT: brusing noted on upper and lower lip. No active bleeding. Normocephalic, atraumatic  NECK: supple, symmetrical, no jugular venous distension   HEMATOLOGIC/LYMPHATIC: no cervical, supraclavicular or axillary lymphadenopathy   LUNGS: no increased work of breathing and clear to auscultation   CARDIOVASCULAR: regular rate and rhythm, normal S1 and S2, no murmur noted  ABDOMEN: normal bowel sounds x 4, soft, non-distended, non-tender, no masses palpated, no hepatosplenomgaly   MUSCULOSKELETAL: atrophic limbs. Weak with movements. NEUROLOGIC: awake, alert, oriented to name, place and time. Motor skills grossly intact. SKIN: Left arm from elbow down appears edematous. Scattered ecchymosis noted on medial forearm through dorsal aspect of left hand. Slightly tender to touch. Pulses present. Right arm has scattered superficial bruising noted.      DATA:  CBC:    Recent Labs     03/02/23  0504 03/01/23  0517 02/28/23  2244 02/28/23  1203 02/28/23  0518   WBC 16.2* 18.5* 26.5* 33.4* 15.5*   NEUTROABS 0.6* 0.9*  --  1.0* 1.1*   LYMPHOPCT 89.0 30.0  --  96.0 91.0   RBC 2.41* 2.81* 2.35* 1.70* 1.66*   HGB 7.5* 8.5* 7.5* 5.5* 5.5*   HCT 21.8* 26.3* 21.8* 17.6* 16.3*   MCV 90.6 93.5 92.5 103.9* 98.0   MCH 31.0 30.3 31.8 32.4 33.0   MCHC 34.2 32.4 34.4 31.1 33.7   RDW 15.2 15.7* 15.3 15.8* 14.8   PLT  --  4* 5* 15* 14*         PT/INR:    Recent Labs     03/02/23  0504 03/01/23  0517 02/28/23  1203 02/28/23  0518 02/27/23  0445 02/25/23  0218 02/24/23  1911 02/22/23  1445 02/14/23  0722   PROT 5.1* 5.0* 5.2* 4.8* 5.0*   < >  --    < > --    INR  --   --   --   --   --   --  1.10  --  1.12    < > = values in this interval not displayed. PTT:    Recent Labs     02/24/23  1911   APTT 27.9         CMP:    Recent Labs     03/02/23  0504 03/01/23  0517 02/28/23  2244 02/28/23  1203 02/28/23  0518 02/13/23  1150 02/13/23  0948    139 140 139 138   < > 142   K 3.9 5.0 5.4* 6.3* 4.9   < > 4.8    105 105 107 108   < > 106   CO2 21 15* 17* 13* 19*   < > 18*   GLUCOSE 138* 175* 172* 167* 118*   < > 133*   BUN 69* 66* 62* 45* 39*   < > 31*   CREATININE 3.3* 3.4* 3.3* 2.8* 2.3*   < > 2.7*   LABGLOM 18* 18* 18* 22* 28*   < > 23*   CALCIUM 7.5* 7.8* 8.1* 8.3 8.3   < > 9.1   PROT 5.1* 5.0*  --  5.2* 4.8*   < >  --    LABALBU 3.2* 3.1* 3.2* 3.0* 3.0*   < >  --    AGRATIO 1.7 1.6  --  1.4 1.7   < >  --    BILITOT 0.6 0.4  --  0.4 0.3   < >  --    ALKPHOS 62 70  --  73 66   < >  --    ALT 13 17  --  13 10   < >  --    AST 36 50*  --  28 16   < >  --    MG  --   --   --   --   --   --  2.20    < > = values in this interval not displayed. Lab Results   Component Value Date    CALCIUM 7.5 (L) 03/02/2023    PHOS 6.6 (H) 02/28/2023       LDH:No results for input(s): LDH in the last 720 hours.     Radiology Review:  VL Extremity Venous Left  Upper Extremities Veins     Demographics      Patient Name        Vergil Bearded      Date of Study       02/27/2023      Gender               Male      Patient Number      [id number]      Date of Birth        1945      Visit Number        [id number]       Age                  65 year(s)      Accession Number    [id number]      Room Number          5521      Corporate ID        [id number]         Sonographer          GILBERTO CoolT      Ordering Physician  Orvis Cushing MD  Interpreting         [facility]                                       Physician            Vascular Juju Sarmiento MD     Procedure    Type of Study:      Veins:Upper Extremities Veins, VL EXTREMITY VENOUS DUPLEX LEFT. Vascular Sonographer Report     Indications for Study:Arm swelling. Additional Indications:Patient complains of left upper extremity swelling for  about four days. Venous Duplex Scan: B-mode imaging of the deep and superficial veins, with  compression maneuvers, including color and Doppler spectral analysis. Impressions  Right Impression  There was no evidence of deep venous thrombosis involving the right subclavian  vein. Left Impression  Technically difficult and limited exam due to swelling, patients inability to  position correctly and bandages. The basilic vein was not visualized due to limitations. The cephalic vein was  only partially visualized. Within the limits of this exam,, there is no evidence of deep or superifical  venous thrombosis involving the left upper extremity. Previous exam on 02/24/2023, deep venous thrombosis involving the right  brachial vein. Conclusions      Summary      Within the limits of this exam, there is no evidence of deep or superficial   venous thrombosis involving the left upper extremity or right subclavian   vein. Signature      ------------------------------------------------------------------   Electronically signed by Juju Sarmiento MD (Interpreting   physician) on 02/28/2023 at 09:46 AM   ------------------------------------------------------------------     Patient Status:Routine. Study Alondra  [facility] - Vascular Lab. Technical Quality:Limited visualization due to dressings. Risk Factors  History  +---------+----+-----------------------------------+  ! Diagnosis! Date! Comments                           ! +---------+----+-----------------------------------+  ! Other    ! ! MVP, History of DVT Left Leg \"2014\"! +---------+----+-----------------------------------+      - The patient has cancer.     Velocities are measured in cm/s ; Diameters are measured in mm    Right UE Vein Measurements  2D Measurements  +--------+----------+---------------+----------+  ! Location! Visualized! Compressibility! Thrombosis! +--------+----------+---------------+----------+  ! SCV     ! Yes       ! Yes            ! None      !  +--------+----------+---------------+----------+    Doppler Measurements  +--------+------+------+  ! Location! Signal!Reflux! +--------+------+------+  ! SCV     ! Phasic!      ! +--------+------+------+    Left UE Vein Measurements  2D Measurements  +--------+----------+---------------+----------+  ! Location! Visualized! Compressibility! Thrombosis! +--------+----------+---------------+----------+  ! IJV     ! Yes       ! Yes            ! None      !  +--------+----------+---------------+----------+  ! SCV     ! Yes       ! Yes            ! None      !  +--------+----------+---------------+----------+  ! Axillary! Yes       ! Yes            ! None      !  +--------+----------+---------------+----------+  ! Brachial!Yes       ! Yes            ! None      !  +--------+----------+---------------+----------+  ! Radial  !Yes       ! Yes            ! None      !  +--------+----------+---------------+----------+  ! Ulnar   ! Yes       ! Yes            ! None      !  +--------+----------+---------------+----------+  ! Basilic ! No        !               !          !  +--------+----------+---------------+----------+  ! Cephalic! Partial   !Yes            ! None      !  +--------+----------+---------------+----------+    Doppler Measurements  +--------+------+------+  ! Location! Signal!Reflux! +--------+------+------+  ! IJV     ! Phasic!      ! +--------+------+------+  ! SCV     ! Phasic!      ! +--------+------+------+  ! Axillary! Phasic!      !   +--------+------+------+      Problem List  Patient Active Problem List   Diagnosis    GERD (gastroesophageal reflux disease)    MANISH (generalized anxiety disorder)    Essential hypertension, benign    CKD (chronic kidney disease) stage 3, GFR 30-59 ml/min (HCC)    Recurrent deep vein thrombosis (DVT) of right lower extremity (HCC)    Mixed hyperlipidemia    Ni's esophagus without dysplasia    Primary insomnia    Multiple myeloma not having achieved remission (HCC)    Current moderate episode of major depressive disorder without prior episode ([facility]    Cancer, metastatic to bone (HCC)    Malaise and fatigue    Thrombocytopenia ([facility] [facility]    Plasma cell leukemia ([facility] [address] [facility]    History of DVT in adulthood       ASSESSMENT AND PLAN:    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 Was receiving Velcade, cytoxan, and Xgeva. -Cycle 11 day 1 received on 01/11/2023. Received subsequent Velcade on time. Due for C12D1 on 02/15/2023.  -Patient progressed on treatment    -Bone marrow biopsy performed 2/15/2023 showed plasma cell leukemia.   -After discussion with BMT team, patient was started on hyperfractionated cyclophosphamide by Dr. Saji Martinez on 2/17/2023 for 4 doses plus pulse dexamethasone.  -Plan to start carfilzomib/Jeimy/Dex as an outpatient when he recovers from chemo. 2.  Thrombocytopenia/Anemia/Myelosuppression  -Secondary to underlying leukemia and chronic bone marrow suppression secondary to chemotherapy. -Received  1 unit platelet transfusion on 2 /25 /2023 since patient has been started on IV heparin for newly found PICC line induced DVT to maintain platelet count around 30 K.  - transfuse if hgb <7 or PLTs <10 K/mcL  -Patient denies any overt bleeding at this time. - plts this AM are 12 K/uL. HGB is 7.5 g/dL. - HIT Ab neg. - Check DIC labs given thrombocytopenia, clotting, bleeding.  - patient will need PLTs this AM.  - Transfuse PLTs. If PLTs can be maintained 30-50 K/mcL, then resume UFH. 3.  DVT of the right upper extremity.   -PICC line induced  -Ultrasound venous Doppler revealed virtually occluded DVT involving the right brachial vein with partially occluded superficial venous thrombosis involving the right basilic vein.  -As per the patient and his wife, he was on Eliquis for approximately 15 years which was discontinued months back due to thrombocytopenia.  -Discussed with the  primary team-PICC line removed for now to prevent the clot propagation and speed resolution of thrombosis.  -Underlying malignancy, his previous history of DVT are  his personal risk factors that put him at risk of embolism though the brachial vein DVT is not notorious for causing pulmonary embolism.  -Started the patient on IV heparin without bolus in a very guarded fashion with careful monitoring of the hematological parameters on 02/24. Patient has been counseled to report immediately if he notices any bleeding. We will discontinue heparin if patient reports any bleeding  -Patient will need PICC line in the other arm or Mediport prior to discharge since he is scheduled for outpatient chemotherapy. 4. Swelling/Ecchymosis of left forearm  - noted this AM  - LUE US completed 02/27 shows no evidence of deep or superficial venous thrombosis involving left upper extremity      5. Chronic fatigue  -Secondary to underlying plasma cell leukemia and chemotherapy. - patient has been unable to participate in PT/OT given functional status and critical lab values  - Continue to monitor through the week, if he doesn't improve as he gets past his chari, then readdress hospice. 6. Hematemesis  - UFH is on hold. - EGD, 3/01/2023, showed a large hiatal hernia and mild gastropathy. 7. ELEONORA on CKD IIIb  - poor dialysis candidate.       ONCOLOGIC DISPOSITION:      Roger Coffman MD  Please contact through [facility]

## 2023-03-02 NOTE — PROGRESS NOTES
Physical Therapy  Facility/Department: [facility] C3 TELE/MED SURG/ONC  Daily Treatment Note  NAME: Joshua Hartman  : 1945  MRN: [id number]    Date of Service: 3/2/2023    Discharge Recommendations:  Subacute/Skilled Nursing Facility   PT Equipment Recommendations  Equipment Needed: No  Other: defer, pt states went home with RW from [facility] past admit    AM-PAC Basic Mobility - Inpatient   How much help is needed turning from your back to your side while in a flat bed without using bedrails?: A Lot  How much help is needed moving from lying on your back to sitting on the side of a flat bed without using bedrails?: A Lot  How much help is needed moving to and from a bed to a chair?: Total  How much help is needed standing up from a chair using your arms?: Total  How much help is needed walking in hospital room?: Total  How much help is needed climbing 3-5 steps with a railing?: Total  AM-PAC Inpatient Mobility Raw Score : 8  AM-PAC Inpatient T-Scale Score : 28.52  Mobility Inpatient CMS 0-100% Score: 86.62  Mobility Inpatient CMS G-Code Modifier : CM     Patient Diagnosis(es): The primary encounter diagnosis was Generalized weakness. Diagnoses of Thrombocytopenia (HCC) and Urinary retention were also pertinent to this visit.    Assessment   Assessment: Pt fatigued today, sat EOB 17 minutes for there ex and core strengthening at times having difficulty keeping his eyes open.  He was able to engage in conversation and help with feeding himself.  Pt is recommended for con't skilled PT and SNF at D/C.  Activity Tolerance: Patient limited by fatigue  Equipment Needed: No  Other: defer, pt states went home with RW from [facility] past admit     Plan    Physcial Therapy Plan  General Plan: 3-5 times per week  Current Treatment Recommendations: Strengthening;ROM;Balance training;Functional mobility training;Transfer training;Endurance training;Gait training;Stair training;Neuromuscular re-education;Home exercise program;Safety  education & training;Patient/Caregiver education & training;Equipment evaluation, education, & procurement; Modalities; Positioning; Therapeutic activities     Restrictions  Restrictions/Precautions  Restrictions/Precautions: General Precautions  Position Activity Restriction  Other position/activity restrictions: up with assist, IV in R foot and munira MARTINS     Subjective    Subjective  Subjective: pt found in bed, agreeable to therapy, reports nausea  Pain: Pt denies pain. Orientation  Overall Orientation Status: Impaired  Orientation Level: Oriented to person;Oriented to place;Oriented to situation;Disoriented to time (unable to state birth date)  Cognition  Overall Cognitive Status: Exceptions  Arousal/Alertness: Delayed responses to stimuli  Following Commands: Follows one step commands with repetition; Follows one step commands with increased time  Attention Span: Attends with cues to redirect  Memory: Decreased recall of recent events  Safety Judgement: Decreased awareness of need for assistance  Insights: Decreased awareness of deficits  Initiation: Requires cues for some  Sequencing: Requires cues for some     Objective   Vitals  Vitals:    03/02/23    BP: 137/68   Pulse: 95   Resp:    Temp:    SpO2: 93%          Bed Mobility Training  Bed Mobility Training: Yes  Interventions: Safety awareness training;Verbal cues; Tactile cues;Manual cues  Rolling: Maximum assistance  Supine to Sit: Maximum assistance;Assist X2  Sit to Supine: Maximum assistance;Assist X2  Scooting: Maximum assistance  Balance  Sitting: Impaired  Sitting - Static: Poor (constant support) (initially CGA, as pt fatigues requiring max A, EOB 17 mins total)  Transfer Training  Transfer Training: No     PT Exercises  Exercise Treatment: BLE LAQ with A X 5     Safety Devices  Type of Devices: Left in bed;Bed alarm in place;Call light within reach;Nurse notified;Gait belt;Telesitter in use       Goals  Short Term Goals  Time Frame for Short Term Goals: one week 3/2 unless otherwise indicated  Short Term Goal 1: pt will transfer supine to and from sit with sup from flat bed by 2/28; 3/2 A of 2  Short Term Goal 2: pt will transfer sit to and from stand with sup; 3/2 NT  Short Term Goal 3: pt will ambulate 25ft with RW with cga; 3/2 NT  Short Term Goal 4: pt will negotiate 3 steps without rails with cga to enter home if discharges home; 3/2 NT  Short Term Goal 5: pt will negotiate a flight of stairs with one rail with cga if discharges home; 3/2 NT  Patient Goals   Patient Goals : get stronger before I go home again    Education  Patient Education  Education Given To: Patient; Family  Education Provided: Role of Therapy;Plan of Care;Home Exercise Program;Transfer Training;Equipment; Fall Prevention Strategies  Education Method: Verbal  Barriers to Learning: None  Education Outcome: Verbalized understanding;Demonstrated understanding    Therapy Time   Individual Concurrent Group Co-treatment   Time In 0756         Time Out 0836         Minutes [address]

## 2023-03-02 NOTE — DISCHARGE INSTR - COC
Continuity of Care Form    Patient Name: Mabel Michelle   :  1945  MRN:  [id number]    Admit date:  2023  Discharge date:  3/2/23    Code Status Order: Limited   Advance Directives:   [facility] Documentation       Date/Time Healthcare Directive Type of Healthcare Directive Copy in [address] Agent's Name Healthcare Agent's Phone Number    [phone] Yes, patient has an advance directive for healthcare treatment -- -- -- -- --            Admitting Physician:  Heather Tesfaye MD  PCP: Mikey Wilkins DO    Discharging Nurse: [facility] [address] [place] [address] Unit/Room#: 2032/0512-15  Discharging Unit Phone Number: [phone]    Emergency Contact:   Extended Emergency Contact Information  Primary Emergency Contact: Amy Melchor  Address: [address], [address] [facility] [address] Phone: [phone]  Mobile Phone: [phone]  Relation: Spouse    Past Surgical History:  Past Surgical History:   Procedure Laterality Date    BLADDER SURGERY      tumor removed    CATARACT REMOVAL Bilateral     CHOLECYSTECTOMY, LAPAROSCOPIC N/A 1/10/2022    VIDEO LAPAROSCOPIC CHOLECYSTECTOMY performed by Iban Epstein MD at [facility]  12    Polyps    COLONOSCOPY  2017    Polyp    COLONOSCOPY N/A 2022    COLONOSCOPY POLYPECTOMY SNARE/COLD BIOPSY performed by Car Hui MD at [address] [facility] BONE  3/3/2022    CT BIOPSY PERCUTANEOUS DEEP BONE 3/3/2022 Tete Lopez MD [facility] CT SCAN    CT BONE MARROW BIOPSY  2/15/2023    CT BONE MARROW BIOPSY 2/15/2023 [facility] CT SCAN    INGUINAL HERNIA REPAIR Right 1999    PENILE PROSTHESIS PLACEMENT      PROSTATECTOMY  2005 approx    Dr. Shantelle De La O      In [place] - had one tonsil burned out    UPPER GASTROINTESTINAL ENDOSCOPY N/A 2022    EGD BIOPSY performed by Car Hui MD at [address] [place] [address] GASTROINTESTINAL ENDOSCOPY N/A 3/1/2023    EGD DIAGNOSTIC ONLY performed by Dale Simpson MD at [address]       Immunization History:   Immunization History   Administered Date(s) Administered    COVID-19, [address], Primary or Immunocompromised, (age 12y+), IM, 100 mcg/0.5mL 01/29/2021, 02/26/2021, 11/18/2021    COVID-19, PFIZER Bivalent BOOSTER, DO NOT Dilute, (age 12y+), IM, 30 mcg/0.3 mL 10/11/2022    Influenza Vaccine, unspecified formulation 01/12/2016    Influenza Virus Vaccine 01/12/2016    Influenza, FLUAD, (age 72 y+), Adjuvanted, 0.5mL 10/15/2020, 09/16/2021    Influenza, FLUZONE (age 72 y+), High Dose, 0.7mL 10/29/2022    Influenza, High Dose (Fluzone 65 yrs and older) 01/12/2016, 01/29/2018    PPD Test 08/13/2003    Pneumococcal Conjugate 13-valent ([id number]) 01/12/2016    Pneumococcal Polysaccharide (Tbfyiffto81) 02/18/2014    Td, unspecified formulation 11/30/2004    Tdap (Boostrix, Adacel) 10/15/2012, 07/01/2017    Zoster Recombinant (Shingrix) 08/20/2020, 12/30/2020       Active Problems:  Patient Active Problem List   Diagnosis Code    GERD (gastroesophageal reflux disease) K21.9    MANISH (generalized anxiety disorder) F41.1    Essential hypertension, benign I10    CKD (chronic kidney disease) stage 3, GFR 30-59 ml/min ([facility]) N18.30    Recurrent deep vein thrombosis (DVT) of right lower extremity ([address] I82.401    Mixed hyperlipidemia E78.2    Ni's esophagus without dysplasia K22.70    Primary insomnia F51.01    Multiple myeloma not having achieved remission ([facility]) C90.00    Current moderate episode of major depressive disorder without prior episode ([address] F32.1    Cancer, metastatic to bone ([facility]) C79.51    Malaise and fatigue R53.81, R53.83    Thrombocytopenia ([facility]) D69.6    Plasma cell leukemia ([facility]) C90.10    History of DVT in adulthood Z86.718       Isolation/Infection:   Isolation            No Isolation          Patient Infection Status       Infection Onset Added Last Indicated Last Indicated By Review Planned Expiration Resolved Resolved By    None active    Resolved    COVID-19 (Rule Out) 02/22/23 02/22/23 02/22/23 COVID-19, Rapid (Ordered)   02/22/23 Rule-Out Test Resulted    COVID-19 (Rule Out) 02/13/23 02/13/23 02/13/23 COVID-19 & Influenza Combo (Ordered)   02/13/23 Rule-Out Test Resulted    COVID-19 (Rule Out) 01/05/22 01/05/22 01/05/22 COVID-19 (Ordered)   01/06/22 Rule-Out Test Resulted            Nurse Assessment:  Last Vital Signs: /75   Pulse 96   Temp 100.1 °F (37.8 °C) (Oral)   Resp 20   Ht 5' 9\" (1.753 m)   Wt 180 lb (81.6 kg)   SpO2 92%   BMI 26.58 kg/m²     Last documented pain score (0-10 scale): Pain Level: 8  Last Weight:   Wt Readings from Last 1 Encounters:   02/22/23 180 lb (81.6 kg)     Mental Status:  disoriented    IV Access:  L AC and R foot    Nursing Mobility/ADLs:  Walking   Dependent  Transfer  Dependent  Bathing  Dependent  Dressing  Dependent  Toileting  Dependent  Feeding  Dependent  Med Admin  Dependent  Med Delivery   crushed    Wound Care Documentation and Therapy:  Wound 02/24/23 Buttocks Left dusky purplish discoloration to left buttocks, bi lateral dry rough (Active)   Wound Image   02/24/23 1707   Wound Etiology Other 02/26/23 2125   Wound Cleansed Not Cleansed 02/26/23 2125   Dressing/Treatment Zinc paste 03/02/23 0919   Distance Tunneling (cm) 0 cm 02/26/23 2125   Tunneling Position ___ O'Clock 0 02/26/23 2125   Undermining Starts ___ O'Clock 0 02/26/23 2125   Undermining Ends___ O'Clock 0 02/26/23 2125   Undermining Maxium Distance (cm) 0 02/26/23 2125   Wound Assessment Dry;Pink/red 02/26/23 2125   Drainage Amount None 02/26/23 2125   Odor None 03/02/23 0919   Heather-wound Assessment Blanchable erythema 03/02/23 0919   Margins Unattached edges 03/02/23 0919   Wound Thickness Description not for Pressure Injury Partial thickness 02/26/23 0362   Number of days: 5       Incision Abdomen Medial;Upper (Active)   Number of days: Elimination:  Continence: Bowel: No  Bladder: Yes  Urinary Catheter: Insertion Date: 3/1    Colostomy/Ileostomy/Ileal Conduit: No       Date of Last BM: 3/2/23    Intake/Output Summary (Last 24 hours) at 3/2/2023 1503  Last data filed at 3/2/2023 1431  Gross per 24 hour   Intake 437.22 ml   Output 1850 ml   Net -1412.78 ml     I/O last 3 completed shifts: In: 1152.6 [P.O.:150; I.V.:425.6; Blood:577.1]  Out: 2050 [Urine:2050]    Safety Concerns: At Risk for Falls and Aspiration Risk    Impairments/Disabilities:      None    Nutrition Therapy:  Current Nutrition Therapy:   - Oral Diet:  General    Routes of Feeding: Oral  Liquids: Thin Liquids  Daily Fluid Restriction: no  Last Modified Barium Swallow with Video (Video Swallowing Test): not done    Treatments at the Time of Hospital Discharge:   Respiratory Treatments: n/a  Oxygen Therapy:  is not on home oxygen therapy.   Ventilator:    - No ventilator support    Rehab Therapies: none  Weight Bearing Status/Restrictions: No weight bearing restrictions  Other Medical Equipment (for information only, NOT a DME order):  hospital bed  Other Treatments: n/a    Patient's personal belongings (please select all that are sent with patient):  Sent w/family    RN SIGNATURE:  Electronically signed by Daniela Merida RN on 3/2/23 at 3:06 PM EST    CASE MANAGEMENT/SOCIAL WORK SECTION    Inpatient Status Date: ***    Readmission Risk Assessment Score:  Readmission Risk              Risk of Unplanned Readmission:  40           Discharging to Facility/ Agency   Name:   Address:  Phone:  Fax:    Dialysis Facility (if applicable)   Name:  Address:  Dialysis Schedule:  Phone:  Fax:    / signature: {Esignature:[id number]}    PHYSICIAN SECTION    Prognosis: {Prognosis:8693073447}    Condition at Discharge: [address] Patient Condition:337758621}    Rehab Potential (if transferring to Rehab): {Prognosis:3057080660}    Recommended Labs or Other Treatments After Discharge: ***    Physician Certification: I certify the above information and transfer of Marlyn Williamson  is necessary for the continuing treatment of the diagnosis listed and that he requires {Admit to Appropriate Level of Care:93439} for {GREATER/LESS:420084157} 30 days.      Update Admission H&P: {CHP DME Changes in BGCZP:518018818}    PHYSICIAN SIGNATURE:  {Esignature:[id number]}

## 2023-03-02 NOTE — PROGRESS NOTES
Speech Language Pathology  Note    SLP received consult and completed chart review. Spoke with RN, who reports that pt is discharging to hospice this evening. RN advises SLP to discontinue order. Please re-consult as needed. Estela Hernandez, [address] [id number]  Speech-Language Pathologist

## 2023-03-02 NOTE — PROGRESS NOTES
Shift assessment completed. Pt A&O x4; lethargic, responds to speech. VSS. AM medications admin whole, see MAR. Pt w/coughing post water intake; MD notified and SLP placed per protocol, educated family on aspiration precautions, verbalize understanding. Pt assisted w/personal care, lombardo care provided, along w/turning in bed. Denies any needs at this time. Bed locked and in lowest position. Call light within reach. Will continue to monitor.  Electronically signed by Cindy Carrasco RN on 3/2/2023 at 10:19 AM

## 2023-03-02 NOTE — PROGRESS NOTES
NUTRITION THERAPY ASSESSMENT    Due to hospice status, patient will be followed at low nutrition risk. Plan is transport to [facility] at [address] with [address] [facility]. Per GI, recommend advancing diet as tolerated. Dietitian will sign off. If nutrition intervention is required, please submit a dietary consult.   Office: [phone]; [address]: [address]

## 2023-03-02 NOTE — PLAN OF CARE
Problem: ABCDS Injury Assessment  Goal: Absence of physical injury  3/2/2023 0934 by Keren Waddell RN  Outcome: Progressing  3/1/2023 2211 by Pete Damico RN  Outcome: Progressing     Problem: Safety - Adult  Goal: Free from fall injury  3/2/2023 0934 by Keren Waddell RN  Outcome: Progressing  3/1/2023 2211 by Pete Damico RN  Outcome: Progressing     Problem: Skin/Tissue Integrity  Goal: Absence of new skin breakdown  Description: 1. Monitor for areas of redness and/or skin breakdown  2. Assess vascular access sites hourly  3. Every 4-6 hours minimum:  Change oxygen saturation probe site  4. Every 4-6 hours:  If on nasal continuous positive airway pressure, respiratory therapy assess nares and determine need for appliance change or resting period.   3/2/2023 0934 by Keren Waddell RN  Outcome: Progressing  3/1/2023 2211 by Pete Damico RN  Outcome: Progressing     Problem: Nutrition Deficit:  Goal: Optimize nutritional status  3/2/2023 0934 by Keren Waddell RN  Outcome: Progressing  3/1/2023 2211 by Pete Damico RN  Outcome: Progressing     Problem: Pain  Goal: Verbalizes/displays adequate comfort level or baseline comfort level  3/2/2023 0934 by Keren Waddell RN  Outcome: Progressing  3/1/2023 2211 by Pete Damico RN  Outcome: Progressing

## 2023-03-03 ENCOUNTER — TELEPHONE (OUTPATIENT)
Dept: FAMILY MEDICINE CLINIC | Age: 78
End: 2023-03-03

## 2023-03-03 ENCOUNTER — CLINICAL DOCUMENTATION ONLY (OUTPATIENT)
Facility: CLINIC | Age: 78
End: 2023-03-03

## 2023-03-03 LAB
BLOOD BANK DISPENSE STATUS: NORMAL
BLOOD BANK PRODUCT CODE: NORMAL
BPU ID: NORMAL
DESCRIPTION BLOOD BANK: NORMAL

## 2023-03-03 NOTE — PROGRESS NOTES
Received patient on bed, conscious and coherent. Due evening medications were given prior to discharge. Report given to ambulance staff. Patient will be discharged with lombardo catheter attached to urine bag, draining well; PIVs on the left antecubital and right foot since pt is a hard stick. All needed documents were given. Discharge to hospice.

## 2023-03-03 NOTE — TELEPHONE ENCOUNTER
[facility] Transitions Initial Follow Up Call    Outreach made within 2 business days of discharge: Yes    Patient: Yvette Rivera Patient : 1945   MRN: [id number]  Reason for Admission: There are no discharge diagnoses documented for the most recent discharge. Discharge Date: 3/2/23       Spoke with: Attempted to reach PT for post hospital follow up. VM left to return call to schedule TCM with PCP. Discharge department/facility: A ED TO HOSP    Non-face-to-face services provided:  Obtained and reviewed discharge summary and/or continuity of care documents    TCM Interactive Patient Contact:  Was patient able to fill all prescriptions: Yes  Was patient instructed to bring all medications to the follow-up visit: Yes  Is patient taking all medications as directed in the discharge summary?  Yes  Does patient understand their discharge instructions: Yes  Does patient have questions or concerns that need addressed prior to 7-14 day follow up office visit: no    Scheduled appointment with PCP within 7-14 days    Follow Up  Future Appointments   Date Time Provider Austin Colunga   2023  9:30 AM DO FRED Burgess LPN

## 2023-03-06 ENCOUNTER — TELEPHONE (OUTPATIENT)
Dept: FAMILY MEDICINE CLINIC | Age: 78
End: 2023-03-06

## 2023-03-06 PROBLEM — Z51.5 HOSPICE CARE PATIENT: Status: ACTIVE | Noted: 2023-03-06

## 2023-03-06 NOTE — TELEPHONE ENCOUNTER
2nd attempt to reach PT for post hospital follow up. VM left to return call to schedule TCM with PCP.

## 2023-03-13 NOTE — DISCHARGE INSTRUCTIONS
Follow up with MD as instructed.     Contact [facility] as needed
[FreeTextEntry3] : Orthovisc (Large Joint) with Ultrasound Guidance\par Viscosupplementation Injection: X-ray evidence of Osteoarthritis on this or prior visit and Patient has tried OTC's including aspirin, Ibuprofen, Aleve etc or prescription NSAIDS, and/or exercises at home and/ or physical therapy without satisfactory response. \par An injection of Orthovisc 2ml #4 was injected into the right knee(s). after verbal consent using sterile technique. The risks, benefits, and alternatives to Viscosupplementation injection were explained in full to the patient. Risks outlined include but are not limited to infection, sepsis, bleeding, scarring, skin discoloration, temporary increase in pain, syncopal episode, failure to resolve symptoms, allergic reaction, and symptom recurrence. Signs and symptoms of infection reviewed and patient advised to call immediately for redness, fevers, and/or chills. Patient understood the risks. All questions were answered. After discussion of options, patient requested Viscosupplementation. Oral informed consent was obtained and sterile prep was done of the injection site. Sterile technique was used without complications. The patient tolerated the procedure well. Ice tonight to the injection site. \par \par Ultrasound Guidance was used for the following reasons: prior failure or difficult injection. \par \par Ultrasound guided injection was performed of the knee, visualization of the needle and placement of injection was performed without complication. \par \par

## 2023-07-31 NOTE — OP NOTE
Endoscopy Note    Patient: Mihir Newton  : 1945  CSN: [id number]    Procedure: Esophagogastroduodenoscopy    Date:  3/1/2023     Surgeon:  Alison Mathew MD     Referring Physician:  Kerry Upton DO    Preoperative Diagnosis:  Hematemesis, unspecified whether nausea present [K92.0]    Anesthesia:  MAC    EBL: minimal to none. Description of Procedure:  Informed consent was obtained from the patient after explanation of indications, benefits and possible risks and complications of the procedure. The patient was then taken to the endoscopy suite, placed in the left lateral decubitus position and the above IV sedation was administrered. The Olympus video endoscope was passed through the hypopharynx into the esophagus. The scope was advanced all the way to the third portion of the duodenum. The scope was slowly withdrawn and mucosa was carefully evaluated including a retroflex view of the proximal stomach. Findings and interventions are described below. The patient was decompressed and the scope was then withdrawn. Gastric or Duodenal ulcer present: No    The patient tolerated the procedure well and was taken to the post anesthesia care unit in good condition. There were no immediate complications. Complications: none    Impression:  -Large hiatal hernia  -Mild gastropathy in gastric body without active bleeding or stigmata      Recommendations: -Continue acid suppression. Ok to advance diet if patient tolerates. Alison Mathew MD, MD  [facility]  [phone]    Please note that some or all of this record was generated using voice recognition software. If there are any questions about the content of this document, please contact the author as some errors in translation may have occurred. Mirvaso Counseling: Mirvaso is a topical medication which can decrease superficial blood flow where applied. Side effects are uncommon and include stinging, redness and allergic reactions.
